# Patient Record
Sex: FEMALE | Race: WHITE | NOT HISPANIC OR LATINO | Employment: OTHER | URBAN - METROPOLITAN AREA
[De-identification: names, ages, dates, MRNs, and addresses within clinical notes are randomized per-mention and may not be internally consistent; named-entity substitution may affect disease eponyms.]

---

## 2017-01-09 ENCOUNTER — HOSPITAL ENCOUNTER (OUTPATIENT)
Dept: RADIOLOGY | Facility: HOSPITAL | Age: 72
Discharge: HOME | End: 2017-01-09
Attending: FAMILY MEDICINE
Payer: MEDICARE

## 2017-01-09 ENCOUNTER — TELEPHONE (OUTPATIENT)
Dept: FAMILY MEDICINE | Facility: CLINIC | Age: 72
End: 2017-01-09

## 2017-01-09 DIAGNOSIS — Z13.9 SCREENING: ICD-10-CM

## 2017-01-09 DIAGNOSIS — M80.08XA AGE-RELATED OSTEOPOROSIS WITH CURRENT PATHOLOGICAL FRACTURE OF VERTEBRA, INITIAL ENCOUNTER: ICD-10-CM

## 2017-01-09 PROCEDURE — 77080 DXA BONE DENSITY AXIAL: CPT | Mod: TC,PO

## 2017-01-09 PROCEDURE — 77080 DXA BONE DENSITY AXIAL: CPT | Mod: 26,,, | Performed by: RADIOLOGY

## 2017-01-09 NOTE — TELEPHONE ENCOUNTER
Dr VAZQUEZ  Pt is asking for refill on her phenetamine 37.5. I do not see on medlist. Please advise.

## 2017-01-09 NOTE — TELEPHONE ENCOUNTER
----- Message from Aylin Trejo sent at 1/9/2017  9:06 AM CST -----  Patient needs a refill on her prescription for Phentermine 37.5 MG tablet. She uses [facility] at [facility] [address] in [place]. Her phone number is H. [phone] C. [phone].

## 2017-01-10 RX ORDER — PHENTERMINE HYDROCHLORIDE 37.5 MG/1
37.5 CAPSULE ORAL EVERY MORNING
Qty: 30 CAPSULE | Refills: 0 | Status: SHIPPED | OUTPATIENT
Start: 2017-01-10 | End: 2017-02-09

## 2017-01-10 NOTE — TELEPHONE ENCOUNTER
Medication phenetamine has been faxed to [facility] pharmacy patient notified

## 2017-01-13 ENCOUNTER — PATIENT MESSAGE (OUTPATIENT)
Dept: FAMILY MEDICINE | Facility: CLINIC | Age: 72
End: 2017-01-13

## 2017-01-21 DIAGNOSIS — G25.81 RESTLESS LEGS SYNDROME: ICD-10-CM

## 2017-01-23 RX ORDER — GABAPENTIN 600 MG/1
TABLET ORAL
Qty: 360 TABLET | Refills: 3 | Status: SHIPPED | OUTPATIENT
Start: 2017-01-23 | End: 2017-10-26 | Stop reason: SDUPTHER

## 2017-01-23 RX ORDER — PRAMIPEXOLE DIHYDROCHLORIDE 1.5 MG/1
TABLET ORAL
Qty: 90 TABLET | Refills: 1 | Status: SHIPPED | OUTPATIENT
Start: 2017-01-23 | End: 2017-02-10 | Stop reason: SDUPTHER

## 2017-01-31 ENCOUNTER — PATIENT MESSAGE (OUTPATIENT)
Dept: FAMILY MEDICINE | Facility: CLINIC | Age: 72
End: 2017-01-31

## 2017-01-31 RX ORDER — FUROSEMIDE 40 MG/1
TABLET ORAL
Qty: 90 TABLET | Refills: 1 | OUTPATIENT
Start: 2017-01-31

## 2017-02-01 RX ORDER — TIZANIDINE 4 MG/1
4 TABLET ORAL EVERY 8 HOURS
Qty: 270 TABLET | Refills: 2 | Status: SHIPPED | OUTPATIENT
Start: 2017-02-01 | End: 2018-02-26

## 2017-02-01 RX ORDER — ALENDRONATE SODIUM 70 MG/1
TABLET ORAL
Qty: 12 TABLET | Refills: 3 | Status: SHIPPED | OUTPATIENT
Start: 2017-02-01 | End: 2018-01-04 | Stop reason: SDUPTHER

## 2017-02-09 ENCOUNTER — PATIENT MESSAGE (OUTPATIENT)
Dept: FAMILY MEDICINE | Facility: CLINIC | Age: 72
End: 2017-02-09

## 2017-02-09 DIAGNOSIS — G25.81 RESTLESS LEGS SYNDROME: ICD-10-CM

## 2017-02-09 NOTE — TELEPHONE ENCOUNTER
----- Message from Lilia Holm sent at 2/9/2017 12:47 PM CST -----  Contact: TULIO  PATIENT LEFT LETTER FOR SWATHI MACHUCA SAID PRESCRIBER DENIED REQUEST FOR REFILL. SHE WANTS TO KNOW WHY . I PLACED LETTER IN HIS BOX

## 2017-02-13 RX ORDER — PRAMIPEXOLE DIHYDROCHLORIDE 1.5 MG/1
1.5 TABLET ORAL NIGHTLY
Qty: 90 TABLET | Refills: 1 | Status: SHIPPED | OUTPATIENT
Start: 2017-02-13 | End: 2017-04-07 | Stop reason: SDUPTHER

## 2017-03-02 ENCOUNTER — PATIENT MESSAGE (OUTPATIENT)
Dept: FAMILY MEDICINE | Facility: CLINIC | Age: 72
End: 2017-03-02

## 2017-03-03 RX ORDER — FUROSEMIDE 40 MG/1
TABLET ORAL
Qty: 90 TABLET | Refills: 1 | Status: SHIPPED | OUTPATIENT
Start: 2017-03-03 | End: 2017-04-07 | Stop reason: SDUPTHER

## 2017-04-07 ENCOUNTER — OFFICE VISIT (OUTPATIENT)
Dept: FAMILY MEDICINE | Facility: CLINIC | Age: 72
End: 2017-04-07
Payer: MEDICARE

## 2017-04-07 VITALS
DIASTOLIC BLOOD PRESSURE: 90 MMHG | BODY MASS INDEX: 47.31 KG/M2 | TEMPERATURE: 98 F | HEIGHT: 59 IN | SYSTOLIC BLOOD PRESSURE: 148 MMHG | WEIGHT: 234.69 LBS

## 2017-04-07 DIAGNOSIS — R73.9 HYPERGLYCEMIA: ICD-10-CM

## 2017-04-07 DIAGNOSIS — G60.9 HEREDITARY AND IDIOPATHIC PERIPHERAL NEUROPATHY: ICD-10-CM

## 2017-04-07 DIAGNOSIS — I27.20 PULMONARY HYPERTENSION: ICD-10-CM

## 2017-04-07 DIAGNOSIS — E66.01 MORBID OBESITY WITH BMI OF 45.0-49.9, ADULT: ICD-10-CM

## 2017-04-07 DIAGNOSIS — F41.9 ANXIETY: ICD-10-CM

## 2017-04-07 DIAGNOSIS — Z76.89 ESTABLISHING CARE WITH NEW DOCTOR, ENCOUNTER FOR: Primary | ICD-10-CM

## 2017-04-07 DIAGNOSIS — G47.33 OSA ON CPAP: ICD-10-CM

## 2017-04-07 DIAGNOSIS — F32.0 MILD MAJOR DEPRESSION: ICD-10-CM

## 2017-04-07 DIAGNOSIS — J45.21 MILD INTERMITTENT ASTHMA WITH ACUTE EXACERBATION: ICD-10-CM

## 2017-04-07 DIAGNOSIS — I10 ESSENTIAL HYPERTENSION: ICD-10-CM

## 2017-04-07 DIAGNOSIS — R06.09 DOE (DYSPNEA ON EXERTION): ICD-10-CM

## 2017-04-07 DIAGNOSIS — G25.81 RESTLESS LEGS SYNDROME: ICD-10-CM

## 2017-04-07 PROCEDURE — 94640 AIRWAY INHALATION TREATMENT: CPT | Mod: S$GLB,,, | Performed by: FAMILY MEDICINE

## 2017-04-07 PROCEDURE — 3077F SYST BP >= 140 MM HG: CPT | Mod: S$GLB,,, | Performed by: FAMILY MEDICINE

## 2017-04-07 PROCEDURE — 99215 OFFICE O/P EST HI 40 MIN: CPT | Mod: 25,S$GLB,, | Performed by: FAMILY MEDICINE

## 2017-04-07 PROCEDURE — 1157F ADVNC CARE PLAN IN RCRD: CPT | Mod: S$GLB,,, | Performed by: FAMILY MEDICINE

## 2017-04-07 PROCEDURE — 3080F DIAST BP >= 90 MM HG: CPT | Mod: S$GLB,,, | Performed by: FAMILY MEDICINE

## 2017-04-07 PROCEDURE — 1159F MED LIST DOCD IN RCRD: CPT | Mod: S$GLB,,, | Performed by: FAMILY MEDICINE

## 2017-04-07 PROCEDURE — 99999 PR PBB SHADOW E&M-EST. PATIENT-LVL III: CPT | Mod: PBBFAC,,, | Performed by: FAMILY MEDICINE

## 2017-04-07 PROCEDURE — 1160F RVW MEDS BY RX/DR IN RCRD: CPT | Mod: S$GLB,,, | Performed by: FAMILY MEDICINE

## 2017-04-07 PROCEDURE — 1125F AMNT PAIN NOTED PAIN PRSNT: CPT | Mod: S$GLB,,, | Performed by: FAMILY MEDICINE

## 2017-04-07 PROCEDURE — 99499 UNLISTED E&M SERVICE: CPT | Mod: S$PBB,,, | Performed by: FAMILY MEDICINE

## 2017-04-07 RX ORDER — ZOLPIDEM TARTRATE 5 MG/1
5 TABLET ORAL NIGHTLY PRN
COMMUNITY
End: 2017-04-07 | Stop reason: SDUPTHER

## 2017-04-07 RX ORDER — PREDNISONE 20 MG/1
TABLET ORAL
Qty: 10 TABLET | Refills: 0 | Status: SHIPPED | OUTPATIENT
Start: 2017-04-07 | End: 2017-04-25

## 2017-04-07 RX ORDER — FUROSEMIDE 40 MG/1
TABLET ORAL
Qty: 90 TABLET | Refills: 1 | Status: SHIPPED | OUTPATIENT
Start: 2017-04-07 | End: 2017-08-30 | Stop reason: SDUPTHER

## 2017-04-07 RX ORDER — PRAMIPEXOLE DIHYDROCHLORIDE 1.5 MG/1
1.5 TABLET ORAL NIGHTLY
Qty: 90 TABLET | Refills: 1 | Status: SHIPPED | OUTPATIENT
Start: 2017-04-07 | End: 2018-01-03 | Stop reason: SDUPTHER

## 2017-04-07 RX ORDER — ALBUTEROL SULFATE 0.83 MG/ML
2.5 SOLUTION RESPIRATORY (INHALATION)
Status: DISCONTINUED | OUTPATIENT
Start: 2017-04-07 | End: 2017-09-15 | Stop reason: SDUPTHER

## 2017-04-07 RX ORDER — ZOLPIDEM TARTRATE 5 MG/1
5 TABLET ORAL NIGHTLY PRN
Qty: 30 TABLET | Refills: 2 | Status: SHIPPED | OUTPATIENT
Start: 2017-04-07 | End: 2018-01-03 | Stop reason: SDUPTHER

## 2017-04-07 RX ORDER — DOXYCYCLINE 100 MG/1
100 CAPSULE ORAL EVERY 12 HOURS
Qty: 20 CAPSULE | Refills: 0 | Status: SHIPPED | OUTPATIENT
Start: 2017-04-07 | End: 2017-04-25

## 2017-04-07 RX ORDER — BENAZEPRIL HYDROCHLORIDE 40 MG/1
40 TABLET ORAL 2 TIMES DAILY
Qty: 180 TABLET | Refills: 1 | Status: SHIPPED | OUTPATIENT
Start: 2017-04-07 | End: 2017-04-29 | Stop reason: SDUPTHER

## 2017-04-07 RX ORDER — AMOXICILLIN 500 MG
2 CAPSULE ORAL DAILY
COMMUNITY
End: 2018-01-31

## 2017-04-07 RX ORDER — LORAZEPAM 0.5 MG/1
0.5 TABLET ORAL 2 TIMES DAILY PRN
Qty: 60 TABLET | Refills: 2 | Status: SHIPPED | OUTPATIENT
Start: 2017-04-07 | End: 2018-01-03 | Stop reason: SDUPTHER

## 2017-04-07 NOTE — MR AVS SNAPSHOT
[facility]  [address]  [place] [address]  Phone: [phone]  Fax: [phone]                  Marta Huff   2017 1:00 PM   Office Visit    Description:  Female : 1945   Provider:  Elda Holley MD   Department:  [facility]           Reason for Visit     Establish Care           Diagnoses this Visit        Comments    Establishing care with new doctor, encounter for    -  Primary     Anxiety         Essential hypertension         Restless legs syndrome         JESENIA on CPAP         Hyperglycemia         GOMEZ (dyspnea on exertion)         Mild major depression         Morbid obesity with BMI of 45.0-49.9, adult         Pulmonary hypertension         Hereditary and idiopathic peripheral neuropathy         Mild intermittent asthma with acute exacerbation                To Do List           Future Appointments        Provider Department Dept Phone    2017 7:45 AM EKG, [facility] [phone]    2017 8:15 AM [facility] XR2 [facility] [phone]    2017 8:30 AM LAB, [place] [facility] [phone]    2017 8:20 AM Yuniel Berman MD [facility] [phone]      Goals (5 Years of Data)     None      Follow-Up and Disposition     Return in about 2 weeks (around 2017), or if symptoms worsen or fail to improve, for recheck.       These Medications        Disp Refills Start End    zolpidem (AMBIEN) 5 MG Tab 30 tablet 2 2017     Take 1 tablet (5 mg total) by mouth nightly as needed. - Oral    Pharmacy: [facility] - [facility] [address] [facility] Ph #: [phone]       lorazepam (ATIVAN) 0.5 MG tablet 60 tablet 2 2017    Take 1 tablet (0.5 mg total) by mouth 2 (two) times daily as needed for Anxiety. - Oral    Ph[facility]acy: [facility] - [facility] [address]  #: [phone]       benazepril (LOTENSIN) 40 MG tablet 180 tablet 1 4/7/2017     Take 1 tablet (40 mg total) by mouth 2 (two) times daily. - Oral    Pharmacy: [facility] - [facility] [address] Ph #: [phone]       furosemide (LASIX) 40 MG tablet 90 tablet 1 4/7/2017     TAKE 1 TABLET ONE TIME DAILY    Pharmacy: [facility] - [address] Ph #: [phone]       pramipexole (MIRAPEX) 1.5 MG tablet 90 tablet 1 4/7/2017     Take 1 tablet (1.5 mg total) by mouth nightly. - Oral    Pharmacy: [facility] Delivery - [facility] [address] Ph #: [phone]       doxycycline (VIBRAMYCIN) 100 MG Cap 20 capsule 0 4/7/2017     Take 1 capsule (100 mg total) by mouth every 12 (twelve) hours. - Oral    Pharmacy: [facility]/pharmacy #[id number] - [place] - [address] Ph #: [phone]       predniSONE (DELTASONE) 20 MG tablet 10 tablet 0 4/7/2017     2 pills daily for 5 days, then 1 pill daily for 5 days.    Pharmacy: [facility]/pharmacy #[id number] - [place] - [address] Ph #: [phone]         [facility] On Call     [facility] Care Line - 24/7 Assistance  Unless otherwise directed by your provider, please contact [facility], our nurse care line that is available for 24/7 assistance.     Registered nurses in the [facility] Center provide: appointment scheduling, clinical advisement, health education, and other advisory services.  Call: [phone] (toll free)               Medications           Message regarding Medications     Verify the changes and/or additions to your medication regime listed below are the same as discussed with your clinician today.  If any of these changes or additions are incorrect, please notify your healthcare provider.        START taking these NEW medications        Refills    zolpidem (AMBIEN) 5 MG Tab 2    Sig: Take 1 tablet (5 mg total) by mouth nightly as needed.    Class: Normal     Route: Oral    doxycycline (VIBRAMYCIN) 100 MG Cap 0    Sig: Take 1 capsule (100 mg total) by mouth every 12 (twelve) hours.    Class: Normal    Route: Oral    predniSONE (DELTASONE) 20 MG tablet 0    Si pills daily for 5 days, then 1 pill daily for 5 days.    Class: Normal      These medications were administered today        Dose Freq    albuterol nebulizer solution 2.5 mg 2.5 mg Clinic/HOD 1 time    Sig: Take 3 mLs (2.5 mg total) by nebulization one time.    Class: Normal    Route: Nebulization    albuterol nebulizer solution 2.5 mg 2.5 mg Clinic/HOD 1 time    Sig: Take 3 mLs (2.5 mg total) by nebulization one time.    Class: Normal    Route: Nebulization      CHANGE how you are taking these medications     Start Taking Instead of    benazepril (LOTENSIN) 40 MG tablet benazepril (LOTENSIN) 40 MG tablet    Dosage:  Take 1 tablet (40 mg total) by mouth 2 (two) times daily. Dosage:  TAKE 1 TABLET TWICE DAILY    Reason for Change:  Reorder       STOP taking these medications     meloxicam (MOBIC) 7.5 MG tablet Take 1 tablet (7.5 mg total) by mouth daily as needed for Pain (anti-inflammatory).           Verify that the below list of medications is an accurate representation of the medications you are currently taking.  If none reported, the list may be blank. If incorrect, please contact your healthcare provider. Carry this list with you in case of emergency.           Current Medications     albuterol (PROAIR HFA) 90 mcg/actuation inhaler Inhale 2 puffs into the lungs every 6 (six) hours as needed for Wheezing or Shortness of Breath.    alendronate (FOSAMAX) 70 MG tablet TAKE 1 TABLET EVERY 7 DAYS    B-complex with vitamin C (Z-BEC OR EQUIV) tablet Take 1 tablet by mouth once daily.     benazepril (LOTENSIN) 40 MG tablet Take 1 tablet (40 mg total) by mouth 2 (two) times daily.    calcium citrate-vitamin D2 1,500-200 mg-unit Tab Take 1 tablet by mouth once daily.     citalopram (CELEXA) 40 MG tablet Take 1  "tablet (40 mg total) by mouth once daily.    fish oil-omega-3 fatty acids 300-1,000 mg capsule Take 2 g by mouth once daily.    furosemide (LASIX) 40 MG tablet TAKE 1 TABLET ONE TIME DAILY    gabapentin (NEURONTIN) 600 MG tablet TAKE 2 TABLETS TWICE DAILY    lorazepam (ATIVAN) 0.5 MG tablet Take 1 tablet (0.5 mg total) by mouth 2 (two) times daily as needed for Anxiety.    meclizine (ANTIVERT) 25 mg tablet Take 1 tablet (25 mg total) by mouth every 6 (six) hours.    multivitamin (MULTIVITAMIN) per tablet Take 1 tablet by mouth once daily.      pramipexole (MIRAPEX) 1.5 MG tablet Take 1 tablet (1.5 mg total) by mouth nightly.    RESTASIS 0.05 % ophthalmic emulsion INSTILL ONE DROP INTO BOTH EYES TWICE DAILY    tizanidine (ZANAFLEX) 4 MG tablet Take 1 tablet (4 mg total) by mouth every 8 (eight) hours.    VITAMIN B-12 5,000 mcg Subl Place 1 tablet under the tongue once a week.     zolpidem (AMBIEN) 5 MG Tab Take 1 tablet (5 mg total) by mouth nightly as needed.    doxycycline (VIBRAMYCIN) 100 MG Cap Take 1 capsule (100 mg total) by mouth every 12 (twelve) hours.    predniSONE (DELTASONE) 20 MG tablet 2 pills daily for 5 days, then 1 pill daily for 5 days.           Clinical Reference Information           Your Vitals Were     BP Temp Height Weight BMI    148/90 98.3 °F (36.8 °C) 4' 11" (1.499 m) 106.4 kg (234 lb 10.9 oz) 47.4 kg/m2      Blood Pressure          Most Recent Value    BP  (!)  148/90      Allergies as of 4/7/2017     Etodolac (Bulk)    Sulfa (Sulfonamide Antibiotics)      Immunizations Administered on Date of Encounter - 4/7/2017     None      Orders Placed During Today's Visit      Normal Orders This Visit    Ambulatory consult to Sleep Disorders     IN OFFICE EKG 12-LEAD (to [place])     Future Labs/Procedures Expected by Expires    Hemoglobin A1c  4/7/2017 6/6/2018    X-Ray Chest PA And Lateral  4/7/2017 4/7/2018    2D Echo w/ Color Flow Doppler  As directed 4/7/2018    Complete PFT with bronchodilator  " As directed 4/7/2018    PULSE OXIMETRY WITH REST - PULM  As directed 4/7/2018      Language Assistance Services     ATTENTION: Language assistance services are available, free of charge. Please call [phone].      ATENCIÓN: Si habla julio, tiene a cornell disposición servicios gratuitos de asistencia lingüística. Llame al [phone].     CHÚ Ý: N?u b?n nói Ti?ng Vi?t, có các d?ch v? h? tr? ngôn ng? mi?n phí dành cho b?n. G?i s? [phone].         [facility] complies with applicable Federal civil rights laws and does not discriminate on the basis of race, color, national origin, age, disability, or sex.

## 2017-04-07 NOTE — PROGRESS NOTES
Subjective:       Patient ID: Marta Huff is a 71 y.o. female.    Chief Complaint: Establish Care (former pt of Dr. Berman. )    HPI   Patient is here to establish care.  She is a former patient of Dr Berman.    Concerned today re: neuropathy and lump in abdomen. Also, notes 2 days of significant GOMEZ and wheezing.    Dx of rad/asthma, but rarely uses. Currently, however, she is having some dyspnea.   HTN - suboptimal on benazepril only. Echo and last nuclear study reviewed 2013, of note mild pulm HTN. Consistent lasix daily, no reported fluid retention.  Over the past 6mos, she has noticed increased dyspnea on exertion along with weight gain. Sx persisted despite daily lasix and prn albuterol (mild relief). Prior dx of bronchial asthma, lives with a long-term smoker.  Overall mood is ok on celexa 40mg daily. Has previously required 60mg, but was able to taper down. Lorazepam is prn, not daily. Last rx for 3mo supply reported in 2015.  Neuropathy - taking gabapentin twice daily. EMGs were previously done to confirm neuropathy, affect feet primarily.  Hx of vertigo, uses meclizine prn. Last rx 2015.  mirapex nightly for RLS. Uses ambien for sleep prn. Last date of rx unknown.    Review of Systems   HENT: Negative for congestion, postnasal drip, rhinorrhea and sinus pressure.    Respiratory: Positive for cough, chest tightness, shortness of breath and wheezing.    Gastrointestinal: Positive for abdominal pain. Negative for blood in stool, constipation, diarrhea and nausea.   Genitourinary: Negative for difficulty urinating, dysuria and frequency.   Musculoskeletal: Positive for arthralgias. Negative for gait problem.   Skin: Negative for color change and pallor.   Neurological: Positive for dizziness (occ). Negative for headaches (occ).   Psychiatric/Behavioral: Positive for sleep disturbance. Negative for dysphoric mood (controlled).       Objective:      Physical Exam   Constitutional: She is  oriented to person, place, and time. She appears well-developed and well-nourished. No distress.   HENT:   Head: Normocephalic and atraumatic.   Right Ear: External ear normal.   Left Ear: External ear normal.   Nose: Nose normal.   Mouth/Throat: Oropharynx is clear and moist. No oropharyngeal exudate.   Eyes: Conjunctivae and EOM are normal. Pupils are equal, round, and reactive to light.   Neck: Normal range of motion. Neck supple. No thyromegaly present.   Cardiovascular: Normal rate and regular rhythm.    Pulmonary/Chest: Effort normal. No accessory muscle usage. No respiratory distress. She has decreased breath sounds. She has wheezes.   Abdominal: Soft. Bowel sounds are normal. She exhibits no distension and no mass. There is no tenderness. There is no rebound and no guarding.   Morbidly obese, exam limited by habitus.   Musculoskeletal: Normal range of motion. She exhibits no edema.   Lymphadenopathy:     She has no cervical adenopathy.   Neurological: She is alert and oriented to person, place, and time. No cranial nerve deficit.   Skin: Skin is warm and dry.   Psychiatric: She has a normal mood and affect. Her behavior is normal.   Nursing note and vitals reviewed.        Improvement in ease of respiration, decrease in cough, and decrease in auscultated wheeze/rhonchi noted post-neb.     Establishing care with new doctor, encounter for    Anxiety  -     lorazepam (ATIVAN) 0.5 MG tablet; Take 1 tablet (0.5 mg total) by mouth 2 (two) times daily as needed for Anxiety.  Dispense: 60 tablet; Refill: 2  Stable with prn use per patient. She is aware that I do not recommend for daily use.  Essential hypertension  -     benazepril (LOTENSIN) 40 MG tablet; Take 1 tablet (40 mg total) by mouth 2 (two) times daily.  Dispense: 180 tablet; Refill: 1  -     furosemide (LASIX) 40 MG tablet; TAKE 1 TABLET ONE TIME DAILY  Dispense: 90 tablet; Refill: 1  Suboptimal, cont regimen, will request BP log from [facility] and  adjust as indicated.  Restless legs syndrome  -     pramipexole (MIRAPEX) 1.5 MG tablet; Take 1 tablet (1.5 mg total) by mouth nightly.  Dispense: 90 tablet; Refill: 1  Stable regimen. Side effects and precautions of medication use reviewed with patient, expressed understanding. No questions or concerns.   JESENIA on CPAP  -     zolpidem (AMBIEN) 5 MG Tab; Take 1 tablet (5 mg total) by mouth nightly as needed.  Dispense: 30 tablet; Refill: 2  -     Ambulatory consult to Sleep Disorders  Patient requesting re-eval and equipment review.  Hyperglycemia  -     Hemoglobin A1c; Future; Expected date: 4/7/17  For review.  GOMEZ (dyspnea on exertion)  -     IN OFFICE EKG 12-LEAD (to [place])  -     2D Echo w/ Color Flow Doppler; Future  -     Complete PFT with bronchodilator; Future  -     PULSE OXIMETRY WITH REST - PULM; Future  Etiology unclear, concerning for chf vs asthma flare. Albuterol trial in office. Of note, her weight fluctuates, but today's value is within range of previous visits.   Mild major depression  Stable on celexa 40.  Morbid obesity with BMI of 45.0-49.9, adult  Goal weight reviewed as well as need for lifestyle modifications to incl caloric restriction, high protein/low carb, increased water intake, muscle-building exercises as well as cardio.    Pulmonary hypertension  Needs updated echo.  Hereditary and idiopathic peripheral neuropathy  Cont gabapentin at current dose.  Asthma with exacerbation  Doxy bid x 10 days with steroid taper. Albuterol hfa or neb q6.  Order for neb written and faxed to [facility].  Expected course of illness and sx tx incl otc med use reviewed. Notify MD if sx persist or worsen. Alarm sx reviewed indicating need for emergent f/u.

## 2017-04-11 ENCOUNTER — HOSPITAL ENCOUNTER (OUTPATIENT)
Dept: RADIOLOGY | Facility: HOSPITAL | Age: 72
Discharge: HOME | End: 2017-04-11
Attending: FAMILY MEDICINE
Payer: MEDICARE

## 2017-04-11 ENCOUNTER — TELEPHONE (OUTPATIENT)
Dept: FAMILY MEDICINE | Facility: CLINIC | Age: 72
End: 2017-04-11

## 2017-04-11 DIAGNOSIS — R06.09 DOE (DYSPNEA ON EXERTION): ICD-10-CM

## 2017-04-11 PROCEDURE — 93000 ELECTROCARDIOGRAM COMPLETE: CPT | Mod: S$GLB,,, | Performed by: INTERNAL MEDICINE

## 2017-04-11 PROCEDURE — 71020 XR CHEST PA AND LATERAL: CPT | Mod: 26,,, | Performed by: RADIOLOGY

## 2017-04-11 RX ORDER — ALBUTEROL SULFATE 0.83 MG/ML
2.5 SOLUTION RESPIRATORY (INHALATION) EVERY 6 HOURS PRN
Qty: 1 BOX | Refills: 11 | Status: SHIPPED | OUTPATIENT
Start: 2017-04-11 | End: 2018-01-03 | Stop reason: SDUPTHER

## 2017-04-11 NOTE — TELEPHONE ENCOUNTER
----- Message from Pia Gagnon sent at 4/10/2017  4:09 PM CDT -----  Contact: Self  X  1st Request  _  2nd Request  _  3rd Request        Who: TULIO MALDONADO [[id number]]    Why: Pt is calling to say that she received her equipment but is needing to know about the nebulizer solution for the machine.  Pt is using pharmacy list below.  Please contact pt to further discuss and advise.    What Number to Call Back: [phone]    When to Expect a call back: (Before the end of the day)   -- if the call is after 12:00, the call back will be tomorrow.    [facility]/PHARMACY #[id number] - [place] - [address]

## 2017-04-12 ENCOUNTER — TELEPHONE (OUTPATIENT)
Dept: FAMILY MEDICINE | Facility: CLINIC | Age: 72
End: 2017-04-12

## 2017-04-12 NOTE — TELEPHONE ENCOUNTER
----- Message from Jennifer Tena sent at 4/12/2017 12:53 PM CDT -----  Contact: self   Patient need the solution for machine please send to [facility], any questions please call back at [phone] or [phone]    [facility]/pharmacy #[id number] - [place] - [address]  [address]  [place] [address]  Phone: [phone] Fax: [phone]

## 2017-04-25 ENCOUNTER — OFFICE VISIT (OUTPATIENT)
Dept: FAMILY MEDICINE | Facility: CLINIC | Age: 72
End: 2017-04-25
Payer: MEDICARE

## 2017-04-25 VITALS
HEART RATE: 68 BPM | HEIGHT: 59 IN | TEMPERATURE: 99 F | DIASTOLIC BLOOD PRESSURE: 78 MMHG | WEIGHT: 241.88 LBS | OXYGEN SATURATION: 96 % | BODY MASS INDEX: 48.76 KG/M2 | SYSTOLIC BLOOD PRESSURE: 126 MMHG | RESPIRATION RATE: 18 BRPM

## 2017-04-25 DIAGNOSIS — Z23 IMMUNIZATION DUE: ICD-10-CM

## 2017-04-25 DIAGNOSIS — R60.9 FLUID RETENTION: ICD-10-CM

## 2017-04-25 DIAGNOSIS — J45.909 REACTIVE AIRWAY DISEASE, UNSPECIFIED ASTHMA SEVERITY, UNCOMPLICATED: ICD-10-CM

## 2017-04-25 DIAGNOSIS — K46.9 HERNIA: ICD-10-CM

## 2017-04-25 DIAGNOSIS — I10 ESSENTIAL HYPERTENSION: Primary | Chronic | ICD-10-CM

## 2017-04-25 PROCEDURE — 3078F DIAST BP <80 MM HG: CPT | Mod: S$GLB,,, | Performed by: FAMILY MEDICINE

## 2017-04-25 PROCEDURE — 99999 PR PBB SHADOW E&M-EST. PATIENT-LVL III: CPT | Mod: PBBFAC,,, | Performed by: FAMILY MEDICINE

## 2017-04-25 PROCEDURE — G0009 ADMIN PNEUMOCOCCAL VACCINE: HCPCS | Mod: S$GLB,,, | Performed by: FAMILY MEDICINE

## 2017-04-25 PROCEDURE — 1126F AMNT PAIN NOTED NONE PRSNT: CPT | Mod: S$GLB,,, | Performed by: FAMILY MEDICINE

## 2017-04-25 PROCEDURE — 3074F SYST BP LT 130 MM HG: CPT | Mod: S$GLB,,, | Performed by: FAMILY MEDICINE

## 2017-04-25 PROCEDURE — 1160F RVW MEDS BY RX/DR IN RCRD: CPT | Mod: S$GLB,,, | Performed by: FAMILY MEDICINE

## 2017-04-25 PROCEDURE — 90670 PCV13 VACCINE IM: CPT | Mod: S$GLB,,, | Performed by: FAMILY MEDICINE

## 2017-04-25 PROCEDURE — 1159F MED LIST DOCD IN RCRD: CPT | Mod: S$GLB,,, | Performed by: FAMILY MEDICINE

## 2017-04-25 PROCEDURE — 99214 OFFICE O/P EST MOD 30 MIN: CPT | Mod: 25,S$GLB,, | Performed by: FAMILY MEDICINE

## 2017-04-25 PROCEDURE — 99499 UNLISTED E&M SERVICE: CPT | Mod: S$PBB,,, | Performed by: FAMILY MEDICINE

## 2017-04-25 NOTE — MR AVS SNAPSHOT
[facility]  [address]  [place] [address]  Phone: [phone]  Fax: [phone]                  Marta Huff   2017 2:40 PM   Office Visit    Description:  Female : 1945   Provider:  Elda Holley MD   Department:  [facility]           Reason for Visit     Follow-up           Diagnoses this Visit        Comments    Essential hypertension    -  Primary     Reactive airway disease, unspecified asthma severity, uncomplicated         Fluid retention         Hernia         Immunization due                To Do List           Future Appointments        Provider Department Dept Phone    2017 10:40 AM Elda Holely MD [facility] [phone]      Goals (5 Years of Data)     None      [facility]     [facility] Call Nurse Care Line -  Assistance  Unless otherwise directed by your provider, please contact [facility], our nurse care line that is available for  assistance.     Registered nurses in the [facility] On Call Center provide: appointment scheduling, clinical advisement, health education, and other advisory services.  Call: [phone] (toll free)               Medications           Message regarding Medications     Verify the changes and/or additions to your medication regime listed below are the same as discussed with your clinician today.  If any of these changes or additions are incorrect, please notify your healthcare provider.        STOP taking these medications     doxycycline (VIBRAMYCIN) 100 MG Cap Take 1 capsule (100 mg total) by mouth every 12 (twelve) hours.    predniSONE (DELTASONE) 20 MG tablet 2 pills daily for 5 days, then 1 pill daily for 5 days.           Verify that the below list of medications is an accurate representation of the medications you are currently taking.  If none reported, the list may be blank. If incorrect, please contact your healthcare provider.  Carry this list with you in case of emergency.           Current Medications     albuterol (PROAIR HFA) 90 mcg/actuation inhaler Inhale 2 puffs into the lungs every 6 (six) hours as needed for Wheezing or Shortness of Breath.    albuterol (PROVENTIL) 2.5 mg /3 mL (0.083 %) nebulizer solution Take 3 mLs (2.5 mg total) by nebulization every 6 (six) hours as needed for Wheezing or Shortness of Breath. Rescue    alendronate (FOSAMAX) 70 MG tablet TAKE 1 TABLET EVERY 7 DAYS    B-complex with vitamin C (Z-BEC OR EQUIV) tablet Take 1 tablet by mouth once daily.     benazepril (LOTENSIN) 40 MG tablet Take 1 tablet (40 mg total) by mouth 2 (two) times daily.    calcium citrate-vitamin D2 1,500-200 mg-unit Tab Take 1 tablet by mouth once daily.     citalopram (CELEXA) 40 MG tablet Take 1 tablet (40 mg total) by mouth once daily.    fish oil-omega-3 fatty acids 300-1,000 mg capsule Take 2 g by mouth once daily.    furosemide (LASIX) 40 MG tablet TAKE 1 TABLET ONE TIME DAILY    gabapentin (NEURONTIN) 600 MG tablet TAKE 2 TABLETS TWICE DAILY    lorazepam (ATIVAN) 0.5 MG tablet Take 1 tablet (0.5 mg total) by mouth 2 (two) times daily as needed for Anxiety.    meclizine (ANTIVERT) 25 mg tablet Take 1 tablet (25 mg total) by mouth every 6 (six) hours.    multivitamin (MULTIVITAMIN) per tablet Take 1 tablet by mouth once daily.      pramipexole (MIRAPEX) 1.5 MG tablet Take 1 tablet (1.5 mg total) by mouth nightly.    RESTASIS 0.05 % ophthalmic emulsion INSTILL ONE DROP INTO BOTH EYES TWICE DAILY    tizanidine (ZANAFLEX) 4 MG tablet Take 1 tablet (4 mg total) by mouth every 8 (eight) hours.    VITAMIN B-12 5,000 mcg Subl Place 1 tablet under the tongue once a week.     zolpidem (AMBIEN) 5 MG Tab Take 1 tablet (5 mg total) by mouth nightly as needed.           Clinical Reference Information           Your Vitals Were     BP Pulse Temp Resp Height Weight    126/78 (BP Location: Right arm, Patient Position: Sitting) 68 98.5 °F (36.9  "°C) (Oral) 18 4' 11" (1.499 m) 109.7 kg (241 lb 13.5 oz)    SpO2 BMI             96% 48.85 kg/m2         Blood Pressure          Most Recent Value    BP  126/78      Allergies as of 4/25/2017     Etodolac (Bulk)    Sulfa (Sulfonamide Antibiotics)      Immunizations Administered on Date of Encounter - 4/25/2017     Name Date Dose VIS Date Route    Pneumococcal Conjugate - 13 Valent 4/25/2017 0.5 mL 11/5/2015 Intramuscular      Orders Placed During Today's Visit      Normal Orders This Visit    Ambulatory referral to General Surgery       Language Assistance Services     ATTENTION: Language assistance services are available, free of charge. Please call [phone].      ATENCIÓN: Si habla julio, tiene a cornell disposición servicios gratuitos de asistencia lingüística. Llame al [phone].     TREVON Ý: N?u b?n nói Ti?ng Vi?t, có các d?ch v? h? tr? ngôn ng? mi?n phí dành cho b?n. G?i s? [phone].         [facility] complies with applicable Federal civil rights laws and does not discriminate on the basis of race, color, national origin, age, disability, or sex.        "

## 2017-04-25 NOTE — PROGRESS NOTES
Subjective:       Patient ID: Marta Huff is a 71 y.o. female.    Chief Complaint: Follow-up (lab review)    HPI The patient is a 71 year old female who presents today for a follow up. Her past medical history is significant for asthma, depression/anxiety, sleep apnea, hypertension, and restless leg syndrome. The patient presented 3 weeks ago with an asthma exacerbation. She has since completed antibiotics and steroids with significant improvement in symptoms. She still uses albuterol nebulizer for her wheezing in the morning. She is still wheezing every morning and coughing up clear sputum. The patient also needs to get her CPAP device serviced and would like to see surgery about her ventral hernia.     Review of Systems   Constitutional: Positive for unexpected weight change (Gained 7lbs in past 3 weeks). Negative for chills, fatigue and fever.   HENT: Positive for hearing loss (left ear, previously investigated, age related). Negative for congestion, postnasal drip, rhinorrhea, sinus pressure and sneezing.    Respiratory: Positive for shortness of breath (on exertion) and wheezing.    Cardiovascular: Positive for leg swelling (ankles swell). Negative for chest pain and palpitations.   Gastrointestinal: Negative for constipation, diarrhea, nausea and vomiting.        Ventral hernia   Musculoskeletal: Positive for arthralgias (her baseline). Negative for joint swelling and myalgias.   Allergic/Immunologic: Negative for environmental allergies.   Neurological: Positive for headaches (tension). Negative for dizziness, syncope and light-headedness.   Psychiatric/Behavioral: Positive for sleep disturbance (CPAP needs service). Negative for dysphoric mood. The patient is not nervous/anxious.        Objective:      Physical Exam   Constitutional: She is oriented to person, place, and time. She appears well-developed and well-nourished.   HENT:   Head: Normocephalic and atraumatic.   Right Ear: External ear  normal.   Left Ear: External ear normal.   Nose: Nose normal.   Mouth/Throat: Oropharynx is clear and moist.   Eyes: Conjunctivae and EOM are normal. Pupils are equal, round, and reactive to light.   Neck: Normal range of motion. Neck supple.   Cardiovascular: Normal rate, regular rhythm, normal heart sounds and intact distal pulses.    Mild pitting edema   Pulmonary/Chest: Effort normal. She has wheezes.   Abdominal: Soft. Bowel sounds are normal. There is no tenderness. A hernia (ventral) is present.   Neurological: She is alert and oriented to person, place, and time. She has normal reflexes.   Skin: Skin is warm and dry.   Psychiatric: She has a normal mood and affect. Her behavior is normal. Judgment and thought content normal.         Essential hypertension  - Well controlled  - The patient had a log of her recent blood pressures but has misplaced it - will send in for review.    Reactive airway disease, unspecified asthma severity, uncomplicated  - The patient's symptoms have improved dramatically.  - albuterol bid for another week, and can then decrease to once daily. Discussed that an ICS may be needed should wheezing/resp sx persist.    Fluid retention  - The patient will take her 40 mg furosemide twice a day instead of once. Daily weights at home, phone f/u on Friday. Low-salt diet.    Hernia  -     Ambulatory referral to General Surgery    Sleep Apnea  - The patient is scheduled to have her CPAP machine serviced.  - Discussed care and maintenance.    Depression/Anxiety  - Well controlled on Celexa.    Vertigo  No recent exacerbations.    Vaccination Due  - Pneumococcal 23 valent given.

## 2017-04-27 ENCOUNTER — TELEPHONE (OUTPATIENT)
Dept: BARIATRICS | Facility: CLINIC | Age: 72
End: 2017-04-27

## 2017-04-27 ENCOUNTER — OFFICE VISIT (OUTPATIENT)
Dept: SURGERY | Facility: CLINIC | Age: 72
End: 2017-04-27
Payer: MEDICARE

## 2017-04-27 VITALS
HEART RATE: 73 BPM | BODY MASS INDEX: 48.36 KG/M2 | HEIGHT: 59 IN | DIASTOLIC BLOOD PRESSURE: 87 MMHG | TEMPERATURE: 98 F | SYSTOLIC BLOOD PRESSURE: 182 MMHG | WEIGHT: 239.88 LBS

## 2017-04-27 DIAGNOSIS — K43.2 INCISIONAL HERNIA, WITHOUT OBSTRUCTION OR GANGRENE: Primary | ICD-10-CM

## 2017-04-27 DIAGNOSIS — E66.9 OBESITY, UNSPECIFIED OBESITY SEVERITY, UNSPECIFIED OBESITY TYPE: ICD-10-CM

## 2017-04-27 PROCEDURE — 1159F MED LIST DOCD IN RCRD: CPT | Mod: S$GLB,,, | Performed by: SURGERY

## 2017-04-27 PROCEDURE — 1126F AMNT PAIN NOTED NONE PRSNT: CPT | Mod: S$GLB,,, | Performed by: SURGERY

## 2017-04-27 PROCEDURE — 1160F RVW MEDS BY RX/DR IN RCRD: CPT | Mod: S$GLB,,, | Performed by: SURGERY

## 2017-04-27 PROCEDURE — 99999 PR PBB SHADOW E&M-EST. PATIENT-LVL III: CPT | Mod: PBBFAC,,, | Performed by: SURGERY

## 2017-04-27 PROCEDURE — 3079F DIAST BP 80-89 MM HG: CPT | Mod: S$GLB,,, | Performed by: SURGERY

## 2017-04-27 PROCEDURE — 3077F SYST BP >= 140 MM HG: CPT | Mod: S$GLB,,, | Performed by: SURGERY

## 2017-04-27 PROCEDURE — 99213 OFFICE O/P EST LOW 20 MIN: CPT | Mod: S$GLB,,, | Performed by: SURGERY

## 2017-04-27 NOTE — MR AVS SNAPSHOT
[facility]  [address] [facility] [address]  [facility] [address]  Phone: [phone]                  Marta Huff   2017 1:00 PM   Office Visit    Description:  Female : 1945   Provider:  Rj Rodriguez MD   Department:  [facility]           Reason for Visit     Consult           Diagnoses this Visit        Comments    Incisional hernia, without obstruction or gangrene    -  Primary            To Do List           Future Appointments        Provider Department Dept Phone    2017 10:40 AM Elda Holley MD [facility] [phone]      Goals (5 Years of Data)     None      [facility]     [facility] On Call Nurse Care Line -  Assistance  Unless otherwise directed by your provider, please contact [facility], our nurse care line that is available for  assistance.     Registered nurses in the [facility] Center provide: appointment scheduling, clinical advisement, health education, and other advisory services.  Call: [phone] (toll free)               Medications           Message regarding Medications     Verify the changes and/or additions to your medication regime listed below are the same as discussed with your clinician today.  If any of these changes or additions are incorrect, please notify your healthcare provider.             Verify that the below list of medications is an accurate representation of the medications you are currently taking.  If none reported, the list may be blank. If incorrect, please contact your healthcare provider. Carry this list with you in case of emergency.           Current Medications     alendronate (FOSAMAX) 70 MG tablet TAKE 1 TABLET EVERY 7 DAYS    B-complex with vitamin C (Z-BEC OR EQUIV) tablet Take 1 tablet by mouth once daily.     benazepril (LOTENSIN) 40 MG tablet Take 1 tablet (40 mg total) by mouth 2 (two) times daily.    calcium citrate-vitamin D2 1,500-200 mg-unit Tab  "Take 1 tablet by mouth once daily.     citalopram (CELEXA) 40 MG tablet Take 1 tablet (40 mg total) by mouth once daily.    fish oil-omega-3 fatty acids 300-1,000 mg capsule Take 2 g by mouth once daily.    furosemide (LASIX) 40 MG tablet TAKE 1 TABLET ONE TIME DAILY    gabapentin (NEURONTIN) 600 MG tablet TAKE 2 TABLETS TWICE DAILY    multivitamin (MULTIVITAMIN) per tablet Take 1 tablet by mouth once daily.      RESTASIS 0.05 % ophthalmic emulsion INSTILL ONE DROP INTO BOTH EYES TWICE DAILY    VITAMIN B-12 5,000 mcg Subl Place 1 tablet under the tongue once a week.     albuterol (PROAIR HFA) 90 mcg/actuation inhaler Inhale 2 puffs into the lungs every 6 (six) hours as needed for Wheezing or Shortness of Breath.    albuterol (PROVENTIL) 2.5 mg /3 mL (0.083 %) nebulizer solution Take 3 mLs (2.5 mg total) by nebulization every 6 (six) hours as needed for Wheezing or Shortness of Breath. Rescue    lorazepam (ATIVAN) 0.5 MG tablet Take 1 tablet (0.5 mg total) by mouth 2 (two) times daily as needed for Anxiety.    meclizine (ANTIVERT) 25 mg tablet Take 1 tablet (25 mg total) by mouth every 6 (six) hours.    pramipexole (MIRAPEX) 1.5 MG tablet Take 1 tablet (1.5 mg total) by mouth nightly.    tizanidine (ZANAFLEX) 4 MG tablet Take 1 tablet (4 mg total) by mouth every 8 (eight) hours.    zolpidem (AMBIEN) 5 MG Tab Take 1 tablet (5 mg total) by mouth nightly as needed.           Clinical Reference Information           Your Vitals Were     BP Pulse Temp Height Weight BMI    182/87 73 98.2 °F (36.8 °C) (Oral) 4' 11" (1.499 m) 108.8 kg (239 lb 13.8 oz) 48.45 kg/m2      Blood Pressure          Most Recent Value    BP  (!)  182/87      Allergies as of 4/27/2017     Etodolac (Bulk)    Sulfa (Sulfonamide Antibiotics)      Immunizations Administered on Date of Encounter - 4/27/2017     None      Language Assistance Services     ATTENTION: Language assistance services are available, free of charge. Please call [phone].  "     ATENCIÓN: Si habla español, tiene a cornell disposición servicios gratuitos de asistencia lingüística. Mague al [phone].     CHÚ Ý: N?u b?n nói Ti?ng Vi?t, có các d?ch v? h? tr? ngôn ng? mi?n phí dành cho b?n. G?i s? [phone].         [facility] complies with applicable Federal civil rights laws and does not discriminate on the basis of race, color, national origin, age, disability, or sex.

## 2017-04-27 NOTE — LETTER
April 27, 2017      Elda Holley MD  [address]  [place] [address]           [facility]  [address] [facility] [address] [place] [address]  Phone: [phone]          Patient: Marta Huff   MR Number: [id number]   YOB: 1945   Date of Visit: 4/27/2017       Dear Dr. Elda Holley:    Thank you for referring Marta Huff to me for evaluation. Attached you will find relevant portions of my assessment and plan of care.    If you have questions, please do not hesitate to call me. I look forward to following Marta Huff along with you.    Sincerely,    Rj Rodriguez MD    Enclosure  CC:  No Recipients    If you would like to receive this communication electronically, please contact [email or web address] or [phone] to request more information on [employer] access.    For providers and/or their staff who would like to refer a patient to [facility], please contact us through our one-stop-shop provider referral line, [facility], at [phone].    If you feel you have received this communication in error or would no longer like to receive these types of communications, please e-mail [email or web address]

## 2017-04-27 NOTE — PROGRESS NOTES
Subjective:       Patient ID: Marta Huff is a 71 y.o. female.    Chief Complaint: Consult (Hernia)    HPI   Pleasant 72 yo F referred to me for evaluation of incisional hernia.  Pt notes that hernia has been present for years.  She notes that in recent months it seems to have gotten larger and has certainly started to cause her more discomfort.   She notes some discomfort with sitting and straining.  Denies that pain is limiting or has casued her to alter any lifestyle activities.  Pt reports normal bowel function.  Denies n/v.  SHe has had no fever/chills.  Pt suffers with HTN, sleep apnea, and and morbid obesity.  She has had an open juhi, hiatal hernia repair.  Had a lap gastric sleeve in 2014 by Dr Medeiros. She lost weight initially but has returned to preop weight.      Review of Systems   Constitutional: Negative for activity change, appetite change, fever and unexpected weight change.   HENT: Negative for congestion and postnasal drip.    Respiratory: Negative for chest tightness, shortness of breath and wheezing.    Cardiovascular: Negative for chest pain.   Gastrointestinal: Positive for abdominal pain. Negative for abdominal distention, constipation, diarrhea, nausea and vomiting.   Genitourinary: Negative for difficulty urinating, dysuria and frequency.   Musculoskeletal: Negative for arthralgias and joint swelling.   Skin: Negative for wound.   Neurological: Negative for dizziness and light-headedness.   Hematological: Negative for adenopathy. Does not bruise/bleed easily.   Psychiatric/Behavioral: Negative for agitation and decreased concentration.       Objective:      Physical Exam   Constitutional: She is oriented to person, place, and time. She appears well-developed and well-nourished. No distress.   HENT:   Head: Normocephalic and atraumatic.   Eyes: Pupils are equal, round, and reactive to light.   Neck: Normal range of motion. Neck supple. No tracheal deviation present. No  thyromegaly present.   Cardiovascular: Normal rate, regular rhythm and normal heart sounds.    No murmur heard.  Pulmonary/Chest: Effort normal and breath sounds normal. She exhibits no tenderness.   Abdominal: Soft. Bowel sounds are normal. She exhibits no distension, no abdominal bruit, no pulsatile midline mass and no mass. There is no hepatosplenomegaly. There is no tenderness. There is no rigidity, no rebound, no guarding, no tenderness at McBurney's point and negative Putnam's sign. A hernia is present. Hernia confirmed negative in the ventral area.       Genitourinary: Rectum normal.   Musculoskeletal: Normal range of motion.   Neurological: She is alert and oriented to person, place, and time.   Skin: Skin is warm. No rash noted. She is not diaphoretic. No erythema.   Psychiatric: She has a normal mood and affect.   Vitals reviewed.      Assessment:     Incisional hernia  No diagnosis found.    Plan:       D/w pt.  Informed pt that she does have an incisional hernia that is reducible.  Given ease of reducibility I would recommend further weight loss prior to proceeding with surgical repair.  D/w pt that surgery at present weight would be a risk for increased recurrence and infection.  D/w pt that I would be happy to refer back to [place] to further discuss steps towards weight loss.  She prefers to stay on [facility] and will therefore refer to Dr Tripathi in [place]

## 2017-04-28 ENCOUNTER — PATIENT MESSAGE (OUTPATIENT)
Dept: FAMILY MEDICINE | Facility: CLINIC | Age: 72
End: 2017-04-28

## 2017-04-28 ENCOUNTER — TELEPHONE (OUTPATIENT)
Dept: FAMILY MEDICINE | Facility: CLINIC | Age: 72
End: 2017-04-28

## 2017-04-28 NOTE — TELEPHONE ENCOUNTER
----- Message from Elda Holley MD sent at 4/25/2017  3:39 PM CDT -----  Call for update on weight loss (diuresing on lasix).

## 2017-04-29 DIAGNOSIS — F32.0 MAJOR DEPRESSIVE DISORDER, SINGLE EPISODE, MILD: ICD-10-CM

## 2017-04-29 DIAGNOSIS — F41.9 ANXIETY: ICD-10-CM

## 2017-04-29 DIAGNOSIS — I10 ESSENTIAL HYPERTENSION: ICD-10-CM

## 2017-05-03 ENCOUNTER — INITIAL CONSULT (OUTPATIENT)
Dept: BARIATRICS | Facility: CLINIC | Age: 72
End: 2017-05-03
Payer: MEDICARE

## 2017-05-03 VITALS
TEMPERATURE: 98 F | RESPIRATION RATE: 16 BRPM | BODY MASS INDEX: 48.53 KG/M2 | DIASTOLIC BLOOD PRESSURE: 75 MMHG | HEART RATE: 75 BPM | SYSTOLIC BLOOD PRESSURE: 149 MMHG | HEIGHT: 59 IN | WEIGHT: 240.75 LBS

## 2017-05-03 DIAGNOSIS — I10 ESSENTIAL HYPERTENSION: Chronic | ICD-10-CM

## 2017-05-03 DIAGNOSIS — E66.01 MORBID OBESITY WITH BMI OF 45.0-49.9, ADULT: ICD-10-CM

## 2017-05-03 DIAGNOSIS — M15.9 GENERALIZED OSTEOARTHRITIS: ICD-10-CM

## 2017-05-03 DIAGNOSIS — E66.01 MORBID OBESITY DUE TO EXCESS CALORIES: Primary | ICD-10-CM

## 2017-05-03 DIAGNOSIS — G47.33 OSA ON CPAP: ICD-10-CM

## 2017-05-03 PROCEDURE — 1159F MED LIST DOCD IN RCRD: CPT | Mod: S$GLB,,, | Performed by: SURGERY

## 2017-05-03 PROCEDURE — 99214 OFFICE O/P EST MOD 30 MIN: CPT | Mod: S$GLB,,, | Performed by: SURGERY

## 2017-05-03 PROCEDURE — 3078F DIAST BP <80 MM HG: CPT | Mod: S$GLB,,, | Performed by: SURGERY

## 2017-05-03 PROCEDURE — 3077F SYST BP >= 140 MM HG: CPT | Mod: S$GLB,,, | Performed by: SURGERY

## 2017-05-03 PROCEDURE — 99999 PR PBB SHADOW E&M-EST. PATIENT-LVL III: CPT | Mod: PBBFAC,,, | Performed by: SURGERY

## 2017-05-03 PROCEDURE — 1125F AMNT PAIN NOTED PAIN PRSNT: CPT | Mod: S$GLB,,, | Performed by: SURGERY

## 2017-05-03 PROCEDURE — 99499 UNLISTED E&M SERVICE: CPT | Mod: S$GLB,,, | Performed by: SURGERY

## 2017-05-03 PROCEDURE — 1160F RVW MEDS BY RX/DR IN RCRD: CPT | Mod: S$GLB,,, | Performed by: SURGERY

## 2017-05-03 RX ORDER — BENAZEPRIL HYDROCHLORIDE 40 MG/1
TABLET ORAL
Qty: 180 TABLET | Refills: 1 | Status: SHIPPED | OUTPATIENT
Start: 2017-05-03 | End: 2017-05-04 | Stop reason: SDUPTHER

## 2017-05-03 RX ORDER — CITALOPRAM 40 MG/1
TABLET, FILM COATED ORAL
Qty: 90 TABLET | Refills: 1 | Status: SHIPPED | OUTPATIENT
Start: 2017-05-03 | End: 2018-01-04 | Stop reason: SDUPTHER

## 2017-05-03 NOTE — MR AVS SNAPSHOT
[facility]   [address] [facility], [address]  [place] [address]  Phone: [phone]  Fax: [phone]                  Marta Huff   5/3/2017 11:00 AM   Initial consult    Description:  Female : 1945   Provider:  Dorene Tripathi MD   Department:  [place] [facility]           Reason for Visit     Weight Loss           Diagnoses this Visit        Comments    Morbid obesity due to excess calories    -  Primary     Morbid obesity with BMI of 45.0-49.9, adult         JESENIA on CPAP         Essential hypertension         Generalized osteoarthritis                To Do List           Future Appointments        Provider Department Dept Phone    2017 8:30 AM LAB, [place] [facility] [phone]    2017 9:15 AM EKG, [facility] - Cardiology [phone]    2017 10:40 AM Elda Holley MD [facility] [phone]    2017 2:00 PM MD [place] [facility] [phone]    2017 9:00 AM NS [id number] [facility] [phone]      Goals (5 Years of Data)     None      Follow-Up and Disposition     Return in about 1 month (around 6/3/2017).      [facility]     [facility] On Call Nurse Care Line - 24/ Assistance  Unless otherwise directed by your provider, please contact [facility] On-Call, our nurse care line that is available for / assistance.     Registered nurses in the [facility] provide: appointment scheduling, clinical advisement, health education, and other advisory services.  Call: [phone] (toll free)               Medications           Message regarding Medications     Verify the changes and/or additions to your medication regime listed below are the same as discussed with your clinician today.  If any of these changes or additions are incorrect, please notify your healthcare provider.             Verify that the below list of medications is an accurate  representation of the medications you are currently taking.  If none reported, the list may be blank. If incorrect, please contact your healthcare provider. Carry this list with you in case of emergency.           Current Medications     albuterol (PROAIR HFA) 90 mcg/actuation inhaler Inhale 2 puffs into the lungs every 6 (six) hours as needed for Wheezing or Shortness of Breath.    albuterol (PROVENTIL) 2.5 mg /3 mL (0.083 %) nebulizer solution Take 3 mLs (2.5 mg total) by nebulization every 6 (six) hours as needed for Wheezing or Shortness of Breath. Rescue    alendronate (FOSAMAX) 70 MG tablet TAKE 1 TABLET EVERY 7 DAYS    B-complex with vitamin C (Z-BEC OR EQUIV) tablet Take 1 tablet by mouth once daily.     benazepril (LOTENSIN) 40 MG tablet Take 1 tablet (40 mg total) by mouth 2 (two) times daily.    calcium citrate-vitamin D2 1,500-200 mg-unit Tab Take 1 tablet by mouth once daily.     citalopram (CELEXA) 40 MG tablet Take 1 tablet (40 mg total) by mouth once daily.    fish oil-omega-3 fatty acids 300-1,000 mg capsule Take 2 g by mouth once daily.    furosemide (LASIX) 40 MG tablet TAKE 1 TABLET ONE TIME DAILY    gabapentin (NEURONTIN) 600 MG tablet TAKE 2 TABLETS TWICE DAILY    multivitamin (MULTIVITAMIN) per tablet Take 1 tablet by mouth once daily.      RESTASIS 0.05 % ophthalmic emulsion INSTILL ONE DROP INTO BOTH EYES TWICE DAILY    VITAMIN B-12 5,000 mcg Subl Place 1 tablet under the tongue once a week.     lorazepam (ATIVAN) 0.5 MG tablet Take 1 tablet (0.5 mg total) by mouth 2 (two) times daily as needed for Anxiety.    meclizine (ANTIVERT) 25 mg tablet Take 1 tablet (25 mg total) by mouth every 6 (six) hours.    pramipexole (MIRAPEX) 1.5 MG tablet Take 1 tablet (1.5 mg total) by mouth nightly.    tizanidine (ZANAFLEX) 4 MG tablet Take 1 tablet (4 mg total) by mouth every 8 (eight) hours.    zolpidem (AMBIEN) 5 MG Tab Take 1 tablet (5 mg total) by mouth nightly as needed.           Clinical Reference  "Information           Your Vitals Were     BP Pulse Temp Resp Height Weight    149/75 75 98 °F (36.7 °C) (Oral) 16 4' 11" (1.499 m) 109.2 kg (240 lb 11.9 oz)    BMI                48.62 kg/m2          Blood Pressure          Most Recent Value    BP  (!)  149/75      Allergies as of 5/3/2017     Etodolac (Bulk)    Sulfa (Sulfonamide Antibiotics)      Immunizations Administered on Date of Encounter - 5/3/2017     None      Orders Placed During Today's Visit     Future Labs/Procedures Expected by Expires    BMP  5/3/2017 7/2/2018    CBC w/ Auto Differential  5/3/2017 7/2/2018    FL Upper GI Without KUB  5/3/2017 5/3/2018    Free T4  5/3/2017 7/2/2018    H. Pylori Antibody, IGG  5/3/2017 7/2/2018    Hepatic Panel  5/3/2017 7/2/2018    Lipid Profile  5/3/2017 7/2/2018    Magnesium  5/3/2017 7/2/2018    Phosphorus  5/3/2017 7/2/2018    T3  5/3/2017 7/2/2018    T4  5/3/2017 7/2/2018    TSH  5/3/2017 7/2/2018    Vitamin B1  5/3/2017 7/2/2018    Vitamin D 25 Hydroxy  5/3/2017 7/2/2018    EKG  As directed 5/3/2018      Language Assistance Services     ATTENTION: Language assistance services are available, free of charge. Please call [phone].      ATENCIÓN: Si habla español, tiene a cornell disposición servicios gratuitos de asistencia lingüística. Llame al [phone].     CHÚ Ý: N?u b?n nói Ti?ng Vi?t, có các d?ch v? h? tr? ngôn ng? mi?n phí dành cho b?n. G?i s? [phone].         [facility] complies with applicable Federal civil rights laws and does not discriminate on the basis of race, color, national origin, age, disability, or sex.        "

## 2017-05-03 NOTE — PROGRESS NOTES
Initial Consult    Chief Complaint   Patient presents with    Weight Loss     Previous sleeve/Hernia       History of Present Illness:  Patient is a 71 y.o. female who wants to lose weight for a hernia repair, referral from Dr. Rodriguez.  She has a sleeve done by Dr. Medeiros in 2014.  She has recently started gaining weight.  Was taking prednisone for asthma.  Feels portion sizes have remained smaller and fills up quickly.  Has had some cheat days on diet.    Starting weight 230  Lowest weight 185  Current weight 240    Past attempts at weight loss include: Unsupervised: calorie restriction, gym membership, slim fast;  Supervised:  Tami vonda, weight watchers;  Diet pills: none;  Exercise attempts: group classes    Weight history:   At current weight:  6 months  Obese for 2 years.  More than 35 pounds overweight for 5 years.  More than 100 pounds overweight for 1 year.  Started dieting at 25 years old.  Maximum weight reached: 240  Most weight lost was 40 pounds through sleeve for 2 years.  She describes Her eating habits as emotional eater.    JESENIA screening: has a sleep apnea machine and getting it re calibrated    Reflux screening: none    Review of patient's allergies indicates:   Allergen Reactions    Etodolac (bulk) Swelling    Sulfa (sulfonamide antibiotics)      Headache and rash       Current Outpatient Prescriptions   Medication Sig Dispense Refill    albuterol (PROAIR HFA) 90 mcg/actuation inhaler Inhale 2 puffs into the lungs every 6 (six) hours as needed for Wheezing or Shortness of Breath. 3 Inhaler 3    albuterol (PROVENTIL) 2.5 mg /3 mL (0.083 %) nebulizer solution Take 3 mLs (2.5 mg total) by nebulization every 6 (six) hours as needed for Wheezing or Shortness of Breath. Rescue 1 Box 11    alendronate (FOSAMAX) 70 MG tablet TAKE 1 TABLET EVERY 7 DAYS 12 tablet 3    B-complex with vitamin C (Z-BEC OR EQUIV) tablet Take 1 tablet by mouth once daily.       benazepril (LOTENSIN) 40 MG tablet  Take 1 tablet (40 mg total) by mouth 2 (two) times daily. 180 tablet 1    calcium citrate-vitamin D2 1,500-200 mg-unit Tab Take 1 tablet by mouth once daily.       citalopram (CELEXA) 40 MG tablet Take 1 tablet (40 mg total) by mouth once daily. 90 tablet 1    fish oil-omega-3 fatty acids 300-1,000 mg capsule Take 2 g by mouth once daily.      furosemide (LASIX) 40 MG tablet TAKE 1 TABLET ONE TIME DAILY 90 tablet 1    gabapentin (NEURONTIN) 600 MG tablet TAKE 2 TABLETS TWICE DAILY 360 tablet 3    multivitamin (MULTIVITAMIN) per tablet Take 1 tablet by mouth once daily.        RESTASIS 0.05 % ophthalmic emulsion INSTILL ONE DROP INTO BOTH EYES TWICE DAILY  0    VITAMIN B-12 5,000 mcg Subl Place 1 tablet under the tongue once a week.       lorazepam (ATIVAN) 0.5 MG tablet Take 1 tablet (0.5 mg total) by mouth 2 (two) times daily as needed for Anxiety. 60 tablet 2    meclizine (ANTIVERT) 25 mg tablet Take 1 tablet (25 mg total) by mouth every 6 (six) hours. (Patient taking differently: Take 25 mg by mouth every 6 (six) hours as needed. ) 360 tablet 1    pramipexole (MIRAPEX) 1.5 MG tablet Take 1 tablet (1.5 mg total) by mouth nightly. 90 tablet 1    tizanidine (ZANAFLEX) 4 MG tablet Take 1 tablet (4 mg total) by mouth every 8 (eight) hours. 270 tablet 2    zolpidem (AMBIEN) 5 MG Tab Take 1 tablet (5 mg total) by mouth nightly as needed. 30 tablet 2     Current Facility-Administered Medications   Medication Dose Route Frequency Provider Last Rate Last Dose    albuterol nebulizer solution 2.5 mg  2.5 mg Nebulization 1 time in Clinic/HOD Elda Holley MD        albuterol nebulizer solution 2.5 mg  2.5 mg Nebulization 1 time in Clinic/HOD Elda Holley MD           Past Medical History:   Diagnosis Date    Anxiety     Arthralgia of knee     arthralgia of bilateral knees secondary to djd    Back pain     Depression     GERD (gastroesophageal reflux disease)     Hiatal hernia     repaired in 1979     Hypertension     Obesity     JESENIA on CPAP     Osteoporosis     Pneumonia     Reactive airway disease     Restless legs syndrome     Rheumatic fever     at 4 y/o    Trouble in sleeping      Past Surgical History:   Procedure Laterality Date    APPENDECTOMY      BARIATRIC SURGERY  2014    Gastric Sleeve    CHOLECYSTECTOMY      GASTRECTOMY      HERNIA REPAIR      hiatal hernia    HIATAL HERNIA REPAIR  1/1/1979    HYSTERECTOMY      partial    KNEE ARTHROPLASTY  1/2/2010    bilateral    KNEE ARTHROSCOPY      right    NISSEN FUNDOPLICATION       Family History   Problem Relation Age of Onset    Heart disease Mother     Hypertension Mother     Diabetes Mother     Obesity Mother     Heart disease Maternal Grandfather      Social History   Substance Use Topics    Smoking status: Never Smoker    Smokeless tobacco: Never Used    Alcohol use Yes      Comment: 1-2 glasses of wine monthly          Review of Systems:  Review of Systems   Constitutional: Negative for activity change, appetite change, fever and unexpected weight change.   HENT: Negative for congestion, sinus pressure, sneezing, sore throat, tinnitus, trouble swallowing and voice change.    Eyes: Negative for pain, redness, itching and visual disturbance.   Respiratory: Positive for wheezing. Negative for apnea, cough, choking, chest tightness, shortness of breath and stridor.    Cardiovascular: Positive for leg swelling. Negative for chest pain and palpitations.   Gastrointestinal: Positive for diarrhea. Negative for abdominal distention, abdominal pain, anal bleeding, blood in stool, constipation, nausea, rectal pain and vomiting.   Endocrine: Negative for cold intolerance and heat intolerance.   Genitourinary: Negative for difficulty urinating and dysuria.   Musculoskeletal: Positive for arthralgias and gait problem. Negative for back pain, joint swelling, myalgias, neck pain and neck stiffness.   Skin: Positive for rash. Negative for  "wound.   Allergic/Immunologic: Positive for environmental allergies. Negative for food allergies.   Neurological: Negative for dizziness, light-headedness and headaches.   Hematological: Negative for adenopathy. Does not bruise/bleed easily.   Psychiatric/Behavioral: Negative for agitation and confusion.       Physical:     Vital Signs (Most Recent)  Temp: 98 °F (36.7 °C) (05/03/17 1052)  Pulse: 75 (05/03/17 1052)  Resp: 16 (05/03/17 1052)  BP: (!) 149/75 (05/03/17 1052)  4' 11" (1.499 m)  109.2 kg (240 lb 11.9 oz)     Body comp:  Fat Percent:  48.1 %  Fat Mass:  114 lb  FFM:  123.2 lb  TBW: 88.8 lb  TBW %:  37.4 %  BMR: 1733 kcal      Physical Exam:  Physical Exam   Constitutional: She is oriented to person, place, and time. She appears well-developed and well-nourished. No distress.   HENT:   Head: Normocephalic and atraumatic.   Mouth/Throat: No oropharyngeal exudate.   Eyes: Conjunctivae and EOM are normal. Pupils are equal, round, and reactive to light. No scleral icterus.   Neck: Normal range of motion. Neck supple. No JVD present. No tracheal deviation present. No thyromegaly present.   Cardiovascular: Normal rate, regular rhythm and normal heart sounds.    Pulmonary/Chest: Effort normal. No stridor. No respiratory distress. She has wheezes. She has no rales.   Abdominal: Soft. Bowel sounds are normal. She exhibits no distension and no mass. There is no tenderness. There is no rebound and no guarding.       Large well healed upper midline incision with reducible hernia   Musculoskeletal: Normal range of motion. She exhibits no edema or tenderness.   Neurological: She is alert and oriented to person, place, and time. No cranial nerve deficit.   Skin: Skin is warm and dry. No rash noted. She is not diaphoretic. No erythema.   Psychiatric: She has a normal mood and affect. Her behavior is normal.   Nursing note and vitals reviewed.      ASSESSMENT/PLAN:        1. Morbid obesity due to excess calories  BMP    CBC " w/ Auto Differential    H. Pylori Antibody, IGG    Hepatic Panel    Lipid Profile    Magnesium    Phosphorus    T3    T4    TSH    Free T4    Vitamin B1    Vitamin D 25 Hydroxy    EKG    FL Upper GI Without KUB   2. Morbid obesity with BMI of 45.0-49.9, adult  BMP    CBC w/ Auto Differential    H. Pylori Antibody, IGG    Hepatic Panel    Lipid Profile    Magnesium    Phosphorus    T3    T4    TSH    Free T4    Vitamin B1    Vitamin D 25 Hydroxy    EKG   3. JESENIA on CPAP  BMP    CBC w/ Auto Differential    H. Pylori Antibody, IGG    Hepatic Panel    Lipid Profile    Magnesium    Phosphorus    T3    T4    TSH    Free T4    Vitamin B1    Vitamin D 25 Hydroxy    EKG   4. Essential hypertension  BMP    CBC w/ Auto Differential    H. Pylori Antibody, IGG    Hepatic Panel    Lipid Profile    Magnesium    Phosphorus    T3    T4    TSH    Free T4    Vitamin B1    Vitamin D 25 Hydroxy    EKG   5. Generalized osteoarthritis  BMP    CBC w/ Auto Differential    H. Pylori Antibody, IGG    Hepatic Panel    Lipid Profile    Magnesium    Phosphorus    T3    T4    TSH    Free T4    Vitamin B1    Vitamin D 25 Hydroxy    EKG       Plan:  Marta Huff has morbid obesity as their Body mass index is 48.62 kg/(m^2). She would benefit from weight loss     She will need:    Labs  UGI   Endoscopy- depending on UGI  EKG in preparation for possible phentermine next month        Diet plan: high protein low carb- mainly meats and vegetables  Exercise plan: Cardiovascular exercise, get HR over 100 for 20 minutes 3 times per week.  Start multivitamin

## 2017-05-03 NOTE — LETTER
May 3, 2017      Rj Rodriguez MD  [address] [facility] [address] [place] [address]           [facility]  [address], [address]  [facility] [place] [address]  Phone: [phone]  Fax: [phone]          Patient: Marta Huff   MR Number: [id number]   YOB: 1945   Date of Visit: 5/3/2017       Dear Dr. Rj Rodriguez:    Thank you for referring Marta Huff to me for evaluation. Attached you will find relevant portions of my assessment and plan of care.    If you have questions, please do not hesitate to call me. I look forward to following Marta Huff along with you.    Sincerely,    Dorene Tripathi MD    Enclosure  CC:  No Recipients    If you would like to receive this communication electronically, please contact [email or web address] or [phone] to request more information on [employer] Link access.    For providers and/or their staff who would like to refer a patient to [facility], please contact us through our one-stop-shop provider referral line, [facility], at [phone].    If you feel you have received this communication in error or would no longer like to receive these types of communications, please e-mail [email or web address]

## 2017-05-04 ENCOUNTER — CLINICAL SUPPORT (OUTPATIENT)
Dept: CARDIOLOGY | Facility: CLINIC | Age: 72
End: 2017-05-04
Payer: MEDICARE

## 2017-05-04 ENCOUNTER — PATIENT MESSAGE (OUTPATIENT)
Dept: BARIATRICS | Facility: CLINIC | Age: 72
End: 2017-05-04

## 2017-05-04 ENCOUNTER — LAB VISIT (OUTPATIENT)
Dept: LAB | Facility: HOSPITAL | Age: 72
End: 2017-05-04
Attending: SURGERY
Payer: MEDICARE

## 2017-05-04 DIAGNOSIS — M15.9 GENERALIZED OSTEOARTHRITIS: ICD-10-CM

## 2017-05-04 DIAGNOSIS — E66.01 MORBID OBESITY DUE TO EXCESS CALORIES: ICD-10-CM

## 2017-05-04 DIAGNOSIS — I10 ESSENTIAL HYPERTENSION: Chronic | ICD-10-CM

## 2017-05-04 DIAGNOSIS — E66.01 MORBID OBESITY WITH BMI OF 45.0-49.9, ADULT: ICD-10-CM

## 2017-05-04 DIAGNOSIS — I10 ESSENTIAL HYPERTENSION: ICD-10-CM

## 2017-05-04 DIAGNOSIS — G47.33 OSA ON CPAP: ICD-10-CM

## 2017-05-04 LAB
25(OH)D3+25(OH)D2 SERPL-MCNC: 29 NG/ML
ALBUMIN SERPL BCP-MCNC: 3.5 G/DL
ALP SERPL-CCNC: 90 U/L
ALT SERPL W/O P-5'-P-CCNC: 19 U/L
ANION GAP SERPL CALC-SCNC: 8 MMOL/L
AST SERPL-CCNC: 21 U/L
BASOPHILS # BLD AUTO: 0.06 K/UL
BASOPHILS NFR BLD: 1.2 %
BILIRUB DIRECT SERPL-MCNC: 0.3 MG/DL
BILIRUB SERPL-MCNC: 0.8 MG/DL
BUN SERPL-MCNC: 22 MG/DL
CALCIUM SERPL-MCNC: 9.4 MG/DL
CHLORIDE SERPL-SCNC: 108 MMOL/L
CHOLEST/HDLC SERPL: 2.6 {RATIO}
CO2 SERPL-SCNC: 29 MMOL/L
CREAT SERPL-MCNC: 0.8 MG/DL
DIFFERENTIAL METHOD: ABNORMAL
EOSINOPHIL # BLD AUTO: 0.4 K/UL
EOSINOPHIL NFR BLD: 8.6 %
ERYTHROCYTE [DISTWIDTH] IN BLOOD BY AUTOMATED COUNT: 14.7 %
EST. GFR  (AFRICAN AMERICAN): >60 ML/MIN/1.73 M^2
EST. GFR  (NON AFRICAN AMERICAN): >60 ML/MIN/1.73 M^2
GLUCOSE SERPL-MCNC: 98 MG/DL
HCT VFR BLD AUTO: 40.2 %
HDL/CHOLESTEROL RATIO: 38.9 %
HDLC SERPL-MCNC: 175 MG/DL
HDLC SERPL-MCNC: 68 MG/DL
HGB BLD-MCNC: 12.9 G/DL
LDLC SERPL CALC-MCNC: 95.6 MG/DL
LYMPHOCYTES # BLD AUTO: 1.6 K/UL
LYMPHOCYTES NFR BLD: 30.7 %
MAGNESIUM SERPL-MCNC: 1.8 MG/DL
MCH RBC QN AUTO: 29.7 PG
MCHC RBC AUTO-ENTMCNC: 32.1 %
MCV RBC AUTO: 92 FL
MONOCYTES # BLD AUTO: 0.4 K/UL
MONOCYTES NFR BLD: 7.4 %
NEUTROPHILS # BLD AUTO: 2.7 K/UL
NEUTROPHILS NFR BLD: 51.9 %
NONHDLC SERPL-MCNC: 107 MG/DL
PHOSPHATE SERPL-MCNC: 4.7 MG/DL
PLATELET # BLD AUTO: 206 K/UL
PMV BLD AUTO: 11.7 FL
POTASSIUM SERPL-SCNC: 4.4 MMOL/L
PROT SERPL-MCNC: 7.1 G/DL
RBC # BLD AUTO: 4.35 M/UL
SODIUM SERPL-SCNC: 145 MMOL/L
T3 SERPL-MCNC: 106 NG/DL
T4 FREE SERPL-MCNC: 0.93 NG/DL
T4 SERPL-MCNC: 5.9 UG/DL
TRIGL SERPL-MCNC: 57 MG/DL
TSH SERPL DL<=0.005 MIU/L-ACNC: 1.84 UIU/ML
WBC # BLD AUTO: 5.11 K/UL

## 2017-05-04 PROCEDURE — 93000 ELECTROCARDIOGRAM COMPLETE: CPT | Mod: S$GLB,,, | Performed by: INTERNAL MEDICINE

## 2017-05-04 RX ORDER — BENAZEPRIL HYDROCHLORIDE 40 MG/1
TABLET ORAL
Qty: 180 TABLET | Refills: 1 | Status: SHIPPED | OUTPATIENT
Start: 2017-05-04 | End: 2018-01-04 | Stop reason: SDUPTHER

## 2017-05-08 LAB — H PYLORI IGG SERPL QL IA: NEGATIVE

## 2017-05-09 LAB — VIT B1 SERPL-MCNC: 59 UG/L (ref 38–122)

## 2017-05-30 ENCOUNTER — OFFICE VISIT (OUTPATIENT)
Dept: FAMILY MEDICINE | Facility: CLINIC | Age: 72
End: 2017-05-30
Payer: MEDICARE

## 2017-05-30 VITALS
SYSTOLIC BLOOD PRESSURE: 124 MMHG | HEART RATE: 64 BPM | BODY MASS INDEX: 47.98 KG/M2 | WEIGHT: 238 LBS | TEMPERATURE: 98 F | HEIGHT: 59 IN | DIASTOLIC BLOOD PRESSURE: 82 MMHG

## 2017-05-30 DIAGNOSIS — I10 ESSENTIAL HYPERTENSION: Primary | Chronic | ICD-10-CM

## 2017-05-30 DIAGNOSIS — K43.9 VENTRAL HERNIA WITHOUT OBSTRUCTION OR GANGRENE: ICD-10-CM

## 2017-05-30 DIAGNOSIS — J45.40 RAD (REACTIVE AIRWAY DISEASE), MODERATE PERSISTENT, UNCOMPLICATED: ICD-10-CM

## 2017-05-30 DIAGNOSIS — E66.01 MORBID OBESITY WITH BMI OF 45.0-49.9, ADULT: ICD-10-CM

## 2017-05-30 DIAGNOSIS — G47.33 OSA ON CPAP: ICD-10-CM

## 2017-05-30 DIAGNOSIS — F13.20 ANXIOLYTIC DEPENDENCE: ICD-10-CM

## 2017-05-30 PROCEDURE — 99214 OFFICE O/P EST MOD 30 MIN: CPT | Mod: S$GLB,,, | Performed by: FAMILY MEDICINE

## 2017-05-30 PROCEDURE — 1126F AMNT PAIN NOTED NONE PRSNT: CPT | Mod: S$GLB,,, | Performed by: FAMILY MEDICINE

## 2017-05-30 PROCEDURE — 1159F MED LIST DOCD IN RCRD: CPT | Mod: S$GLB,,, | Performed by: FAMILY MEDICINE

## 2017-05-30 PROCEDURE — 99999 PR PBB SHADOW E&M-EST. PATIENT-LVL III: CPT | Mod: PBBFAC,,, | Performed by: FAMILY MEDICINE

## 2017-05-30 NOTE — PROGRESS NOTES
Subjective:       Patient ID: Marta Huff is a 71 y.o. female.    Chief Complaint: Follow-up (asthma sx improved, using nebulizer regularly as needed. Seeing Dr. Tripathi for weight management. )    HPI     Mrs. Wong is a 71 y.o. F w/ a PMH significant for reactive airway disease, HTN and JESENIA who presents for f/u since her last asthma exacerbation. She reports that since her last visit (4/25/17) her asthma has been much better controlled. She is only using her inhalers every few days when she becomes symptomatic. She also complains of poor sleep for the past month since her CPAP started malfunctioning. She plans on having it serviced this week. She is being followed by Dr. Tripathi for weight loss and for a ventral hernia that developed s/p gastric sleeve. Since her last appointment with Dr. Tripathi she was put on an 800 calorie restriction and she has lost 3lbs since the start of the month.       Review of Systems   Constitutional: Negative for appetite change and fatigue.   HENT: Negative for congestion, postnasal drip, sinus pressure and sore throat.    Respiratory: Positive for apnea, shortness of breath and wheezing.    Cardiovascular: Positive for leg swelling. Negative for chest pain and palpitations.   Gastrointestinal: Negative for abdominal pain, diarrhea, nausea and vomiting.   Musculoskeletal: Positive for arthralgias (both hands due to arthritis) and joint swelling.   Skin: Negative for color change and rash.   Neurological: Negative for dizziness, syncope, weakness and headaches.   Hematological: Negative for adenopathy.   Psychiatric/Behavioral: Negative for agitation, confusion and suicidal ideas. The patient is not nervous/anxious and is not hyperactive.        Objective:      Physical Exam   Constitutional: She is oriented to person, place, and time. She appears well-developed and well-nourished. No distress.   HENT:   Head: Normocephalic and atraumatic.   Right Ear: External ear  normal.   Left Ear: External ear normal.   Mouth/Throat: Oropharynx is clear and moist. No oropharyngeal exudate.   Eyes: Conjunctivae are normal. Pupils are equal, round, and reactive to light.   Neck: No thyromegaly present.   Cardiovascular: Normal rate, regular rhythm, normal heart sounds and intact distal pulses.    No murmur heard.  Pulmonary/Chest: Effort normal. She has wheezes (bilateral lung bases).   Abdominal: Soft. Bowel sounds are normal. A hernia (ventral hernia ) is present.   Lymphadenopathy:     She has no cervical adenopathy.   Neurological: She is alert and oriented to person, place, and time. She has normal reflexes. She exhibits normal muscle tone.   Skin: Skin is warm and dry. No rash noted.   Psychiatric: She has a normal mood and affect. Her behavior is normal. Judgment and thought content normal.         Essential hypertension         - Well controlled, continue current medication regimen. Request a BP log be recorded and sent in.   Anxiolytic dependence  Chronic, stable use of lorazepam for her nerves and ambien for sleep.  JESENIA on CPAP          - Going to get CPAP serviced due to poor fit and leaking fluid  Morbid obesity with BMI of 45.0-49.9, adult          - Being followed by Bariatrics s/p gastric sleeve          - Currently on an 800 calorie / day diet  RAD   Update pfts. She has extensive passive smoke exposure as  is a smoker. Has been treated to date as an asthmatic which she states she was dx with previously.  Ventral hernia  Discussed use of abdominal binder for comfort/support.

## 2017-06-06 ENCOUNTER — HOSPITAL ENCOUNTER (OUTPATIENT)
Dept: RADIOLOGY | Facility: HOSPITAL | Age: 72
Discharge: HOME | End: 2017-06-06
Attending: SURGERY
Payer: MEDICARE

## 2017-06-06 DIAGNOSIS — E66.01 MORBID OBESITY DUE TO EXCESS CALORIES: ICD-10-CM

## 2017-06-06 PROCEDURE — 74240 X-RAY XM UPR GI TRC 1CNTRST: CPT | Mod: 26,,, | Performed by: RADIOLOGY

## 2017-06-06 PROCEDURE — 74240 X-RAY XM UPR GI TRC 1CNTRST: CPT | Mod: TC,PO

## 2017-06-08 ENCOUNTER — OFFICE VISIT (OUTPATIENT)
Dept: BARIATRICS | Facility: CLINIC | Age: 72
End: 2017-06-08
Payer: MEDICARE

## 2017-06-08 VITALS
RESPIRATION RATE: 17 BRPM | HEART RATE: 73 BPM | TEMPERATURE: 99 F | HEIGHT: 59 IN | BODY MASS INDEX: 48.22 KG/M2 | WEIGHT: 239.19 LBS | DIASTOLIC BLOOD PRESSURE: 97 MMHG | SYSTOLIC BLOOD PRESSURE: 172 MMHG

## 2017-06-08 DIAGNOSIS — E66.01 MORBID OBESITY WITH BMI OF 45.0-49.9, ADULT: ICD-10-CM

## 2017-06-08 DIAGNOSIS — G47.33 OSA ON CPAP: ICD-10-CM

## 2017-06-08 DIAGNOSIS — E66.01 MORBID OBESITY DUE TO EXCESS CALORIES: Primary | ICD-10-CM

## 2017-06-08 DIAGNOSIS — I10 ESSENTIAL HYPERTENSION: Chronic | ICD-10-CM

## 2017-06-08 DIAGNOSIS — E66.01 MORBID OBESITY, UNSPECIFIED OBESITY TYPE: Primary | ICD-10-CM

## 2017-06-08 PROCEDURE — 99213 OFFICE O/P EST LOW 20 MIN: CPT | Mod: S$GLB,,, | Performed by: SURGERY

## 2017-06-08 PROCEDURE — 1126F AMNT PAIN NOTED NONE PRSNT: CPT | Mod: S$GLB,,, | Performed by: SURGERY

## 2017-06-08 PROCEDURE — 1159F MED LIST DOCD IN RCRD: CPT | Mod: S$GLB,,, | Performed by: SURGERY

## 2017-06-08 PROCEDURE — 99999 PR PBB SHADOW E&M-EST. PATIENT-LVL III: CPT | Mod: PBBFAC,,, | Performed by: SURGERY

## 2017-06-08 NOTE — PROGRESS NOTES
Medically Supervised Weight Loss Documentation    Date of Visit: 06/08/2017    Patient: Marta Huff    Current Weight: 239  Current BMI: Body mass index is 48.31 kg/m².  Weight Change: -1 pounds    Last Weight: 240    Beginning Weight: 240      Vitals:   Vitals:    06/08/17 0821   BP: (!) 172/97   Pulse: 73   Resp: 17   Temp: 98.6 °F (37 °C)       Comorbidities:   Past Medical History:   Diagnosis Date    Anxiety     Arthralgia of knee     arthralgia of bilateral knees secondary to djd    Back pain     Depression     GERD (gastroesophageal reflux disease)     Hiatal hernia     repaired in 1979    Hypertension     Obesity     JESENIA on CPAP     Osteoporosis     Pneumonia     Reactive airway disease     Restless legs syndrome     Rheumatic fever     at 6 y/o    Trouble in sleeping        Medications:  Current Outpatient Prescriptions on File Prior to Visit   Medication Sig Dispense Refill    albuterol (PROAIR HFA) 90 mcg/actuation inhaler Inhale 2 puffs into the lungs every 6 (six) hours as needed for Wheezing or Shortness of Breath. 3 Inhaler 3    albuterol (PROVENTIL) 2.5 mg /3 mL (0.083 %) nebulizer solution Take 3 mLs (2.5 mg total) by nebulization every 6 (six) hours as needed for Wheezing or Shortness of Breath. Rescue 1 Box 11    alendronate (FOSAMAX) 70 MG tablet TAKE 1 TABLET EVERY 7 DAYS 12 tablet 3    B-complex with vitamin C (Z-BEC OR EQUIV) tablet Take 1 tablet by mouth once daily.       benazepril (LOTENSIN) 40 MG tablet TAKE 1 TABLET TWICE DAILY 180 tablet 1    calcium citrate-vitamin D2 1,500-200 mg-unit Tab Take 1 tablet by mouth once daily.       citalopram (CELEXA) 40 MG tablet TAKE 1 TABLET (40 MG TOTAL) ONE TIME DAILY. 90 tablet 1    fish oil-omega-3 fatty acids 300-1,000 mg capsule Take 2 g by mouth once daily.      furosemide (LASIX) 40 MG tablet TAKE 1 TABLET ONE TIME DAILY 90 tablet 1    gabapentin (NEURONTIN) 600 MG tablet TAKE 2 TABLETS TWICE DAILY 360 tablet  3    lorazepam (ATIVAN) 0.5 MG tablet Take 1 tablet (0.5 mg total) by mouth 2 (two) times daily as needed for Anxiety. 60 tablet 2    meclizine (ANTIVERT) 25 mg tablet Take 1 tablet (25 mg total) by mouth every 6 (six) hours. (Patient taking differently: Take 25 mg by mouth every 6 (six) hours as needed. ) 360 tablet 1    multivitamin (MULTIVITAMIN) per tablet Take 1 tablet by mouth once daily.        pramipexole (MIRAPEX) 1.5 MG tablet Take 1 tablet (1.5 mg total) by mouth nightly. 90 tablet 1    RESTASIS 0.05 % ophthalmic emulsion INSTILL ONE DROP INTO BOTH EYES TWICE DAILY  0    tizanidine (ZANAFLEX) 4 MG tablet Take 1 tablet (4 mg total) by mouth every 8 (eight) hours. 270 tablet 2    VITAMIN B-12 5,000 mcg Subl Place 1 tablet under the tongue once a week.       zolpidem (AMBIEN) 5 MG Tab Take 1 tablet (5 mg total) by mouth nightly as needed. 30 tablet 2     Current Facility-Administered Medications on File Prior to Visit   Medication Dose Route Frequency Provider Last Rate Last Dose    albuterol nebulizer solution 2.5 mg  2.5 mg Nebulization 1 time in Clinic/HOD Elda Holely MD        albuterol nebulizer solution 2.5 mg  2.5 mg Nebulization 1 time in Clinic/HOD Elda Holley MD             Body comp:  Fat Percent:  46.7 %  Fat Mass:  111.8 lb  FFM:  127.4 lb  TBW: 92 lb  TBW %:  38.5 %  BMR: 1783 kcal      Diet Education Discussed:    Breakfast:  Eggs- omelette; doing greek yogurt or protein shake other days  Lunch:  Meat and vegetalbes  Dinner:  Yogurt or shake  Cheat days: no restaurants; no sodas; no candy, none  Protein shake: sharq    Exercise/Activity Discussed:    Elliptical every morning- 30 minutes     Behavior or Diet Goals for this patient:    Portions are controlled on her diet- She does about 3 ounces of meat, 1/2 cup of vegetables  She is doing well on the diet - continue dieting; keep portion sizes small, start counting calories   Plan endoscopy to see size of sleeve  Plan for  calorie count next time  She is interested in converting to gastric bypass or re sleeve- I think this may be feasible as the majority of the weight loss will be in the first 1 year.  I will send her to cardiology for pre operative examination.    She is quite active in her exercising and should continue this trying to increase her exercising intensity    Labs- reviewed  UGI - large sleeve-   Endoscopy-  EKG - will avoid phentermine or other weight loss meds given her age and risks for complication    : I met with the patient for 15 minutes and counseled her for over 50% of that time

## 2017-06-16 ENCOUNTER — OFFICE VISIT (OUTPATIENT)
Dept: CARDIOLOGY | Facility: CLINIC | Age: 72
End: 2017-06-16
Payer: MEDICARE

## 2017-06-16 VITALS
SYSTOLIC BLOOD PRESSURE: 133 MMHG | HEART RATE: 73 BPM | HEIGHT: 59 IN | DIASTOLIC BLOOD PRESSURE: 83 MMHG | BODY MASS INDEX: 48.94 KG/M2 | WEIGHT: 242.75 LBS

## 2017-06-16 DIAGNOSIS — I27.20 PULMONARY HYPERTENSION: ICD-10-CM

## 2017-06-16 DIAGNOSIS — G47.33 OSA ON CPAP: ICD-10-CM

## 2017-06-16 DIAGNOSIS — J45.909 REACTIVE AIRWAY DISEASE, UNSPECIFIED ASTHMA SEVERITY, UNCOMPLICATED: ICD-10-CM

## 2017-06-16 DIAGNOSIS — I10 ESSENTIAL HYPERTENSION: Chronic | ICD-10-CM

## 2017-06-16 DIAGNOSIS — E66.01 MORBID OBESITY WITH BMI OF 45.0-49.9, ADULT: ICD-10-CM

## 2017-06-16 PROCEDURE — 1126F AMNT PAIN NOTED NONE PRSNT: CPT | Mod: S$GLB,,, | Performed by: INTERNAL MEDICINE

## 2017-06-16 PROCEDURE — 99499 UNLISTED E&M SERVICE: CPT | Mod: S$GLB,,, | Performed by: INTERNAL MEDICINE

## 2017-06-16 PROCEDURE — 1159F MED LIST DOCD IN RCRD: CPT | Mod: S$GLB,,, | Performed by: INTERNAL MEDICINE

## 2017-06-16 PROCEDURE — 99999 PR PBB SHADOW E&M-EST. PATIENT-LVL III: CPT | Mod: PBBFAC,,, | Performed by: INTERNAL MEDICINE

## 2017-06-16 PROCEDURE — 99204 OFFICE O/P NEW MOD 45 MIN: CPT | Mod: S$GLB,,, | Performed by: INTERNAL MEDICINE

## 2017-06-16 NOTE — PROGRESS NOTES
Subjective:    Patient ID:  Marta Huff is a 71 y.o. female who presents for evaluation of Asthma (cardiac clearance bariatric surgery) and Hyperlipidemia      Pt here for pre-op for re-do bariatric surgery. She has no previous cardiac issues. She had an Echo 4 years ago showing mildly increased PA pressure estimates. SPECT stress was normal. She has asthma and JESENIA. She never smoked. She has started exercising and uses the elliptical for 40 mins. She denies any palpitations, dizziness, syncope or pre-syncope. She denies any chest pain, SOB, PND, orthopnea. She denies exertional symptoms.        Review of Systems   Constitution: Negative for weight gain and weight loss.   HENT: Negative.    Eyes: Negative.    Cardiovascular: Positive for leg swelling. Negative for chest pain, claudication, cyanosis, dyspnea on exertion, irregular heartbeat, near-syncope, orthopnea (no PND) and palpitations.   Respiratory: Positive for shortness of breath (when asthma acts up). Negative for cough, hemoptysis and snoring.    Endocrine: Negative.    Skin: Negative.    Musculoskeletal: Negative for joint pain, muscle cramps, muscle weakness and myalgias.   Gastrointestinal: Negative for diarrhea, hematemesis, nausea and vomiting.   Genitourinary: Negative.    Neurological: Negative for dizziness, focal weakness, light-headedness, loss of balance, numbness, paresthesias and seizures.   Psychiatric/Behavioral: Negative.         Objective:    Physical Exam   Constitutional: She is oriented to person, place, and time. She appears well-developed and well-nourished.   Eyes: Pupils are equal, round, and reactive to light.   Neck: Normal range of motion. No thyromegaly present.   Cardiovascular: Normal rate, regular rhythm, S1 normal, S2 normal, normal heart sounds, intact distal pulses and normal pulses.   No extrasystoles are present. PMI is not displaced.  Exam reveals no friction rub.    No murmur heard.  Pulmonary/Chest: Effort  normal and breath sounds normal. She has no wheezes. She has no rales. She exhibits no tenderness.   Abdominal: Soft. Bowel sounds are normal. She exhibits no distension and no mass. There is no tenderness.   Musculoskeletal: Normal range of motion. She exhibits edema (1-2+).   Neurological: She is alert and oriented to person, place, and time.   Skin: Skin is warm and dry.   Vitals reviewed.      Test(s) Reviewed  I have reviewed the following in detail:  [x] Stress test   [] Angiography   [x] Echocardiogram   [] Labs   [x] Other:  ECG normal       Assessment:       1. Essential hypertension    2. Reactive airway disease, unspecified asthma severity, uncomplicated    3. Pulmonary hypertension    4. JESENIA on CPAP    5. Morbid obesity with BMI of 45.0-49.9, adult         Plan:       Pt asymptomatic with normal ECG  She is probably not high risk for bariatric procedure  Will repeat Echo to re-evaluate PA pressures which are likely a result of her life long asthma and JESENIA  If no significant change would see no contraindication to planned surgery.

## 2017-06-16 NOTE — LETTER
June 16, 2017      Dorene Tripathi MD  [address]  [place] [address]           [facility]  [address] [facility] [address] [place] [address]  Phone: [phone]          Patient: Marta Huff   MR Number: [id number]   YOB: 1945   Date of Visit: 6/16/2017       Dear Dr. Dorene Tripathi:    Thank you for referring Marta Huff to me for evaluation. Attached you will find relevant portions of my assessment and plan of care.    If you have questions, please do not hesitate to call me. I look forward to following Marta Huff along with you.    Sincerely,    Prosper Jerez MD    Enclosure  CC:  No Recipients    If you would like to receive this communication electronically, please contact [email or web address] or [phone] to request more information on [employer] Link access.    For providers and/or their staff who would like to refer a patient to [facility], please contact us through our one-stop-shop provider referral line, [facility] [place], at [phone].    If you feel you have received this communication in error or would no longer like to receive these types of communications, please e-mail [email or web address]

## 2017-06-22 ENCOUNTER — ANESTHESIA EVENT (OUTPATIENT)
Dept: ENDOSCOPY | Facility: HOSPITAL | Age: 72
End: 2017-06-22
Payer: MEDICARE

## 2017-06-23 ENCOUNTER — HOSPITAL ENCOUNTER (OUTPATIENT)
Facility: HOSPITAL | Age: 72
Discharge: HOME | End: 2017-06-23
Attending: SURGERY | Admitting: SURGERY
Payer: MEDICARE

## 2017-06-23 ENCOUNTER — ANESTHESIA (OUTPATIENT)
Dept: ENDOSCOPY | Facility: HOSPITAL | Age: 72
End: 2017-06-23
Payer: MEDICARE

## 2017-06-23 VITALS
TEMPERATURE: 98 F | DIASTOLIC BLOOD PRESSURE: 97 MMHG | BODY MASS INDEX: 47.37 KG/M2 | WEIGHT: 235 LBS | SYSTOLIC BLOOD PRESSURE: 175 MMHG | RESPIRATION RATE: 16 BRPM | HEIGHT: 59 IN | HEART RATE: 66 BPM | OXYGEN SATURATION: 98 % | RESPIRATION RATE: 20 BRPM

## 2017-06-23 DIAGNOSIS — E66.01 MORBID OBESITY: ICD-10-CM

## 2017-06-23 PROCEDURE — 88312 SPECIAL STAINS GROUP 1: CPT | Mod: 26,,, | Performed by: PATHOLOGY

## 2017-06-23 PROCEDURE — 37000009 HC ANESTHESIA EA ADD 15 MINS: Performed by: SURGERY

## 2017-06-23 PROCEDURE — 88305 TISSUE EXAM BY PATHOLOGIST: CPT | Performed by: PATHOLOGY

## 2017-06-23 PROCEDURE — D9220A PRA ANESTHESIA: Mod: ANES,,, | Performed by: ANESTHESIOLOGY

## 2017-06-23 PROCEDURE — D9220A PRA ANESTHESIA: Mod: CRNA,,, | Performed by: NURSE ANESTHETIST, CERTIFIED REGISTERED

## 2017-06-23 PROCEDURE — 27201012 HC FORCEPS, HOT/COLD, DISP: Performed by: SURGERY

## 2017-06-23 PROCEDURE — 37000008 HC ANESTHESIA 1ST 15 MINUTES: Performed by: SURGERY

## 2017-06-23 PROCEDURE — 25000003 PHARM REV CODE 250: Performed by: SURGERY

## 2017-06-23 PROCEDURE — 43239 EGD BIOPSY SINGLE/MULTIPLE: CPT | Performed by: SURGERY

## 2017-06-23 RX ORDER — SUCRALFATE 1 G/10ML
1 SUSPENSION ORAL 4 TIMES DAILY
Qty: 414 ML | Refills: 0 | Status: SHIPPED | OUTPATIENT
Start: 2017-06-23 | End: 2017-09-15

## 2017-06-23 RX ORDER — OMEPRAZOLE 40 MG/1
40 CAPSULE, DELAYED RELEASE ORAL DAILY
Qty: 30 CAPSULE | Refills: 2 | Status: SHIPPED | OUTPATIENT
Start: 2017-06-23 | End: 2018-01-04

## 2017-06-23 RX ORDER — LIDOCAINE HYDROCHLORIDE 20 MG/ML
INJECTION, SOLUTION EPIDURAL; INFILTRATION; INTRACAUDAL; PERINEURAL
Status: DISCONTINUED
Start: 2017-06-23 | End: 2017-06-23 | Stop reason: HOSPADM

## 2017-06-23 RX ORDER — PROPOFOL 10 MG/ML
INJECTION, EMULSION INTRAVENOUS
Status: DISCONTINUED
Start: 2017-06-23 | End: 2017-06-23 | Stop reason: HOSPADM

## 2017-06-23 RX ORDER — SODIUM CHLORIDE 9 MG/ML
INJECTION, SOLUTION INTRAVENOUS CONTINUOUS
Status: DISCONTINUED | OUTPATIENT
Start: 2017-06-23 | End: 2017-06-23 | Stop reason: HOSPADM

## 2017-06-23 RX ADMIN — SODIUM CHLORIDE 1000 ML: 0.9 INJECTION, SOLUTION INTRAVENOUS at 08:06

## 2017-06-23 NOTE — DISCHARGE INSTRUCTIONS

## 2017-06-23 NOTE — ANESTHESIA PREPROCEDURE EVALUATION
06/23/2017  Marta Huff is a 72 y.o., female.    Anesthesia Evaluation    I have reviewed the Patient Summary Reports.    I have reviewed the Nursing Notes.   I have reviewed the Medications.     Review of Systems  Anesthesia Hx:  No problems with previous Anesthesia Denies Hx of Anesthetic complications    Social:  Non-Smoker    Cardiovascular:   Hypertension Denies Valvular problems/Murmurs.  Denies MI.  Denies CAD.    Denies Dysrhythmias.   Denies Angina.    Pulmonary:   Denies COPD. Asthma moderate  Denies Shortness of breath.  Denies Recent URI. Sleep Apnea, CPAP    Renal/:   Denies Chronic Renal Disease.     Hepatic/GI:   Hiatal Hernia, GERD Denies Liver Disease.    Musculoskeletal:   Arthritis     Neurological:   Denies TIA. Denies CVA. Denies Seizures.    Endocrine:   Denies Diabetes. Denies Hypothyroidism.    Psych:   Psychiatric History depression          Physical Exam  General:  Well nourished, Morbid Obesity    Airway/Jaw/Neck:  Airway Findings: Mouth Opening: Normal Tongue: Normal  General Airway Assessment: Adult, Good  Mallampati: II  Improves to II with phonation.  TM Distance: 4-6 cm      Dental:  Dental Findings: In tact   Chest/Lungs:  Chest/Lungs Findings: Clear to auscultation, Normal Respiratory Rate     Heart/Vascular:  Heart Findings: Rate: Normal  Rhythm: Regular Rhythm  Sounds: Normal  Heart murmur: negative       Mental Status:  Mental Status Findings:  Cooperative, Alert and Oriented         Anesthesia Plan  Type of Anesthesia, risks & benefits discussed:  Anesthesia Type:  general  Patient's Preference:   Intra-op Monitoring Plan:   Intra-op Monitoring Plan Comments:   Post Op Pain Control Plan:   Post Op Pain Control Plan Comments:   Induction:   IV  Beta Blocker:  Patient is not currently on a Beta-Blocker (No further documentation required).       Informed  Consent: Patient understands risks and agrees with Anesthesia plan.  Questions answered. Anesthesia consent signed with patient.  ASA Score: 3     Day of Surgery Review of History & Physical:    H&P update referred to the surgeon.         Ready For Surgery From Anesthesia Perspective.

## 2017-06-23 NOTE — TRANSFER OF CARE
"Anesthesia Transfer of Care Note    Patient: Marta Huff    Procedure(s) Performed: Procedure(s) (LRB):  ESOPHAGOGASTRODUODENOSCOPY (EGD) (N/A)    Patient location: GI    Anesthesia Type: general    Transport from OR: Transported from OR on room air with adequate spontaneous ventilation    Post pain: adequate analgesia    Post assessment: no apparent anesthetic complications    Post vital signs: stable    Level of consciousness: awake    Nausea/Vomiting: no nausea/vomiting    Complications: none    Transfer of care protocol was followed      Last vitals:   Visit Vitals  BP (!) 171/93 (BP Location: Left arm, Patient Position: Lying, BP Method: Automatic)   Pulse 81   Temp 36.9 °C (98.4 °F) (Temporal)   Resp 16   Ht 4' 11" (1.499 m)   Wt 106.6 kg (235 lb)   SpO2 95%   Breastfeeding? No   BMI 47.46 kg/m²     "

## 2017-06-23 NOTE — PLAN OF CARE
Vss, aaron po fluids, denies pain, ambulates easily.  States ready to go home.  Discharged from facility with family.

## 2017-06-23 NOTE — ANESTHESIA POSTPROCEDURE EVALUATION
"Anesthesia Post Evaluation    Patient: Marta Huff    Procedure(s) Performed: Procedure(s) (LRB):  ESOPHAGOGASTRODUODENOSCOPY (EGD) (N/A)    Final Anesthesia Type: general  Patient location during evaluation: PACU  Patient participation: Yes- Able to Participate  Level of consciousness: awake and alert and oriented  Post-procedure vital signs: reviewed and stable  Pain management: adequate  Airway patency: patent  PONV status at discharge: No PONV  Anesthetic complications: no      Cardiovascular status: blood pressure returned to baseline  Respiratory status: unassisted, spontaneous ventilation and room air  Hydration status: euvolemic  Follow-up not needed.        Visit Vitals  BP (!) 175/97   Pulse 66   Temp 36.9 °C (98.4 °F) (Temporal)   Resp 16   Ht 4' 11" (1.499 m)   Wt 106.6 kg (235 lb)   SpO2 98%   Breastfeeding? No   BMI 47.46 kg/m²       Pain/Khari Score: Pain Assessment Performed: Yes (6/23/2017  9:08 AM)  Presence of Pain: denies (6/23/2017  9:30 AM)  Khari Score: 10 (6/23/2017  9:30 AM)      "

## 2017-06-23 NOTE — H&P
Medically Supervised Weight Loss Documentation     Date of Visit: 06/08/2017     Patient: Marta Huff     Current Weight: 239                                    Current BMI: Body mass index is 48.31 kg/m².                                          Weight Change: -1 pounds     Last Weight: 240     Beginning Weight: 240                                                  Here for endoscopy to evaluate sleeve gastrectomy    Vitals:     Vitals:    06/23/17 0819   BP: (!) 153/92   Pulse: 64   Resp: 16   Temp: 98.4 °F (36.9 °C)             Vitals:     06/08/17 0821   BP: (!) 172/97   Pulse: 73   Resp: 17   Temp: 98.6 °F (37 °C)         Comorbidities:        Past Medical History:   Diagnosis Date    Anxiety      Arthralgia of knee       arthralgia of bilateral knees secondary to djd    Back pain      Depression      GERD (gastroesophageal reflux disease)      Hiatal hernia       repaired in 1979    Hypertension      Obesity      JESENIA on CPAP      Osteoporosis      Pneumonia      Reactive airway disease      Restless legs syndrome      Rheumatic fever       at 4 y/o    Trouble in sleeping           Medications:         Current Outpatient Prescriptions on File Prior to Visit   Medication Sig Dispense Refill    albuterol (PROAIR HFA) 90 mcg/actuation inhaler Inhale 2 puffs into the lungs every 6 (six) hours as needed for Wheezing or Shortness of Breath. 3 Inhaler 3    albuterol (PROVENTIL) 2.5 mg /3 mL (0.083 %) nebulizer solution Take 3 mLs (2.5 mg total) by nebulization every 6 (six) hours as needed for Wheezing or Shortness of Breath. Rescue 1 Box 11    alendronate (FOSAMAX) 70 MG tablet TAKE 1 TABLET EVERY 7 DAYS 12 tablet 3    B-complex with vitamin C (Z-BEC OR EQUIV) tablet Take 1 tablet by mouth once daily.         benazepril (LOTENSIN) 40 MG tablet TAKE 1 TABLET TWICE DAILY 180 tablet 1    calcium citrate-vitamin D2 1,500-200 mg-unit Tab Take 1 tablet by mouth once daily.         citalopram  (CELEXA) 40 MG tablet TAKE 1 TABLET (40 MG TOTAL) ONE TIME DAILY. 90 tablet 1    fish oil-omega-3 fatty acids 300-1,000 mg capsule Take 2 g by mouth once daily.        furosemide (LASIX) 40 MG tablet TAKE 1 TABLET ONE TIME DAILY 90 tablet 1    gabapentin (NEURONTIN) 600 MG tablet TAKE 2 TABLETS TWICE DAILY 360 tablet 3    lorazepam (ATIVAN) 0.5 MG tablet Take 1 tablet (0.5 mg total) by mouth 2 (two) times daily as needed for Anxiety. 60 tablet 2    meclizine (ANTIVERT) 25 mg tablet Take 1 tablet (25 mg total) by mouth every 6 (six) hours. (Patient taking differently: Take 25 mg by mouth every 6 (six) hours as needed. ) 360 tablet 1    multivitamin (MULTIVITAMIN) per tablet Take 1 tablet by mouth once daily.          pramipexole (MIRAPEX) 1.5 MG tablet Take 1 tablet (1.5 mg total) by mouth nightly. 90 tablet 1    RESTASIS 0.05 % ophthalmic emulsion INSTILL ONE DROP INTO BOTH EYES TWICE DAILY   0    tizanidine (ZANAFLEX) 4 MG tablet Take 1 tablet (4 mg total) by mouth every 8 (eight) hours. 270 tablet 2    VITAMIN B-12 5,000 mcg Subl Place 1 tablet under the tongue once a week.         zolpidem (AMBIEN) 5 MG Tab Take 1 tablet (5 mg total) by mouth nightly as needed. 30 tablet 2                Current Facility-Administered Medications on File Prior to Visit   Medication Dose Route Frequency Provider Last Rate Last Dose    albuterol nebulizer solution 2.5 mg  2.5 mg Nebulization 1 time in Clinic/HOD Elda Holley MD        albuterol nebulizer solution 2.5 mg  2.5 mg Nebulization 1 time in Clinic/HOD Elda Holley MD                Body comp:  Fat Percent:  46.7 %  Fat Mass:  111.8 lb  FFM:  127.4 lb  TBW: 92 lb  TBW %:  38.5 %  BMR: 1783 kcal        Diet Education Discussed:     Breakfast:  Eggs- omelette; doing greek yogurt or protein shake other days  Lunch:  Meat and vegetalbes  Dinner:  Yogurt or shake  Cheat days: no restaurants; no sodas; no candy, none  Protein shake:  linden     Exercise/Activity Discussed:     Elliptical every morning- 30 minutes      Behavior or Diet Goals for this patient:     Portions are controlled on her diet- She does about 3 ounces of meat, 1/2 cup of vegetables  She is doing well on the diet - continue dieting; keep portion sizes small, start counting calories   Plan endoscopy to see size of sleeve  Plan for calorie count next time  She is interested in converting to gastric bypass or re sleeve- I think this may be feasible as the majority of the weight loss will be in the first 1 year.  I will send her to cardiology for pre operative examination.     She is quite active in her exercising and should continue this trying to increase her exercising intensity    Labs- reviewed  UGI - large sleeve-   Endoscopy-  EKG - will avoid phentermine or other weight loss meds given her age and risks for complication

## 2017-06-26 ENCOUNTER — CLINICAL SUPPORT (OUTPATIENT)
Dept: CARDIOLOGY | Facility: CLINIC | Age: 72
End: 2017-06-26
Payer: MEDICARE

## 2017-06-26 ENCOUNTER — TELEPHONE (OUTPATIENT)
Dept: CARDIOLOGY | Facility: CLINIC | Age: 72
End: 2017-06-26

## 2017-06-26 DIAGNOSIS — I27.20 PULMONARY HYPERTENSION: ICD-10-CM

## 2017-06-26 DIAGNOSIS — J45.909 REACTIVE AIRWAY DISEASE, UNSPECIFIED ASTHMA SEVERITY, UNCOMPLICATED: ICD-10-CM

## 2017-06-26 DIAGNOSIS — E66.01 MORBID OBESITY WITH BMI OF 45.0-49.9, ADULT: ICD-10-CM

## 2017-06-26 DIAGNOSIS — I10 ESSENTIAL HYPERTENSION: Chronic | ICD-10-CM

## 2017-06-26 DIAGNOSIS — G47.33 OSA ON CPAP: ICD-10-CM

## 2017-06-26 LAB
ESTIMATED PA SYSTOLIC PRESSURE: 35.72
MITRAL VALVE REGURGITATION: NORMAL
RETIRED EF AND QEF - SEE NOTES: 60 (ref 55–65)
TRICUSPID VALVE REGURGITATION: NORMAL

## 2017-06-26 PROCEDURE — 93306 TTE W/DOPPLER COMPLETE: CPT | Mod: S$GLB,,, | Performed by: INTERNAL MEDICINE

## 2017-06-26 NOTE — TELEPHONE ENCOUNTER
Test(s) Reviewed  I have reviewed the following in detail:  [] Stress test   [] Angiography   [x] Echocardiogram   [] Labs   [] Other:       Call Pt and tell her tests are ok; unchanged from previous  No contraindication to planned surgery. Continue all meds scot-op.

## 2017-06-27 ENCOUNTER — PATIENT MESSAGE (OUTPATIENT)
Dept: CARDIOLOGY | Facility: CLINIC | Age: 72
End: 2017-06-27

## 2017-07-07 ENCOUNTER — OFFICE VISIT (OUTPATIENT)
Dept: BARIATRICS | Facility: CLINIC | Age: 72
End: 2017-07-07
Payer: MEDICARE

## 2017-07-07 VITALS
BODY MASS INDEX: 48.46 KG/M2 | HEART RATE: 77 BPM | TEMPERATURE: 98 F | HEIGHT: 59 IN | DIASTOLIC BLOOD PRESSURE: 67 MMHG | SYSTOLIC BLOOD PRESSURE: 119 MMHG | RESPIRATION RATE: 18 BRPM | WEIGHT: 240.38 LBS

## 2017-07-07 DIAGNOSIS — I10 ESSENTIAL HYPERTENSION: Chronic | ICD-10-CM

## 2017-07-07 DIAGNOSIS — G47.33 OSA ON CPAP: ICD-10-CM

## 2017-07-07 DIAGNOSIS — E66.01 MORBID OBESITY DUE TO EXCESS CALORIES: Primary | ICD-10-CM

## 2017-07-07 PROCEDURE — 1159F MED LIST DOCD IN RCRD: CPT | Mod: S$GLB,,, | Performed by: SURGERY

## 2017-07-07 PROCEDURE — 1126F AMNT PAIN NOTED NONE PRSNT: CPT | Mod: S$GLB,,, | Performed by: SURGERY

## 2017-07-07 PROCEDURE — 99999 PR PBB SHADOW E&M-EST. PATIENT-LVL III: CPT | Mod: PBBFAC,,, | Performed by: SURGERY

## 2017-07-07 PROCEDURE — 99213 OFFICE O/P EST LOW 20 MIN: CPT | Mod: S$GLB,,, | Performed by: SURGERY

## 2017-07-07 NOTE — PROGRESS NOTES
Medically Supervised Weight Loss Documentation    Date of Visit: 07/07/2017    Patient: Marta Huff    Current Weight: 240  Current BMI: Body mass index is 48.55 kg/m².  Weight Change: 0    Last Weight: 239    Beginning Weight: 240      Vitals:   Vitals:    07/07/17 1302   BP: 119/67   Pulse: 77   Resp: 18   Temp: 98.2 °F (36.8 °C)       Comorbidities:   Past Medical History:   Diagnosis Date    Anxiety     Arthralgia of knee     arthralgia of bilateral knees secondary to djd    Arthritis     Back pain     Depression     GERD (gastroesophageal reflux disease)     Hiatal hernia     repaired in 1979    Hypertension     Obesity     JESENIA on CPAP     not using cpap currently    Osteoporosis     Pneumonia     Reactive airway disease     Restless legs syndrome     Rheumatic fever     at 6 y/o    Trouble in sleeping        Medications:  Current Outpatient Prescriptions on File Prior to Visit   Medication Sig Dispense Refill    albuterol (PROAIR HFA) 90 mcg/actuation inhaler Inhale 2 puffs into the lungs every 6 (six) hours as needed for Wheezing or Shortness of Breath. 3 Inhaler 3    albuterol (PROVENTIL) 2.5 mg /3 mL (0.083 %) nebulizer solution Take 3 mLs (2.5 mg total) by nebulization every 6 (six) hours as needed for Wheezing or Shortness of Breath. Rescue 1 Box 11    alendronate (FOSAMAX) 70 MG tablet TAKE 1 TABLET EVERY 7 DAYS 12 tablet 3    B-complex with vitamin C (Z-BEC OR EQUIV) tablet Take 1 tablet by mouth once daily.       benazepril (LOTENSIN) 40 MG tablet TAKE 1 TABLET TWICE DAILY 180 tablet 1    calcium citrate-vitamin D2 1,500-200 mg-unit Tab Take 1 tablet by mouth once daily.       citalopram (CELEXA) 40 MG tablet TAKE 1 TABLET (40 MG TOTAL) ONE TIME DAILY. 90 tablet 1    fish oil-omega-3 fatty acids 300-1,000 mg capsule Take 2 g by mouth once daily.      furosemide (LASIX) 40 MG tablet TAKE 1 TABLET ONE TIME DAILY 90 tablet 1    gabapentin (NEURONTIN) 600 MG tablet TAKE  2 TABLETS TWICE DAILY 360 tablet 3    lorazepam (ATIVAN) 0.5 MG tablet Take 1 tablet (0.5 mg total) by mouth 2 (two) times daily as needed for Anxiety. 60 tablet 2    meclizine (ANTIVERT) 25 mg tablet Take 1 tablet (25 mg total) by mouth every 6 (six) hours. (Patient taking differently: Take 25 mg by mouth every 6 (six) hours as needed. ) 360 tablet 1    multivitamin (MULTIVITAMIN) per tablet Take 1 tablet by mouth once daily.        omeprazole (PRILOSEC) 40 MG capsule Take 1 capsule (40 mg total) by mouth once daily. 30 capsule 2    pramipexole (MIRAPEX) 1.5 MG tablet Take 1 tablet (1.5 mg total) by mouth nightly. 90 tablet 1    RESTASIS 0.05 % ophthalmic emulsion INSTILL ONE DROP INTO BOTH EYES TWICE DAILY  0    sucralfate (CARAFATE) 100 mg/mL suspension Take 10 mLs (1 g total) by mouth 4 (four) times daily. 414 mL 0    tizanidine (ZANAFLEX) 4 MG tablet Take 1 tablet (4 mg total) by mouth every 8 (eight) hours. 270 tablet 2    VITAMIN B-12 5,000 mcg Subl Place 1 tablet under the tongue once a week.       zolpidem (AMBIEN) 5 MG Tab Take 1 tablet (5 mg total) by mouth nightly as needed. 30 tablet 2     Current Facility-Administered Medications on File Prior to Visit   Medication Dose Route Frequency Provider Last Rate Last Dose    albuterol nebulizer solution 2.5 mg  2.5 mg Nebulization 1 time in Clinic/HOD Elda Holley MD        albuterol nebulizer solution 2.5 mg  2.5 mg Nebulization 1 time in Clinic/HOD Elda Holley MD             Body comp:  Fat Percent:  45.5 %  Fat Mass:  109.4 lb  FFM:  131 lb  TBW: 94.4 lb  TBW %:  39.3 %  BMR: 1824 kcal      Diet Education Discussed:    Continues to eat mainly meats and vegetables but has some fruits    Exercise/Activity Discussed:    Walking and water aerobics 5-7 days per week    Behavior or Diet Goals for this patient:     We have decided to set a weight loss goal of 10 pounds this month.  If she meets this goal we will schedule a gastric bypass.  She  wants ventral hernia repair at the same time and we will decide at the time surgery.  We talked about sticking closely to the diet and stop eating fruits.  We talked about exercising as much as possible to meet her 10 pound weight loss goal.    We talked about the risks of the gastric bypass and she still wants to pursue having the surgery.  She is quite active and seems appropriate as a candidate despite her age.    Labs- reviewed  UGI - large sleeve-   Endoscopy- esophageal ulcer- on ppi  EKG - will avoid phentermine or other weight loss meds given her age and risks for complication     Cardiology- Dr. Jerez    : I met with the patient for 15 minutes and counseled her for over 50% of that time

## 2017-08-15 ENCOUNTER — TELEPHONE (OUTPATIENT)
Dept: FAMILY MEDICINE | Facility: CLINIC | Age: 72
End: 2017-08-15

## 2017-08-15 ENCOUNTER — OFFICE VISIT (OUTPATIENT)
Dept: BARIATRICS | Facility: CLINIC | Age: 72
End: 2017-08-15
Payer: MEDICARE

## 2017-08-15 VITALS
WEIGHT: 236 LBS | HEART RATE: 73 BPM | TEMPERATURE: 98 F | SYSTOLIC BLOOD PRESSURE: 129 MMHG | HEIGHT: 59 IN | BODY MASS INDEX: 47.58 KG/M2 | RESPIRATION RATE: 16 BRPM | DIASTOLIC BLOOD PRESSURE: 71 MMHG

## 2017-08-15 DIAGNOSIS — E66.01 MORBID OBESITY DUE TO EXCESS CALORIES: ICD-10-CM

## 2017-08-15 DIAGNOSIS — G47.33 OSA ON CPAP: ICD-10-CM

## 2017-08-15 DIAGNOSIS — E66.01 MORBID OBESITY WITH BMI OF 45.0-49.9, ADULT: Primary | ICD-10-CM

## 2017-08-15 PROCEDURE — 3074F SYST BP LT 130 MM HG: CPT | Mod: S$GLB,,, | Performed by: SURGERY

## 2017-08-15 PROCEDURE — 3008F BODY MASS INDEX DOCD: CPT | Mod: S$GLB,,, | Performed by: SURGERY

## 2017-08-15 PROCEDURE — 1125F AMNT PAIN NOTED PAIN PRSNT: CPT | Mod: S$GLB,,, | Performed by: SURGERY

## 2017-08-15 PROCEDURE — 99999 PR PBB SHADOW E&M-EST. PATIENT-LVL IV: CPT | Mod: PBBFAC,,, | Performed by: SURGERY

## 2017-08-15 PROCEDURE — 1159F MED LIST DOCD IN RCRD: CPT | Mod: S$GLB,,, | Performed by: SURGERY

## 2017-08-15 PROCEDURE — 99214 OFFICE O/P EST MOD 30 MIN: CPT | Mod: S$GLB,,, | Performed by: SURGERY

## 2017-08-15 PROCEDURE — 3078F DIAST BP <80 MM HG: CPT | Mod: S$GLB,,, | Performed by: SURGERY

## 2017-08-15 RX ORDER — FAMOTIDINE 10 MG/ML
20 INJECTION INTRAVENOUS
Status: CANCELLED | OUTPATIENT
Start: 2017-08-15

## 2017-08-15 RX ORDER — HEPARIN SODIUM 5000 [USP'U]/ML
5000 INJECTION, SOLUTION INTRAVENOUS; SUBCUTANEOUS
Status: CANCELLED | OUTPATIENT
Start: 2017-08-15

## 2017-08-15 NOTE — H&P
Follow up    SUBJECTIVE:     Chief Complaint   Patient presents with    Follow-up     1 month f/u       History of Present Illness:    Patient is a 72 y.o. female who is referred for evaluation of surgical treatment of morbid obesity. Her Body mass index is 47.67 kg/m².  She is interested in converting to a gastric bypass and has been following the diet plan very well and losing weight.    I have reviewed her diet journal and she is doing well choosing mainly meats and vegetables.      Review of patient's allergies indicates:   Allergen Reactions    Etodolac (bulk) Swelling    Sulfa (sulfonamide antibiotics)      Headache and rash       Current Outpatient Prescriptions   Medication Sig Dispense Refill    alendronate (FOSAMAX) 70 MG tablet TAKE 1 TABLET EVERY 7 DAYS 12 tablet 3    B-complex with vitamin C (Z-BEC OR EQUIV) tablet Take 1 tablet by mouth once daily.       benazepril (LOTENSIN) 40 MG tablet TAKE 1 TABLET TWICE DAILY 180 tablet 1    calcium citrate-vitamin D2 1,500-200 mg-unit Tab Take 1 tablet by mouth once daily.       citalopram (CELEXA) 40 MG tablet TAKE 1 TABLET (40 MG TOTAL) ONE TIME DAILY. 90 tablet 1    fish oil-omega-3 fatty acids 300-1,000 mg capsule Take 2 g by mouth once daily.      furosemide (LASIX) 40 MG tablet TAKE 1 TABLET ONE TIME DAILY 90 tablet 1    gabapentin (NEURONTIN) 600 MG tablet TAKE 2 TABLETS TWICE DAILY 360 tablet 3    multivitamin (MULTIVITAMIN) per tablet Take 1 tablet by mouth once daily.        omeprazole (PRILOSEC) 40 MG capsule Take 1 capsule (40 mg total) by mouth once daily. 30 capsule 2    RESTASIS 0.05 % ophthalmic emulsion INSTILL ONE DROP INTO BOTH EYES TWICE DAILY  0    VITAMIN B-12 5,000 mcg Subl Place 1 tablet under the tongue once a week.       albuterol (PROAIR HFA) 90 mcg/actuation inhaler Inhale 2 puffs into the lungs every 6 (six) hours as needed for Wheezing or Shortness of Breath. 3 Inhaler 3    albuterol (PROVENTIL) 2.5 mg /3 mL (0.083  %) nebulizer solution Take 3 mLs (2.5 mg total) by nebulization every 6 (six) hours as needed for Wheezing or Shortness of Breath. Rescue 1 Box 11    lorazepam (ATIVAN) 0.5 MG tablet Take 1 tablet (0.5 mg total) by mouth 2 (two) times daily as needed for Anxiety. 60 tablet 2    meclizine (ANTIVERT) 25 mg tablet Take 1 tablet (25 mg total) by mouth every 6 (six) hours. (Patient taking differently: Take 25 mg by mouth every 6 (six) hours as needed. ) 360 tablet 1    pramipexole (MIRAPEX) 1.5 MG tablet Take 1 tablet (1.5 mg total) by mouth nightly. 90 tablet 1    sucralfate (CARAFATE) 100 mg/mL suspension Take 10 mLs (1 g total) by mouth 4 (four) times daily. 414 mL 0    tizanidine (ZANAFLEX) 4 MG tablet Take 1 tablet (4 mg total) by mouth every 8 (eight) hours. 270 tablet 2    zolpidem (AMBIEN) 5 MG Tab Take 1 tablet (5 mg total) by mouth nightly as needed. 30 tablet 2     Current Facility-Administered Medications   Medication Dose Route Frequency Provider Last Rate Last Dose    albuterol nebulizer solution 2.5 mg  2.5 mg Nebulization 1 time in Clinic/HOD Elda Holley MD        albuterol nebulizer solution 2.5 mg  2.5 mg Nebulization 1 time in Clinic/HOD Elda Holley MD           Past Medical History:   Diagnosis Date    Anxiety     Arthralgia of knee     arthralgia of bilateral knees secondary to djd    Arthritis     Back pain     Depression     GERD (gastroesophageal reflux disease)     Hiatal hernia     repaired in 1979    Hypertension     Obesity     JESENIA on CPAP     not using cpap currently    Osteoporosis     Pneumonia     Reactive airway disease     Restless legs syndrome     Rheumatic fever     at 4 y/o    Trouble in sleeping      Past Surgical History:   Procedure Laterality Date    APPENDECTOMY      BARIATRIC SURGERY  2014    Gastric Sleeve    CHOLECYSTECTOMY      GASTRECTOMY      HERNIA REPAIR      hiatal hernia    HIATAL HERNIA REPAIR  1/1/1979    HYSTERECTOMY       partial    KNEE ARTHROPLASTY  1/2/2010    bilateral    KNEE ARTHROSCOPY      right    NISSEN FUNDOPLICATION       Family History   Problem Relation Age of Onset    Heart disease Mother     Hypertension Mother     Diabetes Mother     Obesity Mother     Heart disease Maternal Grandfather      Social History   Substance Use Topics    Smoking status: Passive Smoke Exposure - Never Smoker    Smokeless tobacco: Never Used    Alcohol use Yes      Comment: 1-2 glasses of wine monthly        Review of Systems:  Review of Systems   Constitutional: Negative for activity change, appetite change, fever and unexpected weight change.   HENT: Negative for congestion, sinus pressure, sneezing, sore throat, tinnitus, trouble swallowing and voice change.    Eyes: Negative for pain, redness, itching and visual disturbance.   Respiratory: Positive for wheezing. Negative for apnea, cough, choking, chest tightness, shortness of breath and stridor.    Cardiovascular: Positive for leg swelling. Negative for chest pain and palpitations.   Gastrointestinal: Positive for diarrhea. Negative for abdominal distention, abdominal pain, anal bleeding, blood in stool, constipation, nausea, rectal pain and vomiting.   Endocrine: Negative for cold intolerance and heat intolerance.   Genitourinary: Negative for difficulty urinating and dysuria.   Musculoskeletal: Positive for arthralgias and gait problem. Negative for back pain, joint swelling, myalgias, neck pain and neck stiffness.   Skin: Positive for rash. Negative for wound.   Allergic/Immunologic: Positive for environmental allergies. Negative for food allergies.   Neurological: Negative for dizziness, light-headedness and headaches.   Hematological: Negative for adenopathy. Does not bruise/bleed easily.   Psychiatric/Behavioral: Negative for agitation and confusion.       OBJECTIVE:     Vital Signs (Most Recent)  Temp: 97.6 °F (36.4 °C) (08/15/17 0924)  Pulse: 73 (08/15/17 0924)  Resp:  "16 (08/15/17 0924)  BP: 129/71 (08/15/17 0924)  4' 11" (1.499 m)  107 kg (236 lb)     Body comp:  Fat Percent:  47.8 %  Fat Mass:  112.8 lb  FFM:  123.2 lb  TBW: 88.8 lb  TBW %:  37.6 %  BMR: 1731 kcal    Physical Exam:  Physical Exam   Constitutional: She is oriented to person, place, and time. She appears well-developed and well-nourished. No distress.   HENT:   Head: Normocephalic and atraumatic.   Mouth/Throat: No oropharyngeal exudate.   Eyes: Conjunctivae and EOM are normal. Pupils are equal, round, and reactive to light. No scleral icterus.   Neck: Normal range of motion. Neck supple. No JVD present. No tracheal deviation present. No thyromegaly present.   Cardiovascular: Normal rate, regular rhythm and normal heart sounds.    Pulmonary/Chest: Effort normal. No stridor. No respiratory distress. She has wheezes. She has no rales.   Abdominal: Soft. Bowel sounds are normal. She exhibits no distension and no mass. There is no tenderness. There is no rebound and no guarding.       Large well healed upper midline incision with reducible hernia   Musculoskeletal: Normal range of motion. She exhibits no edema or tenderness.   Neurological: She is alert and oriented to person, place, and time. No cranial nerve deficit.   Skin: Skin is warm and dry. No rash noted. She is not diaphoretic. No erythema.   Psychiatric: She has a normal mood and affect. Her behavior is normal.   Nursing note and vitals reviewed.      ASSESSMENT/PLAN:        Labs- reviewed  UGI - large sleeve-   Endoscopy- esophageal ulcer- on ppi  EKG - will avoid phentermine or other weight loss meds given her age and risks for complication     Cardiology- Dr. Jerez- clear    1. Morbid obesity with BMI of 45.0-49.9, adult     2. Morbid obesity due to excess calories     3. JESENIA on CPAP         Plan:  Marta Huff has morbid obesity as their Body mass index is 47.67 kg/m². She would benefit from weight loss surgery and has chosen gastric bypass " surgery as the preferred procedure. She understands that this is a tool and lifestyle change will be necessary to keep weight off. I went over possible complications of the chosen surgery with the patient and she is agreeable to surgery.    She has been instructed to start the pre operative diet. September 4    She surgical date is September 18      The patient has been taught the post operative diet and understands that she will need to stay on this diet to prevent complication.  They are aware of the post operative follow up and return to see me after surgery to treat/prevent/identify any complications.  she has been advised to attend support groups post operatively.

## 2017-08-15 NOTE — PROGRESS NOTES
Follow up    SUBJECTIVE:     Chief Complaint   Patient presents with    Follow-up     1 month f/u       History of Present Illness:    Patient is a 72 y.o. female who is referred for evaluation of surgical treatment of morbid obesity. Her Body mass index is 47.67 kg/m².  She is interested in converting to a gastric bypass and has been following the diet plan very well and losing weight.    I have reviewed her diet journal and she is doing well choosing mainly meats and vegetables.      Review of patient's allergies indicates:   Allergen Reactions    Etodolac (bulk) Swelling    Sulfa (sulfonamide antibiotics)      Headache and rash       Current Outpatient Prescriptions   Medication Sig Dispense Refill    alendronate (FOSAMAX) 70 MG tablet TAKE 1 TABLET EVERY 7 DAYS 12 tablet 3    B-complex with vitamin C (Z-BEC OR EQUIV) tablet Take 1 tablet by mouth once daily.       benazepril (LOTENSIN) 40 MG tablet TAKE 1 TABLET TWICE DAILY 180 tablet 1    calcium citrate-vitamin D2 1,500-200 mg-unit Tab Take 1 tablet by mouth once daily.       citalopram (CELEXA) 40 MG tablet TAKE 1 TABLET (40 MG TOTAL) ONE TIME DAILY. 90 tablet 1    fish oil-omega-3 fatty acids 300-1,000 mg capsule Take 2 g by mouth once daily.      furosemide (LASIX) 40 MG tablet TAKE 1 TABLET ONE TIME DAILY 90 tablet 1    gabapentin (NEURONTIN) 600 MG tablet TAKE 2 TABLETS TWICE DAILY 360 tablet 3    multivitamin (MULTIVITAMIN) per tablet Take 1 tablet by mouth once daily.        omeprazole (PRILOSEC) 40 MG capsule Take 1 capsule (40 mg total) by mouth once daily. 30 capsule 2    RESTASIS 0.05 % ophthalmic emulsion INSTILL ONE DROP INTO BOTH EYES TWICE DAILY  0    VITAMIN B-12 5,000 mcg Subl Place 1 tablet under the tongue once a week.       albuterol (PROAIR HFA) 90 mcg/actuation inhaler Inhale 2 puffs into the lungs every 6 (six) hours as needed for Wheezing or Shortness of Breath. 3 Inhaler 3    albuterol (PROVENTIL) 2.5 mg /3 mL (0.083  %) nebulizer solution Take 3 mLs (2.5 mg total) by nebulization every 6 (six) hours as needed for Wheezing or Shortness of Breath. Rescue 1 Box 11    lorazepam (ATIVAN) 0.5 MG tablet Take 1 tablet (0.5 mg total) by mouth 2 (two) times daily as needed for Anxiety. 60 tablet 2    meclizine (ANTIVERT) 25 mg tablet Take 1 tablet (25 mg total) by mouth every 6 (six) hours. (Patient taking differently: Take 25 mg by mouth every 6 (six) hours as needed. ) 360 tablet 1    pramipexole (MIRAPEX) 1.5 MG tablet Take 1 tablet (1.5 mg total) by mouth nightly. 90 tablet 1    sucralfate (CARAFATE) 100 mg/mL suspension Take 10 mLs (1 g total) by mouth 4 (four) times daily. 414 mL 0    tizanidine (ZANAFLEX) 4 MG tablet Take 1 tablet (4 mg total) by mouth every 8 (eight) hours. 270 tablet 2    zolpidem (AMBIEN) 5 MG Tab Take 1 tablet (5 mg total) by mouth nightly as needed. 30 tablet 2     Current Facility-Administered Medications   Medication Dose Route Frequency Provider Last Rate Last Dose    albuterol nebulizer solution 2.5 mg  2.5 mg Nebulization 1 time in Clinic/HOD Elda Holley MD        albuterol nebulizer solution 2.5 mg  2.5 mg Nebulization 1 time in Clinic/HOD Elda Holley MD           Past Medical History:   Diagnosis Date    Anxiety     Arthralgia of knee     arthralgia of bilateral knees secondary to djd    Arthritis     Back pain     Depression     GERD (gastroesophageal reflux disease)     Hiatal hernia     repaired in 1979    Hypertension     Obesity     JESENIA on CPAP     not using cpap currently    Osteoporosis     Pneumonia     Reactive airway disease     Restless legs syndrome     Rheumatic fever     at 6 y/o    Trouble in sleeping      Past Surgical History:   Procedure Laterality Date    APPENDECTOMY      BARIATRIC SURGERY  2014    Gastric Sleeve    CHOLECYSTECTOMY      GASTRECTOMY      HERNIA REPAIR      hiatal hernia    HIATAL HERNIA REPAIR  1/1/1979    HYSTERECTOMY       partial    KNEE ARTHROPLASTY  1/2/2010    bilateral    KNEE ARTHROSCOPY      right    NISSEN FUNDOPLICATION       Family History   Problem Relation Age of Onset    Heart disease Mother     Hypertension Mother     Diabetes Mother     Obesity Mother     Heart disease Maternal Grandfather      Social History   Substance Use Topics    Smoking status: Passive Smoke Exposure - Never Smoker    Smokeless tobacco: Never Used    Alcohol use Yes      Comment: 1-2 glasses of wine monthly        Review of Systems:  Review of Systems   Constitutional: Negative for activity change, appetite change, fever and unexpected weight change.   HENT: Negative for congestion, sinus pressure, sneezing, sore throat, tinnitus, trouble swallowing and voice change.    Eyes: Negative for pain, redness, itching and visual disturbance.   Respiratory: Positive for wheezing. Negative for apnea, cough, choking, chest tightness, shortness of breath and stridor.    Cardiovascular: Positive for leg swelling. Negative for chest pain and palpitations.   Gastrointestinal: Positive for diarrhea. Negative for abdominal distention, abdominal pain, anal bleeding, blood in stool, constipation, nausea, rectal pain and vomiting.   Endocrine: Negative for cold intolerance and heat intolerance.   Genitourinary: Negative for difficulty urinating and dysuria.   Musculoskeletal: Positive for arthralgias and gait problem. Negative for back pain, joint swelling, myalgias, neck pain and neck stiffness.   Skin: Positive for rash. Negative for wound.   Allergic/Immunologic: Positive for environmental allergies. Negative for food allergies.   Neurological: Negative for dizziness, light-headedness and headaches.   Hematological: Negative for adenopathy. Does not bruise/bleed easily.   Psychiatric/Behavioral: Negative for agitation and confusion.       OBJECTIVE:     Vital Signs (Most Recent)  Temp: 97.6 °F (36.4 °C) (08/15/17 0924)  Pulse: 73 (08/15/17 0924)  Resp:  "16 (08/15/17 0924)  BP: 129/71 (08/15/17 0924)  4' 11" (1.499 m)  107 kg (236 lb)     Body comp:  Fat Percent:  47.8 %  Fat Mass:  112.8 lb  FFM:  123.2 lb  TBW: 88.8 lb  TBW %:  37.6 %  BMR: 1731 kcal    Physical Exam:  Physical Exam   Constitutional: She is oriented to person, place, and time. She appears well-developed and well-nourished. No distress.   HENT:   Head: Normocephalic and atraumatic.   Mouth/Throat: No oropharyngeal exudate.   Eyes: Conjunctivae and EOM are normal. Pupils are equal, round, and reactive to light. No scleral icterus.   Neck: Normal range of motion. Neck supple. No JVD present. No tracheal deviation present. No thyromegaly present.   Cardiovascular: Normal rate, regular rhythm and normal heart sounds.    Pulmonary/Chest: Effort normal. No stridor. No respiratory distress. She has wheezes. She has no rales.   Abdominal: Soft. Bowel sounds are normal. She exhibits no distension and no mass. There is no tenderness. There is no rebound and no guarding.       Large well healed upper midline incision with reducible hernia   Musculoskeletal: Normal range of motion. She exhibits no edema or tenderness.   Neurological: She is alert and oriented to person, place, and time. No cranial nerve deficit.   Skin: Skin is warm and dry. No rash noted. She is not diaphoretic. No erythema.   Psychiatric: She has a normal mood and affect. Her behavior is normal.   Nursing note and vitals reviewed.      ASSESSMENT/PLAN:        Labs- reviewed  UGI - large sleeve-   Endoscopy- esophageal ulcer- on ppi  EKG - will avoid phentermine or other weight loss meds given her age and risks for complication     Cardiology- Dr. Jerez- clear    1. Morbid obesity with BMI of 45.0-49.9, adult     2. Morbid obesity due to excess calories     3. JESENIA on CPAP         Plan:  Marta Huff has morbid obesity as their Body mass index is 47.67 kg/m². She would benefit from weight loss surgery and has chosen gastric bypass " surgery as the preferred procedure. She understands that this is a tool and lifestyle change will be necessary to keep weight off. I went over possible complications of the chosen surgery with the patient and she is agreeable to surgery.    She has been instructed to start the pre operative diet. September 4    She surgical date is September 18      The patient has been taught the post operative diet and understands that she will need to stay on this diet to prevent complication.  They are aware of the post operative follow up and return to see me after surgery to treat/prevent/identify any complications.  she has been advised to attend support groups post operatively.

## 2017-08-15 NOTE — PATIENT INSTRUCTIONS
Pre op Diet- September 4, 2017    Breakfast- protein shake  Lunch- protein shake  Dinner- 3 ounces of meat and vegetables ( from list)    NO SNACKING  Only water to drink

## 2017-08-16 NOTE — TELEPHONE ENCOUNTER
Attempted to contact pt to r/s appt with Joann Marion NP on 08/30/2017; no answer; left message to return call.

## 2017-08-30 ENCOUNTER — OFFICE VISIT (OUTPATIENT)
Dept: FAMILY MEDICINE | Facility: CLINIC | Age: 72
End: 2017-08-30
Payer: MEDICARE

## 2017-08-30 VITALS
DIASTOLIC BLOOD PRESSURE: 80 MMHG | SYSTOLIC BLOOD PRESSURE: 134 MMHG | HEART RATE: 82 BPM | WEIGHT: 239.06 LBS | TEMPERATURE: 99 F | HEIGHT: 59 IN | BODY MASS INDEX: 48.2 KG/M2 | OXYGEN SATURATION: 93 %

## 2017-08-30 DIAGNOSIS — F32.0 MILD MAJOR DEPRESSION: ICD-10-CM

## 2017-08-30 DIAGNOSIS — F41.9 ANXIETY: ICD-10-CM

## 2017-08-30 DIAGNOSIS — E66.01 MORBID OBESITY WITH BMI OF 45.0-49.9, ADULT: ICD-10-CM

## 2017-08-30 DIAGNOSIS — I77.1 TORTUOUS AORTA: ICD-10-CM

## 2017-08-30 DIAGNOSIS — K21.9 GASTROESOPHAGEAL REFLUX DISEASE WITHOUT ESOPHAGITIS: ICD-10-CM

## 2017-08-30 DIAGNOSIS — M15.9 GENERALIZED OSTEOARTHRITIS: ICD-10-CM

## 2017-08-30 DIAGNOSIS — J45.20 REACTIVE AIRWAY DISEASE, MILD INTERMITTENT, UNCOMPLICATED: ICD-10-CM

## 2017-08-30 DIAGNOSIS — I10 ESSENTIAL HYPERTENSION: Primary | ICD-10-CM

## 2017-08-30 PROCEDURE — 3075F SYST BP GE 130 - 139MM HG: CPT | Mod: S$GLB,,, | Performed by: NURSE PRACTITIONER

## 2017-08-30 PROCEDURE — 3008F BODY MASS INDEX DOCD: CPT | Mod: S$GLB,,, | Performed by: NURSE PRACTITIONER

## 2017-08-30 PROCEDURE — 99214 OFFICE O/P EST MOD 30 MIN: CPT | Mod: S$GLB,,, | Performed by: NURSE PRACTITIONER

## 2017-08-30 PROCEDURE — 99999 PR PBB SHADOW E&M-EST. PATIENT-LVL IV: CPT | Mod: PBBFAC,,, | Performed by: NURSE PRACTITIONER

## 2017-08-30 PROCEDURE — 1159F MED LIST DOCD IN RCRD: CPT | Mod: S$GLB,,, | Performed by: NURSE PRACTITIONER

## 2017-08-30 PROCEDURE — 1126F AMNT PAIN NOTED NONE PRSNT: CPT | Mod: S$GLB,,, | Performed by: NURSE PRACTITIONER

## 2017-08-30 PROCEDURE — 3079F DIAST BP 80-89 MM HG: CPT | Mod: S$GLB,,, | Performed by: NURSE PRACTITIONER

## 2017-08-30 RX ORDER — FUROSEMIDE 40 MG/1
TABLET ORAL
Qty: 90 TABLET | Refills: 1 | Status: SHIPPED | OUTPATIENT
Start: 2017-08-30 | End: 2018-01-03 | Stop reason: SDUPTHER

## 2017-08-30 NOTE — PROGRESS NOTES
Subjective:       Patient ID: Marta Huff is a 72 y.o. female.    Chief Complaint: Follow-up    HPI     Patient presents to clinic for routine f/u. Denies any current complaints.   Reactive airway disease - maintained on albuterol nebulizer PRN. Denies any current complaints of SOB, wheezing.   Morbid obesity - BMI - 48.29 - scheduled for gastric bypass with Dr. Tripathi on 09/18/2017. Cleared by Dr. Rodriguez, cardiac w/u which included, echo and EKG was negative.   HTN - well controlled in clinic. Routinely monitors her bp at home notes it to range 140s/70s-80s.   YAO - maintained on Celexa 40mg daily with Ativan 0.5 mg PRN, estimates use a couple of times weekly. Insomnia - maintained on Ambien nightly PRN, estimates use a couple of times weekly.     Review of Systems   Constitutional: Negative for chills and fever.   Respiratory: Negative for cough, shortness of breath and wheezing.    Cardiovascular: Positive for leg swelling (occasional swelling, chronic). Negative for chest pain and palpitations.   Gastrointestinal: Negative for blood in stool, diarrhea, nausea and vomiting.   Genitourinary: Negative for difficulty urinating, dysuria, frequency, hematuria and urgency.   Musculoskeletal: Positive for arthralgias, joint swelling and myalgias.   Skin: Negative for rash and wound.   Neurological: Negative for dizziness, light-headedness and headaches.   Psychiatric/Behavioral: Positive for sleep disturbance. Negative for dysphoric mood. The patient is not nervous/anxious.        Objective:      Physical Exam   Constitutional: She is oriented to person, place, and time. She appears well-developed and well-nourished.   HENT:   Head: Normocephalic and atraumatic.   Right Ear: External ear normal.   Left Ear: External ear normal.   Nose: Nose normal.   Mouth/Throat: Oropharynx is clear and moist.   Eyes: Pupils are equal, round, and reactive to light. Right eye exhibits no discharge. Left eye exhibits no  discharge.   Neck: Normal range of motion. Neck supple. No thyromegaly present.   Cardiovascular: Normal rate, regular rhythm, normal heart sounds and intact distal pulses.    No murmur heard.  Pulmonary/Chest: Effort normal and breath sounds normal. No respiratory distress. She has no wheezes. She has no rales.   Abdominal: Soft. Bowel sounds are normal.   Musculoskeletal: Normal range of motion. She exhibits edema (trace). She exhibits no tenderness or deformity.   Lymphadenopathy:     She has no cervical adenopathy.   Neurological: She is alert and oriented to person, place, and time. No cranial nerve deficit.   Skin: Skin is warm and dry.   Psychiatric: She has a normal mood and affect. Her behavior is normal.   Nursing note and vitals reviewed.      Assessment:       1. Essential hypertension    2. Mild major depression    3. Morbid obesity with BMI of 45.0-49.9, adult    4. Reactive airway disease, mild intermittent, uncomplicated    5. Generalized osteoarthritis    6. Gastroesophageal reflux disease without esophagitis    7. Anxiety    8. Tortuous aorta        Marta was seen today for follow-up.    Diagnoses and all orders for this visit:    Essential hypertension  -     furosemide (LASIX) 40 MG tablet; TAKE 1 TABLET ONE TIME DAILY  Stable. Continue current regimen.     Mild major depression  Self-care, sx monitoring, and counseling reviewed. Patient receptive to discussion.     Morbid obesity with BMI of 45.0-49.9, adult  Continue with scheduled bariatric sx.   Routine f/u.   Goal weight reviewed as well as need for lifestyle modifications to incl caloric restriction, high protein/low carb, increased water intake, muscle-building exercises as well as cardio.     Reactive airway disease, mild intermittent, uncomplicated  Stable. Continue current regimen.   RTC if sx worsen or fail to improve.     Generalized osteoarthritis  Stable. Continue current regimen.     Gastroesophageal reflux disease without  esophagitis  Stable. Continue current regimen.     Tortuous aorta  Noted on cxr 01/2014. Stable. Continue current regimen.

## 2017-09-15 ENCOUNTER — DOCUMENTATION ONLY (OUTPATIENT)
Dept: BARIATRICS | Facility: CLINIC | Age: 72
End: 2017-09-15

## 2017-09-15 ENCOUNTER — HOSPITAL ENCOUNTER (OUTPATIENT)
Dept: PREADMISSION TESTING | Facility: HOSPITAL | Age: 72
Discharge: HOME | End: 2017-09-15
Attending: SURGERY
Payer: MEDICARE

## 2017-09-15 VITALS — HEIGHT: 59 IN | WEIGHT: 232.5 LBS | BODY MASS INDEX: 46.87 KG/M2

## 2017-09-15 PROCEDURE — 99900104 DSU ONLY-NO CHARGE-EA ADD'L HR (STAT)

## 2017-09-15 PROCEDURE — 99900103 DSU ONLY-NO CHARGE-INITIAL HR (STAT)

## 2017-09-16 ENCOUNTER — ANESTHESIA EVENT (OUTPATIENT)
Dept: SURGERY | Facility: HOSPITAL | Age: 72
DRG: 620 | End: 2017-09-16
Payer: MEDICARE

## 2017-09-18 ENCOUNTER — SURGERY (OUTPATIENT)
Age: 72
End: 2017-09-18

## 2017-09-18 ENCOUNTER — HOSPITAL ENCOUNTER (INPATIENT)
Facility: HOSPITAL | Age: 72
LOS: 3 days | Discharge: HOME | DRG: 620 | End: 2017-09-21
Attending: SURGERY | Admitting: INTERNAL MEDICINE
Payer: MEDICARE

## 2017-09-18 ENCOUNTER — ANESTHESIA (OUTPATIENT)
Dept: SURGERY | Facility: HOSPITAL | Age: 72
DRG: 620 | End: 2017-09-18
Payer: MEDICARE

## 2017-09-18 DIAGNOSIS — I10 ESSENTIAL HYPERTENSION: Primary | Chronic | ICD-10-CM

## 2017-09-18 DIAGNOSIS — K21.9 GASTROESOPHAGEAL REFLUX DISEASE WITHOUT ESOPHAGITIS: ICD-10-CM

## 2017-09-18 DIAGNOSIS — E66.01 MORBID OBESITY WITH BMI OF 45.0-49.9, ADULT: ICD-10-CM

## 2017-09-18 DIAGNOSIS — G47.33 OSA ON CPAP: ICD-10-CM

## 2017-09-18 LAB — POCT GLUCOSE: 152 MG/DL (ref 70–110)

## 2017-09-18 PROCEDURE — 94761 N-INVAS EAR/PLS OXIMETRY MLT: CPT

## 2017-09-18 PROCEDURE — 43644 LAP GASTRIC BYPASS/ROUX-EN-Y: CPT | Mod: 22,,, | Performed by: SURGERY

## 2017-09-18 PROCEDURE — 25000003 PHARM REV CODE 250: Performed by: SURGERY

## 2017-09-18 PROCEDURE — S0028 INJECTION, FAMOTIDINE, 20 MG: HCPCS | Performed by: SURGERY

## 2017-09-18 PROCEDURE — D9220A PRA ANESTHESIA: Mod: ANES,,, | Performed by: ANESTHESIOLOGY

## 2017-09-18 PROCEDURE — 43644 LAP GASTRIC BYPASS/ROUX-EN-Y: CPT | Mod: 80,22,, | Performed by: SURGERY

## 2017-09-18 PROCEDURE — 0DNW4ZZ RELEASE PERITONEUM, PERCUTANEOUS ENDOSCOPIC APPROACH: ICD-10-PCS | Performed by: SURGERY

## 2017-09-18 PROCEDURE — 99900035 HC TECH TIME PER 15 MIN (STAT)

## 2017-09-18 PROCEDURE — 0WQF4ZZ REPAIR ABDOMINAL WALL, PERCUTANEOUS ENDOSCOPIC APPROACH: ICD-10-PCS | Performed by: SURGERY

## 2017-09-18 PROCEDURE — 63600175 PHARM REV CODE 636 W HCPCS: Performed by: SURGERY

## 2017-09-18 PROCEDURE — 99222 1ST HOSP IP/OBS MODERATE 55: CPT | Mod: ,,, | Performed by: INTERNAL MEDICINE

## 2017-09-18 PROCEDURE — C9290 INJ, BUPIVACAINE LIPOSOME: HCPCS | Performed by: SURGERY

## 2017-09-18 PROCEDURE — 71000033 HC RECOVERY, INTIAL HOUR: Performed by: SURGERY

## 2017-09-18 PROCEDURE — 37000009 HC ANESTHESIA EA ADD 15 MINS: Performed by: SURGERY

## 2017-09-18 PROCEDURE — 25000003 PHARM REV CODE 250: Performed by: ANESTHESIOLOGY

## 2017-09-18 PROCEDURE — 36000711: Performed by: SURGERY

## 2017-09-18 PROCEDURE — 99900103 DSU ONLY-NO CHARGE-INITIAL HR (STAT): Performed by: SURGERY

## 2017-09-18 PROCEDURE — D9220A PRA ANESTHESIA: Mod: CRNA,,, | Performed by: NURSE ANESTHETIST, CERTIFIED REGISTERED

## 2017-09-18 PROCEDURE — 99900104 DSU ONLY-NO CHARGE-EA ADD'L HR (STAT): Performed by: SURGERY

## 2017-09-18 PROCEDURE — 36000710: Performed by: SURGERY

## 2017-09-18 PROCEDURE — 94770 HC EXHALED C02 TEST: CPT

## 2017-09-18 PROCEDURE — 49561 PR REPAIR INCISIONAL HERNIA,STRANG: CPT | Mod: 59,,, | Performed by: SURGERY

## 2017-09-18 PROCEDURE — 88307 TISSUE EXAM BY PATHOLOGIST: CPT | Performed by: PATHOLOGY

## 2017-09-18 PROCEDURE — 63600175 PHARM REV CODE 636 W HCPCS: Performed by: NURSE ANESTHETIST, CERTIFIED REGISTERED

## 2017-09-18 PROCEDURE — 71000039 HC RECOVERY, EACH ADD'L HOUR: Performed by: SURGERY

## 2017-09-18 PROCEDURE — 27201423 OPTIME MED/SURG SUP & DEVICES STERILE SUPPLY: Performed by: SURGERY

## 2017-09-18 PROCEDURE — 0D164ZA BYPASS STOMACH TO JEJUNUM, PERCUTANEOUS ENDOSCOPIC APPROACH: ICD-10-PCS | Performed by: SURGERY

## 2017-09-18 PROCEDURE — 37000008 HC ANESTHESIA 1ST 15 MINUTES: Performed by: SURGERY

## 2017-09-18 PROCEDURE — 49561 PR REPAIR INCISIONAL HERNIA,STRANG: CPT | Mod: 80,59,, | Performed by: SURGERY

## 2017-09-18 PROCEDURE — 20600001 HC STEP DOWN PRIVATE ROOM

## 2017-09-18 PROCEDURE — 25000003 PHARM REV CODE 250: Performed by: NURSE ANESTHETIST, CERTIFIED REGISTERED

## 2017-09-18 RX ORDER — INSULIN ASPART 100 [IU]/ML
1-10 INJECTION, SOLUTION INTRAVENOUS; SUBCUTANEOUS EVERY 6 HOURS PRN
Status: DISCONTINUED | OUTPATIENT
Start: 2017-09-18 | End: 2017-09-20

## 2017-09-18 RX ORDER — FENTANYL CITRATE 50 UG/ML
INJECTION, SOLUTION INTRAMUSCULAR; INTRAVENOUS
Status: DISCONTINUED | OUTPATIENT
Start: 2017-09-18 | End: 2017-09-18

## 2017-09-18 RX ORDER — DIAZEPAM 10 MG/2ML
2 INJECTION INTRAMUSCULAR EVERY 4 HOURS PRN
Status: DISCONTINUED | OUTPATIENT
Start: 2017-09-18 | End: 2017-09-21 | Stop reason: HOSPADM

## 2017-09-18 RX ORDER — SUCCINYLCHOLINE CHLORIDE 20 MG/ML
INJECTION INTRAMUSCULAR; INTRAVENOUS
Status: DISCONTINUED | OUTPATIENT
Start: 2017-09-18 | End: 2017-09-18

## 2017-09-18 RX ORDER — GLYCOPYRROLATE 0.2 MG/ML
INJECTION INTRAMUSCULAR; INTRAVENOUS
Status: DISCONTINUED | OUTPATIENT
Start: 2017-09-18 | End: 2017-09-18

## 2017-09-18 RX ORDER — PANTOPRAZOLE SODIUM 40 MG/1
40 TABLET, DELAYED RELEASE ORAL DAILY
Status: DISCONTINUED | OUTPATIENT
Start: 2017-09-18 | End: 2017-09-21 | Stop reason: HOSPADM

## 2017-09-18 RX ORDER — SODIUM CHLORIDE, SODIUM LACTATE, POTASSIUM CHLORIDE, CALCIUM CHLORIDE 600; 310; 30; 20 MG/100ML; MG/100ML; MG/100ML; MG/100ML
INJECTION, SOLUTION INTRAVENOUS CONTINUOUS
Status: DISCONTINUED | OUTPATIENT
Start: 2017-09-18 | End: 2017-09-18

## 2017-09-18 RX ORDER — SODIUM CHLORIDE, SODIUM LACTATE, POTASSIUM CHLORIDE, CALCIUM CHLORIDE 600; 310; 30; 20 MG/100ML; MG/100ML; MG/100ML; MG/100ML
INJECTION, SOLUTION INTRAVENOUS CONTINUOUS
Status: DISCONTINUED | OUTPATIENT
Start: 2017-09-18 | End: 2017-09-19

## 2017-09-18 RX ORDER — LIDOCAINE HYDROCHLORIDE 10 MG/ML
1 INJECTION, SOLUTION EPIDURAL; INFILTRATION; INTRACAUDAL; PERINEURAL ONCE
Status: COMPLETED | OUTPATIENT
Start: 2017-09-18 | End: 2017-09-18

## 2017-09-18 RX ORDER — PROPOFOL 10 MG/ML
VIAL (ML) INTRAVENOUS
Status: DISCONTINUED | OUTPATIENT
Start: 2017-09-18 | End: 2017-09-18

## 2017-09-18 RX ORDER — SODIUM CHLORIDE, SODIUM LACTATE, POTASSIUM CHLORIDE, CALCIUM CHLORIDE 600; 310; 30; 20 MG/100ML; MG/100ML; MG/100ML; MG/100ML
1000 INJECTION, SOLUTION INTRAVENOUS ONCE
Status: DISCONTINUED | OUTPATIENT
Start: 2017-09-18 | End: 2017-09-18 | Stop reason: HOSPADM

## 2017-09-18 RX ORDER — FAMOTIDINE 10 MG/ML
20 INJECTION INTRAVENOUS EVERY 12 HOURS
Status: DISCONTINUED | OUTPATIENT
Start: 2017-09-18 | End: 2017-09-20

## 2017-09-18 RX ORDER — HEPARIN SODIUM 5000 [USP'U]/ML
5000 INJECTION, SOLUTION INTRAVENOUS; SUBCUTANEOUS
Status: COMPLETED | OUTPATIENT
Start: 2017-09-18 | End: 2017-09-18

## 2017-09-18 RX ORDER — CITALOPRAM 40 MG/1
40 TABLET, FILM COATED ORAL DAILY
Status: DISCONTINUED | OUTPATIENT
Start: 2017-09-18 | End: 2017-09-21 | Stop reason: HOSPADM

## 2017-09-18 RX ORDER — ACETAMINOPHEN 10 MG/ML
INJECTION, SOLUTION INTRAVENOUS
Status: DISCONTINUED | OUTPATIENT
Start: 2017-09-18 | End: 2017-09-18

## 2017-09-18 RX ORDER — HYDROMORPHONE HYDROCHLORIDE 2 MG/ML
0.2 INJECTION, SOLUTION INTRAMUSCULAR; INTRAVENOUS; SUBCUTANEOUS EVERY 5 MIN PRN
Status: DISCONTINUED | OUTPATIENT
Start: 2017-09-18 | End: 2017-09-18 | Stop reason: HOSPADM

## 2017-09-18 RX ORDER — FAMOTIDINE 10 MG/ML
20 INJECTION INTRAVENOUS
Status: COMPLETED | OUTPATIENT
Start: 2017-09-18 | End: 2017-09-18

## 2017-09-18 RX ORDER — ONDANSETRON 2 MG/ML
INJECTION INTRAMUSCULAR; INTRAVENOUS
Status: DISCONTINUED | OUTPATIENT
Start: 2017-09-18 | End: 2017-09-18

## 2017-09-18 RX ORDER — NALOXONE HCL 0.4 MG/ML
0.02 VIAL (ML) INJECTION
Status: DISCONTINUED | OUTPATIENT
Start: 2017-09-18 | End: 2017-09-19

## 2017-09-18 RX ORDER — LIDOCAINE HCL/PF 100 MG/5ML
SYRINGE (ML) INTRAVENOUS
Status: DISCONTINUED | OUTPATIENT
Start: 2017-09-18 | End: 2017-09-18

## 2017-09-18 RX ORDER — CEFAZOLIN SODIUM 2 G/50ML
2 SOLUTION INTRAVENOUS
Status: COMPLETED | OUTPATIENT
Start: 2017-09-18 | End: 2017-09-19

## 2017-09-18 RX ORDER — ONDANSETRON 2 MG/ML
8 INJECTION INTRAMUSCULAR; INTRAVENOUS EVERY 6 HOURS PRN
Status: DISCONTINUED | OUTPATIENT
Start: 2017-09-18 | End: 2017-09-19

## 2017-09-18 RX ORDER — MIDAZOLAM HYDROCHLORIDE 1 MG/ML
INJECTION, SOLUTION INTRAMUSCULAR; INTRAVENOUS
Status: DISCONTINUED | OUTPATIENT
Start: 2017-09-18 | End: 2017-09-18

## 2017-09-18 RX ORDER — HYDROCODONE BITARTRATE AND ACETAMINOPHEN 7.5; 325 MG/15ML; MG/15ML
15 SOLUTION ORAL EVERY 4 HOURS PRN
Status: DISCONTINUED | OUTPATIENT
Start: 2017-09-18 | End: 2017-09-21 | Stop reason: HOSPADM

## 2017-09-18 RX ORDER — HYDROMORPHONE HCL IN 0.9% NACL 6 MG/30 ML
PATIENT CONTROLLED ANALGESIA SYRINGE INTRAVENOUS CONTINUOUS
Status: DISCONTINUED | OUTPATIENT
Start: 2017-09-18 | End: 2017-09-19

## 2017-09-18 RX ORDER — GLUCAGON 1 MG
1 KIT INJECTION
Status: DISCONTINUED | OUTPATIENT
Start: 2017-09-18 | End: 2017-09-21 | Stop reason: HOSPADM

## 2017-09-18 RX ORDER — ENOXAPARIN SODIUM 100 MG/ML
40 INJECTION SUBCUTANEOUS EVERY 12 HOURS
Status: DISCONTINUED | OUTPATIENT
Start: 2017-09-18 | End: 2017-09-21 | Stop reason: HOSPADM

## 2017-09-18 RX ORDER — MECLIZINE HYDROCHLORIDE 25 MG/1
25 TABLET ORAL EVERY 6 HOURS PRN
Status: DISCONTINUED | OUTPATIENT
Start: 2017-09-18 | End: 2017-09-21 | Stop reason: HOSPADM

## 2017-09-18 RX ORDER — GABAPENTIN 300 MG/1
600 CAPSULE ORAL 2 TIMES DAILY
Status: DISCONTINUED | OUTPATIENT
Start: 2017-09-18 | End: 2017-09-21 | Stop reason: HOSPADM

## 2017-09-18 RX ORDER — DIPHENHYDRAMINE HYDROCHLORIDE 50 MG/ML
12.5 INJECTION INTRAMUSCULAR; INTRAVENOUS EVERY 4 HOURS PRN
Status: DISCONTINUED | OUTPATIENT
Start: 2017-09-18 | End: 2017-09-18

## 2017-09-18 RX ORDER — ROCURONIUM BROMIDE 10 MG/ML
INJECTION, SOLUTION INTRAVENOUS
Status: DISCONTINUED | OUTPATIENT
Start: 2017-09-18 | End: 2017-09-18

## 2017-09-18 RX ORDER — NEOSTIGMINE METHYLSULFATE 1 MG/ML
INJECTION, SOLUTION INTRAVENOUS
Status: DISCONTINUED | OUTPATIENT
Start: 2017-09-18 | End: 2017-09-18

## 2017-09-18 RX ORDER — DEXAMETHASONE SODIUM PHOSPHATE 4 MG/ML
INJECTION, SOLUTION INTRA-ARTICULAR; INTRALESIONAL; INTRAMUSCULAR; INTRAVENOUS; SOFT TISSUE
Status: DISCONTINUED | OUTPATIENT
Start: 2017-09-18 | End: 2017-09-18

## 2017-09-18 RX ADMIN — FENTANYL CITRATE 100 MCG: 50 INJECTION INTRAMUSCULAR; INTRAVENOUS at 07:09

## 2017-09-18 RX ADMIN — FAMOTIDINE 20 MG: 10 INJECTION, SOLUTION INTRAVENOUS at 06:09

## 2017-09-18 RX ADMIN — ROCURONIUM BROMIDE 10 MG: 10 INJECTION, SOLUTION INTRAVENOUS at 07:09

## 2017-09-18 RX ADMIN — ACETAMINOPHEN 1000 MG: 10 INJECTION, SOLUTION INTRAVENOUS at 09:09

## 2017-09-18 RX ADMIN — ROCURONIUM BROMIDE 10 MG: 10 INJECTION, SOLUTION INTRAVENOUS at 08:09

## 2017-09-18 RX ADMIN — EPHEDRINE SULFATE 10 MG: 50 INJECTION, SOLUTION INTRAMUSCULAR; INTRAVENOUS; SUBCUTANEOUS at 08:09

## 2017-09-18 RX ADMIN — EPHEDRINE SULFATE 20 MG: 50 INJECTION, SOLUTION INTRAMUSCULAR; INTRAVENOUS; SUBCUTANEOUS at 09:09

## 2017-09-18 RX ADMIN — GABAPENTIN 600 MG: 300 CAPSULE ORAL at 01:09

## 2017-09-18 RX ADMIN — PROPOFOL 180 MG: 10 INJECTION, EMULSION INTRAVENOUS at 07:09

## 2017-09-18 RX ADMIN — ROCURONIUM BROMIDE 20 MG: 10 INJECTION, SOLUTION INTRAVENOUS at 07:09

## 2017-09-18 RX ADMIN — ENOXAPARIN SODIUM 40 MG: 100 INJECTION SUBCUTANEOUS at 10:09

## 2017-09-18 RX ADMIN — Medication: at 12:09

## 2017-09-18 RX ADMIN — GABAPENTIN 600 MG: 300 CAPSULE ORAL at 08:09

## 2017-09-18 RX ADMIN — FENTANYL CITRATE 50 MCG: 50 INJECTION INTRAMUSCULAR; INTRAVENOUS at 10:09

## 2017-09-18 RX ADMIN — ROCURONIUM BROMIDE 10 MG: 10 INJECTION, SOLUTION INTRAVENOUS at 09:09

## 2017-09-18 RX ADMIN — BUPIVACAINE 20 ML: 13.3 INJECTION, SUSPENSION, LIPOSOMAL INFILTRATION at 11:09

## 2017-09-18 RX ADMIN — CEFAZOLIN SODIUM 2 G: 2 SOLUTION INTRAVENOUS at 08:09

## 2017-09-18 RX ADMIN — SODIUM CHLORIDE, SODIUM LACTATE, POTASSIUM CHLORIDE, AND CALCIUM CHLORIDE: 600; 310; 30; 20 INJECTION, SOLUTION INTRAVENOUS at 06:09

## 2017-09-18 RX ADMIN — GLYCOPYRROLATE 0.4 MG: 0.2 INJECTION, SOLUTION INTRAMUSCULAR; INTRAVENOUS at 11:09

## 2017-09-18 RX ADMIN — GLYCOPYRROLATE 0.2 MG: 0.2 INJECTION, SOLUTION INTRAMUSCULAR; INTRAVENOUS at 07:09

## 2017-09-18 RX ADMIN — FAMOTIDINE 20 MG: 10 INJECTION, SOLUTION INTRAVENOUS at 08:09

## 2017-09-18 RX ADMIN — LIDOCAINE HYDROCHLORIDE 100 MG: 20 INJECTION, SOLUTION INTRAVENOUS at 07:09

## 2017-09-18 RX ADMIN — SUCCINYLCHOLINE CHLORIDE 120 MG: 20 INJECTION, SOLUTION INTRAMUSCULAR; INTRAVENOUS at 07:09

## 2017-09-18 RX ADMIN — EPHEDRINE SULFATE 20 MG: 50 INJECTION, SOLUTION INTRAMUSCULAR; INTRAVENOUS; SUBCUTANEOUS at 08:09

## 2017-09-18 RX ADMIN — MIDAZOLAM 2 MG: 1 INJECTION INTRAMUSCULAR; INTRAVENOUS at 07:09

## 2017-09-18 RX ADMIN — DEXAMETHASONE SODIUM PHOSPHATE 8 MG: 4 INJECTION, SOLUTION INTRAMUSCULAR; INTRAVENOUS at 08:09

## 2017-09-18 RX ADMIN — FAMOTIDINE 20 MG: 10 INJECTION, SOLUTION INTRAVENOUS at 01:09

## 2017-09-18 RX ADMIN — PRAMIPEXOLE DIHYDROCHLORIDE 1.5 MG: 1 TABLET ORAL at 08:09

## 2017-09-18 RX ADMIN — PANTOPRAZOLE SODIUM 40 MG: 40 TABLET, DELAYED RELEASE ORAL at 01:09

## 2017-09-18 RX ADMIN — CEFAZOLIN SODIUM 2 G: 2 SOLUTION INTRAVENOUS at 02:09

## 2017-09-18 RX ADMIN — LIDOCAINE HYDROCHLORIDE 10 MG: 10 INJECTION, SOLUTION EPIDURAL; INFILTRATION; INTRACAUDAL; PERINEURAL at 06:09

## 2017-09-18 RX ADMIN — SODIUM CHLORIDE, SODIUM LACTATE, POTASSIUM CHLORIDE, AND CALCIUM CHLORIDE: 600; 310; 30; 20 INJECTION, SOLUTION INTRAVENOUS at 12:09

## 2017-09-18 RX ADMIN — ONDANSETRON 4 MG: 2 INJECTION, SOLUTION INTRAMUSCULAR; INTRAVENOUS at 08:09

## 2017-09-18 RX ADMIN — CITALOPRAM HYDROBROMIDE 40 MG: 40 TABLET ORAL at 01:09

## 2017-09-18 RX ADMIN — HEPARIN SODIUM 5000 UNITS: 5000 INJECTION, SOLUTION INTRAVENOUS; SUBCUTANEOUS at 06:09

## 2017-09-18 RX ADMIN — CEFAZOLIN 3 G: 1 INJECTION, POWDER, FOR SOLUTION INTRAVENOUS at 07:09

## 2017-09-18 RX ADMIN — NEOSTIGMINE METHYLSULFATE 5 MG: 1 INJECTION INTRAVENOUS at 11:09

## 2017-09-18 NOTE — TRANSFER OF CARE
"Anesthesia Transfer of Care Note    Patient: Marta Huff    Procedure(s) Performed: Procedure(s) (LRB):  CADX-J-Q-LAPAROSCOPIC (N/A)    Patient location: PACU    Anesthesia Type: general    Transport from OR: Transported from OR on 2-3 L/min O2 by NC with adequate spontaneous ventilation    Post pain: adequate analgesia    Post assessment: no apparent anesthetic complications    Post vital signs: stable    Level of consciousness: awake    Nausea/Vomiting: no nausea/vomiting    Complications: none    Transfer of care protocol was followed      Last vitals:   Visit Vitals  /62 (BP Location: Right arm, Patient Position: Lying)   Pulse (!) 54   Temp 36.9 °C (98.4 °F) (Temporal)   Resp 20   Ht 4' 11" (1.499 m)   Wt 105.2 kg (232 lb)   SpO2 97%   Breastfeeding? No   BMI 46.86 kg/m²     "

## 2017-09-18 NOTE — ANESTHESIA PREPROCEDURE EVALUATION
09/18/2017  Marta Huff is a 72 y.o., female.    Anesthesia Evaluation    I have reviewed the Patient Summary Reports.    I have reviewed the Nursing Notes.   I have reviewed the Medications.     Review of Systems  Social:  Smoker Passive smoke exposure    Hematology/Oncology:  Hematology Normal   Oncology Normal     EENT/Dental:EENT/Dental Normal   Cardiovascular:   Hypertension, well controlled    Pulmonary:   Pneumonia Asthma Sleep Apnea, CPAP    Hepatic/GI:   Hiatal Hernia, GERD, well controlled    Musculoskeletal:   Arthritis     Neurological:   Neuromuscular Disease,    Psych:   Psychiatric History depression          Physical Exam  General:  Morbid Obesity    Airway/Jaw/Neck:  Airway Findings: Mouth Opening: Normal Tongue: Normal  General Airway Assessment: Adult, Possible difficult intubation  Mallampati: III  Jaw/Neck Findings:  Micrognathia     Eyes/Ears/Nose:  EYES/EARS/NOSE FINDINGS: Normal   Dental:  DENTAL FINDINGS: Normal   Chest/Lungs:  Chest/Lungs Findings: Clear to auscultation, Normal Respiratory Rate     Heart/Vascular:  Heart Findings: Rate: Normal  Rhythm: Regular Rhythm  Sounds: Normal  Vascular Findings: Normal    Abdomen:  Abdomen Findings: Normal    Musculoskeletal:  Musculoskeletal Findings: Normal   Skin:  Skin Findings: Normal    Mental Status:  Mental Status Findings: Normal        Anesthesia Plan  Type of Anesthesia, risks & benefits discussed:  Anesthesia Type:  general  Patient's Preference:   Intra-op Monitoring Plan: standard ASA monitors  Intra-op Monitoring Plan Comments:   Post Op Pain Control Plan: IV/PO Opioids PRN  Post Op Pain Control Plan Comments:   Induction:   IV  Beta Blocker:  Patient is not currently on a Beta-Blocker (No further documentation required).       Informed Consent: Patient understands risks and agrees with Anesthesia plan.  Questions  answered. Anesthesia consent signed with patient.  ASA Score: 3     Day of Surgery Review of History & Physical: I have interviewed and examined the patient. I have reviewed the patient's H&P dated:    H&P update referred to the surgeon.         Ready For Surgery From Anesthesia Perspective.

## 2017-09-18 NOTE — NURSING
Pt here from recovery, awake, drowsy, denies pain, vss, spouse at bedside, tele monitor in place, incisions to abd x5, bandaids cdi, no drainage noted, pt due to void, nad noted

## 2017-09-18 NOTE — ANESTHESIA POSTPROCEDURE EVALUATION
"Anesthesia Post Evaluation    Patient: Marta Huff    Procedure(s) Performed: Procedure(s) (LRB):  [id number]-I-K-LAPAROSCOPIC (N/A)    Final Anesthesia Type: general  Patient location during evaluation: PACU  Patient participation: Yes- Able to Participate  Level of consciousness: awake and alert  Post-procedure vital signs: reviewed and stable  Pain management: adequate  Airway patency: patent  PONV status at discharge: No PONV  Anesthetic complications: no      Cardiovascular status: hemodynamically stable  Respiratory status: unassisted and room air  Hydration status: euvolemic  Follow-up not needed.        Visit Vitals  /70 (BP Location: Right arm, Patient Position: Lying)   Pulse 76   Temp 36.7 °C (98.1 °F) (Axillary)   Resp 17   Ht 4' 11" (1.499 m)   Wt 105.2 kg (232 lb)   SpO2 (!) 94%   Breastfeeding? No   BMI 46.86 kg/m²       Pain/Khari Score: Pain Assessment Performed: Yes (Simultaneous filing. User may not have seen previous data.) (9/18/2017 12:45 PM)  Presence of Pain: denies (Simultaneous filing. User may not have seen previous data.) (9/18/2017 12:45 PM)  Pain Rating Prior to Med Admin: 5 (9/18/2017 12:45 PM)  Khari Score: 8 (9/18/2017 12:45 PM)      "

## 2017-09-18 NOTE — PLAN OF CARE
Pt states pain to buttocks rated 5 on scale of 1 to 10.  VSS.  IS education done = 1000 per patient.   at bedside and will take clothing and patient's glasses at bedside.

## 2017-09-18 NOTE — OP NOTE
Laparoscopic Gastric bypass Procedure Note    Date of procedure:   09/18/2017    Indications: Inability to maintain weight loss    Pre-operative Diagnosis:   1. Morbid obesity with BMI of 45.0-49.9, adult      2. Morbid obesity due to excess calories      3. JESENIA on CPAP       Post-operative Diagnosis: Same with incisional hernia    Surgeon: Dorene Tripathi MD    Assistants: Panchito Vasquez MD, General Surgery    Anesthesia: General endotracheal anesthesia    ASA Class: 3    Procedure Details   The patient was seen in the Holding Room. The risks, benefits, complications, treatment options, and expected outcomes were discussed with the patient. The possibilities of reaction to medication, pulmonary aspiration, perforation of viscus, bleeding, recurrent infection, the need for additional procedures, failure to diagnose a condition, and creating a complication requiring transfusion or operation were discussed with the patient. The patient concurred with the proposed plan, giving informed consent. The site of surgery properly noted/marked. The patient was taken to Operating Room 6 identified as Marta Huff and the procedure verified as Laparoscopic Gastric Bypass. A Time Out was held and the above information confirmed.    Full general anesthesia was induced with orotracheal intubation. The patient was prepped and draped in a supine position. Appropriate antibiotics were given intravenously. Arms were out.    Endoscopy was performed showing no retained food in stomach.    Abdomen was entered in the right upper quadrant using 12 mm optiview trocar without damage to surrounding organs.  4 other trocars were placed under direct visualization.  A large ventral incisional hernia was seen involving the omentum and small intestine.  This hernia was incarcerated and with careful dissection using the harmonic was taken down. I would not be able to do the procedure without freeing this hernia.  The hernia was large enough  that I elected to repair it primarily.    The omentum was placed above the colon.  The ligament of treitz was identified and the bowel divided 50 cm distal to the ligament of treitz.  The mesentery was lengthnd using the harmonic and clips were placed at the base of the mesentery.      The bowel was run another 130 cm and and a side to side jejunojejunostomy was created with a white load.  The enterotomies were closed with a running vicryl and mesentery closed with a silk.      The omentum was divided.    The patient was then placed in steep reverse trendelenburg.  Dense adhesions from the liver to diaphragm retracted the liver.    Dense adhesions from her prior surgeries also tethered her stomach to the liver.  The entire length of the stomach was involved. Careful dissection with scissors and harmonic along with blunt dissection provided access to the stomach.  This dissection was tedious and required much time.  I have elected to use a 22 modifier for the complexity of this dissection as she has had multiple previous surgeries making the dissection very difficult.  A retrogastric plane was created.    A stapled gastric pouch was then created using blue loads.    21 mm orvel was passed by anesthesia and delivered into peritoneal cavity.    The left lower quadrant trocar was dilated and the applied medical wound retractor placed.    Enterotomy was created in the wilberto limb and 21 mm eea stapler passed through the wound retractor.  An end to side circular stapled anastomosis was then created.    Excess small bowel was trimmed and passed off.    Provacative leak test was negative.    Suture was used to take tensions off the gj anastomosis.    Retro wilberto defect was closed with running silk.    Evicell was placed on anastomosis.    A separate incision was made over the large incisional hernia.  The hernia sac was transected from the subcutaneous tissues and fascia.  A running prolene was then used to close the hernia  defect.    Skin was closed with monocryl    Abdomen was inspected for an abnormalities, none were seen    Instrument, sponge, and needle counts were correct prior to wound closure and at the conclusion of the case.     Findings:  Large ventral incisional hernia, dense gastric adhesions    Estimated Blood Loss: 20.0 cc    Drains: none    Total IV Fluids: 1000 ml    Specimens: small bowel and gj anastomosis    Implants: none    Complications:  None; patient tolerated the procedure well.    Disposition: PACU - hemodynamically stable.    Condition: stable    Attending Attestation: I was present and scrubbed for the entire procedure.

## 2017-09-18 NOTE — BRIEF OP NOTE
[facility]  Brief Operative Note    SUMMARY     Surgery Date: 9/18/2017     Surgeon(s) and Role:     * Panchito Vasquez MD - Assisting     * Dorene Tripathi MD - Primary        Pre-op Diagnosis:  JESENIA on CPAP [G47.33, Z99.89]  Morbid obesity with BMI of 45.0-49.9, adult [E66.01, Z68.42]  Morbid obesity due to excess calories [E66.01]    Post-op Diagnosis:  Post-Op Diagnosis Codes:     * JESENIA on CPAP [G47.33, Z99.89]     * Morbid obesity with BMI of 45.0-49.9, adult [E66.01, Z68.42]     * Morbid obesity due to excess calories [E66.01]    Procedure(s) (LRB):  VUKN-R-O-LAPAROSCOPIC (N/A)    Anesthesia: General    Description of Procedure: lap wilberto en y, primary repair of incisional hernia    Description of the findings of the procedure: large incisional hernia    Estimated Blood Loss: 20.0 cc         Specimens:   Specimen (12h ago through future)    Start     Ordered    09/18/17 1020  Specimen to Pathology - Surgery  Once     Comments:  Pre-op Diagnosis: JESENIA on CPAP [G47.33, Z99.89]Morbid obesity with BMI of 45.0-49.9, adult [E66.01, Z68.42]Morbid obesity due to excess calories [E66.01]Post-op Diagnosis: sameProcedure(s):DMHD-K-L-LAPAROSCOPIC Number of specimens: 1Name of specimens: 1) Small Bowel      09/18/17 1046

## 2017-09-18 NOTE — INTERVAL H&P NOTE
The patient has been examined and the H&P has been reviewed:    I concur with the findings and no changes have occurred since H&P was written.    Anesthesia/Surgery risks, benefits and alternative options discussed and understood by patient/family.          Active Hospital Problems    Diagnosis  POA    Morbid obesity with BMI of 45.0-49.9, adult [E66.01, Z68.42]  Not Applicable      Resolved Hospital Problems    Diagnosis Date Resolved POA   No resolved problems to display.

## 2017-09-19 LAB
ANION GAP SERPL CALC-SCNC: 10 MMOL/L
BASOPHILS # BLD AUTO: 0 K/UL
BASOPHILS NFR BLD: 0 %
BILIRUB UR QL STRIP: NEGATIVE
BUN SERPL-MCNC: 28 MG/DL
CALCIUM SERPL-MCNC: 8.7 MG/DL
CHLORIDE SERPL-SCNC: 105 MMOL/L
CLARITY UR: CLEAR
CO2 SERPL-SCNC: 26 MMOL/L
COLOR UR: YELLOW
CREAT SERPL-MCNC: 0.9 MG/DL
DIFFERENTIAL METHOD: ABNORMAL
EOSINOPHIL # BLD AUTO: 0 K/UL
EOSINOPHIL NFR BLD: 0 %
ERYTHROCYTE [DISTWIDTH] IN BLOOD BY AUTOMATED COUNT: 15.6 %
EST. GFR  (AFRICAN AMERICAN): >60 ML/MIN/1.73 M^2
EST. GFR  (NON AFRICAN AMERICAN): >60 ML/MIN/1.73 M^2
GLUCOSE SERPL-MCNC: 127 MG/DL
GLUCOSE UR QL STRIP: NEGATIVE
HCT VFR BLD AUTO: 38 %
HGB BLD-MCNC: 12.6 G/DL
HGB UR QL STRIP: NEGATIVE
HYALINE CASTS #/AREA URNS LPF: 1 /LPF
KETONES UR QL STRIP: NEGATIVE
LEUKOCYTE ESTERASE UR QL STRIP: ABNORMAL
LYMPHOCYTES # BLD AUTO: 1 K/UL
LYMPHOCYTES NFR BLD: 8.3 %
MAGNESIUM SERPL-MCNC: 2 MG/DL
MCH RBC QN AUTO: 30.5 PG
MCHC RBC AUTO-ENTMCNC: 33 G/DL
MCV RBC AUTO: 92 FL
MICROSCOPIC COMMENT: NORMAL
MONOCYTES # BLD AUTO: 0.5 K/UL
MONOCYTES NFR BLD: 4.1 %
NEUTROPHILS # BLD AUTO: 10.5 K/UL
NEUTROPHILS NFR BLD: 87.6 %
NITRITE UR QL STRIP: NEGATIVE
PH UR STRIP: 5 [PH] (ref 5–8)
PHOSPHATE SERPL-MCNC: 4.5 MG/DL
PLATELET # BLD AUTO: 172 K/UL
PMV BLD AUTO: 9.7 FL
POCT GLUCOSE: 119 MG/DL (ref 70–110)
POCT GLUCOSE: 139 MG/DL (ref 70–110)
POTASSIUM SERPL-SCNC: 5.5 MMOL/L
PROT UR QL STRIP: NEGATIVE
RBC # BLD AUTO: 4.11 M/UL
SODIUM SERPL-SCNC: 141 MMOL/L
SP GR UR STRIP: 1.02 (ref 1–1.03)
SQUAMOUS #/AREA URNS HPF: 1 /HPF
URN SPEC COLLECT METH UR: ABNORMAL
UROBILINOGEN UR STRIP-ACNC: NEGATIVE EU/DL
WBC # BLD AUTO: 11.9 K/UL
WBC #/AREA URNS HPF: 5 /HPF (ref 0–5)

## 2017-09-19 PROCEDURE — 99900035 HC TECH TIME PER 15 MIN (STAT)

## 2017-09-19 PROCEDURE — 94770 HC EXHALED C02 TEST: CPT

## 2017-09-19 PROCEDURE — 97530 THERAPEUTIC ACTIVITIES: CPT | Performed by: PHYSICAL THERAPIST

## 2017-09-19 PROCEDURE — G8978 MOBILITY CURRENT STATUS: HCPCS | Mod: CK | Performed by: PHYSICAL THERAPIST

## 2017-09-19 PROCEDURE — 94761 N-INVAS EAR/PLS OXIMETRY MLT: CPT

## 2017-09-19 PROCEDURE — 97161 PT EVAL LOW COMPLEX 20 MIN: CPT | Performed by: PHYSICAL THERAPIST

## 2017-09-19 PROCEDURE — 80048 BASIC METABOLIC PNL TOTAL CA: CPT

## 2017-09-19 PROCEDURE — G8979 MOBILITY GOAL STATUS: HCPCS | Mod: CJ | Performed by: PHYSICAL THERAPIST

## 2017-09-19 PROCEDURE — 25000003 PHARM REV CODE 250: Performed by: SURGERY

## 2017-09-19 PROCEDURE — S0028 INJECTION, FAMOTIDINE, 20 MG: HCPCS | Performed by: SURGERY

## 2017-09-19 PROCEDURE — 85025 COMPLETE CBC W/AUTO DIFF WBC: CPT

## 2017-09-19 PROCEDURE — 99232 SBSQ HOSP IP/OBS MODERATE 35: CPT | Mod: ,,, | Performed by: INTERNAL MEDICINE

## 2017-09-19 PROCEDURE — 20600001 HC STEP DOWN PRIVATE ROOM

## 2017-09-19 PROCEDURE — 84100 ASSAY OF PHOSPHORUS: CPT

## 2017-09-19 PROCEDURE — 36415 COLL VENOUS BLD VENIPUNCTURE: CPT

## 2017-09-19 PROCEDURE — 81000 URINALYSIS NONAUTO W/SCOPE: CPT

## 2017-09-19 PROCEDURE — 63600175 PHARM REV CODE 636 W HCPCS: Performed by: SURGERY

## 2017-09-19 PROCEDURE — 27000221 HC OXYGEN, UP TO 24 HOURS

## 2017-09-19 PROCEDURE — 83735 ASSAY OF MAGNESIUM: CPT

## 2017-09-19 RX ORDER — SODIUM CHLORIDE 450 MG/100ML
INJECTION, SOLUTION INTRAVENOUS CONTINUOUS
Status: DISCONTINUED | OUTPATIENT
Start: 2017-09-19 | End: 2017-09-20

## 2017-09-19 RX ORDER — HYDROMORPHONE HYDROCHLORIDE 1 MG/ML
1 INJECTION, SOLUTION INTRAMUSCULAR; INTRAVENOUS; SUBCUTANEOUS EVERY 6 HOURS PRN
Status: DISCONTINUED | OUTPATIENT
Start: 2017-09-19 | End: 2017-09-21 | Stop reason: HOSPADM

## 2017-09-19 RX ADMIN — PRAMIPEXOLE DIHYDROCHLORIDE 1.5 MG: 1 TABLET ORAL at 08:09

## 2017-09-19 RX ADMIN — PANTOPRAZOLE SODIUM 40 MG: 40 TABLET, DELAYED RELEASE ORAL at 08:09

## 2017-09-19 RX ADMIN — GABAPENTIN 600 MG: 300 CAPSULE ORAL at 08:09

## 2017-09-19 RX ADMIN — ENOXAPARIN SODIUM 40 MG: 100 INJECTION SUBCUTANEOUS at 08:09

## 2017-09-19 RX ADMIN — CEFAZOLIN SODIUM 2 G: 2 SOLUTION INTRAVENOUS at 01:09

## 2017-09-19 RX ADMIN — CITALOPRAM HYDROBROMIDE 40 MG: 40 TABLET ORAL at 08:09

## 2017-09-19 RX ADMIN — HYDROCODONE BITARTRATE AND ACETAMINOPHEN 15 ML: 7.5; 325 SOLUTION ORAL at 08:09

## 2017-09-19 RX ADMIN — FAMOTIDINE 20 MG: 10 INJECTION, SOLUTION INTRAVENOUS at 08:09

## 2017-09-19 RX ADMIN — SODIUM CHLORIDE: 0.45 INJECTION, SOLUTION INTRAVENOUS at 08:09

## 2017-09-19 NOTE — PT/OT/SLP EVAL
Physical Therapy  Evaluation    Marta Huff   MRN: [id number]   Admitting Diagnosis: Morbid obesity with BMI of 45.0-49.9, adult    PT Received On: 09/19/17  PT Start Time: 0915     PT Stop Time: 0945    PT Total Time (min): 30 min       Billable Minutes:  Evaluation 10 min and Therapeutic Activity 20 min    Diagnosis: Morbid obesity with BMI of 45.0-49.9, adult  Impaired mobility    Past Medical History:   Diagnosis Date    Anxiety     Arthralgia of knee     arthralgia of bilateral knees secondary to djd    Arthritis     Back pain     Depression     Encounter for blood transfusion     AUTOLOGUS    GERD (gastroesophageal reflux disease)     Hiatal hernia     repaired in 1979    Hypertension     Obesity     JESENIA on CPAP     not using cpap currently    Osteoporosis     Pneumonia     Reactive airway disease     Restless legs syndrome     Rheumatic fever     at 4 y/o    Trouble in sleeping     Wears glasses       Past Surgical History:   Procedure Laterality Date    APPENDECTOMY      BARIATRIC SURGERY  2014    Gastric Sleeve    CHOLECYSTECTOMY      GASTRIC SLEEVE      HERNIA REPAIR      hiatal hernia    HIATAL HERNIA REPAIR  1/1/1979    HYSTERECTOMY      partial    JOINT REPLACEMENT      BILAT KNEE    KNEE ARTHROPLASTY  1/2/2010    bilateral    KNEE ARTHROSCOPY      right    NISSEN FUNDOPLICATION         Referring physician: Deuce  Date referred to PT: 9/18/17    General Precautions: Standard, fall  Orthopedic Precautions:     Braces:              Patient History:  Lives With: spouse  Living Arrangements: house  Transportation Available: family or friend will provide, car  Living Environment Comment:  (no concerns)  Equipment Currently Used at Home: none  DME owned (not currently used): rolling walker    Previous Level of Function:  Ambulation Skills: independent  Transfer Skills: independent  ADL Skills: independent  Work/Leisure Activity: independent    Subjective:  Communicated  "with primary nurse prior to session.  Pt cleared for PT  Chief Complaint: "belly hurts"  Patient goals: Go home    Pain/Comfort  Pain Rating 1: 4/10  Location 1: abdomen  Pain Addressed 2: Pre-medicate for activity, Distraction      Objective:   Patient found with: SCD, telemetry, oxygen, PCA, peripheral IV     Cognitive Exam:  Oriented to: Person, Place, Time and Situation    Follows Commands/attention: Follows multistep  commands  Communication: clear/fluent  Safety awareness/insight to disability: intact    Physical Exam:  Postural examination/scapula alignment: Rounded shoulder and Head forward    Skin integrity: Visible skin intact  Edema: None noted       Lower Extremity Range of Motion:  Right Lower Extremity: WFL  Left Lower Extremity: WFL    Lower Extremity Strength:  Right Lower Extremity: WFL  Left Lower Extremity: WFL       Gross motor coordination: WFL    Functional Mobility:  Bed Mobility:  Rolling/Turning to Left: Stand by assistance  Rolling/Turning Right: Stand by assistance  Scooting/Bridging: Stand by Assistance  Supine to Sit: Minimum Assistance  Sit to Supine: Stand by Assistance    Transfers:  Sit <> Stand Assistance: Contact Guard Assistance  Sit <> Stand Assistive Device: No Assistive Device    Gait:   Gait Distance:  (60 feet)  Assistance 1: Contact Guard Assistance  Gait Assistive Device: Rolling walker  Gait Pattern: 3-point gait  Gait Deviation(s): decreased joanna (mild path deviation, mild lat LOB (able to recover with mod I with use of RW))      Balance:   Static Sit: FAIR+: Able to take MINIMAL challenges from all directions  Dynamic Sit: FAIR+: Maintains balance through MINIMAL excursions of active trunk motion  Static Stand: FAIR: Maintains without assist but unable to take challenges  Dynamic stand: FAIR: Needs CONTACT GUARD during gait    Therapeutic Activities and Exercises:  Cuing for safety and efficiency, including hand placement and body mechanics, to execute transfers, bed " mobility.     AM-PAC 6 CLICK MOBILITY  How much help from another person does this patient currently need?   1 = Unable, Total/Dependent Assistance  2 = A lot, Maximum/Moderate Assistance  3 = A little, Minimum/Contact Guard/Supervision  4 = None, Modified [place]/Independent          AM-PAC Raw Score CMS G-Code Modifier Level of Impairment Assistance   6 % Total / Unable   7 - 9 CM 80 - 100% Maximal Assist   10 - 14 CL 60 - 80% Moderate Assist   15 - 19 CK 40 - 60% Moderate Assist   20 - 22 CJ 20 - 40% Minimal Assist   23 CI 1-20% SBA / CGA   24 CH 0% Independent/ Mod I     Patient left HOB elevated with all lines intact, call button in reach and nurse notified.    Assessment:   Marta Huff is a 72 y.o. female with a medical diagnosis of Morbid obesity with BMI of 45.0-49.9, adult and presents with impaired functional mobility due to recent surgical procedure.    Rehab identified problem list/impairments: Rehab identified problem list/impairments: gait instability, impaired functional mobilty    Rehab potential is good.    Activity tolerance: Good    Discharge recommendations: Discharge Facility/Level Of Care Needs: home     Barriers to discharge: Barriers to Discharge: None    Equipment recommendations: Equipment Needed After Discharge: none     GOALS:    Physical Therapy Goals        Problem: Physical Therapy Goal    Goal Priority Disciplines Outcome Goal Variances Interventions   Physical Therapy Goal     PT/OT, PT      Description:  Goals to be met by: 10/3/17     Patient will increase functional independence with mobility by performin. Supine to sit with Modified [place]  2. Sit to supine with Modified [place]  3. Sit to stand transfer with Modified [place]  4. Gait  x 240 feet with Stand-by Assistance using Rolling Walker.                       PLAN:    Patient to be seen daily to address the above listed problems via gait training, therapeutic activities,  therapeutic exercises  Plan of Care expires: 10/03/17  Plan of Care reviewed with: patient          Cathie JENN Farah, PT  09/19/2017

## 2017-09-19 NOTE — PROGRESS NOTES
Progress Note  Gen Surg    Admit Date: 9/18/2017  Attending: Deuce  S/P: Procedure(s) (LRB):  UXTJ-D-P-LAPAROSCOPIC (N/A)    Post-operative Day: 1 Day Post-Op    Hospital Day: 2    SUBJECTIVE:     Trouble walking, pain and nausea controlled     OBJECTIVE:     Vital Signs (Most Recent)  Temp: 98.2 °F (36.8 °C) (09/19/17 0358)  Pulse: 72 (09/19/17 0358)  Resp: 18 (09/19/17 0358)  BP: (!) 142/70 (09/19/17 0358)  SpO2: 96 % (09/19/17 0358)    Vital Signs Range (Last 24H):  Temp:  [97.9 °F (36.6 °C)-98.4 °F (36.9 °C)]   Pulse:  [68-93]   Resp:  [12-21]   BP: (130-145)/(56-86)   SpO2:  [92 %-98 %]     I & O (Last 24H):No intake or output data in the 24 hours ending 09/19/17 0905    Scheduled medications:   citalopram  40 mg Oral Daily    enoxaparin  40 mg Subcutaneous Q12H    famotidine (PF)  20 mg Intravenous Q12H    gabapentin  600 mg Oral BID    pantoprazole  40 mg Oral Daily    pramipexole  1.5 mg Oral Nightly       Physical Exam:  General: no distress  Lungs:  clear to auscultation bilaterally and normal respiratory effort  Heart: regular rate and rhythm, S1, S2 normal, no murmur, rub or gallop  Abdomen: soft, non-tender non-distented; bowel sounds normal; no masses,  no organomegaly    Wound/Incision:  clean, dry, intact    Laboratory:  Labs within the past 24 hours have been reviewed.    ASSESSMENT/PLAN:     Doing well post op- advance diet to sugar free clears, ambulate around nursing station, use incentive spirometer, transition to oral medications, D/C PCA this afternoon, Ok to shower tonight    Physical therapy    Dorene Tripathi MD

## 2017-09-19 NOTE — PROGRESS NOTES
Attempted to ambulate patient, pt not stable enough at this time, pt can make it to bathroom with minimal assist, but is not steady on her feet, will continue to monitor closely

## 2017-09-19 NOTE — PLAN OF CARE
CM met with patient in room but patient too groggy to answer questions. CM called spouse @ 435pm and completed discharge planning assessment. Patient lives with spouse, Prosper Walter- [phone], there is no POA or living will, pharmacy is [facility], patient use walker and cane as needed at home, patient is independent with ADL's according to spouse and drives. PCP is Dr. Holley. Correct physical address is [address] [place] [address], spouse plans for patient to go home with HH if MD feels like HH services are needed.      09/19/17 1230   Discharge Assessment   Assessment Type Discharge Planning Assessment   Confirmed/corrected address and phone number on facesheet? Yes   Assessment information obtained from? Patient   Communicated expected length of stay with patient/caregiver yes   Prior to hospitilization cognitive status: Unable to Assess   Prior to hospitalization functional status: Assistive Equipment   Current cognitive status: Unable to Assess   Current Functional Status: Assistive Equipment   Lives With spouse   Able to Return to Prior Arrangements unable to determine at this time (comments)   Is patient able to care for self after discharge? Unable to determine at this time (comments)   Patient's perception of discharge disposition home or selfcare   Readmission Within The Last 30 Days no previous admission in last 30 days   Patient currently being followed by outpatient case management? No   Patient currently receives any other outside agency services? No   Equipment Currently Used at Home cane, straight;walker, rolling   Do you have any problems affording any of your prescribed medications? No   Is the patient taking medications as prescribed? yes   Does the patient have transportation home? Yes   Transportation Available family or friend will provide   Does the patient receive services at the [facility]? No   Discharge Plan A Home   Discharge Plan B Home Health   Patient/Family  In Agreement With Plan yes

## 2017-09-19 NOTE — PROGRESS NOTES
Progress Note  Hospital Medicine  Patient Name:Marta Huff  MRN:  [id number]  Patient Class: IP- Inpatient  Admit Date: 9/18/2017  Length of Stay: 1 days  Expected Discharge Date:   Attending Physician: Nickie Baker MD  Primary Care Provider:  Elda Holley MD    SUBJECTIVE:     Principal Problem: Morbid obesity with BMI of 45.0-49.9, adult  Initial history of present illness: Patient is a 72 y.o. female admitted to Hospitalist Service from [facility] s/p laparoscopic Danielle-N-Y surgery performed by Dr. Tripathi. Patient reportedly has past medical history significant for morbid obesity, chronic low back pain, GERD, hypertension, JESENIA (on CPAP). Post-operatively, patient denied chest pain, shortness of breath, vomiting, headache, vision changes, focal neuro-deficits, cough or fever. Abdominal pain and nausea are stable.    PMH/PSH/SH/FH/Meds: reviewed.    Symptoms/Review of Systems: Mild nausea reported. No shortness of breath, cough, chest pain or headache, fever or abdominal pain.     Diet:  Liquid bariatric diet  Activity level: Normal.    Pain:  Patient reports no pain.       OBJECTIVE:   Vital Signs (Most Recent):      Temp: 98.2 °F (36.8 °C) (09/19/17 0358)  Pulse: 72 (09/19/17 0358)  Resp: 18 (09/19/17 0358)  BP: (!) 142/70 (09/19/17 0358)  SpO2: 96 % (09/19/17 0358)       Vital Signs Range (Last 24H):  Temp:  [97.9 °F (36.6 °C)-98.4 °F (36.9 °C)]   Pulse:  [68-93]   Resp:  [12-21]   BP: (130-145)/(56-86)   SpO2:  [92 %-98 %]     Weight: 105.2 kg (232 lb)  Body mass index is 46.86 kg/m².  No intake or output data in the 24 hours ending 09/19/17 0950  Physical Examination:  General appearance: well developed, appears stated age  Head: normocephalic, atraumatic  Eyes:  conjunctivae/corneas clear. PERRL.  Nose: Nares normal. Septum midline.  Throat: lips, mucosa, and tongue normal; teeth and gums normal, no throat erythema Neck: supple, symmetrical, trachea midline, no JVD and thyroid not enlarged,  symmetric, no tenderness/mass/nodules  Lungs:  clear to auscultation bilaterally and normal respiratory effort  Chest wall: no tenderness  Heart: regular rate and rhythm, S1, S2 normal, no murmur, click, rub or gallop  Abdomen: soft, non-tender non-distented; bowel sounds normal; no masses,  no organomegaly  Extremities: no cyanosis or edema, or clubbing  Pulses: 2+ and symmetric  Skin: Skin color, texture, turgor normal. No rashes or lesions.  Lymph nodes: Cervical, supraclavicular, and axillary nodes normal.  Neurologic: Normal strength and tone. No focal numbness or weakness. CNII-XII intact.     CBC:    Recent Labs  Lab 09/19/17 0444   WBC 11.90   RBC 4.11   HGB 12.6   HCT 38.0      MCV 92   MCH 30.5   MCHC 33.0   BMP    Recent Labs  Lab 09/19/17 0444   *      K 5.5*      CO2 26   BUN 28*   CREATININE 0.9   CALCIUM 8.7   MG 2.0      Diagnostic Results:  Microbiology Results (last 7 days)     ** No results found for the last 168 hours. **         Assessment/Plan:      *Morbid obesity with BMI of 45.0-49.9, adult s/p laparoscopic Danielle-N-Y [E66.01, Z68.42]  Tele-monitoring.  Continue to follow Dr. Tripathi's recommendations.  Needs aggressive incentive spirometry.  Follow hemoglobin and hematocrit closely.  Pain control with PO narcotics and antiemetics as needed.  Observe fall precautions.  Check blood glucose level q AC/HS.  Use Novolog Insulin Sliding Scale as needed.    Not Applicable    JESENIA on CPAP [G47.33, Z99.89]  Use CPAP nightly or as needed.    Hyperkalemia  Tele-monitoring.  Avoid drinking juices.     Not Applicable    Anxiety [F41.9]  Chronic problem. Will continue chronic medications and monitor for any changes, adjusting as needed.   Yes    GERD (gastroesophageal reflux disease) [K21.9]  On IV Pepcid.      Yes    Hypertension [I10]   Yes       Chronic  Chronic problem. Will continue chronic medications and monitor for any changes, adjusting as needed.       VTE Risk  Mitigation         Ordered     enoxaparin injection 40 mg  Every 12 hours     Route:  Subcutaneous        09/18/17 1147     Medium Risk of VTE  Once      09/18/17 1147     Place BLANCA hose  Until discontinued      09/18/17 1147     Place sequential compression device  Until discontinued      09/18/17 1147        Nickie Baker MD  Department of Hospital Medicine   [facility]

## 2017-09-20 LAB
ANION GAP SERPL CALC-SCNC: 6 MMOL/L
BASOPHILS # BLD AUTO: 0 K/UL
BASOPHILS NFR BLD: 0.3 %
BUN SERPL-MCNC: 20 MG/DL
CALCIUM SERPL-MCNC: 8.8 MG/DL
CHLORIDE SERPL-SCNC: 104 MMOL/L
CO2 SERPL-SCNC: 29 MMOL/L
CREAT SERPL-MCNC: 0.7 MG/DL
DIFFERENTIAL METHOD: ABNORMAL
EOSINOPHIL # BLD AUTO: 0.1 K/UL
EOSINOPHIL NFR BLD: 0.6 %
ERYTHROCYTE [DISTWIDTH] IN BLOOD BY AUTOMATED COUNT: 15.6 %
EST. GFR  (AFRICAN AMERICAN): >60 ML/MIN/1.73 M^2
EST. GFR  (NON AFRICAN AMERICAN): >60 ML/MIN/1.73 M^2
GLUCOSE SERPL-MCNC: 101 MG/DL
HCT VFR BLD AUTO: 35.1 %
HGB BLD-MCNC: 11.6 G/DL
LYMPHOCYTES # BLD AUTO: 1.1 K/UL
LYMPHOCYTES NFR BLD: 13.8 %
MAGNESIUM SERPL-MCNC: 1.9 MG/DL
MCH RBC QN AUTO: 30.7 PG
MCHC RBC AUTO-ENTMCNC: 33 G/DL
MCV RBC AUTO: 93 FL
MONOCYTES # BLD AUTO: 0.3 K/UL
MONOCYTES NFR BLD: 3.3 %
NEUTROPHILS # BLD AUTO: 6.6 K/UL
NEUTROPHILS NFR BLD: 82 %
PLATELET # BLD AUTO: 150 K/UL
PMV BLD AUTO: 9.7 FL
POTASSIUM SERPL-SCNC: 4.5 MMOL/L
RBC # BLD AUTO: 3.78 M/UL
SODIUM SERPL-SCNC: 139 MMOL/L
WBC # BLD AUTO: 8 K/UL

## 2017-09-20 PROCEDURE — 80048 BASIC METABOLIC PNL TOTAL CA: CPT

## 2017-09-20 PROCEDURE — 87086 URINE CULTURE/COLONY COUNT: CPT

## 2017-09-20 PROCEDURE — 25000003 PHARM REV CODE 250: Performed by: INTERNAL MEDICINE

## 2017-09-20 PROCEDURE — 25000003 PHARM REV CODE 250: Performed by: SURGERY

## 2017-09-20 PROCEDURE — 83735 ASSAY OF MAGNESIUM: CPT

## 2017-09-20 PROCEDURE — 27000221 HC OXYGEN, UP TO 24 HOURS

## 2017-09-20 PROCEDURE — 20600001 HC STEP DOWN PRIVATE ROOM

## 2017-09-20 PROCEDURE — 97116 GAIT TRAINING THERAPY: CPT

## 2017-09-20 PROCEDURE — 94761 N-INVAS EAR/PLS OXIMETRY MLT: CPT

## 2017-09-20 PROCEDURE — 36415 COLL VENOUS BLD VENIPUNCTURE: CPT

## 2017-09-20 PROCEDURE — 63600175 PHARM REV CODE 636 W HCPCS: Performed by: SURGERY

## 2017-09-20 PROCEDURE — S0028 INJECTION, FAMOTIDINE, 20 MG: HCPCS | Performed by: SURGERY

## 2017-09-20 PROCEDURE — 99232 SBSQ HOSP IP/OBS MODERATE 35: CPT | Mod: ,,, | Performed by: INTERNAL MEDICINE

## 2017-09-20 PROCEDURE — 63600175 PHARM REV CODE 636 W HCPCS: Performed by: INTERNAL MEDICINE

## 2017-09-20 PROCEDURE — 99900035 HC TECH TIME PER 15 MIN (STAT)

## 2017-09-20 PROCEDURE — 85025 COMPLETE CBC W/AUTO DIFF WBC: CPT

## 2017-09-20 RX ORDER — FAMOTIDINE 20 MG/1
20 TABLET, FILM COATED ORAL 2 TIMES DAILY
Status: DISCONTINUED | OUTPATIENT
Start: 2017-09-20 | End: 2017-09-21 | Stop reason: HOSPADM

## 2017-09-20 RX ORDER — ONDANSETRON 4 MG/1
4 TABLET, ORALLY DISINTEGRATING ORAL EVERY 6 HOURS PRN
Qty: 30 TABLET | Refills: 0 | Status: SHIPPED | OUTPATIENT
Start: 2017-09-20 | End: 2018-01-03 | Stop reason: SDUPTHER

## 2017-09-20 RX ORDER — HYDROCODONE BITARTRATE AND ACETAMINOPHEN 7.5; 325 MG/1; MG/1
2 TABLET ORAL EVERY 6 HOURS PRN
Qty: 40 TABLET | Refills: 0 | Status: SHIPPED | OUTPATIENT
Start: 2017-09-20 | End: 2017-11-13 | Stop reason: SDUPTHER

## 2017-09-20 RX ORDER — DIAZEPAM 2 MG/1
2 TABLET ORAL EVERY 6 HOURS PRN
Qty: 20 TABLET | Refills: 0 | Status: SHIPPED | OUTPATIENT
Start: 2017-09-20 | End: 2018-01-03 | Stop reason: SDUPTHER

## 2017-09-20 RX ADMIN — CEFTRIAXONE 1 G: 1 INJECTION, SOLUTION INTRAVENOUS at 11:09

## 2017-09-20 RX ADMIN — PANTOPRAZOLE SODIUM 40 MG: 40 TABLET, DELAYED RELEASE ORAL at 08:09

## 2017-09-20 RX ADMIN — GABAPENTIN 600 MG: 300 CAPSULE ORAL at 08:09

## 2017-09-20 RX ADMIN — HYDROCODONE BITARTRATE AND ACETAMINOPHEN 15 ML: 7.5; 325 SOLUTION ORAL at 05:09

## 2017-09-20 RX ADMIN — HYDROCODONE BITARTRATE AND ACETAMINOPHEN 15 ML: 7.5; 325 SOLUTION ORAL at 06:09

## 2017-09-20 RX ADMIN — ENOXAPARIN SODIUM 40 MG: 100 INJECTION SUBCUTANEOUS at 09:09

## 2017-09-20 RX ADMIN — ENOXAPARIN SODIUM 40 MG: 100 INJECTION SUBCUTANEOUS at 08:09

## 2017-09-20 RX ADMIN — GABAPENTIN 600 MG: 300 CAPSULE ORAL at 09:09

## 2017-09-20 RX ADMIN — PRAMIPEXOLE DIHYDROCHLORIDE 1.5 MG: 1 TABLET ORAL at 09:09

## 2017-09-20 RX ADMIN — HYDROCODONE BITARTRATE AND ACETAMINOPHEN 15 ML: 7.5; 325 SOLUTION ORAL at 09:09

## 2017-09-20 RX ADMIN — FAMOTIDINE 20 MG: 10 INJECTION, SOLUTION INTRAVENOUS at 08:09

## 2017-09-20 RX ADMIN — FAMOTIDINE 20 MG: 20 TABLET ORAL at 09:09

## 2017-09-20 RX ADMIN — CITALOPRAM HYDROBROMIDE 40 MG: 40 TABLET ORAL at 08:09

## 2017-09-20 NOTE — PROGRESS NOTES
Progress Note  Gen Surg    Admit Date: 9/18/2017  Attending: Deuce  S/P: Procedure(s) (LRB):  MMSL-L-X-LAPAROSCOPIC (N/A)    Post-operative Day: 2 Days Post-Op    Hospital Day: 3    SUBJECTIVE:     Alert and orientated this morning, nurse reports mild confusion yesterday related to pca use (has been discharged)  Fever over night, patient not using IS much  Feels good this morning just took shower     OBJECTIVE:     Vital Signs (Most Recent)  Temp: 98.4 °F (36.9 °C) (09/20/17 0447)  Pulse: 73 (09/20/17 0447)  Resp: 18 (09/20/17 0447)  BP: (!) 143/79 (09/20/17 0447)  SpO2: (!) 90 % (09/20/17 0447)    Vital Signs Range (Last 24H):  Temp:  [98.4 °F (36.9 °C)-101.2 °F (38.4 °C)]   Pulse:  []   Resp:  [17-20]   BP: (118-156)/(59-87)   SpO2:  [90 %-98 %]     I & O (Last 24H):  Intake/Output Summary (Last 24 hours) at 09/20/17 0843  Last data filed at 09/20/17 0634   Gross per 24 hour   Intake          3190.42 ml   Output              525 ml   Net          2665.42 ml       Scheduled medications:   citalopram  40 mg Oral Daily    enoxaparin  40 mg Subcutaneous Q12H    famotidine (PF)  20 mg Intravenous Q12H    gabapentin  600 mg Oral BID    pantoprazole  40 mg Oral Daily    pramipexole  1.5 mg Oral Nightly       Physical Exam:  General: no distress  Lungs:  clear to auscultation bilaterally and normal respiratory effort  Heart: regular rate and rhythm, S1, S2 normal, no murmur, rub or gallop  Abdomen: soft, non-tender non-distented; bowel sounds normal; no masses,  no organomegaly    Wound/Incision:  clean, dry, intact    Laboratory:  Labs within the past 24 hours have been reviewed.    ASSESSMENT/PLAN:     Fever likely from atelectasis will encourage IS and observe for 24 more hours  Check labs  Encourage cl diet  Ambulate  IS  lovenox  Home meds  Care as per hospital medicine    Dorene Tripathi MD

## 2017-09-20 NOTE — PROGRESS NOTES
Pharmacist Intervention IV to PO Note    Marta Huff is a 72 y.o. female being treated with IV medication famotidine    Patient Data:    Vital Signs (Most Recent):  Temp: 98.1 °F (36.7 °C) (09/20/17 1131)  Pulse: 69 (09/20/17 1131)  Resp: 20 (09/20/17 1131)  BP: (!) 141/73 (09/20/17 1131)  SpO2: (!) 90 % (09/20/17 1131)   Vital Signs (72h Range):  Temp:  [97.9 °F (36.6 °C)-101.2 °F (38.4 °C)]   Pulse:  []   Resp:  [12-21]   BP: (118-156)/(56-87)   SpO2:  [90 %-98 %]      CBC:    Recent Labs     Lab 09/19/17  0444 09/20/17  1003   WBC 11.90 8.00   RBC 4.11 3.78*   HGB 12.6 11.6*   HCT 38.0 35.1*    150   MCV 92 93   MCH 30.5 30.7   MCHC 33.0 33.0     CMP:     Recent Labs     Lab 09/19/17  0444 09/20/17  1003   * 101   CALCIUM 8.7 8.8    139   K 5.5* 4.5   CO2 26 29    104   BUN 28* 20   CREATININE 0.9 0.7       Dietary Orders:  Diet Orders            Diet Clear liquid (no sugar)/Bariatric: No Sugar Clear Liq starting at 09/19 0759            Based on the following criteria, this patient qualifies for intravenous to oral conversion:  [x] The patients gastrointestinal tract is functioning (tolerating medications via oral or enteral route for 24 hours and tolerating food or enteral feeds for 24 hours).  [x] The patient is hemodynamically stable for 24 hours (heart rate <100 beats per minute, systolic blood pressure >99 mm Hg, and respiratory rate <20 breaths per minute).    IV medication famotidine 20mg BID will be changed to oral medication famotidine 20mg BID    Pharmacist's Name: Aliyah Dos Santos

## 2017-09-20 NOTE — PROGRESS NOTES
Progress Note  Hospital Medicine  Patient Name:Marta Huff  MRN:  [id number]  Patient Class: IP- Inpatient  Admit Date: 9/18/2017  Length of Stay: 2 days  Expected Discharge Date:   Attending Physician: Nickie Baker MD  Primary Care Provider:  Elda Holley MD    SUBJECTIVE:     Principal Problem: Morbid obesity with BMI of 45.0-49.9, adult  Initial history of present illness: Patient is a 72 y.o. female admitted to Hospitalist Service from [facility] s/p laparoscopic Danielle-N-Y surgery performed by Dr. Tripathi. Patient reportedly has past medical history significant for morbid obesity, chronic low back pain, GERD, hypertension, JESENIA (on CPAP). Post-operatively, patient denied chest pain, shortness of breath, vomiting, headache, vision changes, focal neuro-deficits, cough or fever. Abdominal pain and nausea are stable.    PMH/PSH/SH/FH/Meds: reviewed.    Symptoms/Review of Systems: Patient spiked fever 101.2F last night, occasional mild confusion reported. No shortness of breath, cough, chest pain or headache, fever or abdominal pain.     Diet:  Liquid bariatric diet  Activity level: Normal.    Pain:  Patient reports no pain.       OBJECTIVE:   Vital Signs (Most Recent):      Temp: 98.4 °F (36.9 °C) (09/20/17 0447)  Pulse: 73 (09/20/17 0447)  Resp: 18 (09/20/17 0447)  BP: (!) 143/79 (09/20/17 0447)  SpO2: (!) 90 % (09/20/17 0447)       Vital Signs Range (Last 24H):  Temp:  [98.4 °F (36.9 °C)-101.2 °F (38.4 °C)]   Pulse:  []   Resp:  [17-20]   BP: (118-156)/(59-87)   SpO2:  [90 %-98 %]     Weight: 105.2 kg (232 lb)  Body mass index is 46.86 kg/m².    Intake/Output Summary (Last 24 hours) at 09/20/17 0910  Last data filed at 09/20/17 0634   Gross per 24 hour   Intake          3190.42 ml   Output              525 ml   Net          2665.42 ml     Physical Examination:  General appearance: well developed, appears stated age  Head: normocephalic, atraumatic  Eyes:  conjunctivae/corneas clear. PERRL.  Nose:  Nares normal. Septum midline.  Throat: lips, mucosa, and tongue normal; teeth and gums normal, no throat erythema Neck: supple, symmetrical, trachea midline, no JVD and thyroid not enlarged, symmetric, no tenderness/mass/nodules  Lungs:  clear to auscultation bilaterally and normal respiratory effort  Chest wall: no tenderness  Heart: regular rate and rhythm, S1, S2 normal, no murmur, click, rub or gallop  Abdomen: soft, non-tender non-distented; bowel sounds normal; no masses,  no organomegaly  Extremities: no cyanosis or edema, or clubbing  Pulses: 2+ and symmetric  Skin: Skin color, texture, turgor normal. No rashes or lesions.  Lymph nodes: Cervical, supraclavicular, and axillary nodes normal.  Neurologic: Normal strength and tone. No focal numbness or weakness. CNII-XII intact.     CBC:    Recent Labs  Lab 09/19/17 0444   WBC 11.90   RBC 4.11   HGB 12.6   HCT 38.0      MCV 92   MCH 30.5   MCHC 33.0   BMP    Recent Labs  Lab 09/19/17 0444   *      K 5.5*      CO2 26   BUN 28*   CREATININE 0.9   CALCIUM 8.7   MG 2.0      Diagnostic Results:  Microbiology Results (last 7 days)     ** No results found for the last 168 hours. **         CXR: Cardiomegaly and central vascular prominence may indicate early congestive failure.  No definite infiltrate seen.  Assessment/Plan:      *Morbid obesity with BMI of 45.0-49.9, adult s/p laparoscopic Danielle-N-Y [E66.01, Z68.42]  Tele-monitoring.  Continue to follow Dr. Tripathi's recommendations.  Needs aggressive incentive spirometry.  Follow hemoglobin and hematocrit closely.  Pain control with PO narcotics and antiemetics as needed.  Observe fall precautions.  Check blood glucose level q AC/HS.  Use Novolog Insulin Sliding Scale as needed.    Not Applicable    JESENIA on CPAP [G47.33, Z99.89]  Use CPAP nightly or as needed.    Hyperkalemia  Tele-monitoring.  Avoid drinking juices.    UTI  Urine C/S. Start IV Rocephin.     Not Applicable    Anxiety  [F41.9]  Chronic problem. Will continue chronic medications and monitor for any changes, adjusting as needed.   Yes    GERD (gastroesophageal reflux disease) [K21.9]  On IV Pepcid.      Yes    Hypertension [I10]   Yes       Chronic  Chronic problem. Will continue chronic medications and monitor for any changes, adjusting as needed.       VTE Risk Mitigation         Ordered     enoxaparin injection 40 mg  Every 12 hours     Route:  Subcutaneous        09/18/17 1147     Medium Risk of VTE  Once      09/18/17 1147     Place BLANCA hose  Until discontinued      09/18/17 1147     Place sequential compression device  Until discontinued      09/18/17 1147        Nickie Baker MD  Department of Hospital Medicine   [facility]

## 2017-09-20 NOTE — PLAN OF CARE
09/19/17 2030   Patient Assessment/Suction   Level of Consciousness (AVPU) alert   PRE-TX-O2-ETCO2   O2 Device (Oxygen Therapy) nasal cannula   Flow (L/min) 3   Oxygen Concentration (%) 32   SpO2 98 %   Incentive Spirometer   $ Incentive Spirometer Charges unable to perform   Administration (Incentive Spirometer) unable to perform   Ready to Wean/Extubation Screen   FIO2<=50 (chart decimal) 0.32

## 2017-09-20 NOTE — PLAN OF CARE
Problem: Physical Therapy Goal  Goal: Physical Therapy Goal  Goals to be met by: 10/3/17     Patient will increase functional independence with mobility by performin. Supine to sit with Modified [place]  2. Sit to supine with Modified [place]  3. Sit to stand transfer with Modified [place]  4. Gait  x 240 feet with Stand-by Assistance using Rolling Walker.      Outcome: Ongoing (interventions implemented as appropriate)  Ambulated 200' with rw and CGA.

## 2017-09-20 NOTE — PLAN OF CARE
09/20/17 1558   PRE-TX-O2-ETCO2   O2 Device (Oxygen Therapy) nasal cannula   $ Is the patient on Oxygen? Yes   Flow (L/min) 2   Oxygen Concentration (%) 28   SpO2 100 %   Pulse Oximetry Type Intermittent   $ Pulse Oximetry - Multiple Charge Pulse Oximetry - Multiple   Incentive Spirometer   $ Incentive Spirometer Charges done independently per patient   Ready to Wean/Extubation Screen   FIO2<=50 (chart decimal) 0.28

## 2017-09-20 NOTE — PT/OT/SLP PROGRESS
Physical Therapy  Treatment    Marta Huff   MRN: [id number]   Admitting Diagnosis: Morbid obesity with BMI of 45.0-49.9, adult    PT Received On: 09/20/17  PT Start Time: 1035     PT Stop Time: 1043    PT Total Time (min): 8 min       Billable Minutes:  Gait [id number]    Treatment Type: Treatment  PT/PTA: PTA     PTA Visit Number: 1       General Precautions: Standard, fall  Orthopedic Precautions:     Braces:           Subjective:  Communicated with nurse Resendez prior to session.  Agreed to mobilize.    Pain/Comfort  Pain Rating 1: 0/10    Objective:   Patient found with: telemetry, oxygen    Functional Mobility:  Bed Mobility:   Rolling/Turning to Left: Stand by assistance  Rolling/Turning Right: Stand by assistance  Supine to Sit: Minimum Assistance  Sit to Supine: Minimum Assistance    Transfers:  Sit <> Stand Assistance: Contact Guard Assistance  Sit <> Stand Assistive Device: Rolling Walker    Gait:   Gait Distance: 200'  Assistance 1: Contact Guard Assistance  Gait Assistive Device: Rolling walker  Gait Pattern: reciprocal  Gait Deviation(s): decreased step length    Stairs:      Balance:   Static Sit: FAIR+: Able to take MINIMAL challenges from all directions  Dynamic Sit: FAIR+: Maintains balance through MINIMAL excursions of active trunk motion  Static Stand: FAIR: Maintains without assist but unable to take challenges  Dynamic stand: FAIR: Needs CONTACT GUARD during gait     Therapeutic Activities and Exercises:  Ambulated 200' with rw and CGA, slowly / steadily.     AM-PAC 6 CLICK MOBILITY  How much help from another person does this patient currently need?   1 = Unable, Total/Dependent Assistance  2 = A lot, Maximum/Moderate Assistance  3 = A little, Minimum/Contact Guard/Supervision  4 = None, Modified [place]/Independent         AM-PAC Raw Score CMS G-Code Modifier Level of Impairment Assistance   6 % Total / Unable   7 - 9 CM 80 - 100% Maximal Assist   10 - 14 CL 60 - 80%  Moderate Assist   15 - 19 CK 40 - 60% Moderate Assist   20 - 22 CJ 20 - 40% Minimal Assist   23 CI 1-20% SBA / CGA   24 CH 0% Independent/ Mod I     Patient left supine with all lines intact, call button in reach, nurse Dipti notified and caregiver present.    Assessment:  Marta Huff is a 72 y.o. female with a medical diagnosis of Morbid obesity with BMI of 45.0-49.9, adult and presents with weakness.    Rehab identified problem list/impairments: Rehab identified problem list/impairments: weakness    Rehab potential is good.    Activity tolerance: Good    Discharge recommendations: Discharge Facility/Level Of Care Needs: home     Barriers to discharge: Barriers to Discharge: None    Equipment recommendations:       GOALS:    Physical Therapy Goals        Problem: Physical Therapy Goal    Goal Priority Disciplines Outcome Goal Variances Interventions   Physical Therapy Goal     PT/OT, PT Ongoing (interventions implemented as appropriate)     Description:  Goals to be met by: 10/3/17     Patient will increase functional independence with mobility by performin. Supine to sit with Modified [place]  2. Sit to supine with Modified [place]  3. Sit to stand transfer with Modified [place]  4. Gait  x 240 feet with Stand-by Assistance using Rolling Walker.                       PLAN:    Patient to be seen daily  to address the above listed problems via gait training, therapeutic activities, therapeutic exercises  Plan of Care expires: 10/03/17  Plan of Care reviewed with: patient, caregiver         Prachi Chavez PTA  2017

## 2017-09-21 VITALS
HEART RATE: 66 BPM | OXYGEN SATURATION: 95 % | HEIGHT: 59 IN | WEIGHT: 232 LBS | TEMPERATURE: 98 F | DIASTOLIC BLOOD PRESSURE: 72 MMHG | RESPIRATION RATE: 17 BRPM | BODY MASS INDEX: 46.77 KG/M2 | SYSTOLIC BLOOD PRESSURE: 147 MMHG

## 2017-09-21 LAB
ANION GAP SERPL CALC-SCNC: 9 MMOL/L
BACTERIA UR CULT: NO GROWTH
BASOPHILS # BLD AUTO: 0 K/UL
BASOPHILS NFR BLD: 0.6 %
BUN SERPL-MCNC: 14 MG/DL
CALCIUM SERPL-MCNC: 8.4 MG/DL
CHLORIDE SERPL-SCNC: 105 MMOL/L
CO2 SERPL-SCNC: 26 MMOL/L
CREAT SERPL-MCNC: 0.7 MG/DL
DIFFERENTIAL METHOD: ABNORMAL
EOSINOPHIL # BLD AUTO: 0.2 K/UL
EOSINOPHIL NFR BLD: 3.8 %
ERYTHROCYTE [DISTWIDTH] IN BLOOD BY AUTOMATED COUNT: 15.4 %
EST. GFR  (AFRICAN AMERICAN): >60 ML/MIN/1.73 M^2
EST. GFR  (NON AFRICAN AMERICAN): >60 ML/MIN/1.73 M^2
GLUCOSE SERPL-MCNC: 90 MG/DL
HCT VFR BLD AUTO: 33 %
HGB BLD-MCNC: 10.9 G/DL
LYMPHOCYTES # BLD AUTO: 1.2 K/UL
LYMPHOCYTES NFR BLD: 20.3 %
MCH RBC QN AUTO: 30.6 PG
MCHC RBC AUTO-ENTMCNC: 33 G/DL
MCV RBC AUTO: 93 FL
MONOCYTES # BLD AUTO: 0.3 K/UL
MONOCYTES NFR BLD: 5.4 %
NEUTROPHILS # BLD AUTO: 4.1 K/UL
NEUTROPHILS NFR BLD: 69.9 %
PLATELET # BLD AUTO: 140 K/UL
PMV BLD AUTO: 8.7 FL
POTASSIUM SERPL-SCNC: 4.3 MMOL/L
RBC # BLD AUTO: 3.56 M/UL
SODIUM SERPL-SCNC: 140 MMOL/L
WBC # BLD AUTO: 5.9 K/UL

## 2017-09-21 PROCEDURE — 25000003 PHARM REV CODE 250: Performed by: INTERNAL MEDICINE

## 2017-09-21 PROCEDURE — 94761 N-INVAS EAR/PLS OXIMETRY MLT: CPT

## 2017-09-21 PROCEDURE — 85025 COMPLETE CBC W/AUTO DIFF WBC: CPT

## 2017-09-21 PROCEDURE — 63600175 PHARM REV CODE 636 W HCPCS: Performed by: INTERNAL MEDICINE

## 2017-09-21 PROCEDURE — 97116 GAIT TRAINING THERAPY: CPT

## 2017-09-21 PROCEDURE — 97110 THERAPEUTIC EXERCISES: CPT

## 2017-09-21 PROCEDURE — 80048 BASIC METABOLIC PNL TOTAL CA: CPT

## 2017-09-21 PROCEDURE — 25000003 PHARM REV CODE 250: Performed by: SURGERY

## 2017-09-21 PROCEDURE — 99238 HOSP IP/OBS DSCHRG MGMT 30/<: CPT | Mod: ,,, | Performed by: INTERNAL MEDICINE

## 2017-09-21 PROCEDURE — 63600175 PHARM REV CODE 636 W HCPCS: Performed by: SURGERY

## 2017-09-21 PROCEDURE — 36415 COLL VENOUS BLD VENIPUNCTURE: CPT

## 2017-09-21 PROCEDURE — 27000221 HC OXYGEN, UP TO 24 HOURS

## 2017-09-21 PROCEDURE — 94799 UNLISTED PULMONARY SVC/PX: CPT

## 2017-09-21 RX ORDER — CEPHALEXIN 500 MG/1
500 CAPSULE ORAL EVERY 8 HOURS
Qty: 6 CAPSULE | Refills: 0 | Status: SHIPPED | OUTPATIENT
Start: 2017-09-21 | End: 2018-01-04

## 2017-09-21 RX ADMIN — PANTOPRAZOLE SODIUM 40 MG: 40 TABLET, DELAYED RELEASE ORAL at 10:09

## 2017-09-21 RX ADMIN — HYDROCODONE BITARTRATE AND ACETAMINOPHEN 15 ML: 7.5; 325 SOLUTION ORAL at 10:09

## 2017-09-21 RX ADMIN — GABAPENTIN 600 MG: 300 CAPSULE ORAL at 10:09

## 2017-09-21 RX ADMIN — FAMOTIDINE 20 MG: 20 TABLET ORAL at 10:09

## 2017-09-21 RX ADMIN — CEFTRIAXONE 1 G: 1 INJECTION, SOLUTION INTRAVENOUS at 10:09

## 2017-09-21 RX ADMIN — ENOXAPARIN SODIUM 40 MG: 100 INJECTION SUBCUTANEOUS at 10:09

## 2017-09-21 RX ADMIN — CITALOPRAM HYDROBROMIDE 40 MG: 40 TABLET ORAL at 10:09

## 2017-09-21 NOTE — CONSULTS
Food & Nutrition  Education    Diet Education: S/p gastric bypass  Time Spent: 15 mins  Learners: Patient       Nutrition Education provided with handouts: yes. Bariatric surgery diet progression      Comments: Reviewed post op guidelines (no straws, sugar free liquids only, nothing carbonated, sipping slowly, ect). Reviewed diet progression (clear liquids, full liquids, soft diet, new normal). Reviewed protein and fluid goals/day, and approved protein supplements. Also reviewed required vitamins and minerals. All questions and concerns answered. Provided nutrition handout for after discharge.       Follow-Up: x1 weekly    Please Re-consult as needed

## 2017-09-21 NOTE — PLAN OF CARE
Problem: Patient Care Overview  Goal: Plan of Care Review  Outcome: Ongoing (interventions implemented as appropriate)  IS X 10 breaths Pt achieved 500cc and instructed to use Q4. 95% sats on room air.

## 2017-09-21 NOTE — PROGRESS NOTES
PATIENT ALERT AND ORIENTED.  PT AMBULATED TO RESTROOM WITH STAND BY ASSIST.  PRN MEDICATION GIVEN FOR PAIN.  EDUCATED PATIENT ON THE NEED TO USE INCENTIVE SPIROMETER MORE.  PT ONLY  ML X 10.  WILL CONTINUE TO ENCOURAGE IS.  CALL LIGHT IN REACH.  BED IN LOW/LOCKED POSITION.  .

## 2017-09-21 NOTE — PT/OT/SLP PROGRESS
Physical Therapy  Treatment    Marta Huff   MRN: [id number]   Admitting Diagnosis: Morbid obesity with BMI of 45.0-49.9, adult    PT Received On: 09/21/17  PT Start Time: 0840     PT Stop Time: 0900    PT Total Time (min): 20 min       Billable Minutes:  Gait [id number] and Therapeutic Exercise 10min    Treatment Type: Treatment  PT/PTA: PTA     PTA Visit Number: 2       General Precautions: Standard, fall  Orthopedic Precautions:     Braces:           Subjective:  Communicated with nurse Keren prior to session.  Agreed to ambulate.    Pain/Comfort  Pain Rating 1: 0/10    Objective:   Patient found with: telemetry, oxygen    Functional Mobility:  Bed Mobility:   Rolling/Turning to Left: Stand by assistance  Rolling/Turning Right: Stand by assistance  Scooting/Bridging: Stand by Assistance  Supine to Sit: Minimum Assistance  Sit to Supine: Minimum Assistance    Transfers:  Sit <> Stand Assistance: Contact Guard Assistance  Sit <> Stand Assistive Device: Rolling Walker    Gait:   Gait Distance: 120'  Assistance 1: Contact Guard Assistance  Gait Assistive Device: Rolling walker  Gait Pattern: reciprocal  Gait Deviation(s): decreased step length    Stairs:      Balance:   Static Sit: FAIR+: Able to take MINIMAL challenges from all directions  Dynamic Sit: FAIR+: Maintains balance through MINIMAL excursions of active trunk motion  Static Stand: FAIR: Maintains without assist but unable to take challenges  Dynamic stand: FAIR: Needs CONTACT GUARD during gait     Therapeutic Activities and Exercises:  Transferred to EOB. Ambulated 120' with rw and CGA. Performed AROM BLE's at 10 reps each : TKE's, Hip abd /add / flexion, AP's.     AM-PAC 6 CLICK MOBILITY  How much help from another person does this patient currently need?   1 = Unable, Total/Dependent Assistance  2 = A lot, Maximum/Moderate Assistance  3 = A little, Minimum/Contact Guard/Supervision  4 = None, Modified [place]/Independent         AM-PAC Raw  Score CMS G-Code Modifier Level of Impairment Assistance   6 % Total / Unable   7 - 9 CM 80 - 100% Maximal Assist   10 - 14 CL 60 - 80% Moderate Assist   15 - 19 CK 40 - 60% Moderate Assist   20 - 22 CJ 20 - 40% Minimal Assist   23 CI 1-20% SBA / CGA   24 CH 0% Independent/ Mod I     Patient left up in chair with all lines intact, call button in reach and nurse Keren notified.    Assessment:  Marta Huff is a 72 y.o. female with a medical diagnosis of Morbid obesity with BMI of 45.0-49.9, adult and presents with weakness, limited endurance.    Rehab identified problem list/impairments: Rehab identified problem list/impairments: weakness    Rehab potential is good.    Activity tolerance: Good    Discharge recommendations: Discharge Facility/Level Of Care Needs: home     Barriers to discharge: Barriers to Discharge: None    Equipment recommendations: Equipment Needed After Discharge: none     GOALS:    Physical Therapy Goals        Problem: Physical Therapy Goal    Goal Priority Disciplines Outcome Goal Variances Interventions   Physical Therapy Goal     PT/OT, PT Ongoing (interventions implemented as appropriate)     Description:  Goals to be met by: 10/3/17     Patient will increase functional independence with mobility by performin. Supine to sit with Modified [place]  2. Sit to supine with Modified [place]  3. Sit to stand transfer with Modified [place]  4. Gait  x 240 feet with Stand-by Assistance using Rolling Walker.                       PLAN:    Patient to be seen daily  to address the above listed problems via gait training, therapeutic activities, therapeutic exercises  Plan of Care expires: 10/03/17  Plan of Care reviewed with: patient         Prachi Chavez, PTA  2017

## 2017-09-21 NOTE — PROGRESS NOTES
Progress Note  Gen Surg    Admit Date: 9/18/2017  Attending: Deuce  S/P: Procedure(s) (LRB):  JSYB-H-T-LAPAROSCOPIC (N/A)    Post-operative Day: 3 Days Post-Op    Hospital Day: 4    SUBJECTIVE:     Doing much better today     OBJECTIVE:     Vital Signs (Most Recent)  Temp: 98.2 °F (36.8 °C) (09/21/17 0821)  Pulse: 69 (09/21/17 0821)  Resp: 14 (09/21/17 0821)  BP: (!) 125/59 (09/21/17 0821)  SpO2: 97 % (09/21/17 0821)    Vital Signs Range (Last 24H):  Temp:  [98.1 °F (36.7 °C)-98.6 °F (37 °C)]   Pulse:  [63-77]   Resp:  [14-20]   BP: (112-149)/(54-73)   SpO2:  [90 %-100 %]     I & O (Last 24H):  Intake/Output Summary (Last 24 hours) at 09/21/17 1103  Last data filed at 09/21/17 0400   Gross per 24 hour   Intake              230 ml   Output                0 ml   Net              230 ml       Scheduled medications:   cefTRIAXone (ROCEPHIN) IVPB  1 g Intravenous Q24H    citalopram  40 mg Oral Daily    enoxaparin  40 mg Subcutaneous Q12H    famotidine  20 mg Oral BID    gabapentin  600 mg Oral BID    pantoprazole  40 mg Oral Daily    pramipexole  1.5 mg Oral Nightly       Physical Exam:  General: no distress  Lungs:  clear to auscultation bilaterally and normal respiratory effort  Heart: regular rate and rhythm, S1, S2 normal, no murmur, rub or gallop  Abdomen: soft, non-tender non-distented; bowel sounds normal; no masses,  no organomegaly    Wound/Incision:  clean, dry, intact    Laboratory:  Labs within the past 24 hours have been reviewed.    ASSESSMENT/PLAN:     Ok to discharge    Dorene Tripathi MD

## 2017-09-21 NOTE — PLAN OF CARE
09/20/17 2135   Patient Assessment/Suction   Level of Consciousness (AVPU) alert   PRE-TX-O2-ETCO2   O2 Device (Oxygen Therapy) nasal cannula   Flow (L/min) 2   Oxygen Concentration (%) 28   SpO2 99 %   Incentive Spirometer   $ Incentive Spirometer Charges done independently per patient   Administration (Incentive Spirometer) done independently per patient   Ready to Wean/Extubation Screen   FIO2<=50 (chart decimal) 0.28

## 2017-09-21 NOTE — PLAN OF CARE
Problem: Physical Therapy Goal  Goal: Physical Therapy Goal  Goals to be met by: 10/3/17     Patient will increase functional independence with mobility by performin. Supine to sit with Modified [place]  2. Sit to supine with Modified [place]  3. Sit to stand transfer with Modified [place]  4. Gait  x 240 feet with Stand-by Assistance using Rolling Walker.      Outcome: Ongoing (interventions implemented as appropriate)  Ambulated 120' with rw and CGA.

## 2017-09-21 NOTE — DISCHARGE SUMMARY
Discharge Summary  Hospital Medicine    Admit Date: 9/18/2017    Date and Time: 9/21/201710:52 AM    Discharge Attending Physician: Nickie Baker MD    Primary Care Physician: Elda Holley MD    Diagnoses:  Active Hospital Problems    Diagnosis  POA    *Morbid obesity with BMI of 45.0-49.9, adult s/p laparoscopic Danielle-N-Y [E66.01, Z68.42]  Not Applicable    JESENIA on CPAP [G47.33, Z99.89]  Not Applicable    Anxiety [F41.9]  Yes    GERD (gastroesophageal reflux disease) [K21.9]  Yes    Hypertension [I10]  Yes     Chronic      Resolved Hospital Problems    Diagnosis Date Resolved POA   No resolved problems to display.     Discharged Condition: Good    Hospital Course:   Patient is a 72 y.o. female admitted to Hospitalist Service from Operation Room s/p laparoscopic Daniel[place]-N-Y surgery performed by Dr. Tripathi. Patient reportedly has past medical history significant for morbid obesity, chronic low back pain, GERD, hypertension, JESENIA (on CPAP). Post-operatively, patient denied chest pain, shortness of breath, vomiting, headache, vision changes, focal neuro-deficits, cough or fever. Abdominal pain and nausea are stable. Patient was admitted to Hospitalist medicine service. Patient was followed by Dr. Tripathi. Patient was admitted to hospital medicine. Patient was closely monitored post-operatively. Blood sugar and blood pressure closely followed. Patient was given supportive care. Patient worked with Physical Therapist. Nausea and pain controlled. Patient tolerated liquid diet. Symptoms resolved. Patient was discharged home in stable condition with following discharge plan of care.     Consults: Dr. Tripathi    Significant Diagnostic Studies:     Microbiology Results (last 7 days)     Procedure Component Value Units Date/Time    Urine culture [[id number]] Collected:  09/20/17 0932    Order Status:  Sent Specimen:  Urine from Urine, Clean Catch Updated:  09/20/17 1324        Special Treatments/Procedures: as  above  Disposition: Home or Self Care    Medications:  Reconciled Home Medications:   Current Discharge Medication List      START taking these medications    Details   cephALEXin (KEFLEX) 500 MG capsule Take 1 capsule (500 mg total) by mouth every 8 (eight) hours.  Qty: 6 capsule, Refills: 0      diazePAM (VALIUM) 2 MG tablet Take 1 tablet (2 mg total) by mouth every 6 (six) hours as needed for Anxiety (muscle spasm).  Qty: 20 tablet, Refills: 0      hydrocodone-acetaminophen 7.5-325mg (NORCO) 7.5-325 mg per tablet Take 2 tablets by mouth every 6 (six) hours as needed for Pain.  Qty: 40 tablet, Refills: 0      ondansetron (ZOFRAN-ODT) 4 MG TbDL Take 1 tablet (4 mg total) by mouth every 6 (six) hours as needed.  Qty: 30 tablet, Refills: 0         CONTINUE these medications which have NOT CHANGED    Details   albuterol (PROAIR HFA) 90 mcg/actuation inhaler Inhale 2 puffs into the lungs every 6 (six) hours as needed for Wheezing or Shortness of Breath.  Qty: 3 Inhaler, Refills: 3    Associated Diagnoses: Reactive airway disease, unspecified asthma severity, uncomplicated      albuterol (PROVENTIL) 2.5 mg /3 mL (0.083 %) nebulizer solution Take 3 mLs (2.5 mg total) by nebulization every 6 (six) hours as needed for Wheezing or Shortness of Breath. Rescue  Qty: 1 Box, Refills: 11      benazepril (LOTENSIN) 40 MG tablet TAKE 1 TABLET TWICE DAILY  Qty: 180 tablet, Refills: 1    Associated Diagnoses: Essential hypertension      citalopram (CELEXA) 40 MG tablet TAKE 1 TABLET (40 MG TOTAL) ONE TIME DAILY.  Qty: 90 tablet, Refills: 1    Associated Diagnoses: Major depressive disorder, single episode, mild; Anxiety      furosemide (LASIX) 40 MG tablet TAKE 1 TABLET ONE TIME DAILY  Qty: 90 tablet, Refills: 1    Associated Diagnoses: Essential hypertension      gabapentin (NEURONTIN) 600 MG tablet TAKE 2 TABLETS TWICE DAILY  Qty: 360 tablet, Refills: 3      pramipexole (MIRAPEX) 1.5 MG tablet Take 1 tablet (1.5 mg total) by mouth  nightly.  Qty: 90 tablet, Refills: 1    Associated Diagnoses: Restless legs syndrome      RESTASIS 0.05 % ophthalmic emulsion INSTILL ONE DROP INTO BOTH EYES TWICE DAILY  Refills: 0      tizanidine (ZANAFLEX) 4 MG tablet Take 1 tablet (4 mg total) by mouth every 8 (eight) hours.  Qty: 270 tablet, Refills: 2      alendronate (FOSAMAX) 70 MG tablet TAKE 1 TABLET EVERY 7 DAYS  Qty: 12 tablet, Refills: 3      B-complex with vitamin C (Z-BEC OR EQUIV) tablet Take 1 tablet by mouth once daily.       calcium citrate-vitamin D2 1,500-200 mg-unit Tab Take 1 tablet by mouth once daily.     Associated Diagnoses: Osteopenia      fish oil-omega-3 fatty acids 300-1,000 mg capsule Take 2 g by mouth once daily.      lorazepam (ATIVAN) 0.5 MG tablet Take 1 tablet (0.5 mg total) by mouth 2 (two) times daily as needed for Anxiety.  Qty: 60 tablet, Refills: 2    Associated Diagnoses: Anxiety      meclizine (ANTIVERT) 25 mg tablet Take 1 tablet (25 mg total) by mouth every 6 (six) hours.  Qty: 360 tablet, Refills: 1    Associated Diagnoses: Vertigo      multivitamin (MULTIVITAMIN) per tablet Take 1 tablet by mouth once daily.        omeprazole (PRILOSEC) 40 MG capsule Take 1 capsule (40 mg total) by mouth once daily.  Qty: 30 capsule, Refills: 2      VITAMIN B-12 5,000 mcg Subl Place 1 tablet under the tongue once a week.     Associated Diagnoses: Annual physical exam      zolpidem (AMBIEN) 5 MG Tab Take 1 tablet (5 mg total) by mouth nightly as needed.  Qty: 30 tablet, Refills: 2    Associated Diagnoses: JESENIA on CPAP             Discharge Procedure Orders  Diet general   Order Comments: Liquid Bariatric diet     Other restrictions (specify):   Order Comments: Fall precautions     Call MD for:   Order Comments: For worsening symptoms, chest pain, shortness of breath, increased abdominal pain, high grade fever, stroke or stroke like symptoms, immediately go to the nearest Emergency Room or call 911 as soon as possible.       Follow-up  Information     Dorene Tripathi MD On 10/3/2017.    Specialties:  General Surgery, Bariatrics, Surgery  Why:  @9:15am   Contact information:  [address]  [address]  [facility] [id number] [phone]             Elda Holley MD.    Specialty:  Family Medicine  Contact information:  [address]  [place] [address]  [phone]

## 2017-09-21 NOTE — NURSING
Dr. Tripathi here earlier and pt seen. Discharge orders received and initiated.Dischage prescription and instructions given. Pt verbalized an understanding. Discharged to home.To lobby per wc. Accompanied by hosp staff member. Denies any acute distress or discomfort at this time

## 2017-09-22 ENCOUNTER — PATIENT OUTREACH (OUTPATIENT)
Dept: ADMINISTRATIVE | Facility: CLINIC | Age: 72
End: 2017-09-22

## 2017-09-22 NOTE — PROGRESS NOTES
C3 nurse attempted to contact patient. No answer. The following message was left for the patient to return the call:  Good afternoon  I am a nurse calling on behalf of [facility] from the [facility].  This is a Transitional Care Call for Marta [address]. When you have a moment please contact us at [phone] or [phone] Monday through Friday, between the hours of 8 am to 4 pm. We look forward to speaking with you. On behalf of [facility] have a nice day.    The patient has a scheduled HOSFU appointment with Dr. Dorene Tripathi on 10/3/17 @ 9:15am. Message sent to Physician staff.

## 2017-09-22 NOTE — PLAN OF CARE
09/22/17 0811   Final Note   Assessment Type Final Discharge Note   Discharge Disposition Home   Hospital Follow Up  Appt(s) scheduled? Yes   Discharge plans and expectations educations in teach back method with documentation complete? Yes

## 2017-09-25 NOTE — PATIENT INSTRUCTIONS
[facility] Discharge Instructions    Please follow these instructions from the Department of Bariatric Surgery  Dr. Bennie Medeiros    Medications  Specific, written instructions about your medications will be given to you by the nurse. The following general erin usually apply:    Most medications you were taken before surgery can and should be resumed at the same dose and schedule. Speak with your primary care doctor or the physician who prescribed your medication if you have any concerns. You should NOT take aspirin, Motrin, Ibuprofen, Advil, Celebrex or other pain medications in this group (medically known as non-steroidal anti-inflammatory drugs or NSAIDs). Tylenol is OK.    All of your medications must be taken in a liquid or chewable form. Pulls MUST BE CRUSHED until instructed differently by your surgeon. Make sure you know if your medications can be crushed and discuss this with the doctor who prescribed them.     Reminder: [facility] is located on the 1st floor in the atrium near the coffee shop.    You will receive a prescription for a liquid pain medication (usually Hycet elixir) upon discharge. Be sure you have your prescription for pain prior to going home. If not, speak to the nurse who is taking care of you.        You will receive a prescription for an antacid (Omeprazole) to take each day to prevent ulcers. PLEASE TAKE AS PRESCRIBED AND NOT AS NEEDED. This is a medication that you need to take. Since you are unable to swallow pills, pull it apart, mix its contents with a tablespoon of apple sauce or 2-3 tablespoons of your protein shake, and then swallow it. If your insurance does not cover this medication, contact our office for an alternative as soon as possible. If it's after hours or on the weekend you may reach out to the Surgical Resident on Call at [phone].     You will also receive medications for nausea. Zofran is a  dissolving tablet and Phenergan is a suppository. You can take either medication but do not take both at the same time.    You should begin taking a multivitamin of your choice immediately upon discharge.    You should begin taking 1,000-1,200 mg of Calcium Citrate every day for the rest of your life.    You should begin Vitamin B12 500 mcg every day. You can get this in a form to place under your tongue or request a monthly injection from your surgeon. Refer to your nutrition booklet for vitamin and mineral information.        If you still have your gallbladder, you will receive a prescription for Pippa Forte on your first post-operative visit. This is a bile thinning medicine to help reduce your chances of forming gallstones during the most rapid period of your weight loss. You are instructed to take this medicine for six months. IT MUST BE CRUSHED.     Diet   Refer to your nutritional booklet for a complete description of what you are allowed to eat. Remember you will be resuming your protein liquid diet for another 2 weeks after surgery. Here are a few points should always be kept in mind:       You should drink zero-calorie fluids constantly. But remember the 30/30 rule: no drinking liquids 30 minutes after a meal or during a meal.     It is normal not to have much of an appetite for 2-4 weeks following weight loss surgery. If you force food too keep your energy up you will make yourself feel sick, delay your recovery, and impair weight loss.     This is the time to begin learning to listen to your stomach. Eat small amounts only when your new little stomach pouch tells you it is ready. If you go for a whole day or two without eating food, its OK--just be certain you are drinking plenty of fluids.     Activity     Activity   Walking is highly encouraged. You should walk as far as you can at least once each day, and twice is even better. This means you should get out of the house and walk around the  neighborhood. Going up and down stairs will not impair healing so it is OK as long as youre steady on your feet.     You should not lift anything greater than 10 lbs. for 6 weeks after your surgery.     Showers are OK.     Riding in a car is OK, but DO NOT drive until you have stopped taking all pain medications. If you go on a long drive, be sure to frequently stop and walk around to prevent developing blood clots.     You cannot travel by plane for 4-6 weeks after your surgery.     Wound Care       Your wound(s) should heal nicely without special care. It is OK for the incision(s) to get wet in the shower, but dont soak (tub bath, swimming pool, hot tub, etc) until your surgeon says it is OK.     Your incisions will be covered with steri-strips, which you should leave on until they fall off.     For women whose incision extends underneath the usual spot for their bra straps, put a large Band-Aid (the patch style) on the upper end of the incision. The Band-Aid will pad the incision and let the strap slide more easily in this area.     Be on the lookout for signs of an infection in the wound. If you have redness, increasing pain, a thick white or yellow substance leaking from the wound or have a fever, call our office right away.     Some patients develop a red-orange drainage from the incision within the first two weeks at home. In most cases, this is not serious. Call if you have any questions or concerns.     Follow-Up     Your surgeon would like to see you in the office for a general checkup 2 weeks after you have gone home. Refer to the follow up schedule you received and keep your follow up appointments. It is important that we maintain a relationship with you for life. We want to ensure you remain healthy and stay on track so your weight loss journey is a success.   Call our office at [phone] about any of the following problems:     Any obvious bleeding (some dried blood is normal)   · Fever of 101º F  or greater   · Chills   · Worsening or unrelieved pain   · Increasing or pus-like drainage from your incision   · Redness, swelling, or increasing tenderness for the incision   · Inability to keep down liquids   · Shortness of breath   · Chest pain   · Increased heart rate or palpitations of the heart     Should you go to the emergency department during your recovery, tell them you had a bariatric procedure and to notify your surgeon. This is very important.   For more information, call [facility] at [phone].     Item: [id number]   Revised: 04/2017     © 2017 [facility] ([email or web address]) is a non-profit, academic, multi-specialty, healthcare delivery system dedicated to patient care, research and education.

## 2017-10-03 ENCOUNTER — OFFICE VISIT (OUTPATIENT)
Dept: BARIATRICS | Facility: CLINIC | Age: 72
End: 2017-10-03
Payer: MEDICARE

## 2017-10-03 VITALS
WEIGHT: 229.19 LBS | RESPIRATION RATE: 20 BRPM | HEART RATE: 70 BPM | DIASTOLIC BLOOD PRESSURE: 53 MMHG | TEMPERATURE: 98 F | HEIGHT: 59 IN | SYSTOLIC BLOOD PRESSURE: 113 MMHG | BODY MASS INDEX: 46.2 KG/M2

## 2017-10-03 DIAGNOSIS — E66.01 MORBID OBESITY WITH BMI OF 45.0-49.9, ADULT: ICD-10-CM

## 2017-10-03 DIAGNOSIS — E66.01 MORBID OBESITY DUE TO EXCESS CALORIES: Primary | ICD-10-CM

## 2017-10-03 PROCEDURE — 99499 UNLISTED E&M SERVICE: CPT | Mod: S$GLB,,, | Performed by: SURGERY

## 2017-10-03 PROCEDURE — 99999 PR PBB SHADOW E&M-EST. PATIENT-LVL III: CPT | Mod: PBBFAC,,, | Performed by: SURGERY

## 2017-10-03 PROCEDURE — 99024 POSTOP FOLLOW-UP VISIT: CPT | Mod: S$GLB,,, | Performed by: SURGERY

## 2017-10-03 RX ORDER — PYRITHIONE ZINC 1 %
SHAMPOO TOPICAL
Status: ON HOLD | COMMUNITY
Start: 2017-09-05 | End: 2018-07-11 | Stop reason: CLARIF

## 2017-10-03 NOTE — PROGRESS NOTES
Post Op Note    Surgery: gastric bypass surgery  Date: 9/18/17  Initial weight: 240  Last weight: 236  Current weight: 229    Constipation: none  Reflux: none  Vomiting: none    Diet:    Protein shakes 2 per day  Soups and broths- tomato, cream of broccoli- doing about 2 per day    Exercise:  Walking but gets tired    MVI: b12, b complex, mvi- 2 per day, calcium with D    Vitals:    10/03/17 0857   BP: (!) 113/53   Pulse: 70   Resp: 20   Temp: 97.8 °F (36.6 °C)       Body comp:  Fat Percent:  43.5 %  Fat Mass:  99.8 lb  FFM:  129.4 lb  TBW: 93 lb  TBW %:  40.6 %  BMR: 1787 kcal    PE:  NAD  RRR  Soft/nt/nd  Incisions: well healed, slight redness laterally to midline incision    Labs: none    A/P: s/p bypass (prior sleeve)     Counseling for patient:    Diet: follow protocol  Exercise: no heavy lifting for another month, ok to do any cardio  Vitamins: 3 mvi daily, calcium, b complex    Co morbidities:     1. Morbid obesity due to excess calories     2. Morbid obesity with BMI of 45.0-49.9, adult s/p laparoscopic Danielle-N-Y         : I met with the patient for 15 minutes and counseled her for over 50% of that time

## 2017-10-09 RX ORDER — AMOXICILLIN AND CLAVULANATE POTASSIUM 875; 125 MG/1; MG/1
1 TABLET, FILM COATED ORAL 2 TIMES DAILY
Qty: 20 TABLET | Refills: 0 | Status: SHIPPED | OUTPATIENT
Start: 2017-10-09 | End: 2017-10-19

## 2017-10-10 ENCOUNTER — TELEPHONE (OUTPATIENT)
Dept: BARIATRICS | Facility: CLINIC | Age: 72
End: 2017-10-10

## 2017-10-10 ENCOUNTER — OFFICE VISIT (OUTPATIENT)
Dept: BARIATRICS | Facility: CLINIC | Age: 72
End: 2017-10-10
Payer: MEDICARE

## 2017-10-10 VITALS
HEART RATE: 71 BPM | BODY MASS INDEX: 46.09 KG/M2 | SYSTOLIC BLOOD PRESSURE: 121 MMHG | RESPIRATION RATE: 16 BRPM | TEMPERATURE: 99 F | DIASTOLIC BLOOD PRESSURE: 58 MMHG | HEIGHT: 59 IN | WEIGHT: 228.63 LBS

## 2017-10-10 PROCEDURE — 87186 SC STD MICRODIL/AGAR DIL: CPT

## 2017-10-10 PROCEDURE — 87077 CULTURE AEROBIC IDENTIFY: CPT

## 2017-10-10 PROCEDURE — 87075 CULTR BACTERIA EXCEPT BLOOD: CPT

## 2017-10-10 PROCEDURE — 99999 PR PBB SHADOW E&M-EST. PATIENT-LVL III: CPT | Mod: PBBFAC,,, | Performed by: SURGERY

## 2017-10-10 PROCEDURE — 99024 POSTOP FOLLOW-UP VISIT: CPT | Mod: S$GLB,,, | Performed by: SURGERY

## 2017-10-10 PROCEDURE — 87070 CULTURE OTHR SPECIMN AEROBIC: CPT

## 2017-10-10 NOTE — TELEPHONE ENCOUNTER
----- Message from Gael Martins sent at 10/10/2017  2:45 PM CDT -----  Contact: Staci/[facility]  Staci called in regarding the attached patient and an order she received today from Dr. Tripathi for home health services.  Staci stated there are no wound care orders included although it states home health for wound care.  Staci's call back number is [phone] and fax number is [phone]

## 2017-10-10 NOTE — PROGRESS NOTES
Redness pain and warmth to umbilical incision (site of hernia repair and where stapler was placed).    Vitals:    10/10/17 1007   BP: (!) 121/58   Pulse: 71   Resp: 16   Temp: 98.9 °F (37.2 °C)       Wound with obvious cellulitis  Incision opened under local anesthesia with return of copious amounts of pus.  The abscess extended superiorly but did not feel to cross the fascia.    POST OP WOUND INFECTION  CULTURES  ANTIBIOTICS  FOLLOW UP THIS WEEK.  HOME HEALTH FOR DAILY WOUND CARE  
no

## 2017-10-12 ENCOUNTER — TELEPHONE (OUTPATIENT)
Dept: BARIATRICS | Facility: CLINIC | Age: 72
End: 2017-10-12

## 2017-10-12 ENCOUNTER — OFFICE VISIT (OUTPATIENT)
Dept: BARIATRICS | Facility: CLINIC | Age: 72
End: 2017-10-12
Payer: MEDICARE

## 2017-10-12 VITALS
WEIGHT: 229.81 LBS | SYSTOLIC BLOOD PRESSURE: 147 MMHG | HEART RATE: 65 BPM | RESPIRATION RATE: 16 BRPM | TEMPERATURE: 99 F | DIASTOLIC BLOOD PRESSURE: 72 MMHG | HEIGHT: 59 IN | BODY MASS INDEX: 46.33 KG/M2

## 2017-10-12 PROCEDURE — 99024 POSTOP FOLLOW-UP VISIT: CPT | Mod: S$GLB,,, | Performed by: SURGERY

## 2017-10-12 PROCEDURE — 99999 PR PBB SHADOW E&M-EST. PATIENT-LVL V: CPT | Mod: PBBFAC,,, | Performed by: SURGERY

## 2017-10-12 NOTE — TELEPHONE ENCOUNTER
----- Message from Elayne Broussard sent at 10/11/2017  4:47 PM CDT -----  Contact: Fer abdi/ [facility]  Gene w/ [facility] calling in regards to a class 2 drug interaction between Celexa and Zofran. He needs to speak with the Nurse. Please advise. Call to pod. No answer.  Call back Gene at   Thanks!

## 2017-10-12 NOTE — PROGRESS NOTES
Erythema nearly resolved  Doing well  Follow up Tues for wound exam  hh to do dressing changes tid  Continue antibotics  Cultures reviewed- staph suspect mssa as clinical improvement on augment- will continue for now, follow closely, and await susceptibilties

## 2017-10-13 ENCOUNTER — TELEPHONE (OUTPATIENT)
Dept: SURGERY | Facility: HOSPITAL | Age: 72
End: 2017-10-13

## 2017-10-13 LAB — BACTERIA SPEC AEROBE CULT: NORMAL

## 2017-10-13 RX ORDER — DOXYCYCLINE HYCLATE 100 MG
100 TABLET ORAL EVERY 12 HOURS
Qty: 28 TABLET | Refills: 0 | Status: SHIPPED | OUTPATIENT
Start: 2017-10-13 | End: 2017-10-27

## 2017-10-16 LAB — BACTERIA SPEC ANAEROBE CULT: NORMAL

## 2017-10-17 ENCOUNTER — OFFICE VISIT (OUTPATIENT)
Dept: BARIATRICS | Facility: CLINIC | Age: 72
End: 2017-10-17
Payer: MEDICARE

## 2017-10-17 ENCOUNTER — TELEPHONE (OUTPATIENT)
Dept: FAMILY MEDICINE | Facility: CLINIC | Age: 72
End: 2017-10-17

## 2017-10-17 ENCOUNTER — LAB VISIT (OUTPATIENT)
Dept: LAB | Facility: HOSPITAL | Age: 72
End: 2017-10-17
Attending: SURGERY
Payer: MEDICARE

## 2017-10-17 VITALS
WEIGHT: 223 LBS | HEIGHT: 59 IN | RESPIRATION RATE: 16 BRPM | SYSTOLIC BLOOD PRESSURE: 135 MMHG | TEMPERATURE: 99 F | HEART RATE: 67 BPM | DIASTOLIC BLOOD PRESSURE: 63 MMHG | BODY MASS INDEX: 44.96 KG/M2

## 2017-10-17 DIAGNOSIS — E66.01 MORBID OBESITY: ICD-10-CM

## 2017-10-17 DIAGNOSIS — E66.01 MORBID OBESITY: Primary | ICD-10-CM

## 2017-10-17 LAB
ANION GAP SERPL CALC-SCNC: 10 MMOL/L
BASOPHILS # BLD AUTO: 0 K/UL
BASOPHILS NFR BLD: 0.3 %
BUN SERPL-MCNC: 17 MG/DL
CALCIUM SERPL-MCNC: 9.3 MG/DL
CHLORIDE SERPL-SCNC: 102 MMOL/L
CO2 SERPL-SCNC: 33 MMOL/L
CREAT SERPL-MCNC: 0.7 MG/DL
DIFFERENTIAL METHOD: ABNORMAL
EOSINOPHIL # BLD AUTO: 0.4 K/UL
EOSINOPHIL NFR BLD: 6.7 %
ERYTHROCYTE [DISTWIDTH] IN BLOOD BY AUTOMATED COUNT: 15.2 %
EST. GFR  (AFRICAN AMERICAN): >60 ML/MIN/1.73 M^2
EST. GFR  (NON AFRICAN AMERICAN): >60 ML/MIN/1.73 M^2
GLUCOSE SERPL-MCNC: 80 MG/DL
HCT VFR BLD AUTO: 36.5 %
HGB BLD-MCNC: 12 G/DL
LYMPHOCYTES # BLD AUTO: 1.7 K/UL
LYMPHOCYTES NFR BLD: 26.4 %
MAGNESIUM SERPL-MCNC: 2 MG/DL
MCH RBC QN AUTO: 29.6 PG
MCHC RBC AUTO-ENTMCNC: 32.8 G/DL
MCV RBC AUTO: 90 FL
MONOCYTES # BLD AUTO: 0.4 K/UL
MONOCYTES NFR BLD: 5.9 %
NEUTROPHILS # BLD AUTO: 4 K/UL
NEUTROPHILS NFR BLD: 60.7 %
PLATELET # BLD AUTO: 252 K/UL
PMV BLD AUTO: 9.1 FL
POTASSIUM SERPL-SCNC: 4 MMOL/L
RBC # BLD AUTO: 4.05 M/UL
SODIUM SERPL-SCNC: 145 MMOL/L
WBC # BLD AUTO: 6.5 K/UL

## 2017-10-17 PROCEDURE — 99999 PR PBB SHADOW E&M-EST. PATIENT-LVL V: CPT | Mod: PBBFAC,,, | Performed by: SURGERY

## 2017-10-17 PROCEDURE — 80048 BASIC METABOLIC PNL TOTAL CA: CPT

## 2017-10-17 PROCEDURE — 99024 POSTOP FOLLOW-UP VISIT: CPT | Mod: S$GLB,,, | Performed by: SURGERY

## 2017-10-17 PROCEDURE — 36415 COLL VENOUS BLD VENIPUNCTURE: CPT

## 2017-10-17 PROCEDURE — 85025 COMPLETE CBC W/AUTO DIFF WBC: CPT

## 2017-10-17 PROCEDURE — 83735 ASSAY OF MAGNESIUM: CPT

## 2017-10-17 NOTE — TELEPHONE ENCOUNTER
----- Message from Marcelina Perdomo sent at 10/17/2017  8:35 AM CDT -----  Contact: Self  Patient states that the last time she saw the doctor before she left the office she was given an appointment towards the end of this month but it is not showing any appointment.  Now she is having problems and needs her medications refilled, so she is requesting early access.  Please call [phone] (home), please leave a message if not at home.   Thank you

## 2017-10-18 NOTE — PROGRESS NOTES
Here for wound check  Doing well  Erythema resolved, wound healing  Continue packing follow up in a week.

## 2017-10-24 ENCOUNTER — OFFICE VISIT (OUTPATIENT)
Dept: BARIATRICS | Facility: CLINIC | Age: 72
End: 2017-10-24
Payer: MEDICARE

## 2017-10-24 VITALS
WEIGHT: 222.19 LBS | TEMPERATURE: 98 F | SYSTOLIC BLOOD PRESSURE: 141 MMHG | DIASTOLIC BLOOD PRESSURE: 72 MMHG | BODY MASS INDEX: 44.79 KG/M2 | HEIGHT: 59 IN | HEART RATE: 61 BPM | RESPIRATION RATE: 16 BRPM

## 2017-10-24 DIAGNOSIS — E66.01 MORBID OBESITY: ICD-10-CM

## 2017-10-24 DIAGNOSIS — R19.7 DIARRHEA, UNSPECIFIED TYPE: Primary | ICD-10-CM

## 2017-10-24 DIAGNOSIS — E66.01 MORBID OBESITY WITH BMI OF 45.0-49.9, ADULT: ICD-10-CM

## 2017-10-24 PROCEDURE — 99499 UNLISTED E&M SERVICE: CPT | Mod: S$GLB,,, | Performed by: SURGERY

## 2017-10-24 PROCEDURE — 99999 PR PBB SHADOW E&M-EST. PATIENT-LVL III: CPT | Mod: PBBFAC,,, | Performed by: SURGERY

## 2017-10-24 PROCEDURE — 99024 POSTOP FOLLOW-UP VISIT: CPT | Mod: S$GLB,,, | Performed by: SURGERY

## 2017-10-24 NOTE — PROGRESS NOTES
Post Op Note    Surgery: gastric bypass surgery  Date: 9/18/17  Initial weight: 240  Last weight: 229  Current weight: 222    Constipation: loose stools  Reflux: none  Vomiting: once yesterday with night time pills    Diet:  Breakfast:  Cottage cheese (1/2 cup)  Lunch: salad or chicken stew and vegetables  Dinner: fish, broccoli, cauliflower, spinach  Snack: rarely yogurt  Protein shake: gnc brand- powdered mixed with sugar free almond milk    Exercise:  Walking the dog    MVI: 3 mvi, b12    Vitals:    10/24/17 0858   BP: (!) 141/72   Pulse: 61   Resp: 16   Temp: 98.3 °F (36.8 °C)       Body comp:  Fat Percent:  45.8 %  Fat Mass:  101.8 lb  FFM:  120.4 lb  TBW: 86.4 lb  TBW %:  38.9 %  BMR: 1877 kcal    PE:  NAD  RRR  Soft/nt/nd  Incisions: incision nearly healed all the way    Labs: none yet    A/P: s/p gastric bypass    Wound healing well- continue care    Check c diff for loose stools and recent antibiotic use     Counseling for patient:    Diet: doing great continue high protein low carb  Exercise: time to start, keep wound covered, no pools yet  Vitamins: add calcium with vitamin d  Co morbidities:     1. Diarrhea, unspecified type  Clostridium difficile EIA   2. Morbid obesity     3. Morbid obesity with BMI of 45.0-49.9, adult s/p laparoscopic Danielle-N-Y         : I met with the patient for 15 minutes and counseled her for over 50% of that time

## 2017-10-26 ENCOUNTER — OFFICE VISIT (OUTPATIENT)
Dept: FAMILY MEDICINE | Facility: CLINIC | Age: 72
End: 2017-10-26
Payer: MEDICARE

## 2017-10-26 VITALS
DIASTOLIC BLOOD PRESSURE: 80 MMHG | WEIGHT: 226 LBS | HEIGHT: 59 IN | BODY MASS INDEX: 45.56 KG/M2 | HEART RATE: 72 BPM | TEMPERATURE: 98 F | SYSTOLIC BLOOD PRESSURE: 148 MMHG

## 2017-10-26 DIAGNOSIS — M79.662 PAIN OF LEFT CALF: ICD-10-CM

## 2017-10-26 DIAGNOSIS — M47.26 OSTEOARTHRITIS OF SPINE WITH RADICULOPATHY, LUMBAR REGION: ICD-10-CM

## 2017-10-26 DIAGNOSIS — I10 ESSENTIAL HYPERTENSION: Chronic | ICD-10-CM

## 2017-10-26 DIAGNOSIS — E66.01 MORBID OBESITY: ICD-10-CM

## 2017-10-26 DIAGNOSIS — M54.17 LUMBOSACRAL RADICULOPATHY AT S1: Primary | ICD-10-CM

## 2017-10-26 DIAGNOSIS — Z12.39 SCREENING BREAST EXAMINATION: ICD-10-CM

## 2017-10-26 DIAGNOSIS — G60.9 HEREDITARY AND IDIOPATHIC PERIPHERAL NEUROPATHY: ICD-10-CM

## 2017-10-26 PROCEDURE — 99214 OFFICE O/P EST MOD 30 MIN: CPT | Mod: S$GLB,,, | Performed by: FAMILY MEDICINE

## 2017-10-26 PROCEDURE — 99999 PR PBB SHADOW E&M-EST. PATIENT-LVL IV: CPT | Mod: PBBFAC,,, | Performed by: FAMILY MEDICINE

## 2017-10-26 PROCEDURE — 99499 UNLISTED E&M SERVICE: CPT | Mod: S$GLB,,, | Performed by: FAMILY MEDICINE

## 2017-10-26 RX ORDER — GABAPENTIN 600 MG/1
1200 TABLET ORAL 2 TIMES DAILY
Qty: 360 TABLET | Refills: 3 | Status: SHIPPED | OUTPATIENT
Start: 2017-10-26 | End: 2018-01-03 | Stop reason: SDUPTHER

## 2017-10-26 NOTE — PROGRESS NOTES
Subjective:       Patient ID: Marta Huff is a 72 y.o. female.    Chief Complaint: Follow-up; Leg Pain (9/10); and Medication Refill    HPI   Patient here today for f/u.  S/p hernia repair and bypass with [place]. Had infection following hernia repair that is nearly resolved.   Lionel horse in L leg. Onset prior to surg (9/18), and cont.  Loss of sensation, lateral leg extending to foot.  Resp sx doing well. occ use of nebulizer, rare cough. Monitoring carefully.   Forgot to take BP pills this am.     Review of Systems   Constitutional: Negative for activity change, fatigue, fever and unexpected weight change.   HENT: Negative for hearing loss, rhinorrhea and trouble swallowing.    Eyes: Negative for discharge and visual disturbance.   Respiratory: Negative for chest tightness and wheezing.    Cardiovascular: Negative for chest pain and palpitations.   Gastrointestinal: Positive for diarrhea and vomiting. Negative for blood in stool and constipation.        Attributed to abx   Endocrine: Negative for polydipsia and polyuria.   Genitourinary: Negative for difficulty urinating, dysuria, hematuria and menstrual problem.   Musculoskeletal: Positive for arthralgias and joint swelling. Negative for neck pain.   Neurological: Positive for numbness. Negative for weakness and headaches.   Psychiatric/Behavioral: Negative for confusion and dysphoric mood.       Objective:      Physical Exam   Constitutional: She is oriented to person, place, and time. She appears well-developed and well-nourished. No distress.   HENT:   Head: Normocephalic and atraumatic.   Mouth/Throat: No oropharyngeal exudate.   Eyes: Conjunctivae are normal. Right eye exhibits no discharge. Left eye exhibits no discharge. No scleral icterus.   Neck: Normal range of motion. Neck supple.   Cardiovascular: Normal rate and regular rhythm.    Pulmonary/Chest: Effort normal and breath sounds normal. No respiratory distress.   Abdominal: Soft. There  is no guarding.   Musculoskeletal: Normal range of motion. She exhibits no edema.   Slow, non-antalgic gait. Distal sensory loss, L L5-S1. ROM intact. L calf tender on palp, soft. No edema.   Lymphadenopathy:     She has no cervical adenopathy.   Neurological: She is alert and oriented to person, place, and time. No cranial nerve deficit.   Skin: Skin is warm and dry. No rash noted.   Psychiatric: She has a normal mood and affect. Her behavior is normal.   Nursing note and vitals reviewed.        Pain of left calf  -     US Lower Extremity Veins Left; Future; Expected date: 10/26/2017  -     Ambulatory Referral to Physical/Occupational Therapy  Likely related to sciatic nerve compression. Sensory loss noted at L5-S1.  Osteoarthritis of spine with radiculopathy, lumbar region  -     MRI Lumbar Spine Without Contrast; Future; Expected date: 10/26/2017  -     Ambulatory Referral to Physical/Occupational Therapy  As above.  Morbid obesity  Goal weight reviewed as well as need for lifestyle modifications to incl caloric restriction, high protein/low carb, increased water intake, muscle-building exercises as well as cardio.  Per bariatrics.  Essential hypertension  Uncontrolled. Forgot meds. Nurse visit recheck.  Hereditary and idiopathic peripheral neuropathy  Chronic.  Lumbosacral radiculopathy at S1  -     Ambulatory Referral to Physical/Occupational Therapy  As above.  Screening breast examination  -     Mammo Digital Screening Bilateral With CAD; Future; Expected date: 10/26/2017  Other orders  -     gabapentin (NEURONTIN) 600 MG tablet; Take 2 tablets (1,200 mg total) by mouth 2 (two) times daily.  Dispense: 360 tablet; Refill: 3

## 2017-11-07 ENCOUNTER — OFFICE VISIT (OUTPATIENT)
Dept: BARIATRICS | Facility: CLINIC | Age: 72
End: 2017-11-07
Payer: MEDICARE

## 2017-11-07 VITALS
SYSTOLIC BLOOD PRESSURE: 104 MMHG | WEIGHT: 222.63 LBS | RESPIRATION RATE: 17 BRPM | BODY MASS INDEX: 44.88 KG/M2 | HEART RATE: 61 BPM | HEIGHT: 59 IN | TEMPERATURE: 98 F | DIASTOLIC BLOOD PRESSURE: 56 MMHG

## 2017-11-07 DIAGNOSIS — E66.01 MORBID OBESITY WITH BMI OF 45.0-49.9, ADULT: ICD-10-CM

## 2017-11-07 DIAGNOSIS — E66.01 MORBID OBESITY: Primary | ICD-10-CM

## 2017-11-07 PROCEDURE — 99024 POSTOP FOLLOW-UP VISIT: CPT | Mod: ,,, | Performed by: SURGERY

## 2017-11-07 PROCEDURE — 99499 UNLISTED E&M SERVICE: CPT | Mod: S$PBB,,, | Performed by: SURGERY

## 2017-11-07 PROCEDURE — 99999 PR PBB SHADOW E&M-EST. PATIENT-LVL V: CPT | Mod: PBBFAC,,, | Performed by: SURGERY

## 2017-11-07 PROCEDURE — 99215 OFFICE O/P EST HI 40 MIN: CPT | Mod: PBBFAC,PN | Performed by: SURGERY

## 2017-11-07 NOTE — PROGRESS NOTES
Post Op Note    Surgery: gastric bypass surgery  Date: 9/18/17  Initial weight: 240  Last weight: 222  Current weight: 222    Constipation: loose stools  Reflux: none  Vomiting: some frothing no vomiting    Diet:  Breakfast:  Protein shake, cottage cheese with frozen blueberries, omlette  Lunch: vegetable like salad, depends on what the center has  Dinner: protein shake  Snack: salad or yogurt  Protein shake: premier    Exercise:  Elliptical- 15 minutes 3 times per week; tried: sliver sneakers, line danciing    MVI: 1 per day, b12    Vitals:    11/07/17 0954   BP: (!) 104/56   Pulse: 61   Resp: 17   Temp: 97.5 °F (36.4 °C)       Body comp:  Fat Percent:  44.3 %  Fat Mass:  98.6 lb  FFM:  124 lb  TBW: 88.8 lb  TBW %:  39.9 %  BMR: 1717 kcal    PE:  NAD  RRR  Soft/nt/nd  Incisions: well healed, no more drainage    Labs: none    A/P: s/p gastric bypass     Counseling for patient:     Diet: doing well, limit to one protein shake  Exercise: continue elliptical at least 15 minutes 3-4 times per week  Vitamins: make regimen 3 mvi, calcium, b complex  Co morbidities:     1. Morbid obesity  CBC w/ Auto Differential    CMP    B12    B1    Lipid Panel    Iron Studies   2. Morbid obesity with BMI of 45.0-49.9, adult s/p laparoscopic Danielle-N-Y         : I met with the patient for 15 minutes and counseled her for over 50% of that time

## 2017-11-08 ENCOUNTER — HOSPITAL ENCOUNTER (OUTPATIENT)
Dept: RADIOLOGY | Facility: HOSPITAL | Age: 72
Discharge: HOME | End: 2017-11-08
Attending: FAMILY MEDICINE
Payer: MEDICARE

## 2017-11-08 ENCOUNTER — TELEPHONE (OUTPATIENT)
Dept: FAMILY MEDICINE | Facility: CLINIC | Age: 72
End: 2017-11-08

## 2017-11-08 DIAGNOSIS — M51.36 DDD (DEGENERATIVE DISC DISEASE), LUMBAR: Primary | ICD-10-CM

## 2017-11-08 DIAGNOSIS — M47.26 OSTEOARTHRITIS OF SPINE WITH RADICULOPATHY, LUMBAR REGION: ICD-10-CM

## 2017-11-08 PROCEDURE — 72148 MRI LUMBAR SPINE W/O DYE: CPT | Mod: 26,,, | Performed by: RADIOLOGY

## 2017-11-08 PROCEDURE — 72148 MRI LUMBAR SPINE W/O DYE: CPT | Mod: TC,PO

## 2017-11-08 NOTE — TELEPHONE ENCOUNTER
Lumbar issues noted at multiple levels. Likely contributing to loss of sensation. Please schedule appt with pain management for review. Let me know if there's a long delay until available appt.

## 2017-11-13 ENCOUNTER — PATIENT MESSAGE (OUTPATIENT)
Dept: FAMILY MEDICINE | Facility: CLINIC | Age: 72
End: 2017-11-13

## 2017-11-13 RX ORDER — HYDROCODONE BITARTRATE AND ACETAMINOPHEN 7.5; 325 MG/1; MG/1
2 TABLET ORAL EVERY 6 HOURS PRN
Qty: 40 TABLET | Refills: 0 | Status: SHIPPED | OUTPATIENT
Start: 2017-11-13 | End: 2017-12-12 | Stop reason: SDUPTHER

## 2017-11-13 NOTE — TELEPHONE ENCOUNTER
----- Message from Yaquelin Gardner sent at 11/13/2017  1:10 PM CST -----  Contact: [facility] calling needing a diagnosis code on pt hydrocodone-acetaminophen 7.5-325mg (NORCO) 7.5-325 mg per tablet    [facility]/pharmacy #[id number] - [place] - [address]  [address]  [place] [address]  Phone: [phone] Fax: [phone]

## 2017-11-14 ENCOUNTER — HOSPITAL ENCOUNTER (OUTPATIENT)
Dept: RADIOLOGY | Facility: HOSPITAL | Age: 72
Discharge: HOME | End: 2017-11-14
Attending: FAMILY MEDICINE
Payer: MEDICARE

## 2017-11-14 DIAGNOSIS — Z12.39 SCREENING BREAST EXAMINATION: ICD-10-CM

## 2017-11-14 DIAGNOSIS — M79.662 PAIN OF LEFT CALF: ICD-10-CM

## 2017-11-14 DIAGNOSIS — Z12.31 VISIT FOR SCREENING MAMMOGRAM: ICD-10-CM

## 2017-11-14 PROCEDURE — 77063 BREAST TOMOSYNTHESIS BI: CPT | Mod: 26,,, | Performed by: RADIOLOGY

## 2017-11-14 PROCEDURE — 93971 EXTREMITY STUDY: CPT | Mod: 26,,, | Performed by: RADIOLOGY

## 2017-11-14 PROCEDURE — 93971 EXTREMITY STUDY: CPT | Mod: TC,PO

## 2017-11-14 PROCEDURE — 77067 SCR MAMMO BI INCL CAD: CPT | Mod: TC

## 2017-11-14 PROCEDURE — 77067 SCR MAMMO BI INCL CAD: CPT | Mod: 26,,, | Performed by: RADIOLOGY

## 2017-12-12 ENCOUNTER — PATIENT MESSAGE (OUTPATIENT)
Dept: FAMILY MEDICINE | Facility: CLINIC | Age: 72
End: 2017-12-12

## 2017-12-12 RX ORDER — HYDROCODONE BITARTRATE AND ACETAMINOPHEN 7.5; 325 MG/1; MG/1
2 TABLET ORAL EVERY 6 HOURS PRN
Qty: 40 TABLET | Refills: 0 | Status: SHIPPED | OUTPATIENT
Start: 2017-12-12 | End: 2018-01-03 | Stop reason: SDUPTHER

## 2017-12-15 ENCOUNTER — TELEPHONE (OUTPATIENT)
Dept: FAMILY MEDICINE | Facility: CLINIC | Age: 72
End: 2017-12-15

## 2017-12-20 ENCOUNTER — OFFICE VISIT (OUTPATIENT)
Dept: PAIN MEDICINE | Facility: CLINIC | Age: 72
End: 2017-12-20
Payer: MEDICARE

## 2017-12-20 VITALS
HEIGHT: 59 IN | SYSTOLIC BLOOD PRESSURE: 130 MMHG | RESPIRATION RATE: 18 BRPM | WEIGHT: 220.25 LBS | BODY MASS INDEX: 44.4 KG/M2 | TEMPERATURE: 98 F | HEART RATE: 78 BPM | DIASTOLIC BLOOD PRESSURE: 70 MMHG

## 2017-12-20 DIAGNOSIS — M47.816 LUMBAR SPONDYLOSIS: ICD-10-CM

## 2017-12-20 DIAGNOSIS — E66.01 MORBID OBESITY: ICD-10-CM

## 2017-12-20 DIAGNOSIS — M51.36 DDD (DEGENERATIVE DISC DISEASE), LUMBAR: ICD-10-CM

## 2017-12-20 DIAGNOSIS — M54.16 LEFT LUMBAR RADICULOPATHY: Primary | ICD-10-CM

## 2017-12-20 PROCEDURE — 99999 PR PBB SHADOW E&M-EST. PATIENT-LVL V: CPT | Mod: PBBFAC,,, | Performed by: ANESTHESIOLOGY

## 2017-12-20 PROCEDURE — 99499 UNLISTED E&M SERVICE: CPT | Mod: S$GLB,,, | Performed by: ANESTHESIOLOGY

## 2017-12-20 PROCEDURE — 99204 OFFICE O/P NEW MOD 45 MIN: CPT | Mod: S$GLB,,, | Performed by: ANESTHESIOLOGY

## 2017-12-20 RX ORDER — LIDOCAINE HYDROCHLORIDE 10 MG/ML
1 INJECTION, SOLUTION EPIDURAL; INFILTRATION; INTRACAUDAL; PERINEURAL ONCE
Status: DISCONTINUED | OUTPATIENT
Start: 2017-12-20 | End: 2018-07-11 | Stop reason: CLARIF

## 2017-12-20 RX ORDER — ALPRAZOLAM 0.5 MG/1
1 TABLET, ORALLY DISINTEGRATING ORAL ONCE AS NEEDED
Status: CANCELLED | OUTPATIENT
Start: 2017-12-20 | End: 2017-12-20

## 2017-12-20 NOTE — PROGRESS NOTES
This note was completed with dictation software and grammatical errors may exist.    CC: Back pain, left leg pain    HPI: The patient is a 72-year-old woman with obesity, arthritis, osteoporosis, GERD who presents in referral from Dr. Holley for back and left leg pain.  She reports that her pain began in September, 2017, denies any specific trauma that may have caused this.  She describes it as throbbing, aching worse with laying down, worse at night and worse with moving from sitting to standing.  She does get some relief with heat and medications.  She has been taking some hydrocodone occasionally with mild relief.  She has been taking gabapentin 1200 mg twice a day for the last several years for neuropathy in her legs, obviously it has not helped with her pain recently.  She denies any felicity weakness, no bowel or bladder incontinence.      ROS: She reports itching, headaches, joint stiffness, joint swelling and back pain.  Balance of review of systems is negative.    Past Medical History:   Diagnosis Date    Anxiety     Arthralgia of knee     arthralgia of bilateral knees secondary to djd    Arthritis     Back pain     Depression     Encounter for blood transfusion     AUTOLOGUS    GERD (gastroesophageal reflux disease)     Hiatal hernia     repaired in 1979    Hypertension     Obesity     JESENIA on CPAP     not using cpap currently    Osteoporosis     Pneumonia     Reactive airway disease     Restless legs syndrome     Rheumatic fever     at 6 y/o    Trouble in sleeping     Wears glasses        Past Surgical History:   Procedure Laterality Date    APPENDECTOMY      BARIATRIC SURGERY  2014    Gastric Sleeve    CHOLECYSTECTOMY      GASTRIC SLEEVE      HERNIA REPAIR      hiatal hernia    HIATAL HERNIA REPAIR  1/1/1979    HYSTERECTOMY      partial    JOINT REPLACEMENT      BILAT KNEE    KNEE ARTHROPLASTY  1/2/2010    bilateral    KNEE ARTHROSCOPY      right    NISSEN FUNDOPLICATION    "      Social History     Social History    Marital status:      Spouse name: N/A    Number of children: N/A    Years of education: N/A     Social History Main Topics    Smoking status: Passive Smoke Exposure - Never Smoker    Smokeless tobacco: Never Used    Alcohol use Yes      Comment: 1-2 glasses of wine monthly    Drug use: No    Sexual activity: Not on file     Other Topics Concern    Not on file     Social History Narrative    No narrative on file         Medications/Allergies: See med card    Vitals:    17 0801   BP: 130/70   Pulse: 78   Resp: 18   Temp: 98.3 °F (36.8 °C)   TempSrc: Oral   Weight: 99.9 kg (220 lb 3.8 oz)   Height: 4' 11" (1.499 m)   PainSc:   6   PainLoc: Back         Physical exam:  Gen: A and O x3, pleasant, well-groomed  Skin: No rashes or obvious lesions  HEENT: PERRLA, no obvious deformities on ears or in canals. Trachea midline.  CVS: Regular rate and rhythm, normal palpable pulses.  Resp:No increased work of breathing, symmetrical chest rise.  Abdomen: Soft, NT/ND.  Musculoskeletal: No antalgic gait.     Neuro:  Lower extremities: 5/5 strength bilaterally  Reflexes: Patellar 2+, Achilles 2+ bilaterally.  Sensory:  Intact and symmetrical to light touch and pinprick in L2-S1 dermatomes bilaterally.    Lumbar spine:  Lumbar spine: Range of motion is moderately reduced with extension with increased pain, mildly reduced with flexion with no increased pain.  Brian's test causes no increased pain on either side.    Supine straight leg raise is negative bilaterally.    Internal and external rotation of the hip causes no increased pain on either side.  Myofascial exam: No tenderness to palpation across lumbar paraspinous muscles.    Imagin17 MRI L-spine  T11-12: There is minimal bulging of the anulus.  There is mild facet joint arthropathy with ligamentum flavum thickening, right greater than left.  There is no spinal canal or foraminal stenosis.  T12-L1: There " is trace anterolisthesis of T12 on L1.  There is a diffuse disc bulge with moderate facet joint arthropathy.  There is no spinal canal or significant foraminal stenosis.  L1-2: There is marked disc space narrowing and mild retrolisthesis of L1 on L2.  There is inferior unroofing of a diffuse disc bulge with osteophytic ridging, eccentric to the left with broad left paracentral and foraminal disc protrusion.  There is mild facet joint arthropathy.  There is no significant spinal stenosis.  There is moderate left lateral recess stenosis and foraminal stenosis.  There is mild to moderate right foraminal stenosis.  L2-3: There is mild retrolisthesis, diffuse disc bulge, moderate facet joint arthropathy.  There is no spinal stenosis.  There is mild to moderate bilateral, right greater than left, foraminal stenosis.  L3-4: There is moderate to marked facet joint arthropathy with ligamentum flavum thickening.  There is a diffuse disc bulge with osteophytic ridging.  There is mild prominent dorsal epidural fat.  There is only borderline spinal stenosis.  There is moderate bilateral, right greater than left, foraminal stenosis.  L4-5:There is trace anterolisthesis, marked facet joint arthropathy with ligamentum flavum thickening, unroofing of a diffuse disc bulge with small superimposed central disc protrusion.  There is no significant spinal stenosis.  There is moderate marked right and moderate left foraminal stenosis.  L5-S1: There is trace anterolisthesis.  There is marked facet joint arthropathy with ligamentum flavum thickening and bilateral facet joint effusions.  More importantly, there is a large 12 x 10 mm left synovial cyst which results and severe flattening and deformity of the left side of the dural sac and severe left lateral recess stenosis, compressing the left S1 root.  There is unroofing of a diffuse disc bulge.  There is moderate bilateral foraminal stenosis.  All of the discs are desiccated.  There is no  acute fracture.  There is trace anterolisthesis of L4 on L5 and L5 on S1.  There is a 3 mm retrolisthesis of L1 on L2 and 2 mm retrolisthesis of L2 on L3.    Assessment:  The patient is a 72-year-old woman with obesity, arthritis, osteoporosis, GERD who presents in referral from Dr. Holley for back and left leg pain.   1. Left lumbar radiculopathy     2. Lumbar spondylosis     3. DDD (degenerative disc disease), lumbar     4. Morbid obesity           Plan:  1.  We reviewed her lumbar spine MRI which shows a large cyst from the left L5/S1 facet joint which causes canal stenosis and foraminal stenosis, compressing the left descending S1 nerve root.  This is likely causing her symptoms and we discussed treating with epidural steroid injections but if this is not providing improvement, I'm going to have her see neurosurgery.  We will set her up for a left L5 and S1 transforaminal injection and have her follow-up in several weeks after the injection.    Thank you for referring this interesting patient, and I look forward to continuing to collaborate in her care.

## 2017-12-20 NOTE — LETTER
December 27, 2017      Elda Holley MD  [address]  [place] [address]           [facility]  [address] [facility] [address] [place] [address]  Phone: [phone]          Patient: Marta Huff   MR Number: [id number]   YOB: 1945   Date of Visit: 12/20/2017       Dear Dr. Elda Holley:    Thank you for referring Marta Huff to me for evaluation. Attached you will find relevant portions of my assessment and plan of care.    If you have questions, please do not hesitate to call me. I look forward to following Marta Huff along with you.    Sincerely,    Raoul Belcher MD    Enclosure  CC:  No Recipients    If you would like to receive this communication electronically, please contact [email or web address] or [phone] to request more information on [employer] Link access.    For providers and/or their staff who would like to refer a patient to [facility], please contact us through our one-stop-shop provider referral line, [facility], at [phone].    If you feel you have received this communication in error or would no longer like to receive these types of communications, please e-mail [email or web address]

## 2017-12-21 NOTE — H&P
"  SUBJECTIVE:   Tiffanie Mcdermott is a 54 year old female who presents to clinic today for the following health issues:      Patient states on Monday, her alarm went off and was startled, heart was racing. Felt like her heart was pounding in her chest. She did not have a sensation of pressure like a weight on her chest but more of a tightness and burning sensation at the center of the chest. Had similar sensation when she was dealing with reflux in the past. Is unsure if tightness was due to worry/anxiety over her heart pounding so hard.     She became more concerned when last night around 1:30am, same thing happened, states it felt like something woke her up. She did not wake up to a noise or alarm. Denies remembering having a bad dream and states she usually does not dream.  Is currently highly stressed at work but is trying to stay calm and not let anything stress her out.    Patient states back muscles are sore. 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     Family history of heart disease but no early MI in family history.       Problem list and histories reviewed & adjusted, as indicated.  Additional history: as documented    BP Readings from Last 3 Encounters:   12/21/17 114/72   02/24/16 120/82   06/29/15 100/76    Wt Readings from Last 3 Encounters:   12/21/17 260 lb 9.6 oz (118.2 kg)   06/17/16 259 lb (117.5 kg)   05/04/16 266 lb (120.7 kg)                  Labs reviewed in EPIC      Reviewed and updated as needed this visit by clinical staff       Reviewed and updated as needed this visit by Provider         ROS:  Constitutional, HEENT, cardiovascular, pulmonary, GI, , musculoskeletal, neuro, skin, endocrine and psych systems are negative, except as otherwise noted.      OBJECTIVE:   /72 (BP Location: Right arm, Patient Position: Sitting, Cuff Size: Adult Large)  Pulse 60  Temp 99.1  F (37.3  C) (Temporal)  Resp 10  Ht 5' 6.93\" (1.7 m) " PCP: Elda Holley MD    History & Physical    Chief Complaint: s/p laparoscopic Danielle-N-Y    History of Present Illness:  Patient is a 72 y.o. female admitted to Hospitalist [facility] from [facility] s/p laparoscopic Danielle-N-Y surgery performed by Dr. Tripathi. Patient reportedly has past medical history significant for morbid obesity, chronic low back pain, GERD, hypertension, JESENIA (on CPAP). Post-operatively, patient denied chest pain, shortness of breath, vomiting, headache, vision changes, focal neuro-deficits, cough or fever. Abdominal pain and nausea are stable.    Past Medical History:   Diagnosis Date    Anxiety     Arthralgia of knee     arthralgia of bilateral knees secondary to djd    Arthritis     Back pain     Depression     Encounter for blood transfusion     AUTOLOGUS    GERD (gastroesophageal reflux disease)     Hiatal hernia     repaired in 1979    Hypertension     Obesity     JESENIA on CPAP     not using cpap currently    Osteoporosis     Pneumonia     Reactive airway disease     Restless legs syndrome     Rheumatic fever     at 6 y/o    Trouble in sleeping     Wears glasses      Past Surgical History:   Procedure Laterality Date    APPENDECTOMY      BARIATRIC SURGERY  2014    Gastric Sleeve    CHOLECYSTECTOMY      GASTRIC SLEEVE      HERNIA REPAIR      hiatal hernia    HIATAL HERNIA REPAIR  1/1/1979    HYSTERECTOMY      partial    JOINT REPLACEMENT      BILAT KNEE    KNEE ARTHROPLASTY  1/2/2010    bilateral    KNEE ARTHROSCOPY      right    NISSEN FUNDOPLICATION       Family History   Problem Relation Age of Onset    Heart disease Mother     Hypertension Mother     Diabetes Mother     Obesity Mother     Heart disease Maternal Grandfather      Social History   Substance Use Topics    Smoking status: Passive Smoke Exposure - Never Smoker    Smokeless tobacco: Never Used    Alcohol use Yes      Comment: 1-2 glasses of wine monthly      Review of patient's allergies   Wt 260 lb 9.6 oz (118.2 kg)  LMP 08/06/2008  BMI 40.9 kg/m2  Body mass index is 40.9 kg/(m^2).  GENERAL: healthy, alert and no distress  EYES: Eyes grossly normal to inspection, PERRL and conjunctivae and sclerae normal  HENT: ear canals and TM's normal, nose and mouth without ulcers or lesions  NECK: no adenopathy, no asymmetry, masses, or scars and thyroid normal to palpation  RESP: lungs clear to auscultation - no rales, rhonchi or wheezes  CV: regular rate and rhythm, normal S1 S2, no S3 or S4, no murmur, click or rub, no peripheral edema and peripheral pulses strong  ABDOMEN: soft, nontender, no hepatosplenomegaly, no masses and bowel sounds normal  MS: no gross musculoskeletal defects noted, no edema  SKIN: no suspicious lesions or rashes  NEURO: Normal strength and tone, mentation intact and speech normal  PSYCH: mentation appears normal, affect normal/bright    Diagnostic Test Results:  Stress echo, pending.    ASSESSMENT/PLAN:     1. Palpitations  Patient is having palpitations at night that is unclear if these are waking her up of if she is waking with a start and this is causing racing heart beat and palpitations. Recommend doing a stress echo to ensure there is no ischemia to the heart when it is placed under stress.    - Exercise Stress Echocardiogram; Future    2. Gastroesophageal reflux disease, esophagitis presence not specified  Having a pressure/burning sensation mid chest which is likely due to GERD vs heart etiology. Recommend food triggers and avoiding eating close to bedtime.  Start omeprazole 20 mg bid for one week then decrease to 20 mg once daily to see if this resolves sensation mid chest.    - omeprazole (PRILOSEC) 20 MG CR capsule; 20 mg twice daily for 7 days then 20 mg at bedtime thereafter.  Dispense: 45 capsule; Refill: 0    HENRIETTA Dejesus [facility]   indicates:   Allergen Reactions    Etodolac (bulk) Swelling    Sulfa (sulfonamide antibiotics)      Headache and rash     PTA Medications   Medication Sig    albuterol (PROAIR HFA) 90 mcg/actuation inhaler Inhale 2 puffs into the lungs every 6 (six) hours as needed for Wheezing or Shortness of Breath.    albuterol (PROVENTIL) 2.5 mg /3 mL (0.083 %) nebulizer solution Take 3 mLs (2.5 mg total) by nebulization every 6 (six) hours as needed for Wheezing or Shortness of Breath. Rescue    benazepril (LOTENSIN) 40 MG tablet TAKE 1 TABLET TWICE DAILY    citalopram (CELEXA) 40 MG tablet TAKE 1 TABLET (40 MG TOTAL) ONE TIME DAILY.    furosemide (LASIX) 40 MG tablet TAKE 1 TABLET ONE TIME DAILY    gabapentin (NEURONTIN) 600 MG tablet TAKE 2 TABLETS TWICE DAILY    pramipexole (MIRAPEX) 1.5 MG tablet Take 1 tablet (1.5 mg total) by mouth nightly.    RESTASIS 0.05 % ophthalmic emulsion INSTILL ONE DROP INTO BOTH EYES TWICE DAILY    tizanidine (ZANAFLEX) 4 MG tablet Take 1 tablet (4 mg total) by mouth every 8 (eight) hours. (Patient taking differently: Take 4 mg by mouth every 8 (eight) hours as needed. )    alendronate (FOSAMAX) 70 MG tablet TAKE 1 TABLET EVERY 7 DAYS    B-complex with vitamin C (Z-BEC OR EQUIV) tablet Take 1 tablet by mouth once daily.     calcium citrate-vitamin D2 1,500-200 mg-unit Tab Take 1 tablet by mouth once daily.     fish oil-omega-3 fatty acids 300-1,000 mg capsule Take 2 g by mouth once daily.    lorazepam (ATIVAN) 0.5 MG tablet Take 1 tablet (0.5 mg total) by mouth 2 (two) times daily as needed for Anxiety.    meclizine (ANTIVERT) 25 mg tablet Take 1 tablet (25 mg total) by mouth every 6 (six) hours. (Patient taking differently: Take 25 mg by mouth every 6 (six) hours as needed. )    multivitamin (MULTIVITAMIN) per tablet Take 1 tablet by mouth once daily.      omeprazole (PRILOSEC) 40 MG capsule Take 1 capsule (40 mg total) by mouth once daily.    VITAMIN B-12 5,000 mcg Subl  Place 1 tablet under the tongue once a week.     zolpidem (AMBIEN) 5 MG Tab Take 1 tablet (5 mg total) by mouth nightly as needed.     Review of Systems: Patient is sedated, unable to obtain ROS.      OBJECTIVE:     Vital Signs (Most Recent)  Temp: 98.1 °F (36.7 °C) (09/18/17 1255)  Pulse: 92 (09/18/17 1501)  Resp: 12 (09/18/17 1418)  BP: (!) 145/74 (09/18/17 1501)  SpO2: (!) 93 % (09/18/17 1501)    Physical Exam:  General appearance: well developed, appears stated age  Head: normocephalic, atraumatic  Eyes:  conjunctivae/corneas clear. PERRL.  Nose: Nares normal. Septum midline.  Throat: lips, mucosa, and tongue normal; teeth and gums normal, no throat erythema.  Neck: supple, symmetrical, trachea midline, no JVD and thyroid not enlarged, symmetric, no tenderness/mass/nodules  Lungs:  clear to auscultation bilaterally and normal respiratory effort  Chest wall: no tenderness  Heart: regular rate and rhythm, S1, S2 normal, no murmur, click, rub or gallop  Abdomen: soft, non-tender non-distented; bowel sounds normal; no masses,  no organomegaly. Abdominal dressings C/D/I.  Extremities: no cyanosis, clubbing or edema.   Pulses: 2+ and symmetric  Skin: Skin color, texture, turgor normal. No rashes or lesions.  Lymph nodes: Cervical, supraclavicular, and axillary nodes normal.  Neurologic: Normal strength and tone. No focal numbness or weakness. CNII-XII intact.      Laboratory:   CBC: No results for input(s): WBC, RBC, HGB, HCT, PLT, MCV, MCH, MCHC in the last 168 hours.  BMP: No results for input(s): GLU, NA, K, CL, CO2, BUN, CREATININE, CALCIUM, MG in the last 168 hours.    Hemoglobin A1C   Date Value Ref Range Status   04/11/2017 5.9 4.5 - 6.2 % Final     Comment:     According to ADA guidelines, hemoglobin A1C <7.0% represents  optimal control in non-pregnant diabetic patients.  Different  metrics may apply to specific populations.   Standards of Medical Care in Diabetes - 2016.  For the purpose of screening for  the presence of diabetes:  <5.7%     Consistent with the absence of diabetes  5.7-6.4%  Consistent with increasing risk for diabetes   (prediabetes)  >or=6.5%  Consistent with diabetes  Currently no consensus exists for use of hemoglobin A1C  for diagnosis of diabetes for children.     11/19/2013 6.1 4.5 - 6.2 % Final   04/19/2004 5.3 4.5 - 6.2 % Final       Diagnostic Results: None    Assessment/Plan:     Active Hospital Problems    Diagnosis  POA    *Morbid obesity with BMI of 45.0-49.9, adult s/p laparoscopic Danielle-N-Y [E66.01, Z68.42]  Tele-monitoring.  Continue to follow Dr. Tripathi's recommendations.  Needs aggressive incentive spirometry.  Follow hemoglobin and hematocrit closely.  Pain control with IV narcotics and antiemetics as needed.  Observe fall precautions.  Check blood glucose level q AC/HS.  Use Novolog Insulin Sliding Scale as needed.     Not Applicable    JESENIA on CPAP [G47.33, Z99.89]  Use CPAP nightly or as needed.    Not Applicable    Anxiety [F41.9]  Chronic problem. Will continue chronic medications and monitor for any changes, adjusting as needed.    Yes    GERD (gastroesophageal reflux disease) [K21.9]  On IV Pepcid.    Yes    Hypertension [I10]  Yes     Chronic  Chronic problem. Will continue chronic medications and monitor for any changes, adjusting as needed.          VTE Risk Mitigation         Ordered     enoxaparin injection 40 mg  Every 12 hours     Route:  Subcutaneous        09/18/17 1147     Medium Risk of VTE  Once      09/18/17 1147     Place BLANCA hose  Until discontinued      09/18/17 1147     Place sequential compression device  Until discontinued      09/18/17 1147        Nickie Baker MD  Department of Hospital Medicine   [facility]

## 2017-12-22 DIAGNOSIS — M51.36 DDD (DEGENERATIVE DISC DISEASE), LUMBAR: Primary | ICD-10-CM

## 2018-01-02 ENCOUNTER — PATIENT MESSAGE (OUTPATIENT)
Dept: FAMILY MEDICINE | Facility: CLINIC | Age: 73
End: 2018-01-02

## 2018-01-03 DIAGNOSIS — F41.9 ANXIETY: ICD-10-CM

## 2018-01-03 DIAGNOSIS — G25.81 RESTLESS LEGS SYNDROME: ICD-10-CM

## 2018-01-03 DIAGNOSIS — I10 ESSENTIAL HYPERTENSION: ICD-10-CM

## 2018-01-03 DIAGNOSIS — G47.33 OSA ON CPAP: ICD-10-CM

## 2018-01-04 ENCOUNTER — TELEPHONE (OUTPATIENT)
Dept: SURGERY | Facility: HOSPITAL | Age: 73
End: 2018-01-04

## 2018-01-04 ENCOUNTER — TELEPHONE (OUTPATIENT)
Dept: PAIN MEDICINE | Facility: CLINIC | Age: 73
End: 2018-01-04

## 2018-01-04 ENCOUNTER — OFFICE VISIT (OUTPATIENT)
Dept: FAMILY MEDICINE | Facility: CLINIC | Age: 73
End: 2018-01-04
Payer: MEDICARE

## 2018-01-04 VITALS
HEIGHT: 59 IN | BODY MASS INDEX: 44.03 KG/M2 | HEART RATE: 78 BPM | OXYGEN SATURATION: 95 % | SYSTOLIC BLOOD PRESSURE: 144 MMHG | DIASTOLIC BLOOD PRESSURE: 90 MMHG | TEMPERATURE: 99 F | WEIGHT: 218.38 LBS

## 2018-01-04 DIAGNOSIS — I10 ESSENTIAL HYPERTENSION: ICD-10-CM

## 2018-01-04 DIAGNOSIS — F32.0 MAJOR DEPRESSIVE DISORDER, SINGLE EPISODE, MILD: ICD-10-CM

## 2018-01-04 DIAGNOSIS — F41.9 ANXIETY: ICD-10-CM

## 2018-01-04 DIAGNOSIS — J45.21 MILD INTERMITTENT ASTHMA WITH EXACERBATION: Primary | ICD-10-CM

## 2018-01-04 PROCEDURE — 99499 UNLISTED E&M SERVICE: CPT | Mod: S$GLB,,, | Performed by: NURSE PRACTITIONER

## 2018-01-04 PROCEDURE — 99999 PR PBB SHADOW E&M-EST. PATIENT-LVL V: CPT | Mod: PBBFAC,,, | Performed by: NURSE PRACTITIONER

## 2018-01-04 PROCEDURE — 99214 OFFICE O/P EST MOD 30 MIN: CPT | Mod: S$GLB,,, | Performed by: NURSE PRACTITIONER

## 2018-01-04 RX ORDER — HYDROCODONE BITARTRATE AND ACETAMINOPHEN 7.5; 325 MG/1; MG/1
2 TABLET ORAL EVERY 6 HOURS PRN
Qty: 40 TABLET | Refills: 0 | Status: SHIPPED | OUTPATIENT
Start: 2018-01-04 | End: 2018-08-02

## 2018-01-04 RX ORDER — LORAZEPAM 0.5 MG/1
0.5 TABLET ORAL 2 TIMES DAILY PRN
Qty: 60 TABLET | Refills: 0 | Status: SHIPPED | OUTPATIENT
Start: 2018-01-04 | End: 2019-04-16 | Stop reason: SDUPTHER

## 2018-01-04 RX ORDER — CITALOPRAM 40 MG/1
TABLET, FILM COATED ORAL
Qty: 90 TABLET | Refills: 1 | Status: SHIPPED | OUTPATIENT
Start: 2018-01-04 | End: 2018-02-26

## 2018-01-04 RX ORDER — GABAPENTIN 600 MG/1
1200 TABLET ORAL 2 TIMES DAILY
Qty: 360 TABLET | Refills: 3 | Status: SHIPPED | OUTPATIENT
Start: 2018-01-04 | End: 2018-07-09

## 2018-01-04 RX ORDER — ZOLPIDEM TARTRATE 5 MG/1
5 TABLET ORAL NIGHTLY PRN
Qty: 30 TABLET | Refills: 0 | Status: SHIPPED | OUTPATIENT
Start: 2018-01-04 | End: 2018-04-15 | Stop reason: SDUPTHER

## 2018-01-04 RX ORDER — ALBUTEROL SULFATE 0.83 MG/ML
2.5 SOLUTION RESPIRATORY (INHALATION) EVERY 6 HOURS PRN
Qty: 1 BOX | Refills: 11 | Status: SHIPPED | OUTPATIENT
Start: 2018-01-04 | End: 2019-01-04

## 2018-01-04 RX ORDER — METHYLPREDNISOLONE 4 MG/1
TABLET ORAL
Qty: 21 TABLET | Refills: 0 | Status: SHIPPED | OUTPATIENT
Start: 2018-01-04 | End: 2018-01-26

## 2018-01-04 RX ORDER — PRAMIPEXOLE DIHYDROCHLORIDE 1.5 MG/1
1.5 TABLET ORAL NIGHTLY
Qty: 90 TABLET | Refills: 1 | Status: SHIPPED | OUTPATIENT
Start: 2018-01-04 | End: 2018-04-15 | Stop reason: SDUPTHER

## 2018-01-04 RX ORDER — FUROSEMIDE 40 MG/1
TABLET ORAL
Qty: 90 TABLET | Refills: 1 | Status: SHIPPED | OUTPATIENT
Start: 2018-01-04 | End: 2018-04-15 | Stop reason: SDUPTHER

## 2018-01-04 RX ORDER — ALENDRONATE SODIUM 70 MG/1
TABLET ORAL
Qty: 12 TABLET | Refills: 3 | Status: SHIPPED | OUTPATIENT
Start: 2018-01-04 | End: 2019-01-08 | Stop reason: SDUPTHER

## 2018-01-04 RX ORDER — AMOXICILLIN 500 MG/1
1000 TABLET, FILM COATED ORAL EVERY 12 HOURS
Qty: 28 TABLET | Refills: 0 | Status: SHIPPED | OUTPATIENT
Start: 2018-01-04 | End: 2018-01-11

## 2018-01-04 RX ORDER — BENAZEPRIL HYDROCHLORIDE 40 MG/1
40 TABLET ORAL 2 TIMES DAILY
Qty: 180 TABLET | Refills: 1 | Status: SHIPPED | OUTPATIENT
Start: 2018-01-04 | End: 2018-04-15 | Stop reason: SDUPTHER

## 2018-01-04 NOTE — TELEPHONE ENCOUNTER
rx done, however, she is on too many sedating, opioid, or addictive medications and is at significant risk for med interactions and side effects.  Do not mix any of the controlled substances or take at the same time. No etoh whatsoever.  We will need to discuss alternate regimens at her next appt.

## 2018-01-04 NOTE — PROGRESS NOTES
Subjective:       Patient ID: Marta Huff is a 72 y.o. female.    Chief Complaint: Cough (productive-white); Emesis (with coughing); and Chest Congestion    HPI     Pt presents to clinic with complaints of sinus congestion, cough, and wheezing.   Hx of asthma, has not increased the use of her albuterol.   Denies fever, chills. Cough is productive with white sputum.     Review of Systems   Constitutional: Negative for chills and fever.   HENT: Positive for congestion, postnasal drip, rhinorrhea, sinus pain, sinus pressure and sore throat.    Respiratory: Positive for cough, shortness of breath and wheezing.    Cardiovascular: Negative for chest pain and palpitations.   Gastrointestinal: Positive for diarrhea (relates to diet) and nausea (with coughing). Negative for vomiting.   Neurological: Positive for headaches. Negative for dizziness and light-headedness.       Objective:      Physical Exam   Constitutional: She is oriented to person, place, and time. She appears well-developed and well-nourished.   HENT:   Head: Normocephalic and atraumatic.   Right Ear: Tympanic membrane is not erythematous. A middle ear effusion is present.   Left Ear: Tympanic membrane is not erythematous. A middle ear effusion is present.   Nose: Mucosal edema and rhinorrhea present. Right sinus exhibits no maxillary sinus tenderness and no frontal sinus tenderness. Left sinus exhibits no maxillary sinus tenderness and no frontal sinus tenderness.   Mouth/Throat: Uvula is midline and mucous membranes are normal. No oropharyngeal exudate or posterior oropharyngeal erythema.   Eyes: Pupils are equal, round, and reactive to light. Right eye exhibits no discharge. Left eye exhibits no discharge.   Neck: Normal range of motion. Neck supple.   Cardiovascular: Normal rate, regular rhythm and normal heart sounds.    Pulmonary/Chest: Effort normal. No respiratory distress. She has wheezes. She has no rales.   Musculoskeletal: Normal range  of motion. She exhibits no edema.   Neurological: She is alert and oriented to person, place, and time. No cranial nerve deficit. Coordination normal.   Skin: Skin is warm and dry. No rash noted. No erythema.   Psychiatric: She has a normal mood and affect. Her behavior is normal.   Nursing note and vitals reviewed.      Assessment:       1. Mild intermittent asthma with exacerbation    2. Essential hypertension    3. Major depressive disorder, single episode, mild    4. Anxiety        Plan:   Marta was seen today for cough, emesis and chest congestion.    Diagnoses and all orders for this visit:    Mild intermittent asthma with exacerbation  -     methylPREDNISolone (MEDROL DOSEPACK) 4 mg tablet; Take as directed  -     amoxicillin (AMOXIL) 500 MG Tab; Take 2 tablets (1,000 mg total) by mouth every 12 (twelve) hours.  Side effects and precautions of medication use reviewed with patient, expressed understanding. No questions or concerns.  Expectant management reviewed: increase fluid intake, otc analgesics prn, mucinex and antihistamines for sx relief. Call if sx persist or worsen.     Essential hypertension  -     benazepril (LOTENSIN) 40 MG tablet; Take 1 tablet (40 mg total) by mouth 2 (two) times daily.  Stable. Continue current regimen. Routine f/u.     Major depressive disorder, single episode, mild  -     citalopram (CELEXA) 40 MG tablet; TAKE 1 TABLET (40 MG TOTAL) ONE TIME DAILY.  Self-care, sx monitoring, and counseling reviewed. Patient receptive to discussion.     Anxiety  -     citalopram (CELEXA) 40 MG tablet; TAKE 1 TABLET (40 MG TOTAL) ONE TIME DAILY.  Self-care, sx monitoring, and counseling reviewed. Patient receptive to discussion.     Other orders  -     alendronate (FOSAMAX) 70 MG tablet; TAKE 1 TABLET EVERY 7 DAYS

## 2018-01-04 NOTE — PROGRESS NOTES
Patient, Marta Huff (MRN #[id number]), presented with a recorded BMI of 44.1 kg/m^2 consistent with the definition of morbid obesity (ICD-10 E66.01). The patient's morbid obesity was monitored, evaluated, addressed and/or treated. This addendum to the medical record is made on 01/04/2018.

## 2018-01-04 NOTE — TELEPHONE ENCOUNTER
Pt. Would like to cancel her procedure for 1/9/2018 due to illness. She was instructed to call the office when ready to reschedule

## 2018-01-04 NOTE — TELEPHONE ENCOUNTER
----- Message from Jo-Ann Stockton sent at 1/4/2018  1:19 PM CST -----  Please call patient to reschedule procedure [phone] (home)

## 2018-01-09 RX ORDER — ONDANSETRON 4 MG/1
4 TABLET, ORALLY DISINTEGRATING ORAL EVERY 6 HOURS PRN
Qty: 30 TABLET | Refills: 0 | Status: SHIPPED | OUTPATIENT
Start: 2018-01-09 | End: 2018-02-26

## 2018-01-09 RX ORDER — OMEPRAZOLE 40 MG/1
40 CAPSULE, DELAYED RELEASE ORAL DAILY
Qty: 30 CAPSULE | Refills: 2 | Status: SHIPPED | OUTPATIENT
Start: 2018-01-09 | End: 2018-04-15 | Stop reason: SDUPTHER

## 2018-01-09 RX ORDER — DIAZEPAM 2 MG/1
2 TABLET ORAL EVERY 6 HOURS PRN
Qty: 20 TABLET | Refills: 0 | Status: SHIPPED | OUTPATIENT
Start: 2018-01-09 | End: 2018-02-26

## 2018-01-14 ENCOUNTER — PATIENT MESSAGE (OUTPATIENT)
Dept: FAMILY MEDICINE | Facility: CLINIC | Age: 73
End: 2018-01-14

## 2018-01-17 NOTE — TELEPHONE ENCOUNTER
[facility] has medications on hold for pt pending clarification.  She is currently prescribed diazepam 2mg q 6 hours PRN as well as lorazepam 0.5mg twice daily PRN.  Please review and clarify if this is correct.

## 2018-01-26 ENCOUNTER — OFFICE VISIT (OUTPATIENT)
Dept: SURGERY | Facility: CLINIC | Age: 73
End: 2018-01-26
Payer: MEDICARE

## 2018-01-26 VITALS
RESPIRATION RATE: 16 BRPM | SYSTOLIC BLOOD PRESSURE: 139 MMHG | TEMPERATURE: 98 F | WEIGHT: 213.81 LBS | HEIGHT: 59 IN | HEART RATE: 66 BPM | DIASTOLIC BLOOD PRESSURE: 83 MMHG | BODY MASS INDEX: 43.1 KG/M2

## 2018-01-26 DIAGNOSIS — G47.33 OSA ON CPAP: ICD-10-CM

## 2018-01-26 DIAGNOSIS — E66.01 MORBID OBESITY WITH BMI OF 45.0-49.9, ADULT: Primary | ICD-10-CM

## 2018-01-26 DIAGNOSIS — E66.01 MORBID OBESITY DUE TO EXCESS CALORIES: ICD-10-CM

## 2018-01-26 DIAGNOSIS — M51.36 DDD (DEGENERATIVE DISC DISEASE), LUMBAR: Primary | ICD-10-CM

## 2018-01-26 PROCEDURE — 99213 OFFICE O/P EST LOW 20 MIN: CPT | Mod: S$GLB,,, | Performed by: SURGERY

## 2018-01-26 PROCEDURE — 99499 UNLISTED E&M SERVICE: CPT | Mod: S$GLB,,, | Performed by: SURGERY

## 2018-01-26 PROCEDURE — 99999 PR PBB SHADOW E&M-EST. PATIENT-LVL IV: CPT | Mod: PBBFAC,,, | Performed by: SURGERY

## 2018-01-26 RX ORDER — ONDANSETRON 4 MG/1
4 TABLET, ORALLY DISINTEGRATING ORAL EVERY 6 HOURS PRN
Qty: 30 TABLET | Refills: 0 | Status: SHIPPED | OUTPATIENT
Start: 2018-01-26 | End: 2019-01-10 | Stop reason: SDUPTHER

## 2018-01-26 NOTE — PROGRESS NOTES
Post Op Note    Surgery: gastric bypass surgery  Date: 9/18/17  Initial weight: 240  Last weight: 222  Current weight: 213    Constipation: gas and belching  Reflux: none  Vomiting: once with tomato sauce    Diet:    Had some cheating over holiday    Breakfast:  Eggs and turkey beltran  Lunch: fish or shrimp, salad  Dinner: meats mainly  Snack: none  Protein shake: gets from [employer], lean--     Exercise:  Started back on elliptical 3 times per week 15 minutes on elliptical    MVI: mvi, b12, calcium     Vitals:    01/26/18 0909   BP: 139/83   Pulse: 66   Resp: 16   Temp: 98.1 °F (36.7 °C)       Body comp:  Fat Percent:  44.3 %  Fat Mass:  98.6 lb  FFM:  124 lb  TBW: 88.8 lb  TBW %:  39.9 %  BMR: 1717 kcal    PE:  NAD  RRR  Soft/nt/nd  Incisions: well healed    Labs: pending    A/P: s/p gastric bypass     Counseling for patient:    Diet: ok to have nuts for snacks, keep portions small and continue to choose low carb high protein   Exercise: increase intensity or do recumbent bicycle  Vitamins: increase to 2 mvi, calcium, b vitamin     Co morbidities:     1. Morbid obesity with BMI of 45.0-49.9, adult s/p laparoscopic Danielle-N-Y     2. Morbid obesity due to excess calories     3. JESENIA on CPAP         : I met with the patient for 15 minutes and counseled her for over 50% of that time

## 2018-01-30 ENCOUNTER — LAB VISIT (OUTPATIENT)
Dept: LAB | Facility: HOSPITAL | Age: 73
End: 2018-01-30
Attending: SURGERY
Payer: MEDICARE

## 2018-01-30 ENCOUNTER — PATIENT MESSAGE (OUTPATIENT)
Dept: FAMILY MEDICINE | Facility: CLINIC | Age: 73
End: 2018-01-30

## 2018-01-30 DIAGNOSIS — E66.01 MORBID OBESITY: ICD-10-CM

## 2018-01-30 LAB
ALBUMIN SERPL BCP-MCNC: 3.4 G/DL
ALP SERPL-CCNC: 94 U/L
ALT SERPL W/O P-5'-P-CCNC: 17 U/L
ANION GAP SERPL CALC-SCNC: 6 MMOL/L
AST SERPL-CCNC: 21 U/L
BASOPHILS # BLD AUTO: 0.05 K/UL
BASOPHILS NFR BLD: 0.9 %
BILIRUB SERPL-MCNC: 0.9 MG/DL
BUN SERPL-MCNC: 16 MG/DL
CALCIUM SERPL-MCNC: 9 MG/DL
CHLORIDE SERPL-SCNC: 107 MMOL/L
CHOLEST SERPL-MCNC: 156 MG/DL
CHOLEST/HDLC SERPL: 2.6 {RATIO}
CO2 SERPL-SCNC: 29 MMOL/L
CREAT SERPL-MCNC: 0.7 MG/DL
DIFFERENTIAL METHOD: ABNORMAL
EOSINOPHIL # BLD AUTO: 0.3 K/UL
EOSINOPHIL NFR BLD: 6 %
ERYTHROCYTE [DISTWIDTH] IN BLOOD BY AUTOMATED COUNT: 15 %
EST. GFR  (AFRICAN AMERICAN): >60 ML/MIN/1.73 M^2
EST. GFR  (NON AFRICAN AMERICAN): >60 ML/MIN/1.73 M^2
GLUCOSE SERPL-MCNC: 97 MG/DL
HCT VFR BLD AUTO: 37.6 %
HDLC SERPL-MCNC: 60 MG/DL
HDLC SERPL: 38.5 %
HGB BLD-MCNC: 12 G/DL
IMM GRANULOCYTES # BLD AUTO: 0.01 K/UL
IMM GRANULOCYTES NFR BLD AUTO: 0.2 %
IRON SERPL-MCNC: 73 UG/DL
LDLC SERPL CALC-MCNC: 86.2 MG/DL
LYMPHOCYTES # BLD AUTO: 1.8 K/UL
LYMPHOCYTES NFR BLD: 34.6 %
MCH RBC QN AUTO: 28.9 PG
MCHC RBC AUTO-ENTMCNC: 31.9 G/DL
MCV RBC AUTO: 91 FL
MONOCYTES # BLD AUTO: 0.4 K/UL
MONOCYTES NFR BLD: 6.8 %
NEUTROPHILS # BLD AUTO: 2.7 K/UL
NEUTROPHILS NFR BLD: 51.5 %
NONHDLC SERPL-MCNC: 96 MG/DL
NRBC BLD-RTO: 0 /100 WBC
PLATELET # BLD AUTO: 196 K/UL
PMV BLD AUTO: 12.5 FL
POTASSIUM SERPL-SCNC: 4.1 MMOL/L
PROT SERPL-MCNC: 7 G/DL
RBC # BLD AUTO: 4.15 M/UL
SATURATED IRON: 14 %
SODIUM SERPL-SCNC: 142 MMOL/L
TOTAL IRON BINDING CAPACITY: 527 UG/DL
TRANSFERRIN SERPL-MCNC: 356 MG/DL
TRIGL SERPL-MCNC: 49 MG/DL
VIT B12 SERPL-MCNC: 449 PG/ML
WBC # BLD AUTO: 5.32 K/UL

## 2018-01-30 PROCEDURE — 36415 COLL VENOUS BLD VENIPUNCTURE: CPT | Mod: PN

## 2018-01-30 PROCEDURE — 80061 LIPID PANEL: CPT

## 2018-01-30 PROCEDURE — 82607 VITAMIN B-12: CPT

## 2018-01-30 PROCEDURE — 80053 COMPREHEN METABOLIC PANEL: CPT

## 2018-01-30 PROCEDURE — 85025 COMPLETE CBC W/AUTO DIFF WBC: CPT

## 2018-01-30 PROCEDURE — 84425 ASSAY OF VITAMIN B-1: CPT

## 2018-01-30 PROCEDURE — 83540 ASSAY OF IRON: CPT

## 2018-01-31 ENCOUNTER — TELEPHONE (OUTPATIENT)
Dept: PAIN MEDICINE | Facility: CLINIC | Age: 73
End: 2018-01-31

## 2018-01-31 NOTE — TELEPHONE ENCOUNTER
----- Message from Victoria Mirza sent at 1/31/2018  8:11 AM CST -----  Contact: self  Patient is returning call regarding injection.  Please call patient at [phone].  Thanks!

## 2018-01-31 NOTE — TELEPHONE ENCOUNTER
Spoke to patient she missed a call yesterday and thought it was our office. I informed her that we did not call but provided her with the billing department number in case it was them calling about her procedure.

## 2018-02-02 LAB — VIT B1 SERPL-MCNC: 53 UG/L (ref 38–122)

## 2018-02-03 ENCOUNTER — PATIENT MESSAGE (OUTPATIENT)
Dept: FAMILY MEDICINE | Facility: CLINIC | Age: 73
End: 2018-02-03

## 2018-02-05 ENCOUNTER — SURGERY (OUTPATIENT)
Age: 73
End: 2018-02-05

## 2018-02-05 ENCOUNTER — HOSPITAL ENCOUNTER (OUTPATIENT)
Dept: RADIOLOGY | Facility: HOSPITAL | Age: 73
Discharge: HOME | End: 2018-02-05
Attending: ANESTHESIOLOGY | Admitting: ANESTHESIOLOGY
Payer: MEDICARE

## 2018-02-05 ENCOUNTER — HOSPITAL ENCOUNTER (OUTPATIENT)
Facility: HOSPITAL | Age: 73
Discharge: HOME | End: 2018-02-05
Attending: ANESTHESIOLOGY | Admitting: ANESTHESIOLOGY
Payer: MEDICARE

## 2018-02-05 VITALS
HEART RATE: 72 BPM | BODY MASS INDEX: 42.94 KG/M2 | TEMPERATURE: 98 F | WEIGHT: 213 LBS | SYSTOLIC BLOOD PRESSURE: 158 MMHG | HEIGHT: 59 IN | DIASTOLIC BLOOD PRESSURE: 61 MMHG | OXYGEN SATURATION: 95 % | RESPIRATION RATE: 16 BRPM

## 2018-02-05 DIAGNOSIS — M51.36 DDD (DEGENERATIVE DISC DISEASE), LUMBAR: ICD-10-CM

## 2018-02-05 DIAGNOSIS — M54.16 LEFT LUMBAR RADICULOPATHY: Primary | ICD-10-CM

## 2018-02-05 PROCEDURE — 64484 NJX AA&/STRD TFRM EPI L/S EA: CPT | Mod: LT,,, | Performed by: ANESTHESIOLOGY

## 2018-02-05 PROCEDURE — 64484 NJX AA&/STRD TFRM EPI L/S EA: CPT | Mod: PO | Performed by: ANESTHESIOLOGY

## 2018-02-05 PROCEDURE — 76000 FLUOROSCOPY <1 HR PHYS/QHP: CPT | Mod: TC,PO

## 2018-02-05 PROCEDURE — 64483 NJX AA&/STRD TFRM EPI L/S 1: CPT | Mod: PO | Performed by: ANESTHESIOLOGY

## 2018-02-05 PROCEDURE — 64483 NJX AA&/STRD TFRM EPI L/S 1: CPT | Mod: LT,,, | Performed by: ANESTHESIOLOGY

## 2018-02-05 PROCEDURE — 25000003 PHARM REV CODE 250: Mod: PO | Performed by: ANESTHESIOLOGY

## 2018-02-05 RX ORDER — ALPRAZOLAM 0.5 MG/1
1 TABLET, ORALLY DISINTEGRATING ORAL ONCE AS NEEDED
Status: COMPLETED | OUTPATIENT
Start: 2018-02-05 | End: 2018-02-05

## 2018-02-05 RX ORDER — BUPIVACAINE HYDROCHLORIDE 2.5 MG/ML
INJECTION, SOLUTION EPIDURAL; INFILTRATION; INTRACAUDAL
Status: DISCONTINUED | OUTPATIENT
Start: 2018-02-05 | End: 2018-02-05 | Stop reason: HOSPADM

## 2018-02-05 RX ADMIN — BUPIVACAINE HYDROCHLORIDE 4 ML: 2.5 INJECTION, SOLUTION EPIDURAL; INFILTRATION; INTRACAUDAL; PERINEURAL at 01:02

## 2018-02-05 RX ADMIN — ALPRAZOLAM 1 MG: 0.5 TABLET, ORALLY DISINTEGRATING ORAL at 12:02

## 2018-02-05 NOTE — DISCHARGE INSTRUCTIONS
Home care instructions  Apply ice pack to the injection site for 20 minutes periods for the first 24 hrs for soreness/discomfort at injection site DO NOT USE HEAT FOR 24 HOURS  Keep site clean and dry for 24 hours, remove bandaid when desired  Do not drive until tomorrow  Take care when walking after a lumbar injection  Avoid strenuous activities for 2 days  Make take 2 weeks to feel the full effects   Resume home medication as prescribed today  Resume Aspirin, Plavix, or Coumadin the day after the procedure unless otherwise instructed.    SEE IMMEDIATE MEDICAL HELP FOR:  Severe increase in your usual pain or appearance of new pain  Prolonged or increasing weakness or numbness in the legs or arms  Drainage, redness, active bleeding, or increased swelling at the injection site  Temperature over 100.0 degrees F.  Headache that increases when your head is upright and decreases when you lie flat    CALL 911 OR GO DIRECTLY TO EMERGENCY DEPARTMENT FOR:  Shortness of breath, chest pain, or problems breathing      Recovery After Procedural Sedation (Adult)  You have been given medicine by vein to make you sleep during your surgery. This may have included both a pain medicine and sleeping medicine. Most of the effects have worn off. But you may still have some drowsiness for the next 6 to 8 hours.  Home care  Follow these guidelines when you get home:  · For the next 8 hours, you should be watched by a responsible adult. This person should make sure your condition is not getting worse.  · Don't drink any alcohol for the next 24 hours.  · Don't drive, operate dangerous machinery, or make important business or personal decisions during the next 24 hours.  Note: Your healthcare provider may tell you not to take any medicine by mouth for pain or sleep in the next 4 hours. These medicines may react with the medicines you were given in the hospital. This could cause a much stronger response than usual.  Follow-up care  Follow up  with your healthcare provider if you are not alert and back to your usual level of activity within 12 hours.  When to seek medical advice  Call your healthcare provider right away if any of these occur:  · Drowsiness gets worse  · Weakness or dizziness gets worse  · Repeated vomiting  · You can't be awakened   Date Last Reviewed: 10/18/2016  © 5678-3120 [employer]. [address] [place] [address], [place] [address]. All rights reserved. This information is not intended as a substitute for professional medical care. Always follow your healthcare professional's instructions.

## 2018-02-05 NOTE — DISCHARGE SUMMARY
[facility]  Discharge Note  Short Stay    Admit Date: 2/5/2018    Discharge Date: 2/5/2018    Attending Physician: No att. providers found     Discharge Provider: Raoul Belcehr    Diagnoses:  Active Hospital Problems    Diagnosis  POA    *Left lumbar radiculopathy [M54.16]  Yes      Resolved Hospital Problems    Diagnosis Date Resolved POA   No resolved problems to display.       Discharged Condition: good    Final Diagnoses: Left lumbar radiculopathy [M54.16]    Disposition: Home or Self Care    Hospital Course: no complications, uneventful    Outcome of Hospitalization, Treatment, Procedure, or Surgery:  Patient was admitted for outpatient procedure. The patient underwent procedure without complications and are discharged home    Follow up/Patient Instructions:  Follow up as scheduled/Patient has received instructions and follow up date    Medications:  Continue previous medications      Discharge Procedure Orders  Call MD for:  temperature >100.4     Call MD for:  severe uncontrolled pain     Call MD for:  redness, tenderness, or signs of infection (pain, swelling, redness, odor or green/yellow discharge around incision site)     Call MD for:  severe persistent headache     No dressing needed           Discharge Procedure Orders (must include Diet, Follow-up, Activity):    Discharge Procedure Orders (must include Diet, Follow-up, Activity)  Call MD for:  temperature >100.4     Call MD for:  severe uncontrolled pain     Call MD for:  redness, tenderness, or signs of infection (pain, swelling, redness, odor or green/yellow discharge around incision site)     Call MD for:  severe persistent headache     No dressing needed

## 2018-02-05 NOTE — PLAN OF CARE
BP elevated, pt states due to anxiety ab procedure., all questions answered. Denies recent fever or illness. Pt states ready for procedure.

## 2018-02-05 NOTE — OP NOTE
PROCEDURE DATE: 2/5/2018    PROCEDURE: Left L5 and S1 transforaminal epidural steroid injection under fluoroscopy    DIAGNOSIS: Lumbar  Radiculopathy    Post op diagnosis: Same    PHYSICIAN: Raoul Belcher MD    MEDICATIONS INJECTED:  Methylprednisolone 40mg (0.5ml) and 1.5ml 0.25% bupivicaine at each nerve root.     LOCAL ANESTHETIC INJECTED:  Lidocaine 1%. 4 ml per site.    SEDATION MEDICATIONS: none    ESTIMATED BLOOD LOSS:  none    COMPLICATIONS:  none    TECHNIQUE:   A time-out was taken to identify patient and procedure side prior to starting the procedure. The patient was placed in a prone position, prepped and draped in the usual sterile fashion using ChloraPrep and sterile towels.  The area to be injected was determined under fluoroscopic guidance in AP and oblique view.  Local anesthetic was given by raising a wheal and going down to the hub of a 25-gauge 1.5 inch needle.  In oblique view, a 5 inch 22-gauge bent-tip spinal needle was introduced towards 6 oclock position of the pedicle of each above named nerve root level.  The needle was walked medially then hinged into the neural foramen and position was confirmed in AP and lateral views.  Omnipaque contrast dye was injected to confirm appropriate placement and that there was no vascular uptake.  After negative aspiration for blood or CSF, the medication was then injected. This was performed at the left L5/S1 and the S1 level(s). The patient tolerated the procedure well.    The patient was monitored after the procedure.  Patient was given post procedure and discharge instructions to follow at home. The patient was discharged in a stable condition.

## 2018-02-05 NOTE — H&P
CC: Back pain    HPI: The patient is a 73yo woman with a history of lumbar radiculopathy here for left L5/S1 TFESI. There are no major changes in history and physical from 12/20/17.    Past Medical History:   Diagnosis Date    Anxiety     Arthralgia of knee     arthralgia of bilateral knees secondary to djd    Arthritis     Back pain     Depression     Encounter for blood transfusion     AUTOLOGUS    GERD (gastroesophageal reflux disease)     Hiatal hernia     repaired in 1979    Hypertension     Obesity     JESENIA on CPAP     not using cpap currently    Osteoporosis     Pneumonia     Reactive airway disease     Restless legs syndrome     Rheumatic fever     at 6 y/o    Trouble in sleeping     Wears glasses        Past Surgical History:   Procedure Laterality Date    APPENDECTOMY      BARIATRIC SURGERY  2014    Gastric Sleeve    CHOLECYSTECTOMY      GASTRIC SLEEVE      HERNIA REPAIR      hiatal hernia    HIATAL HERNIA REPAIR  1/1/1979    HYSTERECTOMY      partial    JOINT REPLACEMENT      BILAT KNEE    KNEE ARTHROPLASTY  1/2/2010    bilateral    KNEE ARTHROSCOPY      right    NISSEN FUNDOPLICATION         Family History   Problem Relation Age of Onset    Heart disease Mother     Hypertension Mother     Diabetes Mother     Obesity Mother     Heart disease Maternal Grandfather        Social History     Social History    Marital status:      Spouse name: N/A    Number of children: N/A    Years of education: N/A     Social History Main Topics    Smoking status: Passive Smoke Exposure - Never Smoker    Smokeless tobacco: Never Used    Alcohol use Yes      Comment: 1-2 glasses of wine monthly    Drug use: No    Sexual activity: Not Asked     Other Topics Concern    None     Social History Narrative    None       No current facility-administered medications for this encounter.        Review of patient's allergies indicates:   Allergen Reactions    Etodolac (bulk) Swelling  "   Sulfa (sulfonamide antibiotics)      Headache and rash       Vitals:    02/05/18 1240   BP: (!) 186/92   Pulse: 63   Resp: 16   Temp: 97.5 °F (36.4 °C)   TempSrc: Skin   SpO2: 97%   Weight: 96.6 kg (213 lb)   Height: 4' 11" (1.499 m)       REVIEW OF SYSTEMS:     GENERAL: No weight loss, malaise or fevers.  HEENT:  No recent changes in vision or hearing  NECK: Negative for lumps, no difficulty with swallowing.  RESPIRATORY: Negative for cough, wheezing or shortness of breath, patient denies any recent URI.  CARDIOVASCULAR: Negative for chest pain, leg swelling or palpitations.  GI: Negative for abdominal discomfort, blood in stools or black stools or change in bowel habits.  MUSCULOSKELETAL: See HPI.  SKIN: Negative for lesions, rash, and itching.  PSYCH: No suicidal or homicidal ideations, no current mood disturbances.  HEMATOLOGY/LYMPHOLOGY: Negative for prolonged bleeding, bruising easily or swollen nodes. Patient is not currently taking any anti-coagulants  ENDO: No history of diabetes or thyroid dysfunction  NEURO: No history of syncope, paralysis, seizures or tremors.All other reviewed and negative other than HPI.    Physical exam:  Gen: A and O x3, pleasant, well-groomed  Skin: No rashes or obvious lesions  HEENT: PERRLA, no obvious deformities on ears or in canals. No thyroid masses, trachea midline, no palpable lymph nodes in neck, axilla.  CVS: Regular rate and rhythm, normal S1 and S2, no murmurs.  Resp: Clear to auscultation bilaterally.  Abdomen: Soft, NT/ND, normal bowel sounds present.  Musculoskeletal/Neuro: Moving all extremities    Assessment:  Left lumbar radiculopathy  -     Case Request Operating Room: AKUA-TRANSFORAMINAL L5 and S1  -     Activity as tolerated; Standing  -     Place in Outpatient; Standing  -     Diet NPO; Standing  -     POCT urine pregnancy; Standing  -     alprazolam ODT dissolvable tablet 1 mg; Take 2 tablets (1 mg total) by mouth once as needed for Anxiety.  -     Notify " physician ; Standing  -     Notify physician ; Standing  -     Notify physician (specify); Standing  -     Verify informed consent; Standing  -     Vital signs; Standing

## 2018-02-09 ENCOUNTER — PATIENT MESSAGE (OUTPATIENT)
Dept: FAMILY MEDICINE | Facility: CLINIC | Age: 73
End: 2018-02-09

## 2018-02-26 ENCOUNTER — OFFICE VISIT (OUTPATIENT)
Dept: FAMILY MEDICINE | Facility: CLINIC | Age: 73
End: 2018-02-26
Payer: MEDICARE

## 2018-02-26 VITALS
WEIGHT: 215.63 LBS | RESPIRATION RATE: 18 BRPM | BODY MASS INDEX: 43.47 KG/M2 | OXYGEN SATURATION: 96 % | DIASTOLIC BLOOD PRESSURE: 84 MMHG | HEART RATE: 67 BPM | TEMPERATURE: 98 F | HEIGHT: 59 IN | SYSTOLIC BLOOD PRESSURE: 122 MMHG

## 2018-02-26 DIAGNOSIS — J45.20 MILD INTERMITTENT REACTIVE AIRWAY DISEASE WITHOUT COMPLICATION: ICD-10-CM

## 2018-02-26 DIAGNOSIS — F32.0 MILD MAJOR DEPRESSION: Primary | ICD-10-CM

## 2018-02-26 DIAGNOSIS — E66.01 MORBID OBESITY WITH BMI OF 45.0-49.9, ADULT: ICD-10-CM

## 2018-02-26 PROCEDURE — 1159F MED LIST DOCD IN RCRD: CPT | Mod: S$GLB,,, | Performed by: FAMILY MEDICINE

## 2018-02-26 PROCEDURE — 99499 UNLISTED E&M SERVICE: CPT | Mod: S$GLB,,, | Performed by: FAMILY MEDICINE

## 2018-02-26 PROCEDURE — 1126F AMNT PAIN NOTED NONE PRSNT: CPT | Mod: S$GLB,,, | Performed by: FAMILY MEDICINE

## 2018-02-26 PROCEDURE — 99999 PR PBB SHADOW E&M-EST. PATIENT-LVL III: CPT | Mod: PBBFAC,,, | Performed by: FAMILY MEDICINE

## 2018-02-26 PROCEDURE — 3008F BODY MASS INDEX DOCD: CPT | Mod: S$GLB,,, | Performed by: FAMILY MEDICINE

## 2018-02-26 PROCEDURE — 99214 OFFICE O/P EST MOD 30 MIN: CPT | Mod: S$GLB,,, | Performed by: FAMILY MEDICINE

## 2018-02-26 RX ORDER — SERTRALINE HYDROCHLORIDE 50 MG/1
50 TABLET, FILM COATED ORAL NIGHTLY
Qty: 30 TABLET | Refills: 1 | Status: SHIPPED | OUTPATIENT
Start: 2018-02-26 | End: 2018-04-09 | Stop reason: SDUPTHER

## 2018-02-26 NOTE — PROGRESS NOTES
"Subjective:       Patient ID: Marta Huff is a 72 y.o. female.    Chief Complaint: Follow-up (asthma exacerbation) and Follow-up (pt would like to discuss anxiety issues)    HPI   Patient in the office for f/u.  PMH sig for pulm htn, L radiculopathy, MDD, anxiety, OA, asthma.  Asthma exacerbation seen 1/2018 has resolved.   Using inhalers when she hears wheezing. Has a nebulizer prn.  MDD/anxiety. Has been on celexa 40 for quite some time. Family tells her she needs "a chill pill". Not taking ambien. Gets frustrated when things aren't done just so.   On mvt, b-vitamin, biotin supplement per bariatrics.  Labs 1/2018 rev.    Review of Systems   Constitutional: Negative for chills and fever.   HENT: Positive for postnasal drip. Negative for congestion, rhinorrhea, sinus pain, sinus pressure and sore throat.    Respiratory: Positive for wheezing. Negative for cough and shortness of breath.    Cardiovascular: Negative for chest pain and palpitations.   Gastrointestinal: Negative for diarrhea and nausea.   Neurological: Positive for headaches. Negative for dizziness and light-headedness.   Psychiatric/Behavioral: Positive for dysphoric mood and sleep disturbance. The patient is nervous/anxious.        Objective:      Physical Exam   Constitutional: She is oriented to person, place, and time. She appears well-developed and well-nourished. No distress.   HENT:   Head: Normocephalic and atraumatic.   Eyes: Conjunctivae are normal. Right eye exhibits no discharge. Left eye exhibits no discharge. No scleral icterus.   Neck: Normal range of motion. Neck supple.   Cardiovascular: Normal rate and regular rhythm.    Pulmonary/Chest: Effort normal and breath sounds normal. No respiratory distress.   Abdominal: Soft. She exhibits no distension.   Musculoskeletal: Normal range of motion. She exhibits no edema.   Neurological: She is alert and oriented to person, place, and time.   Skin: Skin is warm and dry. No rash noted. "   Psychiatric: She has a normal mood and affect. Her behavior is normal.   Nursing note and vitals reviewed.        Mild major depression  Will try to switch from celexa to zoloft. Side effects and precautions of medication use reviewed with patient, expressed understanding. No questions or concerns.   Morbid obesity with BMI of 45.0-49.9, adult s/p laparoscopic Danielle-N-Y  Per surg.  Mild intermittent reactive airway disease without complication  Resolved exacerbation. Cont albuterol prn.  Other orders  -     sertraline (ZOLOFT) 50 MG tablet; Take 1 tablet (50 mg total) by mouth every evening. Week 1: start 1/2 tablet by mouth at bedtime.  Dispense: 30 tablet; Refill: 1

## 2018-03-01 ENCOUNTER — PES CALL (OUTPATIENT)
Dept: ADMINISTRATIVE | Facility: CLINIC | Age: 73
End: 2018-03-01

## 2018-03-14 ENCOUNTER — OFFICE VISIT (OUTPATIENT)
Dept: PAIN MEDICINE | Facility: CLINIC | Age: 73
End: 2018-03-14
Payer: MEDICARE

## 2018-03-14 VITALS
HEART RATE: 78 BPM | RESPIRATION RATE: 20 BRPM | SYSTOLIC BLOOD PRESSURE: 181 MMHG | DIASTOLIC BLOOD PRESSURE: 83 MMHG | OXYGEN SATURATION: 98 % | TEMPERATURE: 97 F | BODY MASS INDEX: 44.39 KG/M2 | WEIGHT: 219.81 LBS

## 2018-03-14 DIAGNOSIS — G62.9 PERIPHERAL POLYNEUROPATHY: Primary | ICD-10-CM

## 2018-03-14 DIAGNOSIS — M54.16 LEFT LUMBAR RADICULOPATHY: ICD-10-CM

## 2018-03-14 DIAGNOSIS — G89.4 CHRONIC PAIN DISORDER: ICD-10-CM

## 2018-03-14 DIAGNOSIS — M47.816 LUMBAR SPONDYLOSIS: ICD-10-CM

## 2018-03-14 PROCEDURE — 3079F DIAST BP 80-89 MM HG: CPT | Mod: CPTII,S$GLB,, | Performed by: PHYSICIAN ASSISTANT

## 2018-03-14 PROCEDURE — 99999 PR PBB SHADOW E&M-EST. PATIENT-LVL V: CPT | Mod: PBBFAC,,, | Performed by: PHYSICIAN ASSISTANT

## 2018-03-14 PROCEDURE — 3077F SYST BP >= 140 MM HG: CPT | Mod: CPTII,S$GLB,, | Performed by: PHYSICIAN ASSISTANT

## 2018-03-14 PROCEDURE — 99214 OFFICE O/P EST MOD 30 MIN: CPT | Mod: S$GLB,,, | Performed by: PHYSICIAN ASSISTANT

## 2018-03-14 PROCEDURE — 99499 UNLISTED E&M SERVICE: CPT | Mod: S$GLB,,, | Performed by: PHYSICIAN ASSISTANT

## 2018-03-16 NOTE — PROGRESS NOTES
This note was completed with dictation software and grammatical errors may exist.    CC: Back pain, left leg pain, Bilateral foot pain    HPI: The patient is a 72-year-old woman with obesity, arthritis, osteoporosis, GERD who presents in referral from Dr. Holley for back and left leg pain.  She is status post left L5 and S1 transforaminal epidural steroid injection on 2/5/18 with 100% relief of her radicular left leg pain.  The patient is new to me.  She denies any low back or left leg at this time.  However, she reports severe pain in her feet due to neuropathy.  She describes this as burning, tingling and numb.  She reports taking several medications for this, currently on high-dose gabapentin with mild relief.  She feels that this is getting worse and would like something done about it.  She denies weakness, bladder or bowel incontinence.    Pain intervention history:She is status post left L5 and S1 transforaminal epidural steroid injection on 2/5/18 with 100% relief of her radicular left leg pain.     ROS: She reports itching, headaches, joint stiffness, joint swelling and back pain.  Balance of review of systems is negative.    Past Medical History:   Diagnosis Date    Anxiety     Arthralgia of knee     arthralgia of bilateral knees secondary to djd    Arthritis     Back pain     Depression     Encounter for blood transfusion     AUTOLOGUS    GERD (gastroesophageal reflux disease)     Hiatal hernia     repaired in 1979    Hypertension     Obesity     JESENIA on CPAP     not using cpap currently    Osteoporosis     Pneumonia     Reactive airway disease     Restless legs syndrome     Rheumatic fever     at 4 y/o    Trouble in sleeping     Wears glasses        Past Surgical History:   Procedure Laterality Date    APPENDECTOMY      BARIATRIC SURGERY  2014    Gastric Sleeve    CHOLECYSTECTOMY      GASTRIC SLEEVE      HERNIA REPAIR      hiatal hernia    HIATAL HERNIA REPAIR  1/1/1979     HYSTERECTOMY      partial    JOINT REPLACEMENT      BILAT KNEE    KNEE ARTHROPLASTY  2010    bilateral    KNEE ARTHROSCOPY      right    NISSEN FUNDOPLICATION         Social History     Social History    Marital status:      Spouse name: N/A    Number of children: N/A    Years of education: N/A     Social History Main Topics    Smoking status: Passive Smoke Exposure - Never Smoker    Smokeless tobacco: Never Used    Alcohol use Yes      Comment: 1-2 glasses of wine monthly    Drug use: No    Sexual activity: Not Asked     Other Topics Concern    None     Social History Narrative    None         Medications/Allergies: See med card    Vitals:    18 0832   BP: (!) 181/83   Pulse: 78   Resp: 20   Temp: 97.2 °F (36.2 °C)   TempSrc: Oral   SpO2: 98%   Weight: 99.7 kg (219 lb 12.8 oz)   PainSc:   4   PainLoc: Back         Physical exam:  Gen: A and O x3, pleasant, well-groomed  Skin: No rashes or obvious lesions  HEENT: PERRLA, no obvious deformities on ears or in canals. Trachea midline.  CVS: Regular rate and rhythm, normal palpable pulses.  Resp:No increased work of breathing, symmetrical chest rise.  Abdomen: Soft, NT/ND.  Musculoskeletal: No antalgic gait.     Neuro:  Lower extremities: 5/5 strength bilaterally  Reflexes: Patellar 2+, Achilles 2+ bilaterally.  Sensory:  Intact and symmetrical to light touch and pinprick in L2-S1 dermatomes bilaterally.    Lumbar spine:  Lumbar spine: Range of motion is mildly reduced with flexion and extension without pain.  Brian's test causes no increased pain on either side.    Supine straight leg raise is negative bilaterally.    Internal and external rotation of the hip causes no increased pain on either side.  Myofascial exam: No tenderness to palpation across lumbar paraspinous muscles.    Imagin17 MRI L-spine  T11-12: There is minimal bulging of the anulus.  There is mild facet joint arthropathy with ligamentum flavum thickening, right  greater than left.  There is no spinal canal or foraminal stenosis.  T12-L1: There is trace anterolisthesis of T12 on L1.  There is a diffuse disc bulge with moderate facet joint arthropathy.  There is no spinal canal or significant foraminal stenosis.  L1-2: There is marked disc space narrowing and mild retrolisthesis of L1 on L2.  There is inferior unroofing of a diffuse disc bulge with osteophytic ridging, eccentric to the left with broad left paracentral and foraminal disc protrusion.  There is mild facet joint arthropathy.  There is no significant spinal stenosis.  There is moderate left lateral recess stenosis and foraminal stenosis.  There is mild to moderate right foraminal stenosis.  L2-3: There is mild retrolisthesis, diffuse disc bulge, moderate facet joint arthropathy.  There is no spinal stenosis.  There is mild to moderate bilateral, right greater than left, foraminal stenosis.  L3-4: There is moderate to marked facet joint arthropathy with ligamentum flavum thickening.  There is a diffuse disc bulge with osteophytic ridging.  There is mild prominent dorsal epidural fat.  There is only borderline spinal stenosis.  There is moderate bilateral, right greater than left, foraminal stenosis.  L4-5:There is trace anterolisthesis, marked facet joint arthropathy with ligamentum flavum thickening, unroofing of a diffuse disc bulge with small superimposed central disc protrusion.  There is no significant spinal stenosis.  There is moderate marked right and moderate left foraminal stenosis.  L5-S1: There is trace anterolisthesis.  There is marked facet joint arthropathy with ligamentum flavum thickening and bilateral facet joint effusions.  More importantly, there is a large 12 x 10 mm left synovial cyst which results and severe flattening and deformity of the left side of the dural sac and severe left lateral recess stenosis, compressing the left S1 root.  There is unroofing of a diffuse disc bulge.  There is  moderate bilateral foraminal stenosis.  All of the discs are desiccated.  There is no acute fracture.  There is trace anterolisthesis of L4 on L5 and L5 on S1.  There is a 3 mm retrolisthesis of L1 on L2 and 2 mm retrolisthesis of L2 on L3.    Assessment:  The patient is a 72-year-old woman with obesity, arthritis, osteoporosis, GERD who presents in referral from Dr. Holley for back and left leg pain.   1. Peripheral polyneuropathy     2. Chronic pain disorder     3. Left lumbar radiculopathy     4. Lumbar spondylosis           Plan:  1.  The patient had complete relief on the left L5 and S1 TF AKUA.  This can be repeated in the future if necessary.  2.  We had a lengthy discussion about her peripheral neuropathy and also discussed spinal cord stimulation.  She would like to consider this and find out more about it.  Due to her lumbar spine problems and the possible need for future lumbar spine MRIs, I have given her information from [employer] because their montage system is whole body MRI capable.  If she decides to proceed with a trial, I will order a back brace with lateral support.    Greater than 50% of this 25 minute visit was spent on counseling the patient.

## 2018-03-22 ENCOUNTER — TELEPHONE (OUTPATIENT)
Dept: PAIN MEDICINE | Facility: CLINIC | Age: 73
End: 2018-03-22

## 2018-03-22 NOTE — TELEPHONE ENCOUNTER
Spoke with the patient and she will schedule her procedure when she comes in on 3/29 for her follow up. Back brace request has been sent as well as the surgical psych evaluation information.

## 2018-03-22 NOTE — TELEPHONE ENCOUNTER
Spoke with Isaac from [employer].  The patient is ready to schedule a spinal cord stimulator trial for peripheral neuropathy.  We will use their whole-body MRI compatible Montage system.  Please schedule the patient.  I ordered a back brace.

## 2018-03-29 ENCOUNTER — OFFICE VISIT (OUTPATIENT)
Dept: PAIN MEDICINE | Facility: CLINIC | Age: 73
End: 2018-03-29
Payer: MEDICARE

## 2018-03-29 VITALS
WEIGHT: 217.06 LBS | RESPIRATION RATE: 20 BRPM | OXYGEN SATURATION: 93 % | BODY MASS INDEX: 43.84 KG/M2 | TEMPERATURE: 98 F | DIASTOLIC BLOOD PRESSURE: 61 MMHG | HEART RATE: 72 BPM | SYSTOLIC BLOOD PRESSURE: 131 MMHG

## 2018-03-29 DIAGNOSIS — G62.9 PERIPHERAL POLYNEUROPATHY: ICD-10-CM

## 2018-03-29 DIAGNOSIS — G89.4 CHRONIC PAIN DISORDER: Primary | ICD-10-CM

## 2018-03-29 PROCEDURE — 99213 OFFICE O/P EST LOW 20 MIN: CPT | Mod: S$GLB,,, | Performed by: PHYSICIAN ASSISTANT

## 2018-03-29 PROCEDURE — 3078F DIAST BP <80 MM HG: CPT | Mod: CPTII,S$GLB,, | Performed by: PHYSICIAN ASSISTANT

## 2018-03-29 PROCEDURE — 99999 PR PBB SHADOW E&M-EST. PATIENT-LVL V: CPT | Mod: PBBFAC,,, | Performed by: PHYSICIAN ASSISTANT

## 2018-03-29 PROCEDURE — 3075F SYST BP GE 130 - 139MM HG: CPT | Mod: CPTII,S$GLB,, | Performed by: PHYSICIAN ASSISTANT

## 2018-03-29 PROCEDURE — 99499 UNLISTED E&M SERVICE: CPT | Mod: S$GLB,,, | Performed by: PHYSICIAN ASSISTANT

## 2018-03-29 NOTE — PROGRESS NOTES
This note was completed with dictation software and grammatical errors may exist.    CC: Bilateral foot pain    HPI: The patient is a 72-year-old woman with obesity, arthritis, osteoporosis, GERD who presents in referral from Dr. Holley for back and left leg pain.  She returns in follow-up today to discuss spinal cord stimulation.  She has continued relief of her left leg pain following the epidural steroid injections almost 2 months ago.  Her main complaint is severe bilateral foot pain due to neuropathy.  She reports numbness, tingling and burning, states that it is continuing to get worse.  She would like to schedule a spinal cord similar trial.  She denies weakness, bladder or bowel incontinence.    Pain intervention history:She is status post left L5 and S1 transforaminal epidural steroid injection on 2/5/18 with 100% relief of her radicular left leg pain.     ROS: She reports itching, headaches, joint stiffness, joint swelling and back pain.  Balance of review of systems is negative.    Past Medical History:   Diagnosis Date    Anxiety     Arthralgia of knee     arthralgia of bilateral knees secondary to djd    Arthritis     Back pain     Depression     Encounter for blood transfusion     AUTOLOGUS    GERD (gastroesophageal reflux disease)     Hiatal hernia     repaired in 1979    Hypertension     Obesity     JESENIA on CPAP     not using cpap currently    Osteoporosis     Pneumonia     Reactive airway disease     Restless legs syndrome     Rheumatic fever     at 6 y/o    Trouble in sleeping     Wears glasses        Past Surgical History:   Procedure Laterality Date    APPENDECTOMY      BARIATRIC SURGERY  2014    Gastric Sleeve    CHOLECYSTECTOMY      GASTRIC SLEEVE      HERNIA REPAIR      hiatal hernia    HIATAL HERNIA REPAIR  1/1/1979    HYSTERECTOMY      partial    JOINT REPLACEMENT      BILAT KNEE    KNEE ARTHROPLASTY  1/2/2010    bilateral    KNEE ARTHROSCOPY      right    NISSEN  FUNDOPLICATION         Social History     Social History    Marital status:      Spouse name: N/A    Number of children: N/A    Years of education: N/A     Social History Main Topics    Smoking status: Passive Smoke Exposure - Never Smoker    Smokeless tobacco: Never Used    Alcohol use Yes      Comment: 1-2 glasses of wine monthly    Drug use: No    Sexual activity: Not Asked     Other Topics Concern    None     Social History Narrative    None         Medications/Allergies: See med card    Vitals:    18 0827   BP: 131/61   Pulse: 72   Resp: 20   Temp: 97.6 °F (36.4 °C)   TempSrc: Oral   SpO2: (!) 93%   Weight: 98.5 kg (217 lb 0.7 oz)   PainSc:   2   PainLoc: Back         Physical exam:  Gen: A and O x3, pleasant, well-groomed  Skin: No rashes or obvious lesions  HEENT: PERRLA, no obvious deformities on ears or in canals. Trachea midline.  CVS: Regular rate and rhythm, normal palpable pulses.  Resp:No increased work of breathing, symmetrical chest rise.  Abdomen: Soft, NT/ND.  Musculoskeletal: No antalgic gait.     Neuro:  Lower extremities: 5/5 strength bilaterally  Reflexes: Patellar 2+, Achilles 2+ bilaterally.  Sensory:  Intact and symmetrical to light touch and pinprick in L2-S1 dermatomes bilaterally.    Lumbar spine:  Lumbar spine: Range of motion is mildly reduced with flexion and extension without pain.  Brian's test causes no increased pain on either side.    Supine straight leg raise is negative bilaterally.    Internal and external rotation of the hip causes no increased pain on either side.  Myofascial exam: No tenderness to palpation across lumbar paraspinous muscles.    Imagin17 MRI L-spine  T11-12: There is minimal bulging of the anulus.  There is mild facet joint arthropathy with ligamentum flavum thickening, right greater than left.  There is no spinal canal or foraminal stenosis.  T12-L1: There is trace anterolisthesis of T12 on L1.  There is a diffuse disc bulge  with moderate facet joint arthropathy.  There is no spinal canal or significant foraminal stenosis.  L1-2: There is marked disc space narrowing and mild retrolisthesis of L1 on L2.  There is inferior unroofing of a diffuse disc bulge with osteophytic ridging, eccentric to the left with broad left paracentral and foraminal disc protrusion.  There is mild facet joint arthropathy.  There is no significant spinal stenosis.  There is moderate left lateral recess stenosis and foraminal stenosis.  There is mild to moderate right foraminal stenosis.  L2-3: There is mild retrolisthesis, diffuse disc bulge, moderate facet joint arthropathy.  There is no spinal stenosis.  There is mild to moderate bilateral, right greater than left, foraminal stenosis.  L3-4: There is moderate to marked facet joint arthropathy with ligamentum flavum thickening.  There is a diffuse disc bulge with osteophytic ridging.  There is mild prominent dorsal epidural fat.  There is only borderline spinal stenosis.  There is moderate bilateral, right greater than left, foraminal stenosis.  L4-5:There is trace anterolisthesis, marked facet joint arthropathy with ligamentum flavum thickening, unroofing of a diffuse disc bulge with small superimposed central disc protrusion.  There is no significant spinal stenosis.  There is moderate marked right and moderate left foraminal stenosis.  L5-S1: There is trace anterolisthesis.  There is marked facet joint arthropathy with ligamentum flavum thickening and bilateral facet joint effusions.  More importantly, there is a large 12 x 10 mm left synovial cyst which results and severe flattening and deformity of the left side of the dural sac and severe left lateral recess stenosis, compressing the left S1 root.  There is unroofing of a diffuse disc bulge.  There is moderate bilateral foraminal stenosis.  All of the discs are desiccated.  There is no acute fracture.  There is trace anterolisthesis of L4 on L5 and L5 on  S1.  There is a 3 mm retrolisthesis of L1 on L2 and 2 mm retrolisthesis of L2 on L3.    Assessment:  The patient is a 72-year-old woman with obesity, arthritis, osteoporosis, GERD who presents in referral from Dr. Holley for back and left leg pain.   1. Chronic pain disorder     2. Peripheral polyneuropathy           Plan:  1.  I will schedule the patient for a spinal cord stimulator trial with [employer] for her bilateral foot pain.  We will use their montage system that is full body MRI compatible.  Due to her history of lumbar spine problems, we may need to get an MRI in the future if necessary.  Currently she is not having any radicular pain.  I discussed the risks involved and ordered a back brace and lateral support.  2.  Follow-up 5 days postop or sooner as needed.

## 2018-04-03 DIAGNOSIS — M79.2 PERIPHERAL NEURALGIA: ICD-10-CM

## 2018-04-03 DIAGNOSIS — G89.4 CHRONIC PAIN ASSOCIATED WITH SIGNIFICANT PSYCHOSOCIAL DYSFUNCTION: Primary | ICD-10-CM

## 2018-04-03 RX ORDER — SODIUM CHLORIDE, SODIUM LACTATE, POTASSIUM CHLORIDE, CALCIUM CHLORIDE 600; 310; 30; 20 MG/100ML; MG/100ML; MG/100ML; MG/100ML
INJECTION, SOLUTION INTRAVENOUS CONTINUOUS
Status: CANCELLED | OUTPATIENT
Start: 2018-05-25

## 2018-04-09 ENCOUNTER — OFFICE VISIT (OUTPATIENT)
Dept: FAMILY MEDICINE | Facility: CLINIC | Age: 73
End: 2018-04-09
Payer: MEDICARE

## 2018-04-09 VITALS
RESPIRATION RATE: 20 BRPM | WEIGHT: 218.06 LBS | SYSTOLIC BLOOD PRESSURE: 122 MMHG | BODY MASS INDEX: 43.96 KG/M2 | DIASTOLIC BLOOD PRESSURE: 82 MMHG | OXYGEN SATURATION: 96 % | HEART RATE: 76 BPM | HEIGHT: 59 IN | TEMPERATURE: 98 F

## 2018-04-09 DIAGNOSIS — F41.9 ANXIETY: ICD-10-CM

## 2018-04-09 DIAGNOSIS — I77.1 TORTUOUS AORTA: ICD-10-CM

## 2018-04-09 DIAGNOSIS — G62.9 NEUROPATHY: Primary | ICD-10-CM

## 2018-04-09 PROCEDURE — 99214 OFFICE O/P EST MOD 30 MIN: CPT | Mod: S$GLB,,, | Performed by: FAMILY MEDICINE

## 2018-04-09 PROCEDURE — 3079F DIAST BP 80-89 MM HG: CPT | Mod: CPTII,S$GLB,, | Performed by: FAMILY MEDICINE

## 2018-04-09 PROCEDURE — 3074F SYST BP LT 130 MM HG: CPT | Mod: CPTII,S$GLB,, | Performed by: FAMILY MEDICINE

## 2018-04-09 PROCEDURE — 99999 PR PBB SHADOW E&M-EST. PATIENT-LVL III: CPT | Mod: PBBFAC,,, | Performed by: FAMILY MEDICINE

## 2018-04-09 PROCEDURE — 99499 UNLISTED E&M SERVICE: CPT | Mod: S$GLB,,, | Performed by: FAMILY MEDICINE

## 2018-04-09 RX ORDER — SERTRALINE HYDROCHLORIDE 50 MG/1
50 TABLET, FILM COATED ORAL NIGHTLY
Qty: 90 TABLET | Refills: 1 | Status: SHIPPED | OUTPATIENT
Start: 2018-04-09 | End: 2018-10-30 | Stop reason: SDUPTHER

## 2018-04-09 RX ORDER — LORATADINE 10 MG/1
10 TABLET ORAL DAILY PRN
COMMUNITY
End: 2020-04-08

## 2018-04-09 NOTE — PROGRESS NOTES
Subjective:       Patient ID: Marta Huff is a 72 y.o. female.    Chief Complaint: Follow-up (asthma) and Follow-up (medication follow up sertraline)    HPI  Patient in the office for f/u.  Past Medical History:   Diagnosis Date    Anxiety     Arthralgia of knee     arthralgia of bilateral knees secondary to djd    Arthritis     Back pain     Depression     Encounter for blood transfusion     AUTOLOGUS    GERD (gastroesophageal reflux disease)     Hiatal hernia     repaired in 1979    Hypertension     Obesity     JESENIA on CPAP     not using cpap currently    Osteoporosis     Pneumonia     Reactive airway disease     Restless legs syndrome     Rheumatic fever     at 6 y/o    Trouble in sleeping     Wears glasses      At LOV, zoloft was started to replace celexa for irritatibility. No neg se reported. Family reports that she's less snappy. She has also noted that she is less frustrated when things don't go just so. She does not report any specific sx that are still looking for improvement.    Was able to enjoy a visit with family.   Sleep is ok, preserved.    Asthma sx are doing ok. Reports a slight sx flare recently, mild cough lingering, but has otherwise resolved. Some dyspnea still noted with activities. She has increased physical activity on the bike and elliptical.     Review of Systems   Constitutional: Negative for chills and fever.   HENT: Negative for congestion, postnasal drip, rhinorrhea, sinus pain, sinus pressure and sore throat.    Respiratory: Positive for cough and wheezing. Negative for shortness of breath.    Cardiovascular: Negative for chest pain and palpitations.   Gastrointestinal: Negative for diarrhea and nausea.   Neurological: Positive for headaches. Negative for dizziness and light-headedness.   Psychiatric/Behavioral: Positive for dysphoric mood and sleep disturbance. The patient is nervous/anxious.        Objective:      Physical Exam   Constitutional: She is  oriented to person, place, and time. She appears well-developed and well-nourished. No distress.   HENT:   Head: Normocephalic and atraumatic.   Eyes: Conjunctivae are normal. Right eye exhibits no discharge. Left eye exhibits no discharge. No scleral icterus.   Neck: Normal range of motion. Neck supple.   Cardiovascular: Normal rate and regular rhythm.    Pulmonary/Chest: Effort normal and breath sounds normal. No respiratory distress.   Abdominal: Soft. She exhibits no distension.   Musculoskeletal: Normal range of motion. She exhibits no edema.   Neurological: She is alert and oriented to person, place, and time.   Skin: Skin is warm and dry. No rash noted.   Psychiatric: She has a normal mood and affect. Her behavior is normal.   Nursing note and vitals reviewed.        Neuropathy  -     Ambulatory referral to Psychology  Per pain management.  Tortuous aorta  Noted prev on imaging.   Stress eval neg 2013.  Anxiety  Improved, cont current regimen.  -     sertraline (ZOLOFT) 50 MG tablet; Take 1 tablet (50 mg total) by mouth every evening.  Dispense: 90 tablet; Refill: 1

## 2018-04-15 DIAGNOSIS — I10 ESSENTIAL HYPERTENSION: ICD-10-CM

## 2018-04-15 DIAGNOSIS — G47.33 OSA ON CPAP: ICD-10-CM

## 2018-04-15 DIAGNOSIS — G25.81 RESTLESS LEGS SYNDROME: ICD-10-CM

## 2018-04-16 ENCOUNTER — PATIENT MESSAGE (OUTPATIENT)
Dept: FAMILY MEDICINE | Facility: CLINIC | Age: 73
End: 2018-04-16

## 2018-04-16 RX ORDER — FUROSEMIDE 40 MG/1
TABLET ORAL
Qty: 90 TABLET | Refills: 1 | Status: SHIPPED | OUTPATIENT
Start: 2018-04-16 | End: 2018-10-30 | Stop reason: SDUPTHER

## 2018-04-16 RX ORDER — BENAZEPRIL HYDROCHLORIDE 40 MG/1
TABLET ORAL
Qty: 180 TABLET | Refills: 1 | Status: SHIPPED | OUTPATIENT
Start: 2018-04-16 | End: 2019-01-08 | Stop reason: SDUPTHER

## 2018-04-16 RX ORDER — PRAMIPEXOLE DIHYDROCHLORIDE 1.5 MG/1
TABLET ORAL
Qty: 90 TABLET | Refills: 1 | Status: SHIPPED | OUTPATIENT
Start: 2018-04-16 | End: 2019-01-07 | Stop reason: SDUPTHER

## 2018-04-16 RX ORDER — ZOLPIDEM TARTRATE 5 MG/1
TABLET ORAL
Qty: 30 TABLET | Refills: 0 | Status: SHIPPED | OUTPATIENT
Start: 2018-04-16 | End: 2019-03-28

## 2018-04-18 ENCOUNTER — PATIENT MESSAGE (OUTPATIENT)
Dept: FAMILY MEDICINE | Facility: CLINIC | Age: 73
End: 2018-04-18

## 2018-04-18 RX ORDER — DIAZEPAM 2 MG/1
TABLET ORAL
Qty: 20 TABLET | Refills: 0 | Status: SHIPPED | OUTPATIENT
Start: 2018-04-18 | End: 2018-10-30 | Stop reason: CLARIF

## 2018-04-18 RX ORDER — OMEPRAZOLE 40 MG/1
CAPSULE, DELAYED RELEASE ORAL
Qty: 90 CAPSULE | Refills: 2 | Status: SHIPPED | OUTPATIENT
Start: 2018-04-18 | End: 2018-04-18 | Stop reason: SDUPTHER

## 2018-04-18 RX ORDER — OMEPRAZOLE 40 MG/1
40 CAPSULE, DELAYED RELEASE ORAL DAILY
Qty: 90 CAPSULE | Refills: 2 | Status: SHIPPED | OUTPATIENT
Start: 2018-04-18 | End: 2018-10-30 | Stop reason: CLARIF

## 2018-05-01 ENCOUNTER — PATIENT MESSAGE (OUTPATIENT)
Dept: FAMILY MEDICINE | Facility: CLINIC | Age: 73
End: 2018-05-01

## 2018-05-04 ENCOUNTER — PATIENT MESSAGE (OUTPATIENT)
Dept: SURGERY | Facility: HOSPITAL | Age: 73
End: 2018-05-04

## 2018-05-16 ENCOUNTER — CLINICAL SUPPORT (OUTPATIENT)
Dept: PAIN MEDICINE | Facility: CLINIC | Age: 73
End: 2018-05-16
Payer: MEDICARE

## 2018-05-16 DIAGNOSIS — Z11.9 SCREENING EXAMINATION FOR INFECTIOUS DISEASE: Primary | ICD-10-CM

## 2018-05-16 PROCEDURE — 87081 CULTURE SCREEN ONLY: CPT

## 2018-05-19 LAB — MRSA SPEC QL CULT: NORMAL

## 2018-05-21 ENCOUNTER — DOCUMENTATION ONLY (OUTPATIENT)
Dept: PAIN MEDICINE | Facility: CLINIC | Age: 73
End: 2018-05-21

## 2018-05-21 ENCOUNTER — TELEPHONE (OUTPATIENT)
Dept: PAIN MEDICINE | Facility: CLINIC | Age: 73
End: 2018-05-21

## 2018-05-21 DIAGNOSIS — Z22.322 MRSA (METHICILLIN RESISTANT STAPH AUREUS) CULTURE POSITIVE: ICD-10-CM

## 2018-05-21 DIAGNOSIS — G89.4 CHRONIC PAIN SYNDROME: Primary | ICD-10-CM

## 2018-05-21 DIAGNOSIS — G89.4 CHRONIC PAIN ASSOCIATED WITH SIGNIFICANT PSYCHOSOCIAL DYSFUNCTION: ICD-10-CM

## 2018-05-21 RX ORDER — MUPIROCIN 20 MG/G
OINTMENT TOPICAL 2 TIMES DAILY
Qty: 30 G | Refills: 0 | Status: ON HOLD | OUTPATIENT
Start: 2018-05-21 | End: 2018-07-11 | Stop reason: CLARIF

## 2018-05-21 NOTE — TELEPHONE ENCOUNTER
Please let the patient know that her MRSA nasal screen came back positive and we will give her appropriate antibiotics during the trial and I have sent a prescription for Bactroban to her pharmacy for her to apply to the bilateral nares twice a day.

## 2018-05-23 DIAGNOSIS — M51.36 DDD (DEGENERATIVE DISC DISEASE), LUMBAR: Primary | ICD-10-CM

## 2018-05-24 ENCOUNTER — ANESTHESIA EVENT (OUTPATIENT)
Dept: SURGERY | Facility: HOSPITAL | Age: 73
End: 2018-05-24
Payer: MEDICARE

## 2018-05-25 ENCOUNTER — HOSPITAL ENCOUNTER (OUTPATIENT)
Dept: RADIOLOGY | Facility: HOSPITAL | Age: 73
Discharge: HOME | End: 2018-05-25
Attending: ANESTHESIOLOGY | Admitting: ANESTHESIOLOGY
Payer: MEDICARE

## 2018-05-25 ENCOUNTER — SURGERY (OUTPATIENT)
Age: 73
End: 2018-05-25

## 2018-05-25 ENCOUNTER — ANESTHESIA (OUTPATIENT)
Dept: SURGERY | Facility: HOSPITAL | Age: 73
End: 2018-05-25
Payer: MEDICARE

## 2018-05-25 ENCOUNTER — HOSPITAL ENCOUNTER (OUTPATIENT)
Facility: HOSPITAL | Age: 73
Discharge: HOME | End: 2018-05-25
Attending: ANESTHESIOLOGY | Admitting: ANESTHESIOLOGY
Payer: MEDICARE

## 2018-05-25 DIAGNOSIS — M54.16 LEFT LUMBAR RADICULOPATHY: ICD-10-CM

## 2018-05-25 DIAGNOSIS — G89.4 CHRONIC PAIN SYNDROME: ICD-10-CM

## 2018-05-25 DIAGNOSIS — M79.2 PERIPHERAL NEURALGIA: ICD-10-CM

## 2018-05-25 DIAGNOSIS — Z22.322 MRSA (METHICILLIN RESISTANT STAPH AUREUS) CULTURE POSITIVE: ICD-10-CM

## 2018-05-25 DIAGNOSIS — M51.36 DDD (DEGENERATIVE DISC DISEASE), LUMBAR: ICD-10-CM

## 2018-05-25 DIAGNOSIS — G89.4 CHRONIC PAIN ASSOCIATED WITH SIGNIFICANT PSYCHOSOCIAL DYSFUNCTION: Primary | ICD-10-CM

## 2018-05-25 PROCEDURE — 63650 IMPLANT NEUROELECTRODES: CPT | Mod: ,,, | Performed by: ANESTHESIOLOGY

## 2018-05-25 PROCEDURE — 76000 FLUOROSCOPY <1 HR PHYS/QHP: CPT | Mod: TC,PO

## 2018-05-25 PROCEDURE — 95971 ALYS SMPL SP/PN NPGT W/PRGRM: CPT | Mod: ,,, | Performed by: ANESTHESIOLOGY

## 2018-05-25 PROCEDURE — C1778 LEAD, NEUROSTIMULATOR: HCPCS | Mod: PO | Performed by: ANESTHESIOLOGY

## 2018-05-25 PROCEDURE — 63600175 PHARM REV CODE 636 W HCPCS: Mod: PO | Performed by: NURSE ANESTHETIST, CERTIFIED REGISTERED

## 2018-05-25 PROCEDURE — D9220A PRA ANESTHESIA: Mod: CRNA,,, | Performed by: NURSE ANESTHETIST, CERTIFIED REGISTERED

## 2018-05-25 PROCEDURE — 71000033 HC RECOVERY, INTIAL HOUR: Mod: PO | Performed by: ANESTHESIOLOGY

## 2018-05-25 PROCEDURE — 36000706: Mod: PO | Performed by: ANESTHESIOLOGY

## 2018-05-25 PROCEDURE — C1897 LEAD, NEUROSTIM TEST KIT: HCPCS | Mod: PO | Performed by: ANESTHESIOLOGY

## 2018-05-25 PROCEDURE — 25000003 PHARM REV CODE 250: Mod: PO | Performed by: ANESTHESIOLOGY

## 2018-05-25 PROCEDURE — 36000707: Mod: PO | Performed by: ANESTHESIOLOGY

## 2018-05-25 PROCEDURE — 37000008 HC ANESTHESIA 1ST 15 MINUTES: Mod: PO | Performed by: ANESTHESIOLOGY

## 2018-05-25 PROCEDURE — D9220A PRA ANESTHESIA: Mod: ANES,,, | Performed by: ANESTHESIOLOGY

## 2018-05-25 PROCEDURE — 63600175 PHARM REV CODE 636 W HCPCS: Mod: PO | Performed by: ANESTHESIOLOGY

## 2018-05-25 PROCEDURE — 37000009 HC ANESTHESIA EA ADD 15 MINS: Mod: PO | Performed by: ANESTHESIOLOGY

## 2018-05-25 DEVICE — LEAD NEROSTIMLTR INFINION 50CM: Type: IMPLANTABLE DEVICE | Site: BACK | Status: FUNCTIONAL

## 2018-05-25 RX ORDER — FENTANYL CITRATE 50 UG/ML
INJECTION, SOLUTION INTRAMUSCULAR; INTRAVENOUS
Status: DISCONTINUED | OUTPATIENT
Start: 2018-05-25 | End: 2018-05-25

## 2018-05-25 RX ORDER — MIDAZOLAM HYDROCHLORIDE 1 MG/ML
INJECTION, SOLUTION INTRAMUSCULAR; INTRAVENOUS
Status: DISCONTINUED | OUTPATIENT
Start: 2018-05-25 | End: 2018-05-25

## 2018-05-25 RX ORDER — LIDOCAINE HYDROCHLORIDE 10 MG/ML
0.5 INJECTION, SOLUTION EPIDURAL; INFILTRATION; INTRACAUDAL; PERINEURAL ONCE AS NEEDED
Status: COMPLETED | OUTPATIENT
Start: 2018-05-25 | End: 2018-05-25

## 2018-05-25 RX ORDER — LIDOCAINE HYDROCHLORIDE 10 MG/ML
INJECTION, SOLUTION EPIDURAL; INFILTRATION; INTRACAUDAL; PERINEURAL
Status: DISCONTINUED | OUTPATIENT
Start: 2018-05-25 | End: 2018-05-25 | Stop reason: HOSPADM

## 2018-05-25 RX ORDER — CEFAZOLIN SODIUM 1 G/3ML
2 INJECTION, POWDER, FOR SOLUTION INTRAMUSCULAR; INTRAVENOUS
Status: DISCONTINUED | OUTPATIENT
Start: 2018-05-25 | End: 2018-05-25

## 2018-05-25 RX ORDER — PROPOFOL 10 MG/ML
VIAL (ML) INTRAVENOUS CONTINUOUS PRN
Status: DISCONTINUED | OUTPATIENT
Start: 2018-05-25 | End: 2018-05-25

## 2018-05-25 RX ORDER — SODIUM CHLORIDE, SODIUM LACTATE, POTASSIUM CHLORIDE, CALCIUM CHLORIDE 600; 310; 30; 20 MG/100ML; MG/100ML; MG/100ML; MG/100ML
INJECTION, SOLUTION INTRAVENOUS CONTINUOUS
Status: DISCONTINUED | OUTPATIENT
Start: 2018-05-25 | End: 2018-05-25

## 2018-05-25 RX ORDER — BACITRACIN ZINC 500 UNIT/G
OINTMENT (GRAM) TOPICAL
Status: DISCONTINUED | OUTPATIENT
Start: 2018-05-25 | End: 2018-05-25 | Stop reason: HOSPADM

## 2018-05-25 RX ORDER — SODIUM CHLORIDE, SODIUM LACTATE, POTASSIUM CHLORIDE, CALCIUM CHLORIDE 600; 310; 30; 20 MG/100ML; MG/100ML; MG/100ML; MG/100ML
INJECTION, SOLUTION INTRAVENOUS CONTINUOUS
Status: DISCONTINUED | OUTPATIENT
Start: 2018-05-25 | End: 2018-05-25 | Stop reason: HOSPADM

## 2018-05-25 RX ADMIN — FENTANYL CITRATE 25 MCG: 50 INJECTION, SOLUTION INTRAMUSCULAR; INTRAVENOUS at 08:05

## 2018-05-25 RX ADMIN — MIDAZOLAM HYDROCHLORIDE 0.5 MG: 1 INJECTION, SOLUTION INTRAMUSCULAR; INTRAVENOUS at 08:05

## 2018-05-25 RX ADMIN — SODIUM CHLORIDE, SODIUM LACTATE, POTASSIUM CHLORIDE, CALCIUM CHLORIDE: 600; 310; 30; 20 INJECTION, SOLUTION INTRAVENOUS at 07:05

## 2018-05-25 RX ADMIN — LIDOCAINE HYDROCHLORIDE 5 MG: 10 INJECTION, SOLUTION EPIDURAL; INFILTRATION; INTRACAUDAL; PERINEURAL at 07:05

## 2018-05-25 RX ADMIN — BACITRACIN ZINC 1 TUBE: 500 OINTMENT TOPICAL at 08:05

## 2018-05-25 RX ADMIN — VANCOMYCIN HYDROCHLORIDE 1000 MG: 1 INJECTION, POWDER, LYOPHILIZED, FOR SOLUTION INTRAVENOUS at 08:05

## 2018-05-25 RX ADMIN — PROPOFOL 50 MCG/KG/MIN: 10 INJECTION, EMULSION INTRAVENOUS at 08:05

## 2018-05-25 RX ADMIN — MIDAZOLAM HYDROCHLORIDE 1 MG: 1 INJECTION, SOLUTION INTRAMUSCULAR; INTRAVENOUS at 08:05

## 2018-05-25 RX ADMIN — LIDOCAINE HYDROCHLORIDE 15 ML: 10 INJECTION, SOLUTION EPIDURAL; INFILTRATION; INTRACAUDAL; PERINEURAL at 08:05

## 2018-05-25 NOTE — DISCHARGE SUMMARY
[facility]  Discharge Note  Short Stay    Admit Date: 5/25/2018    Discharge Date: 5/25/2018    Attending Physician: Raoul Belcher MD     Discharge Provider: Raoul Belcher    Diagnoses:  Active Hospital Problems    Diagnosis  POA    *Chronic pain associated with significant psychosocial dysfunction [G89.4]  Yes      Resolved Hospital Problems    Diagnosis Date Resolved POA   No resolved problems to display.       Discharged Condition: good    Final Diagnoses: Chronic pain associated with significant psychosocial dysfunction [G89.4]  Peripheral neuralgia [M79.2]    Disposition: Home or Self Care    Hospital Course: no complications, uneventful    Outcome of Hospitalization, Treatment, Procedure, or Surgery:  Patient was admitted for outpatient procedure. The patient underwent procedure without complications and are discharged home    Follow up/Patient Instructions:  Follow up as scheduled in Pain Management clinic in 3-4 weeks/Patient has received instructions and follow up date and time    Medications:  Continue previous medications      Discharge Procedure Orders  Call MD for:  temperature >100.4     Call MD for:  severe uncontrolled pain     Call MD for:  redness, tenderness, or signs of infection (pain, swelling, redness, odor or green/yellow discharge around incision site)     Call MD for:  severe persistent headache     No dressing needed           Discharge Procedure Orders (must include Diet, Follow-up, Activity):    Discharge Procedure Orders (must include Diet, Follow-up, Activity)  Call MD for:  temperature >100.4     Call MD for:  severe uncontrolled pain     Call MD for:  redness, tenderness, or signs of infection (pain, swelling, redness, odor or green/yellow discharge around incision site)     Call MD for:  severe persistent headache     No dressing needed

## 2018-05-25 NOTE — TRANSFER OF CARE
"Anesthesia Transfer of Care Note    Patient: Marta Huff    Procedure(s) Performed: Procedure(s) (LRB):  TRIAL-STIMULATOR-DORSAL COLUMN (N/A)    Patient location: PACU    Anesthesia Type: MAC    Transport from OR: Transported from OR on room air with adequate spontaneous ventilation    Post pain: adequate analgesia    Post assessment: no apparent anesthetic complications    Post vital signs: stable    Level of consciousness: awake    Nausea/Vomiting: no nausea/vomiting    Complications: none    Transfer of care protocol was followed      Last vitals:   Visit Vitals  /81 (BP Location: Right arm, Patient Position: Lying)   Pulse 64   Temp 36.5 °C (97.7 °F) (Skin)   Resp 17   Ht 4' 11" (1.499 m)   Wt 99.8 kg (220 lb)   SpO2 97%   Breastfeeding? No   BMI 44.43 kg/m²     "

## 2018-05-25 NOTE — ANESTHESIA POSTPROCEDURE EVALUATION
"Anesthesia Post Evaluation    Patient: Marta Huff    Procedure(s) Performed: Procedure(s) (LRB):  TRIAL-STIMULATOR-DORSAL COLUMN (N/A)    Final Anesthesia Type: MAC  Patient location during evaluation: PACU  Patient participation: Yes- Able to Participate  Level of consciousness: awake and alert and oriented  Post-procedure vital signs: reviewed and stable  Pain management: adequate  Airway patency: patent  PONV status at discharge: No PONV  Anesthetic complications: no      Cardiovascular status: blood pressure returned to baseline and stable  Respiratory status: unassisted and spontaneous ventilation  Hydration status: euvolemic  Follow-up not needed.        Visit Vitals  /71 (BP Location: Right arm, Patient Position: Sitting)   Pulse 66   Temp 36.4 °C (97.5 °F) (Skin)   Resp 12   Ht 4' 11" (1.499 m)   Wt 99.8 kg (220 lb)   SpO2 98%   Breastfeeding? No   BMI 44.43 kg/m²       Pain/Khari Score: Pain Assessment Performed: Yes (5/25/2018  9:36 AM)  Presence of Pain: denies (5/25/2018  9:36 AM)  Khari Score: 10 (5/25/2018  9:36 AM)      "

## 2018-05-25 NOTE — OP NOTE
PROCEDURE DATE: 5/25/2018    Procedure  1. Percutaneous insertion of spinal cord stimulator leads x 2. Total of 32 contacts.   2. Fluoroscopic needle guidance.   3. Intraoperative complex programming of Spinal Cord Stimulator, >30mins.     Pre-op Diagnosis: : Chronic pain syndrome, lumbar radiculitis, peripheral neuropathy    Post-op Diagnosis: Same    Surgeon: Raoul Belcher M.D.    Assistant: None    Anesthesia: MAC per Anesthesia Provider    Blood Loss: <10ml    Complications: none    PROCEDURE NOTE: After obtaining written informed consent patient was taken to the procedure room. Pre-procedure blood pressure and pulse were stable and recorded in patients clinic chart. Pre-op IV antibiotics were started by the Anesthesia provider.    The patient was taken to the operating room and placed in prone position. Routine monitors were applied and IV anesthesia was titrated to effect by the Anesthesia provider. The patient's back and buttocks were prepped and draped in sterile fashion using betadine and then DuraPrep solution and sterile drapes were applied. C-arm fluoroscopy was used to identify the L1/2 interspace. Through a 1% local lidocaine skin wheal, a small stab incision was made with a #11 blade scapel. Through this, a 14-gauge Tuohy needle was advanced until contact was made with the right-sided L2 lamina. It was then walked off in a cephalad medial direction using loss of resistance to technique entering into the epidural space. Once within the epidural space, an epidural spinal cord stimulator lead was advanced until the tip of the lead rested at the middle of the  T9 vertebral body. Following this, another percutaneous lead was placed with another Tuohy needle on the left side following the same procedure as described for the other side. Once all the leads were in place, stimulation testing was carried out by the device representative under my guidance. Once the leads were noted to be within proper position  with patient reporting stimulation in the painful areas, the needle was removed. The leads were secured to the patient's back using 0-0 silk. Skin was cleaned, bacitracin applied and a sterile dressing was applied.    Following the procedure the patient's vital signs were stable. The patient was taken to the recovery room in good condition with no known complication. The patient was allowed to fully awaken from anesthesia and final programming of the device was done by the device representative under my guidance. The patient was discharged home in good condition after being given discharge instructions.

## 2018-05-25 NOTE — H&P
CC: Back pain, leg pain    HPI: The patient is a 71yo woman with a history of chronic pain syndrome, lumbar radiculitis here for SCS trial. There are no major changes in history and physical from 3/29/18.    Past Medical History:   Diagnosis Date    Anxiety     Arthralgia of knee     arthralgia of bilateral knees secondary to djd    Arthritis     Back pain     Depression     Encounter for blood transfusion     AUTOLOGUS    GERD (gastroesophageal reflux disease)     Hiatal hernia     repaired in 1979    Hypertension     Obesity     JESENIA on CPAP     not using cpap currently    Osteoporosis     Pneumonia     Reactive airway disease     Restless legs syndrome     Rheumatic fever     at 6 y/o    Trouble in sleeping     Wears glasses        Past Surgical History:   Procedure Laterality Date    APPENDECTOMY      BARIATRIC SURGERY  2014    Gastric Sleeve    CHOLECYSTECTOMY      GASTRIC SLEEVE      HERNIA REPAIR      hiatal hernia    HIATAL HERNIA REPAIR  1/1/1979    HYSTERECTOMY      partial    JOINT REPLACEMENT      BILAT KNEE    KNEE ARTHROPLASTY  1/2/2010    bilateral    KNEE ARTHROSCOPY      right    NISSEN FUNDOPLICATION         Family History   Problem Relation Age of Onset    Heart disease Mother     Hypertension Mother     Diabetes Mother     Obesity Mother     Heart disease Maternal Grandfather        Social History     Social History    Marital status:      Spouse name: N/A    Number of children: N/A    Years of education: N/A     Social History Main Topics    Smoking status: Passive Smoke Exposure - Never Smoker    Smokeless tobacco: Never Used    Alcohol use Yes      Comment: 1-2 glasses of wine monthly    Drug use: No    Sexual activity: Not Asked     Other Topics Concern    None     Social History Narrative    None       Current Facility-Administered Medications   Medication Dose Route Frequency Provider Last Rate Last Dose    ceFAZolin injection 2 g  2 g  "Intravenous On Call Procedure Raoul Belcher MD        lactated ringers infusion   Intravenous Continuous Raoul Belcher MD 25 mL/hr at 05/25/18 0746      lidocaine (PF) 10 mg/ml (1%) injection 5 mg  0.5 mL Intradermal Once PRN Kassy Man MD        vancomycin (VANCOCIN) 1,000 mg in sodium chloride 0.9% 250 mL IVPB  1,000 mg Intravenous On Call Procedure Raoul Belcher MD           Review of patient's allergies indicates:   Allergen Reactions    Etodolac (bulk) Swelling    Sulfa (sulfonamide antibiotics)      Headache and rash       Vitals:    05/25/18 0733 05/25/18 0740   BP:  127/81   Pulse:  64   Resp:  17   Temp:  97.7 °F (36.5 °C)   TempSrc:  Skin   SpO2:  97%   Weight: 99.8 kg (220 lb)    Height: 4' 11" (1.499 m)        REVIEW OF SYSTEMS:     GENERAL: No weight loss, malaise or fevers.  HEENT:  No recent changes in vision or hearing  NECK: Negative for lumps, no difficulty with swallowing.  RESPIRATORY: Negative for cough, wheezing or shortness of breath, patient denies any recent URI.  CARDIOVASCULAR: Negative for chest pain, leg swelling or palpitations.  GI: Negative for abdominal discomfort, blood in stools or black stools or change in bowel habits.  MUSCULOSKELETAL: See HPI.  SKIN: Negative for lesions, rash, and itching.  PSYCH: No suicidal or homicidal ideations, no current mood disturbances.  HEMATOLOGY/LYMPHOLOGY: Negative for prolonged bleeding, bruising easily or swollen nodes. Patient is not currently taking any anti-coagulants  ENDO: No history of diabetes or thyroid dysfunction  NEURO: No history of syncope, paralysis, seizures or tremors.All other reviewed and negative other than HPI.    Physical exam:  Gen: A and O x3, pleasant, well-groomed  Skin: No rashes or obvious lesions  HEENT: PERRLA, no obvious deformities on ears or in canals. No thyroid masses, trachea midline, no palpable lymph nodes in neck, axilla.  CVS: Regular rate and rhythm, normal S1 and S2, no " murmurs.  Resp: Clear to auscultation bilaterally.  Abdomen: Soft, NT/ND, normal bowel sounds present.  Musculoskeletal/Neuro: Moving all extremities    Assessment:  Chronic pain syndrome  -     Activity as tolerated; Standing  -     Cancel: Place in Outpatient; Standing  -     Cancel: Diet NPO; Standing  -     vancomycin (VANCOCIN) 1,000 mg in sodium chloride 0.9% 250 mL IVPB; Inject 1,000 mg into the vein On call Procedure (surgery).  -     Cancel: Notify physician ; Standing  -     Cancel: Notify physician ; Standing  -     Cancel: Notify physician (specify); Standing  -     Place 18-22 gauage peripheral IV ; Standing  -     Cancel: Verify informed consent; Standing  -     Cancel: Vital signs; Standing    MRSA (methicillin resistant staph aureus) culture positive  -     Activity as tolerated; Standing  -     Cancel: Place in Outpatient; Standing  -     Cancel: Diet NPO; Standing  -     vancomycin (VANCOCIN) 1,000 mg in sodium chloride 0.9% 250 mL IVPB; Inject 1,000 mg into the vein On call Procedure (surgery).  -     Cancel: Notify physician ; Standing  -     Cancel: Notify physician ; Standing  -     Cancel: Notify physician (specify); Standing  -     Place 18-22 gauage peripheral IV ; Standing  -     Cancel: Verify informed consent; Standing  -     Cancel: Vital signs; Standing    Chronic pain associated with significant psychosocial dysfunction  -     Case Request Operating Room: TRIAL-STIMULATOR-DORSAL COLUMN  -     Activity as tolerated; Standing  -     Place in Outpatient; Standing  -     Diet NPO; Standing  -     lactated ringers infusion; Inject into the vein continuous.  -     ceFAZolin injection 2 g; Inject 2,000 mg (2 g total) into the vein On call Procedure (Surgery).  -     Notify physician ; Standing  -     Notify physician ; Standing  -     Notify physician (specify); Standing  -     Cancel: Place 18-22 gauage peripheral IV ; Standing  -     Verify informed consent; Standing  -     Vital signs;  Standing    Peripheral neuralgia  -     Case Request Operating Room: TRIAL-STIMULATOR-DORSAL COLUMN  -     Activity as tolerated; Standing  -     Place in Outpatient; Standing  -     Diet NPO; Standing  -     lactated ringers infusion; Inject into the vein continuous.  -     ceFAZolin injection 2 g; Inject 2,000 mg (2 g total) into the vein On call Procedure (Surgery).  -     Notify physician ; Standing  -     Notify physician ; Standing  -     Notify physician (specify); Standing  -     Cancel: Place 18-22 gauage peripheral IV ; Standing  -     Verify informed consent; Standing  -     Vital signs; Standing    Other orders  -     Cancel: Vital signs; Standing  -     Insert peripheral IV; Standing  -     Use 1% lidocaine at IV site; Standing  -     Nursing to confirm consent for anesthesia services; Standing  -     Discontinue: lactated ringers infusion; Inject into the vein continuous.  -     lidocaine (PF) 10 mg/ml (1%) injection 5 mg; Inject 0.5 mLs (5 mg total) into the skin once as needed (prn IV start).  -     Pulse Oximetry Once; Standing

## 2018-05-25 NOTE — DISCHARGE INSTRUCTIONS
SPINAL CORD STIMULATOR TRIAL    Please follow all of the instructions that were given to you and demonstrated by your [employer] Representative about the use and care of your remote and equipment.    A member from the [employer] team will call you each afternoon to remind you of restrictions and to offer any support you may need on the care and operation of your spinal cord stimulator device.     After your procedure:    -Sponge baths only.  -Keep your bandage and all of the external components clean and dry.  -Please limit any necessary bending, lifting or twisting to slow and careful movements.  -Avoid overhead work. Keep your elbows below your shoulders.  -Avoid motions that cause pulling on your dressing or wires.  -Turn off your stimulator when driving. (Do not drive for the first 24 hours after your procedure)  -Wear your brace when you are walking or active. You do not have to wear the brace while you are in bed.    -You may resume your home medications as prescribed.  -You may resume your normal diet as tolerated.  -Follow up as scheduled with Dr. Belcher.       For programming or any questions you may have about your spinal cord stimulator, please call your [employer] Representative:    ___ Pablo [phone]  ___ Alfred [phone]  ___ Anthony [phone]  ___ Bowen [phone]  ___ Phi [phone]  ___ Christopher [phone]  ___ Hieu [phone]

## 2018-05-25 NOTE — ANESTHESIA PREPROCEDURE EVALUATION
05/25/2018  Marta Huff is a 72 y.o., female.    Anesthesia Evaluation    I have reviewed the Patient Summary Reports.    I have reviewed the Nursing Notes.   I have reviewed the Medications.     Review of Systems  Anesthesia Hx:  No problems with previous Anesthesia    Social:  Non-Smoker    Cardiovascular:   Hypertension, well controlled    Pulmonary:   Pneumonia Asthma mild Sleep Apnea, CPAP    Renal/:  Renal/ Normal     Hepatic/GI:   Hiatal Hernia, GERD    Musculoskeletal:   Arthritis     Neurological:   Neuromuscular Disease, Restless Leg Synd   Endocrine:  Endocrine Normal    Psych:   Psychiatric History anxiety depression          Physical Exam  General:  Well nourished, Obesity    Airway/Jaw/Neck:  Airway Findings: Mouth Opening: Normal Tongue: Normal  General Airway Assessment: Adult  Oropharynx Findings:  Mallampati: II  Jaw/Neck Findings:  Neck ROM: Normal ROM     Eyes/Ears/Nose:  Eyes/Ears/Nose Findings:    Dental:  Dental Findings:   Chest/Lungs:  Chest/Lungs Findings: Normal Respiratory Rate     Heart/Vascular:  Heart Findings: Rate: Normal  Rhythm: Regular Rhythm        Mental Status:  Mental Status Findings:  Cooperative, Alert and Oriented         Anesthesia Plan  Type of Anesthesia, risks & benefits discussed:  Anesthesia Type:  general, MAC  Patient's Preference:   Intra-op Monitoring Plan:   Intra-op Monitoring Plan Comments:   Post Op Pain Control Plan: multimodal analgesia  Post Op Pain Control Plan Comments:   Induction:   IV  Beta Blocker:  Patient is not currently on a Beta-Blocker (No further documentation required).       Informed Consent: Patient understands risks and agrees with Anesthesia plan.  Questions answered. Anesthesia consent signed with patient.  ASA Score: 3     Day of Surgery Review of History & Physical:  There are no significant changes.   H&P  completed by Anesthesiologist.       Ready For Surgery From Anesthesia Perspective.

## 2018-05-28 VITALS
WEIGHT: 220 LBS | HEART RATE: 66 BPM | DIASTOLIC BLOOD PRESSURE: 71 MMHG | TEMPERATURE: 98 F | HEIGHT: 59 IN | RESPIRATION RATE: 12 BRPM | SYSTOLIC BLOOD PRESSURE: 134 MMHG | OXYGEN SATURATION: 98 % | BODY MASS INDEX: 44.35 KG/M2

## 2018-05-29 ENCOUNTER — TELEPHONE (OUTPATIENT)
Dept: BARIATRICS | Facility: CLINIC | Age: 73
End: 2018-05-29

## 2018-05-30 ENCOUNTER — OFFICE VISIT (OUTPATIENT)
Dept: PAIN MEDICINE | Facility: CLINIC | Age: 73
End: 2018-05-30
Payer: MEDICARE

## 2018-05-30 VITALS
RESPIRATION RATE: 18 BRPM | HEART RATE: 80 BPM | WEIGHT: 228.63 LBS | BODY MASS INDEX: 46.18 KG/M2 | SYSTOLIC BLOOD PRESSURE: 140 MMHG | DIASTOLIC BLOOD PRESSURE: 78 MMHG | TEMPERATURE: 98 F

## 2018-05-30 DIAGNOSIS — G89.4 CHRONIC PAIN SYNDROME: Primary | ICD-10-CM

## 2018-05-30 DIAGNOSIS — M54.16 LEFT LUMBAR RADICULOPATHY: ICD-10-CM

## 2018-05-30 DIAGNOSIS — Z22.322 MRSA (METHICILLIN RESISTANT STAPH AUREUS) CULTURE POSITIVE: ICD-10-CM

## 2018-05-30 DIAGNOSIS — M79.2 PERIPHERAL NEURALGIA: ICD-10-CM

## 2018-05-30 PROCEDURE — 99999 PR PBB SHADOW E&M-EST. PATIENT-LVL IV: CPT | Mod: PBBFAC,,, | Performed by: ANESTHESIOLOGY

## 2018-05-30 PROCEDURE — 99499 UNLISTED E&M SERVICE: CPT | Mod: S$GLB,,, | Performed by: ANESTHESIOLOGY

## 2018-05-30 PROCEDURE — 99024 POSTOP FOLLOW-UP VISIT: CPT | Mod: S$GLB,,, | Performed by: ANESTHESIOLOGY

## 2018-05-30 RX ORDER — SODIUM CHLORIDE, SODIUM LACTATE, POTASSIUM CHLORIDE, CALCIUM CHLORIDE 600; 310; 30; 20 MG/100ML; MG/100ML; MG/100ML; MG/100ML
INJECTION, SOLUTION INTRAVENOUS CONTINUOUS
Status: CANCELLED | OUTPATIENT
Start: 2018-06-11

## 2018-05-30 RX ORDER — CHLORHEXIDINE GLUCONATE 40 MG/ML
SOLUTION TOPICAL
Qty: 120 ML | Refills: 0 | Status: SHIPPED | OUTPATIENT
Start: 2018-05-30 | End: 2018-10-30

## 2018-05-30 NOTE — PROGRESS NOTES
This note was completed with dictation software and grammatical errors may exist.    CC: Bilateral foot pain    HPI: The patient is a 72-year-old woman with obesity, arthritis, osteoporosis, GERD who presents in referral from Dr. Holley for back and left leg pain. She returns in follow-up today, she is status post spinal cord stimulator trial 5 days with [employer].  She reports at least 90% relief with the stimulator, states that she has been almost pain-free in her feet and legs.  She was able to with walk much better where normally she has significant discomfort after short periods of time. She also states that she is able to wear various shoes, normally can only wear special shoes that do not hurt her feet.  She denies any fevers or chills, no pain at the lead insertion sites.        Pain intervention history:She is status post left L5 and S1 transforaminal epidural steroid injection on 2/5/18 with 100% relief of her radicular left leg pain.     ROS: She reports itching, headaches, joint stiffness, joint swelling and back pain.  Balance of review of systems is negative.    Past Medical History:   Diagnosis Date    Anxiety     Arthralgia of knee     arthralgia of bilateral knees secondary to djd    Arthritis     Back pain     Depression     Encounter for blood transfusion     AUTOLOGUS    GERD (gastroesophageal reflux disease)     Hiatal hernia     repaired in 1979    Hypertension     Obesity     JESENIA on CPAP     not using cpap currently    Osteoporosis     Pneumonia     Reactive airway disease     Restless legs syndrome     Rheumatic fever     at 4 y/o    Trouble in sleeping     Wears glasses        Past Surgical History:   Procedure Laterality Date    APPENDECTOMY      BARIATRIC SURGERY  2014    Gastric Sleeve    CHOLECYSTECTOMY      GASTRIC SLEEVE      HERNIA REPAIR      hiatal hernia    HIATAL HERNIA REPAIR  1/1/1979    HYSTERECTOMY      partial    JOINT REPLACEMENT      BILAT  KNEE    KNEE ARTHROPLASTY  2010    bilateral    KNEE ARTHROSCOPY      right    NISSEN FUNDOPLICATION         Social History     Social History    Marital status:      Spouse name: N/A    Number of children: N/A    Years of education: N/A     Social History Main Topics    Smoking status: Passive Smoke Exposure - Never Smoker    Smokeless tobacco: Never Used    Alcohol use Yes      Comment: 1-2 glasses of wine monthly    Drug use: No    Sexual activity: Not on file     Other Topics Concern    Not on file     Social History Narrative    No narrative on file         Medications/Allergies: See med card    Vitals:    18 0901   BP: (!) 140/78   Pulse: 80   Resp: 18   Temp: 98.2 °F (36.8 °C)   TempSrc: Oral   Weight: 103.7 kg (228 lb 9.9 oz)   PainSc:   2   PainLoc: Foot         Physical exam:  Gen: A and O x3, pleasant, well-groomed  Skin: No rashes or obvious lesions  HEENT: PERRLA, no obvious deformities on ears or in canals. Trachea midline.  CVS: Regular rate and rhythm, normal palpable pulses.  Resp:No increased work of breathing, symmetrical chest rise.  Abdomen: Soft, NT/ND.  Musculoskeletal: No antalgic gait.     Neuro:  Lower extremities: 5/5 strength bilaterally  Reflexes: Patellar 2+, Achilles 2+ bilaterally.  Sensory:  Intact and symmetrical to light touch and pinprick in L2-S1 dermatomes bilaterally.    Lead insertion site clean, dry, intact, no erythema or drainage.    Imagin17 MRI L-spine  T11-12: There is minimal bulging of the anulus.  There is mild facet joint arthropathy with ligamentum flavum thickening, right greater than left.  There is no spinal canal or foraminal stenosis.  T12-L1: There is trace anterolisthesis of T12 on L1.  There is a diffuse disc bulge with moderate facet joint arthropathy.  There is no spinal canal or significant foraminal stenosis.  L1-2: There is marked disc space narrowing and mild retrolisthesis of L1 on L2.  There is inferior  unroofing of a diffuse disc bulge with osteophytic ridging, eccentric to the left with broad left paracentral and foraminal disc protrusion.  There is mild facet joint arthropathy.  There is no significant spinal stenosis.  There is moderate left lateral recess stenosis and foraminal stenosis.  There is mild to moderate right foraminal stenosis.  L2-3: There is mild retrolisthesis, diffuse disc bulge, moderate facet joint arthropathy.  There is no spinal stenosis.  There is mild to moderate bilateral, right greater than left, foraminal stenosis.  L3-4: There is moderate to marked facet joint arthropathy with ligamentum flavum thickening.  There is a diffuse disc bulge with osteophytic ridging.  There is mild prominent dorsal epidural fat.  There is only borderline spinal stenosis.  There is moderate bilateral, right greater than left, foraminal stenosis.  L4-5:There is trace anterolisthesis, marked facet joint arthropathy with ligamentum flavum thickening, unroofing of a diffuse disc bulge with small superimposed central disc protrusion.  There is no significant spinal stenosis.  There is moderate marked right and moderate left foraminal stenosis.  L5-S1: There is trace anterolisthesis.  There is marked facet joint arthropathy with ligamentum flavum thickening and bilateral facet joint effusions.  More importantly, there is a large 12 x 10 mm left synovial cyst which results and severe flattening and deformity of the left side of the dural sac and severe left lateral recess stenosis, compressing the left S1 root.  There is unroofing of a diffuse disc bulge.  There is moderate bilateral foraminal stenosis.  All of the discs are desiccated.  There is no acute fracture.  There is trace anterolisthesis of L4 on L5 and L5 on S1.  There is a 3 mm retrolisthesis of L1 on L2 and 2 mm retrolisthesis of L2 on L3.    Assessment:  The patient is a 72-year-old woman with obesity, arthritis, osteoporosis, GERD who presents in  referral from Dr. Holley for back and left leg pain.   1. Chronic pain syndrome     2. Left lumbar radiculopathy     3. Peripheral neuralgia     4. MRSA (methicillin resistant staph aureus) culture positive           Plan:  1.  I removed the spinal cord stimulator leads after cleaning with sterile prep and cutting sutures.  The leads were removed intact and bandages were applied to the site.  2.  Since she did so well with the trial, she would like to proceed with implantation.  I am going to send Hibiclens wash to use the night before the morning of the procedure. We are also going to use vancomycin for the procedure. I also plan to send her home on Bactrim for the week after the procedure.

## 2018-06-15 ENCOUNTER — OFFICE VISIT (OUTPATIENT)
Dept: URGENT CARE | Facility: CLINIC | Age: 73
End: 2018-06-15
Payer: MEDICARE

## 2018-06-15 VITALS
HEIGHT: 59 IN | BODY MASS INDEX: 45.96 KG/M2 | OXYGEN SATURATION: 94 % | WEIGHT: 228 LBS | RESPIRATION RATE: 16 BRPM | TEMPERATURE: 97 F | HEART RATE: 70 BPM

## 2018-06-15 DIAGNOSIS — B02.8 HERPES ZOSTER WITH COMPLICATION: Primary | ICD-10-CM

## 2018-06-15 PROCEDURE — 99214 OFFICE O/P EST MOD 30 MIN: CPT | Mod: S$GLB,,, | Performed by: PHYSICIAN ASSISTANT

## 2018-06-15 RX ORDER — VALACYCLOVIR HYDROCHLORIDE 500 MG/1
500 TABLET, FILM COATED ORAL 3 TIMES DAILY
Qty: 21 TABLET | Refills: 0 | Status: ON HOLD | OUTPATIENT
Start: 2018-06-15 | End: 2018-07-11 | Stop reason: CLARIF

## 2018-06-15 NOTE — PROGRESS NOTES
"Subjective:       Patient ID: Marta Huff is a 72 y.o. female.    Vitals:  height is 4' 11" (1.499 m) and weight is 103.4 kg (228 lb). Her temperature is 97 °F (36.1 °C). Her pulse is 70. Her respiration is 16 and oxygen saturation is 94% (abnormal).     Chief Complaint: Herpes Zoster    Pt noticed rash 4 days ago.      Review of Systems   Constitution: Negative for chills and fever.   HENT: Negative for sore throat.    Eyes: Negative for blurred vision.   Cardiovascular: Negative for chest pain.   Respiratory: Negative for shortness of breath.    Skin: Positive for itching and rash.   Musculoskeletal: Negative for back pain and joint pain.   Gastrointestinal: Negative for abdominal pain, diarrhea, nausea and vomiting.   Neurological: Negative for headaches.   Psychiatric/Behavioral: The patient is not nervous/anxious.        Objective:      Physical Exam   Constitutional: She is oriented to person, place, and time. She appears well-developed and well-nourished.   HENT:   Head: Normocephalic and atraumatic. Head is without abrasion, without contusion and without laceration.   Right Ear: External ear normal.   Left Ear: External ear normal.   Nose: Nose normal.   Mouth/Throat: Oropharynx is clear and moist.   Eyes: Conjunctivae, EOM and lids are normal. Pupils are equal, round, and reactive to light.   Neck: Trachea normal, full passive range of motion without pain and phonation normal. Neck supple.   Cardiovascular: Normal rate, regular rhythm and normal heart sounds.    Pulmonary/Chest: Effort normal and breath sounds normal. No stridor. No respiratory distress.   Musculoskeletal: Normal range of motion.   Neurological: She is alert and oriented to person, place, and time.   Skin: Skin is warm, dry and intact. Capillary refill takes less than 2 seconds. No abrasion, no bruising, no burn, no ecchymosis, no laceration, no lesion and no rash noted. No erythema.        Vesicular rash with surrounding " erythema   Psychiatric: She has a normal mood and affect. Her speech is normal and behavior is normal. Judgment and thought content normal. Cognition and memory are normal.   Nursing note and vitals reviewed.      Assessment:       1. Herpes zoster with complication        Plan:         Herpes zoster with complication    Other orders  -     valACYclovir (VALTREX) 500 MG tablet; Take 1 tablet (500 mg total) by mouth 3 (three) times daily.  Dispense: 21 tablet; Refill: 0       I discussed with the patient the importance of follow up if their symptoms have not resolved in 1-2 week's time. Patient verbalized understanding and will RTC or go to the nearest ER if symptoms persist or worsen.       Of note, I checked records for O2 sats, this is not unusual for her to sat low and she has no complaints today of cough, chest pain or SOB.

## 2018-06-15 NOTE — PATIENT INSTRUCTIONS

## 2018-06-18 ENCOUNTER — TELEPHONE (OUTPATIENT)
Dept: URGENT CARE | Facility: CLINIC | Age: 73
End: 2018-06-18

## 2018-07-09 DIAGNOSIS — M51.36 DDD (DEGENERATIVE DISC DISEASE), LUMBAR: Primary | ICD-10-CM

## 2018-07-11 ENCOUNTER — SURGERY (OUTPATIENT)
Age: 73
End: 2018-07-11

## 2018-07-11 ENCOUNTER — ANESTHESIA EVENT (OUTPATIENT)
Dept: SURGERY | Facility: HOSPITAL | Age: 73
End: 2018-07-11
Payer: MEDICARE

## 2018-07-11 ENCOUNTER — PATIENT MESSAGE (OUTPATIENT)
Dept: SURGERY | Facility: CLINIC | Age: 73
End: 2018-07-11

## 2018-07-11 ENCOUNTER — HOSPITAL ENCOUNTER (OUTPATIENT)
Facility: HOSPITAL | Age: 73
Discharge: HOME | End: 2018-07-11
Attending: ANESTHESIOLOGY | Admitting: ANESTHESIOLOGY
Payer: MEDICARE

## 2018-07-11 ENCOUNTER — HOSPITAL ENCOUNTER (OUTPATIENT)
Dept: RADIOLOGY | Facility: HOSPITAL | Age: 73
Discharge: HOME | End: 2018-07-11
Attending: ANESTHESIOLOGY | Admitting: ANESTHESIOLOGY
Payer: MEDICARE

## 2018-07-11 ENCOUNTER — ANESTHESIA (OUTPATIENT)
Dept: SURGERY | Facility: HOSPITAL | Age: 73
End: 2018-07-11
Payer: MEDICARE

## 2018-07-11 VITALS
DIASTOLIC BLOOD PRESSURE: 60 MMHG | HEART RATE: 68 BPM | OXYGEN SATURATION: 97 % | TEMPERATURE: 98 F | SYSTOLIC BLOOD PRESSURE: 141 MMHG | RESPIRATION RATE: 16 BRPM

## 2018-07-11 DIAGNOSIS — M51.36 DDD (DEGENERATIVE DISC DISEASE), LUMBAR: ICD-10-CM

## 2018-07-11 DIAGNOSIS — G89.4 CHRONIC PAIN SYNDROME: Primary | ICD-10-CM

## 2018-07-11 PROCEDURE — 71000033 HC RECOVERY, INTIAL HOUR: Mod: PO | Performed by: ANESTHESIOLOGY

## 2018-07-11 PROCEDURE — 63600175 PHARM REV CODE 636 W HCPCS: Mod: PO | Performed by: ANESTHESIOLOGY

## 2018-07-11 PROCEDURE — C1787 PATIENT PROGR, NEUROSTIM: HCPCS | Mod: PO | Performed by: ANESTHESIOLOGY

## 2018-07-11 PROCEDURE — 63650 IMPLANT NEUROELECTRODES: CPT | Mod: 51,,, | Performed by: ANESTHESIOLOGY

## 2018-07-11 PROCEDURE — 37000009 HC ANESTHESIA EA ADD 15 MINS: Mod: PO | Performed by: ANESTHESIOLOGY

## 2018-07-11 PROCEDURE — 95972 ALYS CPLX SP/PN NPGT W/PRGRM: CPT | Mod: ,,, | Performed by: ANESTHESIOLOGY

## 2018-07-11 PROCEDURE — C1820 GENERATOR NEURO RECHG BAT SY: HCPCS | Mod: PO | Performed by: ANESTHESIOLOGY

## 2018-07-11 PROCEDURE — 63600175 PHARM REV CODE 636 W HCPCS: Mod: PO | Performed by: NURSE ANESTHETIST, CERTIFIED REGISTERED

## 2018-07-11 PROCEDURE — C1713 ANCHOR/SCREW BN/BN,TIS/BN: HCPCS | Mod: PO | Performed by: ANESTHESIOLOGY

## 2018-07-11 PROCEDURE — 27201423 OPTIME MED/SURG SUP & DEVICES STERILE SUPPLY: Mod: PO | Performed by: ANESTHESIOLOGY

## 2018-07-11 PROCEDURE — 63685 INS/RPLC SPI NPG/RCVR POCKET: CPT | Mod: ,,, | Performed by: ANESTHESIOLOGY

## 2018-07-11 PROCEDURE — D9220A PRA ANESTHESIA: Mod: ANES,,, | Performed by: ANESTHESIOLOGY

## 2018-07-11 PROCEDURE — 27800903 OPTIME MED/SURG SUP & DEVICES OTHER IMPLANTS: Mod: PO | Performed by: ANESTHESIOLOGY

## 2018-07-11 PROCEDURE — 37000008 HC ANESTHESIA 1ST 15 MINUTES: Mod: PO | Performed by: ANESTHESIOLOGY

## 2018-07-11 PROCEDURE — 76000 FLUOROSCOPY <1 HR PHYS/QHP: CPT | Mod: TC,PO

## 2018-07-11 PROCEDURE — D9220A PRA ANESTHESIA: Mod: CRNA,,, | Performed by: NURSE ANESTHETIST, CERTIFIED REGISTERED

## 2018-07-11 PROCEDURE — C1778 LEAD, NEUROSTIMULATOR: HCPCS | Mod: PO | Performed by: ANESTHESIOLOGY

## 2018-07-11 PROCEDURE — 36000707: Mod: PO | Performed by: ANESTHESIOLOGY

## 2018-07-11 PROCEDURE — 36000706: Mod: PO | Performed by: ANESTHESIOLOGY

## 2018-07-11 PROCEDURE — 25000003 PHARM REV CODE 250: Mod: PO | Performed by: ANESTHESIOLOGY

## 2018-07-11 DEVICE — LEAD KIT 8 CONTACT LINEAR 50CM: Type: IMPLANTABLE DEVICE | Site: BACK | Status: FUNCTIONAL

## 2018-07-11 DEVICE — KIT SPECTRA WAVEWRITER IPG: Type: IMPLANTABLE DEVICE | Site: BACK | Status: FUNCTIONAL

## 2018-07-11 DEVICE — ANCHOR LEAD NEROSTIMLTR CLIK X: Type: IMPLANTABLE DEVICE | Site: BACK | Status: FUNCTIONAL

## 2018-07-11 RX ORDER — OXYCODONE AND ACETAMINOPHEN 10; 325 MG/1; MG/1
1 TABLET ORAL EVERY 6 HOURS PRN
Qty: 25 TABLET | Refills: 0 | Status: SHIPPED | OUTPATIENT
Start: 2018-07-11 | End: 2018-08-02

## 2018-07-11 RX ORDER — LIDOCAINE HYDROCHLORIDE 10 MG/ML
1 INJECTION, SOLUTION EPIDURAL; INFILTRATION; INTRACAUDAL; PERINEURAL ONCE
Status: COMPLETED | OUTPATIENT
Start: 2018-07-11 | End: 2018-07-11

## 2018-07-11 RX ORDER — MIDAZOLAM HYDROCHLORIDE 1 MG/ML
INJECTION, SOLUTION INTRAMUSCULAR; INTRAVENOUS
Status: DISCONTINUED | OUTPATIENT
Start: 2018-07-11 | End: 2018-07-11

## 2018-07-11 RX ORDER — SODIUM CHLORIDE, SODIUM LACTATE, POTASSIUM CHLORIDE, CALCIUM CHLORIDE 600; 310; 30; 20 MG/100ML; MG/100ML; MG/100ML; MG/100ML
INJECTION, SOLUTION INTRAVENOUS CONTINUOUS
Status: DISCONTINUED | OUTPATIENT
Start: 2018-07-11 | End: 2018-07-11 | Stop reason: HOSPADM

## 2018-07-11 RX ORDER — LIDOCAINE HCL/PF 100 MG/5ML
SYRINGE (ML) INTRAVENOUS
Status: DISCONTINUED | OUTPATIENT
Start: 2018-07-11 | End: 2018-07-11

## 2018-07-11 RX ORDER — LIDOCAINE HYDROCHLORIDE AND EPINEPHRINE 10; 10 MG/ML; UG/ML
INJECTION, SOLUTION INFILTRATION; PERINEURAL
Status: DISCONTINUED | OUTPATIENT
Start: 2018-07-11 | End: 2018-07-11 | Stop reason: HOSPADM

## 2018-07-11 RX ORDER — BACITRACIN 50000 [IU]/1
INJECTION, POWDER, FOR SOLUTION INTRAMUSCULAR
Status: DISCONTINUED | OUTPATIENT
Start: 2018-07-11 | End: 2018-07-11 | Stop reason: HOSPADM

## 2018-07-11 RX ORDER — FENTANYL CITRATE 50 UG/ML
INJECTION, SOLUTION INTRAMUSCULAR; INTRAVENOUS
Status: DISCONTINUED | OUTPATIENT
Start: 2018-07-11 | End: 2018-07-11

## 2018-07-11 RX ORDER — MUPIROCIN 20 MG/G
OINTMENT TOPICAL DAILY
Status: DISCONTINUED | OUTPATIENT
Start: 2018-07-11 | End: 2018-07-11

## 2018-07-11 RX ORDER — CEPHALEXIN 500 MG/1
500 CAPSULE ORAL 4 TIMES DAILY
Qty: 28 CAPSULE | Refills: 0 | Status: SHIPPED | OUTPATIENT
Start: 2018-07-11 | End: 2018-08-02

## 2018-07-11 RX ORDER — MUPIROCIN 20 MG/G
OINTMENT TOPICAL
Status: DISCONTINUED | OUTPATIENT
Start: 2018-07-11 | End: 2018-07-11 | Stop reason: HOSPADM

## 2018-07-11 RX ORDER — PROPOFOL 10 MG/ML
VIAL (ML) INTRAVENOUS CONTINUOUS PRN
Status: DISCONTINUED | OUTPATIENT
Start: 2018-07-11 | End: 2018-07-11

## 2018-07-11 RX ORDER — BACITRACIN ZINC 500 UNIT/G
OINTMENT (GRAM) TOPICAL
Status: DISCONTINUED | OUTPATIENT
Start: 2018-07-11 | End: 2018-07-11 | Stop reason: HOSPADM

## 2018-07-11 RX ORDER — BUPIVACAINE HYDROCHLORIDE 2.5 MG/ML
INJECTION, SOLUTION EPIDURAL; INFILTRATION; INTRACAUDAL
Status: DISCONTINUED | OUTPATIENT
Start: 2018-07-11 | End: 2018-07-11 | Stop reason: HOSPADM

## 2018-07-11 RX ORDER — PROPOFOL 10 MG/ML
VIAL (ML) INTRAVENOUS
Status: DISCONTINUED | OUTPATIENT
Start: 2018-07-11 | End: 2018-07-11

## 2018-07-11 RX ADMIN — FENTANYL CITRATE 25 MCG: 50 INJECTION, SOLUTION INTRAMUSCULAR; INTRAVENOUS at 01:07

## 2018-07-11 RX ADMIN — BACITRACIN 50000 UNITS: 5000 INJECTION, POWDER, FOR SOLUTION INTRAMUSCULAR at 01:07

## 2018-07-11 RX ADMIN — PROPOFOL 50 MCG/KG/MIN: 10 INJECTION, EMULSION INTRAVENOUS at 12:07

## 2018-07-11 RX ADMIN — LIDOCAINE HYDROCHLORIDE: 10 INJECTION, SOLUTION EPIDURAL; INFILTRATION; INTRACAUDAL; PERINEURAL at 12:07

## 2018-07-11 RX ADMIN — MUPIROCIN: 20 OINTMENT TOPICAL at 12:07

## 2018-07-11 RX ADMIN — VANCOMYCIN HYDROCHLORIDE 1000 MG: 1 INJECTION, POWDER, LYOPHILIZED, FOR SOLUTION INTRAVENOUS at 12:07

## 2018-07-11 RX ADMIN — PROPOFOL 30 MG: 10 INJECTION, EMULSION INTRAVENOUS at 01:07

## 2018-07-11 RX ADMIN — FENTANYL CITRATE 25 MCG: 50 INJECTION, SOLUTION INTRAMUSCULAR; INTRAVENOUS at 12:07

## 2018-07-11 RX ADMIN — SODIUM CHLORIDE, SODIUM LACTATE, POTASSIUM CHLORIDE, AND CALCIUM CHLORIDE: .6; .31; .03; .02 INJECTION, SOLUTION INTRAVENOUS at 12:07

## 2018-07-11 RX ADMIN — BACITRACIN ZINC 1 TUBE: 500 OINTMENT TOPICAL at 01:07

## 2018-07-11 RX ADMIN — MIDAZOLAM HYDROCHLORIDE 1 MG: 1 INJECTION, SOLUTION INTRAMUSCULAR; INTRAVENOUS at 01:07

## 2018-07-11 RX ADMIN — LIDOCAINE HYDROCHLORIDE AND EPINEPHRINE 30 ML: 10; 10 INJECTION, SOLUTION INFILTRATION; PERINEURAL at 01:07

## 2018-07-11 RX ADMIN — LIDOCAINE HYDROCHLORIDE 50 MG: 20 INJECTION PARENTERAL at 12:07

## 2018-07-11 RX ADMIN — BUPIVACAINE HYDROCHLORIDE 30 ML: 2.5 INJECTION, SOLUTION EPIDURAL; INFILTRATION; INTRACAUDAL; PERINEURAL at 01:07

## 2018-07-11 RX ADMIN — MIDAZOLAM HYDROCHLORIDE 1 MG: 1 INJECTION, SOLUTION INTRAMUSCULAR; INTRAVENOUS at 12:07

## 2018-07-11 NOTE — DISCHARGE SUMMARY
[facility]  Discharge Note  Short Stay    Admit Date: 7/11/2018    Discharge Date: 7/11/2018    Attending Physician: Raoul Belcher MD     Discharge Provider: Raoul Belcher    Diagnoses:  Active Hospital Problems    Diagnosis  POA    *Chronic pain syndrome [G89.4]  Yes      Resolved Hospital Problems    Diagnosis Date Resolved POA   No resolved problems to display.       Discharged Condition: good    Final Diagnoses: Chronic pain syndrome [G89.4]    Disposition: Home or Self Care    Hospital Course: no complications, uneventful    Outcome of Hospitalization, Treatment, Procedure, or Surgery:  Patient was admitted for outpatient procedure. The patient underwent procedure without complications and are discharged home    Follow up/Patient Instructions:  Follow up as scheduled in Pain Management clinic in 3-4 weeks/Patient has received instructions and follow up date and time    Medications:  Continue previous medications      Discharge Procedure Orders  Call MD for:  temperature >100.4     Call MD for:  severe uncontrolled pain     Call MD for:  redness, tenderness, or signs of infection (pain, swelling, redness, odor or green/yellow discharge around incision site)     Call MD for:  severe persistent headache     No dressing needed           Discharge Procedure Orders (must include Diet, Follow-up, Activity):    Discharge Procedure Orders (must include Diet, Follow-up, Activity)  Call MD for:  temperature >100.4     Call MD for:  severe uncontrolled pain     Call MD for:  redness, tenderness, or signs of infection (pain, swelling, redness, odor or green/yellow discharge around incision site)     Call MD for:  severe persistent headache     No dressing needed

## 2018-07-11 NOTE — TRANSFER OF CARE
Anesthesia Transfer of Care Note    Patient: Marta Huff    Procedure(s) Performed: Procedure(s) (LRB):  INSERTION, SPINAL CORD STIMULATOR, (N/A)    Patient location: PACU    Anesthesia Type: MAC    Transport from OR: Transported from OR on room air with adequate spontaneous ventilation    Post pain: adequate analgesia    Post assessment: no apparent anesthetic complications    Post vital signs: stable    Level of consciousness: awake, alert and oriented    Nausea/Vomiting: no nausea/vomiting    Complications: none    Transfer of care protocol was followed      Last vitals:   Visit Vitals  BP (!) 158/89 (BP Location: Right arm, Patient Position: Lying)   Pulse 61   Temp 36.4 °C (97.6 °F) (Skin)   Resp 18   SpO2 100%   Breastfeeding? No

## 2018-07-11 NOTE — ANESTHESIA POSTPROCEDURE EVALUATION
Anesthesia Post Evaluation    Patient: Marta Huff    Procedure(s) Performed: Procedure(s) (LRB):  INSERTION, SPINAL CORD STIMULATOR, (N/A)    Final Anesthesia Type: MAC  Patient location during evaluation: PACU  Patient participation: Yes- Able to Participate  Level of consciousness: awake and alert  Post-procedure vital signs: reviewed and stable  Pain management: adequate  Airway patency: patent  PONV status at discharge: No PONV  Anesthetic complications: no      Cardiovascular status: hemodynamically stable  Respiratory status: unassisted and room air  Hydration status: euvolemic  Follow-up not needed.        Visit Vitals  /66   Pulse 63   Temp 36.4 °C (97.6 °F) (Skin)   Resp 12   SpO2 100%   Breastfeeding? No       Pain/Khari Score: Pain Assessment Performed: Yes (7/11/2018  2:40 PM)  Presence of Pain: denies (7/11/2018  2:40 PM)  Khari Score: 10 (7/11/2018  2:40 PM)

## 2018-07-11 NOTE — ANESTHESIA PREPROCEDURE EVALUATION
07/11/2018  Marta Huff is a 73 y.o., female.    Pre-op Assessment    I have reviewed the Patient Summary Reports.     I have reviewed the Nursing Notes.   I have reviewed the Medications.     Review of Systems  Anesthesia Hx:  No problems with previous Anesthesia    Social:  Non-Smoker    Cardiovascular:   Hypertension, well controlled    Pulmonary:   Pneumonia Asthma mild Sleep Apnea, CPAP    Renal/:  Renal/ Normal     Hepatic/GI:   Hiatal Hernia, GERD    Musculoskeletal:   Arthritis     Neurological:   Neuromuscular Disease, Restless Leg Synd   Endocrine:  Endocrine Normal    Psych:   Psychiatric History anxiety depression          Physical Exam  General:  Well nourished, Obesity    Airway/Jaw/Neck:  Airway Findings: Mouth Opening: Normal Tongue: Normal  General Airway Assessment: Adult  Oropharynx Findings:  Mallampati: II  Jaw/Neck Findings:  Neck ROM: Normal ROM     Eyes/Ears/Nose:  Eyes/Ears/Nose Findings:    Dental:  Dental Findings:   Chest/Lungs:  Chest/Lungs Findings: Normal Respiratory Rate     Heart/Vascular:  Heart Findings: Rate: Normal  Rhythm: Regular Rhythm        Mental Status:  Mental Status Findings:  Cooperative, Alert and Oriented         Anesthesia Plan  Type of Anesthesia, risks & benefits discussed:  Anesthesia Type:  MAC  Patient's Preference:   Intra-op Monitoring Plan:   Intra-op Monitoring Plan Comments:   Post Op Pain Control Plan: multimodal analgesia  Post Op Pain Control Plan Comments:   Induction:   IV  Beta Blocker:  Patient is not currently on a Beta-Blocker (No further documentation required).       Informed Consent: Patient understands risks and agrees with Anesthesia plan.  Questions answered. Anesthesia consent signed with patient.  ASA Score: 3     Day of Surgery Review of History & Physical: I have interviewed and examined the patient. I have reviewed  the patient's H&P dated:  There are no significant changes.  H&P update referred to the provider.         Ready For Surgery From Anesthesia Perspective.

## 2018-07-11 NOTE — DISCHARGE INSTRUCTIONS
SPINAL CORD STIMULATOR   Please follow all of the instructions that were given to you and demonstrated by your  Representative about the use and care of your remote and equipment.    Please contact your representative for any questions about restrictions or to offer any support you may need on the care and operation of your spinal cord stimulator device.     After your procedure:    -Sponge baths only .  -Keep your bandage clean and dry.  -Do not remove your dressing until your follow up visit.   -Please limit any necessary bending, lifting or twisting to slow and careful movements    for the next 6 weeks or as directed by your doctor.  -During these 6 weeks, also avoid overhead work. Keep your elbows below your  shoulders.  -During these 6 weeks, wear your brace when you are walking or active. You do not have to wear the brace  while you are in bed.  -Turn off your stimulator when driving. (Do not drive for the first 24 hours after your  procedure)      -You may resume your home medications as prescribed.  -You may resume your normal diet as tolerated.  -Follow up as scheduled with Dr. Belcher.       For programming or any questions you may have about your spinal cord stimulator, please call your  Representative:

## 2018-07-11 NOTE — H&P
CC: Back and leg pain    HPI: The patient is a 73 with a history of chronic pain syndrome, lumbar radiculitis here for SCS. There are no major changes in history and physical from 5/30/18.    Past Medical History:   Diagnosis Date    Anxiety     Arthralgia of knee     arthralgia of bilateral knees secondary to djd    Arthritis     Back pain     Depression     Encounter for blood transfusion     AUTOLOGUS    GERD (gastroesophageal reflux disease)     Hiatal hernia     repaired in 1979    History of seasonal allergies     History of shingles     Hypertension     Insomnia     Obesity     JESENIA on CPAP     not using cpap currently    Osteoporosis     Pneumonia     Reactive airway disease     Restless legs syndrome     Rheumatic fever     at 4 y/o       Past Surgical History:   Procedure Laterality Date    APPENDECTOMY      BARIATRIC SURGERY  2014    Gastric Sleeve    CHOLECYSTECTOMY      HERNIA REPAIR      hiatal hernia    HYSTERECTOMY      partial    INSERTION OF DORSAL COLUMN NERVE STIMULATOR FOR TRIAL N/A 5/25/2018    Procedure: TRIAL-STIMULATOR-DORSAL COLUMN;  Surgeon: Raoul Belcher MD;  Location: [facility] OR;  Service: Pain Management;  Laterality: N/A;    JOINT REPLACEMENT      BILAT KNEE    KNEE ARTHROPLASTY  1/2/2010    bilateral    NISSEN FUNDOPLICATION         Family History   Problem Relation Age of Onset    Heart disease Mother     Hypertension Mother     Diabetes Mother     Obesity Mother     Heart disease Maternal Grandfather        Social History     Social History    Marital status:      Spouse name: N/A    Number of children: N/A    Years of education: N/A     Social History Main Topics    Smoking status: Passive Smoke Exposure - Never Smoker    Smokeless tobacco: Never Used    Alcohol use Yes      Comment: 1-2 glasses of wine monthly    Drug use: No    Sexual activity: Not Asked     Other Topics Concern    None     Social History Narrative    None        Current Facility-Administered Medications   Medication Dose Route Frequency Provider Last Rate Last Dose    lactated ringers infusion   Intravenous Continuous Raoul Belcher MD 25 mL/hr at 07/11/18 1206      vancomycin (VANCOCIN) 1,000 mg in dextrose 5 % 250 mL IVPB  1,000 mg Intravenous On Call Procedure Raoul Belcher MD           Review of patient's allergies indicates:   Allergen Reactions    Etodolac (bulk) Swelling     Hands and feet swelling    Sulfa (sulfonamide antibiotics) Rash and Other (See Comments)     Headache        Vitals:    07/11/18 1150   BP: (!) 158/89   Pulse: 61   Resp: 18   Temp: 97.6 °F (36.4 °C)   TempSrc: Skin   SpO2: 100%       REVIEW OF SYSTEMS:     GENERAL: No weight loss, malaise or fevers.  HEENT:  No recent changes in vision or hearing  NECK: Negative for lumps, no difficulty with swallowing.  RESPIRATORY: Negative for cough, wheezing or shortness of breath, patient denies any recent URI.  CARDIOVASCULAR: Negative for chest pain, leg swelling or palpitations.  GI: Negative for abdominal discomfort, blood in stools or black stools or change in bowel habits.  MUSCULOSKELETAL: See HPI.  SKIN: Negative for lesions, rash, and itching.  PSYCH: No suicidal or homicidal ideations, no current mood disturbances.  HEMATOLOGY/LYMPHOLOGY: Negative for prolonged bleeding, bruising easily or swollen nodes. Patient is not currently taking any anti-coagulants  ENDO: No history of diabetes or thyroid dysfunction  NEURO: No history of syncope, paralysis, seizures or tremors.All other reviewed and negative other than HPI.    Physical exam:  Gen: A and O x3, pleasant, well-groomed  Skin: No rashes or obvious lesions  HEENT: PERRLA, no obvious deformities on ears or in canals. No thyroid masses, trachea midline, no palpable lymph nodes in neck, axilla.  CVS: Regular rate and rhythm, normal S1 and S2, no murmurs.  Resp: Clear to auscultation bilaterally.  Abdomen: Soft, NT/ND, normal  bowel sounds present.  Musculoskeletal/Neuro: Moving all extremities    Assessment:  Chronic pain syndrome  -     Case Request Operating Room: REPLACEMENT, SPINAL CORD STIMULATOR  -     Activity as tolerated; Standing  -     Place in Outpatient; Standing  -     Diet NPO; Standing  -     lactated ringers infusion; Inject into the vein continuous.  -     vancomycin (VANCOCIN) 1,000 mg in dextrose 5 % 250 mL IVPB; Inject 1,000 mg into the vein On call Procedure (surgery).  -     Notify physician ; Standing  -     Notify physician ; Standing  -     Notify physician (specify); Standing  -     Place 18-22 gauage peripheral IV ; Standing  -     Verify informed consent; Standing  -     Vital signs; Standing    Other orders  -     lidocaine (PF) 10 mg/ml (1%) injection 10 mg; 1 mL (10 mg total) by Other route once.

## 2018-07-11 NOTE — OP NOTE
PROCEDURE DATE: 7/11/2018    Spinal Cord Stimulator Implantation  PROCEDURE:   1. Spinal Cord Stimulator leads placement x 2 and implant- 16 contact total  2. Pulse Generator Implantation under flouroscopy  3. Programming greater than 30 mins    Pre-op diagnosis: Chronic pain syndrome, lumbar radiculitis    Post-op diagnosis: Same    Surgeon: Dr. Raoul Belcher    Assistant: none     Anesthesia: Monitored Anesthesia Care    Estimated Blood Loss: Minimal- <15    Urine Output: Not Measured    IV Fluids- See Anesthesia record    Complications: none    CONSENT: The risks, benefits, and options were discussed thoroughly with the patient. The patient's questions were answered. The patient understands the risk and benefits and wishes to proceed. Informed consent was obtained.     PROCEDURE NOTE:  Patient is s/p a successful trial of spinal cord stimulation and scheduled for a stage 2 implant. After obtaining informed consent, pre-op antibiotic was started 30 minutes prior to incision. Site of the implantable pulse generator was marked on the patients right side in the preoperative area. The patient was taken to the OR and placed in the prone position. The anesthesia provider throughout the case provided sedation and cardiopulmonary monitoring. The Patient's back was prepped with Duraprep and then draped in usual sterile fashion.     An AP fluoroscopic view was obtained to identify and eliezer the midline position of the spinous process. The skin was anesthetized with Lidocaine 1%. Skin incision with a No. 10 scalpel was made and local dissection done with electrocautery as necessary to facilitate access to the paraspinous fascia.     Using a paramedian approach, a Tuohy needle was entered into the right-side T12/L1 epidural space using a loss of resistance technique with 0.5cc of air. A similar approach was done on the left-side. Then, after negative aspiration of blood, CSF, or any paraesthesia, the SCS leads were advanced  to the bottom vertebral body in the midline. Stimulation was carried and patient reported coverage of pain areas.     Then, the Tuohy needles were removed under fluoroscopy and a lead anchor placed over the leads. Using 0-0 Orthocord sutures, leads were anchored to the fascia with 2 sutures for each lead. A relief loop was then placed. Then further local anesthetic was used at the IPG site with 1% lidocaine. Using a No. 10 scalpel, an incision was made over the right buttock and dissection carried out with Bovie and blunt dissection. After obtaining hemostasis, both the incisions were irrigated with antibiotic solutions. Using the tunneling tool, tunneling was completed between the midline and the IPG pocket. The leads were passed to the IPG and connected. Then the IPG was placed in the IPG pocket and system was checked and noted to again cover back and legs but xray revealed that the top of the right lead now moved down to the top of T11. Coverage was again checked and since patient reported good coverage, leads were left in place. Copious antibiotic bulb lavage was irrigated throughout the field, and hemostasis was maintained.     Then the wound was closed with simple interrupted sutures using 0-0 vicryl sutures in 2 layers and the skin closed with 4-0 monocryl. Bacitracin was placed over the incision sites and dressings applied. Sponge and needle counts were correct x2 at conclusion of the case.     Patient was then placed supine on the stretcher and transferred to the recovery room. Patient was programmed by the representative of the SCS. Patient was instructed not to perform any abrupt movements with the back, avoid bending, twisting, or lifting >10lbs. The patient has been scheduled to return to the clinic in approx 7 days. The patient tolerated the procedure well.

## 2018-07-18 ENCOUNTER — OFFICE VISIT (OUTPATIENT)
Dept: PAIN MEDICINE | Facility: CLINIC | Age: 73
End: 2018-07-18
Payer: MEDICARE

## 2018-07-18 VITALS
HEART RATE: 71 BPM | RESPIRATION RATE: 20 BRPM | BODY MASS INDEX: 45.95 KG/M2 | SYSTOLIC BLOOD PRESSURE: 150 MMHG | TEMPERATURE: 97 F | OXYGEN SATURATION: 99 % | DIASTOLIC BLOOD PRESSURE: 67 MMHG | WEIGHT: 227.5 LBS

## 2018-07-18 DIAGNOSIS — G89.4 CHRONIC PAIN SYNDROME: Primary | ICD-10-CM

## 2018-07-18 DIAGNOSIS — M54.16 LEFT LUMBAR RADICULOPATHY: ICD-10-CM

## 2018-07-18 PROCEDURE — 99024 POSTOP FOLLOW-UP VISIT: CPT | Mod: S$GLB,,, | Performed by: ANESTHESIOLOGY

## 2018-07-18 PROCEDURE — 99999 PR PBB SHADOW E&M-EST. PATIENT-LVL IV: CPT | Mod: PBBFAC,,, | Performed by: ANESTHESIOLOGY

## 2018-07-18 NOTE — PROGRESS NOTES
This note was completed with dictation software and grammatical errors may exist.    CC: Bilateral foot pain    HPI: The patient is a 72-year-old woman with obesity, arthritis, osteoporosis, GERD who presents in referral from Dr. Holley for back and left leg pain.  She returns in follow-up one week after spinal cord stimulator implantation, reporting almost complete relief of her back pain and leg pain, denies any new weakness or numbness.  She denies any fevers or chills, denies any pain in the incision sites.    Pain intervention history:She is status post left L5 and S1 transforaminal epidural steroid injection on 2/5/18 with 100% relief of her radicular left leg pain.     ROS: She reports itching, headaches, joint stiffness, joint swelling and back pain.  Balance of review of systems is negative.    Past Medical History:   Diagnosis Date    Anxiety     Arthralgia of knee     arthralgia of bilateral knees secondary to djd    Arthritis     Back pain     Depression     Encounter for blood transfusion     AUTOLOGUS    GERD (gastroesophageal reflux disease)     Hiatal hernia     repaired in 1979    History of seasonal allergies     History of shingles     Hypertension     Insomnia     Obesity     JESENIA on CPAP     not using cpap currently    Osteoporosis     Pneumonia     Reactive airway disease     Restless legs syndrome     Rheumatic fever     at 6 y/o       Past Surgical History:   Procedure Laterality Date    APPENDECTOMY      BARIATRIC SURGERY  2014    Gastric Sleeve    CHOLECYSTECTOMY      HERNIA REPAIR      hiatal hernia    HYSTERECTOMY      partial    INSERTION OF DORSAL COLUMN NERVE STIMULATOR FOR TRIAL N/A 5/25/2018    Procedure: TRIAL-STIMULATOR-DORSAL COLUMN;  Surgeon: Raoul Belcher MD;  Location: [facility];  Service: Pain Management;  Laterality: N/A;    INSERTION OF SPINAL CORD STIMULATOR USING MINIMALLY INVASIVE TECHNIQUE N/A 7/11/2018    Procedure: INSERTION, SPINAL CORD  STIMULATOR,;  Surgeon: Raoul Belcher MD;  Location: [facility];  Service: Pain Management;  Laterality: N/A;    JOINT REPLACEMENT      BILAT KNEE    KNEE ARTHROPLASTY  2010    bilateral    NISSEN FUNDOPLICATION         Social History     Social History    Marital status:      Spouse name: N/A    Number of children: N/A    Years of education: N/A     Social History Main Topics    Smoking status: Passive Smoke Exposure - Never Smoker    Smokeless tobacco: Never Used    Alcohol use Yes      Comment: 1-2 glasses of wine monthly    Drug use: No    Sexual activity: Not Asked     Other Topics Concern    None     Social History Narrative    None         Medications/Allergies: See med card    Vitals:    18 0947   BP: (!) 150/67   Pulse: 71   Resp: 20   Temp: 96.7 °F (35.9 °C)   TempSrc: Oral   SpO2: 99%   Weight: 103.2 kg (227 lb 8.2 oz)   PainSc: 0-No pain         Physical exam:  Gen: A and O x3, pleasant, well-groomed  Skin: No rashes or obvious lesions  HEENT: PERRLA, no obvious deformities on ears or in canals. Trachea midline.  CVS: Regular rate and rhythm, normal palpable pulses.  Resp:No increased work of breathing, symmetrical chest rise.  Abdomen: Soft, NT/ND.  Musculoskeletal: No antalgic gait.     Neuro:  Lower extremities: 5/5 strength bilaterally  Reflexes: Patellar 2+, Achilles 2+ bilaterally.  Sensory:  Intact and symmetrical to light touch and pinprick in L2-S1 dermatomes bilaterally.    Incision sites are clean, dry and intact, Steri-Strips in place.  No erythema or drainage.    Imagin17 MRI L-spine  T11-12: There is minimal bulging of the anulus.  There is mild facet joint arthropathy with ligamentum flavum thickening, right greater than left.  There is no spinal canal or foraminal stenosis.  T12-L1: There is trace anterolisthesis of T12 on L1.  There is a diffuse disc bulge with moderate facet joint arthropathy.  There is no spinal canal or significant foraminal  stenosis.  L1-2: There is marked disc space narrowing and mild retrolisthesis of L1 on L2.  There is inferior unroofing of a diffuse disc bulge with osteophytic ridging, eccentric to the left with broad left paracentral and foraminal disc protrusion.  There is mild facet joint arthropathy.  There is no significant spinal stenosis.  There is moderate left lateral recess stenosis and foraminal stenosis.  There is mild to moderate right foraminal stenosis.  L2-3: There is mild retrolisthesis, diffuse disc bulge, moderate facet joint arthropathy.  There is no spinal stenosis.  There is mild to moderate bilateral, right greater than left, foraminal stenosis.  L3-4: There is moderate to marked facet joint arthropathy with ligamentum flavum thickening.  There is a diffuse disc bulge with osteophytic ridging.  There is mild prominent dorsal epidural fat.  There is only borderline spinal stenosis.  There is moderate bilateral, right greater than left, foraminal stenosis.  L4-5:There is trace anterolisthesis, marked facet joint arthropathy with ligamentum flavum thickening, unroofing of a diffuse disc bulge with small superimposed central disc protrusion.  There is no significant spinal stenosis.  There is moderate marked right and moderate left foraminal stenosis.  L5-S1: There is trace anterolisthesis.  There is marked facet joint arthropathy with ligamentum flavum thickening and bilateral facet joint effusions.  More importantly, there is a large 12 x 10 mm left synovial cyst which results and severe flattening and deformity of the left side of the dural sac and severe left lateral recess stenosis, compressing the left S1 root.  There is unroofing of a diffuse disc bulge.  There is moderate bilateral foraminal stenosis.  All of the discs are desiccated.  There is no acute fracture.  There is trace anterolisthesis of L4 on L5 and L5 on S1.  There is a 3 mm retrolisthesis of L1 on L2 and 2 mm retrolisthesis of L2 on  L3.    Assessment:  The patient is a 72-year-old woman with obesity, arthritis, osteoporosis, GERD who presents in referral from Dr. Holley for back and left leg pain.   1. Chronic pain syndrome     2. Left lumbar radiculopathy           Plan:  1.  I removed the [place] Bandages and the wound sites look well healed, Steri-Strips still in place.  I've instructed her to begin showering but not too vigorously scrub incision sites and allow the Steri-Strips to stay in place until they fall off in the next 3-5 days.  Do not soak in water for at least 3 weeks.  2.  Since she is doing well, I will have her return for followup in one month or sooner as needed.

## 2018-08-02 ENCOUNTER — LAB VISIT (OUTPATIENT)
Dept: LAB | Facility: HOSPITAL | Age: 73
End: 2018-08-02
Attending: ANESTHESIOLOGY
Payer: MEDICARE

## 2018-08-02 ENCOUNTER — TELEPHONE (OUTPATIENT)
Dept: PAIN MEDICINE | Facility: CLINIC | Age: 73
End: 2018-08-02

## 2018-08-02 ENCOUNTER — OFFICE VISIT (OUTPATIENT)
Dept: PAIN MEDICINE | Facility: CLINIC | Age: 73
End: 2018-08-02
Payer: MEDICARE

## 2018-08-02 VITALS
TEMPERATURE: 100 F | RESPIRATION RATE: 20 BRPM | HEART RATE: 98 BPM | WEIGHT: 227.06 LBS | SYSTOLIC BLOOD PRESSURE: 138 MMHG | BODY MASS INDEX: 45.86 KG/M2 | DIASTOLIC BLOOD PRESSURE: 63 MMHG | OXYGEN SATURATION: 95 %

## 2018-08-02 DIAGNOSIS — M79.2 PERIPHERAL NEURALGIA: ICD-10-CM

## 2018-08-02 DIAGNOSIS — G89.4 CHRONIC PAIN SYNDROME: ICD-10-CM

## 2018-08-02 LAB
BASOPHILS # BLD AUTO: 0.06 K/UL
BASOPHILS NFR BLD: 0.5 %
CRP SERPL-MCNC: 134.1 MG/L
DIFFERENTIAL METHOD: ABNORMAL
EOSINOPHIL # BLD AUTO: 0.2 K/UL
EOSINOPHIL NFR BLD: 1.1 %
ERYTHROCYTE [DISTWIDTH] IN BLOOD BY AUTOMATED COUNT: 14.2 %
ERYTHROCYTE [SEDIMENTATION RATE] IN BLOOD BY WESTERGREN METHOD: 30 MM/HR
HCT VFR BLD AUTO: 38.4 %
HGB BLD-MCNC: 12.2 G/DL
IMM GRANULOCYTES # BLD AUTO: 0.05 K/UL
IMM GRANULOCYTES NFR BLD AUTO: 0.4 %
LYMPHOCYTES # BLD AUTO: 1.9 K/UL
LYMPHOCYTES NFR BLD: 14.3 %
MCH RBC QN AUTO: 29.4 PG
MCHC RBC AUTO-ENTMCNC: 31.8 G/DL
MCV RBC AUTO: 93 FL
MONOCYTES # BLD AUTO: 0.9 K/UL
MONOCYTES NFR BLD: 6.7 %
NEUTROPHILS # BLD AUTO: 10.1 K/UL
NEUTROPHILS NFR BLD: 77 %
NRBC BLD-RTO: 0 /100 WBC
PLATELET # BLD AUTO: 209 K/UL
PMV BLD AUTO: 12.4 FL
RBC # BLD AUTO: 4.15 M/UL
WBC # BLD AUTO: 13.14 K/UL

## 2018-08-02 PROCEDURE — 85651 RBC SED RATE NONAUTOMATED: CPT | Mod: PO

## 2018-08-02 PROCEDURE — 3078F DIAST BP <80 MM HG: CPT | Mod: CPTII,S$GLB,, | Performed by: PHYSICIAN ASSISTANT

## 2018-08-02 PROCEDURE — 86140 C-REACTIVE PROTEIN: CPT

## 2018-08-02 PROCEDURE — 85025 COMPLETE CBC W/AUTO DIFF WBC: CPT

## 2018-08-02 PROCEDURE — 99214 OFFICE O/P EST MOD 30 MIN: CPT | Mod: S$GLB,,, | Performed by: PHYSICIAN ASSISTANT

## 2018-08-02 PROCEDURE — 87040 BLOOD CULTURE FOR BACTERIA: CPT

## 2018-08-02 PROCEDURE — 3075F SYST BP GE 130 - 139MM HG: CPT | Mod: CPTII,S$GLB,, | Performed by: PHYSICIAN ASSISTANT

## 2018-08-02 PROCEDURE — 99999 PR PBB SHADOW E&M-EST. PATIENT-LVL IV: CPT | Mod: PBBFAC,,, | Performed by: PHYSICIAN ASSISTANT

## 2018-08-02 RX ORDER — DOXYCYCLINE 100 MG/1
100 CAPSULE ORAL EVERY 12 HOURS
Qty: 20 CAPSULE | Refills: 0 | Status: SHIPPED | OUTPATIENT
Start: 2018-08-02 | End: 2018-08-08

## 2018-08-02 NOTE — TELEPHONE ENCOUNTER
----- Message from Annabelle Brito sent at 8/2/2018  2:37 PM CDT -----  Contact: pt  Type: Needs Medical Advice    Who Called:  Marta   Symptoms (please be specific):  Implant   How long has patient had these symptoms:  Possible infection  Pharmacy name and phone #:  n/a  Best Call Back Number:     Additional Information:  Pt is calling to speak with nurse regarding her implant. She is experiencing some discomfort, redness, warm to the touch. Please call back and advise.

## 2018-08-02 NOTE — PROGRESS NOTES
This note was completed with dictation software and grammatical errors may exist.    CC: Bilateral foot pain    HPI: The patient is a 73-year-old woman with obesity, arthritis, osteoporosis, GERD who presents in referral from Dr. Holley for back and left leg pain.  The patient returns for an early follow-up today because she called the clinic today reporting some redness at her IPG site.  She is 3 weeks status post spinal cord stimulator implant with Quettra.  She reports developing some chills yesterday at 4:30 p.m. as well as some tenderness to her IPG site. She reports having a temperature of 100.2° F at 3:00 a.m. during the visit, her temperature was 99.8°.  What alerted her to call was a friend who looked at the site telling her it was bright red.  She reports almost complete relief of her pain with her stimulator.    Pain intervention history:She is status post left L5 and S1 transforaminal epidural steroid injection on 2/5/18 with 100% relief of her radicular left leg pain.     ROS: She reports itching, headaches, joint stiffness, joint swelling and back pain.  Balance of review of systems is negative.    Past Medical History:   Diagnosis Date    Anxiety     Arthralgia of knee     arthralgia of bilateral knees secondary to djd    Arthritis     Back pain     Depression     Encounter for blood transfusion     AUTOLOGUS    GERD (gastroesophageal reflux disease)     Hiatal hernia     repaired in 1979    History of seasonal allergies     History of shingles     Hypertension     Insomnia     Obesity     JESENIA on CPAP     not using cpap currently    Osteoporosis     Pneumonia     Reactive airway disease     Restless legs syndrome     Rheumatic fever     at 6 y/o       Past Surgical History:   Procedure Laterality Date    APPENDECTOMY      BARIATRIC SURGERY  2014    Gastric Sleeve    CHOLECYSTECTOMY      HERNIA REPAIR      hiatal hernia    HYSTERECTOMY      partial    INSERTION OF  DORSAL COLUMN NERVE STIMULATOR FOR TRIAL N/A 5/25/2018    Procedure: TRIAL-STIMULATOR-DORSAL COLUMN;  Surgeon: Raoul Belcher MD;  Location: [facility] OR;  Service: Pain Management;  Laterality: N/A;    INSERTION OF SPINAL CORD STIMULATOR USING MINIMALLY INVASIVE TECHNIQUE N/A 7/11/2018    Procedure: INSERTION, SPINAL CORD STIMULATOR,;  Surgeon: Raoul Belcher MD;  Location: [facility] OR;  Service: Pain Management;  Laterality: N/A;    JOINT REPLACEMENT      BILAT KNEE    KNEE ARTHROPLASTY  1/2/2010    bilateral    NISSEN FUNDOPLICATION         Social History     Social History    Marital status:      Spouse name: N/A    Number of children: N/A    Years of education: N/A     Social History Main Topics    Smoking status: Passive Smoke Exposure - Never Smoker    Smokeless tobacco: Never Used    Alcohol use Yes      Comment: 1-2 glasses of wine monthly    Drug use: No    Sexual activity: Not Asked     Other Topics Concern    None     Social History Narrative    None         Medications/Allergies: See med card    Vitals:    08/02/18 1527   BP: 138/63   Pulse: 98   Resp: 20   Temp: 99.8 °F (37.7 °C)   TempSrc: Oral   SpO2: 95%   Weight: 103 kg (227 lb 1.2 oz)   PainSc:   4   PainLoc: Back         Physical exam:  Gen: A and O x3, pleasant, well-groomed  Skin: No rashes or obvious lesions  HEENT: PERRLA, no obvious deformities on ears or in canals. Trachea midline.  CVS: Regular rate and rhythm, normal palpable pulses.  Resp:No increased work of breathing, symmetrical chest rise.  Abdomen: Soft, NT/ND.  Musculoskeletal: No antalgic gait.     Neuro:  Lower extremities: 5/5 strength bilaterally  Reflexes: Patellar 2+, Achilles 2+ bilaterally.  Sensory:  Intact and symmetrical to light touch and pinprick in L2-S1 dermatomes bilaterally.    Midline incision intact, dry, nontender.  No erythema, induration or drainage.  IPG incision intact, dry.  Mild tenderness to palpation along the IPG.  There is slight  erythema surrounding a scab on the right lateral 1/4 of the incision.  No induration or drainage.    Imagin17 MRI L-spine  T11-12: There is minimal bulging of the anulus.  There is mild facet joint arthropathy with ligamentum flavum thickening, right greater than left.  There is no spinal canal or foraminal stenosis.  T12-L1: There is trace anterolisthesis of T12 on L1.  There is a diffuse disc bulge with moderate facet joint arthropathy.  There is no spinal canal or significant foraminal stenosis.  L1-2: There is marked disc space narrowing and mild retrolisthesis of L1 on L2.  There is inferior unroofing of a diffuse disc bulge with osteophytic ridging, eccentric to the left with broad left paracentral and foraminal disc protrusion.  There is mild facet joint arthropathy.  There is no significant spinal stenosis.  There is moderate left lateral recess stenosis and foraminal stenosis.  There is mild to moderate right foraminal stenosis.  L2-3: There is mild retrolisthesis, diffuse disc bulge, moderate facet joint arthropathy.  There is no spinal stenosis.  There is mild to moderate bilateral, right greater than left, foraminal stenosis.  L3-4: There is moderate to marked facet joint arthropathy with ligamentum flavum thickening.  There is a diffuse disc bulge with osteophytic ridging.  There is mild prominent dorsal epidural fat.  There is only borderline spinal stenosis.  There is moderate bilateral, right greater than left, foraminal stenosis.  L4-5:There is trace anterolisthesis, marked facet joint arthropathy with ligamentum flavum thickening, unroofing of a diffuse disc bulge with small superimposed central disc protrusion.  There is no significant spinal stenosis.  There is moderate marked right and moderate left foraminal stenosis.  L5-S1: There is trace anterolisthesis.  There is marked facet joint arthropathy with ligamentum flavum thickening and bilateral facet joint effusions.  More importantly,  there is a large 12 x 10 mm left synovial cyst which results and severe flattening and deformity of the left side of the dural sac and severe left lateral recess stenosis, compressing the left S1 root.  There is unroofing of a diffuse disc bulge.  There is moderate bilateral foraminal stenosis.  All of the discs are desiccated.  There is no acute fracture.  There is trace anterolisthesis of L4 on L5 and L5 on S1.  There is a 3 mm retrolisthesis of L1 on L2 and 2 mm retrolisthesis of L2 on L3.    Assessment:  The patient is a 73-year-old woman with obesity, arthritis, osteoporosis, GERD who presents in referral from Dr. Holley for back and left leg pain.   1. Postoperative wound infection, initial encounter  CBC auto differential    C-REACTIVE PROTEIN    Sedimentation rate    CULTURE, BLOOD    CULTURE, BLOOD   2. Chronic pain syndrome     3. Peripheral neuralgia           Plan:  1.  I discussed with the patient that she may have a superficial surgical site infection at her IPG site.  She does have a history of MRSA and I have provided a prescription for doxycycline 100 mg twice a day for 10 days.  I have also ordered a CBC, CRP, ESR and blood cultures x2.  I advised the patient to contact us immediately if she develops high fever or worsening pain at the IPG site. I will have her follow-up in 1 week or sooner as needed and we will contact her with the lab results.

## 2018-08-02 NOTE — TELEPHONE ENCOUNTER
Spoke with patient. She has redness and warmth around the implant site. Her temperature this morning was 100. She is scheduled to see Phi at 3:30.

## 2018-08-03 ENCOUNTER — TELEPHONE (OUTPATIENT)
Dept: PAIN MEDICINE | Facility: CLINIC | Age: 73
End: 2018-08-03

## 2018-08-03 NOTE — TELEPHONE ENCOUNTER
Spoke with patient and reviewed lab work.  She has elevated ESR, CRP and WBCs.  Blood cultures are negative x2 at 24 hrs. She will continue doxycycline twice a day, started last night.  She does report some mild improvement and the IPG site pain.  I instructed her to go to the emergency department immediately if she develops any high fever over the weekend, weakness, numbness or severe pain. She verbalizes her understanding and she has an appointment with me next week.  At that time, we will repeat labs.

## 2018-08-06 ENCOUNTER — TELEPHONE (OUTPATIENT)
Dept: PAIN MEDICINE | Facility: CLINIC | Age: 73
End: 2018-08-06

## 2018-08-06 NOTE — TELEPHONE ENCOUNTER
Spoke with the patient and she has not had fever for 24 hours. Just slight tenderness and area is pink, not red. Please advise.

## 2018-08-06 NOTE — TELEPHONE ENCOUNTER
Please contact the patient and find out how her battery site is doing, she had reported some increased pain at that site and we had started her on antibiotics.  The cultures have not shown anything yet.

## 2018-08-07 LAB
BACTERIA BLD CULT: NORMAL
BACTERIA BLD CULT: NORMAL

## 2018-08-08 ENCOUNTER — LAB VISIT (OUTPATIENT)
Dept: LAB | Facility: HOSPITAL | Age: 73
End: 2018-08-08
Attending: PHYSICIAN ASSISTANT
Payer: MEDICARE

## 2018-08-08 ENCOUNTER — OFFICE VISIT (OUTPATIENT)
Dept: PAIN MEDICINE | Facility: CLINIC | Age: 73
End: 2018-08-08
Payer: MEDICARE

## 2018-08-08 VITALS
WEIGHT: 228.63 LBS | SYSTOLIC BLOOD PRESSURE: 145 MMHG | RESPIRATION RATE: 18 BRPM | TEMPERATURE: 99 F | BODY MASS INDEX: 46.18 KG/M2 | DIASTOLIC BLOOD PRESSURE: 68 MMHG | HEART RATE: 69 BPM | OXYGEN SATURATION: 95 %

## 2018-08-08 DIAGNOSIS — G89.4 CHRONIC PAIN SYNDROME: ICD-10-CM

## 2018-08-08 DIAGNOSIS — M79.2 PERIPHERAL NEURALGIA: ICD-10-CM

## 2018-08-08 LAB
BASOPHILS # BLD AUTO: 0.05 K/UL
BASOPHILS NFR BLD: 0.7 %
CRP SERPL-MCNC: 48.3 MG/L
DIFFERENTIAL METHOD: ABNORMAL
EOSINOPHIL # BLD AUTO: 0.3 K/UL
EOSINOPHIL NFR BLD: 4.2 %
ERYTHROCYTE [DISTWIDTH] IN BLOOD BY AUTOMATED COUNT: 14.2 %
ERYTHROCYTE [SEDIMENTATION RATE] IN BLOOD BY WESTERGREN METHOD: 73 MM/HR
HCT VFR BLD AUTO: 36.4 %
HGB BLD-MCNC: 11.2 G/DL
IMM GRANULOCYTES # BLD AUTO: 0.03 K/UL
IMM GRANULOCYTES NFR BLD AUTO: 0.4 %
LYMPHOCYTES # BLD AUTO: 1.8 K/UL
LYMPHOCYTES NFR BLD: 26.6 %
MCH RBC QN AUTO: 29.4 PG
MCHC RBC AUTO-ENTMCNC: 30.8 G/DL
MCV RBC AUTO: 96 FL
MONOCYTES # BLD AUTO: 0.5 K/UL
MONOCYTES NFR BLD: 7.9 %
NEUTROPHILS # BLD AUTO: 4 K/UL
NEUTROPHILS NFR BLD: 60.2 %
NRBC BLD-RTO: 0 /100 WBC
PLATELET # BLD AUTO: 228 K/UL
PMV BLD AUTO: 12.4 FL
RBC # BLD AUTO: 3.81 M/UL
WBC # BLD AUTO: 6.69 K/UL

## 2018-08-08 PROCEDURE — 99213 OFFICE O/P EST LOW 20 MIN: CPT | Mod: S$GLB,,, | Performed by: PHYSICIAN ASSISTANT

## 2018-08-08 PROCEDURE — 99999 PR PBB SHADOW E&M-EST. PATIENT-LVL IV: CPT | Mod: PBBFAC,,, | Performed by: PHYSICIAN ASSISTANT

## 2018-08-08 PROCEDURE — 36415 COLL VENOUS BLD VENIPUNCTURE: CPT | Mod: PO

## 2018-08-08 PROCEDURE — 3078F DIAST BP <80 MM HG: CPT | Mod: CPTII,S$GLB,, | Performed by: PHYSICIAN ASSISTANT

## 2018-08-08 PROCEDURE — 85651 RBC SED RATE NONAUTOMATED: CPT | Mod: PO

## 2018-08-08 PROCEDURE — 85025 COMPLETE CBC W/AUTO DIFF WBC: CPT

## 2018-08-08 PROCEDURE — 3077F SYST BP >= 140 MM HG: CPT | Mod: CPTII,S$GLB,, | Performed by: PHYSICIAN ASSISTANT

## 2018-08-08 PROCEDURE — 86140 C-REACTIVE PROTEIN: CPT

## 2018-08-08 RX ORDER — DOXYCYCLINE 100 MG/1
100 CAPSULE ORAL EVERY 12 HOURS
Qty: 22 CAPSULE | Refills: 0 | Status: SHIPPED | OUTPATIENT
Start: 2018-08-12 | End: 2018-08-23

## 2018-08-08 NOTE — PROGRESS NOTES
This note was completed with dictation software and grammatical errors may exist.    CC: Bilateral foot pain    HPI: The patient is a 73-year-old woman with obesity, arthritis, osteoporosis, GERD who presents in referral from Dr. Holley for back and left leg pain.  She returns in follow-up today to check her IPG site. She reports less tenderness at the area.  She is 4 weeks status post spinal cord stimulator implant with [employer].  She denies having any further low grade temperatures since Saturday night and has been taking her antibiotics as directed.  She denies chills, weakness or numbness.  She reports almost complete relief with her spinal cord stimulator.    Pain intervention history:She is status post left L5 and S1 transforaminal epidural steroid injection on 2/5/18 with 100% relief of her radicular left leg pain.     ROS: She reports itching, headaches, joint stiffness, joint swelling and back pain.  Balance of review of systems is negative.    Past Medical History:   Diagnosis Date    Anxiety     Arthralgia of knee     arthralgia of bilateral knees secondary to djd    Arthritis     Back pain     Depression     Encounter for blood transfusion     AUTOLOGUS    GERD (gastroesophageal reflux disease)     Hiatal hernia     repaired in 1979    History of seasonal allergies     History of shingles     Hypertension     Insomnia     Obesity     JESENIA on CPAP     not using cpap currently    Osteoporosis     Pneumonia     Reactive airway disease     Restless legs syndrome     Rheumatic fever     at 6 y/o       Past Surgical History:   Procedure Laterality Date    APPENDECTOMY      BARIATRIC SURGERY  2014    Gastric Sleeve    CHOLECYSTECTOMY      HERNIA REPAIR      hiatal hernia    HYSTERECTOMY      partial    INSERTION OF DORSAL COLUMN NERVE STIMULATOR FOR TRIAL N/A 5/25/2018    Procedure: TRIAL-STIMULATOR-DORSAL COLUMN;  Surgeon: Raoul Belcher MD;  Location: [facility] OR;  Service:  Pain Management;  Laterality: N/A;    INSERTION OF SPINAL CORD STIMULATOR USING MINIMALLY INVASIVE TECHNIQUE N/A 2018    Procedure: INSERTION, SPINAL CORD STIMULATOR,;  Surgeon: Raoul Belcher MD;  Location: [facility] OR;  Service: Pain Management;  Laterality: N/A;    JOINT REPLACEMENT      BILAT KNEE    KNEE ARTHROPLASTY  2010    bilateral    NISSEN FUNDOPLICATION         Social History     Social History    Marital status:      Spouse name: N/A    Number of children: N/A    Years of education: N/A     Social History Main Topics    Smoking status: Passive Smoke Exposure - Never Smoker    Smokeless tobacco: Never Used    Alcohol use Yes      Comment: 1-2 glasses of wine monthly    Drug use: No    Sexual activity: Not Asked     Other Topics Concern    None     Social History Narrative    None         Medications/Allergies: See med card    Vitals:    18 1008   BP: (!) 145/68   Pulse: 69   Resp: 18   Temp: 98.5 °F (36.9 °C)   TempSrc: Oral   SpO2: 95%   Weight: 103.7 kg (228 lb 9.9 oz)   PainSc:   4   PainLoc: Leg         Physical exam:  Gen: A and O x3, pleasant, well-groomed  Skin: No rashes or obvious lesions  HEENT: PERRLA, no obvious deformities on ears or in canals. Trachea midline.  CVS: Regular rate and rhythm, normal palpable pulses.  Resp:No increased work of breathing, symmetrical chest rise.  Abdomen: Soft, NT/ND.  Musculoskeletal: No antalgic gait.     Neuro:  Lower extremities: 5/5 strength bilaterally  Reflexes: Patellar 2+, Achilles 2+ bilaterally.  Sensory:  Intact and symmetrical to light touch and pinprick in L2-S1 dermatomes bilaterally.    Midline incision intact, dry, nontender.    IPG incision intact, dry.  Erythema along the lateral aspect of the incision almost completely resolved.  No induration.  Mild tenderness to palpation but improved.    Imagin17 MRI L-spine  T11-12: There is minimal bulging of the anulus.  There is mild facet joint  arthropathy with ligamentum flavum thickening, right greater than left.  There is no spinal canal or foraminal stenosis.  T12-L1: There is trace anterolisthesis of T12 on L1.  There is a diffuse disc bulge with moderate facet joint arthropathy.  There is no spinal canal or significant foraminal stenosis.  L1-2: There is marked disc space narrowing and mild retrolisthesis of L1 on L2.  There is inferior unroofing of a diffuse disc bulge with osteophytic ridging, eccentric to the left with broad left paracentral and foraminal disc protrusion.  There is mild facet joint arthropathy.  There is no significant spinal stenosis.  There is moderate left lateral recess stenosis and foraminal stenosis.  There is mild to moderate right foraminal stenosis.  L2-3: There is mild retrolisthesis, diffuse disc bulge, moderate facet joint arthropathy.  There is no spinal stenosis.  There is mild to moderate bilateral, right greater than left, foraminal stenosis.  L3-4: There is moderate to marked facet joint arthropathy with ligamentum flavum thickening.  There is a diffuse disc bulge with osteophytic ridging.  There is mild prominent dorsal epidural fat.  There is only borderline spinal stenosis.  There is moderate bilateral, right greater than left, foraminal stenosis.  L4-5:There is trace anterolisthesis, marked facet joint arthropathy with ligamentum flavum thickening, unroofing of a diffuse disc bulge with small superimposed central disc protrusion.  There is no significant spinal stenosis.  There is moderate marked right and moderate left foraminal stenosis.  L5-S1: There is trace anterolisthesis.  There is marked facet joint arthropathy with ligamentum flavum thickening and bilateral facet joint effusions.  More importantly, there is a large 12 x 10 mm left synovial cyst which results and severe flattening and deformity of the left side of the dural sac and severe left lateral recess stenosis, compressing the left S1 root.   There is unroofing of a diffuse disc bulge.  There is moderate bilateral foraminal stenosis.  All of the discs are desiccated.  There is no acute fracture.  There is trace anterolisthesis of L4 on L5 and L5 on S1.  There is a 3 mm retrolisthesis of L1 on L2 and 2 mm retrolisthesis of L2 on L3.    Assessment:  The patient is a 73-year-old woman with obesity, arthritis, osteoporosis, GERD who presents in referral from Dr. Holley for back and left leg pain.   1. Postoperative wound infection, initial encounter  CBC auto differential    Sedimentation rate    C-REACTIVE PROTEIN   2. Chronic pain syndrome     3. Peripheral neuralgia           Plan:  1.  The patient's symptoms are improving and I have provided another prescription for doxycycline, extending the treatment course to 21 days.  I have ordered another CBC, ESR and CRP to see if she has had improvement in her white cells, CRP and ESR.  Her blood cultures were negative x2.  I advised the patient to contact us immediately or go to the Emergency Department if she develops high fever, new pain, weakness or numbness.  She will follow up in 2 weeks or sooner as needed.

## 2018-08-09 ENCOUNTER — OFFICE VISIT (OUTPATIENT)
Dept: FAMILY MEDICINE | Facility: CLINIC | Age: 73
End: 2018-08-09
Payer: MEDICARE

## 2018-08-09 ENCOUNTER — TELEPHONE (OUTPATIENT)
Dept: PAIN MEDICINE | Facility: CLINIC | Age: 73
End: 2018-08-09

## 2018-08-09 VITALS
TEMPERATURE: 99 F | DIASTOLIC BLOOD PRESSURE: 86 MMHG | SYSTOLIC BLOOD PRESSURE: 138 MMHG | HEIGHT: 59 IN | WEIGHT: 228.81 LBS | HEART RATE: 64 BPM | BODY MASS INDEX: 46.13 KG/M2

## 2018-08-09 DIAGNOSIS — I10 ESSENTIAL HYPERTENSION: Primary | Chronic | ICD-10-CM

## 2018-08-09 DIAGNOSIS — F32.0 MILD MAJOR DEPRESSION: ICD-10-CM

## 2018-08-09 DIAGNOSIS — J45.20 MILD INTERMITTENT REACTIVE AIRWAY DISEASE WITHOUT COMPLICATION: ICD-10-CM

## 2018-08-09 PROCEDURE — 3075F SYST BP GE 130 - 139MM HG: CPT | Mod: CPTII,S$GLB,, | Performed by: FAMILY MEDICINE

## 2018-08-09 PROCEDURE — 3079F DIAST BP 80-89 MM HG: CPT | Mod: CPTII,S$GLB,, | Performed by: FAMILY MEDICINE

## 2018-08-09 PROCEDURE — 99999 PR PBB SHADOW E&M-EST. PATIENT-LVL III: CPT | Mod: PBBFAC,,, | Performed by: FAMILY MEDICINE

## 2018-08-09 PROCEDURE — 99213 OFFICE O/P EST LOW 20 MIN: CPT | Mod: S$GLB,,, | Performed by: FAMILY MEDICINE

## 2018-08-09 NOTE — PROGRESS NOTES
Subjective:       Patient ID: Marta Huff is a 73 y.o. female.    Chief Complaint: Follow-up (4 month follow up)    HPI  Patient in the office for f/u and review.  She is currently on doxy for an infection related to her spine stimulator. Has not been able to exercise as a result.   On zoloft for her nerves. Sleep is relatively preserved. No neg side effects reported. She does recall that family noted she was less jumpy, but has been more stressed of late so they did see some extra snappiness.   She does note that her asthma sx have been doing well this season.occ wheezing noted, but it does not last. She does have albuterol inhaler available.     Review of Systems   Constitutional: Negative for activity change, fatigue and unexpected weight change.   HENT: Negative for hearing loss, rhinorrhea and trouble swallowing.    Eyes: Negative for discharge and visual disturbance.   Respiratory: Negative for cough, chest tightness, shortness of breath and wheezing.    Cardiovascular: Negative for chest pain and palpitations.   Gastrointestinal: Negative for blood in stool, constipation, diarrhea and vomiting.   Endocrine: Negative for polydipsia and polyuria.   Genitourinary: Negative for difficulty urinating, dysuria, hematuria and menstrual problem.   Musculoskeletal: Negative for arthralgias, joint swelling and neck pain.   Neurological: Negative for weakness and headaches.   Psychiatric/Behavioral: Negative for confusion, dysphoric mood and sleep disturbance. The patient is nervous/anxious (doing ok).        Objective:      Physical Exam   Constitutional: She is oriented to person, place, and time. She appears well-developed and well-nourished. No distress.   HENT:   Head: Normocephalic and atraumatic.   Eyes: Conjunctivae are normal. Right eye exhibits no discharge. Left eye exhibits no discharge. No scleral icterus.   Neck: Normal range of motion. Neck supple.   Cardiovascular: Normal rate and regular  rhythm.    Pulmonary/Chest: Effort normal and breath sounds normal. No respiratory distress.   Abdominal: Soft. She exhibits no distension.   Musculoskeletal: Normal range of motion. She exhibits no edema.   Neurological: She is alert and oriented to person, place, and time.   Skin: Skin is warm and dry. No rash noted.   Psychiatric: She has a normal mood and affect. Her behavior is normal.   Nursing note and vitals reviewed.        Essential hypertension  Controlled, cont regimen.  Mild major depression  Doing well at current dose of zoloft. Can continue at 50mg/day.  Mild intermittent reactive airway disease without complication  Stable, cont prn albuterol.

## 2018-08-20 ENCOUNTER — OFFICE VISIT (OUTPATIENT)
Dept: PAIN MEDICINE | Facility: CLINIC | Age: 73
End: 2018-08-20
Payer: MEDICARE

## 2018-08-20 ENCOUNTER — LAB VISIT (OUTPATIENT)
Dept: LAB | Facility: HOSPITAL | Age: 73
End: 2018-08-20
Attending: ANESTHESIOLOGY
Payer: MEDICARE

## 2018-08-20 VITALS
SYSTOLIC BLOOD PRESSURE: 140 MMHG | OXYGEN SATURATION: 95 % | BODY MASS INDEX: 46.02 KG/M2 | RESPIRATION RATE: 20 BRPM | DIASTOLIC BLOOD PRESSURE: 82 MMHG | WEIGHT: 227.88 LBS | TEMPERATURE: 97 F | HEART RATE: 91 BPM

## 2018-08-20 DIAGNOSIS — L03.90 CELLULITIS, UNSPECIFIED CELLULITIS SITE: ICD-10-CM

## 2018-08-20 DIAGNOSIS — M54.16 LEFT LUMBAR RADICULOPATHY: ICD-10-CM

## 2018-08-20 DIAGNOSIS — G89.4 CHRONIC PAIN SYNDROME: Primary | ICD-10-CM

## 2018-08-20 LAB
BASOPHILS # BLD AUTO: 0.08 K/UL
BASOPHILS NFR BLD: 1 %
CRP SERPL-MCNC: 3 MG/L
DIFFERENTIAL METHOD: ABNORMAL
EOSINOPHIL # BLD AUTO: 0.3 K/UL
EOSINOPHIL NFR BLD: 3.8 %
ERYTHROCYTE [DISTWIDTH] IN BLOOD BY AUTOMATED COUNT: 14.4 %
ERYTHROCYTE [SEDIMENTATION RATE] IN BLOOD BY WESTERGREN METHOD: 33 MM/HR
HCT VFR BLD AUTO: 39 %
HGB BLD-MCNC: 12.1 G/DL
IMM GRANULOCYTES # BLD AUTO: 0.02 K/UL
IMM GRANULOCYTES NFR BLD AUTO: 0.3 %
LYMPHOCYTES # BLD AUTO: 2.1 K/UL
LYMPHOCYTES NFR BLD: 27.2 %
MCH RBC QN AUTO: 29.3 PG
MCHC RBC AUTO-ENTMCNC: 31 G/DL
MCV RBC AUTO: 94 FL
MONOCYTES # BLD AUTO: 0.4 K/UL
MONOCYTES NFR BLD: 5.4 %
NEUTROPHILS # BLD AUTO: 4.8 K/UL
NEUTROPHILS NFR BLD: 62.3 %
NRBC BLD-RTO: 0 /100 WBC
PLATELET # BLD AUTO: 273 K/UL
PMV BLD AUTO: 12.3 FL
RBC # BLD AUTO: 4.13 M/UL
WBC # BLD AUTO: 7.66 K/UL

## 2018-08-20 PROCEDURE — 85025 COMPLETE CBC W/AUTO DIFF WBC: CPT

## 2018-08-20 PROCEDURE — 3077F SYST BP >= 140 MM HG: CPT | Mod: CPTII,S$GLB,, | Performed by: ANESTHESIOLOGY

## 2018-08-20 PROCEDURE — 99999 PR PBB SHADOW E&M-EST. PATIENT-LVL IV: CPT | Mod: PBBFAC,,, | Performed by: ANESTHESIOLOGY

## 2018-08-20 PROCEDURE — 36415 COLL VENOUS BLD VENIPUNCTURE: CPT | Mod: PO

## 2018-08-20 PROCEDURE — 3079F DIAST BP 80-89 MM HG: CPT | Mod: CPTII,S$GLB,, | Performed by: ANESTHESIOLOGY

## 2018-08-20 PROCEDURE — 85651 RBC SED RATE NONAUTOMATED: CPT | Mod: PO

## 2018-08-20 PROCEDURE — 99213 OFFICE O/P EST LOW 20 MIN: CPT | Mod: S$GLB,,, | Performed by: ANESTHESIOLOGY

## 2018-08-20 PROCEDURE — 86140 C-REACTIVE PROTEIN: CPT

## 2018-08-20 NOTE — PROGRESS NOTES
This note was completed with dictation software and grammatical errors may exist.    CC: Bilateral foot pain    HPI: The patient is a 73-year-old woman with obesity, arthritis, osteoporosis, GERD who presents in referral from Dr. Holley for back and left leg pain.  She is now 1.5 months out from spinal cord stimulator implantation, there was some concern for possible infection with elevated CRP and ESR.  She was most recently treated with 21 days of doxycycline for which she has 3 more days of taking these.  She states that she no longer has any fevers or chills, denies any pain at the incision sites.  Stimulator seems to be working very well, reports having excellent coverage of her bilateral feet and feels ready to start exercising again.    Pain intervention history:She is status post left L5 and S1 transforaminal epidural steroid injection on 2/5/18 with 100% relief of her radicular left leg pain.     ROS: She reports itching, headaches, joint stiffness, joint swelling and back pain.  Balance of review of systems is negative.    Past Medical History:   Diagnosis Date    Anxiety     Arthralgia of knee     arthralgia of bilateral knees secondary to djd    Arthritis     Back pain     Depression     Encounter for blood transfusion     AUTOLOGUS    GERD (gastroesophageal reflux disease)     Hiatal hernia     repaired in 1979    History of seasonal allergies     History of shingles     Hypertension     Insomnia     Obesity     JESENIA on CPAP     not using cpap currently    Osteoporosis     Pneumonia     Reactive airway disease     Restless legs syndrome     Rheumatic fever     at 6 y/o       Past Surgical History:   Procedure Laterality Date    APPENDECTOMY      BARIATRIC SURGERY  2014    Gastric Sleeve    CHOLECYSTECTOMY      HERNIA REPAIR      hiatal hernia    HYSTERECTOMY      partial    JOINT REPLACEMENT      BILAT KNEE    KNEE ARTHROPLASTY  1/2/2010    bilateral    NISSEN FUNDOPLICATION          Social History     Socioeconomic History    Marital status:      Spouse name: Not on file    Number of children: Not on file    Years of education: Not on file    Highest education level: Not on file   Social Needs    Financial resource strain: Not on file    Food insecurity - worry: Not on file    Food insecurity - inability: Not on file    Transportation needs - medical: Not on file    Transportation needs - non-medical: Not on file   Occupational History    Not on file   Tobacco Use    Smoking status: Passive Smoke Exposure - Never Smoker    Smokeless tobacco: Never Used   Substance and Sexual Activity    Alcohol use: Yes     Comment: 1-2 glasses of wine monthly    Drug use: No    Sexual activity: Not on file   Other Topics Concern    Not on file   Social History Narrative    Not on file         Medications/Allergies: See med card    Vitals:    18 0856   BP: (!) 140/82   Pulse: 91   Resp: 20   Temp: 97.1 °F (36.2 °C)   TempSrc: Oral   SpO2: 95%   Weight: 103.4 kg (227 lb 13.5 oz)   PainSc:   4   PainLoc: Back         Physical exam:  Gen: A and O x3, pleasant, well-groomed  Skin: No rashes or obvious lesions  HEENT: PERRLA, no obvious deformities on ears or in canals. Trachea midline.  CVS: Regular rate and rhythm, normal palpable pulses.  Resp:No increased work of breathing, symmetrical chest rise.  Abdomen: Soft, NT/ND.  Musculoskeletal: No antalgic gait.     Neuro:  Lower extremities: 5/5 strength bilaterally  Reflexes: Patellar 2+, Achilles 2+ bilaterally.  Sensory:  Intact and symmetrical to light touch and pinprick in L2-S1 dermatomes bilaterally.    Midline incision intact, dry, nontender.  Does appear somewhat erythematous with multiple red spots laterally on both sides.  IPG incision intact, dry.  No erythema whatsoever, nontender.    Imagin17 MRI L-spine  T11-12: There is minimal bulging of the anulus.  There is mild facet joint arthropathy with ligamentum  flavum thickening, right greater than left.  There is no spinal canal or foraminal stenosis.  T12-L1: There is trace anterolisthesis of T12 on L1.  There is a diffuse disc bulge with moderate facet joint arthropathy.  There is no spinal canal or significant foraminal stenosis.  L1-2: There is marked disc space narrowing and mild retrolisthesis of L1 on L2.  There is inferior unroofing of a diffuse disc bulge with osteophytic ridging, eccentric to the left with broad left paracentral and foraminal disc protrusion.  There is mild facet joint arthropathy.  There is no significant spinal stenosis.  There is moderate left lateral recess stenosis and foraminal stenosis.  There is mild to moderate right foraminal stenosis.  L2-3: There is mild retrolisthesis, diffuse disc bulge, moderate facet joint arthropathy.  There is no spinal stenosis.  There is mild to moderate bilateral, right greater than left, foraminal stenosis.  L3-4: There is moderate to marked facet joint arthropathy with ligamentum flavum thickening.  There is a diffuse disc bulge with osteophytic ridging.  There is mild prominent dorsal epidural fat.  There is only borderline spinal stenosis.  There is moderate bilateral, right greater than left, foraminal stenosis.  L4-5:There is trace anterolisthesis, marked facet joint arthropathy with ligamentum flavum thickening, unroofing of a diffuse disc bulge with small superimposed central disc protrusion.  There is no significant spinal stenosis.  There is moderate marked right and moderate left foraminal stenosis.  L5-S1: There is trace anterolisthesis.  There is marked facet joint arthropathy with ligamentum flavum thickening and bilateral facet joint effusions.  More importantly, there is a large 12 x 10 mm left synovial cyst which results and severe flattening and deformity of the left side of the dural sac and severe left lateral recess stenosis, compressing the left S1 root.  There is unroofing of a diffuse  disc bulge.  There is moderate bilateral foraminal stenosis.  All of the discs are desiccated.  There is no acute fracture.  There is trace anterolisthesis of L4 on L5 and L5 on S1.  There is a 3 mm retrolisthesis of L1 on L2 and 2 mm retrolisthesis of L2 on L3.    Assessment:  The patient is a 73-year-old woman with obesity, arthritis, osteoporosis, GERD who presents in referral from Dr. Holley for back and left leg pain.   1. Chronic pain syndrome     2. Left lumbar radiculopathy     3. Cellulitis, unspecified cellulitis site  CBC W/ AUTO DIFFERENTIAL    C-reactive protein    Sedimentation rate         Plan:  1.  We discussed that she likely had a cellulitis at the IPG site but that seems to be completely resolved.  The midline incision site does appear somewhat red but completely nontender and I think this may be some normal healing, also her back brace may be rubbing against the incision site.  I have advised her to discontinue the brace and I am going to order CBC, ESR, CRP just to make sure things are moving in the right direction.  I will call her with the results.  Otherwise I will have her follow-up in 1 month or sooner as needed.  2.  I would like her to return to exercising, we discussed using treadmill and elliptical without the arms for the next month and then I think she will be free to do arm exercises as well.  We discussed trying to avoid migration of the leads.

## 2018-08-22 ENCOUNTER — PES CALL (OUTPATIENT)
Dept: ADMINISTRATIVE | Facility: CLINIC | Age: 73
End: 2018-08-22

## 2018-09-04 ENCOUNTER — PATIENT MESSAGE (OUTPATIENT)
Dept: ADMINISTRATIVE | Facility: OTHER | Age: 73
End: 2018-09-04

## 2018-09-04 ENCOUNTER — PATIENT MESSAGE (OUTPATIENT)
Dept: PAIN MEDICINE | Facility: CLINIC | Age: 73
End: 2018-09-04

## 2018-09-06 ENCOUNTER — PATIENT MESSAGE (OUTPATIENT)
Dept: BARIATRICS | Facility: CLINIC | Age: 73
End: 2018-09-06

## 2018-09-11 ENCOUNTER — TELEPHONE (OUTPATIENT)
Dept: PAIN MEDICINE | Facility: CLINIC | Age: 73
End: 2018-09-11

## 2018-09-11 ENCOUNTER — HOSPITAL ENCOUNTER (OUTPATIENT)
Dept: RADIOLOGY | Facility: HOSPITAL | Age: 73
Discharge: HOME | End: 2018-09-11
Attending: PHYSICIAN ASSISTANT
Payer: MEDICARE

## 2018-09-11 ENCOUNTER — OFFICE VISIT (OUTPATIENT)
Dept: PAIN MEDICINE | Facility: CLINIC | Age: 73
End: 2018-09-11
Payer: MEDICARE

## 2018-09-11 VITALS
TEMPERATURE: 98 F | DIASTOLIC BLOOD PRESSURE: 75 MMHG | WEIGHT: 226 LBS | SYSTOLIC BLOOD PRESSURE: 144 MMHG | HEIGHT: 59 IN | HEART RATE: 70 BPM | BODY MASS INDEX: 45.56 KG/M2

## 2018-09-11 DIAGNOSIS — T85.733A INFECTION OF SPINAL CORD STIMULATOR, INITIAL ENCOUNTER: Primary | ICD-10-CM

## 2018-09-11 DIAGNOSIS — T85.733A INFECTION OF SPINAL CORD STIMULATOR, INITIAL ENCOUNTER: ICD-10-CM

## 2018-09-11 PROCEDURE — 72133 CT LUMBAR SPINE W/O & W/DYE: CPT | Mod: TC,PO

## 2018-09-11 PROCEDURE — 3077F SYST BP >= 140 MM HG: CPT | Mod: CPTII,,, | Performed by: PHYSICIAN ASSISTANT

## 2018-09-11 PROCEDURE — 72130 CT CHEST SPINE W/O & W/DYE: CPT | Mod: 26,,, | Performed by: RADIOLOGY

## 2018-09-11 PROCEDURE — 25500020 PHARM REV CODE 255: Mod: PO | Performed by: PHYSICIAN ASSISTANT

## 2018-09-11 PROCEDURE — 99499 UNLISTED E&M SERVICE: CPT | Mod: HCNC,S$GLB,, | Performed by: PHYSICIAN ASSISTANT

## 2018-09-11 PROCEDURE — 3078F DIAST BP <80 MM HG: CPT | Mod: CPTII,,, | Performed by: PHYSICIAN ASSISTANT

## 2018-09-11 PROCEDURE — 1101F PT FALLS ASSESS-DOCD LE1/YR: CPT | Mod: CPTII,,, | Performed by: PHYSICIAN ASSISTANT

## 2018-09-11 PROCEDURE — 72133 CT LUMBAR SPINE W/O & W/DYE: CPT | Mod: 26,,, | Performed by: RADIOLOGY

## 2018-09-11 PROCEDURE — 99213 OFFICE O/P EST LOW 20 MIN: CPT | Mod: S$PBB,,, | Performed by: PHYSICIAN ASSISTANT

## 2018-09-11 PROCEDURE — 99999 PR PBB SHADOW E&M-EST. PATIENT-LVL V: CPT | Mod: PBBFAC,,, | Performed by: PHYSICIAN ASSISTANT

## 2018-09-11 PROCEDURE — 72130 CT CHEST SPINE W/O & W/DYE: CPT | Mod: TC,PO

## 2018-09-11 PROCEDURE — 99215 OFFICE O/P EST HI 40 MIN: CPT | Mod: PBBFAC,25,PN | Performed by: PHYSICIAN ASSISTANT

## 2018-09-11 RX ORDER — SODIUM CHLORIDE, SODIUM LACTATE, POTASSIUM CHLORIDE, CALCIUM CHLORIDE 600; 310; 30; 20 MG/100ML; MG/100ML; MG/100ML; MG/100ML
INJECTION, SOLUTION INTRAVENOUS CONTINUOUS
Status: CANCELLED | OUTPATIENT
Start: 2018-09-12

## 2018-09-11 RX ORDER — CLINDAMYCIN HYDROCHLORIDE 150 MG/1
300 CAPSULE ORAL 4 TIMES DAILY
Qty: 184 CAPSULE | Refills: 0 | Status: SHIPPED | OUTPATIENT
Start: 2018-09-11 | End: 2018-10-04

## 2018-09-11 RX ADMIN — IOHEXOL 75 ML: 350 INJECTION, SOLUTION INTRAVENOUS at 11:09

## 2018-09-11 NOTE — PROGRESS NOTES
This note was completed with dictation software and grammatical errors may exist.    CC: Bilateral foot pain    HPI: The patient is a 73-year-old woman with obesity, arthritis, osteoporosis, GERD who presents in referral from Dr. Holley for back and left leg pain.  She returns in follow-up today, reports going to the emergency department on 08/31/2018 for wound infection.  She had been treated for 21 days with doxycycline for a suspected MRSA superficial surgical IPG site infection with complete resolution of her symptoms.  She then presented to the emergency department with drainage from her midline incision.  She was given 7 days of clindamycin 300 mg 4 times a day which she completed 3 days ago.  She states that she did have some pain at the area during that time but no longer has any pain.  The stimulator is covering her feet completely.  She denies fever or chills.    Pain intervention history:She is status post left L5 and S1 transforaminal epidural steroid injection on 2/5/18 with 100% relief of her radicular left leg pain.     ROS: She reports itching, headaches, joint stiffness, joint swelling and back pain.  Balance of review of systems is negative.    Past Medical History:   Diagnosis Date    Anxiety     Arthralgia of knee     arthralgia of bilateral knees secondary to djd    Arthritis     Back pain     Depression     Encounter for blood transfusion     AUTOLOGUS    GERD (gastroesophageal reflux disease)     Hiatal hernia     repaired in 1979    History of seasonal allergies     History of shingles     Hypertension     Insomnia     Obesity     JESENIA on CPAP     not using cpap currently    Osteoporosis     Pneumonia     Reactive airway disease     Restless legs syndrome     Rheumatic fever     at 6 y/o       Past Surgical History:   Procedure Laterality Date    APPENDECTOMY      BARIATRIC SURGERY  2014    Gastric Sleeve    CHOLECYSTECTOMY      EGD (ESOPHAGOGASTRODUODENOSCOPY) N/A  5/20/2014    Performed by Tino [facility]eiros Jr., [facility] at [facility] OR 2ND FLR    EGD (ESOPHAGOGASTRODUODENOSCOPY) N/A 11/25/2013    Performed by Antonio Mendez MD at [facility] ENDO (4TH FLR)    AKUA-TRANSFORAMINAL L5 and S1 Left 2/5/2018    Performed by [facility]oul Belcher MD at [facility] OR    ESOPHAGOGASTRODUODENOSCOPY (EGD) N/A 6/23/2017    Performed by Dorene Tripathi MD at [facility] ENDO    GASTRECTOMY, SLEEVE, LAPAROSCOPIC N/A 5/20/2014    Performed by Tino [facility] Jr., [facility] at [facility] OR 2ND FLR    HERNIA REPAIR      hiatal hernia    HYSTERECTOMY      partial    INSERTION OF DORSAL COLUMN NERVE STIMULATOR FOR TRIAL N/A 5/25/2018    Procedure: TRIAL-STIMULATOR-DORSAL COLUMN;  Surgeon: Raoul Belcher MD;  Location: [facility] OR;  Service: Pain Management;  Laterality: N/A;    INSERTION OF SPINAL CORD STIMULATOR USING MINIMALLY INVASIVE TECHNIQUE N/A 7/11/2018    Procedure: INSERTION, SPINAL CORD STIMULATOR,;  Surgeon: Raoul Belcher MD;  Location: [facility] OR;  Service: Pain Management;  Laterality: N/A;    INSERTION, SPINAL CORD STIMULATOR, N/A 7/11/2018    Performed by [facility]cher MD at [facility] OR    JOINT REPLACEMENT      BILAT KNEE    KNEE ARTHROPLASTY  1/2/2010    bilateral    [id number] N/A 5/20/2014    Performed by Tino [facility] Jr., MD at [facility] OR 2ND FLR    NISSEN FUNDOPLICATION      YOWL-H-Z-LAPAROSCOPIC N/A 9/18/2017    Performed by [facility]i [facility] at [facility] OR    TRIAL-STIMULATOR-DORSAL COLUMN N/A 5/25/2018    Performed by Raoul Belcher MD at [facility] OR       Social History     Socioeconomic History    Marital status:      Spouse name: None    Number of children: None    Years of education: None    Highest education level: None   Social Needs    Financial resource strain: None    Food insecurity - worry: None    Food insecurity - inability: None    Transportation needs - medical: None    Transportation needs - non-medical: None   Occupational  "History    None   Tobacco Use    Smoking status: Passive Smoke Exposure - Never Smoker    Smokeless tobacco: Never Used   Substance and Sexual Activity    Alcohol use: Yes     Comment: 1-2 glasses of wine monthly    Drug use: No    Sexual activity: None   Other Topics Concern    None   Social History Narrative    None         Medications/Allergies: See med card    Vitals:    18 0927   BP: (!) 144/75   Pulse: 70   Temp: 97.6 °F (36.4 °C)   Weight: 102.5 kg (225 lb 15.5 oz)   Height: 4' 11" (1.499 m)   PainSc: 0-No pain         Physical exam:  Gen: A and O x3, pleasant, well-groomed  Skin: No rashes or obvious lesions  HEENT: PERRLA, no obvious deformities on ears or in canals. Trachea midline.  CVS: Regular rate and rhythm, normal palpable pulses.  Resp:No increased work of breathing, symmetrical chest rise.  Abdomen: Soft, NT/ND.  Musculoskeletal: No antalgic gait.     Neuro:  Lower extremities: 5/5 strength bilaterally  Reflexes: Patellar 2+, Achilles 2+ bilaterally.  Sensory:  Intact and symmetrical to light touch and pinprick in L2-S1 dermatomes bilaterally.    Midline incision intact, dry, nontender.  Stimulator lead exposed.  IPG incision intact, dry, nontender.  No erythema.    Imagin17 MRI L-spine  T11-12: There is minimal bulging of the anulus.  There is mild facet joint arthropathy with ligamentum flavum thickening, right greater than left.  There is no spinal canal or foraminal stenosis.  T12-L1: There is trace anterolisthesis of T12 on L1.  There is a diffuse disc bulge with moderate facet joint arthropathy.  There is no spinal canal or significant foraminal stenosis.  L1-2: There is marked disc space narrowing and mild retrolisthesis of L1 on L2.  There is inferior unroofing of a diffuse disc bulge with osteophytic ridging, eccentric to the left with broad left paracentral and foraminal disc protrusion.  There is mild facet joint arthropathy.  There is no significant spinal " stenosis.  There is moderate left lateral recess stenosis and foraminal stenosis.  There is mild to moderate right foraminal stenosis.  L2-3: There is mild retrolisthesis, diffuse disc bulge, moderate facet joint arthropathy.  There is no spinal stenosis.  There is mild to moderate bilateral, right greater than left, foraminal stenosis.  L3-4: There is moderate to marked facet joint arthropathy with ligamentum flavum thickening.  There is a diffuse disc bulge with osteophytic ridging.  There is mild prominent dorsal epidural fat.  There is only borderline spinal stenosis.  There is moderate bilateral, right greater than left, foraminal stenosis.  L4-5:There is trace anterolisthesis, marked facet joint arthropathy with ligamentum flavum thickening, unroofing of a diffuse disc bulge with small superimposed central disc protrusion.  There is no significant spinal stenosis.  There is moderate marked right and moderate left foraminal stenosis.  L5-S1: There is trace anterolisthesis.  There is marked facet joint arthropathy with ligamentum flavum thickening and bilateral facet joint effusions.  More importantly, there is a large 12 x 10 mm left synovial cyst which results and severe flattening and deformity of the left side of the dural sac and severe left lateral recess stenosis, compressing the left S1 root.  There is unroofing of a diffuse disc bulge.  There is moderate bilateral foraminal stenosis.  All of the discs are desiccated.  There is no acute fracture.  There is trace anterolisthesis of L4 on L5 and L5 on S1.  There is a 3 mm retrolisthesis of L1 on L2 and 2 mm retrolisthesis of L2 on L3.    Assessment:  The patient is a 73-year-old woman with obesity, arthritis, osteoporosis, GERD who presents in referral from Dr. Holley for back and left leg pain.   1. Infection of spinal cord stimulator, initial encounter  CT Lumbar Spine W Wo Contrast    CT Thoracic Spine W Wo Contrast    Vital signs    [place] 18[place] [facility] [place]     Verify informed consent    Notify physician     Notify physician     Notify physician (specify)    Diet NPO    Case Request Operating Room: REMOVAL, IMPLANT spinal cord stimulator    Place in Outpatient    lactated ringers infusion    vancomycin (VANCOCIN) 1,000 mg in dextrose 5 % 500 mL IVPB         Plan:  1.  The patient had a good response to clindamycin but her stimulator lead is now exposed.  I have discussed her case with Dr. Belcher and we will obtain CTs of the thoracic and lumbar spine and schedule her to have her stimulator leads removed tomorrow.  I have provided a prescription for clindamycin 300 mg 4 times a day.  She will have 21 days of antibiotics status post stimulator removal.  I advised her to take probiotics to help prevent C diff colitis.  2.  Follow-up in 1 week postop or sooner as needed.

## 2018-09-11 NOTE — H&P (VIEW-ONLY)
This note was completed with dictation software and grammatical errors may exist.    CC: Bilateral foot pain    HPI: The patient is a 73-year-old woman with obesity, arthritis, osteoporosis, GERD who presents in referral from Dr. Holley for back and left leg pain.  She returns in follow-up today, reports going to the emergency department on 08/31/2018 for wound infection.  She had been treated for 21 days with doxycycline for a suspected MRSA superficial surgical IPG site infection with complete resolution of her symptoms.  She then presented to the emergency department with drainage from her midline incision.  She was given 7 days of clindamycin 300 mg 4 times a day which she completed 3 days ago.  She states that she did have some pain at the area during that time but no longer has any pain.  The stimulator is covering her feet completely.  She denies fever or chills.    Pain intervention history:She is status post left L5 and S1 transforaminal epidural steroid injection on 2/5/18 with 100% relief of her radicular left leg pain.     ROS: She reports itching, headaches, joint stiffness, joint swelling and back pain.  Balance of review of systems is negative.    Past Medical History:   Diagnosis Date    Anxiety     Arthralgia of knee     arthralgia of bilateral knees secondary to djd    Arthritis     Back pain     Depression     Encounter for blood transfusion     AUTOLOGUS    GERD (gastroesophageal reflux disease)     Hiatal hernia     repaired in 1979    History of seasonal allergies     History of shingles     Hypertension     Insomnia     Obesity     JESENIA on CPAP     not using cpap currently    Osteoporosis     Pneumonia     Reactive airway disease     Restless legs syndrome     Rheumatic fever     at 6 y/o       Past Surgical History:   Procedure Laterality Date    APPENDECTOMY      BARIATRIC SURGERY  2014    Gastric Sleeve    CHOLECYSTECTOMY      EGD (ESOPHAGOGASTRODUODENOSCOPY) N/A  5/20/2014    Performed by [facility] Jr., MD at [facility] OR 2ND FLR    EGD (ESOPHAGOGASTRODUODENOSCOPY) N/A 11/25/2013    Performed by Antonio Mendez MD at [facility] ENDO (4TH FLR)    AKUA-TRANSFORAMINAL L5 and S1 Left 2/5/2018    [facility] at [facility] OR    ESOPHAGOGASTRODUODENOSCOPY (EGD) N/A 6/23/2017    Performed by [facility]i MD at [facility] ENDO    GASTRECTOMY, SLEEVE, LAPAROSCOPIC N/A 5/20/2014    Performed by Tino [facility]ros Jr., MD at [facility] OR 2ND FLR    HERNIA REPAIR      hiatal hernia    HYSTERECTOMY      partial    INSERTION OF DORSAL COLUMN NERVE STIMULATOR FOR TRIAL N/A 5/25/2018    Procedure: TRIAL-STIMULATOR-DORSAL COLUMN;  Surgeon: Raoul Belcher MD;  Location: [facility] OR;  Service: Pain Management;  Laterality: N/A;    INSERTION OF SPINAL CORD STIMULATOR USING MINIMALLY INVASIVE TECHNIQUE N/A 7/11/2018    Procedure: INSERTION, SPINAL CORD STIMULATOR,;  Surgeon: Raoul Belcher MD;  Location: [facility] OR;  Service: Pain Management;  Laterality: N/A;    INSERTION, SPINAL CORD STIMULATOR, N/A 7/11/2018    Performed by [facility]cher MD at [facility] OR    JOINT REPLACEMENT      BILAT KNEE    KNEE ARTHROPLASTY  1/2/2010    bilateral    [id number] N/A 5/20/2014    Performed by Tino [facility]ros Jr., MD at [facility] OR 2ND FLR    NISSEN FUNDOPLICATION      XTLZ-C-A-LAPAROSCOPIC N/A 9/18/2017    Performed by [facility] MD at [facility] OR    TRIAL-STIMULATOR-DORSAL COLUMN N/A 5/25/2018    Performed by [facility]cher MD at [facility] OR       Social History     Socioeconomic History    Marital status:      Spouse name: None    Number of children: None    Years of education: None    Highest education level: None   Social Needs    Financial resource strain: None    Food insecurity - worry: None    Food insecurity - inability: None    Transportation needs - medical: None    Transportation needs - non-medical: None   Occupational  "History    None   Tobacco Use    Smoking status: Passive Smoke Exposure - Never Smoker    Smokeless tobacco: Never Used   Substance and Sexual Activity    Alcohol use: Yes     Comment: 1-2 glasses of wine monthly    Drug use: No    Sexual activity: None   Other Topics Concern    None   Social History Narrative    None         Medications/Allergies: See med card    Vitals:    18 0927   BP: (!) 144/75   Pulse: 70   Temp: 97.6 °F (36.4 °C)   Weight: 102.5 kg (225 lb 15.5 oz)   Height: 4' 11" (1.499 m)   PainSc: 0-No pain         Physical exam:  Gen: A and O x3, pleasant, well-groomed  Skin: No rashes or obvious lesions  HEENT: PERRLA, no obvious deformities on ears or in canals. Trachea midline.  CVS: Regular rate and rhythm, normal palpable pulses.  Resp:No increased work of breathing, symmetrical chest rise.  Abdomen: Soft, NT/ND.  Musculoskeletal: No antalgic gait.     Neuro:  Lower extremities: 5/5 strength bilaterally  Reflexes: Patellar 2+, Achilles 2+ bilaterally.  Sensory:  Intact and symmetrical to light touch and pinprick in L2-S1 dermatomes bilaterally.    Midline incision intact, dry, nontender.  Stimulator lead exposed.  IPG incision intact, dry, nontender.  No erythema.    Imagin17 MRI L-spine  T11-12: There is minimal bulging of the anulus.  There is mild facet joint arthropathy with ligamentum flavum thickening, right greater than left.  There is no spinal canal or foraminal stenosis.  T12-L1: There is trace anterolisthesis of T12 on L1.  There is a diffuse disc bulge with moderate facet joint arthropathy.  There is no spinal canal or significant foraminal stenosis.  L1-2: There is marked disc space narrowing and mild retrolisthesis of L1 on L2.  There is inferior unroofing of a diffuse disc bulge with osteophytic ridging, eccentric to the left with broad left paracentral and foraminal disc protrusion.  There is mild facet joint arthropathy.  There is no significant spinal " stenosis.  There is moderate left lateral recess stenosis and foraminal stenosis.  There is mild to moderate right foraminal stenosis.  L2-3: There is mild retrolisthesis, diffuse disc bulge, moderate facet joint arthropathy.  There is no spinal stenosis.  There is mild to moderate bilateral, right greater than left, foraminal stenosis.  L3-4: There is moderate to marked facet joint arthropathy with ligamentum flavum thickening.  There is a diffuse disc bulge with osteophytic ridging.  There is mild prominent dorsal epidural fat.  There is only borderline spinal stenosis.  There is moderate bilateral, right greater than left, foraminal stenosis.  L4-5:There is trace anterolisthesis, marked facet joint arthropathy with ligamentum flavum thickening, unroofing of a diffuse disc bulge with small superimposed central disc protrusion.  There is no significant spinal stenosis.  There is moderate marked right and moderate left foraminal stenosis.  L5-S1: There is trace anterolisthesis.  There is marked facet joint arthropathy with ligamentum flavum thickening and bilateral facet joint effusions.  More importantly, there is a large 12 x 10 mm left synovial cyst which results and severe flattening and deformity of the left side of the dural sac and severe left lateral recess stenosis, compressing the left S1 root.  There is unroofing of a diffuse disc bulge.  There is moderate bilateral foraminal stenosis.  All of the discs are desiccated.  There is no acute fracture.  There is trace anterolisthesis of L4 on L5 and L5 on S1.  There is a 3 mm retrolisthesis of L1 on L2 and 2 mm retrolisthesis of L2 on L3.    Assessment:  The patient is a 73-year-old woman with obesity, arthritis, osteoporosis, GERD who presents in referral from Dr. Holley for back and left leg pain.   1. Infection of spinal cord stimulator, initial encounter  CT Lumbar Spine W Wo Contrast    CT Thoracic Spine W Wo Contrast    Vital signs    Place 18-22 [facility] [place]     Verify informed consent    Notify physician     Notify physician     Notify physician (specify)    Diet NPO    Case Request Operating Room: REMOVAL, IMPLANT spinal cord stimulator    Place in Outpatient    lactated ringers infusion    vancomycin (VANCOCIN) 1,000 mg in dextrose 5 % 500 mL IVPB         Plan:  1.  The patient had a good response to clindamycin but her stimulator lead is now exposed.  I have discussed her case with Dr. Belcher and we will obtain CTs of the thoracic and lumbar spine and schedule her to have her stimulator leads removed tomorrow.  I have provided a prescription for clindamycin 300 mg 4 times a day.  She will have 21 days of antibiotics status post stimulator removal.  I advised her to take probiotics to help prevent C diff colitis.  2.  Follow-up in 1 week postop or sooner as needed.

## 2018-09-11 NOTE — TELEPHONE ENCOUNTER
----- Message from Nir Nelson sent at 9/11/2018  8:45 AM CDT -----  Type: Needs Medical Advice    Who Called:  Patient    Best Call Back Number: (cell) [phone]  Additional Information: Patient calling.  States that she's stuck in traffic and late for her 8:30 appointment but she's on her way.

## 2018-09-12 ENCOUNTER — HOSPITAL ENCOUNTER (OUTPATIENT)
Facility: HOSPITAL | Age: 73
Discharge: HOME | End: 2018-09-12
Attending: ANESTHESIOLOGY | Admitting: ANESTHESIOLOGY
Payer: MEDICARE

## 2018-09-12 ENCOUNTER — ANESTHESIA (OUTPATIENT)
Dept: SURGERY | Facility: HOSPITAL | Age: 73
End: 2018-09-12
Payer: MEDICARE

## 2018-09-12 ENCOUNTER — ANESTHESIA EVENT (OUTPATIENT)
Dept: SURGERY | Facility: HOSPITAL | Age: 73
End: 2018-09-12
Payer: MEDICARE

## 2018-09-12 ENCOUNTER — HOSPITAL ENCOUNTER (OUTPATIENT)
Dept: RADIOLOGY | Facility: HOSPITAL | Age: 73
Discharge: HOME | End: 2018-09-12
Attending: ANESTHESIOLOGY | Admitting: ANESTHESIOLOGY
Payer: MEDICARE

## 2018-09-12 VITALS
SYSTOLIC BLOOD PRESSURE: 145 MMHG | HEART RATE: 68 BPM | TEMPERATURE: 98 F | HEIGHT: 59 IN | DIASTOLIC BLOOD PRESSURE: 79 MMHG | WEIGHT: 220 LBS | BODY MASS INDEX: 44.35 KG/M2 | RESPIRATION RATE: 14 BRPM | OXYGEN SATURATION: 96 %

## 2018-09-12 DIAGNOSIS — T85.733D INFECTION OF SPINAL CORD STIMULATOR, SUBSEQUENT ENCOUNTER: Primary | ICD-10-CM

## 2018-09-12 DIAGNOSIS — T85.733A INFECTION OF SPINAL CORD STIMULATOR, INITIAL ENCOUNTER: ICD-10-CM

## 2018-09-12 DIAGNOSIS — G89.4 CHRONIC PAIN SYNDROME: ICD-10-CM

## 2018-09-12 DIAGNOSIS — M51.36 DDD (DEGENERATIVE DISC DISEASE), LUMBAR: ICD-10-CM

## 2018-09-12 LAB
ACID FAST MOD KINY STN SPEC: NORMAL
GRAM STN SPEC: NORMAL
GRAM STN SPEC: NORMAL

## 2018-09-12 PROCEDURE — 37000008 HC ANESTHESIA 1ST 15 MINUTES: Mod: PO | Performed by: ANESTHESIOLOGY

## 2018-09-12 PROCEDURE — D9220A PRA ANESTHESIA: Mod: CRNA,,, | Performed by: NURSE ANESTHETIST, CERTIFIED REGISTERED

## 2018-09-12 PROCEDURE — 37000009 HC ANESTHESIA EA ADD 15 MINS: Mod: PO | Performed by: ANESTHESIOLOGY

## 2018-09-12 PROCEDURE — D9220A PRA ANESTHESIA: Mod: ANES,,, | Performed by: ANESTHESIOLOGY

## 2018-09-12 PROCEDURE — 63688 REV/RMV IMP SP NPG/R DTCH CN: CPT | Mod: ,,, | Performed by: ANESTHESIOLOGY

## 2018-09-12 PROCEDURE — 76000 FLUOROSCOPY <1 HR PHYS/QHP: CPT | Mod: TC,PO

## 2018-09-12 PROCEDURE — 25000003 PHARM REV CODE 250: Mod: PO | Performed by: ANESTHESIOLOGY

## 2018-09-12 PROCEDURE — 87075 CULTR BACTERIA EXCEPT BLOOD: CPT

## 2018-09-12 PROCEDURE — 63600175 PHARM REV CODE 636 W HCPCS: Mod: PO | Performed by: ANESTHESIOLOGY

## 2018-09-12 PROCEDURE — S0020 INJECTION, BUPIVICAINE HYDRO: HCPCS | Mod: PO | Performed by: ANESTHESIOLOGY

## 2018-09-12 PROCEDURE — 36000707: Mod: PO | Performed by: ANESTHESIOLOGY

## 2018-09-12 PROCEDURE — 63600175 PHARM REV CODE 636 W HCPCS: Mod: PO | Performed by: NURSE ANESTHETIST, CERTIFIED REGISTERED

## 2018-09-12 PROCEDURE — 63661 REMOVE SPINE ELTRD PERQ ARAY: CPT | Mod: 51,,, | Performed by: ANESTHESIOLOGY

## 2018-09-12 PROCEDURE — 36000706: Mod: PO | Performed by: ANESTHESIOLOGY

## 2018-09-12 PROCEDURE — 87102 FUNGUS ISOLATION CULTURE: CPT

## 2018-09-12 PROCEDURE — 87205 SMEAR GRAM STAIN: CPT

## 2018-09-12 PROCEDURE — 87206 SMEAR FLUORESCENT/ACID STAI: CPT

## 2018-09-12 PROCEDURE — 71000033 HC RECOVERY, INTIAL HOUR: Mod: PO | Performed by: ANESTHESIOLOGY

## 2018-09-12 PROCEDURE — 87070 CULTURE OTHR SPECIMN AEROBIC: CPT

## 2018-09-12 RX ORDER — MIDAZOLAM HYDROCHLORIDE 1 MG/ML
INJECTION, SOLUTION INTRAMUSCULAR; INTRAVENOUS
Status: DISCONTINUED | OUTPATIENT
Start: 2018-09-12 | End: 2018-09-12

## 2018-09-12 RX ORDER — LIDOCAINE HYDROCHLORIDE 10 MG/ML
1 INJECTION INFILTRATION; PERINEURAL ONCE
Status: COMPLETED | OUTPATIENT
Start: 2018-09-12 | End: 2018-09-12

## 2018-09-12 RX ORDER — OXYCODONE HYDROCHLORIDE 5 MG/1
5 TABLET ORAL ONCE AS NEEDED
Status: COMPLETED | OUTPATIENT
Start: 2018-09-12 | End: 2018-09-12

## 2018-09-12 RX ORDER — SODIUM CHLORIDE 0.9 % (FLUSH) 0.9 %
3 SYRINGE (ML) INJECTION
Status: DISCONTINUED | OUTPATIENT
Start: 2018-09-12 | End: 2018-09-12 | Stop reason: HOSPADM

## 2018-09-12 RX ORDER — PROPOFOL 10 MG/ML
VIAL (ML) INTRAVENOUS
Status: DISCONTINUED | OUTPATIENT
Start: 2018-09-12 | End: 2018-09-12

## 2018-09-12 RX ORDER — BACITRACIN 50000 [IU]/1
INJECTION, POWDER, FOR SOLUTION INTRAMUSCULAR
Status: DISCONTINUED | OUTPATIENT
Start: 2018-09-12 | End: 2018-09-12 | Stop reason: HOSPADM

## 2018-09-12 RX ORDER — BACITRACIN ZINC 500 UNIT/G
OINTMENT (GRAM) TOPICAL
Status: DISCONTINUED | OUTPATIENT
Start: 2018-09-12 | End: 2018-09-12 | Stop reason: HOSPADM

## 2018-09-12 RX ORDER — VANCOMYCIN HCL IN 5 % DEXTROSE 1G/250ML
1000 PLASTIC BAG, INJECTION (ML) INTRAVENOUS
Status: DISCONTINUED | OUTPATIENT
Start: 2018-09-12 | End: 2018-09-12 | Stop reason: HOSPADM

## 2018-09-12 RX ORDER — HYDROCODONE BITARTRATE AND ACETAMINOPHEN 7.5; 325 MG/1; MG/1
1 TABLET ORAL EVERY 4 HOURS PRN
Qty: 30 TABLET | Refills: 0 | Status: SHIPPED | OUTPATIENT
Start: 2018-09-12 | End: 2018-10-12

## 2018-09-12 RX ORDER — SODIUM CHLORIDE, SODIUM LACTATE, POTASSIUM CHLORIDE, CALCIUM CHLORIDE 600; 310; 30; 20 MG/100ML; MG/100ML; MG/100ML; MG/100ML
INJECTION, SOLUTION INTRAVENOUS CONTINUOUS
Status: DISCONTINUED | OUTPATIENT
Start: 2018-09-12 | End: 2018-09-12 | Stop reason: HOSPADM

## 2018-09-12 RX ORDER — BUPIVACAINE HYDROCHLORIDE 2.5 MG/ML
INJECTION, SOLUTION INFILTRATION; PERINEURAL
Status: DISCONTINUED | OUTPATIENT
Start: 2018-09-12 | End: 2018-09-12 | Stop reason: HOSPADM

## 2018-09-12 RX ORDER — FENTANYL CITRATE 50 UG/ML
INJECTION, SOLUTION INTRAMUSCULAR; INTRAVENOUS
Status: DISCONTINUED | OUTPATIENT
Start: 2018-09-12 | End: 2018-09-12

## 2018-09-12 RX ORDER — ONDANSETRON 2 MG/ML
INJECTION INTRAMUSCULAR; INTRAVENOUS
Status: DISCONTINUED | OUTPATIENT
Start: 2018-09-12 | End: 2018-09-12

## 2018-09-12 RX ORDER — PROPOFOL 10 MG/ML
VIAL (ML) INTRAVENOUS CONTINUOUS PRN
Status: DISCONTINUED | OUTPATIENT
Start: 2018-09-12 | End: 2018-09-12

## 2018-09-12 RX ORDER — VANCOMYCIN HYDROCHLORIDE 500 MG/10ML
INJECTION, POWDER, LYOPHILIZED, FOR SOLUTION INTRAVENOUS
Status: DISCONTINUED | OUTPATIENT
Start: 2018-09-12 | End: 2018-09-12 | Stop reason: HOSPADM

## 2018-09-12 RX ORDER — LIDOCAINE HYDROCHLORIDE AND EPINEPHRINE 10; 10 MG/ML; UG/ML
INJECTION, SOLUTION INFILTRATION; PERINEURAL
Status: DISCONTINUED | OUTPATIENT
Start: 2018-09-12 | End: 2018-09-12 | Stop reason: HOSPADM

## 2018-09-12 RX ORDER — FENTANYL CITRATE 50 UG/ML
25 INJECTION, SOLUTION INTRAMUSCULAR; INTRAVENOUS EVERY 5 MIN PRN
Status: DISCONTINUED | OUTPATIENT
Start: 2018-09-12 | End: 2018-09-12 | Stop reason: HOSPADM

## 2018-09-12 RX ORDER — LIDOCAINE HCL/PF 100 MG/5ML
SYRINGE (ML) INTRAVENOUS
Status: DISCONTINUED | OUTPATIENT
Start: 2018-09-12 | End: 2018-09-12

## 2018-09-12 RX ADMIN — FENTANYL CITRATE 25 MCG: 50 INJECTION, SOLUTION INTRAMUSCULAR; INTRAVENOUS at 10:09

## 2018-09-12 RX ADMIN — PROPOFOL 20 MG: 10 INJECTION, EMULSION INTRAVENOUS at 11:09

## 2018-09-12 RX ADMIN — SODIUM CHLORIDE, SODIUM LACTATE, POTASSIUM CHLORIDE, AND CALCIUM CHLORIDE: .6; .31; .03; .02 INJECTION, SOLUTION INTRAVENOUS at 09:09

## 2018-09-12 RX ADMIN — PROPOFOL 50 MCG/KG/MIN: 10 INJECTION, EMULSION INTRAVENOUS at 10:09

## 2018-09-12 RX ADMIN — FENTANYL CITRATE 25 MCG: 50 INJECTION, SOLUTION INTRAMUSCULAR; INTRAVENOUS at 11:09

## 2018-09-12 RX ADMIN — LIDOCAINE HYDROCHLORIDE 1 ML: 10 INJECTION, SOLUTION EPIDURAL; INFILTRATION; INTRACAUDAL at 09:09

## 2018-09-12 RX ADMIN — VANCOMYCIN HYDROCHLORIDE 1000 MG: 1 INJECTION, POWDER, LYOPHILIZED, FOR SOLUTION INTRAVENOUS at 09:09

## 2018-09-12 RX ADMIN — LIDOCAINE HYDROCHLORIDE 50 MG: 20 INJECTION PARENTERAL at 10:09

## 2018-09-12 RX ADMIN — PROPOFOL 20 MG: 10 INJECTION, EMULSION INTRAVENOUS at 10:09

## 2018-09-12 RX ADMIN — OXYCODONE HYDROCHLORIDE 5 MG: 5 TABLET ORAL at 12:09

## 2018-09-12 RX ADMIN — MIDAZOLAM HYDROCHLORIDE 1 MG: 1 INJECTION, SOLUTION INTRAMUSCULAR; INTRAVENOUS at 10:09

## 2018-09-12 RX ADMIN — PROPOFOL 30 MG: 10 INJECTION, EMULSION INTRAVENOUS at 10:09

## 2018-09-12 RX ADMIN — ONDANSETRON 4 MG: 2 INJECTION, SOLUTION INTRAMUSCULAR; INTRAVENOUS at 11:09

## 2018-09-12 NOTE — INTERVAL H&P NOTE
The patient has been examined and the H&P has been reviewed:    I concur with the findings and no changes have occurred since H&P was written.    Anesthesia/Surgery risks, benefits and alternative options discussed and understood by patient/family.          Active Hospital Problems    Diagnosis  POA    Infection of spinal cord stimulator [T85.733A]  Yes      Resolved Hospital Problems   No resolved problems to display.

## 2018-09-12 NOTE — DISCHARGE SUMMARY
[facility]  Discharge Note  Short Stay    Admit Date: 9/12/2018    Discharge Date: 9/12/2018    Attending Physician: Raoul Belcher MD     Discharge Provider: Raoul Belcher    Diagnoses:  Active Hospital Problems    Diagnosis  POA    *Infection of spinal cord stimulator [T85.063A]  Yes      Resolved Hospital Problems   No resolved problems to display.       Discharged Condition: good    Final Diagnoses: Infection of spinal cord stimulator, initial encounter [T85.848R]    Disposition: Home or Self Care    Hospital Course: no complications, uneventful    Outcome of Hospitalization, Treatment, Procedure, or Surgery:  Patient was admitted for outpatient procedure. The patient underwent procedure without complications and are discharged home    Follow up/Patient Instructions:  Follow up as scheduled in Pain Management clinic in 3-4 weeks/Patient has received instructions and follow up date and time    Medications:  Continue previous medications    Discharge Procedure Orders   Call MD for:  temperature >100.4     Call MD for:  severe uncontrolled pain     Call MD for:  redness, tenderness, or signs of infection (pain, swelling, redness, odor or green/yellow discharge around incision site)     Call MD for:  severe persistent headache     No dressing needed         Discharge Procedure Orders (must include Diet, Follow-up, Activity):   Discharge Procedure Orders (must include Diet, Follow-up, Activity)   Call MD for:  temperature >100.4     Call MD for:  severe uncontrolled pain     Call MD for:  redness, tenderness, or signs of infection (pain, swelling, redness, odor or green/yellow discharge around incision site)     Call MD for:  severe persistent headache     No dressing needed

## 2018-09-12 NOTE — ANESTHESIA PREPROCEDURE EVALUATION
09/12/2018  Marta Huff is a 73 y.o., female.    Anesthesia Evaluation      I have reviewed the Medications.     Review of Systems  Anesthesia Hx:  No problems with previous Anesthesia   Social:  Non-Smoker    Cardiovascular:   Hypertension, well controlled    Pulmonary:   Pneumonia Asthma mild Sleep Apnea, CPAP    Renal/:  Renal/ Normal     Hepatic/GI:   Hiatal Hernia, GERD    Musculoskeletal:   Arthritis     Neurological:   Neuromuscular Disease, Restless Leg Synd  Chronic Pain Syndrome   Endocrine:  Endocrine Normal    Psych:   Psychiatric History anxiety depression          Physical Exam  General:  Morbid Obesity    Airway/Jaw/Neck:  Airway Findings: Mouth Opening: Normal General Airway Assessment: Adult  Mallampati: II  Jaw/Neck Findings:  Neck ROM: Extension Decreased, Mild       Chest/Lungs:  Chest/Lungs Findings: Clear to auscultation, Normal Respiratory Rate     Heart/Vascular:  Heart Findings: Rate: Normal  Rhythm: Regular Rhythm  Sounds: Normal  Heart murmur: negative Vascular Findings: Normal (No carotid bruits.)       Mental Status:  Mental Status Findings:  Cooperative, Alert and Oriented         Anesthesia Plan  Type of Anesthesia, risks & benefits discussed:  Anesthesia Type:  MAC  Patient's Preference:   Intra-op Monitoring Plan:   Intra-op Monitoring Plan Comments:   Post Op Pain Control Plan:   Post Op Pain Control Plan Comments:   Induction:    Beta Blocker:  Patient is not currently on a Beta-Blocker (No further documentation required).       Informed Consent: Patient understands risks and agrees with Anesthesia plan.  Questions answered. Anesthesia consent signed with patient.  ASA Score: 3     Day of Surgery Review of History & Physical:            Ready For Surgery From Anesthesia Perspective.

## 2018-09-12 NOTE — PLAN OF CARE
Vss, aaron po fluids, denies pain, ambulates easily. IV removed, catheter intact. Discharge instructions provided and states understanding. States ready to go home.  Discharged from facility with family.

## 2018-09-12 NOTE — DISCHARGE INSTRUCTIONS
Discharge Instructions: After Your Surgery  Youve just had surgery. During surgery, you were given medicine called anesthesia to keep you relaxed and free of pain. After surgery, you may have some pain or nausea. This is common. Here are some tips for feeling better and getting well after surgery.     Stay on schedule with your medicine.   Going home  Your healthcare provider will show you how to take care of yourself when you go home. He or she will also answer your questions. Have an adult family member or friend drive you home. For the first 24 hours after your surgery:  · Do not drive or use heavy equipment.  · Do not make important decisions or sign legal papers.  · Do not drink alcohol.  · Have someone stay with you, if needed. He or she can watch for problems and help keep you safe.  Be sure to go to all follow-up visits with your healthcare provider. And rest after your surgery for as long as your healthcare provider tells you to.  Coping with pain  If you have pain after surgery, pain medicine will help you feel better. Take it as told, before pain becomes severe. Also, ask your healthcare provider or pharmacist about other ways to control pain. This might be with heat, ice, or relaxation. And follow any other instructions your surgeon or nurse gives you.  Tips for taking pain medicine  To get the best relief possible, remember these points:  · Pain medicines can upset your stomach. Taking them with a little food may help.  · Most pain relievers taken by mouth need at least 20 to 30 minutes to start to work.  · Taking medicine on a schedule can help you remember to take it. Try to time your medicine so that you can take it before starting an activity. This might be before you get dressed, go for a walk, or sit down for dinner.  · Constipation is a common side effect of pain medicines. Call your healthcare provider before taking any medicines such as laxatives or stool softeners to help ease constipation.  Also ask if you should skip any foods. Drinking lots of fluids and eating foods such as fruits and vegetables that are high in fiber can also help. Remember, do not take laxatives unless your surgeon has prescribed them.  · Drinking alcohol and taking pain medicine can cause dizziness and slow your breathing. It can even be deadly. Do not drink alcohol while taking pain medicine.  · Pain medicine can make you react more slowly to things. Do not drive or run machinery while taking pain medicine.  Your healthcare provider may tell you to take acetaminophen to help ease your pain. Ask him or her how much you are supposed to take each day. Acetaminophen or other pain relievers may interact with your prescription medicines or other over-the-counter (OTC) medicines. Some prescription medicines have acetaminophen and other ingredients. Using both prescription and OTC acetaminophen for pain can cause you to overdose. Read the labels on your OTC medicines with care. This will help you to clearly know the list of ingredients, how much to take, and any warnings. It may also help you not take too much acetaminophen. If you have questions or do not understand the information, ask your pharmacist or healthcare provider to explain it to you before you take the OTC medicine.  Managing nausea  Some people have an upset stomach after surgery. This is often because of anesthesia, pain, or pain medicine, or the stress of surgery. These tips will help you handle nausea and eat healthy foods as you get better. If you were on a special food plan before surgery, ask your healthcare provider if you should follow it while you get better. These tips may help:  · Do not push yourself to eat. Your body will tell you when to eat and how much.  · Start off with clear liquids and soup. They are easier to digest.  · Next try semi-solid foods, such as mashed potatoes, applesauce, and gelatin, as you feel ready.  · Slowly move to solid foods. Dont  eat fatty, rich, or spicy foods at first.  · Do not force yourself to have 3 large meals a day. Instead eat smaller amounts more often.  · Take pain medicines with a small amount of solid food, such as crackers or toast, to avoid nausea.     Call your surgeon if  · You still have pain an hour after taking medicine. The medicine may not be strong enough.  · You feel too sleepy, dizzy, or groggy. The medicine may be too strong.  · You have side effects like nausea, vomiting, or skin changes, such as rash, itching, or hives.       If you have obstructive sleep apnea  You were given anesthesia medicine during surgery to keep you comfortable and free of pain. After surgery, you may have more apnea spells because of this medicine and other medicines you were given. The spells may last longer than usual.   At home:  · Keep using the continuous positive airway pressure (CPAP) device when you sleep. Unless your healthcare provider tells you not to, use it when you sleep, day or night. CPAP is a common device used to treat obstructive sleep apnea.  · Talk with your provider before taking any pain medicine, muscle relaxants, or sedatives. Your provider will tell you about the possible dangers of taking these medicines.  Date Last Reviewed: 12/1/2016 © 2000-2017 [employer]. [address] [place] [address]. All rights reserved. This information is not intended as a substitute for professional medical care. Always follow your healthcare professional's instructions.                After your procedure:    -Sponge baths only .  -Keep your bandage clean and dry.  -Do not remove your dressing until your follow up visit.   -Please limit any necessary bending, lifting or twisting to slow and careful movements    for the next 6 weeks or as directed by your doctor.  -During these 6 weeks, also avoid overhead work. Keep your elbows below your  shoulders.  -During these 6 weeks, wear your brace when you are  walking or active. You do not have to wear the brace  while you are in bed      -You may resume your home medications as prescribed.  -You may resume your normal diet as tolerated.  -Follow up as scheduled with Dr. Belcher.

## 2018-09-12 NOTE — ANESTHESIA POSTPROCEDURE EVALUATION
"Anesthesia Post Evaluation    Patient: Marta Huff    Procedure(s) Performed: Procedure(s) (LRB):  REMOVAL, IMPLANT spinal cord stimulator (N/A)    Final Anesthesia Type: MAC  Patient location during evaluation: PACU  Patient participation: Yes- Able to Participate  Level of consciousness: awake and alert  Post-procedure vital signs: reviewed and stable  Pain management: adequate  Airway patency: patent  PONV status at discharge: No PONV  Anesthetic complications: no      Cardiovascular status: blood pressure returned to baseline and hemodynamically stable  Respiratory status: unassisted, spontaneous ventilation and room air  Hydration status: euvolemic  Follow-up not needed.        Visit Vitals  BP (!) 145/79 (BP Location: Right arm, Patient Position: Lying)   Pulse 68   Temp 36.6 °C (97.8 °F) (Skin)   Resp 14   Ht 4' 11" (1.499 m)   Wt 99.8 kg (220 lb)   SpO2 96%   BMI 44.43 kg/m²       Pain/Khari Score: Pain Assessment Performed: Yes (9/12/2018 12:20 PM)  Presence of Pain: complains of pain/discomfort (9/12/2018 12:20 PM)  Pain Rating Prior to Med Admin: 2 (9/12/2018 12:11 PM)  Khari Score: 10 (9/12/2018 12:20 PM)        "

## 2018-09-12 NOTE — TRANSFER OF CARE
"Anesthesia Transfer of Care Note    Patient: Marta Huff    Procedure(s) Performed: Procedure(s) (LRB):  REMOVAL, IMPLANT spinal cord stimulator (N/A)    Patient location: PACU    Anesthesia Type: general    Transport from OR: Transported from OR on room air with adequate spontaneous ventilation    Post pain: adequate analgesia    Post assessment: no apparent anesthetic complications    Post vital signs: stable    Level of consciousness: awake    Nausea/Vomiting: no nausea/vomiting    Complications: none    Transfer of care protocol was followed      Last vitals:   Visit Vitals  BP (!) 162/86 (BP Location: Right arm, Patient Position: Lying)   Pulse 77   Temp 36.6 °C (97.8 °F) (Skin)   Resp 15   Ht 4' 11" (1.499 m)   Wt 99.8 kg (220 lb)   SpO2 95%   BMI 44.43 kg/m²     "

## 2018-09-12 NOTE — OP NOTE
Spinal Cord Stimulator System  Removal  PROCEDURE:   1. Spinal Cord Stimulator leads removal x2, IPG removal    Pre-op diagnosis: Chronic pain syndrome, infection of SCS     Post-op diagnosis: Same    Surgeon: Dr. Raoul Belcher     Assistant: None     Anesthesia: Monitored Anesthesia Care    Estimated Blood Loss: <20ml    Urine Output: Not Measured    IV Fluids- See Anesthesia record    Complications: None    CONSENT: The risks, benefits, and options were discussed thoroughly with the patient. The patient's questions were answered. The patient understands the risk and benefits and wishes to proceed. Informed consent was obtained.     PROCEDURE NOTE:  Patient is s/p a successful implantation of spinal cord stimulator several months ago but has had pain develop at lead anchoring site. She initially had some signs of possible infection at the IPG site and was treated with Clindamycin for 3 week and no longer had any signs of infection. Yesterday, she was noted to have pain at the anchor site and lead noted to be eroded out of skin. Decision was made to remove the system and treat with antibiotics. After obtaining informed consent, pre-op antibiotic, Vancomycin was started 30 minutes prior to incision. The patient was taken to the OR and placed in the prone position. The anesthesia provider throughout the case provided sedation and cardiopulmonary monitoring. The Patient's back was prepped with Duraprep and covered with Ioban and then draped in usual sterile fashion.     Site of IPG incision noted in right buttock. The skin was anesthetized with Lidocaine 1% with epinephrine. Skin incision with a No. 15 scalpel was made and local dissection done as necessary to facilitate access the IPG. The IPG was easily removed from pocket, no signs of erythema, fluid pockets or pus was noted.Copious antibiotic bulb lavage was irrigated throughout the field, and hemostasis was maintained. Vancomycin powder 500mg was spread  throughout pocket and then closed loosely with 0-0 vicryl, 2-0 vicryl and skin closed with staples and covered with tegaderm for the rest of the procedure.    An AP fluoroscopic view was obtained to identify and eliezer the anchor site in midline thoracic region. A midline lead loop was noted to be extruding from the skin. The skin was anesthetized with Lidocaine 1% with epinephrine. Skin incision with a No. 15 scalpel was made and local dissection done as necessary to facilitate access to the paraspinous fascia and anchoring site. There was a superficial pocket of pus at the level of the skin and approximately 0.5-1mm deep and about 1mm in length caudad to cephalad. Culture was taken from this site. Once anchor sites were localized, sutures cut that were holding the leads in place, leads pulled from thoracic epidural space without resistance.  Copious antibiotic bulb lavage was irrigated throughout the field, and hemostasis was maintained.     Vancomycin powder 500mg was spread throughout the wound and was then closed loosely with simple interrupted sutures using 0-0 Vicryl sutures and then skin closed with staples.. Bacitracin was placed over the incision sites and Mepore dressings applied. Sponge and needle counts were correct x2 at conclusion of the case.     Patient was then placed supine on the stretcher and transferred to the recovery room.The patient has been scheduled to return to the clinic in approx 7 days. The patient has been prescribed Clindamycin 900mg to take twice daily for the next 22 days with first dose being yesterday. The patient tolerated the procedure well.

## 2018-09-15 LAB — BACTERIA SPEC AEROBE CULT: NO GROWTH

## 2018-09-18 ENCOUNTER — OFFICE VISIT (OUTPATIENT)
Dept: PAIN MEDICINE | Facility: CLINIC | Age: 73
End: 2018-09-18
Payer: MEDICARE

## 2018-09-18 VITALS
BODY MASS INDEX: 46.75 KG/M2 | RESPIRATION RATE: 20 BRPM | HEART RATE: 78 BPM | WEIGHT: 231.5 LBS | TEMPERATURE: 98 F | SYSTOLIC BLOOD PRESSURE: 128 MMHG | DIASTOLIC BLOOD PRESSURE: 64 MMHG | OXYGEN SATURATION: 97 %

## 2018-09-18 DIAGNOSIS — T85.733S: ICD-10-CM

## 2018-09-18 DIAGNOSIS — M54.16 LUMBAR RADICULOPATHY: ICD-10-CM

## 2018-09-18 DIAGNOSIS — G89.4 CHRONIC PAIN SYNDROME: ICD-10-CM

## 2018-09-18 DIAGNOSIS — M51.36 DDD (DEGENERATIVE DISC DISEASE), LUMBAR: Primary | ICD-10-CM

## 2018-09-18 PROCEDURE — 99215 OFFICE O/P EST HI 40 MIN: CPT | Mod: PBBFAC,PN | Performed by: ANESTHESIOLOGY

## 2018-09-18 PROCEDURE — 99999 PR PBB SHADOW E&M-EST. PATIENT-LVL V: CPT | Mod: PBBFAC,,, | Performed by: ANESTHESIOLOGY

## 2018-09-18 PROCEDURE — 99024 POSTOP FOLLOW-UP VISIT: CPT | Mod: ,,, | Performed by: ANESTHESIOLOGY

## 2018-09-18 NOTE — PROGRESS NOTES
This note was completed with dictation software and grammatical errors may exist.    CC: Bilateral foot pain    HPI: The patient is a 73-year-old woman with obesity, arthritis, osteoporosis, GERD who presents in referral from Dr. Holley for back and left leg pain.  She returns in follow-up today status post spinal cord stimulator removal because the lead anchoring site developed skin breakdown and lead was exposed at the level of the skin. Unfortunately, she had been doing very well with the stimulator and it was providing 100% relief of her back and bilateral leg pain and she was sleeping very well which she had not been doing in years.  Over the last several months, she initially had some symptoms of infection or at least redness over her the IPG site, we had given her doxycycline 100 mg for 3 weeks and things seem to have resolved.  She is doing well with the stimulator, denied any problems at the IPG site but had some irritation at the midline incision and was found to have a lead exposed.  She is doing well postoperatively now, denies any major pain at either site. She denies any fevers or chills or lethargy.      Pain intervention history:She is status post left L5 and S1 transforaminal epidural steroid injection on 2/5/18 with 100% relief of her radicular left leg pain.     ROS: She reports itching, headaches, joint stiffness, joint swelling and back pain.  Balance of review of systems is negative.    Past Medical History:   Diagnosis Date    Anxiety     Arthralgia of knee     arthralgia of bilateral knees secondary to djd    Arthritis     Back pain     Depression     Encounter for blood transfusion     AUTOLOGUS    GERD (gastroesophageal reflux disease)     Hiatal hernia     repaired in 1979    History of seasonal allergies     History of shingles     Hypertension     Insomnia     Obesity     JESENIA on CPAP     not using cpap currently    Osteoporosis     Pneumonia     Reactive airway disease      Restless legs syndrome     Rheumatic fever     at 4 y/o       Past Surgical History:   Procedure Laterality Date    APPENDECTOMY      BARIATRIC SURGERY  2014    Gastric Sleeve    CHOLECYSTECTOMY      EGD (ESOPHAGOGASTRODUODENOSCOPY) N/A 5/20/2014    Performed by Tino [facility]eiros Jr., [facility] at [facility] OR 2ND FLR    EGD (ESOPHAGOGASTRODUODENOSCOPY) N/A 11/25/2013    Performed by Antonio Mendez MD at [facility] ENDO (4TH FLR)    AKUA-TRANSFORAMINAL L5 and S1 Left 2/5/2018    Per[facility] at [facility] OR    ESOPHAGOGASTRODUODENOSCOPY (EGD) N/A 6/23/2017    Performed by Dorene [facility]pathi [facility] at [facility] ENDO    GASTRECTOMY, SLEEVE, LAPAROSCOPIC N/A 5/20/2014    Performed by Tino [facility]ros Jr., [facility] at [facility] OR 2ND FLR    HERNIA REPAIR      hiatal hernia    HYSTERECTOMY      partial    INSERTION OF DORSAL COLUMN NERVE STIMULATOR FOR TRIAL N/A 5/25/2018    Procedure: TRIAL-STIMULATOR-DORSAL COLUMN;  Surgeon: Raoul Belcher MD;  Location: [facility] OR;  Service: Pain Management;  Laterality: N/A;    INSERTION OF SPINAL CORD STIMULATOR USING MINIMALLY INVASIVE TECHNIQUE N/A 7/11/2018    Procedure: INSERTION, SPINAL CORD STIMULATOR,;  Surgeon: Raoul Belcher MD;  Location: [facility] OR;  Service: Pain Management;  Laterality: N/A;    INSERTION, SPINAL CORD STIMULATOR, N/A 7/11/2018    Performed by [facility]cher [facility] at [facility] OR    JOINT REPLACEMENT      BILAT KNEE    KNEE ARTHROPLASTY  1/2/2010    bilateral    TNEXV-[id number]-[id number] N/A 5/20/2014    Performed by Tino Medeiros Jr., [facility] at [facility] OR 2ND FLR    NISSEN FUNDOPLICATION      REMOVAL OF NEUROSTIMULATOR N/A 9/12/2018    Procedure: REMOVAL, IMPLANT spinal cord stimulator;  Surgeon: Raoul Belcher MD;  Location: [facility] OR;  Service: Pain Management;  Laterality: N/A;    REMOVAL, IMPLANT spinal cord stimulator N/A 9/12/2018    Performed by [facility] Belcher MD at [facility] OR    LKVR-T-F-LAPAROSCOPIC N/A 9/18/2017    Performed  by [facility] at [facility] OR    TRIAL-STIMULATOR-DORSAL COLUMN N/A 2018    [facility] at [facility] OR       Social History     Socioeconomic History    Marital status:      Spouse name: None    Number of children: None    Years of education: None    Highest education level: None   Social Needs    Financial resource strain: None    Food insecurity - worry: None    Food insecurity - inability: None    Transportation needs - medical: None    Transportation needs - non-medical: None   Occupational History    None   Tobacco Use    Smoking status: Passive Smoke Exposure - Never Smoker    Smokeless tobacco: Never Used   Substance and Sexual Activity    Alcohol use: Yes     Comment: 1-2 glasses of wine monthly    Drug use: No    Sexual activity: None   Other Topics Concern    None   Social History Narrative    None         Medications/Allergies: See med card    Vitals:    18 0907   BP: 128/64   Pulse: 78   Resp: 20   Temp: 97.9 °F (36.6 °C)   TempSrc: Oral   SpO2: 97%   Weight: 105 kg (231 lb 7.7 oz)   PainSc:   8   PainLoc: Foot         Physical exam:  Gen: A and O x3, pleasant, well-groomed  Skin: No rashes or obvious lesions  HEENT: PERRLA, no obvious deformities on ears or in canals. Trachea midline.  CVS: Regular rate and rhythm, normal palpable pulses.  Resp:No increased work of breathing, symmetrical chest rise.  Abdomen: Soft, NT/ND.  Musculoskeletal: No antalgic gait.     Neuro:  Lower extremities: 5/5 strength bilaterally  Reflexes: Patellar 2+, Achilles 2+ bilaterally.  Sensory:  Intact and symmetrical to light touch and pinprick in L2-S1 dermatomes bilaterally.     Incision sites look clean dry and intact.  Staples in place.  No erythema or drainage from the wounds, wounds are nontender.     Imagin17 MRI L-spine  T11-12: There is minimal bulging of the anulus.  There is mild facet joint arthropathy with ligamentum flavum thickening, right  greater than left.  There is no spinal canal or foraminal stenosis.  T12-L1: There is trace anterolisthesis of T12 on L1.  There is a diffuse disc bulge with moderate facet joint arthropathy.  There is no spinal canal or significant foraminal stenosis.  L1-2: There is marked disc space narrowing and mild retrolisthesis of L1 on L2.  There is inferior unroofing of a diffuse disc bulge with osteophytic ridging, eccentric to the left with broad left paracentral and foraminal disc protrusion.  There is mild facet joint arthropathy.  There is no significant spinal stenosis.  There is moderate left lateral recess stenosis and foraminal stenosis.  There is mild to moderate right foraminal stenosis.  L2-3: There is mild retrolisthesis, diffuse disc bulge, moderate facet joint arthropathy.  There is no spinal stenosis.  There is mild to moderate bilateral, right greater than left, foraminal stenosis.  L3-4: There is moderate to marked facet joint arthropathy with ligamentum flavum thickening.  There is a diffuse disc bulge with osteophytic ridging.  There is mild prominent dorsal epidural fat.  There is only borderline spinal stenosis.  There is moderate bilateral, right greater than left, foraminal stenosis.  L4-5:There is trace anterolisthesis, marked facet joint arthropathy with ligamentum flavum thickening, unroofing of a diffuse disc bulge with small superimposed central disc protrusion.  There is no significant spinal stenosis.  There is moderate marked right and moderate left foraminal stenosis.  L5-S1: There is trace anterolisthesis.  There is marked facet joint arthropathy with ligamentum flavum thickening and bilateral facet joint effusions.  More importantly, there is a large 12 x 10 mm left synovial cyst which results and severe flattening and deformity of the left side of the dural sac and severe left lateral recess stenosis, compressing the left S1 root.  There is unroofing of a diffuse disc bulge.  There is  moderate bilateral foraminal stenosis.  All of the discs are desiccated.  There is no acute fracture.  There is trace anterolisthesis of L4 on L5 and L5 on S1.  There is a 3 mm retrolisthesis of L1 on L2 and 2 mm retrolisthesis of L2 on L3.    Assessment:  The patient is a 73-year-old woman with obesity, arthritis, osteoporosis, GERD who presents in referral from Dr. Holley for back and left leg pain.   1. DDD (degenerative disc disease), lumbar  MRI Lumbar Spine W WO Cont   2. Lumbar radiculopathy  MRI Lumbar Spine W WO Cont   3. Infection associated with electrode lead of implanted electronic neurostimulator of spinal cord, sequela  Ambulatory Referral to Infectious Disease   4. Chronic pain syndrome         Plan:    1.  I had decided to remove both the battery and the leads because there was concern for continued infection even though the wounds did not look particularly concerning.  I think that the skin broke down due to the relief loop of the lead resting against a folded area of skin and caused chronic inflammation and eventually erosion of the skin.  At this point, I am going to have her take antibiotics for the next 3 weeks, clindamycin 150 mg 4 times daily.  To make sure that she has not had any more infection, I am going to get an MRI with and without contrast of her lumbar spine.  I am going to have her see infectious disease with a question of whether or not she could have the stimulator implanted again at a later date since she did so well, I would also have Neurosurgery place a paddle lead to keep the wires deeper.  2.  After cleaning with chlor prep, I removed the staples and applied Steri-Strips over the incision sites.  I also placed Me-Pore dressings over the incision sites and asked her to keep this on clean and dry for the next 5 days.  After that point she can remove the bandages and begin showering but not scrub the area.  If she has any worsening pain, drainage or any other issues I've asked  her to contact me.  Otherwise I will see her in one month or sooner as needed.

## 2018-09-19 ENCOUNTER — PATIENT MESSAGE (OUTPATIENT)
Dept: INFECTIOUS DISEASES | Facility: CLINIC | Age: 73
End: 2018-09-19

## 2018-09-19 LAB — BACTERIA SPEC ANAEROBE CULT: NORMAL

## 2018-09-24 ENCOUNTER — PATIENT MESSAGE (OUTPATIENT)
Dept: BARIATRICS | Facility: CLINIC | Age: 73
End: 2018-09-24

## 2018-10-01 ENCOUNTER — OFFICE VISIT (OUTPATIENT)
Dept: PAIN MEDICINE | Facility: CLINIC | Age: 73
End: 2018-10-01
Payer: MEDICARE

## 2018-10-01 VITALS
TEMPERATURE: 98 F | OXYGEN SATURATION: 98 % | BODY MASS INDEX: 46.75 KG/M2 | WEIGHT: 231.5 LBS | HEART RATE: 78 BPM | SYSTOLIC BLOOD PRESSURE: 152 MMHG | DIASTOLIC BLOOD PRESSURE: 74 MMHG | RESPIRATION RATE: 20 BRPM

## 2018-10-01 DIAGNOSIS — M54.16 LUMBAR RADICULOPATHY: ICD-10-CM

## 2018-10-01 DIAGNOSIS — G89.4 CHRONIC PAIN SYNDROME: Primary | ICD-10-CM

## 2018-10-01 PROCEDURE — 3078F DIAST BP <80 MM HG: CPT | Mod: CPTII,,, | Performed by: ANESTHESIOLOGY

## 2018-10-01 PROCEDURE — 1101F PT FALLS ASSESS-DOCD LE1/YR: CPT | Mod: CPTII,,, | Performed by: ANESTHESIOLOGY

## 2018-10-01 PROCEDURE — 99213 OFFICE O/P EST LOW 20 MIN: CPT | Mod: S$PBB,,, | Performed by: ANESTHESIOLOGY

## 2018-10-01 PROCEDURE — 3077F SYST BP >= 140 MM HG: CPT | Mod: CPTII,,, | Performed by: ANESTHESIOLOGY

## 2018-10-01 PROCEDURE — 99999 PR PBB SHADOW E&M-EST. PATIENT-LVL IV: CPT | Mod: PBBFAC,,, | Performed by: ANESTHESIOLOGY

## 2018-10-01 PROCEDURE — 99214 OFFICE O/P EST MOD 30 MIN: CPT | Mod: PBBFAC,PN | Performed by: ANESTHESIOLOGY

## 2018-10-01 RX ORDER — TRAMADOL HYDROCHLORIDE 50 MG/1
50 TABLET ORAL 2 TIMES DAILY PRN
Qty: 40 TABLET | Refills: 0 | Status: SHIPPED | OUTPATIENT
Start: 2018-10-01 | End: 2018-11-01

## 2018-10-01 NOTE — PROGRESS NOTES
This note was completed with dictation software and grammatical errors may exist.    CC: Bilateral foot pain    HPI: The patient is a 73-year-old woman with obesity, arthritis, osteoporosis, GERD who presents in referral from Dr. Holley for back and left leg pain.    She returns in follow-up today status post removal of spinal cord stimulator on 09/12/18 after she had 1 of the leads the road through the skin at her midline lumbar incision site. I had obtained cultures from the site to see if there was any infection and they have all been negative. However, she had been started on clindamycin prior to obtaining cultures and she received vancomycin prior to incision on the day of the procedure. She reports feeling good in terms of her incision sites and, no back pain, no tenderness.  She does complain however that her bilateral foot pain has returned and is fairly severe, she is hoping that we will be able to implant the device again because it had worked so well.    Pain intervention history:She is status post left L5 and S1 transforaminal epidural steroid injection on 2/5/18 with 100% relief of her radicular left leg pain.     ROS: She reports itching, headaches, joint stiffness, joint swelling and back pain.  Balance of review of systems is negative.    Past Medical History:   Diagnosis Date    Anxiety     Arthralgia of knee     arthralgia of bilateral knees secondary to djd    Arthritis     Back pain     Depression     Encounter for blood transfusion     AUTOLOGUS    GERD (gastroesophageal reflux disease)     Hiatal hernia     repaired in 1979    History of seasonal allergies     History of shingles     Hypertension     Insomnia     Obesity     JESENIA on CPAP     not using cpap currently    Osteoporosis     Pneumonia     Reactive airway disease     Restless legs syndrome     Rheumatic fever     at 6 y/o       Past Surgical History:   Procedure Laterality Date    APPENDECTOMY      BARIATRIC SURGERY   2014    Gastric Sleeve    CHOLECYSTECTOMY      EGD (ESOPHAGOGASTRODUODENOSCOPY) N/A 5/20/2014    Performed by Tino Medeiros Jr., MD at [facility] OR 2ND FLR    EGD (ESOPHAGOGASTRODUODENOSCOPY) N/A 11/25/2013    Performed by Antonio Mendez MD at [facility] ENDO (4TH FLR)    AKUA-TRANSFORAMINAL L5 and S1 Left 2/5/2018    Performed by [facility]oul [facility]cher MD at [facility] OR    ESOPHAGOGASTRODUODENOSCOPY (EGD) N/A 6/23/2017    Performed by Dorene Tripathi MD at [facility] ENDO    GASTRECTOMY, SLEEVE, LAPAROSCOPIC N/A 5/20/2014    Performed by Tino [facility] Jr., MD at [facility] OR 2ND FLR    HERNIA REPAIR      hiatal hernia    HYSTERECTOMY      partial    INSERTION OF DORSAL COLUMN NERVE STIMULATOR FOR TRIAL N/A 5/25/2018    Procedure: TRIAL-STIMULATOR-DORSAL COLUMN;  Surgeon: Raoul Belcher MD;  Location: [facility] OR;  Service: Pain Management;  Laterality: N/A;    INSERTION OF SPINAL CORD STIMULATOR USING MINIMALLY INVASIVE TECHNIQUE N/A 7/11/2018    Procedure: INSERTION, SPINAL CORD STIMULATOR,;  Surgeon: Raoul Belcher MD;  Location: [facility] OR;  Service: Pain Management;  Laterality: N/A;    INSERTION, SPINAL CORD STIMULATOR, N/A 7/11/2018    Performed by Raoul Belcher MD at [facility] OR    JOINT REPLACEMENT      BILAT KNEE    KNEE ARTHROPLASTY  1/2/2010    bilateral    [id number] N/A 5/20/2014    Performed by Tino [facility]ros Jr., MD at [facility] OR 2ND FLR    NISSEN FUNDOPLICATION      REMOVAL OF NEUROSTIMULATOR N/A 9/12/2018    Procedure: REMOVAL, IMPLANT spinal cord stimulator;  Surgeon: Raoul Belcher MD;  Location: [facility] OR;  Service: Pain Management;  Laterality: N/A;    REMOVAL, IMPLANT spinal cord stimulator N/A 9/12/2018    Performed by Raoul Belcher MD at [facility] OR    [id number]-LAPAROSCOPIC N/A 9/18/2017    Performed by [facility]pathi [facility] at [facility] OR    TRIAL-STIMULATOR-DORSAL COLUMN N/A 5/25/2018    Performed by Raoul Belcher MD at [facility] OR       Social History      Socioeconomic History    Marital status:      Spouse name: None    Number of children: None    Years of education: None    Highest education level: None   Social Needs    Financial resource strain: None    Food insecurity - worry: None    Food insecurity - inability: None    Transportation needs - medical: None    Transportation needs - non-medical: None   Occupational History    None   Tobacco Use    Smoking status: Passive Smoke Exposure - Never Smoker    Smokeless tobacco: Never Used   Substance and Sexual Activity    Alcohol use: Yes     Comment: 1-2 glasses of wine monthly    Drug use: No    Sexual activity: None   Other Topics Concern    None   Social History Narrative    None         Medications/Allergies: See med card    Vitals:    10/01/18 0918   BP: (!) 152/74   Pulse: 78   Resp: 20   Temp: 98 °F (36.7 °C)   TempSrc: Oral   SpO2: 98%   Weight: 105 kg (231 lb 7.7 oz)   PainSc:   6   PainLoc: Foot         Physical exam:  Gen: A and O x3, pleasant, well-groomed  Skin: No rashes or obvious lesions  HEENT: PERRLA, no obvious deformities on ears or in canals. Trachea midline.  CVS: Regular rate and rhythm, normal palpable pulses.  Resp:No increased work of breathing, symmetrical chest rise.  Abdomen: Soft, NT/ND.  Musculoskeletal: No antalgic gait.     Neuro:  Lower extremities: 5/5 strength bilaterally  Reflexes: Patellar 2+, Achilles 2+ bilaterally.  Sensory:  Intact and symmetrical to light touch and pinprick in L2-S1 dermatomes bilaterally.      Incision sites appear to be healing well, no erythema or drainage, completely nontender.     Imagin17 MRI L-spine  T11-12: There is minimal bulging of the anulus.  There is mild facet joint arthropathy with ligamentum flavum thickening, right greater than left.  There is no spinal canal or foraminal stenosis.  T12-L1: There is trace anterolisthesis of T12 on L1.  There is a diffuse disc bulge with moderate facet joint arthropathy.   There is no spinal canal or significant foraminal stenosis.  L1-2: There is marked disc space narrowing and mild retrolisthesis of L1 on L2.  There is inferior unroofing of a diffuse disc bulge with osteophytic ridging, eccentric to the left with broad left paracentral and foraminal disc protrusion.  There is mild facet joint arthropathy.  There is no significant spinal stenosis.  There is moderate left lateral recess stenosis and foraminal stenosis.  There is mild to moderate right foraminal stenosis.  L2-3: There is mild retrolisthesis, diffuse disc bulge, moderate facet joint arthropathy.  There is no spinal stenosis.  There is mild to moderate bilateral, right greater than left, foraminal stenosis.  L3-4: There is moderate to marked facet joint arthropathy with ligamentum flavum thickening.  There is a diffuse disc bulge with osteophytic ridging.  There is mild prominent dorsal epidural fat.  There is only borderline spinal stenosis.  There is moderate bilateral, right greater than left, foraminal stenosis.  L4-5:There is trace anterolisthesis, marked facet joint arthropathy with ligamentum flavum thickening, unroofing of a diffuse disc bulge with small superimposed central disc protrusion.  There is no significant spinal stenosis.  There is moderate marked right and moderate left foraminal stenosis.  L5-S1: There is trace anterolisthesis.  There is marked facet joint arthropathy with ligamentum flavum thickening and bilateral facet joint effusions.  More importantly, there is a large 12 x 10 mm left synovial cyst which results and severe flattening and deformity of the left side of the dural sac and severe left lateral recess stenosis, compressing the left S1 root.  There is unroofing of a diffuse disc bulge.  There is moderate bilateral foraminal stenosis.  All of the discs are desiccated.  There is no acute fracture.  There is trace anterolisthesis of L4 on L5 and L5 on S1.  There is a 3 mm retrolisthesis of  L1 on L2 and 2 mm retrolisthesis of L2 on L3.    Assessment:  The patient is a 73-year-old woman with obesity, arthritis, osteoporosis, GERD who presents in referral from Dr. Holley for back and left leg pain.   1. Chronic pain syndrome     2. Lumbar radiculopathy         Plan:    1.  Her incision sites are healing well, no signs of infection, she is finishing antibiotics and will be seeing Infectious Disease on 10/11/2018.  I would like their opinion on consideration of implanting the neurostimulator again.  I will see her again in a few weeks and we may end up referring her to Neurosurgery to implant the device.

## 2018-10-07 ENCOUNTER — PATIENT MESSAGE (OUTPATIENT)
Dept: PAIN MEDICINE | Facility: CLINIC | Age: 73
End: 2018-10-07

## 2018-10-08 ENCOUNTER — PATIENT MESSAGE (OUTPATIENT)
Dept: FAMILY MEDICINE | Facility: CLINIC | Age: 73
End: 2018-10-08

## 2018-10-08 DIAGNOSIS — G89.4 CHRONIC PAIN ASSOCIATED WITH SIGNIFICANT PSYCHOSOCIAL DYSFUNCTION: Primary | ICD-10-CM

## 2018-10-09 LAB — FUNGUS SPEC CULT: NORMAL

## 2018-10-11 ENCOUNTER — LAB VISIT (OUTPATIENT)
Dept: LAB | Facility: HOSPITAL | Age: 73
End: 2018-10-11
Attending: INTERNAL MEDICINE
Payer: MEDICARE

## 2018-10-11 ENCOUNTER — OFFICE VISIT (OUTPATIENT)
Dept: INFECTIOUS DISEASES | Facility: CLINIC | Age: 73
End: 2018-10-11
Payer: MEDICARE

## 2018-10-11 VITALS
HEIGHT: 59 IN | WEIGHT: 231.5 LBS | SYSTOLIC BLOOD PRESSURE: 138 MMHG | HEART RATE: 72 BPM | DIASTOLIC BLOOD PRESSURE: 88 MMHG | BODY MASS INDEX: 46.67 KG/M2

## 2018-10-11 DIAGNOSIS — T85.733A INFECTION OF SPINAL CORD STIMULATOR, INITIAL ENCOUNTER: Primary | ICD-10-CM

## 2018-10-11 DIAGNOSIS — T85.733A INFECTION OF SPINAL CORD STIMULATOR, INITIAL ENCOUNTER: ICD-10-CM

## 2018-10-11 LAB
CRP SERPL-MCNC: 4.4 MG/L
ERYTHROCYTE [SEDIMENTATION RATE] IN BLOOD BY WESTERGREN METHOD: 30 MM/HR

## 2018-10-11 PROCEDURE — 86140 C-REACTIVE PROTEIN: CPT

## 2018-10-11 PROCEDURE — 99213 OFFICE O/P EST LOW 20 MIN: CPT | Mod: PBBFAC,PO | Performed by: INTERNAL MEDICINE

## 2018-10-11 PROCEDURE — 99999 PR PBB SHADOW E&M-EST. PATIENT-LVL III: CPT | Mod: PBBFAC,,, | Performed by: INTERNAL MEDICINE

## 2018-10-11 PROCEDURE — 36415 COLL VENOUS BLD VENIPUNCTURE: CPT | Mod: PO

## 2018-10-11 PROCEDURE — 3079F DIAST BP 80-89 MM HG: CPT | Mod: CPTII,,, | Performed by: INTERNAL MEDICINE

## 2018-10-11 PROCEDURE — 85651 RBC SED RATE NONAUTOMATED: CPT | Mod: PO

## 2018-10-11 PROCEDURE — 1101F PT FALLS ASSESS-DOCD LE1/YR: CPT | Mod: CPTII,,, | Performed by: INTERNAL MEDICINE

## 2018-10-11 PROCEDURE — 99203 OFFICE O/P NEW LOW 30 MIN: CPT | Mod: S$PBB,,, | Performed by: INTERNAL MEDICINE

## 2018-10-11 PROCEDURE — 3075F SYST BP GE 130 - 139MM HG: CPT | Mod: CPTII,,, | Performed by: INTERNAL MEDICINE

## 2018-10-11 NOTE — PROGRESS NOTES
Subjective:      Patient ID: Marta Huff is a 73 y.o. female.    Chief Complaint:Spinal cord stimulator infection      History of Present Illness    A 73-year-old morbidly obese woman with HTN, MDD, anxiety, reactive airway disease, JESENIA, chronic pain syndrome, neuropahty, s/p spinal stimulatory May 2018, and s/p spinal cord stimulator extraction due to migrating lead who is seen today for management recommendations prior to spinal stimulator replacement. Her operative cultures remain no growth however she received antibiotic therapy prior to extraction due to evidence of cellulitis which was documented via photography in her EMR media tab. Her only complaint today is back pain limiting her ADL's.      Mrs. Huff is suffers severe headaches with sulfa antibiotics. She has a pet dog. She gardens but does not hunt or fish. She has no tattoo's and has her earlobes pierced. She does not smoke or use illicit drugs. She consumes a daiquiri every afternoon.     Review of Systems   Constitution: Negative for chills, decreased appetite, fever, weakness, malaise/fatigue, night sweats, weight gain and weight loss.   HENT: Negative for congestion, ear pain, hearing loss, hoarse voice, sore throat and tinnitus.    Eyes: Negative for blurred vision, redness and visual disturbance.   Cardiovascular: Negative for chest pain, leg swelling and palpitations.   Respiratory: Negative for cough, hemoptysis, shortness of breath and sputum production.    Hematologic/Lymphatic: Negative for adenopathy. Does not bruise/bleed easily.   Skin: Negative for dry skin, itching, rash and suspicious lesions.   Musculoskeletal: Positive for back pain. Negative for joint pain, myalgias and neck pain.   Gastrointestinal: Negative for abdominal pain, constipation, diarrhea, heartburn, nausea and vomiting.   Genitourinary: Negative for dysuria, flank pain, frequency, hematuria, hesitancy and urgency.   Neurological: Negative for dizziness,  headaches, numbness and paresthesias.   Psychiatric/Behavioral: Negative for depression and memory loss. The patient does not have insomnia and is not nervous/anxious.      Objective:   Physical Exam   Constitutional: She is oriented to person, place, and time. She appears well-developed and well-nourished. She is cooperative. She is easily aroused.  Non-toxic appearance. No distress.   HENT:   Head: Normocephalic and atraumatic. Head is without right periorbital erythema and without left periorbital erythema.   Right Ear: Hearing, tympanic membrane, external ear and ear canal normal. No swelling.   Left Ear: Hearing, tympanic membrane, external ear and ear canal normal. No swelling.   Nose: Nose normal. No nasal deformity.   Mouth/Throat: Uvula is midline, oropharynx is clear and moist and mucous membranes are normal. No oropharyngeal exudate.   Eyes: Conjunctivae, EOM and lids are normal. Pupils are equal, round, and reactive to light. Right eye exhibits no discharge and no exudate. Left eye exhibits no discharge and no exudate. Right conjunctiva is not injected. Left conjunctiva is not injected. No scleral icterus.   Neck: Normal range of motion. Neck supple. No tracheal deviation present. No thyromegaly present.   Cardiovascular: Normal rate, regular rhythm, S1 normal, S2 normal, normal heart sounds and intact distal pulses. Exam reveals no gallop and no friction rub.   No murmur heard.  Pulmonary/Chest: Effort normal and breath sounds normal. No accessory muscle usage or stridor. No tachypnea. No respiratory distress. She has no wheezes. She has no rales. She exhibits no tenderness.   Abdominal: Soft. Normal appearance and bowel sounds are normal. She exhibits no distension. There is no tenderness. There is no rebound and no guarding.   Musculoskeletal: Normal range of motion. She exhibits no edema or tenderness.   Low back well healed incision with dark discoloration of the surrounding skin.   Neurological:  She is alert, oriented to person, place, and time and easily aroused. No cranial nerve deficit. Coordination normal.   Skin: Skin is warm and dry. No lesion and no rash noted. She is not diaphoretic. No cyanosis or erythema. No pallor. Nails show no clubbing.   Psychiatric: She has a normal mood and affect. Her speech is normal and behavior is normal. Judgment and thought content normal.   Nursing note and vitals reviewed.        Assessment:       1. Infection of spinal cord stimulator, initial encounter          Plan:   No evidence of ongoing active infection at this time. Suspect cultures were rendered no growth due to antibiotic exposure prior to extraction. She is at increased risk for repeated skin and soft tissue infections.   · Recommend de-colonization of the skin prior to surgical intervention with Hibiclens wash.   · Keep area clean and dry post surgical intervention.   · Perioperative antibiotics as per protocol.   · Monitor wound post operatively and if evidence of cellulitis develops initiate antibiotic therapy.

## 2018-10-11 NOTE — LETTER
October 14, 2018      Raoul Belcher MD  [address] [facility] [address]  [facility] [address]           [place] - Infectious Disease  [address] [facility] [address]  [facility] [address]  Phone: [phone]  Fax: [phone]          Patient: Marta Huff   MR Number: [id number]   YOB: 1945   Date of Visit: 10/11/2018       Dear Dr. Raoul Belcher:    Thank you for referring Marta Huff to me for evaluation. Attached you will find relevant portions of my assessment and plan of care.    If you have questions, please do not hesitate to call me. I look forward to following Marta Huff along with you.    Sincerely,    Marce Plascencia MD    Enclosure  CC:  No Recipients    If you would like to receive this communication electronically, please contact [email or web address] or [phone] to request more information on [employer] Link access.    For providers and/or their staff who would like to refer a patient to [facility], please contact us through our one-stop-shop provider referral line, [facility], at [phone].    If you feel you have received this communication in error or would no longer like to receive these types of communications, please e-mail [email or web address]

## 2018-10-17 ENCOUNTER — PATIENT MESSAGE (OUTPATIENT)
Dept: PAIN MEDICINE | Facility: CLINIC | Age: 73
End: 2018-10-17

## 2018-10-17 ENCOUNTER — OFFICE VISIT (OUTPATIENT)
Dept: PAIN MEDICINE | Facility: CLINIC | Age: 73
End: 2018-10-17
Payer: MEDICARE

## 2018-10-17 VITALS
HEART RATE: 70 BPM | RESPIRATION RATE: 20 BRPM | DIASTOLIC BLOOD PRESSURE: 71 MMHG | SYSTOLIC BLOOD PRESSURE: 146 MMHG | BODY MASS INDEX: 47.49 KG/M2 | OXYGEN SATURATION: 96 % | WEIGHT: 235.13 LBS | TEMPERATURE: 98 F

## 2018-10-17 DIAGNOSIS — G62.9 PERIPHERAL POLYNEUROPATHY: ICD-10-CM

## 2018-10-17 DIAGNOSIS — G89.4 CHRONIC PAIN SYNDROME: Primary | ICD-10-CM

## 2018-10-17 PROCEDURE — 99215 OFFICE O/P EST HI 40 MIN: CPT | Mod: PBBFAC,PN | Performed by: PHYSICIAN ASSISTANT

## 2018-10-17 PROCEDURE — 1101F PT FALLS ASSESS-DOCD LE1/YR: CPT | Mod: CPTII,,, | Performed by: PHYSICIAN ASSISTANT

## 2018-10-17 PROCEDURE — 99213 OFFICE O/P EST LOW 20 MIN: CPT | Mod: S$PBB,,, | Performed by: PHYSICIAN ASSISTANT

## 2018-10-17 PROCEDURE — 99999 PR PBB SHADOW E&M-EST. PATIENT-LVL V: CPT | Mod: PBBFAC,,, | Performed by: PHYSICIAN ASSISTANT

## 2018-10-17 PROCEDURE — 3078F DIAST BP <80 MM HG: CPT | Mod: CPTII,,, | Performed by: PHYSICIAN ASSISTANT

## 2018-10-17 PROCEDURE — 3077F SYST BP >= 140 MM HG: CPT | Mod: CPTII,,, | Performed by: PHYSICIAN ASSISTANT

## 2018-10-18 ENCOUNTER — TELEPHONE (OUTPATIENT)
Dept: NEUROSURGERY | Facility: CLINIC | Age: 73
End: 2018-10-18

## 2018-10-18 NOTE — TELEPHONE ENCOUNTER
Patient called, states that dr barragan would like her to be seen by Dr Parnell. Patient has a MRI. Who should she be scheduled with. Thanks.

## 2018-10-18 NOTE — PROGRESS NOTES
This note was completed with dictation software and grammatical errors may exist.    CC: Bilateral foot pain    HPI: The patient is a 73-year-old woman with obesity, arthritis, osteoporosis, GERD who presents in referral from Dr. Holley for back and left leg pain.  She returns in follow-up today with bilateral foot pain. She has recovered well following the removal of her spinal cord stimulator due to infection.  She has seen Infectious Disease since her last visit.  She states that her bilateral foot pain has returned in full force and she would like to have a new device implanted.  She describes her foot pain as numb, tingling and burning.  She denies any major back pain at this time. She weakness, bladder or bowel incontinence.    Pain intervention history:She is status post left L5 and S1 transforaminal epidural steroid injection on 2/5/18 with 100% relief of her radicular left leg pain.     ROS: She reports itching, headaches, joint stiffness, joint swelling and back pain.  Balance of review of systems is negative.    Past Medical History:   Diagnosis Date    Anxiety     Arthralgia of knee     arthralgia of bilateral knees secondary to djd    Arthritis     Back pain     Depression     Encounter for blood transfusion     AUTOLOGUS    GERD (gastroesophageal reflux disease)     Hiatal hernia     repaired in 1979    History of seasonal allergies     History of shingles     Hypertension     Insomnia     Obesity     JESENIA on CPAP     not using cpap currently    Osteoporosis     Pneumonia     Reactive airway disease     Restless legs syndrome     Rheumatic fever     at 6 y/o       Past Surgical History:   Procedure Laterality Date    APPENDECTOMY      BARIATRIC SURGERY  2014    Gastric Sleeve    CHOLECYSTECTOMY      EGD (ESOPHAGOGASTRODUODENOSCOPY) N/A 5/20/2014    Performed by Tino Medeiros Jr., MD at [facility] OR [address] [place] [address]    EGD (ESOPHAGOGASTRODUODENOSCOPY) N/A 11/25/2013    Performed by Antonio  JODI Mendez MD at [facility] ENDO (4TH FLR)    AKUA-TRANSFORAMINAL L5 and S1 Left 2/5/2018    [facility]formed by [facility]cher MD at [facility] OR    ESOPHAGOGASTRODUODENOSCOPY (EGD) N/A 6/23/2017    Performed by D[facility]ne [facility]pathi MD at [facility] ENDO    GASTRECTOMY, SLEEVE, LAPAROSCOPIC N/A 5/20/2014    Performed by Tino [facility]ros Jr., MD at [facility] OR 2ND FLR    HERNIA REPAIR      hiatal hernia    HYSTERECTOMY      partial    INSERTION OF DORSAL COLUMN NERVE STIMULATOR FOR TRIAL N/A 5/25/2018    Procedure: TRIAL-STIMULATOR-DORSAL COLUMN;  Surgeon: Raoul Belcher MD;  Location: [facility] OR;  Service: Pain Management;  Laterality: N/A;    INSERTION OF SPINAL CORD STIMULATOR USING MINIMALLY INVASIVE TECHNIQUE N/A 7/11/2018    Procedure: INSERTION, SPINAL CORD STIMULATOR,;  Surgeon: Raoul Belcher MD;  Location: [facility] OR;  Service: Pain Management;  Laterality: N/A;    INSERTION, SPINAL CORD STIMULATOR, N/A 7/11/2018    Performed by [facility]cher MD at [facility] OR    JOINT REPLACEMENT      BILAT KNEE    KNEE ARTHROPLASTY  1/2/2010    bilateral    [id number] N/A 5/20/2014    Performed by Tino Medeiros Jr., MD at [facility] OR 2ND FLR    NISSEN FUNDOPLICATION      REMOVAL OF NEUROSTIMULATOR N/A 9/12/2018    Procedure: REMOVAL, IMPLANT spinal cord stimulator;  Surgeon: Raoul Belcher MD;  Location: [facility] OR;  Service: Pain Management;  Laterality: N/A;    REMOVAL, IMPLANT spinal cord stimulator N/A 9/12/2018    Performed by [facility]cher MD at [facility] OR    [id number]-W-M-LAPAROSCOPIC N/A 9/18/2017    Performed by [facility] at [facility] OR    TRIAL-STIMULATOR-DORSAL COLUMN N/A 5/25/2018    Performed by Raoul Belcher MD at [facility] OR       Social History     Socioeconomic History    Marital status:      Spouse name: None    Number of children: None    Years of education: None    Highest education level: None   Social Needs    Financial resource strain: None    Food  insecurity - worry: None    Food insecurity - inability: None    Transportation needs - medical: None    Transportation needs - non-medical: None   Occupational History    None   Tobacco Use    Smoking status: Passive Smoke Exposure - Never Smoker    Smokeless tobacco: Never Used   Substance and Sexual Activity    Alcohol use: Yes     Comment: 1-2 glasses of wine monthly    Drug use: No    Sexual activity: None   Other Topics Concern    None   Social History Narrative    None         Medications/Allergies: See med card    Vitals:    10/17/18 0839   BP: (!) 146/71   Pulse: 70   Resp: 20   Temp: 97.8 °F (36.6 °C)   TempSrc: Oral   SpO2: 96%   Weight: 106.7 kg (235 lb 1.9 oz)   PainSc:   2   PainLoc: Foot         Physical exam:  Gen: A and O x3, pleasant, well-groomed  Skin: No rashes or obvious lesions  HEENT: PERRLA, no obvious deformities on ears or in canals. Trachea midline.  CVS: Regular rate and rhythm, normal palpable pulses.  Resp:No increased work of breathing, symmetrical chest rise.  Abdomen: Soft, NT/ND.  Musculoskeletal: No antalgic gait.     Neuro:  Lower extremities: 5/5 strength bilaterally  Reflexes: Patellar 2+, Achilles 2+ bilaterally.  Sensory:  Intact and symmetrical to light touch and pinprick in L2-S1 dermatomes bilaterally.       Imagin17 MRI L-spine  T11-12: There is minimal bulging of the anulus.  There is mild facet joint arthropathy with ligamentum flavum thickening, right greater than left.  There is no spinal canal or foraminal stenosis.  T12-L1: There is trace anterolisthesis of T12 on L1.  There is a diffuse disc bulge with moderate facet joint arthropathy.  There is no spinal canal or significant foraminal stenosis.  L1-2: There is marked disc space narrowing and mild retrolisthesis of L1 on L2.  There is inferior unroofing of a diffuse disc bulge with osteophytic ridging, eccentric to the left with broad left paracentral and foraminal disc protrusion.  There is  mild facet joint arthropathy.  There is no significant spinal stenosis.  There is moderate left lateral recess stenosis and foraminal stenosis.  There is mild to moderate right foraminal stenosis.  L2-3: There is mild retrolisthesis, diffuse disc bulge, moderate facet joint arthropathy.  There is no spinal stenosis.  There is mild to moderate bilateral, right greater than left, foraminal stenosis.  L3-4: There is moderate to marked facet joint arthropathy with ligamentum flavum thickening.  There is a diffuse disc bulge with osteophytic ridging.  There is mild prominent dorsal epidural fat.  There is only borderline spinal stenosis.  There is moderate bilateral, right greater than left, foraminal stenosis.  L4-5:There is trace anterolisthesis, marked facet joint arthropathy with ligamentum flavum thickening, unroofing of a diffuse disc bulge with small superimposed central disc protrusion.  There is no significant spinal stenosis.  There is moderate marked right and moderate left foraminal stenosis.  L5-S1: There is trace anterolisthesis.  There is marked facet joint arthropathy with ligamentum flavum thickening and bilateral facet joint effusions.  More importantly, there is a large 12 x 10 mm left synovial cyst which results and severe flattening and deformity of the left side of the dural sac and severe left lateral recess stenosis, compressing the left S1 root.  There is unroofing of a diffuse disc bulge.  There is moderate bilateral foraminal stenosis.  All of the discs are desiccated.  There is no acute fracture.  There is trace anterolisthesis of L4 on L5 and L5 on S1.  There is a 3 mm retrolisthesis of L1 on L2 and 2 mm retrolisthesis of L2 on L3.    Assessment:  The patient is a 73-year-old woman with obesity, arthritis, osteoporosis, GERD who presents in referral from Dr. Holley for back and left leg pain.   1. Chronic pain syndrome  Ambulatory referral to Neurosurgery   2. Peripheral polyneuropathy   Ambulatory referral to Neurosurgery       Plan:  1.  The patient has recovered from her spinal cord stimulator infection and removal of the device.  She has been seen by infectious disease and it was recommended to decolonize the skin prior to any surgical intervention with Hibiclens as well as to use perioperative antibiotics.  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  The patient would like to have a new device implanted because she had excellent relief of her bilateral foot pain and now this has returned.  I have discussed her case with Dr. Belcher and we will request that this be done by Neurosurgery so I have placed this referral.

## 2018-10-24 ENCOUNTER — INITIAL CONSULT (OUTPATIENT)
Dept: NEUROSURGERY | Facility: CLINIC | Age: 73
End: 2018-10-24
Payer: MEDICARE

## 2018-10-24 VITALS
WEIGHT: 234.81 LBS | TEMPERATURE: 98 F | DIASTOLIC BLOOD PRESSURE: 77 MMHG | HEART RATE: 68 BPM | HEIGHT: 59 IN | BODY MASS INDEX: 47.34 KG/M2 | SYSTOLIC BLOOD PRESSURE: 147 MMHG

## 2018-10-24 DIAGNOSIS — M41.80 DEXTROSCOLIOSIS: ICD-10-CM

## 2018-10-24 DIAGNOSIS — M51.36 DDD (DEGENERATIVE DISC DISEASE), LUMBAR: ICD-10-CM

## 2018-10-24 DIAGNOSIS — G60.9 IDIOPATHIC PERIPHERAL NEUROPATHY: Primary | ICD-10-CM

## 2018-10-24 PROCEDURE — 99499 UNLISTED E&M SERVICE: CPT | Mod: HCNC,S$GLB,, | Performed by: PHYSICIAN ASSISTANT

## 2018-10-24 PROCEDURE — 99204 OFFICE O/P NEW MOD 45 MIN: CPT | Mod: HCWC,S$GLB,, | Performed by: PHYSICIAN ASSISTANT

## 2018-10-24 PROCEDURE — 99999 PR PBB SHADOW E&M-EST. PATIENT-LVL V: CPT | Mod: PBBFAC,HCWC,, | Performed by: PHYSICIAN ASSISTANT

## 2018-10-24 PROCEDURE — 1101F PT FALLS ASSESS-DOCD LE1/YR: CPT | Mod: CPTII,HCWC,S$GLB, | Performed by: PHYSICIAN ASSISTANT

## 2018-10-24 PROCEDURE — 3078F DIAST BP <80 MM HG: CPT | Mod: CPTII,HCWC,S$GLB, | Performed by: PHYSICIAN ASSISTANT

## 2018-10-24 PROCEDURE — 3077F SYST BP >= 140 MM HG: CPT | Mod: CPTII,HCWC,S$GLB, | Performed by: PHYSICIAN ASSISTANT

## 2018-10-24 NOTE — LETTER
November 9, 2018      [facility]  [address] [facility] [address] [place] [address]           [place] [facility]  [address] [facility] [address] [place] [address]  Phone: [phone]  Fax: [phone]          Patient: Marta Huff   MR Number: [id number]   YOB: 1945   Date of Visit: 10/24/2018       Dear Keegan Lucas:    Thank you for referring Marta Huff to me for evaluation. Attached you will find relevant portions of my assessment and plan of care.    If you have questions, please do not hesitate to call me. I look forward to following Marta Huff along with you.    Sincerely,    DAYNA Cabralosure  CC:  No Recipients    If you would like to receive this communication electronically, please contact [email or web address] or [phone] to request more information on [facility] [employer] access.    For providers and/or their staff who would like to refer a patient to [facility], please contact us through our one-stop-shop provider referral line, [facility], at [phone].    If you feel you have received this communication in error or would no longer like to receive these types of communications, please e-mail [email or web address]

## 2018-10-25 DIAGNOSIS — G62.89 OTHER POLYNEUROPATHY: Primary | ICD-10-CM

## 2018-10-25 DIAGNOSIS — R82.90 ABNORMAL FINDING IN URINE: ICD-10-CM

## 2018-10-25 DIAGNOSIS — R79.1 ABNORMAL COAGULATION PROFILE: ICD-10-CM

## 2018-10-25 DIAGNOSIS — G62.9 PERIPHERAL POLYNEUROPATHY: Primary | ICD-10-CM

## 2018-10-25 RX ORDER — SODIUM CHLORIDE, SODIUM LACTATE, POTASSIUM CHLORIDE, CALCIUM CHLORIDE 600; 310; 30; 20 MG/100ML; MG/100ML; MG/100ML; MG/100ML
INJECTION, SOLUTION INTRAVENOUS CONTINUOUS
Status: CANCELLED | OUTPATIENT
Start: 2018-10-25

## 2018-10-25 RX ORDER — LIDOCAINE HYDROCHLORIDE 10 MG/ML
1 INJECTION, SOLUTION EPIDURAL; INFILTRATION; INTRACAUDAL; PERINEURAL ONCE
Status: DISCONTINUED | OUTPATIENT
Start: 2018-10-25 | End: 2019-08-16 | Stop reason: HOSPADM

## 2018-10-26 ENCOUNTER — TELEPHONE (OUTPATIENT)
Dept: NEUROSURGERY | Facility: CLINIC | Age: 73
End: 2018-10-26

## 2018-10-26 ENCOUNTER — TELEPHONE (OUTPATIENT)
Dept: FAMILY MEDICINE | Facility: CLINIC | Age: 73
End: 2018-10-26

## 2018-10-26 DIAGNOSIS — G62.9 PERIPHERAL POLYNEUROPATHY: Primary | ICD-10-CM

## 2018-10-26 NOTE — TELEPHONE ENCOUNTER
Mrs. Wong will be having surgery on 11.09.18 with Dr. Parnell, Neurosurgeon, at [facility]. We are reaching out to obtain a surgical clearance from Dr. Holley to ensure the safety of our patient. The surgery is the placement of a spinal cord stimulator and will be under general anesthesia. This procedure typically lasts approximately 1.5 hours. We request that the patient hold all anticoagulant/antiinflammatory medications for 5-7 days prior to surgery. Please let us know if our office can be of further assistance.     Thank you,     Manoj Garcia RN  P: [phone]  F: [phone]

## 2018-10-26 NOTE — TELEPHONE ENCOUNTER
Pt needs pre-op clearance for SX on 11-9-2018.    I have attempted without success to contact this patient by phone to I left a message on answering machine.

## 2018-10-26 NOTE — TELEPHONE ENCOUNTER
Spoke with pt and scheduled all pre- and post-op lab appts; pt instructed to contact PCP and schedule surgical clearance appt; all dates/times/locations confirmed; verbalized understanding.

## 2018-10-26 NOTE — TELEPHONE ENCOUNTER
Tried to reach pt. No answer, left msg to call back.    [facility]

## 2018-10-27 ENCOUNTER — PATIENT MESSAGE (OUTPATIENT)
Dept: FAMILY MEDICINE | Facility: CLINIC | Age: 73
End: 2018-10-27

## 2018-10-29 ENCOUNTER — TELEPHONE (OUTPATIENT)
Dept: NEUROSURGERY | Facility: CLINIC | Age: 73
End: 2018-10-29

## 2018-10-29 DIAGNOSIS — M54.16 LEFT LUMBAR RADICULOPATHY: Primary | ICD-10-CM

## 2018-10-29 DIAGNOSIS — R82.90 ABNORMAL FINDING IN URINE: ICD-10-CM

## 2018-10-29 DIAGNOSIS — Z01.818 PREOP EXAMINATION: Primary | ICD-10-CM

## 2018-10-29 DIAGNOSIS — M54.16 LEFT LUMBAR RADICULOPATHY: ICD-10-CM

## 2018-10-29 NOTE — TELEPHONE ENCOUNTER
----- Message from Loren Muniz RN sent at 10/29/2018  2:46 PM CDT -----  No urine orders on pt... Do you want urine and urine culture need orders...Thanks

## 2018-10-29 NOTE — TELEPHONE ENCOUNTER
Yes UA and cx required on all of Dr. Parnell's pts; I don't understand why they aren't crossing over; new orders placed.

## 2018-10-29 NOTE — TELEPHONE ENCOUNTER
Spoke with pt and rescheduled surgery to 11.10.18; also confirmed pt preop appt t[facility] at [facility]; pt verbalized understanding.

## 2018-10-30 DIAGNOSIS — I10 ESSENTIAL HYPERTENSION: ICD-10-CM

## 2018-10-30 RX ORDER — SERTRALINE HYDROCHLORIDE 50 MG/1
50 TABLET, FILM COATED ORAL NIGHTLY
Qty: 90 TABLET | Refills: 1 | Status: SHIPPED | OUTPATIENT
Start: 2018-10-30 | End: 2019-03-28 | Stop reason: SDUPTHER

## 2018-10-30 RX ORDER — FUROSEMIDE 40 MG/1
40 TABLET ORAL DAILY
Qty: 90 TABLET | Refills: 1 | Status: SHIPPED | OUTPATIENT
Start: 2018-10-30 | End: 2019-01-07 | Stop reason: SDUPTHER

## 2018-11-01 ENCOUNTER — OFFICE VISIT (OUTPATIENT)
Dept: FAMILY MEDICINE | Facility: CLINIC | Age: 73
End: 2018-11-01
Payer: MEDICARE

## 2018-11-01 VITALS
HEART RATE: 64 BPM | WEIGHT: 236.75 LBS | DIASTOLIC BLOOD PRESSURE: 86 MMHG | HEIGHT: 59 IN | BODY MASS INDEX: 47.73 KG/M2 | SYSTOLIC BLOOD PRESSURE: 132 MMHG | RESPIRATION RATE: 17 BRPM

## 2018-11-01 DIAGNOSIS — I10 ESSENTIAL HYPERTENSION: Chronic | ICD-10-CM

## 2018-11-01 DIAGNOSIS — J45.20 MILD INTERMITTENT REACTIVE AIRWAY DISEASE WITHOUT COMPLICATION: ICD-10-CM

## 2018-11-01 DIAGNOSIS — Z01.818 PREOPERATIVE EXAMINATION: Primary | ICD-10-CM

## 2018-11-01 DIAGNOSIS — I27.20 PULMONARY HYPERTENSION: ICD-10-CM

## 2018-11-01 DIAGNOSIS — G47.33 OSA ON CPAP: ICD-10-CM

## 2018-11-01 PROCEDURE — 99214 OFFICE O/P EST MOD 30 MIN: CPT | Mod: PBBFAC,HCWC,PN | Performed by: INTERNAL MEDICINE

## 2018-11-01 PROCEDURE — 1101F PT FALLS ASSESS-DOCD LE1/YR: CPT | Mod: CPTII,HCWC,S$GLB, | Performed by: INTERNAL MEDICINE

## 2018-11-01 PROCEDURE — 99214 OFFICE O/P EST MOD 30 MIN: CPT | Mod: HCWC,S$GLB,, | Performed by: INTERNAL MEDICINE

## 2018-11-01 PROCEDURE — 3075F SYST BP GE 130 - 139MM HG: CPT | Mod: CPTII,HCWC,S$GLB, | Performed by: INTERNAL MEDICINE

## 2018-11-01 PROCEDURE — 99499 UNLISTED E&M SERVICE: CPT | Mod: HCNC,S$GLB,, | Performed by: INTERNAL MEDICINE

## 2018-11-01 PROCEDURE — 3079F DIAST BP 80-89 MM HG: CPT | Mod: CPTII,HCWC,S$GLB, | Performed by: INTERNAL MEDICINE

## 2018-11-01 PROCEDURE — 99999 PR PBB SHADOW E&M-EST. PATIENT-LVL IV: CPT | Mod: PBBFAC,HCWC,, | Performed by: INTERNAL MEDICINE

## 2018-11-01 NOTE — PROGRESS NOTES
Assessment and Plan:    1. Preoperative examination  RCRI risk factors include: no known RCRI risk factors. As such, per RCRI the risk of cardiac death, nonfatal myocardial infarction, or nonfatal cardiac arrest is 0.4% and the risk of myocardial infarction, pulmonary edema, ventricular fibrillation, primary cardiac arrest, or complete heart block is 0.5%.  Overall this patient can be considered low risk for this intermediate risk procedure from a cardiac perspective. No further cardiac testing is recommended at this time.     She has known JESENIA which is currently untreated as below. She has known RAD, and is not having any wheezing currently. Would not recommend obtaining chest X-ray, sleep study, or PFTs at this time. Patient is a non-smoker. We discussed the benefits of early mobilization and deep breathing after surgery.      Screened patient for alcohol misuse, use of illicit drugs, and personal or family history of anesthetic complications or bleeding diathesis and no substantial concerns were identified.     All current medications were reviewed and at this time no changes to medications are recommended prior to surgery. Advised to hold benazepril and lasix AM of surgery.     I recommend use of standard pre-op and post-op precautions for this patient. In my opinion, she is medically optimized for this procedure, and can proceed without further evaluation.     2. Pulmonary hypertension  Noted to be mild, had seen Cardiology in 2013, thought to be from combination of JESENIA, asthma, and obesity. She takes lasix which was started when she had been having some edema, but not having edema at this time. We discussed that treating JESENIA will likely improve the edema.     3. JESENIA on CPAP  Has CPAP but it is not working correctly. She requested referral to Dr. Pantoja to discuss this and I agree.   - Ambulatory consult to Sleep Disorders    4. Mild intermittent reactive airway disease without complication  Advised using  breathing treatment the morning of surgery.     5. Essential hypertension  Hold benazepril AM of surgery.           ______________________________________________________________________  Subjective:    Chief Complaint:  Preoperative evaluation    HPI:  Marta is a 73 y.o. year old woman here for preoperative evaluation. She is new to me, she is a patient of Dr. Holley.      She is scheduled on 11/10 for spinal cord stimulator replacement with Dr. Parnell. This will be under general anesthesia and is expected to last aprox 1.5 hours.      Medical history is notable for HTN, RAD, JESENIA.      She takes benazepril and lasix for HTN as well as edema. She still has edema occasionally.      She uses albuterol PRN for wheezing, but has not been on any maintenance inhaler. She has not needed to use this inhaler recently.      For JESENIA she has a CPAP, but notes that it has been broken. She has not seen any doctor in this area about JESENIA, notes she has been told she needs to see Dr. Pantoja, but has not scheduled an apt.      She does not take aspirin or any other anticoagulant.      She has seen ID as she had a history of infection of a spinal cord stimulator. They have made recommendations about perioperative antibiotics and decolonization.     She has no known personal history of cardiovascular disease (no CAD, PAD, or strokes). She has no history of heart failure, diabetes or chronic kidney disease. She denies symptoms of angina, syncope or palpitations. She is able to walk up 2 flights of stairs without chest pain or shortness of breath.       Medications:  Current Outpatient Medications on File Prior to Visit   Medication Sig Dispense Refill    albuterol (PROAIR HFA) 90 mcg/actuation inhaler Inhale 2 puffs into the lungs every 6 (six) hours as needed for Wheezing or Shortness of Breath. 3 Inhaler 3    albuterol (PROVENTIL) 2.5 mg /3 mL (0.083 %) nebulizer solution Take 3 mLs (2.5 mg total) by nebulization every 6 (six)  hours as needed for Wheezing or Shortness of Breath. Rescue 1 Box 11    alendronate (FOSAMAX) 70 MG tablet TAKE 1 TABLET EVERY 7 DAYS 12 tablet 3    B-complex with vitamin C (Z-BEC OR EQUIV) tablet Take 1 tablet by mouth once daily.       benazepril (LOTENSIN) 40 MG tablet TAKE 1 TABLET TWICE DAILY 180 tablet 1    BIOTIN/CALCIUM CARBONATE (BIOTIN 100+10 ORAL) Take 1 capsule by mouth once daily.       calcium citrate-vitamin D2 1,500-200 mg-unit Tab Take 1 tablet by mouth once daily.       [START ON 11/8/2018] chlorhexidine (PERIDEX) 0.12 % solution Use as directed 10 mLs in the mouth or throat 2 (two) times daily.      furosemide (LASIX) 40 MG tablet Take 1 tablet (40 mg total) by mouth once daily. 90 tablet 1    loratadine (CLARITIN) 10 mg tablet Take 10 mg by mouth daily as needed.       LORazepam (ATIVAN) 0.5 MG tablet Take 1 tablet (0.5 mg total) by mouth 2 (two) times daily as needed for Anxiety. 60 tablet 0    meclizine (ANTIVERT) 25 mg tablet Take 1 tablet (25 mg total) by mouth every 6 (six) hours. (Patient taking differently: Take 25 mg by mouth every 6 (six) hours as needed. ) 360 tablet 1    multivitamin (MULTIVITAMIN) per tablet Take 1 tablet by mouth once daily.        [START ON 11/8/2018] mupirocin (BACTROBAN) 2 % ointment 1 g by Nasal route 2 (two) times daily.      ondansetron (ZOFRAN-ODT) 4 MG TbDL Take 1 tablet (4 mg total) by mouth every 6 (six) hours as needed. 30 tablet 0    pramipexole (MIRAPEX) 1.5 MG tablet TAKE 1 TABLET EVERY NIGHT 90 tablet 1    sertraline (ZOLOFT) 50 MG tablet Take 1 tablet (50 mg total) by mouth every evening. 90 tablet 1    traMADol (ULTRAM) 50 mg tablet Take 1 tablet (50 mg total) by mouth 2 (two) times daily as needed for Pain. 40 tablet 0    VITAMIN B-12 5,000 mcg Subl Place 1 tablet under the tongue once a week.       zolpidem (AMBIEN) 5 MG Tab TAKE 1 TABLET NIGHTLY AS NEEDED. 30 tablet 0     Current Facility-Administered Medications on File Prior  "to Visit   Medication Dose Route Frequency Provider Last Rate Last Dose    lidocaine (PF) 10 mg/ml (1%) injection 10 mg  1 mL Intradermal Once Kevin Parnell MD           Review of Systems:  Review of Systems   Constitutional: Negative for chills and fever.   Respiratory: Positive for wheezing (rare). Negative for shortness of breath.    Cardiovascular: Positive for leg swelling. Negative for chest pain and palpitations.   Neurological: Negative for dizziness and light-headedness.       Past Medical History:  Past Medical History:   Diagnosis Date    Anxiety     Arthralgia of knee     arthralgia of bilateral knees secondary to djd    Arthritis     Back pain     Depression     Encounter for blood transfusion     AUTOLOGUS    GERD (gastroesophageal reflux disease)     Hiatal hernia     repaired in 1979    History of seasonal allergies     History of shingles     Hypertension     Insomnia     Obesity     Osteoporosis     Pneumonia     Reactive airway disease     Restless legs syndrome     Rheumatic fever     at 4 y/o    Sleep apnea     no cpap       Objective:    Vitals:  Vitals:    11/01/18 0906   BP: 132/86   Pulse: 64   Resp: 17   Weight: 107.4 kg (236 lb 12.4 oz)   Height: 4' 11" (1.499 m)   PainSc: 0-No pain       Physical Exam   Constitutional: She is oriented to person, place, and time. She appears well-developed and well-nourished. No distress.   HENT:   Mouth/Throat: Oropharynx is clear and moist.   Eyes: No scleral icterus.   Cardiovascular: Normal rate and regular rhythm. Exam reveals no friction rub.   No murmur heard.  Pulmonary/Chest: Effort normal and breath sounds normal. No respiratory distress. She has no wheezes. She has no rales.   Musculoskeletal: She exhibits edema (1+ bilateral).   Neurological: She is alert and oriented to person, place, and time.   Skin: Skin is warm and dry.   Psychiatric: She has a normal mood and affect. Her behavior is normal.   Vitals " reviewed.      Data:  Previous labs reviewed and pertinent for mild anemia, otherwise normal CMP, CBC, INR. A1c 5.5.      Tammy Foley MD  Internal Medicine

## 2018-11-08 ENCOUNTER — TELEPHONE (OUTPATIENT)
Dept: NEUROSURGERY | Facility: CLINIC | Age: 73
End: 2018-11-08

## 2018-11-08 NOTE — TELEPHONE ENCOUNTER
Left voicemail for pt that surgery was rescheduled from 11.10.18 to 11.14.18; pt instructed to call if she has any questions.

## 2018-11-09 ENCOUNTER — TELEPHONE (OUTPATIENT)
Dept: NEUROSURGERY | Facility: CLINIC | Age: 73
End: 2018-11-09

## 2018-11-09 NOTE — TELEPHONE ENCOUNTER
----- Message from Vivian Mack PA-C sent at 11/9/2018  4:33 AM CST -----  She was still on the calendar for Saturday. I know she did not answer, but please make sure she gets moved to Wednesday. Saturday is not an option

## 2018-11-09 NOTE — TELEPHONE ENCOUNTER
Spoke with pt  and notified him that pts surgery rescheduled to 11.14.18; verbalized understanding; instructed to call us with any questions or concerns.

## 2018-11-09 NOTE — PROGRESS NOTES
Neurosurgery History & Physical    Patient ID: Marta Huff is a 73 y.o. female.    Chief Complaint   Patient presents with    Neurologic Problem     Patient here for SCS evaluation. She reports her pain 5/10.       Review of Systems   Constitutional: Negative for activity change, chills, fever and unexpected weight change.   HENT: Negative for hearing loss, tinnitus, trouble swallowing and voice change.    Eyes: Negative.  Negative for visual disturbance.   Respiratory: Negative for apnea, chest tightness and shortness of breath.    Cardiovascular: Negative.  Negative for chest pain and palpitations.   Gastrointestinal: Negative.  Negative for abdominal pain, constipation, diarrhea, nausea and vomiting.   Genitourinary: Negative.  Negative for difficulty urinating, dysuria and frequency.   Musculoskeletal: Negative for back pain, gait problem, neck pain and neck stiffness.   Skin: Negative for wound.   Allergic/Immunologic: Negative for immunocompromised state.   Neurological: Positive for numbness. Negative for dizziness, tremors, seizures, facial asymmetry, speech difficulty, weakness, light-headedness and headaches.   Psychiatric/Behavioral: Negative for confusion and decreased concentration.       Past Medical History:   Diagnosis Date    Anxiety     Arthralgia of knee     arthralgia of bilateral knees secondary to djd    Arthritis     Back pain     Depression     Encounter for blood transfusion     AUTOLOGUS    GERD (gastroesophageal reflux disease)     Hiatal hernia     repaired in 1979    History of seasonal allergies     History of shingles     Hypertension     Insomnia     Obesity     Osteoporosis     Pneumonia     Reactive airway disease     Restless legs syndrome     Rheumatic fever     at 6 y/o    Sleep apnea     no cpap     Past Surgical History:   Procedure Laterality Date    APPENDECTOMY      BARIATRIC SURGERY  2014    Gastric Sleeve    CHOLECYSTECTOMY      EGD  (ESOPHAGOGASTRODUODENOSCOPY) N/A 5/20/2014    Performed by [facility]ino [facility]ros Jr., [facility] at [facility] OR 2ND FLR    EGD (ESOPHAGOGASTRODUODENOSCOPY) N/A 11/25/2013    Performed by Antonio Mendez MD at [facility] ENDO (4TH FLR)    AKUA-TRANSFORAMINAL L5 and S1 Left 2/5/2018    Performed by [facility]cher [facility] at [facility] OR    ESOPHAGOGASTRODUODENOSCOPY (EGD) N/A 6/23/2017    Per[facility]i [facility] at [facility] ENDO    GASTRECTOMY, SLEEVE, LAPAROSCOPIC N/A 5/20/2014    Performed by [facility] Jr[facility] at [facility] OR 2ND FLR    HERNIA REPAIR      hiatal hernia    HYSTERECTOMY      partial    INSERTION OF DORSAL COLUMN NERVE STIMULATOR FOR TRIAL N/A 5/25/2018    Procedure: TRIAL-STIMULATOR-DORSAL COLUMN;  Surgeon: Raoul Belcher MD;  Location: [facility] OR;  Service: Pain Management;  Laterality: N/A;    INSERTION OF SPINAL CORD STIMULATOR USING MINIMALLY INVASIVE TECHNIQUE N/A 7/11/2018    Procedure: INSERTION, SPINAL CORD STIMULATOR,;  Surgeon: Raoul Belcher MD;  Location: [facility] OR;  Service: Pain Management;  Laterality: N/A;    INSERTION, SPINAL CORD STIMULATOR, N/A 7/11/2018    Performed by [facility]cher [facility] at [facility] OR    JOINT REPLACEMENT      BILAT KNEE    KNEE ARTHROPLASTY  1/2/2010    bilateral    HENKA-EGMOGHSP-[id number] N/A 5/20/2014    Performed by T[facility] Jr[facility] at [facility] OR 2ND FLR    NISSEN FUNDOPLICATION      REMOVAL OF NEUROSTIMULATOR N/A 9/12/2018    Procedure: REMOVAL, IMPLANT spinal cord stimulator;  Surgeon: Raoul Belcher MD;  Location: [facility] OR;  Service: Pain Management;  Laterality: N/A;    REMOVAL, IMPLANT spinal cord stimulator N/A 9/12/2018    Performed by [facility]cher [facility] at [facility] OR    BUOJ-R-I-LAPAROSCOPIC N/A 9/18/2017    [facility] at [facility] OR    TRIAL-STIMULATOR-DORSAL COLUMN N/A 5/25/2018    Performed by [facility]cher MD at [facility] OR     Social History     Socioeconomic History    Marital status:       Spouse name: Not on file    Number of children: Not on file    Years of education: Not on file    Highest education level: Not on file   Social Needs    Financial resource strain: Not on file    Food insecurity - worry: Not on file    Food insecurity - inability: Not on file    Transportation needs - medical: Not on file    Transportation needs - non-medical: Not on file   Occupational History    Not on file   Tobacco Use    Smoking status: Passive Smoke Exposure - Never Smoker    Smokeless tobacco: Never Used   Substance and Sexual Activity    Alcohol use: Yes     Comment: 1-2 glasses of wine monthly    Drug use: No    Sexual activity: Not on file   Other Topics Concern    Not on file   Social History Narrative    Not on file     Family History   Problem Relation Age of Onset    Heart disease Mother     Hypertension Mother     Diabetes Mother     Obesity Mother     Heart disease Maternal Grandfather      Review of patient's allergies indicates:   Allergen Reactions    Etodolac (bulk) Swelling     Hands and feet swelling    Sulfa (sulfonamide antibiotics) Rash and Other (See Comments)     Headache        Current Outpatient Medications:     albuterol (PROAIR HFA) 90 mcg/actuation inhaler, Inhale 2 puffs into the lungs every 6 (six) hours as needed for Wheezing or Shortness of Breath., Disp: 3 Inhaler, Rfl: 3    albuterol (PROVENTIL) 2.5 mg /3 mL (0.083 %) nebulizer solution, Take 3 mLs (2.5 mg total) by nebulization every 6 (six) hours as needed for Wheezing or Shortness of Breath. Rescue, Disp: 1 Box, Rfl: 11    alendronate (FOSAMAX) 70 MG tablet, TAKE 1 TABLET EVERY 7 DAYS, Disp: 12 tablet, Rfl: 3    B-complex with vitamin C (Z-BEC OR EQUIV) tablet, Take 1 tablet by mouth once daily. , Disp: , Rfl:     benazepril (LOTENSIN) 40 MG tablet, TAKE 1 TABLET TWICE DAILY, Disp: 180 tablet, Rfl: 1    BIOTIN/CALCIUM CARBONATE (BIOTIN 100+10 ORAL), Take 1 capsule by mouth once daily. , Disp: ,  "Rfl:     calcium citrate-vitamin D2 1,500-200 mg-unit Tab, Take 1 tablet by mouth once daily. , Disp: , Rfl:     loratadine (CLARITIN) 10 mg tablet, Take 10 mg by mouth daily as needed. , Disp: , Rfl:     meclizine (ANTIVERT) 25 mg tablet, Take 1 tablet (25 mg total) by mouth every 6 (six) hours. (Patient taking differently: Take 25 mg by mouth every 6 (six) hours as needed. ), Disp: 360 tablet, Rfl: 1    multivitamin (MULTIVITAMIN) per tablet, Take 1 tablet by mouth once daily.  , Disp: , Rfl:     ondansetron (ZOFRAN-ODT) 4 MG TbDL, Take 1 tablet (4 mg total) by mouth every 6 (six) hours as needed., Disp: 30 tablet, Rfl: 0    pramipexole (MIRAPEX) 1.5 MG tablet, TAKE 1 TABLET EVERY NIGHT, Disp: 90 tablet, Rfl: 1    VITAMIN B-12 5,000 mcg Subl, Place 1 tablet under the tongue once a week. , Disp: , Rfl:     zolpidem (AMBIEN) 5 MG Tab, TAKE 1 TABLET NIGHTLY AS NEEDED., Disp: 30 tablet, Rfl: 0    chlorhexidine (PERIDEX) 0.12 % solution, Use as directed 10 mLs in the mouth or throat 2 (two) times daily., Disp: , Rfl:     furosemide (LASIX) 40 MG tablet, Take 1 tablet (40 mg total) by mouth once daily., Disp: 90 tablet, Rfl: 1    LORazepam (ATIVAN) 0.5 MG tablet, Take 1 tablet (0.5 mg total) by mouth 2 (two) times daily as needed for Anxiety., Disp: 60 tablet, Rfl: 0    mupirocin (BACTROBAN) 2 % ointment, 1 g by Nasal route 2 (two) times daily., Disp: , Rfl:     sertraline (ZOLOFT) 50 MG tablet, Take 1 tablet (50 mg total) by mouth every evening., Disp: 90 tablet, Rfl: 1    Current Facility-Administered Medications:     lidocaine (PF) 10 mg/ml (1%) injection 10 mg, 1 mL, Intradermal, Once, Kevin Parnell MD    Vitals:    10/24/18 1438   BP: (!) 147/77   Pulse: 68   Temp: 98.1 °F (36.7 °C)   Weight: 106.5 kg (234 lb 12.6 oz)   Height: 4' 11" (1.499 m)   PainSc:   5   PainLoc: Foot       Physical Exam   Constitutional: She is oriented to person, place, and time. Vital signs are normal. She appears " well-developed and well-nourished.   HENT:   Head: Normocephalic and atraumatic.   Right Ear: Hearing normal.   Left Ear: Hearing normal.   Nose: Nose normal.   Mouth/Throat: Uvula is midline.   Eyes: Conjunctivae, EOM and lids are normal. Pupils are equal, round, and reactive to light.   Neck: Trachea normal, normal range of motion, full passive range of motion without pain and phonation normal. Neck supple.   Cardiovascular: Normal rate, regular rhythm, intact distal pulses and normal pulses.   Pulmonary/Chest: Effort normal and breath sounds normal.   Abdominal: Soft. Normal appearance.   Musculoskeletal: Normal range of motion.   Feet:   Right Foot:   Protective Sensation: 4 sites tested. 4 sites sensed.   Skin Integrity: Negative for ulcer.   Left Foot:   Protective Sensation: 4 sites tested. 4 sites sensed.   Skin Integrity: Negative for ulcer.   Neurological: She is alert and oriented to person, place, and time. She has normal strength. No cranial nerve deficit or sensory deficit. She has a normal Finger-Nose-Finger Test, a normal Heel to Shin Test, a normal Romberg Test and a normal Tandem Gait Test. She displays a negative Romberg sign. Gait normal.   Reflex Scores:       Tricep reflexes are 2+ on the right side and 2+ on the left side.       Bicep reflexes are 2+ on the right side and 2+ on the left side.       Brachioradialis reflexes are 2+ on the right side and 2+ on the left side.       Patellar reflexes are 1+ on the right side and 1+ on the left side.       Achilles reflexes are 1+ on the right side and 1+ on the left side.  Skin: Skin is warm, dry and intact. Capillary refill takes less than 2 seconds.   Psychiatric: She has a normal mood and affect. Her speech is normal and behavior is normal. Judgment and thought content normal. Cognition and memory are normal.   Nursing note and vitals reviewed.      Neurologic Exam     Mental Status   Oriented to person, place, and time.   Follows 3 step  commands.   Attention: normal. Concentration: normal.   Speech: speech is normal   Level of consciousness: alert  Knowledge: good.   Able to name object.     Cranial Nerves     CN II   Visual fields full to confrontation.   Visual acuity: normal  Right visual field deficit: none  Left visual field deficit: none     CN III, IV, VI   Pupils are equal, round, and reactive to light.  Extraocular motions are normal.   CN III: no CN III palsy  CN VI: no CN VI palsy    CN V   Facial sensation intact.   Right facial sensation deficit: none  Left facial sensation deficit: none    CN VII   Facial expression full, symmetric.   Right facial weakness: none  Left facial weakness: none    CN VIII   CN VIII normal.   Hearing: intact    CN IX, X   CN IX normal.   CN X normal.     CN XI   CN XI normal.     CN XII   CN XII normal.     Motor Exam   Muscle bulk: normal  Overall muscle tone: normal  Right arm tone: normal  Left arm tone: normal  Right arm pronator drift: absent  Left arm pronator drift: absent  Right leg tone: normal  Left leg tone: normal    Strength   Strength 5/5 throughout.   Right neck flexion: 5/5  Left neck flexion: 5/5  Right neck extension: 5/5  Left neck extension: 5/5  Right deltoid: 5/5  Left deltoid: 5/5  Right biceps: 5/5  Left biceps: 5/5  Right triceps: 5/5  Left triceps: 5/5  Right wrist flexion: 5/5  Left wrist flexion: 5/5  Right wrist extension: 5/5  Left wrist extension: 5/5  Right interossei: 5/5  Left interossei: 5/5  Right abdominals: 5/5  Left abdominals: 5/5  Right iliopsoas: 5/5  Left iliopsoas: 5/5  Right quadriceps: 5/5  Left quadriceps: 5/5  Right hamstrin/5  Left hamstrin/5  Right glutei: 5/5  Left glutei: 5/5  Right anterior tibial: 5/5  Left anterior tibial: 5/5  Right posterior tibial: 5/5  Left posterior tibial: 5/5  Right peroneal: 5/5  Left peroneal: 5/5  Right gastroc: 5/5  Left gastroc: 5/5    Sensory Exam   Right arm light touch: normal  Left arm light touch: normal  Right  leg light touch: decreased from knee  Left leg light touch: decreased from knee  Vibration normal.     Gait, Coordination, and Reflexes     Gait  Gait: normal    Coordination   Romberg: negative  Finger to nose coordination: normal  Heel to shin coordination: normal  Tandem walking coordination: normal    Tremor   Resting tremor: absent  Intention tremor: absent  Action tremor: absent    Reflexes   Right brachioradialis: 2+  Left brachioradialis: 2+  Right biceps: 2+  Left biceps: 2+  Right triceps: 2+  Left triceps: 2+  Right patellar: 1+  Left patellar: 1+  Right achilles: 1+  Left achilles: 1+  Right : 2+  Left : 2+  Right plantar: normal  Left plantar: normal  Right Becker: absent  Left Becker: absent  Right ankle clonus: absent  Left ankle clonus: absent        Visit Diagnosis:  There are no diagnoses linked to this encounter.    Provider dictation:  PHQ:  3      The patient is a very pleasant 73-year-old female presenting today for consultation for possible spinal cord stimulator placement evaluation.  She was referred by Dr. Belcher in his physician assistant Keegan Lucas.  The patient had a spinal cord stimulator placed but due to concern for infection it was removed.  She denies back pain or leg pain but reports 5/10 pain in her feet.  She was very happy with the results of the spinal cord stimulator trial and initial placement with the pain relief she received from her neuropathy.  She denies nausea vomiting fever or chills.  She denies weakness in her lower extremities.  Again she denies back pain.    On physical examination, she is an elderly obese female in no acute distress.  Medical status.  She is afebrile and normotensive.  Incision in her spine is well healed with no evidence of infection.  She has no weakness in the lower extremities she does have diminished lower extremity patellar reflexes.  She does have diminished sensation below the knee bilaterally.  She walks with a  normal gait but has an abnormal tandem walk.    MRI of the lumbar spine shows significant dextroscoliosis with severe multilevel spondylosis.  MRI done with contrast post infection demonstrates no evidence of enhancement in the epidural space. There is Modic endplate changes at L1 to as well as T12-L1 wit anterior osteophytes.  There is retrolisthesis of L1 on L2.  There is multilevel facet arthropathy and foraminal stenosis however no significant central canal stenosis    Despite the significant degenerative changes in the lumbar spine the patient actually has no back pain.  She has no radicular leg pain or weakness.  At this time she has done well from the spinal cord stimulator with only a superficial wound infection.  Given her extensive degenerative changes she is a candidate for spinal cord stimulator with paddle lead placement.  We will plan to proceed with this in the near future.  The patient will contact us with any questions concerns or changes in her medical status.  She will require a partial laminectomy for paddle lead placement and will likely stay overnight for observation.            This note was done using voice recognition software. Please excuse any errors missed in proof reading.

## 2018-11-15 ENCOUNTER — TELEPHONE (OUTPATIENT)
Dept: NEUROSURGERY | Facility: CLINIC | Age: 73
End: 2018-11-15

## 2018-11-28 ENCOUNTER — CLINICAL SUPPORT (OUTPATIENT)
Dept: NEUROSURGERY | Facility: CLINIC | Age: 73
End: 2018-11-28
Payer: MEDICARE

## 2018-11-28 RX ORDER — OXYCODONE AND ACETAMINOPHEN 7.5; 325 MG/1; MG/1
1 TABLET ORAL EVERY 6 HOURS PRN
Qty: 45 TABLET | Refills: 0 | Status: SHIPPED | OUTPATIENT
Start: 2018-11-28 | End: 2019-02-18

## 2018-11-28 NOTE — PROGRESS NOTES
Pt is 12 days s/p SCS placement with Dr. Parnell. No s/s of infection. Incision cleaned with chloraprep. Incision is warm, dry, intact. Pt reports post-operative pain level < pre-operative state. Pt is requesting medication refill today. Pt re-educated on narcotics policy. Educated patient on weight lifting status, bending/lifting/twisting, and to call with any changes or questions. Pt aware of imaging and f/u appt with provider. No further questions.

## 2018-12-11 ENCOUNTER — TELEPHONE (OUTPATIENT)
Dept: NEUROSURGERY | Facility: CLINIC | Age: 73
End: 2018-12-11

## 2018-12-12 ENCOUNTER — HOSPITAL ENCOUNTER (OUTPATIENT)
Dept: RADIOLOGY | Facility: HOSPITAL | Age: 73
Discharge: HOME | End: 2018-12-12
Attending: NEUROLOGICAL SURGERY
Payer: MEDICARE

## 2018-12-12 ENCOUNTER — OFFICE VISIT (OUTPATIENT)
Dept: NEUROSURGERY | Facility: CLINIC | Age: 73
End: 2018-12-12
Payer: MEDICARE

## 2018-12-12 VITALS
HEART RATE: 84 BPM | BODY MASS INDEX: 46.88 KG/M2 | SYSTOLIC BLOOD PRESSURE: 152 MMHG | WEIGHT: 232.56 LBS | HEIGHT: 59 IN | TEMPERATURE: 98 F | DIASTOLIC BLOOD PRESSURE: 83 MMHG | RESPIRATION RATE: 20 BRPM

## 2018-12-12 DIAGNOSIS — Z96.89 S/P INSERTION OF SPINAL CORD STIMULATOR: Primary | ICD-10-CM

## 2018-12-12 DIAGNOSIS — G62.9 PERIPHERAL POLYNEUROPATHY: ICD-10-CM

## 2018-12-12 PROCEDURE — 72100 X-RAY EXAM L-S SPINE 2/3 VWS: CPT | Mod: TC,FY,HCWC,PO

## 2018-12-12 PROCEDURE — 72100 X-RAY EXAM L-S SPINE 2/3 VWS: CPT | Mod: 26,HCWC,, | Performed by: RADIOLOGY

## 2018-12-12 PROCEDURE — 99024 POSTOP FOLLOW-UP VISIT: CPT | Mod: S$GLB,,, | Performed by: PHYSICIAN ASSISTANT

## 2018-12-12 PROCEDURE — 99999 PR PBB SHADOW E&M-EST. PATIENT-LVL V: CPT | Mod: PBBFAC,,, | Performed by: PHYSICIAN ASSISTANT

## 2018-12-27 NOTE — PROGRESS NOTES
Neurosurgery History & Physical    Patient ID: Marta Huff is a 73 y.o. female.    Chief Complaint   Patient presents with    Post-op Evaluation     4 wk post spinal cord stim [facility]       Review of Systems   Constitutional: Negative for activity change, chills, fever and unexpected weight change.   HENT: Negative for hearing loss, tinnitus, trouble swallowing and voice change.    Eyes: Negative.  Negative for visual disturbance.   Respiratory: Negative for apnea, chest tightness and shortness of breath.    Cardiovascular: Negative.  Negative for chest pain and palpitations.   Gastrointestinal: Negative.  Negative for abdominal pain, constipation, diarrhea, nausea and vomiting.   Genitourinary: Negative.  Negative for difficulty urinating, dysuria and frequency.   Musculoskeletal: Negative for back pain, gait problem, neck pain and neck stiffness.   Skin: Negative for wound.   Allergic/Immunologic: Negative for immunocompromised state.   Neurological: Positive for numbness. Negative for dizziness, tremors, seizures, facial asymmetry, speech difficulty, weakness, light-headedness and headaches.   Psychiatric/Behavioral: Negative for confusion and decreased concentration.       Past Medical History:   Diagnosis Date    Anxiety     Arthralgia of knee     arthralgia of bilateral knees secondary to djd    Arthritis     Back pain     Depression     Encounter for blood transfusion     AUTOLOGUS    GERD (gastroesophageal reflux disease)     Hiatal hernia     repaired in 1979    History of seasonal allergies     History of shingles     Hypertension     Insomnia     Obesity     Osteoporosis     Pneumonia     Reactive airway disease     Restless legs syndrome     Rheumatic fever     at 6 y/o    Sleep apnea     no cpap     Past Surgical History:   Procedure Laterality Date    APPENDECTOMY      BARIATRIC SURGERY  2014    Gastric Sleeve    CHOLECYSTECTOMY      EGD (ESOPHAGOGASTRODUODENOSCOPY) N/A  5/20/2014    Performed by [facility]ino [facility]ros Jr., [facility] at [facility] OR 2ND FLR    EGD (ESOPHAGOGASTRODUODENOSCOPY) N/A 11/25/2013    Performed by Antonio Mendez MD at [facility] ENDO (4TH FLR)    AKUA-TRANSFORAMINAL L5 and S1 Left 2/5/2018    [facility]formed by [facility]cher [facility] at [facility] OR    ESOPHAGOGASTRODUODENOSCOPY (EGD) N/A 6/23/2017    Performed by Dorene Tripathi MD at [facility] ENDO    GASTRECTOMY, SLEEVE, LAPAROSCOPIC N/A 5/20/2014    Performed by [facility] Jr., [facility] at [facility] OR 2ND FLR    HERNIA REPAIR      hiatal hernia    HYSTERECTOMY      partial    INSERTION, SPINAL CORD STIMULATOR, N/A 7/11/2018    [facility] by [facility] at [facility] OR    JOINT REPLACEMENT      BILAT KNEE    KNEE ARTHROPLASTY  1/2/2010    bilateral    [id number] N/A 5/20/2014    Performed by [facility] Jr., [facility] at [facility] OR 2ND FLR    NISSEN FUNDOPLICATION      REMOVAL, IMPLANT spinal cord stimulator N/A 9/12/2018    [facility]formed by [facility] at [facility] OR    REPLACEMENT, SPINAL CORD STIMULATOR  PLACEMENT OF SPINAL CORD STIMULATOR T10 N/A 11/14/2018    Performed by [facility]ell [facility] at [facility] OR    [id number]-LAPAROSCOPIC N/A 9/18/2017    Performed by [facility]pathi [facility] at [facility] OR    TRIAL-STIMULATOR-DORSAL COLUMN N/A 5/25/2018    Performed by [facility]cher [facility] at [facility] OR     Social History     Socioeconomic History    Marital status:      Spouse name: Not on file    Number of children: Not on file    Years of education: Not on file    Highest education level: Not on file   Social Needs    Financial resource strain: Not on file    Food insecurity - worry: Not on file    Food insecurity - inability: Not on file    Transportation needs - medical: Not on file    Transportation needs - non-medical: Not on file   Occupational History    Not on file   Tobacco Use    Smoking status: Passive Smoke Exposure - Never Smoker    Smokeless tobacco: Never Used   Substance and  Sexual Activity    Alcohol use: Yes     Comment: 1-2 glasses of wine monthly    Drug use: No    Sexual activity: Not on file   Other Topics Concern    Not on file   Social History Narrative    Not on file     Family History   Problem Relation Age of Onset    Heart disease Mother     Hypertension Mother     Diabetes Mother     Obesity Mother     Heart disease Maternal Grandfather      Review of patient's allergies indicates:   Allergen Reactions    Etodolac (bulk) Swelling     Hands and feet swelling    Sulfa (sulfonamide antibiotics) Rash and Other (See Comments)     Headache        Current Outpatient Medications:     albuterol (PROAIR HFA) 90 mcg/actuation inhaler, Inhale 2 puffs into the lungs every 6 (six) hours as needed for Wheezing or Shortness of Breath., Disp: 3 Inhaler, Rfl: 3    albuterol (PROVENTIL) 2.5 mg /3 mL (0.083 %) nebulizer solution, Take 3 mLs (2.5 mg total) by nebulization every 6 (six) hours as needed for Wheezing or Shortness of Breath. Rescue, Disp: 1 Box, Rfl: 11    alendronate (FOSAMAX) 70 MG tablet, TAKE 1 TABLET EVERY 7 DAYS, Disp: 12 tablet, Rfl: 3    B-complex with vitamin C (Z-BEC OR EQUIV) tablet, Take 1 tablet by mouth once daily. , Disp: , Rfl:     benazepril (LOTENSIN) 40 MG tablet, TAKE 1 TABLET TWICE DAILY, Disp: 180 tablet, Rfl: 1    BIOTIN/CALCIUM CARBONATE (BIOTIN 100+10 ORAL), Take 1 capsule by mouth once daily. , Disp: , Rfl:     calcium citrate-vitamin D2 1,500-200 mg-unit Tab, Take 1 tablet by mouth once daily. , Disp: , Rfl:     cholecalciferol, vitamin D3, 5,000 unit capsule, Take 1 capsule (5,000 Units total) by mouth once daily., Disp: 90 capsule, Rfl: 3    furosemide (LASIX) 40 MG tablet, Take 1 tablet (40 mg total) by mouth once daily., Disp: 90 tablet, Rfl: 1    loratadine (CLARITIN) 10 mg tablet, Take 10 mg by mouth daily as needed. , Disp: , Rfl:     meclizine (ANTIVERT) 25 mg tablet, Take 1 tablet (25 mg total) by mouth every 6 (six) hours.  "(Patient taking differently: Take 25 mg by mouth every 6 (six) hours as needed. ), Disp: 360 tablet, Rfl: 1    multivitamin (MULTIVITAMIN) per tablet, Take 1 tablet by mouth once daily.  , Disp: , Rfl:     omega 3-dha-epa-fish oil (FISH OIL) 1,000 mg (120 mg-180 mg) Cap, Take 2 capsules by mouth 2 (two) times daily., Disp: 360 capsule, Rfl: 3    pramipexole (MIRAPEX) 1.5 MG tablet, TAKE 1 TABLET EVERY NIGHT, Disp: 90 tablet, Rfl: 1    VITAMIN B-12 5,000 mcg Subl, Place 1 tablet under the tongue once a week. , Disp: , Rfl:     zolpidem (AMBIEN) 5 MG Tab, TAKE 1 TABLET NIGHTLY AS NEEDED., Disp: 30 tablet, Rfl: 0    LORazepam (ATIVAN) 0.5 MG tablet, Take 1 tablet (0.5 mg total) by mouth 2 (two) times daily as needed for Anxiety., Disp: 60 tablet, Rfl: 0    ondansetron (ZOFRAN-ODT) 4 MG TbDL, Take 1 tablet (4 mg total) by mouth every 6 (six) hours as needed., Disp: 30 tablet, Rfl: 0    oxyCODONE-acetaminophen (PERCOCET) 7.5-325 mg per tablet, Take 1 tablet by mouth every 6 (six) hours as needed for Pain., Disp: 45 tablet, Rfl: 0    sertraline (ZOLOFT) 50 MG tablet, Take 1 tablet (50 mg total) by mouth every evening., Disp: 90 tablet, Rfl: 1    Current Facility-Administered Medications:     lidocaine (PF) 10 mg/ml (1%) injection 10 mg, 1 mL, Intradermal, Once, Kevin Parnell MD    Vitals:    12/12/18 1424   BP: (!) 152/83   Pulse: 84   Resp: 20   Temp: 97.7 °F (36.5 °C)   Weight: 105.5 kg (232 lb 9.4 oz)   Height: 4' 11" (1.499 m)   PainSc: 0-No pain       Physical Exam   Constitutional: She is oriented to person, place, and time. Vital signs are normal. She appears well-developed and well-nourished.   HENT:   Head: Normocephalic and atraumatic.   Right Ear: Hearing normal.   Left Ear: Hearing normal.   Nose: Nose normal.   Mouth/Throat: Uvula is midline.   Eyes: Conjunctivae, EOM and lids are normal. Pupils are equal, round, and reactive to light.   Neck: Trachea normal, normal range of motion, full " passive range of motion without pain and phonation normal. Neck supple.   Cardiovascular: Normal rate, regular rhythm, intact distal pulses and normal pulses.   Pulmonary/Chest: Effort normal and breath sounds normal.   Abdominal: Soft. Normal appearance.   Musculoskeletal: Normal range of motion.   Feet:   Right Foot:   Protective Sensation: 4 sites tested. 4 sites sensed.   Skin Integrity: Negative for ulcer.   Left Foot:   Protective Sensation: 4 sites tested. 4 sites sensed.   Skin Integrity: Negative for ulcer.   Neurological: She is alert and oriented to person, place, and time. She has normal strength. No cranial nerve deficit or sensory deficit. She has a normal Finger-Nose-Finger Test, a normal Heel to Shin Test, a normal Romberg Test and a normal Tandem Gait Test. She displays a negative Romberg sign. Gait normal.   Reflex Scores:       Tricep reflexes are 2+ on the right side and 2+ on the left side.       Bicep reflexes are 2+ on the right side and 2+ on the left side.       Brachioradialis reflexes are 2+ on the right side and 2+ on the left side.       Patellar reflexes are 1+ on the right side and 1+ on the left side.       Achilles reflexes are 1+ on the right side and 1+ on the left side.  Skin: Skin is warm, dry and intact. Capillary refill takes less than 2 seconds.   Psychiatric: She has a normal mood and affect. Her speech is normal and behavior is normal. Judgment and thought content normal. Cognition and memory are normal.   Nursing note and vitals reviewed.      Neurologic Exam     Mental Status   Oriented to person, place, and time.   Follows 3 step commands.   Attention: normal. Concentration: normal.   Speech: speech is normal   Level of consciousness: alert  Knowledge: good.   Able to name object.     Cranial Nerves     CN II   Visual fields full to confrontation.   Visual acuity: normal  Right visual field deficit: none  Left visual field deficit: none     CN III, IV, VI   Pupils are  equal, round, and reactive to light.  Extraocular motions are normal.   CN III: no CN III palsy  CN VI: no CN VI palsy    CN V   Facial sensation intact.   Right facial sensation deficit: none  Left facial sensation deficit: none    CN VII   Facial expression full, symmetric.   Right facial weakness: none  Left facial weakness: none    CN VIII   CN VIII normal.   Hearing: intact    CN IX, X   CN IX normal.   CN X normal.     CN XI   CN XI normal.     CN XII   CN XII normal.     Motor Exam   Muscle bulk: normal  Overall muscle tone: normal  Right arm tone: normal  Left arm tone: normal  Right arm pronator drift: absent  Left arm pronator drift: absent  Right leg tone: normal  Left leg tone: normal    Strength   Strength 5/5 throughout.   Right neck flexion: 5/5  Left neck flexion: 5/5  Right neck extension: 5/5  Left neck extension: 5/5  Right deltoid: 5/5  Left deltoid: 5/5  Right biceps: 5/5  Left biceps: 5/5  Right triceps: 5/5  Left triceps: 5/5  Right wrist flexion: 5/5  Left wrist flexion: 5/5  Right wrist extension: 5/5  Left wrist extension: 5/5  Right interossei: 5/5  Left interossei: 5/5  Right abdominals: 5/5  Left abdominals: 5/5  Right iliopsoas: 5/5  Left iliopsoas: 5/5  Right quadriceps: 5/5  Left quadriceps: 5/5  Right hamstrin/5  Left hamstrin/5  Right glutei: 5/5  Left glutei: 5/5  Right anterior tibial: 5/5  Left anterior tibial: 5/5  Right posterior tibial: 5/5  Left posterior tibial: 5/5  Right peroneal: 5/5  Left peroneal: 5/5  Right gastroc: 5/5  Left gastroc: 5/5    Sensory Exam   Right arm light touch: normal  Left arm light touch: normal  Right leg light touch: decreased from knee  Left leg light touch: decreased from knee  Vibration normal.     Gait, Coordination, and Reflexes     Gait  Gait: normal    Coordination   Romberg: negative  Finger to nose coordination: normal  Heel to shin coordination: normal  Tandem walking coordination: normal    Tremor   Resting tremor:  absent  Intention tremor: absent  Action tremor: absent    Reflexes   Right brachioradialis: 2+  Left brachioradialis: 2+  Right biceps: 2+  Left biceps: 2+  Right triceps: 2+  Left triceps: 2+  Right patellar: 1+  Left patellar: 1+  Right achilles: 1+  Left achilles: 1+  Right : 2+  Left : 2+  Right plantar: normal  Left plantar: normal  Right Becker: absent  Left Becker: absent  Right ankle clonus: absent  Left ankle clonus: absent        Visit Diagnosis:  S/P insertion of spinal cord stimulator        Provider dictation:  PHQ:  3      The patient is a very pleasant 73-year-old female presenting today for postoperative evaluation.  She is 4 weeks status post spinal cord stimulator placement without complications.  She reports significant improvement in her back pain to his 0/10 at this time.  She is still working with the [facility] neurostimulator representative to get the correct program.  She denies nausea vomiting fever or chills.  She denies any new numbness weakness bowel or bladder incontinence.    On physical examination, she is an elderly obese female in no acute distress.  Incision is clean dry and intact without evidence of infection.  She is afebrile and normotensive.  She has no weakness in the lower extremities she does have diminished lower extremity patellar reflexes.  She does have diminished sensation below the knee bilaterally.  She walks with a normal gait but has an abnormal tandem walk.      X-ray dated 12/12/2018 demonstrates neurostimulator lead at the level of T10 T11.  There is continuity of the neurostimulator leads without evidence of complication.    At this time the patient has done well postoperatively despite her severe thoracolumbar degenerative scoliosis her pain is well controlled.  She will continue to follow up with her pain management physician and with us as needed.          This note was done using voice recognition software. Please excuse any errors missed in proof  reading.

## 2019-01-07 DIAGNOSIS — G25.81 RESTLESS LEGS SYNDROME: ICD-10-CM

## 2019-01-07 DIAGNOSIS — I10 ESSENTIAL HYPERTENSION: ICD-10-CM

## 2019-01-08 DIAGNOSIS — M15.9 GENERALIZED OSTEOARTHRITIS: Primary | ICD-10-CM

## 2019-01-08 DIAGNOSIS — I10 ESSENTIAL HYPERTENSION: ICD-10-CM

## 2019-01-08 RX ORDER — BENAZEPRIL HYDROCHLORIDE 40 MG/1
40 TABLET ORAL 2 TIMES DAILY
Qty: 180 TABLET | Refills: 1 | Status: SHIPPED | OUTPATIENT
Start: 2019-01-08 | End: 2019-03-28

## 2019-01-08 RX ORDER — PRAMIPEXOLE DIHYDROCHLORIDE 1.5 MG/1
TABLET ORAL
Qty: 90 TABLET | Refills: 1 | Status: SHIPPED | OUTPATIENT
Start: 2019-01-08 | End: 2019-09-11 | Stop reason: SDUPTHER

## 2019-01-08 RX ORDER — ALBUTEROL SULFATE 0.83 MG/ML
SOLUTION RESPIRATORY (INHALATION)
Qty: 90 ML | Refills: 11 | Status: SHIPPED | OUTPATIENT
Start: 2019-01-08 | End: 2019-05-20 | Stop reason: SDUPTHER

## 2019-01-08 RX ORDER — ALENDRONATE SODIUM 70 MG/1
TABLET ORAL
Qty: 12 TABLET | Refills: 3 | Status: SHIPPED | OUTPATIENT
Start: 2019-01-08 | End: 2020-01-02 | Stop reason: SDUPTHER

## 2019-01-08 RX ORDER — FUROSEMIDE 40 MG/1
TABLET ORAL
Qty: 90 TABLET | Refills: 1 | Status: SHIPPED | OUTPATIENT
Start: 2019-01-08 | End: 2019-04-11 | Stop reason: SDUPTHER

## 2019-01-08 NOTE — TELEPHONE ENCOUNTER
Pt Of Elda Holley MD    Last seen on: 11/1/2018, Dr. Foley    Next appt: N/A    Last refill: N/A    Allergies:   Review of patient's allergies indicates:   Allergen Reactions    Etodolac (bulk) Swelling     Hands and feet swelling    Sulfa (sulfonamide antibiotics) Rash and Other (See Comments)     Headache        Pharmacy:     [facility] - [place], [place] - [address]  [address] [address]  Phone: [phone] Fax: [phone]      Please review! Thank you!

## 2019-01-10 ENCOUNTER — PATIENT MESSAGE (OUTPATIENT)
Dept: FAMILY MEDICINE | Facility: CLINIC | Age: 74
End: 2019-01-10

## 2019-01-10 RX ORDER — ONDANSETRON 4 MG/1
4 TABLET, ORALLY DISINTEGRATING ORAL EVERY 6 HOURS PRN
Qty: 30 TABLET | Refills: 0 | Status: SHIPPED | OUTPATIENT
Start: 2019-01-10 | End: 2019-03-28 | Stop reason: ALTCHOICE

## 2019-01-10 RX ORDER — OMEPRAZOLE 40 MG/1
40 CAPSULE, DELAYED RELEASE ORAL DAILY
Qty: 90 CAPSULE | Refills: 3 | Status: SHIPPED | OUTPATIENT
Start: 2019-01-10 | End: 2020-01-02 | Stop reason: SDUPTHER

## 2019-01-10 NOTE — TELEPHONE ENCOUNTER
Pt Of Elda Holley MD    Last seen on: 11/1/2018 by Dr. Foley    Next appt: N/A    Last refill: 4/18/2018    Allergies:   Review of patient's allergies indicates:   Allergen Reactions    Etodolac (bulk) Swelling     Hands and feet swelling    Sulfa (sulfonamide antibiotics) Rash and Other (See Comments)     Headache        Pharmacy:     [facility] - [place], [place] - [address]  [address] [address]  Phone: [phone] Fax: [phone]      Please review! Thank you!

## 2019-01-30 DIAGNOSIS — M51.36 DDD (DEGENERATIVE DISC DISEASE), LUMBAR: Primary | ICD-10-CM

## 2019-02-01 ENCOUNTER — HOSPITAL ENCOUNTER (OUTPATIENT)
Dept: RADIOLOGY | Facility: HOSPITAL | Age: 74
Discharge: HOME | End: 2019-02-01
Attending: PHYSICIAN ASSISTANT
Payer: MEDICARE

## 2019-02-01 DIAGNOSIS — M51.36 DDD (DEGENERATIVE DISC DISEASE), LUMBAR: ICD-10-CM

## 2019-02-01 PROCEDURE — 72080 XR THORACOLUMBAR SPINE AP LATERAL: ICD-10-PCS | Mod: 26,HCNC,, | Performed by: RADIOLOGY

## 2019-02-01 PROCEDURE — 72080 X-RAY EXAM THORACOLMB 2/> VW: CPT | Mod: TC,HCNC,PO

## 2019-02-01 PROCEDURE — 72080 X-RAY EXAM THORACOLMB 2/> VW: CPT | Mod: 26,HCNC,, | Performed by: RADIOLOGY

## 2019-02-04 ENCOUNTER — PATIENT MESSAGE (OUTPATIENT)
Dept: FAMILY MEDICINE | Facility: CLINIC | Age: 74
End: 2019-02-04

## 2019-02-06 ENCOUNTER — OFFICE VISIT (OUTPATIENT)
Dept: PAIN MEDICINE | Facility: CLINIC | Age: 74
End: 2019-02-06
Payer: MEDICARE

## 2019-02-06 VITALS
OXYGEN SATURATION: 97 % | WEIGHT: 238.13 LBS | TEMPERATURE: 97 F | SYSTOLIC BLOOD PRESSURE: 180 MMHG | RESPIRATION RATE: 18 BRPM | HEART RATE: 70 BPM | BODY MASS INDEX: 48.09 KG/M2 | DIASTOLIC BLOOD PRESSURE: 92 MMHG

## 2019-02-06 DIAGNOSIS — G89.4 CHRONIC PAIN SYNDROME: Primary | ICD-10-CM

## 2019-02-06 DIAGNOSIS — G62.9 PERIPHERAL POLYNEUROPATHY: ICD-10-CM

## 2019-02-06 PROCEDURE — 99999 PR PBB SHADOW E&M-EST. PATIENT-LVL V: CPT | Mod: PBBFAC,HCNC,, | Performed by: PHYSICIAN ASSISTANT

## 2019-02-06 PROCEDURE — 3080F PR MOST RECENT DIASTOLIC BLOOD PRESSURE >= 90 MM HG: ICD-10-PCS | Mod: HCNC,CPTII,S$GLB, | Performed by: PHYSICIAN ASSISTANT

## 2019-02-06 PROCEDURE — 1101F PR PT FALLS ASSESS DOC 0-1 FALLS W/OUT INJ PAST YR: ICD-10-PCS | Mod: HCNC,CPTII,S$GLB, | Performed by: PHYSICIAN ASSISTANT

## 2019-02-06 PROCEDURE — 99499 UNLISTED E&M SERVICE: CPT | Mod: S$GLB,,, | Performed by: PHYSICIAN ASSISTANT

## 2019-02-06 PROCEDURE — 1101F PT FALLS ASSESS-DOCD LE1/YR: CPT | Mod: HCNC,CPTII,S$GLB, | Performed by: PHYSICIAN ASSISTANT

## 2019-02-06 PROCEDURE — 99499 RISK ADDL DX/OHS AUDIT: ICD-10-PCS | Mod: S$GLB,,, | Performed by: PHYSICIAN ASSISTANT

## 2019-02-06 PROCEDURE — 3080F DIAST BP >= 90 MM HG: CPT | Mod: HCNC,CPTII,S$GLB, | Performed by: PHYSICIAN ASSISTANT

## 2019-02-06 PROCEDURE — 3077F PR MOST RECENT SYSTOLIC BLOOD PRESSURE >= 140 MM HG: ICD-10-PCS | Mod: HCNC,CPTII,S$GLB, | Performed by: PHYSICIAN ASSISTANT

## 2019-02-06 PROCEDURE — 99214 OFFICE O/P EST MOD 30 MIN: CPT | Mod: HCNC,S$GLB,, | Performed by: PHYSICIAN ASSISTANT

## 2019-02-06 PROCEDURE — 99999 PR PBB SHADOW E&M-EST. PATIENT-LVL V: ICD-10-PCS | Mod: PBBFAC,HCNC,, | Performed by: PHYSICIAN ASSISTANT

## 2019-02-06 PROCEDURE — 99214 PR OFFICE/OUTPT VISIT, EST, LEVL IV, 30-39 MIN: ICD-10-PCS | Mod: HCNC,S$GLB,, | Performed by: PHYSICIAN ASSISTANT

## 2019-02-06 PROCEDURE — 3077F SYST BP >= 140 MM HG: CPT | Mod: HCNC,CPTII,S$GLB, | Performed by: PHYSICIAN ASSISTANT

## 2019-02-06 NOTE — PROGRESS NOTES
This note was completed with dictation software and grammatical errors may exist.    CC: Bilateral foot pain    HPI: The patient is a 73-year-old woman with obesity, arthritis, osteoporosis, GERD who presents in referral from Dr. Holley for back and left leg pain.  She returns in follow-up today following her spinal cord stimulator implant with Dr. Parnell.  She met the [employer] representative on Friday for reprogramming because she was not having sufficient relief.  She now reports 85% relief of her bilateral foot pain and is very pleased with the results.  She denies fever or chills, no weakness, bladder or bowel incontinence.  She does report some mild numbness and tingling in her feet but not the amount of pain that she had prior to the implant.    Pain intervention history:She is status post left L5 and S1 transforaminal epidural steroid injection on 2/5/18 with 100% relief of her radicular left leg pain.     ROS: She reports itching, headaches, joint stiffness, joint swelling and back pain.  Balance of review of systems is negative.    Past Medical History:   Diagnosis Date    Anxiety     Arthralgia of knee     arthralgia of bilateral knees secondary to djd    Arthritis     Back pain     Depression     Encounter for blood transfusion     AUTOLOGUS    GERD (gastroesophageal reflux disease)     Hiatal hernia     repaired in 1979    History of seasonal allergies     History of shingles     Hypertension     Insomnia     Obesity     Osteoporosis     Pneumonia     Reactive airway disease     Restless legs syndrome     Rheumatic fever     at 6 y/o    Sleep apnea     no cpap       Past Surgical History:   Procedure Laterality Date    APPENDECTOMY      BARIATRIC SURGERY  2014    Gastric Sleeve    CHOLECYSTECTOMY      EGD (ESOPHAGOGASTRODUODENOSCOPY) N/A 5/20/2014    Performed by Tino Medeiros Jr[facility] at [facility] OR [address] [place] [address]    EGD (ESOPHAGOGASTRODUODENOSCOPY) N/A 11/25/2013    Performed by  Antonio Mendez MD at [facility] ENDO (4TH FLR)    AKUA-TRANSFORAMINAL L5 and S1 Left 2/5/2018    [facility]formed by [facility] at [facility] OR    ESOPHAGOGASTRODUODENOSCOPY (EGD) N/A 6/23/2017    Performed by Dorene Tripathi MD at [facility] ENDO    GASTRECTOMY, SLEEVE, LAPAROSCOPIC N/A 5/20/2014    Performed by [facility] Jr., [facility] at [facility] OR 2ND FLR    HERNIA REPAIR      hiatal hernia    HYSTERECTOMY      partial    INSERTION, SPINAL CORD STIMULATOR, N/A 7/11/2018    [facility] by [facility] at [facility] OR    JOINT REPLACEMENT      BILAT KNEE    KNEE ARTHROPLASTY  1/2/2010    bilateral    [id number] N/A 5/20/2014    Performed by T[facility] Jr., MD at [facility] OR 2ND FLR    NISSEN FUNDOPLICATION      REMOVAL, IMPLANT spinal cord stimulator N/A 9/12/2018    Performed by [facility] at [facility] OR    REPLACEMENT, SPINAL CORD STIMULATOR  PLACEMENT OF SPINAL CORD STIMULATOR T10 N/A 11/14/2018    Per[facility] at [facility] OR    IKCO-L-J-LAPAROSCOPIC N/A 9/18/2017    Performed by [facility]ne [facility]pathi MD at [facility] OR    TRIAL-STIMULATOR-DORSAL COLUMN N/A 5/25/2018    Performed by [facility]oul [facility]cher [facility] at [facility] OR       Social History     Socioeconomic History    Marital status:      Spouse name: None    Number of children: None    Years of education: None    Highest education level: None   Social Needs    Financial resource strain: None    Food insecurity - worry: None    Food insecurity - inability: None    Transportation needs - medical: None    Transportation needs - non-medical: None   Occupational History    None   Tobacco Use    Smoking status: Passive Smoke Exposure - Never Smoker    Smokeless tobacco: Never Used   Substance and Sexual Activity    Alcohol use: Yes     Comment: 1-2 glasses of wine monthly    Drug use: No    Sexual activity: None   Other Topics Concern    None   Social History Narrative    None          Medications/Allergies: See med card    Vitals:    19 0822   BP: (!) 180/92   Pulse: 70   Resp: 18   Temp: 97.4 °F (36.3 °C)   TempSrc: Oral   SpO2: 97%   Weight: 108 kg (238 lb 1.6 oz)   PainSc:   2   PainLoc: Back         Physical exam:  Gen: A and O x3, pleasant, well-groomed  Skin: No rashes or obvious lesions  HEENT: PERRLA, no obvious deformities on ears or in canals. Trachea midline.  CVS: Regular rate and rhythm, normal palpable pulses.  Resp:No increased work of breathing, symmetrical chest rise.  Abdomen: Soft, NT/ND.  Musculoskeletal: No antalgic gait.     Neuro:  Lower extremities: 5/5 strength bilaterally  Reflexes: Patellar 2+, Achilles 2+ bilaterally.  Sensory:  Intact and symmetrical to light touch and pinprick in L2-S1 dermatomes bilaterally.       Imagin17 MRI L-spine  T11-12: There is minimal bulging of the anulus.  There is mild facet joint arthropathy with ligamentum flavum thickening, right greater than left.  There is no spinal canal or foraminal stenosis.  T12-L1: There is trace anterolisthesis of T12 on L1.  There is a diffuse disc bulge with moderate facet joint arthropathy.  There is no spinal canal or significant foraminal stenosis.  L1-2: There is marked disc space narrowing and mild retrolisthesis of L1 on L2.  There is inferior unroofing of a diffuse disc bulge with osteophytic ridging, eccentric to the left with broad left paracentral and foraminal disc protrusion.  There is mild facet joint arthropathy.  There is no significant spinal stenosis.  There is moderate left lateral recess stenosis and foraminal stenosis.  There is mild to moderate right foraminal stenosis.  L2-3: There is mild retrolisthesis, diffuse disc bulge, moderate facet joint arthropathy.  There is no spinal stenosis.  There is mild to moderate bilateral, right greater than left, foraminal stenosis.  L3-4: There is moderate to marked facet joint arthropathy with ligamentum flavum thickening.   There is a diffuse disc bulge with osteophytic ridging.  There is mild prominent dorsal epidural fat.  There is only borderline spinal stenosis.  There is moderate bilateral, right greater than left, foraminal stenosis.  L4-5:There is trace anterolisthesis, marked facet joint arthropathy with ligamentum flavum thickening, unroofing of a diffuse disc bulge with small superimposed central disc protrusion.  There is no significant spinal stenosis.  There is moderate marked right and moderate left foraminal stenosis.  L5-S1: There is trace anterolisthesis.  There is marked facet joint arthropathy with ligamentum flavum thickening and bilateral facet joint effusions.  More importantly, there is a large 12 x 10 mm left synovial cyst which results and severe flattening and deformity of the left side of the dural sac and severe left lateral recess stenosis, compressing the left S1 root.  There is unroofing of a diffuse disc bulge.  There is moderate bilateral foraminal stenosis.  All of the discs are desiccated.  There is no acute fracture.  There is trace anterolisthesis of L4 on L5 and L5 on S1.  There is a 3 mm retrolisthesis of L1 on L2 and 2 mm retrolisthesis of L2 on L3.    Assessment:  The patient is a 73-year-old woman with obesity, arthritis, osteoporosis, GERD who presents in referral from Dr. Holley for back and left leg pain.   1. Chronic pain syndrome     2. Peripheral polyneuropathy         Plan:  1.  We reviewed her x-rays and discussed that her paddle is in position to where she should have ongoing good coverage of her bilateral foot pain. She is reporting 85% relief with her spinal cord stimulator.  There are no signs of infection and she is pleased with these results.  I will have her follow up on an as-needed basis.

## 2019-02-18 ENCOUNTER — OFFICE VISIT (OUTPATIENT)
Dept: RHEUMATOLOGY | Facility: CLINIC | Age: 74
End: 2019-02-18
Payer: MEDICARE

## 2019-02-18 VITALS
DIASTOLIC BLOOD PRESSURE: 96 MMHG | BODY MASS INDEX: 48.76 KG/M2 | HEIGHT: 59 IN | SYSTOLIC BLOOD PRESSURE: 188 MMHG | HEART RATE: 65 BPM | WEIGHT: 241.88 LBS

## 2019-02-18 DIAGNOSIS — M19.91 PRIMARY OSTEOARTHRITIS, UNSPECIFIED SITE: Primary | ICD-10-CM

## 2019-02-18 DIAGNOSIS — M70.62 GREATER TROCHANTERIC BURSITIS, LEFT: ICD-10-CM

## 2019-02-18 DIAGNOSIS — M70.61 GREATER TROCHANTERIC BURSITIS OF RIGHT HIP: ICD-10-CM

## 2019-02-18 PROCEDURE — 99203 PR OFFICE/OUTPT VISIT, NEW, LEVL III, 30-44 MIN: ICD-10-PCS | Mod: HCNC,25,S$GLB, | Performed by: INTERNAL MEDICINE

## 2019-02-18 PROCEDURE — 1101F PT FALLS ASSESS-DOCD LE1/YR: CPT | Mod: HCNC,CPTII,S$GLB, | Performed by: INTERNAL MEDICINE

## 2019-02-18 PROCEDURE — 1101F PR PT FALLS ASSESS DOC 0-1 FALLS W/OUT INJ PAST YR: ICD-10-PCS | Mod: HCNC,CPTII,S$GLB, | Performed by: INTERNAL MEDICINE

## 2019-02-18 PROCEDURE — 3080F DIAST BP >= 90 MM HG: CPT | Mod: HCNC,CPTII,S$GLB, | Performed by: INTERNAL MEDICINE

## 2019-02-18 PROCEDURE — 3080F PR MOST RECENT DIASTOLIC BLOOD PRESSURE >= 90 MM HG: ICD-10-PCS | Mod: HCNC,CPTII,S$GLB, | Performed by: INTERNAL MEDICINE

## 2019-02-18 PROCEDURE — 20610 DRAIN/INJ JOINT/BURSA W/O US: CPT | Mod: 50,HCNC,S$GLB, | Performed by: INTERNAL MEDICINE

## 2019-02-18 PROCEDURE — 99999 PR PBB SHADOW E&M-EST. PATIENT-LVL IV: ICD-10-PCS | Mod: PBBFAC,HCNC,, | Performed by: INTERNAL MEDICINE

## 2019-02-18 PROCEDURE — 3077F SYST BP >= 140 MM HG: CPT | Mod: HCNC,CPTII,S$GLB, | Performed by: INTERNAL MEDICINE

## 2019-02-18 PROCEDURE — 99499 UNLISTED E&M SERVICE: CPT | Mod: S$GLB,,, | Performed by: INTERNAL MEDICINE

## 2019-02-18 PROCEDURE — 99499 RISK ADDL DX/OHS AUDIT: ICD-10-PCS | Mod: S$GLB,,, | Performed by: INTERNAL MEDICINE

## 2019-02-18 PROCEDURE — 20610 LARGE JOINT ASPIRATION/INJECTION: R GREATER TROCHANTERIC BURSA, L GREATER TROCHANTERIC BURSA: ICD-10-PCS | Mod: 50,HCNC,S$GLB, | Performed by: INTERNAL MEDICINE

## 2019-02-18 PROCEDURE — 3077F PR MOST RECENT SYSTOLIC BLOOD PRESSURE >= 140 MM HG: ICD-10-PCS | Mod: HCNC,CPTII,S$GLB, | Performed by: INTERNAL MEDICINE

## 2019-02-18 PROCEDURE — 99203 OFFICE O/P NEW LOW 30 MIN: CPT | Mod: HCNC,25,S$GLB, | Performed by: INTERNAL MEDICINE

## 2019-02-18 PROCEDURE — 99999 PR PBB SHADOW E&M-EST. PATIENT-LVL IV: CPT | Mod: PBBFAC,HCNC,, | Performed by: INTERNAL MEDICINE

## 2019-02-18 RX ADMIN — METHYLPREDNISOLONE ACETATE 40 MG: 40 INJECTION, SUSPENSION INTRA-ARTICULAR; INTRALESIONAL; INTRAMUSCULAR; SOFT TISSUE at 04:02

## 2019-02-18 NOTE — PROGRESS NOTES
Subjective:          Chief Complaint: Marta Huff is a 73 y.o. female who had no chief complaint listed for this encounter.    HPI:    Patient is a 73-year-old female previously seen by Dr. Mcintyre last visit was 2015 for osteoarthritis this has diffuse as well as peripheral neuropathy.  She was using gabapentin  2400 mg daily.  Tramadol p.r.n. Zanaflex p.r.n..   Stopped gabapentin with no difference in the pain.   More recently patient had been following with pain management as well as being seen with neurosurgery for spinal cord stimulator.  A noting 85% relief of the foot pain.  She had a initial spinal stimulator with some migration of of transmitted lead had to have a deep replacement that has done well in the postoperative period.     She is having significant pain in bilateral hips to gluteal region and interferes with walking and with using the elliptical. Previous 45min and tolerated, now only 15min and hips begin to hurt.   Last year they have progressively worsened. She still has pain in back but this is not a constant complaint for her. She is sleeping on sides and seems to tolerate, is is the lumbosacral junction that hurts at night.   Exacerbated with sitting prolonged period. At some times she uses walker due to pain.   Hands with stiffness PIP and DIP joints long standing cannot fully curl fingers. +deformity, intermittent swelling.     Interaction with etodolac, but renal function wnl.   She does tolerate NSAID: takes aleve 440mg in AM and PRN at night. No hx of GIB.         REVIEW OF SYSTEMS:    Review of Systems   Constitutional: Negative for fever, malaise/fatigue and weight loss.   HENT: Negative for sore throat.    Eyes: Negative for double vision, photophobia and redness.   Respiratory: Negative for cough, shortness of breath and wheezing.    Cardiovascular: Negative for chest pain, palpitations and orthopnea.   Gastrointestinal: Negative for abdominal pain, constipation and diarrhea.    Genitourinary: Negative for dysuria, hematuria and urgency.   Musculoskeletal: Positive for joint pain. Negative for back pain and myalgias.   Skin: Negative for rash.   Neurological: Negative for dizziness, tingling, focal weakness and headaches.   Endo/Heme/Allergies: Does not bruise/bleed easily.   Psychiatric/Behavioral: Negative for depression, hallucinations and suicidal ideas.               Objective:            Past Medical History:   Diagnosis Date    Anxiety     Arthralgia of knee     arthralgia of bilateral knees secondary to djd    Arthritis     Back pain     Depression     Encounter for blood transfusion     AUTOLOGUS    GERD (gastroesophageal reflux disease)     Hiatal hernia     repaired in 1979    History of seasonal allergies     History of shingles     Hypertension     Insomnia     Obesity     Osteoporosis     Pneumonia     Reactive airway disease     Restless legs syndrome     Rheumatic fever     at 6 y/o    Sleep apnea     no cpap     Family History   Problem Relation Age of Onset    Heart disease Mother     Hypertension Mother     Diabetes Mother     Obesity Mother     Heart disease Maternal Grandfather      Social History     Tobacco Use    Smoking status: Passive Smoke Exposure - Never Smoker    Smokeless tobacco: Never Used   Substance Use Topics    Alcohol use: Yes     Comment: 1-2 glasses of wine monthly    Drug use: No         Current Outpatient Medications on File Prior to Visit   Medication Sig Dispense Refill    albuterol (PROAIR HFA) 90 mcg/actuation inhaler Inhale 2 puffs into the lungs every 6 (six) hours as needed for Wheezing or Shortness of Breath. 3 Inhaler 3    albuterol (PROVENTIL) 2.5 mg /3 mL (0.083 %) nebulizer solution INHALE THE CONTENTS OF 1 VIAL VIA NEBULIZER EVERY 6 HOURS AS NEEDED FOR  WHEEZING  OR  SHORTNESS OF BREATH 90 mL 11    alendronate (FOSAMAX) 70 MG tablet TAKE 1 TABLET EVERY 7 DAYS 12 tablet 3    B-complex with vitamin C  (Z-BEC OR EQUIV) tablet Take 1 tablet by mouth once daily.       benazepril (LOTENSIN) 40 MG tablet Take 1 tablet (40 mg total) by mouth 2 (two) times daily. 180 tablet 1    BIOTIN/CALCIUM CARBONATE (BIOTIN 100+10 ORAL) Take 1 capsule by mouth once daily.       calcium citrate-vitamin D2 1,500-200 mg-unit Tab Take 1 tablet by mouth once daily.       cholecalciferol, vitamin D3, 5,000 unit capsule Take 1 capsule (5,000 Units total) by mouth once daily. 90 capsule 3    furosemide (LASIX) 40 MG tablet TAKE 1 TABLET EVERY DAY 90 tablet 1    loratadine (CLARITIN) 10 mg tablet Take 10 mg by mouth daily as needed.       LORazepam (ATIVAN) 0.5 MG tablet Take 1 tablet (0.5 mg total) by mouth 2 (two) times daily as needed for Anxiety. 60 tablet 0    meclizine (ANTIVERT) 25 mg tablet Take 1 tablet (25 mg total) by mouth every 6 (six) hours. (Patient taking differently: Take 25 mg by mouth every 6 (six) hours as needed. ) 360 tablet 1    multivitamin (MULTIVITAMIN) per tablet Take 1 tablet by mouth once daily.        omega 3-dha-epa-fish oil (FISH OIL) 1,000 mg (120 mg-180 mg) Cap Take 2 capsules by mouth 2 (two) times daily. 360 capsule 3    omeprazole (PRILOSEC) 40 MG capsule Take 1 capsule (40 mg total) by mouth once daily. 90 capsule 3    ondansetron (ZOFRAN-ODT) 4 MG TbDL Take 1 tablet (4 mg total) by mouth every 6 (six) hours as needed. 30 tablet 0    oxyCODONE-acetaminophen (PERCOCET) 7.5-325 mg per tablet Take 1 tablet by mouth every 6 (six) hours as needed for Pain. 45 tablet 0    pramipexole (MIRAPEX) 1.5 MG tablet TAKE 1 TABLET EVERY NIGHT 90 tablet 1    sertraline (ZOLOFT) 50 MG tablet Take 1 tablet (50 mg total) by mouth every evening. 90 tablet 1    VITAMIN B-12 5,000 mcg Subl Place 1 tablet under the tongue once a week.       zolpidem (AMBIEN) 5 MG Tab TAKE 1 TABLET NIGHTLY AS NEEDED. 30 tablet 0     Current Facility-Administered Medications on File Prior to Visit   Medication Dose Route  Frequency Provider Last Rate Last Dose    lidocaine (PF) 10 mg/ml (1%) injection 10 mg  1 mL Intradermal Once Kevin Parnell MD           There were no vitals filed for this visit.    Physical Exam:    Physical Exam   Constitutional: She appears well-developed and well-nourished.   HENT:   Nose: No septal deviation.   Mouth/Throat: Mucous membranes are normal. No oral lesions.   Eyes: Pupils are equal, round, and reactive to light. Right conjunctiva is not injected. Left conjunctiva is not injected.   Neck: No JVD present. No thyroid mass and no thyromegaly present.   Cardiovascular: Normal rate, regular rhythm and normal pulses.   No edema   Pulmonary/Chest: Effort normal and breath sounds normal.   Abdominal: Soft. Normal appearance. There is no hepatosplenomegaly.   Musculoskeletal:        Right shoulder: She exhibits normal range of motion, no tenderness and no swelling.        Left shoulder: She exhibits normal range of motion, no tenderness and no swelling.        Right elbow: She exhibits normal range of motion and no swelling. No tenderness found.        Left elbow: She exhibits normal range of motion and no swelling. No tenderness found.        Right wrist: She exhibits normal range of motion, no tenderness and no swelling.        Left wrist: She exhibits normal range of motion, no tenderness and no swelling.        Right hip: She exhibits bony tenderness. She exhibits normal range of motion, normal strength and no swelling.        Left hip: She exhibits bony tenderness. She exhibits normal range of motion, no tenderness and no swelling.        Right knee: She exhibits normal range of motion and no swelling. No tenderness found.        Left knee: She exhibits normal range of motion and no swelling. No tenderness found.        Right ankle: She exhibits normal range of motion and no swelling. No tenderness.        Left ankle: She exhibits normal range of motion and no swelling. No tenderness.   Bony  hypertrophy PIP and DIP joints. Squaring CMC joint. Tenderness along gr. Troch and ITB. Negative exacerbation SLR some LBP.    Lymphadenopathy:     She has no cervical adenopathy.     She has no axillary adenopathy.   Neurological: She has normal strength and normal reflexes.   Skin: Skin is dry and intact.   Psychiatric: She has a normal mood and affect.             Assessment:       Encounter Diagnoses   Name Primary?    Primary osteoarthritis, unspecified site Yes    Greater trochanteric bursitis of both hips     Greater trochanteric bursitis, left           Plan:        Primary osteoarthritis, unspecified site    Greater trochanteric bursitis of both hips  -     Ambulatory Referral to Physical/Occupational Therapy    Greater trochanteric bursitis, left  -     Large Joint Aspiration/Injection: R greater trochanteric bursa, L greater trochanteric bursa  -     Ambulatory Referral to Physical/Occupational Therapy          Delightful patient with greater trochanteric bursitis bilateral hips   Injected today   Referral for PT here at [facility]     Follow-up in about 4 months (around 6/18/2019).

## 2019-02-18 NOTE — PROCEDURES
Large Joint Aspiration/Injection: R greater trochanteric bursa, L greater trochanteric bursa  Date/Time: 2/18/2019 4:03 PM  Performed by: Adwoa Jaime DO  Authorized by: Adwoa Jaime, DO     Consent Done?:  Yes (Verbal)  Indications:  Pain  Procedure site marked: Yes    Timeout: Prior to procedure the correct patient, procedure, and site was verified      Location:  Hip  Site:  R greater trochanteric bursa and L greater trochanteric bursa  Ultrasonic Guidance for needle placement: No  Needle size:  25 G  Medications:  40 mg methylPREDNISolone acetate 40 mg/mL; 40 mg methylPREDNISolone acetate 40 mg/mL  Patient tolerance:  Patient tolerated the procedure well with no immediate complications    Additional Comments: Patient informed of the risks, benefits, side effects of corticosteroid injections including but not limited to skin hypopigmentation, atrophy, ineffectiveness and infection.

## 2019-02-24 RX ORDER — METHYLPREDNISOLONE ACETATE 40 MG/ML
40 INJECTION, SUSPENSION INTRA-ARTICULAR; INTRALESIONAL; INTRAMUSCULAR; SOFT TISSUE
Status: DISCONTINUED | OUTPATIENT
Start: 2019-02-18 | End: 2019-02-24 | Stop reason: HOSPADM

## 2019-02-28 NOTE — PROGRESS NOTES
Patient, Marta Huff (MRN #[id number]), presented with a recorded BMI of 48.85 kg/m^2 consistent with the definition of morbid obesity (ICD-10 E66.01). The patient's morbid obesity was monitored, evaluated, addressed and/or treated. This addendum to the medical record is made on 02/28/2019.

## 2019-03-06 ENCOUNTER — CLINICAL SUPPORT (OUTPATIENT)
Dept: REHABILITATION | Facility: HOSPITAL | Age: 74
End: 2019-03-06
Attending: INTERNAL MEDICINE
Payer: MEDICARE

## 2019-03-06 DIAGNOSIS — Z78.9 DECREASED ACTIVITIES OF DAILY LIVING (ADL): ICD-10-CM

## 2019-03-06 PROCEDURE — G8979 MOBILITY GOAL STATUS: HCPCS | Mod: CK,HCNC,PO

## 2019-03-06 PROCEDURE — 97112 NEUROMUSCULAR REEDUCATION: CPT | Mod: HCNC,PO

## 2019-03-06 PROCEDURE — 97035 APP MDLTY 1+ULTRASOUND EA 15: CPT | Mod: HCNC,PO

## 2019-03-06 PROCEDURE — G8978 MOBILITY CURRENT STATUS: HCPCS | Mod: CL,HCNC,PO

## 2019-03-06 PROCEDURE — 97161 PT EVAL LOW COMPLEX 20 MIN: CPT | Mod: HCNC,PO

## 2019-03-07 PROBLEM — Z78.9 DECREASED ACTIVITIES OF DAILY LIVING (ADL): Status: ACTIVE | Noted: 2019-03-07

## 2019-03-07 NOTE — PLAN OF CARE
[facility]  Physical Therapy Initial Evaluation    Name: Marta Huff  Clinic Number: [id number]    Therapy Diagnosis:   Encounter Diagnosis   Name Primary?    Decreased activities of daily living (ADL)      Physician: Adwoa Jaime DO    Physician Orders: PT Eval and Treat  Medical Diagnosis from Referral: M70.62 (ICD-10-CM) - Greater trochanteric bursitis, left  Evaluation Date: 3/6/2019  Authorization Period Expiration: 12/31/19  Plan of Care Expiration: 4/10/19  Visit # / Visits authorized: 1 / 20    Time In: 0900  Time Out: 1000  Total Billable Time: 50 minutes    Precautions: Bilateral TKA (2010), O/A x 9 yrs, spinal  stimulator    Subjective   Date of onset: June, 2018  History of current condition - Marta reports: Gradual onset of hip bursitis without critical incident. She has had pain at both hips , but LEFT is worse. Pt had bilateral injections of the bursa on 2/18/19.   Pt has spinal cord stimulator.     Past Medical History:   Diagnosis Date    Anxiety     Arthralgia of knee     arthralgia of bilateral knees secondary to djd    Arthritis     Back pain     Depression     Encounter for blood transfusion     AUTOLOGUS    GERD (gastroesophageal reflux disease)     Hiatal hernia     repaired in 1979    History of seasonal allergies     History of shingles     Hypertension     Insomnia     Obesity     Osteoporosis     Pneumonia     Reactive airway disease     Restless legs syndrome     Rheumatic fever     at 4 y/o    Sleep apnea     no cpap     Marta Huff  has a past surgical history that includes Cholecystectomy; Nissen fundoplication; Appendectomy; Hysterectomy; Hernia repair; Knee Arthroplasty (1/2/2010); Bariatric Surgery (2014); Joint replacement; Insertion of dorsal column nerve stimulator for trial (N/A, 5/25/2018); Insertion of spinal cord stimulator using minimally invasive technique (N/A, 7/11/2018); Removal of  neurostimulator (N/A, 9/12/2018); and Replacement of spinal cord stimulator (N/A, 11/14/2018).    Marta has a current medication list which includes the following prescription(s): albuterol, albuterol, alendronate, b-complex with vitamin c, benazepril, biotin/calcium carbonate, calcium citrate-vitamin d2, cholecalciferol (vitamin d3), furosemide, loratadine, lorazepam, meclizine, multivitamin, omega 3-dha-epa-fish oil, omeprazole, ondansetron, pramipexole, sertraline, vitamin b-12, and zolpidem, and the following Facility-Administered Medications: lidocaine (pf) 10 mg/ml (1%).    Review of patient's allergies indicates:   Allergen Reactions    Etodolac (bulk) Swelling     Hands and feet swelling    Sulfa (sulfonamide antibiotics) Rash and Other (See Comments)     Headache         Imaging, none:     Prior Therapy: None for this occurrence or year  Social History: pt lives with their spouse  Occupation: Retired   Prior Level of Function:Pt walked and did gym exercises, house cleaning  Current Level of Function: Difficulty walking, mopping, house keeping, can't exercise    Pain:  Current 7/10, worst 9/10, best 3/10   Location: left hip   Description: Dull and Sharp  Aggravating Factors: Standing, Walking and laying on side  Easing Factors: laying on back    Pts goals: Resolve pain and return to exercise / ADL    Objective     Posture: Slight EXTENSION bias with LEFT PSIS and crest much higher    ROM:    Tight Hamstrings and KAZ with discomfort  Strength:     5/5 gross strength throughout LE's, but decreased functional strength in ADL    Gait/Balance: Decreased stride and unable to single leg stand    Neuro: Decreased gross touch sensation below knees    Palpation: Tender at greater trochanteric area on LEFT      CMS Impairment/Limitation/Restriction for FOTO Hip Survey    Therapist reviewed FOTO scores for Marta Hfuf on 3/6/2019.   FOTO documents entered into EPIC - see Media  section.    Limitation Score: 67%  Category: Mobility    Current : CL = least 60% but < 80% impaired, limited or restricted  Goal: CK = at least 40% but < 60% impaired, limited or restricted         TREATMENT   Treatment Time In: 0925  Treatment Time Out: 0950  Total Treatment time separate from Evaluation: 25 minutes    Marta participated in neuromuscular re-education activities to improve: positioning and pelvic alignment for 15 minutes. The following activities were included:  Positioning for comfort with pillows from knees to feet, sitting in chair with pillows  Heel lift on Right 8 vs 12 mm    Marta received the following direct contact modalities after being cleared for contraindications: Ultrasound:  Marta received ultrasound to manage pain and inflammation at 100 % duty cycle applied to the LEFT trochanteric bursa area at an intensity of  1.2 W/cm2  for a duration of 10 minutes. Patient tolerated treatment well without adverse effects. Therapist was in attendance throughout intervention.    Home Exercises and Patient Education Provided  Education provided:   - Anatomy related to pelvis and bursa    Written Home Exercises Provided: Positioning  Exercises were reviewed and Marta was able to demonstrate them prior to the end of the session.  Marta demonstrated good  understanding of the education provided.     See EMR under Media for exercises provided 3/6/2019.    Assessment   Marta is a 73 y.o. female referred to outpatient Physical Therapy with a medical diagnosis of M70.62 (ICD-10-CM) - Greater trochanteric bursitis, left  . Pt presents with Pain at the LEFT hip bursa and decreased ADL    Pt prognosis is Excellent.   Pt will benefit from skilled outpatient Physical Therapy to address the deficits stated above and in the chart below, provide pt/family education, and to maximize pt's level of independence.     Plan of care discussed with patient: Yes  Pt's spiritual, cultural and  educational needs considered and patient is agreeable to the plan of care and goals as stated below:     Anticipated Barriers for therapy: None    Medical Necessity is demonstrated by the following  History  Co-morbidities and personal factors that may impact the plan of care Co-morbidities:   anxiety    Personal Factors:   no deficits     low   Examination  Body Structures and Functions, activity limitations and participation restrictions that may impact the plan of care Body Regions:   lower extremities    Body Systems:    ROM  strength    Participation Restrictions:   None    Activity limitations:   Learning and applying knowledge  no deficits    General Tasks and Commands  no deficits    Communication  no deficits    Mobility  lifting and carrying objects    Self care  washing oneself (bathing, drying, washing hands)  caring for body parts (brushing teeth, shaving, grooming)  dressing    Domestic Life  shopping  cooking  doing house work (cleaning house, washing dishes, laundry)    Interactions/Relationships  no deficits    Life Areas  no deficits    Community and Social Life  recreation and leisure         low   Clinical Presentation stable and uncomplicated low   Decision Making/ Complexity Score: low     Goals:  Short Term Goals: 2 weeks  Decrease pain to 1-3/10    Improve gait with heel lift as indicated  Long Term Goals: 4 weeks   Pain @ 0-1 /10 with return to ADL and exercises    Plan   Plan of care Certification: 3/6/2019 to 4/10/19.    Outpatient Physical Therapy 2 times weekly for 6 weeks to include the following interventions: Neuromuscular Re-ed, Therapeutic Exercise and Ultrasound.     Tino Logan, PT

## 2019-03-11 ENCOUNTER — CLINICAL SUPPORT (OUTPATIENT)
Dept: REHABILITATION | Facility: HOSPITAL | Age: 74
End: 2019-03-11
Attending: INTERNAL MEDICINE
Payer: MEDICARE

## 2019-03-11 DIAGNOSIS — Z78.9 DECREASED ACTIVITIES OF DAILY LIVING (ADL): ICD-10-CM

## 2019-03-11 PROCEDURE — 97035 APP MDLTY 1+ULTRASOUND EA 15: CPT | Mod: HCNC,PO

## 2019-03-12 NOTE — PROGRESS NOTES
"  Physical Therapy Daily Treatment Note     Name: Marta Huff  Clinic Number: [id number]    Therapy Diagnosis:   Encounter Diagnosis   Name Primary?    Decreased activities of daily living (ADL)      Physician: Adwoa Jaime DO    Visit Date: 3/11/2019  Physician Orders:  PT Eval and Treat  Medical Diagnosis from Referral: M70.62 (ICD-10-CM) - Greater trochanteric bursitis, left  Evaluation Date: 3/6/2019  Authorization Period Expiration: 12/31/19  Plan of Care Expiration: 4/10/19  Visit # / Visits authorized: 2 / 20    Time In: 0815  Time Out: 0850  Total Billable Time: 10 minutes    Precautions:  Bilateral TKA (2010), O/A x 9 yrs    Subjective     Pt reports: Heel lift hurt her back   .  She was compliant with home exercise program.  Response to previous treatment: Ultrasound "felt good"  Functional change: Heel lift was deferred    Pain: 2/10  Location: right lateral hip      Objective     Marta received the following direct contact modalities after being cleared for contraindications: Ultrasound:  Marta received ultrasound to manage pain and inflammation at 100 % duty cycle applied to the LEFT trochanteric bursa area at an intensity of  1.2 W/cm2  for a duration of 10 minutes. Patient tolerated treatment well without adverse effects. Therapist was in attendance throughout intervention    Marta received hot pack for 10 minutes to increase circulation and promote tissue healing.      Home Exercises Provided and Patient Education Provided  Education provided:   - Reviewed positioning    Assessment     Pt c/o back pain was at the muscular area on the RIGHT which may be from pelvic positional stretch, but heel lift deferred  Ultrasound followed by moist heat to the hip and RIGHT lumbar area    Marta is progressing well towards her goals.   Pt prognosis is Excellent.     Pt will continue to benefit from skilled outpatient physical therapy to address the deficits listed in the problem " list box on initial evaluation, provide pt/family education and to maximize pt's level of independence in the home and community environment.     Pt's spiritual, cultural and educational needs considered and pt agreeable to plan of care and goals.    Anticipated barriers to physical therapy: noen    Goals:   Short Term Goals: 2 weeks  Decrease pain to 1-3/10    Improve gait with heel lift as indicated  Long Term Goals: 4 weeks   Pain @ 0-1 /10 with return to ADL and exercises    Plan     Ultrasound and moist heat PRN    Tino Logan, PT

## 2019-03-14 DIAGNOSIS — I10 ESSENTIAL HYPERTENSION: Primary | Chronic | ICD-10-CM

## 2019-03-15 ENCOUNTER — CLINICAL SUPPORT (OUTPATIENT)
Dept: REHABILITATION | Facility: HOSPITAL | Age: 74
End: 2019-03-15
Payer: MEDICARE

## 2019-03-15 DIAGNOSIS — Z78.9 DECREASED ACTIVITIES OF DAILY LIVING (ADL): ICD-10-CM

## 2019-03-15 PROCEDURE — 97035 APP MDLTY 1+ULTRASOUND EA 15: CPT | Mod: HCNC,PO

## 2019-03-15 NOTE — PROGRESS NOTES
"  Physical Therapy Daily Treatment Note     Name: Marta Harmon [facility]  Clinic Number: [id number]    Therapy Diagnosis:   Encounter Diagnosis   Name Primary?    Decreased activities of daily living (ADL)      Physician: Adwoa Jaime DO    Visit Date: 3/15/2019  Physician Orders:  PT Eval and Treat  Medical Diagnosis from Referral: M70.62 (ICD-10-CM) - Greater trochanteric bursitis, left  Evaluation Date: 3/6/2019  Authorization Period Expiration: 12/31/19  Plan of Care Expiration: 4/10/19  Visit # / Visits authorized: 3 / 20    Time In: 0815  Time Out: 0835  Total Billable Time: 10 minutes    Precautions:  Bilateral TKA (2010), O/A x 9 yrs    Subjective     Pt reports: Feeling better  .  She was compliant with home exercise program.  Response to previous treatment: Ultrasound "felt good"  Functional change:n/a    Pain:1- 2/10  Location: right lateral hip      Objective     Marta received the following direct contact modalities after being cleared for contraindications: Ultrasound:  Marta received ultrasound to manage pain and inflammation at 100 % duty cycle applied to the LEFT trochanteric bursa area at an intensity of  1.2 W/cm2  for a duration of 10 minutes. Patient tolerated treatment well without adverse effects. Therapist was in attendance throughout intervention    Marta deferred hot pack for 10 minutes to increase circulation and promote tissue healing.      Home Exercises Provided and Patient Education Provided  Education provided:   - Reviewed positioning    Assessment     Ultrasound  hip and RIGHT lumbar area    Marta is progressing well towards her goals.   Pt prognosis is Excellent.     Pt will continue to benefit from skilled outpatient physical therapy to address the deficits listed in the problem list box on initial evaluation, provide pt/family education and to maximize pt's level of independence in the home and community environment.     Pt's spiritual, cultural and " educational needs considered and pt agreeable to plan of care and goals.    Anticipated barriers to physical therapy: noen    Goals:   Short Term Goals: 2 weeks  Decrease pain to 1-3/10    Improve gait with heel lift as indicated  Long Term Goals: 4 weeks   Pain @ 0-1 /10 with return to ADL and exercises    Plan     Ultrasound and moist heat PRN    Tino Logan, PT

## 2019-03-25 ENCOUNTER — CLINICAL SUPPORT (OUTPATIENT)
Dept: REHABILITATION | Facility: HOSPITAL | Age: 74
End: 2019-03-25
Payer: MEDICARE

## 2019-03-25 DIAGNOSIS — Z78.9 DECREASED ACTIVITIES OF DAILY LIVING (ADL): ICD-10-CM

## 2019-03-25 PROCEDURE — 97035 APP MDLTY 1+ULTRASOUND EA 15: CPT | Mod: HCNC,PO

## 2019-03-25 PROCEDURE — 97110 THERAPEUTIC EXERCISES: CPT | Mod: HCNC,PO

## 2019-03-25 NOTE — PROGRESS NOTES
Physical Therapy Daily Treatment Note     Name: Marta RENE [facility]  Clinic Number: [id number]    Therapy Diagnosis:   Encounter Diagnosis   Name Primary?    Decreased activities of daily living (ADL)      Physician: Adwoa Jaime DO    Visit Date: 3/25/2019  Physician Orders:  PT Eval and Treat  Medical Diagnosis from Referral: M70.62 (ICD-10-CM) - Greater trochanteric bursitis, left  Evaluation Date: 3/6/2019  Authorization Period Expiration: 12/31/19  Plan of Care Expiration: 4/10/19  Visit # / Visits authorized: 4 / 20    Time In: 0846  Time Out: 0928  Total Billable Time: 38 minutes    Precautions:  Bilateral TKA (2010), O/A x 9 yrs    Subjective     Pt reports: that her right hip is feeling better today. Patient states she is having pain in her legs but she feels that is from her neuropathy and not the hip. She was compliant with home exercise program.  Response to previous treatment: no adverse effect   Functional change: able to do more at home without pain     Pain: 0/10  Location: right lateral hip      Objective     Marta received therapeutic exercise to improve flexibility, strength, endurance, and core stabilization x 28 minutes:  PROM to RLE x 10 minutes (hip flexion, hip adductor, piriformis stretching)   Supine hip adduction ball squeeze x 2 minutes  Supine glut set x 2 minutes  Posterior pelvic tilt x 5 minutes (heavy cueing)   SL hip abduction 2x10 ea   Standing hip abduction 2x10 ea     Marta received the following direct contact modalities after being cleared for contraindications: Ultrasound:  Marta received ultrasound to manage pain and inflammation at 100 % duty cycle applied to the RIGHT trochanteric bursa area at an intensity of  1.2 W/cm2  for a duration of 10 minutes. Patient tolerated treatment well without adverse effects. Therapist was in attendance throughout intervention    Marta deferred hot pack for 00 minutes to increase circulation and promote tissue  healing. (not performed today)    Home Exercises Provided and Patient Education Provided  Education provided:   - Reviewed positioning    Assessment     Marta tolerated treatment well today. Initiated gluteal strengthening exercises today without provocation of pain but heavy cueing needed to isolate gluts. Patient unable to achieve posterior pelvic tilt despite max tactile and verbal cueing. Patient unable to lie on R side due to R hip pain; sidelying hip abduction modified to standing with no difficulty noted.    Marta is progressing well towards her goals.   Pt prognosis is Excellent.     Pt will continue to benefit from skilled outpatient physical therapy to address the deficits listed in the problem list box on initial evaluation, provide pt/family education and to maximize pt's level of independence in the home and community environment.     Pt's spiritual, cultural and educational needs considered and pt agreeable to plan of care and goals.    Anticipated barriers to physical therapy: noen    Goals:   Short Term Goals: 2 weeks  Decrease pain to 1-3/10  (progressing, not met)  Improve gait with heel lift as indicated (progressing, not met)  Long Term Goals: 4 weeks   Pain @ 0-1 /10 with return to ADL and exercises (progressing, not met)    Plan     Ultrasound and moist heat PRN    Devorah Law, PTA

## 2019-03-27 ENCOUNTER — CLINICAL SUPPORT (OUTPATIENT)
Dept: REHABILITATION | Facility: HOSPITAL | Age: 74
End: 2019-03-27
Payer: MEDICARE

## 2019-03-27 DIAGNOSIS — Z78.9 DECREASED ACTIVITIES OF DAILY LIVING (ADL): ICD-10-CM

## 2019-03-27 PROCEDURE — 97035 APP MDLTY 1+ULTRASOUND EA 15: CPT | Mod: HCNC,PO

## 2019-03-27 PROCEDURE — 97110 THERAPEUTIC EXERCISES: CPT | Mod: HCNC,PO

## 2019-03-27 NOTE — PROGRESS NOTES
Physical Therapy Daily Treatment Note     Name: Marta RENE [facility]  Clinic Number: [id number]    Therapy Diagnosis:   Encounter Diagnosis   Name Primary?    Decreased activities of daily living (ADL)      Physician: Adwoa Jaime DO    Visit Date: 3/27/2019  Physician Orders:  PT Eval and Treat  Medical Diagnosis from Referral: M70.62 (ICD-10-CM) - Greater trochanteric bursitis, left  Evaluation Date: 3/6/2019  Authorization Period Expiration: 12/31/19  Plan of Care Expiration: 4/10/19  Visit # / Visits authorized: 5 / 20    Time In: 0808  Time Out: 0850  Total Billable Time: 30 minutes    Precautions:  Bilateral TKA (2010), O/A x 9 yrs    Subjective     Pt reports: that she does have some pain in her right lower back pain today but her hip is pain free. Patient states she has been practicing her pelvic tilts at home but she continues to have difficulty.  She was compliant with home exercise program.  Response to previous treatment: no adverse effect   Functional change: able to do more at home without pain     Pain: 0/10  Location: right lateral hip      Objective     Marta received therapeutic exercise to improve flexibility, strength, endurance, and core stabilization x 28 minutes:  PROM to RLE x 10 minutes (hip flexor, hip adductor, piriformis stretching)   Supine hip adduction ball squeeze x 2 minutes  Supine glut set x 2 minutes  Posterior pelvic tilt x 5 minutes (heavy cueing)   SL hip abduction 2x10 ea   Standing hip abduction 2x10 ea (not performed today)    Marta received the following direct contact modalities after being cleared for contraindications: Ultrasound:  Marta received ultrasound to manage pain and inflammation at 100 % duty cycle applied to the RIGHT trochanteric bursa area at an intensity of  1.2 W/cm2  for a duration of 10 minutes. Patient tolerated treatment well without adverse effects. Therapist was in attendance throughout intervention    Marta deferred hot  pack for 00 minutes to increase circulation and promote tissue healing. (not performed today)    Home Exercises Provided and Patient Education Provided  Education provided:   - Reviewed positioning    Assessment     Marta tolerated treatment well today. Patient continues to struggle with posterior pelvic tilts but this does improve some when performed in conjunction with glut set. Patient continues with trendelenburg gait and this is also noted when performing standing hip abduction.   Marta is progressing well towards her goals.   Pt prognosis is Excellent.     Pt will continue to benefit from skilled outpatient physical therapy to address the deficits listed in the problem list box on initial evaluation, provide pt/family education and to maximize pt's level of independence in the home and community environment.     Pt's spiritual, cultural and educational needs considered and pt agreeable to plan of care and goals.    Anticipated barriers to physical therapy: noen    Goals:   Short Term Goals: 2 weeks  Decrease pain to 1-3/10  (progressing, not met)  Improve gait with heel lift as indicated (progressing, not met)  Long Term Goals: 4 weeks   Pain @ 0-1 /10 with return to ADL and exercises (progressing, not met)    Plan     Ultrasound and moist heat PRN    Devorah Law, PTA

## 2019-03-28 ENCOUNTER — OFFICE VISIT (OUTPATIENT)
Dept: FAMILY MEDICINE | Facility: CLINIC | Age: 74
End: 2019-03-28
Payer: MEDICARE

## 2019-03-28 VITALS
HEART RATE: 74 BPM | DIASTOLIC BLOOD PRESSURE: 80 MMHG | TEMPERATURE: 98 F | WEIGHT: 242.81 LBS | OXYGEN SATURATION: 98 % | RESPIRATION RATE: 18 BRPM | SYSTOLIC BLOOD PRESSURE: 124 MMHG | HEIGHT: 59 IN | BODY MASS INDEX: 48.95 KG/M2

## 2019-03-28 DIAGNOSIS — I77.1 TORTUOUS AORTA: ICD-10-CM

## 2019-03-28 DIAGNOSIS — E78.5 HYPERLIPIDEMIA, UNSPECIFIED HYPERLIPIDEMIA TYPE: Primary | ICD-10-CM

## 2019-03-28 DIAGNOSIS — I10 ESSENTIAL HYPERTENSION: Chronic | ICD-10-CM

## 2019-03-28 DIAGNOSIS — I27.20 PULMONARY HYPERTENSION: ICD-10-CM

## 2019-03-28 DIAGNOSIS — E66.01 MORBID OBESITY DUE TO EXCESS CALORIES: ICD-10-CM

## 2019-03-28 DIAGNOSIS — K46.9 ABDOMINAL HERNIA WITHOUT OBSTRUCTION AND WITHOUT GANGRENE, RECURRENCE NOT SPECIFIED, UNSPECIFIED HERNIA TYPE: ICD-10-CM

## 2019-03-28 DIAGNOSIS — F32.0 MILD MAJOR DEPRESSION: ICD-10-CM

## 2019-03-28 PROCEDURE — 3074F SYST BP LT 130 MM HG: CPT | Mod: HCNC,CPTII,S$GLB, | Performed by: FAMILY MEDICINE

## 2019-03-28 PROCEDURE — 99999 PR PBB SHADOW E&M-EST. PATIENT-LVL IV: ICD-10-PCS | Mod: PBBFAC,HCNC,, | Performed by: FAMILY MEDICINE

## 2019-03-28 PROCEDURE — 3079F PR MOST RECENT DIASTOLIC BLOOD PRESSURE 80-89 MM HG: ICD-10-PCS | Mod: HCNC,CPTII,S$GLB, | Performed by: FAMILY MEDICINE

## 2019-03-28 PROCEDURE — 1101F PR PT FALLS ASSESS DOC 0-1 FALLS W/OUT INJ PAST YR: ICD-10-PCS | Mod: HCNC,CPTII,S$GLB, | Performed by: FAMILY MEDICINE

## 2019-03-28 PROCEDURE — 99999 PR PBB SHADOW E&M-EST. PATIENT-LVL IV: CPT | Mod: PBBFAC,HCNC,, | Performed by: FAMILY MEDICINE

## 2019-03-28 PROCEDURE — 99499 UNLISTED E&M SERVICE: CPT | Mod: HCNC,S$GLB,, | Performed by: FAMILY MEDICINE

## 2019-03-28 PROCEDURE — 3079F DIAST BP 80-89 MM HG: CPT | Mod: HCNC,CPTII,S$GLB, | Performed by: FAMILY MEDICINE

## 2019-03-28 PROCEDURE — 99214 OFFICE O/P EST MOD 30 MIN: CPT | Mod: HCNC,S$GLB,, | Performed by: FAMILY MEDICINE

## 2019-03-28 PROCEDURE — 99499 RISK ADDL DX/OHS AUDIT: ICD-10-PCS | Mod: HCNC,S$GLB,, | Performed by: FAMILY MEDICINE

## 2019-03-28 PROCEDURE — 1101F PT FALLS ASSESS-DOCD LE1/YR: CPT | Mod: HCNC,CPTII,S$GLB, | Performed by: FAMILY MEDICINE

## 2019-03-28 PROCEDURE — 99214 PR OFFICE/OUTPT VISIT, EST, LEVL IV, 30-39 MIN: ICD-10-PCS | Mod: HCNC,S$GLB,, | Performed by: FAMILY MEDICINE

## 2019-03-28 PROCEDURE — 3074F PR MOST RECENT SYSTOLIC BLOOD PRESSURE < 130 MM HG: ICD-10-PCS | Mod: HCNC,CPTII,S$GLB, | Performed by: FAMILY MEDICINE

## 2019-03-28 RX ORDER — LOSARTAN POTASSIUM 50 MG/1
50 TABLET ORAL 2 TIMES DAILY
Qty: 180 TABLET | Refills: 3 | Status: SHIPPED | OUTPATIENT
Start: 2019-03-28 | End: 2020-01-02 | Stop reason: SDUPTHER

## 2019-03-28 RX ORDER — SERTRALINE HYDROCHLORIDE 50 MG/1
100 TABLET, FILM COATED ORAL NIGHTLY
Qty: 180 TABLET | Refills: 1 | Status: SHIPPED | OUTPATIENT
Start: 2019-03-28 | End: 2019-11-25 | Stop reason: SDUPTHER

## 2019-03-28 NOTE — PROGRESS NOTES
"Subjective:       Patient ID: Marta Huff is a 73 y.o. female.    Chief Complaint: Hypertension (follow up)    HPI  Patient in the office for f/u and review.  She is working with rheum/tika re: her back, hips.  Frustrated by inability to lose weight.  Having to help take care of her  who's having more falls. Doesn't sleep well, stable regardless of meds. She has zoloft on med list, but not sure that she's taking.   Taking 1 lasix daily. She notes that since her abdominal hernia and weight gain, she finds increased GOMEZ. +fluid retention.    Review of Systems:  Review of Systems   Constitutional: Positive for fatigue. Negative for unexpected weight change.   HENT: Negative for congestion, sore throat and trouble swallowing.    Respiratory: Positive for wheezing (relates to seasonal pollen). Negative for cough and shortness of breath.    Cardiovascular: Positive for leg swelling. Negative for chest pain and palpitations.   Gastrointestinal: Negative for blood in stool, constipation and diarrhea.        Some belching with indigestion, but no gerd.   Genitourinary: Negative for difficulty urinating, dysuria, frequency, pelvic pain, vaginal bleeding and vaginal discharge.   Neurological: Negative for dizziness, light-headedness and headaches.       Objective:     Vitals:    03/28/19 0813   BP: 124/80   BP Location: Right arm   Patient Position: Sitting   BP Method: X-Large (Manual)   Pulse: 74   Resp: 18   Temp: 98 °F (36.7 °C)   TempSrc: Oral   SpO2: 98%   Weight: 110.2 kg (242 lb 13.4 oz)   Height: 4' 11" (1.499 m)          Physical Exam   Constitutional: She is oriented to person, place, and time. She appears well-developed and well-nourished. No distress.   HENT:   Head: Normocephalic and atraumatic.   Right Ear: External ear normal.   Left Ear: External ear normal.   Nose: Nose normal.   Mouth/Throat: Oropharynx is clear and moist. No oropharyngeal exudate.   Eyes: Pupils are equal, round, and " reactive to light. Conjunctivae and EOM are normal.   Neck: Normal range of motion. Neck supple. No thyromegaly present.   Cardiovascular: Normal rate and regular rhythm.   Pulmonary/Chest: No accessory muscle usage. No respiratory distress. She has wheezes (light exp wheeze, upper airway). She has no rhonchi.   Abdominal: Soft. Bowel sounds are normal. She exhibits no distension and no mass. There is no tenderness. There is no rebound and no guarding. A hernia is present. Hernia confirmed positive in the ventral area.       Exam limited by habitus.   Musculoskeletal: Normal range of motion. She exhibits edema (1+ pitting, ble).   Lymphadenopathy:     She has no cervical adenopathy.   Neurological: She is alert and oriented to person, place, and time.   Skin: Skin is warm and dry.   Psychiatric: She has a normal mood and affect. Her behavior is normal.   Nursing note and vitals reviewed.        Assessment & Plan:  Hyperlipidemia, unspecified hyperlipidemia type  -     CBC auto differential; Future; Expected date: 03/28/2019  -     TSH; Future; Expected date: 03/28/2019  Update for review.  Essential hypertension  -     Transthoracic echo (TTE) 2D with Color Flow; Future  Update echo given increased fluid retention.   Increase lasix to 40mg bid for the next 2-4 days. Phone f/u tomorrow re: diuresis.  Switch ACEi to losartan due to risk for side effect. Pt to monitor BP at home and call with elev, concerns.   Abdominal hernia without obstruction and without gangrene, recurrence not specified, unspecified hernia type  -     US Abdomen Complete; Future; Expected date: 03/28/2019  -     US Pelvis Complete Non OB; Future; Expected date: 03/28/2019  Update imaging for review.   Morbid obesity due to excess calories  Will schedule pt for f/u in bariatric clinic.  Pulmonary hypertension  Update echo as above.  Mild major depression  Notes mood is ok, but more anxiety. Agreeable to increase in zoloft dose to 100mg daily.  Recheck at f/u.  Recommend against ambien use.  Tortuous aorta  Incidental finding noted prev. Update echo and review.  Other orders  -     sertraline (ZOLOFT) 50 MG tablet; Take 2 tablets (100 mg total) by mouth every evening.  Dispense: 180 tablet; Refill: 1  -     losartan (COZAAR) 50 MG tablet; Take 1 tablet (50 mg total) by mouth 2 (two) times daily.  Dispense: 180 tablet; Refill: 3

## 2019-03-29 ENCOUNTER — LAB VISIT (OUTPATIENT)
Dept: LAB | Facility: HOSPITAL | Age: 74
End: 2019-03-29
Attending: FAMILY MEDICINE
Payer: MEDICARE

## 2019-03-29 DIAGNOSIS — E78.5 HYPERLIPIDEMIA, UNSPECIFIED HYPERLIPIDEMIA TYPE: ICD-10-CM

## 2019-03-29 DIAGNOSIS — I10 ESSENTIAL HYPERTENSION: Chronic | ICD-10-CM

## 2019-03-29 LAB
ALBUMIN SERPL BCP-MCNC: 3.6 G/DL (ref 3.5–5.2)
ALP SERPL-CCNC: 134 U/L (ref 55–135)
ALT SERPL W/O P-5'-P-CCNC: 24 U/L (ref 10–44)
ANION GAP SERPL CALC-SCNC: 9 MMOL/L (ref 8–16)
AST SERPL-CCNC: 21 U/L (ref 10–40)
BASOPHILS # BLD AUTO: 0.06 K/UL (ref 0–0.2)
BASOPHILS NFR BLD: 0.9 % (ref 0–1.9)
BILIRUB SERPL-MCNC: 0.9 MG/DL (ref 0.1–1)
BUN SERPL-MCNC: 19 MG/DL (ref 8–23)
CALCIUM SERPL-MCNC: 9.3 MG/DL (ref 8.7–10.5)
CHLORIDE SERPL-SCNC: 103 MMOL/L (ref 95–110)
CHOLEST SERPL-MCNC: 148 MG/DL (ref 120–199)
CHOLEST/HDLC SERPL: 2.3 {RATIO} (ref 2–5)
CO2 SERPL-SCNC: 30 MMOL/L (ref 23–29)
CREAT SERPL-MCNC: 0.7 MG/DL (ref 0.5–1.4)
DIFFERENTIAL METHOD: ABNORMAL
EOSINOPHIL # BLD AUTO: 0.2 K/UL (ref 0–0.5)
EOSINOPHIL NFR BLD: 3.5 % (ref 0–8)
ERYTHROCYTE [DISTWIDTH] IN BLOOD BY AUTOMATED COUNT: 16.5 % (ref 11.5–14.5)
EST. GFR  (AFRICAN AMERICAN): >60 ML/MIN/1.73 M^2
EST. GFR  (NON AFRICAN AMERICAN): >60 ML/MIN/1.73 M^2
GLUCOSE SERPL-MCNC: 95 MG/DL (ref 70–110)
HCT VFR BLD AUTO: 38.7 % (ref 37–48.5)
HDLC SERPL-MCNC: 65 MG/DL (ref 40–75)
HDLC SERPL: 43.9 % (ref 20–50)
HGB BLD-MCNC: 11.7 G/DL (ref 12–16)
IMM GRANULOCYTES # BLD AUTO: 0.02 K/UL (ref 0–0.04)
IMM GRANULOCYTES NFR BLD AUTO: 0.3 % (ref 0–0.5)
LDLC SERPL CALC-MCNC: 74.8 MG/DL (ref 63–159)
LYMPHOCYTES # BLD AUTO: 1.8 K/UL (ref 1–4.8)
LYMPHOCYTES NFR BLD: 26.1 % (ref 18–48)
MCH RBC QN AUTO: 27.9 PG (ref 27–31)
MCHC RBC AUTO-ENTMCNC: 30.2 G/DL (ref 32–36)
MCV RBC AUTO: 92 FL (ref 82–98)
MONOCYTES # BLD AUTO: 0.4 K/UL (ref 0.3–1)
MONOCYTES NFR BLD: 6.3 % (ref 4–15)
NEUTROPHILS # BLD AUTO: 4.3 K/UL (ref 1.8–7.7)
NEUTROPHILS NFR BLD: 62.9 % (ref 38–73)
NONHDLC SERPL-MCNC: 83 MG/DL
NRBC BLD-RTO: 0 /100 WBC
PLATELET # BLD AUTO: 223 K/UL (ref 150–350)
PMV BLD AUTO: 12.2 FL (ref 9.2–12.9)
POTASSIUM SERPL-SCNC: 4.3 MMOL/L (ref 3.5–5.1)
PROT SERPL-MCNC: 7.4 G/DL (ref 6–8.4)
RBC # BLD AUTO: 4.2 M/UL (ref 4–5.4)
SODIUM SERPL-SCNC: 142 MMOL/L (ref 136–145)
TRIGL SERPL-MCNC: 41 MG/DL (ref 30–150)
TSH SERPL DL<=0.005 MIU/L-ACNC: 1.74 UIU/ML (ref 0.4–4)
WBC # BLD AUTO: 6.78 K/UL (ref 3.9–12.7)

## 2019-03-29 PROCEDURE — 84443 ASSAY THYROID STIM HORMONE: CPT | Mod: HCNC

## 2019-03-29 PROCEDURE — 80061 LIPID PANEL: CPT | Mod: HCNC

## 2019-03-29 PROCEDURE — 85025 COMPLETE CBC W/AUTO DIFF WBC: CPT | Mod: HCNC

## 2019-03-29 PROCEDURE — 80053 COMPREHEN METABOLIC PANEL: CPT | Mod: HCNC

## 2019-03-29 PROCEDURE — 36415 COLL VENOUS BLD VENIPUNCTURE: CPT | Mod: HCNC,PN

## 2019-04-01 ENCOUNTER — CLINICAL SUPPORT (OUTPATIENT)
Dept: REHABILITATION | Facility: HOSPITAL | Age: 74
End: 2019-04-01
Attending: INTERNAL MEDICINE
Payer: MEDICARE

## 2019-04-01 DIAGNOSIS — Z78.9 DECREASED ACTIVITIES OF DAILY LIVING (ADL): ICD-10-CM

## 2019-04-01 PROCEDURE — 97110 THERAPEUTIC EXERCISES: CPT | Mod: HCNC,PO

## 2019-04-01 PROCEDURE — 97140 MANUAL THERAPY 1/> REGIONS: CPT | Mod: HCNC,PO

## 2019-04-01 NOTE — PROGRESS NOTES
Physical Therapy Daily Treatment Note     Name: Marta RENE [facility]  Clinic Number: [id number]    Therapy Diagnosis:   Encounter Diagnosis   Name Primary?    Decreased activities of daily living (ADL)      Physician: Adwoa Jaime DO    Visit Date: 4/1/2019  Physician Orders:  PT Eval and Treat  Medical Diagnosis from Referral: M70.62 (ICD-10-CM) - Greater trochanteric bursitis, left  Evaluation Date: 3/6/2019  Authorization Period Expiration: 12/31/19  Plan of Care Expiration: 4/10/19  Visit # / Visits authorized: 6 / 20    Time In: 0805  Time Out: 0855  Total Billable Time: 50 minutes    Precautions:  Bilateral TKA (2010), O/A x 9 yrs    Subjective     Pt reports: that her right lower back is sore this morning. Patient states her right hip does feel better but her back has been bothering her the last week.  was compliant with home exercise program.  Response to previous treatment: no adverse effect   Functional change: able to do more at home without pain     Pain: 5/10  Location: right lateral hip      Objective     Marta received therapeutic exercise to improve flexibility, strength, endurance, and core stabilization x 40 minutes:    Moist heat x 10 minutes to lumbar spine with feet on orange SB   Exercises performed on heat: LTR x 3 minutes, DKTC on orange SB x 2 minutes   PROM to R hip: hip flexion, abduction x 5 minutes    Supine hip adduction ball squeeze x 2 minutes  Supine glut set x 2 minutes  Supine hip flexor stretch x 1 minute x 2   Supine/Seated Posterior pelvic tilt x 5 minutes (heavy tactile and verbal cueing)  SL hip abduction 2x10 ea   Seated ball roll outs x 1 minutes, forward and to left, 5 sec hold ea   Standing hip abduction 2x10 ea (not performed today)    Marta received manual therapy techniques x 10 minutes consisting of:  IASTM with rolling stick: R QL, glut, hamstring, IT band    Marta received the following direct contact modalities after being cleared for  contraindications: Ultrasound:  Marta received ultrasound to manage pain and inflammation at 100 % duty cycle applied to the RIGHT trochanteric bursa area at an intensity of  1.2 W/cm2  for a duration of 00 minutes. Patient tolerated treatment well without adverse effects. Therapist was in attendance throughout intervention (NOT PERFORMED TODAY)    Home Exercises Provided and Patient Education Provided  Education provided:   - Reviewed positioning    Assessment     Marta tolerated treatment well today. Patient able to perform anterior pelvic tilt but increased lower back pain noted. Max difficulty and minimal pelvic mobility noted with posterior pelvic tilt but this does decrease pain. Patient did respond positively to manual therapy today; however, global tenderness noted through right glut and IT band. Patient demonstrates poor core and hip stability with cueing needed for positioning and awareness.   Marta is progressing well towards her goals.   Pt prognosis is Excellent.     Pt will continue to benefit from skilled outpatient physical therapy to address the deficits listed in the problem list box on initial evaluation, provide pt/family education and to maximize pt's level of independence in the home and community environment.     Pt's spiritual, cultural and educational needs considered and pt agreeable to plan of care and goals.    Anticipated barriers to physical therapy: noen    Goals:   Short Term Goals: 2 weeks  Decrease pain to 1-3/10  (progressing, not met)  Improve gait with heel lift as indicated (progressing, not met)  Long Term Goals: 4 weeks   Pain @ 0-1 /10 with return to ADL and exercises (progressing, not met)    Plan     Ultrasound and moist heat PRN    Devorah Law, AVI

## 2019-04-08 ENCOUNTER — CLINICAL SUPPORT (OUTPATIENT)
Dept: REHABILITATION | Facility: HOSPITAL | Age: 74
End: 2019-04-08
Payer: MEDICARE

## 2019-04-08 DIAGNOSIS — Z78.9 DECREASED ACTIVITIES OF DAILY LIVING (ADL): ICD-10-CM

## 2019-04-08 PROCEDURE — 97110 THERAPEUTIC EXERCISES: CPT | Mod: HCNC,PO

## 2019-04-08 NOTE — PROGRESS NOTES
"  Physical Therapy Daily Treatment Note     Name: Marta Harmon Franky  Clinic Number: [id number]    Therapy Diagnosis:   Encounter Diagnosis   Name Primary?    Decreased activities of daily living (ADL)      Physician: Adwoa Jaime DO    Visit Date: 4/8/2019  Physician Orders:  PT Eval and Treat  Medical Diagnosis from Referral: M70.62 (ICD-10-CM) - Greater trochanteric bursitis, left  Evaluation Date: 3/6/2019  Authorization Period Expiration: 12/31/19  Plan of Care Expiration: 4/10/19  Visit # / Visits authorized: 7 / 20    Time In: 0755  Time Out: 0840  Total Billable Time: 40 minutes    Precautions:  Bilateral TKA (2010), O/A x 9 yrs    Subjective     Pt reports: that her t lower back is sore,  "but not as bad" and no c/o RIGHT hip pain.    Pt  was compliant with home exercise program.  Response to previous treatment: no adverse effect   Functional change: able to do more at home without pain     Pain: 4-5 /10 on arrival, 2 /10 on leaving  Location: Lumbosacral junction bilaterally      Objective     Marta received therapeutic exercise to improve flexibility, strength, endurance, and core stabilization x 40 minutes  PROM to LE's  Lumbar stabilization exercises with FLEXION bias:   UE patterns with 2 # 3 x 10 each   UE pull down with red t-band resistance x 10   LE lift and down x 10 each leg  Walk 14' x 4 with FLEXION bias and standing against wall for rest  Leg press: Bi;lateral @ 20 and 30# 3 x 10 each with FLEXION bias  Straight back bend sit to stand x3    Marta deferred manual therapy techniques x 10 minutes consisting of:  IASTM with rolling stick: R QL, glut, hamstring, IT band    Marta deferred the following direct contact modalities after being cleared for contraindications: Ultrasound:  Marta received ultrasound to manage pain and inflammation at 100 % duty cycle applied to the RIGHT trochanteric bursa area at an intensity of  1.2 W/cm2  for a duration of 00 minutes. Patient " tolerated treatment well without adverse effects. Therapist was in attendance throughout intervention (NOT PERFORMED TODAY)    Home Exercises Provided and Patient Education Provided  Education provided:   - Reviewed positioning    Assessment     Marta tolerated treatment well today. Patient able to perform posterior pelvic tilt which decreased / nearly resolves lower back pain   She had better understanding of FLEXION bias      Marta is progressing well towards her goals.   Pt prognosis is Excellent.     Pt will continue to benefit from skilled outpatient physical therapy to address the deficits listed in the problem list box on initial evaluation, provide pt/family education and to maximize pt's level of independence in the home and community environment.     Pt's spiritual, cultural and educational needs considered and pt agreeable to plan of care and goals.    Anticipated barriers to physical therapy: none    Goals:   Short Term Goals: 2 weeks  Decrease pain to 1-3/10  (progressing, not met)  Improve gait with heel lift as indicated (progressing, not met)  Long Term Goals: 3 weeks   Pain @ 0-1 /10 with return to ADL and exercises (progressing, not met)    Plan     Ultrasound and moist heat PRN    Tino Logan, PT

## 2019-04-09 ENCOUNTER — HOSPITAL ENCOUNTER (OUTPATIENT)
Dept: RADIOLOGY | Facility: HOSPITAL | Age: 74
Discharge: HOME | End: 2019-04-09
Attending: FAMILY MEDICINE
Payer: MEDICARE

## 2019-04-09 DIAGNOSIS — K46.9 ABDOMINAL HERNIA WITHOUT OBSTRUCTION AND WITHOUT GANGRENE, RECURRENCE NOT SPECIFIED, UNSPECIFIED HERNIA TYPE: ICD-10-CM

## 2019-04-09 PROCEDURE — 76705 ECHO EXAM OF ABDOMEN: CPT | Mod: 26,HCNC,, | Performed by: RADIOLOGY

## 2019-04-09 PROCEDURE — 76705 US ABDOMEN LIMITED_HERNIA: ICD-10-PCS | Mod: 26,HCNC,, | Performed by: RADIOLOGY

## 2019-04-09 PROCEDURE — 76705 ECHO EXAM OF ABDOMEN: CPT | Mod: TC,HCNC,PO,59

## 2019-04-09 PROCEDURE — 76700 US ABDOMEN COMPLETE: ICD-10-PCS | Mod: 26,HCNC,, | Performed by: RADIOLOGY

## 2019-04-09 PROCEDURE — 76700 US EXAM ABDOM COMPLETE: CPT | Mod: 26,HCNC,, | Performed by: RADIOLOGY

## 2019-04-09 PROCEDURE — 76700 US EXAM ABDOM COMPLETE: CPT | Mod: TC,HCNC,PO

## 2019-04-11 ENCOUNTER — OFFICE VISIT (OUTPATIENT)
Dept: FAMILY MEDICINE | Facility: CLINIC | Age: 74
End: 2019-04-11
Payer: MEDICARE

## 2019-04-11 VITALS
TEMPERATURE: 98 F | RESPIRATION RATE: 19 BRPM | SYSTOLIC BLOOD PRESSURE: 122 MMHG | BODY MASS INDEX: 48.44 KG/M2 | HEART RATE: 71 BPM | DIASTOLIC BLOOD PRESSURE: 78 MMHG | OXYGEN SATURATION: 97 % | HEIGHT: 59 IN | WEIGHT: 240.31 LBS

## 2019-04-11 DIAGNOSIS — I10 ESSENTIAL HYPERTENSION: ICD-10-CM

## 2019-04-11 DIAGNOSIS — E66.01 MORBID OBESITY WITH BMI OF 45.0-49.9, ADULT: ICD-10-CM

## 2019-04-11 DIAGNOSIS — K43.2 INCISIONAL HERNIA, WITHOUT OBSTRUCTION OR GANGRENE: Primary | ICD-10-CM

## 2019-04-11 DIAGNOSIS — J45.40 MODERATE PERSISTENT ASTHMA, UNSPECIFIED WHETHER COMPLICATED: ICD-10-CM

## 2019-04-11 PROBLEM — K76.0 FATTY LIVER: Status: ACTIVE | Noted: 2019-04-11

## 2019-04-11 PROBLEM — N28.1 RENAL CYST: Status: ACTIVE | Noted: 2019-04-11

## 2019-04-11 PROBLEM — T85.733A INFECTION OF SPINAL CORD STIMULATOR: Status: RESOLVED | Noted: 2018-09-12 | Resolved: 2019-04-11

## 2019-04-11 PROCEDURE — 3078F PR MOST RECENT DIASTOLIC BLOOD PRESSURE < 80 MM HG: ICD-10-PCS | Mod: HCNC,CPTII,S$GLB, | Performed by: FAMILY MEDICINE

## 2019-04-11 PROCEDURE — 1101F PT FALLS ASSESS-DOCD LE1/YR: CPT | Mod: HCNC,CPTII,S$GLB, | Performed by: FAMILY MEDICINE

## 2019-04-11 PROCEDURE — 3074F PR MOST RECENT SYSTOLIC BLOOD PRESSURE < 130 MM HG: ICD-10-PCS | Mod: HCNC,CPTII,S$GLB, | Performed by: FAMILY MEDICINE

## 2019-04-11 PROCEDURE — 3074F SYST BP LT 130 MM HG: CPT | Mod: HCNC,CPTII,S$GLB, | Performed by: FAMILY MEDICINE

## 2019-04-11 PROCEDURE — 99999 PR PBB SHADOW E&M-EST. PATIENT-LVL IV: ICD-10-PCS | Mod: PBBFAC,HCNC,, | Performed by: FAMILY MEDICINE

## 2019-04-11 PROCEDURE — 99999 PR PBB SHADOW E&M-EST. PATIENT-LVL IV: CPT | Mod: PBBFAC,HCNC,, | Performed by: FAMILY MEDICINE

## 2019-04-11 PROCEDURE — 3078F DIAST BP <80 MM HG: CPT | Mod: HCNC,CPTII,S$GLB, | Performed by: FAMILY MEDICINE

## 2019-04-11 PROCEDURE — 99499 UNLISTED E&M SERVICE: CPT | Mod: HCNC,S$GLB,, | Performed by: FAMILY MEDICINE

## 2019-04-11 PROCEDURE — 1101F PR PT FALLS ASSESS DOC 0-1 FALLS W/OUT INJ PAST YR: ICD-10-PCS | Mod: HCNC,CPTII,S$GLB, | Performed by: FAMILY MEDICINE

## 2019-04-11 PROCEDURE — 99214 PR OFFICE/OUTPT VISIT, EST, LEVL IV, 30-39 MIN: ICD-10-PCS | Mod: HCNC,S$GLB,, | Performed by: FAMILY MEDICINE

## 2019-04-11 PROCEDURE — 99499 RISK ADDL DX/OHS AUDIT: ICD-10-PCS | Mod: HCNC,S$GLB,, | Performed by: FAMILY MEDICINE

## 2019-04-11 PROCEDURE — 99214 OFFICE O/P EST MOD 30 MIN: CPT | Mod: HCNC,S$GLB,, | Performed by: FAMILY MEDICINE

## 2019-04-11 RX ORDER — FUROSEMIDE 80 MG/1
80 TABLET ORAL DAILY
Qty: 90 TABLET | Refills: 1 | Status: SHIPPED | OUTPATIENT
Start: 2019-04-11 | End: 2019-05-24 | Stop reason: SDUPTHER

## 2019-04-11 NOTE — PROGRESS NOTES
Subjective:       Patient ID: Marta Huff is a 73 y.o. female.    Chief Complaint: No chief complaint on file.    HPI  Patient in the office for f/u and review.  Labs 3/2019 rev.   Us abd rev with fatty liver change, small renal cyst.  Hernia reviewed on us. She saw gen surg/major in 2017 who recommended weight loss before proceeding. To note, weight today is the same. She'd like to see another surgeon re: options. We discussed abdominal binders prev for comfort.   Has not yet seen marsha/rhea for f/u.   Prev wheezing comes/goes depending on pollen counts. If needed, she uses the nebulizer or hfa. claritin daily.  Mood better on zoloft consistently.  Recalls she's seeing more persistent fluid retention. Currently, taking lasix 40 alternating with 80. Due for repeat echo, last EF preserved. Last stress on file 2013, neg.   Ongoing wheezing. Was dx with bronchial asthma after a protracted bout of bronchitis with subsequent pna. Recalls that she was never on additional inhalers or similar for asthma.     Review of Systems:  Review of Systems   Constitutional: Positive for fatigue. Negative for unexpected weight change.   HENT: Negative for congestion, sore throat and trouble swallowing.    Respiratory: Positive for wheezing (relates to seasonal pollen). Negative for cough and shortness of breath (occ, with extended activity).    Cardiovascular: Positive for leg swelling (stable). Negative for chest pain and palpitations.   Gastrointestinal: Negative for blood in stool, constipation and diarrhea.        Some belching with indigestion, but no gerd.   Genitourinary: Negative for difficulty urinating, dysuria and frequency.   Neurological: Negative for dizziness, light-headedness and headaches.       Objective:     Vitals:    04/11/19 0848   BP: 122/78   BP Location: Right arm   Patient Position: Sitting   BP Method: X-Large (Manual)   Pulse: 71   Resp: 19   Temp: 98 °F (36.7 °C)   TempSrc: Oral   SpO2: 97%  "  Weight: 109 kg (240 lb 4.8 oz)   Height: 4' 11" (1.499 m)          Physical Exam   Constitutional: She is oriented to person, place, and time. She appears well-developed and well-nourished. No distress.   HENT:   Head: Normocephalic and atraumatic.   Right Ear: External ear normal.   Left Ear: External ear normal.   Nose: Nose normal.   Mouth/Throat: Oropharynx is clear and moist. No oropharyngeal exudate.   Eyes: Pupils are equal, round, and reactive to light. Conjunctivae and EOM are normal. Right eye exhibits no discharge. Left eye exhibits no discharge. No scleral icterus.   Neck: Normal range of motion. Neck supple. No thyromegaly present.   Cardiovascular: Normal rate and regular rhythm.   Pulmonary/Chest: Effort normal. No accessory muscle usage. No respiratory distress. She has wheezes. She has no rhonchi.   Abdominal: Soft. Bowel sounds are normal. She exhibits no distension and no mass. There is no tenderness. There is no rebound and no guarding. A hernia is present. Hernia confirmed positive in the ventral area.       Exam limited by habitus.   Musculoskeletal: Normal range of motion. She exhibits no edema.   Lymphadenopathy:     She has no cervical adenopathy.   Neurological: She is alert and oriented to person, place, and time.   Skin: Skin is warm and dry. No rash noted.   Psychiatric: She has a normal mood and affect. Her behavior is normal.   Nursing note and vitals reviewed.        Assessment & Plan:  Incisional hernia, without obstruction or gangrene  -     Ambulatory referral to General Surgery  Pt would like second opinion re: surgical correction. She's aware of risks associated with recurrence, particularly in regards to weight. Discussed abdominal binder for the time being, and in lieu of surgery if that option is deferred.  Essential hypertension  -     furosemide (LASIX) 80 MG tablet; Take 1 tablet (80 mg total) by mouth once daily.  Dispense: 90 tablet; Refill: 1  Controlled.   Some fluid " retention persists. Increase lasix to 80mg daily. Lab in 6-8 weeks re: renal function.   Moderate persistent asthma, unspecified whether complicated  -     Complete PFT w/ bronchodilator; Future  -     X-Ray Chest PA And Lateral; Future; Expected date: 04/11/2019  Continued wheezing. Pt not on ICS or laba, and does not recall having prev used. Nebs/hfa are intermittent by sx, not necessarily daily. Recommend daily use and update pfts to confirm asthma and/or copd.   Morbid obesity with BMI of 45.0-49.9, adult s/p laparoscopic Danielle-N-Y  Encouraged her to schedule f/u with established marsha/rhea.

## 2019-04-12 ENCOUNTER — TELEPHONE (OUTPATIENT)
Dept: FAMILY MEDICINE | Facility: CLINIC | Age: 74
End: 2019-04-12

## 2019-04-12 ENCOUNTER — PATIENT MESSAGE (OUTPATIENT)
Dept: FAMILY MEDICINE | Facility: CLINIC | Age: 74
End: 2019-04-12

## 2019-04-12 NOTE — TELEPHONE ENCOUNTER
----- Message from Elda Holley MD sent at 4/11/2019 11:42 AM CDT -----  [facility] message for lab in 6-8 weeks to check potassium and renal function with lasix dose adj.

## 2019-04-16 ENCOUNTER — HOSPITAL ENCOUNTER (OUTPATIENT)
Dept: RADIOLOGY | Facility: HOSPITAL | Age: 74
Discharge: HOME | End: 2019-04-16
Attending: FAMILY MEDICINE
Payer: MEDICARE

## 2019-04-16 DIAGNOSIS — F41.9 ANXIETY: ICD-10-CM

## 2019-04-16 DIAGNOSIS — J45.40 MODERATE PERSISTENT ASTHMA, UNSPECIFIED WHETHER COMPLICATED: ICD-10-CM

## 2019-04-16 PROCEDURE — 71046 X-RAY EXAM CHEST 2 VIEWS: CPT | Mod: TC,HCNC,PN

## 2019-04-16 PROCEDURE — 71046 X-RAY EXAM CHEST 2 VIEWS: CPT | Mod: 26,HCNC,, | Performed by: RADIOLOGY

## 2019-04-16 PROCEDURE — 71046 XR CHEST PA AND LATERAL: ICD-10-PCS | Mod: 26,HCNC,, | Performed by: RADIOLOGY

## 2019-04-16 RX ORDER — LORAZEPAM 0.5 MG/1
0.5 TABLET ORAL 2 TIMES DAILY PRN
Qty: 60 TABLET | Refills: 0 | Status: SHIPPED | OUTPATIENT
Start: 2019-04-16 | End: 2019-09-11 | Stop reason: SDUPTHER

## 2019-04-16 NOTE — TELEPHONE ENCOUNTER
----- Message from Kiana Gagnon sent at 4/16/2019  7:50 AM CDT -----  Contact: Marta Huff  Pt. Stopped by and ask for a refill to be sent in for her LORazepam 0.5 MG tablet. She stated she only have 10 pills left (5 days). Please call her when this is done.    Thanks

## 2019-04-25 ENCOUNTER — TELEPHONE (OUTPATIENT)
Dept: FAMILY MEDICINE | Facility: CLINIC | Age: 74
End: 2019-04-25

## 2019-04-29 ENCOUNTER — OFFICE VISIT (OUTPATIENT)
Dept: SURGERY | Facility: CLINIC | Age: 74
End: 2019-04-29
Payer: MEDICARE

## 2019-04-29 VITALS
WEIGHT: 243.63 LBS | TEMPERATURE: 98 F | RESPIRATION RATE: 16 BRPM | BODY MASS INDEX: 49.12 KG/M2 | SYSTOLIC BLOOD PRESSURE: 135 MMHG | HEIGHT: 59 IN | DIASTOLIC BLOOD PRESSURE: 98 MMHG | HEART RATE: 66 BPM

## 2019-04-29 DIAGNOSIS — K43.9 VENTRAL HERNIA WITHOUT OBSTRUCTION OR GANGRENE: Primary | ICD-10-CM

## 2019-04-29 PROCEDURE — 3075F SYST BP GE 130 - 139MM HG: CPT | Mod: CPTII,S$GLB,, | Performed by: SURGERY

## 2019-04-29 PROCEDURE — 99999 PR PBB SHADOW E&M-EST. PATIENT-LVL III: ICD-10-PCS | Mod: PBBFAC,,, | Performed by: SURGERY

## 2019-04-29 PROCEDURE — 3080F PR MOST RECENT DIASTOLIC BLOOD PRESSURE >= 90 MM HG: ICD-10-PCS | Mod: CPTII,S$GLB,, | Performed by: SURGERY

## 2019-04-29 PROCEDURE — 1101F PT FALLS ASSESS-DOCD LE1/YR: CPT | Mod: CPTII,S$GLB,, | Performed by: SURGERY

## 2019-04-29 PROCEDURE — 1101F PR PT FALLS ASSESS DOC 0-1 FALLS W/OUT INJ PAST YR: ICD-10-PCS | Mod: CPTII,S$GLB,, | Performed by: SURGERY

## 2019-04-29 PROCEDURE — 99213 OFFICE O/P EST LOW 20 MIN: CPT | Mod: S$GLB,,, | Performed by: SURGERY

## 2019-04-29 PROCEDURE — 99213 PR OFFICE/OUTPT VISIT, EST, LEVL III, 20-29 MIN: ICD-10-PCS | Mod: S$GLB,,, | Performed by: SURGERY

## 2019-04-29 PROCEDURE — 3075F PR MOST RECENT SYSTOLIC BLOOD PRESS GE 130-139MM HG: ICD-10-PCS | Mod: CPTII,S$GLB,, | Performed by: SURGERY

## 2019-04-29 PROCEDURE — 3080F DIAST BP >= 90 MM HG: CPT | Mod: CPTII,S$GLB,, | Performed by: SURGERY

## 2019-04-29 PROCEDURE — 99999 PR PBB SHADOW E&M-EST. PATIENT-LVL III: CPT | Mod: PBBFAC,,, | Performed by: SURGERY

## 2019-04-29 NOTE — PROGRESS NOTES
Subjective:       Patient ID: Marta Huff is a 73 y.o. female.    Chief Complaint: No chief complaint on file.      HPI 73-year-old female presents with complaint of incisional hernia for approximately 1 year.  She has a history of cholecystectomy, hysterectomy, and gastric sleeve.  The gastric sleeve was done in 2014.  She has some occasional nausea but no vomiting.  It has not become incarcerated.  She describes it as a discomfort.  She does have COPD and has some wheezing here in the office today.  She is going on a cruise next month so wants to delay this for some time.  She also appears to have an umbilical hernia as well.    Past Medical History:   Diagnosis Date    Anxiety     Arthralgia of knee     arthralgia of bilateral knees secondary to djd    Arthritis     Back pain     Depression     Encounter for blood transfusion     AUTOLOGUS    GERD (gastroesophageal reflux disease)     Hiatal hernia     repaired in 1979    History of seasonal allergies     History of shingles     Hypertension     Insomnia     Obesity     Osteoporosis     Pneumonia     Reactive airway disease     Restless legs syndrome     Rheumatic fever     at 6 y/o    Sleep apnea     no cpap     Past Surgical History:   Procedure Laterality Date    APPENDECTOMY      BARIATRIC SURGERY  2014    Gastric Sleeve    CHOLECYSTECTOMY      EGD (ESOPHAGOGASTRODUODENOSCOPY) N/A 5/20/2014    Performed by [facility] Jr[facility] at [facility] OR 2ND FLR    EGD (ESOPHAGOGASTRODUODENOSCOPY) N/A 11/25/2013    Performed by Antonio Mendez MD at [facility] ENDO (4TH FLR)    AKUA-TRANSFORAMINAL L5 and S1 Left 2/5/2018    [facility]    ESOPHAGOGASTRODUODENOSCOPY (EGD) N/A 6/23/2017    [facility] at [facility] ENDO    GASTRECTOMY, SLEEVE, LAPAROSCOPIC N/A 5/20/2014    Performed by [facility] Jr., [facility] at [facility] OR 2ND FLR    HERNIA REPAIR      hiatal hernia    HYSTERECTOMY       partial    INSERTION, SPINAL CORD STIMULATOR, N/A 7/11/2018    [facility]    JOINT REPLACEMENT      BILAT KNEE    KNEE ARTHROPLASTY  1/2/2010    bilateral    BSRBX-DSWOFKEG-XELKCBEOKQUA N/A 5/20/2014    Performed by T[facility] Jr[facility] at [facility] OR 2ND FLR    NISSEN FUNDOPLICATION      REMOVAL, IMPLANT spinal cord stimulator N/A 9/12/2018    [facility] OR    REPLACEMENT, SPINAL CORD STIMULATOR  PLACEMENT OF SPINAL CORD STIMULATOR T10 N/A 11/14/2018    Performed by [facility]ell [facility] at [facility] OR    SOOJ-X-Q-LAPAROSCOPIC N/A 9/18/2017    Per[facility] at [facility] OR    TRIAL-STIMULATOR-DORSAL COLUMN N/A 5/25/2018    [facility]formed by [facility]cher [facility] at [facility] OR         Current Outpatient Medications:     albuterol (PROAIR HFA) 90 mcg/actuation inhaler, Inhale 2 puffs into the lungs every 6 (six) hours as needed for Wheezing or Shortness of Breath., Disp: 3 Inhaler, Rfl: 3    albuterol (PROVENTIL) 2.5 mg /3 mL (0.083 %) nebulizer solution, INHALE THE CONTENTS OF 1 VIAL VIA NEBULIZER EVERY 6 HOURS AS NEEDED FOR  WHEEZING  OR  SHORTNESS OF BREATH, Disp: 90 mL, Rfl: 11    alendronate (FOSAMAX) 70 MG tablet, TAKE 1 TABLET EVERY 7 DAYS, Disp: 12 tablet, Rfl: 3    B-complex with vitamin C (Z-BEC OR EQUIV) tablet, Take 1 tablet by mouth once daily. , Disp: , Rfl:     BIOTIN/CALCIUM CARBONATE (BIOTIN 100+10 ORAL), Take 1 capsule by mouth once daily. , Disp: , Rfl:     calcium citrate-vitamin D2 1,500-200 mg-unit Tab, Take 1 tablet by mouth once daily. , Disp: , Rfl:     cholecalciferol, vitamin D3, 5,000 unit capsule, Take 1 capsule (5,000 Units total) by mouth once daily., Disp: 90 capsule, Rfl: 3    furosemide (LASIX) 80 MG tablet, Take 1 tablet (80 mg total) by mouth once daily., Disp: 90 tablet, Rfl: 1    loratadine (CLARITIN) 10 mg tablet, Take 10 mg by mouth daily as needed. , Disp: , Rfl:     LORazepam  (ATIVAN) 0.5 MG tablet, Take 1 tablet (0.5 mg total) by mouth 2 (two) times daily as needed for Anxiety., Disp: 60 tablet, Rfl: 0    losartan (COZAAR) 50 MG tablet, Take 1 tablet (50 mg total) by mouth 2 (two) times daily., Disp: 180 tablet, Rfl: 3    meclizine (ANTIVERT) 25 mg tablet, Take 1 tablet (25 mg total) by mouth every 6 (six) hours. (Patient taking differently: Take 25 mg by mouth every 6 (six) hours as needed. ), Disp: 360 tablet, Rfl: 1    multivitamin (MULTIVITAMIN) per tablet, Take 1 tablet by mouth once daily.  , Disp: , Rfl:     omega 3-dha-epa-fish oil (FISH OIL) 1,000 mg (120 mg-180 mg) Cap, Take 2 capsules by mouth 2 (two) times daily., Disp: 360 capsule, Rfl: 3    omeprazole (PRILOSEC) 40 MG capsule, Take 1 capsule (40 mg total) by mouth once daily., Disp: 90 capsule, Rfl: 3    pramipexole (MIRAPEX) 1.5 MG tablet, TAKE 1 TABLET EVERY NIGHT, Disp: 90 tablet, Rfl: 1    sertraline (ZOLOFT) 50 MG tablet, Take 2 tablets (100 mg total) by mouth every evening., Disp: 180 tablet, Rfl: 1    umeclidinium-vilanterol (ANORO ELLIPTA) 62.5-25 mcg/actuation DsDv, Inhale 1 puff into the lungs once daily. Controller, Disp: 90 each, Rfl: 3    VITAMIN B-12 5,000 mcg Subl, Place 1 tablet under the tongue once a week. , Disp: , Rfl:     Current Facility-Administered Medications:     lidocaine (PF) 10 mg/ml (1%) injection 10 mg, 1 mL, Intradermal, Once, Kevin Parnell MD    Review of patient's allergies indicates:   Allergen Reactions    Etodolac (bulk) Swelling     Hands and feet swelling    Sulfa (sulfonamide antibiotics) Rash and Other (See Comments)     Headache        Family History   Problem Relation Age of Onset    Heart disease Mother     Hypertension Mother     Diabetes Mother     Obesity Mother     Heart disease Maternal Grandfather      Social History     Socioeconomic History    Marital status:      Spouse name: Not on file    Number of children: Not on file    Years of  education: Not on file    Highest education level: Not on file   Occupational History    Not on file   Social Needs    Financial resource strain: Not on file    Food insecurity:     Worry: Not on file     Inability: Not on file    Transportation needs:     Medical: Not on file     Non-medical: Not on file   Tobacco Use    Smoking status: Passive Smoke Exposure - Never Smoker    Smokeless tobacco: Never Used   Substance and Sexual Activity    Alcohol use: Yes     Comment: 1-2 glasses of wine monthly    Drug use: No    Sexual activity: Not on file   Lifestyle    Physical activity:     Days per week: Not on file     Minutes per session: Not on file    Stress: Not on file   Relationships    Social connections:     Talks on phone: Not on file     Gets together: Not on file     Attends Anabaptism service: Not on file     Active member of club or organization: Not on file     Attends meetings of clubs or organizations: Not on file     Relationship status: Not on file   Other Topics Concern    Not on file   Social History Narrative    Not on file       Review of Systems   Constitutional: Negative for activity change, chills, fever and unexpected weight change.   HENT: Negative for congestion, sore throat, trouble swallowing and voice change.    Eyes: Negative for redness and visual disturbance.   Respiratory: Negative for cough, shortness of breath and wheezing.    Cardiovascular: Negative for chest pain and palpitations.   Gastrointestinal: Positive for abdominal pain. Negative for blood in stool, nausea and vomiting.   Endocrine: Negative.    Genitourinary: Negative for dysuria, frequency and hematuria.   Musculoskeletal: Negative for arthralgias, back pain and neck pain.   Skin: Negative for rash and wound.   Allergic/Immunologic: Negative.    Neurological: Negative for dizziness, weakness and headaches.   Hematological: Negative for adenopathy.   Psychiatric/Behavioral: Negative for agitation and dysphoric  mood. The patient is not nervous/anxious.      Objective:     Physical Exam   Constitutional: She is oriented to person, place, and time. She appears well-developed and well-nourished. No distress.   HENT:   Head: Normocephalic and atraumatic.   Mouth/Throat: Oropharynx is clear and moist. No oropharyngeal exudate.   Eyes: Pupils are equal, round, and reactive to light. Conjunctivae and EOM are normal. No scleral icterus.   Neck: Normal range of motion. No thyromegaly present.   Cardiovascular: Normal rate and regular rhythm.   No murmur heard.  Pulmonary/Chest: Effort normal. She has wheezes. She has no rales.   Abdominal: Soft. Bowel sounds are normal. She exhibits no distension and no mass. There is no tenderness. A hernia is present.   There is an incisional hernia. The patient is obese of the fascial edges it difficult to palpate.  This is above the umbilicus. She also appears to have an umbilical hernia. There is minimal tenderness.  This reduces easily.   Musculoskeletal: Normal range of motion. She exhibits no edema.   Lymphadenopathy:     She has no cervical adenopathy.   Neurological: She is alert and oriented to person, place, and time. No cranial nerve deficit.   Skin: Skin is warm and dry. No rash noted. No erythema.   Psychiatric: She has a normal mood and affect. Her behavior is normal.     Assessment:     Encounter Diagnosis   Name Primary?    Ventral hernia without obstruction or gangrene Yes       Plan:      1.  Would recommend incisional hernia repair.  2.  CT scan prior to evaluate the extent of the defects.  3.  Would need medical clearance for her pulmonary status.  4.  Patient will follow-up with me after her cruise discussed the CT and the surgical plan and schedule.

## 2019-04-29 NOTE — LETTER
April 29, 2019      Elda Holley MD  [address]  [place] [address]           [facility]  [address] [facility] [address] [place] [address]  Phone: [phone]          Patient: Marta Huff   MR Number: [id number]   YOB: 1945   Date of Visit: 4/29/2019       Dear Dr. Elda Holley:    Thank you for referring Marta Huff to me for evaluation. Attached you will find relevant portions of my assessment and plan of care.    If you have questions, please do not hesitate to call me. I look forward to following Marta Huff along with you.    Sincerely,    Clifton Nguyen MD    Enclosure  CC:  No Recipients    If you would like to receive this communication electronically, please contact [email or web address] or [phone] to request more information on [employer] access.    For providers and/or their staff who would like to refer a patient to [facility], please contact us through our one-stop-shop provider referral line, [facility] [place], at [phone].    If you feel you have received this communication in error or would no longer like to receive these types of communications, please e-mail [email or web address]

## 2019-04-30 ENCOUNTER — HOSPITAL ENCOUNTER (OUTPATIENT)
Dept: RADIOLOGY | Facility: HOSPITAL | Age: 74
Discharge: HOME | End: 2019-04-30
Attending: SURGERY
Payer: MEDICARE

## 2019-04-30 DIAGNOSIS — K43.9 VENTRAL HERNIA WITHOUT OBSTRUCTION OR GANGRENE: ICD-10-CM

## 2019-05-01 ENCOUNTER — HOSPITAL ENCOUNTER (OUTPATIENT)
Dept: RADIOLOGY | Facility: HOSPITAL | Age: 74
Discharge: HOME | End: 2019-05-01
Attending: SURGERY
Payer: MEDICARE

## 2019-05-01 DIAGNOSIS — K43.9 VENTRAL HERNIA WITHOUT OBSTRUCTION OR GANGRENE: ICD-10-CM

## 2019-05-01 PROCEDURE — 74177 CT ABD & PELVIS W/CONTRAST: CPT | Mod: TC,PO

## 2019-05-01 PROCEDURE — 74177 CT ABD & PELVIS W/CONTRAST: CPT | Mod: 26,,, | Performed by: RADIOLOGY

## 2019-05-01 PROCEDURE — 25500020 PHARM REV CODE 255: Mod: PO | Performed by: SURGERY

## 2019-05-01 PROCEDURE — 74177 CT ABDOMEN PELVIS WITH CONTRAST: ICD-10-PCS | Mod: 26,,, | Performed by: RADIOLOGY

## 2019-05-01 RX ADMIN — IOHEXOL 30 ML: 350 INJECTION, SOLUTION INTRAVENOUS at 08:05

## 2019-05-01 RX ADMIN — IOHEXOL 100 ML: 350 INJECTION, SOLUTION INTRAVENOUS at 08:05

## 2019-05-23 ENCOUNTER — PATIENT MESSAGE (OUTPATIENT)
Dept: FAMILY MEDICINE | Facility: CLINIC | Age: 74
End: 2019-05-23

## 2019-05-23 ENCOUNTER — PATIENT MESSAGE (OUTPATIENT)
Dept: SURGERY | Facility: CLINIC | Age: 74
End: 2019-05-23

## 2019-05-24 ENCOUNTER — HOSPITAL ENCOUNTER (OUTPATIENT)
Dept: RADIOLOGY | Facility: HOSPITAL | Age: 74
Discharge: HOME | End: 2019-05-24
Attending: FAMILY MEDICINE
Payer: MEDICARE

## 2019-05-24 ENCOUNTER — OFFICE VISIT (OUTPATIENT)
Dept: FAMILY MEDICINE | Facility: CLINIC | Age: 74
End: 2019-05-24
Payer: MEDICARE

## 2019-05-24 VITALS
HEIGHT: 59 IN | HEART RATE: 65 BPM | WEIGHT: 232.38 LBS | DIASTOLIC BLOOD PRESSURE: 104 MMHG | TEMPERATURE: 98 F | BODY MASS INDEX: 46.85 KG/M2 | OXYGEN SATURATION: 93 % | SYSTOLIC BLOOD PRESSURE: 164 MMHG

## 2019-05-24 DIAGNOSIS — J44.1 COPD WITH EXACERBATION: ICD-10-CM

## 2019-05-24 DIAGNOSIS — J44.1 COPD WITH EXACERBATION: Primary | ICD-10-CM

## 2019-05-24 DIAGNOSIS — I10 ESSENTIAL HYPERTENSION: ICD-10-CM

## 2019-05-24 DIAGNOSIS — R60.9 EDEMA, UNSPECIFIED TYPE: ICD-10-CM

## 2019-05-24 PROCEDURE — 99214 PR OFFICE/OUTPT VISIT, EST, LEVL IV, 30-39 MIN: ICD-10-PCS | Mod: 25,HCNC,S$GLB, | Performed by: FAMILY MEDICINE

## 2019-05-24 PROCEDURE — 3080F DIAST BP >= 90 MM HG: CPT | Mod: HCNC,CPTII,S$GLB, | Performed by: FAMILY MEDICINE

## 2019-05-24 PROCEDURE — 71046 X-RAY EXAM CHEST 2 VIEWS: CPT | Mod: 26,HCNC,, | Performed by: RADIOLOGY

## 2019-05-24 PROCEDURE — 94640 PR INHAL RX, AIRWAY OBST/DX SPUTUM INDUCT: ICD-10-PCS | Mod: HCNC,S$GLB,, | Performed by: FAMILY MEDICINE

## 2019-05-24 PROCEDURE — 99499 RISK ADDL DX/OHS AUDIT: ICD-10-PCS | Mod: HCNC,S$GLB,, | Performed by: FAMILY MEDICINE

## 2019-05-24 PROCEDURE — 99999 PR PBB SHADOW E&M-EST. PATIENT-LVL III: ICD-10-PCS | Mod: PBBFAC,HCNC,, | Performed by: FAMILY MEDICINE

## 2019-05-24 PROCEDURE — 71046 X-RAY EXAM CHEST 2 VIEWS: CPT | Mod: TC,HCNC,PN

## 2019-05-24 PROCEDURE — 3080F PR MOST RECENT DIASTOLIC BLOOD PRESSURE >= 90 MM HG: ICD-10-PCS | Mod: HCNC,CPTII,S$GLB, | Performed by: FAMILY MEDICINE

## 2019-05-24 PROCEDURE — 99214 OFFICE O/P EST MOD 30 MIN: CPT | Mod: 25,HCNC,S$GLB, | Performed by: FAMILY MEDICINE

## 2019-05-24 PROCEDURE — 94640 AIRWAY INHALATION TREATMENT: CPT | Mod: HCNC,S$GLB,, | Performed by: FAMILY MEDICINE

## 2019-05-24 PROCEDURE — 1101F PT FALLS ASSESS-DOCD LE1/YR: CPT | Mod: HCNC,CPTII,S$GLB, | Performed by: FAMILY MEDICINE

## 2019-05-24 PROCEDURE — 1101F PR PT FALLS ASSESS DOC 0-1 FALLS W/OUT INJ PAST YR: ICD-10-PCS | Mod: HCNC,CPTII,S$GLB, | Performed by: FAMILY MEDICINE

## 2019-05-24 PROCEDURE — 3077F SYST BP >= 140 MM HG: CPT | Mod: HCNC,CPTII,S$GLB, | Performed by: FAMILY MEDICINE

## 2019-05-24 PROCEDURE — 99999 PR PBB SHADOW E&M-EST. PATIENT-LVL III: CPT | Mod: PBBFAC,HCNC,, | Performed by: FAMILY MEDICINE

## 2019-05-24 PROCEDURE — 99499 UNLISTED E&M SERVICE: CPT | Mod: HCNC,S$GLB,, | Performed by: FAMILY MEDICINE

## 2019-05-24 PROCEDURE — 71046 XR CHEST PA AND LATERAL: ICD-10-PCS | Mod: 26,HCNC,, | Performed by: RADIOLOGY

## 2019-05-24 PROCEDURE — 3077F PR MOST RECENT SYSTOLIC BLOOD PRESSURE >= 140 MM HG: ICD-10-PCS | Mod: HCNC,CPTII,S$GLB, | Performed by: FAMILY MEDICINE

## 2019-05-24 RX ORDER — IPRATROPIUM BROMIDE AND ALBUTEROL SULFATE 2.5; .5 MG/3ML; MG/3ML
3 SOLUTION RESPIRATORY (INHALATION) ONCE
Status: COMPLETED | OUTPATIENT
Start: 2019-05-24 | End: 2019-05-24

## 2019-05-24 RX ORDER — PREDNISONE 20 MG/1
20 TABLET ORAL DAILY
Qty: 10 TABLET | Refills: 0 | Status: SHIPPED | OUTPATIENT
Start: 2019-05-24 | End: 2019-06-03

## 2019-05-24 RX ORDER — CLONIDINE HYDROCHLORIDE 0.1 MG/1
0.1 TABLET ORAL ONCE
Status: COMPLETED | OUTPATIENT
Start: 2019-05-24 | End: 2019-05-24

## 2019-05-24 RX ORDER — FUROSEMIDE 80 MG/1
80 TABLET ORAL DAILY
Qty: 90 TABLET | Refills: 1 | Status: SHIPPED | OUTPATIENT
Start: 2019-05-24 | End: 2020-01-02 | Stop reason: SDUPTHER

## 2019-05-24 RX ADMIN — CLONIDINE HYDROCHLORIDE 0.1 MG: 0.1 TABLET ORAL at 09:05

## 2019-05-24 RX ADMIN — IPRATROPIUM BROMIDE AND ALBUTEROL SULFATE 3 ML: 2.5; .5 SOLUTION RESPIRATORY (INHALATION) at 09:05

## 2019-05-24 NOTE — PROGRESS NOTES
"Subjective:       Patient ID: Marta Huff is a 73 y.o. female.    Chief Complaint: Follow-up (ER)    HPI  Patient in the office for f/u and review.  Was seen in the ER last week for COPD exacerbation with sats 90%. Given abx, steroids and sent home to continue meds/nebs. She reports nebs q6hrs. Feels significantly improved from when she went to the ER. Now afebrile. Able to get comfortable for rest at night.   +diarrhea. Notes fluid retention with steroid use and elev BP despite BP meds.     Review of Systems:  Review of Systems   Constitutional: Positive for fatigue. Negative for activity change, chills and fever.   HENT: Negative for congestion, ear discharge, ear pain, facial swelling, hearing loss, postnasal drip, rhinorrhea and tinnitus.    Eyes: Negative for redness and itching.   Respiratory: Positive for cough, shortness of breath and wheezing. Negative for choking, chest tightness and stridor.    Cardiovascular: Positive for leg swelling. Negative for chest pain.   Gastrointestinal: Positive for diarrhea. Negative for nausea.   Musculoskeletal: Negative for arthralgias and myalgias.   Skin: Negative for color change and rash.   Neurological: Negative for dizziness and light-headedness.       Objective:     Vitals:    05/24/19 0816   BP: (!) 164/104   Pulse: 65   Temp: 98.4 °F (36.9 °C)   SpO2: (!) 93%   Weight: 105.4 kg (232 lb 5.8 oz)   Height: 4' 11" (1.499 m)          Physical Exam   Constitutional: She is oriented to person, place, and time. She appears well-developed and well-nourished. No distress.   HENT:   Head: Normocephalic and atraumatic.   Right Ear: External ear normal. Tympanic membrane is not erythematous. A middle ear effusion is present.   Left Ear: External ear normal. Tympanic membrane is not erythematous. A middle ear effusion is present.   Nose: Mucosal edema and rhinorrhea present.   Mouth/Throat: Posterior oropharyngeal erythema present. No oropharyngeal exudate.   Eyes: " Pupils are equal, round, and reactive to light. Conjunctivae and EOM are normal. Right eye exhibits no discharge. Left eye exhibits no discharge.   Neck: Normal range of motion. Neck supple.   Cardiovascular: Normal rate and regular rhythm.   Pulmonary/Chest: Effort normal. No respiratory distress. She has decreased breath sounds. She has wheezes. She has rhonchi. She has no rales. She exhibits no tenderness.   Abdominal: Soft. Bowel sounds are normal. She exhibits no distension. There is no tenderness.   Musculoskeletal: Normal range of motion. She exhibits edema (1+ pitting, ble).   Lymphadenopathy:     She has no cervical adenopathy.   Neurological: She is alert and oriented to person, place, and time.   Skin: Skin is warm and dry.   Nursing note and vitals reviewed.         Improvement in ease of respiration, decrease in cough, and decrease in auscultated wheeze/rhonchi noted post-neb.     Assessment & Plan:  COPD with exacerbation  -     albuterol-ipratropium 2.5 mg-0.5 mg/3 mL nebulizer solution 3 mL  -     X-Ray Chest PA And Lateral; Future; Expected date: 05/24/2019  -     predniSONE (DELTASONE) 20 MG tablet; Take 1 tablet (20 mg total) by mouth once daily. for 10 days  Dispense: 10 tablet; Refill: 0  Improved.   Continue abx as prescribed by ER (additional days of azithromycin and amoxil). Cont prednisone from ER, then decrease to 20mg tablet for additional 5-10 days.   Continue nebs every 6hrs.   cxr today.  Essential hypertension  -     cloNIDine tablet 0.1 mg  Cont losartan bid. Call office if persistent elevation noted for short-term BP regimen.  Edema, unspecified type  -     furosemide (LASIX) 80 MG tablet; Take 1 tablet (80 mg total) by mouth once daily.  Dispense: 90 tablet; Refill: 1  Increase lasix to twice daily while on prednisone. Careful monitoring of fluid retention. Recommend compression stockings, leg elevation, avoid extra salt intake.     Discuss updating PFTs at f/u.

## 2019-05-30 ENCOUNTER — TELEPHONE (OUTPATIENT)
Dept: FAMILY MEDICINE | Facility: CLINIC | Age: 74
End: 2019-05-30

## 2019-05-30 NOTE — TELEPHONE ENCOUNTER
----- Message from Elda Holley MD sent at 5/24/2019  1:56 PM CDT -----  Phone f/u Monday please for breathing, fluid retention, BP.   Recheck in office with me, 10 days.

## 2019-06-27 RX ORDER — GABAPENTIN 600 MG/1
TABLET ORAL
Qty: 360 TABLET | Refills: 3 | Status: SHIPPED | OUTPATIENT
Start: 2019-06-27 | End: 2020-01-02 | Stop reason: SDUPTHER

## 2019-07-06 ENCOUNTER — PATIENT MESSAGE (OUTPATIENT)
Dept: FAMILY MEDICINE | Facility: CLINIC | Age: 74
End: 2019-07-06

## 2019-07-15 ENCOUNTER — LAB VISIT (OUTPATIENT)
Dept: LAB | Facility: HOSPITAL | Age: 74
End: 2019-07-15
Attending: SURGERY
Payer: MEDICARE

## 2019-07-15 ENCOUNTER — OFFICE VISIT (OUTPATIENT)
Dept: SURGERY | Facility: CLINIC | Age: 74
End: 2019-07-15
Payer: MEDICARE

## 2019-07-15 VITALS
BODY MASS INDEX: 50.89 KG/M2 | DIASTOLIC BLOOD PRESSURE: 82 MMHG | WEIGHT: 252 LBS | SYSTOLIC BLOOD PRESSURE: 154 MMHG | HEART RATE: 85 BPM

## 2019-07-15 DIAGNOSIS — Z01.818 PREOP TESTING: ICD-10-CM

## 2019-07-15 DIAGNOSIS — K43.9 VENTRAL HERNIA WITHOUT OBSTRUCTION OR GANGRENE: Primary | ICD-10-CM

## 2019-07-15 DIAGNOSIS — K43.2 INCISIONAL HERNIA: ICD-10-CM

## 2019-07-15 LAB
ALBUMIN SERPL BCP-MCNC: 3.4 G/DL (ref 3.5–5.2)
ALP SERPL-CCNC: 127 U/L (ref 55–135)
ALT SERPL W/O P-5'-P-CCNC: 16 U/L (ref 10–44)
ANION GAP SERPL CALC-SCNC: 13 MMOL/L (ref 8–16)
AST SERPL-CCNC: 19 U/L (ref 10–40)
BASOPHILS # BLD AUTO: 0.06 K/UL (ref 0–0.2)
BASOPHILS NFR BLD: 0.9 % (ref 0–1.9)
BILIRUB SERPL-MCNC: 0.6 MG/DL (ref 0.1–1)
BUN SERPL-MCNC: 19 MG/DL (ref 8–23)
CALCIUM SERPL-MCNC: 9.3 MG/DL (ref 8.7–10.5)
CHLORIDE SERPL-SCNC: 103 MMOL/L (ref 95–110)
CO2 SERPL-SCNC: 29 MMOL/L (ref 23–29)
CREAT SERPL-MCNC: 0.7 MG/DL (ref 0.5–1.4)
DIFFERENTIAL METHOD: ABNORMAL
EOSINOPHIL # BLD AUTO: 0.5 K/UL (ref 0–0.5)
EOSINOPHIL NFR BLD: 8 % (ref 0–8)
ERYTHROCYTE [DISTWIDTH] IN BLOOD BY AUTOMATED COUNT: 15.9 % (ref 11.5–14.5)
EST. GFR  (AFRICAN AMERICAN): >60 ML/MIN/1.73 M^2
EST. GFR  (NON AFRICAN AMERICAN): >60 ML/MIN/1.73 M^2
GLUCOSE SERPL-MCNC: 111 MG/DL (ref 70–110)
HCT VFR BLD AUTO: 37.6 % (ref 37–48.5)
HGB BLD-MCNC: 11.4 G/DL (ref 12–16)
IMM GRANULOCYTES # BLD AUTO: 0.02 K/UL (ref 0–0.04)
IMM GRANULOCYTES NFR BLD AUTO: 0.3 % (ref 0–0.5)
LYMPHOCYTES # BLD AUTO: 1.7 K/UL (ref 1–4.8)
LYMPHOCYTES NFR BLD: 25.2 % (ref 18–48)
MCH RBC QN AUTO: 27.9 PG (ref 27–31)
MCHC RBC AUTO-ENTMCNC: 30.3 G/DL (ref 32–36)
MCV RBC AUTO: 92 FL (ref 82–98)
MONOCYTES # BLD AUTO: 0.5 K/UL (ref 0.3–1)
MONOCYTES NFR BLD: 7.6 % (ref 4–15)
NEUTROPHILS # BLD AUTO: 3.8 K/UL (ref 1.8–7.7)
NEUTROPHILS NFR BLD: 58 % (ref 38–73)
NRBC BLD-RTO: 0 /100 WBC
PLATELET # BLD AUTO: 196 K/UL (ref 150–350)
PMV BLD AUTO: 12.9 FL (ref 9.2–12.9)
POTASSIUM SERPL-SCNC: 4.4 MMOL/L (ref 3.5–5.1)
PROT SERPL-MCNC: 7.1 G/DL (ref 6–8.4)
RBC # BLD AUTO: 4.09 M/UL (ref 4–5.4)
SODIUM SERPL-SCNC: 145 MMOL/L (ref 136–145)
WBC # BLD AUTO: 6.62 K/UL (ref 3.9–12.7)

## 2019-07-15 PROCEDURE — 36415 COLL VENOUS BLD VENIPUNCTURE: CPT | Mod: HCNC,PO

## 2019-07-15 PROCEDURE — 1101F PR PT FALLS ASSESS DOC 0-1 FALLS W/OUT INJ PAST YR: ICD-10-PCS | Mod: HCNC,CPTII,S$GLB, | Performed by: SURGERY

## 2019-07-15 PROCEDURE — 1101F PT FALLS ASSESS-DOCD LE1/YR: CPT | Mod: HCNC,CPTII,S$GLB, | Performed by: SURGERY

## 2019-07-15 PROCEDURE — 3079F PR MOST RECENT DIASTOLIC BLOOD PRESSURE 80-89 MM HG: ICD-10-PCS | Mod: HCNC,CPTII,S$GLB, | Performed by: SURGERY

## 2019-07-15 PROCEDURE — 99999 PR PBB SHADOW E&M-EST. PATIENT-LVL III: ICD-10-PCS | Mod: PBBFAC,HCNC,, | Performed by: SURGERY

## 2019-07-15 PROCEDURE — 3077F PR MOST RECENT SYSTOLIC BLOOD PRESSURE >= 140 MM HG: ICD-10-PCS | Mod: HCNC,CPTII,S$GLB, | Performed by: SURGERY

## 2019-07-15 PROCEDURE — 80053 COMPREHEN METABOLIC PANEL: CPT | Mod: HCNC

## 2019-07-15 PROCEDURE — 99214 OFFICE O/P EST MOD 30 MIN: CPT | Mod: HCNC,S$GLB,, | Performed by: SURGERY

## 2019-07-15 PROCEDURE — 99214 PR OFFICE/OUTPT VISIT, EST, LEVL IV, 30-39 MIN: ICD-10-PCS | Mod: HCNC,S$GLB,, | Performed by: SURGERY

## 2019-07-15 PROCEDURE — 99999 PR PBB SHADOW E&M-EST. PATIENT-LVL III: CPT | Mod: PBBFAC,HCNC,, | Performed by: SURGERY

## 2019-07-15 PROCEDURE — 85025 COMPLETE CBC W/AUTO DIFF WBC: CPT | Mod: HCNC

## 2019-07-15 PROCEDURE — 3077F SYST BP >= 140 MM HG: CPT | Mod: HCNC,CPTII,S$GLB, | Performed by: SURGERY

## 2019-07-15 PROCEDURE — 3079F DIAST BP 80-89 MM HG: CPT | Mod: HCNC,CPTII,S$GLB, | Performed by: SURGERY

## 2019-07-15 RX ORDER — ONDANSETRON 4 MG/1
TABLET, ORALLY DISINTEGRATING ORAL
COMMUNITY
Start: 2019-07-01 | End: 2019-09-11

## 2019-07-15 RX ORDER — SODIUM CHLORIDE 9 MG/ML
INJECTION, SOLUTION INTRAVENOUS CONTINUOUS
Status: CANCELLED | OUTPATIENT
Start: 2019-08-15

## 2019-07-15 RX ORDER — LIDOCAINE HYDROCHLORIDE 10 MG/ML
1 INJECTION, SOLUTION EPIDURAL; INFILTRATION; INTRACAUDAL; PERINEURAL ONCE
Status: CANCELLED | OUTPATIENT
Start: 2019-08-15

## 2019-07-15 NOTE — PROGRESS NOTES
Patient ID: Marta Huff is a 73 y.o. female.     Chief Complaint: No chief complaint on file.        HPI 73-year-old female presents with complaint of incisional hernia for approximately 1 year.  She has a history of cholecystectomy, hysterectomy, and gastric sleeve.  The gastric sleeve was done in 2014.  She has some occasional nausea but no vomiting.  It has not become incarcerated.  She describes it as a discomfort.  She does have COPD and has some wheezing here in the office today.  She is going on a cruise next month so wants to delay this for some time.  She also appears to have an umbilical hernia as well. Patient returns to schedule her hernia repair.  She went on her cruise and other than gaining some weight had a good time.  She has not had any new abdominal complaints.          Past Medical History:   Diagnosis Date    Anxiety      Arthralgia of knee       arthralgia of bilateral knees secondary to djd    Arthritis      Back pain      Depression      Encounter for blood transfusion       AUTOLOGUS    GERD (gastroesophageal reflux disease)      Hiatal hernia       repaired in 1979    History of seasonal allergies      History of shingles      Hypertension      Insomnia      Obesity      Osteoporosis      Pneumonia      Reactive airway disease      Restless legs syndrome      Rheumatic fever       at 6 y/o    Sleep apnea       no cpap            Past Surgical History:   Procedure Laterality Date    APPENDECTOMY        BARIATRIC SURGERY   2014     Gastric Sleeve    CHOLECYSTECTOMY        EGD (ESOPHAGOGASTRODUODENOSCOPY) N/A 5/20/2014     Performed by [facility]ros Jr., [facility] at [facility] OR 2ND FLR    EGD (ESOPHAGOGASTRODUODENOSCOPY) N/A 11/25/2013     Performed by Antonio Mendez MD at [facility] ENDO (4TH FLR)    AKUA-TRANSFORAMINAL L5 and S1 Left 2/5/2018     Performed by [facility]cher [facility] at [facility] OR    ESOPHAGOGASTRODUODENOSCOPY (EGD) N/A 6/23/2017     Performed by  Dorene Tripathi MD at [facility] ENDO    GASTRECTOMY, SLEEVE, LAPAROSCOPIC N/A 5/20/2014     Performed by Tino [facility]ros Jr[facility] MD at [facility] OR 2ND FLR    HERNIA REPAIR         hiatal hernia    HYSTERECTOMY         partial    INSERTION, SPINAL CORD STIMULATOR, N/A 7/11/2018     Performed by [facility]cher [facility] at [facility] OR    JOINT REPLACEMENT         BILAT KNEE    KNEE ARTHROPLASTY   1/2/2010     bilateral    VVVXX-ZNILIETU-WNPIWVMBWORM N/A 5/20/2014     Performed by T[facility] Jr[facility] at [facility] OR 2ND FLR    NISSEN FUNDOPLICATION        REMOVAL, IMPLANT spinal cord stimulator N/A 9/12/2018     Performed by [facility]cher [facility] at [facility] OR    REPLACEMENT, SPINAL CORD STIMULATOR  PLACEMENT OF SPINAL CORD STIMULATOR T10 N/A 11/14/2018     [email or web address] OR    WEBD-B-J-LAPAROSCOPIC N/A 9/18/2017     Performed by [facility]i [facility] at [facility] OR    TRIAL-STIMULATOR-DORSAL COLUMN N/A 5/25/2018     Performed by Raoul Belcher MD at [facility] OR            Current Outpatient Medications:     albuterol (PROAIR HFA) 90 mcg/actuation inhaler, Inhale 2 puffs into the lungs every 6 (six) hours as needed for Wheezing or Shortness of Breath., Disp: 3 Inhaler, Rfl: 3    albuterol (PROVENTIL) 2.5 mg /3 mL (0.083 %) nebulizer solution, INHALE THE CONTENTS OF 1 VIAL VIA NEBULIZER EVERY 6 HOURS AS NEEDED FOR  WHEEZING  OR  SHORTNESS OF BREATH, Disp: 90 mL, Rfl: 11    alendronate (FOSAMAX) 70 MG tablet, TAKE 1 TABLET EVERY 7 DAYS, Disp: 12 tablet, Rfl: 3    B-complex with vitamin C (Z-BEC OR EQUIV) tablet, Take 1 tablet by mouth once daily. , Disp: , Rfl:     BIOTIN/CALCIUM CARBONATE (BIOTIN 100+10 ORAL), Take 1 capsule by mouth once daily. , Disp: , Rfl:     calcium citrate-vitamin D2 1,500-200 mg-unit Tab, Take 1 tablet by mouth once daily. , Disp: , Rfl:     cholecalciferol, vitamin D3, 5,000 unit capsule, Take 1 capsule (5,000 Units total) by mouth once daily., Disp: 90  capsule, Rfl: 3    furosemide (LASIX) 80 MG tablet, Take 1 tablet (80 mg total) by mouth once daily., Disp: 90 tablet, Rfl: 1    loratadine (CLARITIN) 10 mg tablet, Take 10 mg by mouth daily as needed. , Disp: , Rfl:     LORazepam (ATIVAN) 0.5 MG tablet, Take 1 tablet (0.5 mg total) by mouth 2 (two) times daily as needed for Anxiety., Disp: 60 tablet, Rfl: 0    losartan (COZAAR) 50 MG tablet, Take 1 tablet (50 mg total) by mouth 2 (two) times daily., Disp: 180 tablet, Rfl: 3    meclizine (ANTIVERT) 25 mg tablet, Take 1 tablet (25 mg total) by mouth every 6 (six) hours. (Patient taking differently: Take 25 mg by mouth every 6 (six) hours as needed. ), Disp: 360 tablet, Rfl: 1    multivitamin (MULTIVITAMIN) per tablet, Take 1 tablet by mouth once daily.  , Disp: , Rfl:     omega 3-dha-epa-fish oil (FISH OIL) 1,000 mg (120 mg-180 mg) Cap, Take 2 capsules by mouth 2 (two) times daily., Disp: 360 capsule, Rfl: 3    omeprazole (PRILOSEC) 40 MG capsule, Take 1 capsule (40 mg total) by mouth once daily., Disp: 90 capsule, Rfl: 3    pramipexole (MIRAPEX) 1.5 MG tablet, TAKE 1 TABLET EVERY NIGHT, Disp: 90 tablet, Rfl: 1    sertraline (ZOLOFT) 50 MG tablet, Take 2 tablets (100 mg total) by mouth every evening., Disp: 180 tablet, Rfl: 1    umeclidinium-vilanterol (ANORO ELLIPTA) 62.5-25 mcg/actuation DsDv, Inhale 1 puff into the lungs once daily. Controller, Disp: 90 each, Rfl: 3    VITAMIN B-12 5,000 mcg Subl, Place 1 tablet under the tongue once a week. , Disp: , Rfl:      Current Facility-Administered Medications:     lidocaine (PF) 10 mg/ml (1%) injection 10 mg, 1 mL, Intradermal, Once, Kevin Parnell MD           Review of patient's allergies indicates:   Allergen Reactions    Etodolac (bulk) Swelling       Hands and feet swelling    Sulfa (sulfonamide antibiotics) Rash and Other (See Comments)       Headache                Family History   Problem Relation Age of Onset    Heart disease Mother       Hypertension Mother      Diabetes Mother      Obesity Mother      Heart disease Maternal Grandfather        Social History               Socioeconomic History    Marital status:        Spouse name: Not on file    Number of children: Not on file    Years of education: Not on file    Highest education level: Not on file   Occupational History    Not on file   Social Needs    Financial resource strain: Not on file    Food insecurity:       Worry: Not on file       Inability: Not on file    Transportation needs:       Medical: Not on file       Non-medical: Not on file   Tobacco Use    Smoking status: Passive Smoke Exposure - Never Smoker    Smokeless tobacco: Never Used   Substance and Sexual Activity    Alcohol use: Yes       Comment: 1-2 glasses of wine monthly    Drug use: No    Sexual activity: Not on file   Lifestyle    Physical activity:       Days per week: Not on file       Minutes per session: Not on file    Stress: Not on file   Relationships    Social connections:       Talks on phone: Not on file       Gets together: Not on file       Attends Taoism service: Not on file       Active member of club or organization: Not on file       Attends meetings of clubs or organizations: Not on file       Relationship status: Not on file   Other Topics Concern    Not on file   Social History Narrative    Not on file            Review of Systems   Constitutional: Negative for activity change, chills, fever and unexpected weight change.   HENT: Negative for congestion, sore throat, trouble swallowing and voice change.    Eyes: Negative for redness and visual disturbance.   Respiratory: Negative for cough, shortness of breath and wheezing.    Cardiovascular: Negative for chest pain and palpitations.   Gastrointestinal: Positive for abdominal pain. Negative for blood in stool, nausea and vomiting.   Endocrine: Negative.    Genitourinary: Negative for dysuria, frequency and hematuria.    Musculoskeletal: Negative for arthralgias, back pain and neck pain.   Skin: Negative for rash and wound.   Allergic/Immunologic: Negative.    Neurological: Negative for dizziness, weakness and headaches.   Hematological: Negative for adenopathy.   Psychiatric/Behavioral: Negative for agitation and dysphoric mood. The patient is not nervous/anxious.       Objective:      Physical Exam   Constitutional: She is oriented to person, place, and time. She appears well-developed and well-nourished. No distress.   HENT:   Head: Normocephalic and atraumatic.   Mouth/Throat: Oropharynx is clear and moist. No oropharyngeal exudate.   Eyes: Pupils are equal, round, and reactive to light. Conjunctivae and EOM are normal. No scleral icterus.   Neck: Normal range of motion. No thyromegaly present.   Cardiovascular: Normal rate and regular rhythm.   No murmur heard.  Pulmonary/Chest: Effort normal. She has wheezes. She has no rales.   Abdominal: Soft. Bowel sounds are normal. She exhibits no distension and no mass. There is no tenderness. A hernia is present.   There is an incisional hernia. The patient is obese of the fascial edges it difficult to palpate.  This is above the umbilicus. She also appears to have an umbilical hernia. There is minimal tenderness.  This reduces easily.   Musculoskeletal: Normal range of motion. She exhibits no edema.   Lymphadenopathy:     She has no cervical adenopathy.   Neurological: She is alert and oriented to person, place, and time. No cranial nerve deficit.   Skin: Skin is warm and dry. No rash noted. No erythema.   Psychiatric: She has a normal mood and affect. Her behavior is normal.      Assessment:           Encounter Diagnosis   Name Primary?    Ventral hernia without obstruction or gangrene Yes         Plan:      1.  Would recommend incisional hernia repair.  2.  CT scan prior revealed a fascial defect as well as an umbilical hernia.  She is asymptomatic from the umbilical hernia.  3.   Will proceed with open repair of incisional hernia.  4. Risks and benefits of the planned procedure were discussed at length with the patient.  Risks and benefits of not proceeding with the procedure were discussed as well. All questions were answered. The patient expressed clear understanding and would like to proceed with the procedure as discussed.

## 2019-07-15 NOTE — PATIENT INSTRUCTIONS
..PRE-OP INSTRUCTIONS    Your procedure has been scheduled at:    [facility]pre-admit nurse  [phone]      DAY thursday    DATE 08/15/2019       Please call the pre-op nurse to schedule your pre-op testing and registration  Someone from the hospital will call you the evening before surgery to let you know what time you need to be at the hospital for surgery.                                               1:  DO NOT EAT OR DRINK ANYTHING AFTER MIDNIGHT THE NIGHT BEFORE SURGERY.     2:  You will need to stop any blood thinners 1 week prior to surgery.  This includes Aspirin, fish oil, Pradaxa, Coumadin, Plavix, Pletal.  Please contact the prescribing doctor to be sure it is ok to stop these medicines.    3:  Pre-admit nurse will go over your medicines and let you know which ones not to take the morning of surgery    4:  Plan to have someone drive you home after you are released from the hospital.  You WILL NOT be able to drive yourself.    5:  If you have any questions or need to change your surgery date, please call [facility] at [phone]    AFTER SURGERY:    You can shower 48 hours after surgery, REMOVE WET BANDAGES AND BANDAIDS, leave the steri- strips on.  If you have not had a bowel movement within 3 days after surgery you may take a laxative of your choice.  Do not lift anything over 5-10 pounds.    You need to have a follow up appointment 7-14 days after surgery, call the office to schedule or if you have questions or concerns.    For [facility] patients, you will receive a [facility] message with a link to schedule your post op appointment.

## 2019-07-17 ENCOUNTER — OFFICE VISIT (OUTPATIENT)
Dept: RHEUMATOLOGY | Facility: CLINIC | Age: 74
End: 2019-07-17
Payer: MEDICARE

## 2019-07-17 VITALS
HEIGHT: 59 IN | HEART RATE: 73 BPM | BODY MASS INDEX: 50.58 KG/M2 | SYSTOLIC BLOOD PRESSURE: 120 MMHG | DIASTOLIC BLOOD PRESSURE: 78 MMHG | WEIGHT: 250.88 LBS

## 2019-07-17 DIAGNOSIS — G62.9 PERIPHERAL POLYNEUROPATHY: ICD-10-CM

## 2019-07-17 DIAGNOSIS — M15.9 GENERALIZED OSTEOARTHRITIS: Primary | ICD-10-CM

## 2019-07-17 DIAGNOSIS — G89.4 CHRONIC PAIN SYNDROME: ICD-10-CM

## 2019-07-17 DIAGNOSIS — M70.62 GREATER TROCHANTERIC BURSITIS OF BOTH HIPS: ICD-10-CM

## 2019-07-17 DIAGNOSIS — M70.61 GREATER TROCHANTERIC BURSITIS OF BOTH HIPS: ICD-10-CM

## 2019-07-17 DIAGNOSIS — M70.62 GREATER TROCHANTERIC BURSITIS OF LEFT HIP: ICD-10-CM

## 2019-07-17 DIAGNOSIS — M70.61 GREATER TROCHANTERIC BURSITIS OF RIGHT HIP: ICD-10-CM

## 2019-07-17 PROCEDURE — 99214 OFFICE O/P EST MOD 30 MIN: CPT | Mod: 25,HCNC,S$GLB, | Performed by: INTERNAL MEDICINE

## 2019-07-17 PROCEDURE — 3074F PR MOST RECENT SYSTOLIC BLOOD PRESSURE < 130 MM HG: ICD-10-PCS | Mod: HCNC,CPTII,S$GLB, | Performed by: INTERNAL MEDICINE

## 2019-07-17 PROCEDURE — 20610 DRAIN/INJ JOINT/BURSA W/O US: CPT | Mod: 50,HCNC,S$GLB, | Performed by: INTERNAL MEDICINE

## 2019-07-17 PROCEDURE — 99999 PR PBB SHADOW E&M-EST. PATIENT-LVL III: ICD-10-PCS | Mod: PBBFAC,HCNC,, | Performed by: INTERNAL MEDICINE

## 2019-07-17 PROCEDURE — 1101F PR PT FALLS ASSESS DOC 0-1 FALLS W/OUT INJ PAST YR: ICD-10-PCS | Mod: HCNC,CPTII,S$GLB, | Performed by: INTERNAL MEDICINE

## 2019-07-17 PROCEDURE — 3078F PR MOST RECENT DIASTOLIC BLOOD PRESSURE < 80 MM HG: ICD-10-PCS | Mod: HCNC,CPTII,S$GLB, | Performed by: INTERNAL MEDICINE

## 2019-07-17 PROCEDURE — 1101F PT FALLS ASSESS-DOCD LE1/YR: CPT | Mod: HCNC,CPTII,S$GLB, | Performed by: INTERNAL MEDICINE

## 2019-07-17 PROCEDURE — 99999 PR PBB SHADOW E&M-EST. PATIENT-LVL III: CPT | Mod: PBBFAC,HCNC,, | Performed by: INTERNAL MEDICINE

## 2019-07-17 PROCEDURE — 20610 LARGE JOINT ASPIRATION/INJECTION: R GREATER TROCHANTERIC BURSA, L GREATER TROCHANTERIC BURSA: ICD-10-PCS | Mod: 50,HCNC,S$GLB, | Performed by: INTERNAL MEDICINE

## 2019-07-17 PROCEDURE — 99214 PR OFFICE/OUTPT VISIT, EST, LEVL IV, 30-39 MIN: ICD-10-PCS | Mod: 25,HCNC,S$GLB, | Performed by: INTERNAL MEDICINE

## 2019-07-17 PROCEDURE — 3078F DIAST BP <80 MM HG: CPT | Mod: HCNC,CPTII,S$GLB, | Performed by: INTERNAL MEDICINE

## 2019-07-17 PROCEDURE — 3074F SYST BP LT 130 MM HG: CPT | Mod: HCNC,CPTII,S$GLB, | Performed by: INTERNAL MEDICINE

## 2019-07-17 RX ADMIN — METHYLPREDNISOLONE ACETATE 40 MG: 40 INJECTION, SUSPENSION INTRA-ARTICULAR; INTRALESIONAL; INTRAMUSCULAR; SOFT TISSUE at 08:07

## 2019-07-17 NOTE — PROGRESS NOTES
Subjective:          Chief Complaint: Marta Huff is a 74 y.o. female who had concerns including Osteoarthritis.    HPI:    Patient is a 73-year-old female previously seen by Dr. Mcintyre   Last visit with me 2/2019 with bilateral GTB injections 2/2019. Did very well with injections  Did try PT seems to exacerbate her joints.   Planning for hernia repair.   C/o weakness in the left hand, no numbness. Impaired  deformities at PIP.       Dx:  osteoarthritis this has diffuse as well as peripheral neuropathy.    She was using gabapentin  2400 mg daily.  Tramadol p.r.n. Zanaflex p.r.n..   Stopped gabapentin with no difference in the pain.   More recently patient had been following with pain management as well as being seen with neurosurgery for spinal cord stimulator.  A noting 85% relief of the foot pain.     She is having significant pain in bilateral hips to gluteal region and interferes with walking and with using the elliptical.  Previous 45min and tolerated, now only 15min and hips begin to hurt.   Last year they have progressively worsened. She still has pain in back but this is not a constant complaint for her. She is sleeping on sides and seems to tolerate, is is the lumbosacral junction that hurts at night.   Exacerbated with sitting prolonged period. At some times she uses walker due to pain.   Hands with stiffness PIP and DIP joints long standing cannot fully curl fingers. +deformity, intermittent swelling.     Interaction with etodolac, but renal function wnl.   She does tolerate NSAID: takes aleve 440mg in AM and PRN at night. No hx of GIB.         REVIEW OF SYSTEMS:    Review of Systems   Constitutional: Negative for fever, malaise/fatigue and weight loss.   HENT: Negative for sore throat.    Eyes: Negative for double vision, photophobia and redness.   Respiratory: Negative for cough, shortness of breath and wheezing.    Cardiovascular: Negative for chest pain, palpitations and orthopnea.    Gastrointestinal: Negative for abdominal pain, constipation and diarrhea.   Genitourinary: Negative for dysuria, hematuria and urgency.   Musculoskeletal: Positive for joint pain. Negative for back pain and myalgias.   Skin: Negative for rash.   Neurological: Negative for dizziness, tingling, focal weakness and headaches.   Endo/Heme/Allergies: Does not bruise/bleed easily.   Psychiatric/Behavioral: Negative for depression, hallucinations and suicidal ideas.               Objective:            Past Medical History:   Diagnosis Date    Anxiety     Arthralgia of knee     arthralgia of bilateral knees secondary to djd    Arthritis     Back pain     Depression     Encounter for blood transfusion     AUTOLOGUS    GERD (gastroesophageal reflux disease)     Hiatal hernia     repaired in 1979    History of seasonal allergies     History of shingles     Hypertension     Insomnia     Obesity     Osteoporosis     Pneumonia     Reactive airway disease     Restless legs syndrome     Rheumatic fever     at 4 y/o    Sleep apnea     no cpap     Family History   Problem Relation Age of Onset    Heart disease Mother     Hypertension Mother     Diabetes Mother     Obesity Mother     Heart disease Maternal Grandfather      Social History     Tobacco Use    Smoking status: Passive Smoke Exposure - Never Smoker    Smokeless tobacco: Never Used   Substance Use Topics    Alcohol use: Yes     Comment: 1-2 glasses of wine monthly    Drug use: No         Current Outpatient Medications on File Prior to Visit   Medication Sig Dispense Refill    albuterol (PROAIR HFA) 90 mcg/actuation inhaler Inhale 2 puffs into the lungs every 6 (six) hours as needed for Wheezing or Shortness of Breath. 3 Inhaler 3    albuterol (PROVENTIL) 2.5 mg /3 mL (0.083 %) nebulizer solution INHALE THE CONTENTS OF 1 VIAL VIA NEBULIZER EVERY 6 HOURS AS NEEDED FOR  WHEEZING  OR  SHORTNESS OF BREATH 90 mL 11    albuterol sulfate 2.5 mg/0.5 mL  Nebu Take 2.5 mg by nebulization every 4 (four) hours as needed. Rescue 1 each 1    alendronate (FOSAMAX) 70 MG tablet TAKE 1 TABLET EVERY 7 DAYS 12 tablet 3    B-complex with vitamin C (Z-BEC OR EQUIV) tablet Take 1 tablet by mouth once daily.       BIOTIN/CALCIUM CARBONATE (BIOTIN 100+10 ORAL) Take 1 capsule by mouth once daily.       calcium citrate-vitamin D2 1,500-200 mg-unit Tab Take 1 tablet by mouth once daily.       cholecalciferol, vitamin D3, 5,000 unit capsule Take 1 capsule (5,000 Units total) by mouth once daily. 90 capsule 3    furosemide (LASIX) 80 MG tablet Take 1 tablet (80 mg total) by mouth once daily. 90 tablet 1    gabapentin (NEURONTIN) 600 MG tablet TAKE 2 TABLETS TWICE DAILY 360 tablet 3    loratadine (CLARITIN) 10 mg tablet Take 10 mg by mouth daily as needed.       losartan (COZAAR) 50 MG tablet Take 1 tablet (50 mg total) by mouth 2 (two) times daily. 180 tablet 3    meclizine (ANTIVERT) 25 mg tablet Take 1 tablet (25 mg total) by mouth every 6 (six) hours. (Patient taking differently: Take 25 mg by mouth every 6 (six) hours as needed. ) 360 tablet 1    multivitamin (MULTIVITAMIN) per tablet Take 1 tablet by mouth once daily.        omega 3-dha-epa-fish oil (FISH OIL) 1,000 mg (120 mg-180 mg) Cap Take 2 capsules by mouth 2 (two) times daily. 360 capsule 3    omeprazole (PRILOSEC) 40 MG capsule Take 1 capsule (40 mg total) by mouth once daily. 90 capsule 3    ondansetron (ZOFRAN-ODT) 4 MG TbDL       pramipexole (MIRAPEX) 1.5 MG tablet TAKE 1 TABLET EVERY NIGHT 90 tablet 1    sertraline (ZOLOFT) 50 MG tablet Take 2 tablets (100 mg total) by mouth every evening. 180 tablet 1    umeclidinium-vilanterol (ANORO ELLIPTA) 62.5-25 mcg/actuation DsDv Inhale 1 puff into the lungs once daily. Controller 90 each 3    VITAMIN B-12 5,000 mcg Subl Place 1 tablet under the tongue once a week.       amoxicillin-clavulanate 875-125mg (AUGMENTIN) 875-125 mg per tablet Take 1 tablet by  mouth 2 (two) times daily. 14 tablet 0    azithromycin (Z-MARCELO) 250 MG tablet Take 1 tablet (250 mg total) by mouth once daily. Take first 2 tablets together, then 1 every day until finished. 6 tablet 0    LORazepam (ATIVAN) 0.5 MG tablet Take 1 tablet (0.5 mg total) by mouth 2 (two) times daily as needed for Anxiety. 60 tablet 0     Current Facility-Administered Medications on File Prior to Visit   Medication Dose Route Frequency Provider Last Rate Last Dose    lidocaine (PF) 10 mg/ml (1%) injection 10 mg  1 mL Intradermal Once Kevin Parnell MD           Vitals:    07/17/19 1519   BP: 120/78   Pulse: 73       Physical Exam:    Physical Exam   Constitutional: She appears well-developed and well-nourished.   HENT:   Nose: No septal deviation.   Mouth/Throat: Mucous membranes are normal. No oral lesions.   Eyes: Pupils are equal, round, and reactive to light. Right conjunctiva is not injected. Left conjunctiva is not injected.   Neck: No JVD present. No thyroid mass and no thyromegaly present.   Cardiovascular: Normal rate, regular rhythm and normal pulses.   No edema   Pulmonary/Chest: Effort normal and breath sounds normal.   Abdominal: Soft. Normal appearance. There is no hepatosplenomegaly.   Musculoskeletal:        Right shoulder: She exhibits normal range of motion, no tenderness and no swelling.        Left shoulder: She exhibits normal range of motion, no tenderness and no swelling.        Right elbow: She exhibits normal range of motion and no swelling. No tenderness found.        Left elbow: She exhibits normal range of motion and no swelling. No tenderness found.        Right wrist: She exhibits normal range of motion, no tenderness and no swelling.        Left wrist: She exhibits normal range of motion, no tenderness and no swelling.        Right hip: She exhibits bony tenderness. She exhibits normal range of motion, normal strength and no swelling.        Left hip: She exhibits bony tenderness. She  exhibits normal range of motion, no tenderness and no swelling.        Right knee: She exhibits normal range of motion and no swelling. No tenderness found.        Left knee: She exhibits normal range of motion and no swelling. No tenderness found.        Right ankle: She exhibits normal range of motion and no swelling. No tenderness.        Left ankle: She exhibits normal range of motion and no swelling. No tenderness.   Bony hypertrophy PIP and DIP joints. Squaring CMC joint. Tenderness along gr. Troch and ITB. Negative exacerbation SLR some LBP.    Lymphadenopathy:     She has no cervical adenopathy.     She has no axillary adenopathy.   Neurological: She has normal strength and normal reflexes.   Skin: Skin is dry and intact.   Psychiatric: She has a normal mood and affect.             Assessment:       Encounter Diagnoses   Name Primary?    Generalized osteoarthritis Yes    Chronic pain syndrome     Peripheral polyneuropathy     Greater trochanteric bursitis of both hips           Plan:        Generalized osteoarthritis    Chronic pain syndrome    Peripheral polyneuropathy    Greater trochanteric bursitis of both hips          Delightful patient with greater trochanteric bursitis bilateral hips      -inject bilateral GTB today    Continue with aleve PRN   -biofreeze prn.     No follow-ups on file.

## 2019-07-18 RX ORDER — METHYLPREDNISOLONE ACETATE 40 MG/ML
40 INJECTION, SUSPENSION INTRA-ARTICULAR; INTRALESIONAL; INTRAMUSCULAR; SOFT TISSUE
Status: DISCONTINUED | OUTPATIENT
Start: 2019-07-17 | End: 2019-07-18 | Stop reason: HOSPADM

## 2019-07-18 NOTE — PROCEDURES
Large Joint Aspiration/Injection: R greater trochanteric bursa, L greater trochanteric bursa  Date/Time: 7/17/2019 8:22 AM  Performed by: Adwoa Jaime DO  Authorized by: Adwoa Jaime DO     Indications:  Pain    Location:  Hip  Site:  R greater trochanteric bursa and L greater trochanteric bursa  Prep: Patient was prepped and draped in usual sterile fashion    Ultrasonic Guidance for needle placement: No  Needle size:  25 G  Medications:  40 mg methylPREDNISolone acetate 40 mg/mL; 40 mg methylPREDNISolone acetate 40 mg/mL    Additional Comments: Patient informed of the risks, benefits, side effects of corticosteroid injections including but not limited to skin hypopigmentation, atrophy, ineffectiveness and infection.

## 2019-07-29 ENCOUNTER — PATIENT OUTREACH (OUTPATIENT)
Dept: ADMINISTRATIVE | Facility: HOSPITAL | Age: 74
End: 2019-07-29

## 2019-08-05 ENCOUNTER — PATIENT MESSAGE (OUTPATIENT)
Dept: SURGERY | Facility: HOSPITAL | Age: 74
End: 2019-08-05

## 2019-08-12 ENCOUNTER — OFFICE VISIT (OUTPATIENT)
Dept: FAMILY MEDICINE | Facility: CLINIC | Age: 74
End: 2019-08-12
Payer: MEDICARE

## 2019-08-12 VITALS
RESPIRATION RATE: 19 BRPM | DIASTOLIC BLOOD PRESSURE: 74 MMHG | OXYGEN SATURATION: 97 % | HEART RATE: 79 BPM | WEIGHT: 246.25 LBS | TEMPERATURE: 99 F | SYSTOLIC BLOOD PRESSURE: 124 MMHG | BODY MASS INDEX: 48.35 KG/M2 | HEIGHT: 60 IN

## 2019-08-12 DIAGNOSIS — F41.9 ANXIETY: ICD-10-CM

## 2019-08-12 DIAGNOSIS — J45.40 MODERATE PERSISTENT ASTHMA WITHOUT COMPLICATION: Primary | ICD-10-CM

## 2019-08-12 DIAGNOSIS — E66.01 MORBID OBESITY DUE TO EXCESS CALORIES: ICD-10-CM

## 2019-08-12 PROBLEM — G89.4 CHRONIC PAIN SYNDROME: Status: RESOLVED | Noted: 2018-07-11 | Resolved: 2019-08-12

## 2019-08-12 PROCEDURE — 99999 PR PBB SHADOW E&M-EST. PATIENT-LVL III: CPT | Mod: PBBFAC,HCNC,, | Performed by: FAMILY MEDICINE

## 2019-08-12 PROCEDURE — 3074F SYST BP LT 130 MM HG: CPT | Mod: HCNC,CPTII,S$GLB, | Performed by: FAMILY MEDICINE

## 2019-08-12 PROCEDURE — 3074F PR MOST RECENT SYSTOLIC BLOOD PRESSURE < 130 MM HG: ICD-10-PCS | Mod: HCNC,CPTII,S$GLB, | Performed by: FAMILY MEDICINE

## 2019-08-12 PROCEDURE — 99214 PR OFFICE/OUTPT VISIT, EST, LEVL IV, 30-39 MIN: ICD-10-PCS | Mod: HCNC,S$GLB,, | Performed by: FAMILY MEDICINE

## 2019-08-12 PROCEDURE — 99214 OFFICE O/P EST MOD 30 MIN: CPT | Mod: HCNC,S$GLB,, | Performed by: FAMILY MEDICINE

## 2019-08-12 PROCEDURE — 99999 PR PBB SHADOW E&M-EST. PATIENT-LVL III: ICD-10-PCS | Mod: PBBFAC,HCNC,, | Performed by: FAMILY MEDICINE

## 2019-08-12 PROCEDURE — 3078F PR MOST RECENT DIASTOLIC BLOOD PRESSURE < 80 MM HG: ICD-10-PCS | Mod: HCNC,CPTII,S$GLB, | Performed by: FAMILY MEDICINE

## 2019-08-12 PROCEDURE — 3078F DIAST BP <80 MM HG: CPT | Mod: HCNC,CPTII,S$GLB, | Performed by: FAMILY MEDICINE

## 2019-08-12 PROCEDURE — 1101F PT FALLS ASSESS-DOCD LE1/YR: CPT | Mod: HCNC,CPTII,S$GLB, | Performed by: FAMILY MEDICINE

## 2019-08-12 PROCEDURE — 1101F PR PT FALLS ASSESS DOC 0-1 FALLS W/OUT INJ PAST YR: ICD-10-PCS | Mod: HCNC,CPTII,S$GLB, | Performed by: FAMILY MEDICINE

## 2019-08-12 NOTE — PROGRESS NOTES
"Subjective:       Patient ID: Marta Huff is a 74 y.o. female.    Chief Complaint: COPD (follow up)    HPI  Patient in the office for f/u and review.   Hx of COPD. Breathing "doing pretty good." she finds an increase in dypsnea related to outdoor activities. Some extra inhaler use this week attributed to humidity. No increase in cough.   Has surgery later this week for ventral hernia x 2 with surg/coleman. Anticipates at least 1 overnight.     Review of Systems:  Review of Systems   Constitutional: Negative for activity change, chills, fatigue and fever.   HENT: Negative for congestion, ear discharge, postnasal drip, rhinorrhea and tinnitus.    Eyes: Negative for redness and itching.   Respiratory: Positive for shortness of breath and wheezing. Negative for cough, choking, chest tightness and stridor.    Cardiovascular: Positive for leg swelling. Negative for chest pain.   Gastrointestinal: Negative for diarrhea and nausea.   Genitourinary: Negative for difficulty urinating and dysuria.   Musculoskeletal: Negative for arthralgias and myalgias.   Skin: Negative for color change and pallor.   Neurological: Negative for dizziness, light-headedness and headaches.       Objective:     Vitals:    08/12/19 0846   BP: 124/74   BP Location: Left arm   Patient Position: Sitting   BP Method: X-Large (Manual)   Pulse: 79   Resp: 19   Temp: 98.7 °F (37.1 °C)   TempSrc: Oral   SpO2: 97%   Weight: 111.7 kg (246 lb 4.1 oz)   Height: 5' (1.524 m)          Physical Exam   Constitutional: She is oriented to person, place, and time. She appears well-developed and well-nourished. No distress.   HENT:   Head: Normocephalic and atraumatic.   Right Ear: External ear normal.   Left Ear: External ear normal.   Nose: Nose normal.   Mouth/Throat: Oropharynx is clear and moist. No oropharyngeal exudate.   Eyes: Pupils are equal, round, and reactive to light. Conjunctivae and EOM are normal. Right eye exhibits no discharge. Left eye " exhibits no discharge. No scleral icterus.   Neck: Normal range of motion. Neck supple. No thyromegaly present.   Cardiovascular: Normal rate and regular rhythm.   Pulmonary/Chest: Effort normal. No accessory muscle usage. No respiratory distress. She has wheezes. She has no rhonchi.   Abdominal: Soft. Bowel sounds are normal. She exhibits no distension and no mass. There is no tenderness. There is no rebound and no guarding. A hernia is present. Hernia confirmed positive in the ventral area.       Exam limited by habitus.   Musculoskeletal: Normal range of motion. She exhibits no edema.   Lymphadenopathy:     She has no cervical adenopathy.   Neurological: She is alert and oriented to person, place, and time.   Skin: Skin is warm and dry. No rash noted.   Psychiatric: She has a normal mood and affect. Her behavior is normal.   Nursing note and vitals reviewed.        Assessment & Plan:  Anxiety  Lorazepam refill. Side effects and precautions of medication use reviewed with patient, expressed understanding. No questions or concerns.   Moderate persistent asthma without complication  Increase nebs to 3x/day prior to surg, and ensure albuterol x 1 morning of surgery.   Obesity  Patient continues to work on healthy diet, maintaining regular exercise.

## 2019-08-14 ENCOUNTER — ANESTHESIA EVENT (OUTPATIENT)
Dept: SURGERY | Facility: HOSPITAL | Age: 74
End: 2019-08-14
Payer: MEDICARE

## 2019-08-15 ENCOUNTER — HOSPITAL ENCOUNTER (OUTPATIENT)
Facility: HOSPITAL | Age: 74
Discharge: HOME | End: 2019-08-16
Attending: SURGERY | Admitting: SURGERY
Payer: MEDICARE

## 2019-08-15 ENCOUNTER — ANESTHESIA (OUTPATIENT)
Dept: SURGERY | Facility: HOSPITAL | Age: 74
End: 2019-08-15
Payer: MEDICARE

## 2019-08-15 DIAGNOSIS — K43.2 INCISIONAL HERNIA: Primary | ICD-10-CM

## 2019-08-15 DIAGNOSIS — K43.9 VENTRAL HERNIA WITHOUT OBSTRUCTION OR GANGRENE: ICD-10-CM

## 2019-08-15 PROCEDURE — 71000033 HC RECOVERY, INTIAL HOUR: Mod: HCNC | Performed by: SURGERY

## 2019-08-15 PROCEDURE — 27000221 HC OXYGEN, UP TO 24 HOURS: Mod: HCNC

## 2019-08-15 PROCEDURE — 71000039 HC RECOVERY, EACH ADD'L HOUR: Mod: HCNC | Performed by: SURGERY

## 2019-08-15 PROCEDURE — D9220A PRA ANESTHESIA: Mod: HCNC,ANES,, | Performed by: ANESTHESIOLOGY

## 2019-08-15 PROCEDURE — 37000008 HC ANESTHESIA 1ST 15 MINUTES: Mod: HCNC | Performed by: SURGERY

## 2019-08-15 PROCEDURE — 88307 TISSUE EXAM BY PATHOLOGIST: CPT | Mod: 26,HCNC,, | Performed by: PATHOLOGY

## 2019-08-15 PROCEDURE — 49568 PR IMPLANT MESH HERNIA REPAIR/DEBRIDEMENT CLOSURE: CPT | Mod: 59,,, | Performed by: SURGERY

## 2019-08-15 PROCEDURE — 49560 PR REPAIR INCISIONAL HERNIA,REDUCIBLE: CPT | Mod: HCNC,,, | Performed by: SURGERY

## 2019-08-15 PROCEDURE — C1781 MESH (IMPLANTABLE): HCPCS | Mod: HCNC | Performed by: SURGERY

## 2019-08-15 PROCEDURE — 63600175 PHARM REV CODE 636 W HCPCS: Mod: HCNC | Performed by: NURSE ANESTHETIST, CERTIFIED REGISTERED

## 2019-08-15 PROCEDURE — 63600175 PHARM REV CODE 636 W HCPCS: Mod: HCNC | Performed by: ANESTHESIOLOGY

## 2019-08-15 PROCEDURE — 88342 IMHCHEM/IMCYTCHM 1ST ANTB: CPT | Mod: 26,HCNC,, | Performed by: PATHOLOGY

## 2019-08-15 PROCEDURE — 94640 AIRWAY INHALATION TREATMENT: CPT | Mod: HCNC

## 2019-08-15 PROCEDURE — 88307 TISSUE SPECIMEN TO PATHOLOGY - SURGERY: ICD-10-PCS | Mod: 26,HCNC,, | Performed by: PATHOLOGY

## 2019-08-15 PROCEDURE — 63600175 PHARM REV CODE 636 W HCPCS: Mod: HCNC | Performed by: SURGERY

## 2019-08-15 PROCEDURE — 94761 N-INVAS EAR/PLS OXIMETRY MLT: CPT | Mod: HCNC

## 2019-08-15 PROCEDURE — 88341 PR IHC OR ICC EACH ADD'L SINGLE ANTIBODY  STAINPR: ICD-10-PCS | Mod: 26,HCNC,, | Performed by: PATHOLOGY

## 2019-08-15 PROCEDURE — 25000003 PHARM REV CODE 250: Mod: HCNC | Performed by: SURGERY

## 2019-08-15 PROCEDURE — 37000009 HC ANESTHESIA EA ADD 15 MINS: Mod: HCNC | Performed by: SURGERY

## 2019-08-15 PROCEDURE — C9290 INJ, BUPIVACAINE LIPOSOME: HCPCS | Mod: HCNC | Performed by: SURGERY

## 2019-08-15 PROCEDURE — 94799 UNLISTED PULMONARY SVC/PX: CPT | Mod: HCNC

## 2019-08-15 PROCEDURE — 99900104 DSU ONLY-NO CHARGE-EA ADD'L HR (STAT): Mod: HCNC | Performed by: SURGERY

## 2019-08-15 PROCEDURE — 88342 IMHCHEM/IMCYTCHM 1ST ANTB: CPT | Mod: HCNC | Performed by: PATHOLOGY

## 2019-08-15 PROCEDURE — 88341 IMHCHEM/IMCYTCHM EA ADD ANTB: CPT | Mod: 26,HCNC,, | Performed by: PATHOLOGY

## 2019-08-15 PROCEDURE — 27201423 OPTIME MED/SURG SUP & DEVICES STERILE SUPPLY: Mod: HCNC | Performed by: SURGERY

## 2019-08-15 PROCEDURE — 25000003 PHARM REV CODE 250: Mod: HCNC | Performed by: ANESTHESIOLOGY

## 2019-08-15 PROCEDURE — 36000706: Mod: HCNC | Performed by: SURGERY

## 2019-08-15 PROCEDURE — 99900103 DSU ONLY-NO CHARGE-INITIAL HR (STAT): Mod: HCNC | Performed by: SURGERY

## 2019-08-15 PROCEDURE — 88307 TISSUE EXAM BY PATHOLOGIST: CPT | Mod: HCNC | Performed by: PATHOLOGY

## 2019-08-15 PROCEDURE — 49587 PR REPAIR UMBILICAL HERN,5+Y/O,STRANG: CPT | Mod: 51,,, | Performed by: SURGERY

## 2019-08-15 PROCEDURE — 25000242 PHARM REV CODE 250 ALT 637 W/ HCPCS: Mod: HCNC | Performed by: SURGERY

## 2019-08-15 PROCEDURE — 49587 PR REPAIR UMBILICAL HERN,5+Y/O,STRANG: ICD-10-PCS | Mod: 51,,, | Performed by: SURGERY

## 2019-08-15 PROCEDURE — 49568 PR IMPLANT MESH HERNIA REPAIR/DEBRIDEMENT CLOSURE: ICD-10-PCS | Mod: 59,,, | Performed by: SURGERY

## 2019-08-15 PROCEDURE — 25000003 PHARM REV CODE 250: Mod: HCNC | Performed by: NURSE ANESTHETIST, CERTIFIED REGISTERED

## 2019-08-15 PROCEDURE — 88342 TISSUE SPECIMEN TO PATHOLOGY - SURGERY: ICD-10-PCS | Mod: 26,HCNC,, | Performed by: PATHOLOGY

## 2019-08-15 PROCEDURE — 36000707: Mod: HCNC | Performed by: SURGERY

## 2019-08-15 PROCEDURE — 49560 PR REPAIR INCISIONAL HERNIA,REDUCIBLE: ICD-10-PCS | Mod: HCNC,,, | Performed by: SURGERY

## 2019-08-15 PROCEDURE — D9220A PRA ANESTHESIA: ICD-10-PCS | Mod: HCNC,ANES,, | Performed by: ANESTHESIOLOGY

## 2019-08-15 DEVICE — PATCH HERNIA VENTRIO ST MED: Type: IMPLANTABLE DEVICE | Site: ABDOMEN | Status: FUNCTIONAL

## 2019-08-15 RX ORDER — DEXAMETHASONE SODIUM PHOSPHATE 4 MG/ML
INJECTION, SOLUTION INTRA-ARTICULAR; INTRALESIONAL; INTRAMUSCULAR; INTRAVENOUS; SOFT TISSUE
Status: DISCONTINUED | OUTPATIENT
Start: 2019-08-15 | End: 2019-08-15

## 2019-08-15 RX ORDER — HYDROMORPHONE HYDROCHLORIDE 2 MG/ML
0.2 INJECTION, SOLUTION INTRAMUSCULAR; INTRAVENOUS; SUBCUTANEOUS EVERY 5 MIN PRN
Status: DISCONTINUED | OUTPATIENT
Start: 2019-08-15 | End: 2019-08-15 | Stop reason: HOSPADM

## 2019-08-15 RX ORDER — HYDROCODONE BITARTRATE AND ACETAMINOPHEN 5; 325 MG/1; MG/1
1 TABLET ORAL EVERY 4 HOURS PRN
Status: DISCONTINUED | OUTPATIENT
Start: 2019-08-15 | End: 2019-08-16 | Stop reason: HOSPADM

## 2019-08-15 RX ORDER — DIPHENHYDRAMINE HYDROCHLORIDE 50 MG/ML
25 INJECTION INTRAMUSCULAR; INTRAVENOUS EVERY 6 HOURS PRN
Status: DISCONTINUED | OUTPATIENT
Start: 2019-08-15 | End: 2019-08-15 | Stop reason: HOSPADM

## 2019-08-15 RX ORDER — MEPERIDINE HYDROCHLORIDE 50 MG/ML
12.5 INJECTION INTRAMUSCULAR; INTRAVENOUS; SUBCUTANEOUS ONCE AS NEEDED
Status: DISCONTINUED | OUTPATIENT
Start: 2019-08-15 | End: 2019-08-15 | Stop reason: HOSPADM

## 2019-08-15 RX ORDER — PROPOFOL 10 MG/ML
VIAL (ML) INTRAVENOUS
Status: DISCONTINUED | OUTPATIENT
Start: 2019-08-15 | End: 2019-08-15

## 2019-08-15 RX ORDER — ALBUTEROL SULFATE 2.5 MG/.5ML
2.5 SOLUTION RESPIRATORY (INHALATION) EVERY 4 HOURS
Status: DISCONTINUED | OUTPATIENT
Start: 2019-08-15 | End: 2019-08-16 | Stop reason: HOSPADM

## 2019-08-15 RX ORDER — LIDOCAINE HYDROCHLORIDE 10 MG/ML
1 INJECTION, SOLUTION EPIDURAL; INFILTRATION; INTRACAUDAL; PERINEURAL ONCE
Status: DISCONTINUED | OUTPATIENT
Start: 2019-08-15 | End: 2019-08-15 | Stop reason: HOSPADM

## 2019-08-15 RX ORDER — ALBUTEROL SULFATE 2.5 MG/.5ML
2.5 SOLUTION RESPIRATORY (INHALATION) EVERY 6 HOURS PRN
Status: DISCONTINUED | OUTPATIENT
Start: 2019-08-15 | End: 2019-08-16 | Stop reason: HOSPADM

## 2019-08-15 RX ORDER — LORAZEPAM 0.5 MG/1
0.5 TABLET ORAL 2 TIMES DAILY PRN
Status: DISCONTINUED | OUTPATIENT
Start: 2019-08-15 | End: 2019-08-16 | Stop reason: HOSPADM

## 2019-08-15 RX ORDER — NEOSTIGMINE METHYLSULFATE 1 MG/ML
INJECTION, SOLUTION INTRAVENOUS
Status: DISCONTINUED | OUTPATIENT
Start: 2019-08-15 | End: 2019-08-15

## 2019-08-15 RX ORDER — FENTANYL CITRATE 50 UG/ML
INJECTION, SOLUTION INTRAMUSCULAR; INTRAVENOUS
Status: DISCONTINUED | OUTPATIENT
Start: 2019-08-15 | End: 2019-08-15

## 2019-08-15 RX ORDER — GLYCOPYRROLATE 0.2 MG/ML
INJECTION INTRAMUSCULAR; INTRAVENOUS
Status: DISCONTINUED | OUTPATIENT
Start: 2019-08-15 | End: 2019-08-15

## 2019-08-15 RX ORDER — SODIUM CHLORIDE, SODIUM LACTATE, POTASSIUM CHLORIDE, CALCIUM CHLORIDE 600; 310; 30; 20 MG/100ML; MG/100ML; MG/100ML; MG/100ML
75 INJECTION, SOLUTION INTRAVENOUS CONTINUOUS
Status: DISCONTINUED | OUTPATIENT
Start: 2019-08-15 | End: 2019-08-15

## 2019-08-15 RX ORDER — ONDANSETRON 2 MG/ML
4 INJECTION INTRAMUSCULAR; INTRAVENOUS ONCE
Status: DISCONTINUED | OUTPATIENT
Start: 2019-08-15 | End: 2019-08-15 | Stop reason: HOSPADM

## 2019-08-15 RX ORDER — ONDANSETRON 2 MG/ML
4 INJECTION INTRAMUSCULAR; INTRAVENOUS EVERY 12 HOURS PRN
Status: DISCONTINUED | OUTPATIENT
Start: 2019-08-15 | End: 2019-08-16 | Stop reason: HOSPADM

## 2019-08-15 RX ORDER — SUCCINYLCHOLINE CHLORIDE 20 MG/ML
INJECTION INTRAMUSCULAR; INTRAVENOUS
Status: DISCONTINUED | OUTPATIENT
Start: 2019-08-15 | End: 2019-08-15

## 2019-08-15 RX ORDER — LIDOCAINE HCL/PF 100 MG/5ML
SYRINGE (ML) INTRAVENOUS
Status: DISCONTINUED | OUTPATIENT
Start: 2019-08-15 | End: 2019-08-15

## 2019-08-15 RX ORDER — OXYCODONE HYDROCHLORIDE 5 MG/1
5 TABLET ORAL
Status: DISCONTINUED | OUTPATIENT
Start: 2019-08-15 | End: 2019-08-15 | Stop reason: HOSPADM

## 2019-08-15 RX ORDER — ROCURONIUM BROMIDE 10 MG/ML
INJECTION, SOLUTION INTRAVENOUS
Status: DISCONTINUED | OUTPATIENT
Start: 2019-08-15 | End: 2019-08-15

## 2019-08-15 RX ORDER — SODIUM CHLORIDE 0.9 % (FLUSH) 0.9 %
3 SYRINGE (ML) INJECTION
Status: DISCONTINUED | OUTPATIENT
Start: 2019-08-15 | End: 2019-08-15 | Stop reason: HOSPADM

## 2019-08-15 RX ORDER — GABAPENTIN 300 MG/1
600 CAPSULE ORAL NIGHTLY
Status: DISCONTINUED | OUTPATIENT
Start: 2019-08-15 | End: 2019-08-16 | Stop reason: HOSPADM

## 2019-08-15 RX ORDER — MIDAZOLAM HYDROCHLORIDE 1 MG/ML
INJECTION, SOLUTION INTRAMUSCULAR; INTRAVENOUS
Status: DISCONTINUED | OUTPATIENT
Start: 2019-08-15 | End: 2019-08-15

## 2019-08-15 RX ORDER — EPHEDRINE SULFATE 50 MG/ML
INJECTION, SOLUTION INTRAVENOUS
Status: DISCONTINUED | OUTPATIENT
Start: 2019-08-15 | End: 2019-08-15

## 2019-08-15 RX ORDER — ALBUTEROL SULFATE 2.5 MG/.5ML
2.5 SOLUTION RESPIRATORY (INHALATION) EVERY 4 HOURS
Status: DISCONTINUED | OUTPATIENT
Start: 2019-08-15 | End: 2019-08-15 | Stop reason: SDUPTHER

## 2019-08-15 RX ORDER — FENTANYL CITRATE 50 UG/ML
25 INJECTION, SOLUTION INTRAMUSCULAR; INTRAVENOUS EVERY 5 MIN PRN
Status: DISCONTINUED | OUTPATIENT
Start: 2019-08-15 | End: 2019-08-15 | Stop reason: HOSPADM

## 2019-08-15 RX ORDER — FUROSEMIDE 40 MG/1
80 TABLET ORAL DAILY
Status: DISCONTINUED | OUTPATIENT
Start: 2019-08-15 | End: 2019-08-16 | Stop reason: HOSPADM

## 2019-08-15 RX ORDER — ALBUTEROL SULFATE 90 UG/1
2 AEROSOL, METERED RESPIRATORY (INHALATION) EVERY 6 HOURS PRN
Status: DISCONTINUED | OUTPATIENT
Start: 2019-08-15 | End: 2019-08-15

## 2019-08-15 RX ORDER — SODIUM CHLORIDE 9 MG/ML
INJECTION, SOLUTION INTRAVENOUS CONTINUOUS
Status: DISCONTINUED | OUTPATIENT
Start: 2019-08-15 | End: 2019-08-16 | Stop reason: HOSPADM

## 2019-08-15 RX ORDER — LOSARTAN POTASSIUM 25 MG/1
50 TABLET ORAL 2 TIMES DAILY
Status: DISCONTINUED | OUTPATIENT
Start: 2019-08-15 | End: 2019-08-16 | Stop reason: HOSPADM

## 2019-08-15 RX ORDER — BUPIVACAINE HYDROCHLORIDE AND EPINEPHRINE 2.5; 5 MG/ML; UG/ML
INJECTION, SOLUTION EPIDURAL; INFILTRATION; INTRACAUDAL; PERINEURAL
Status: DISCONTINUED | OUTPATIENT
Start: 2019-08-15 | End: 2019-08-15 | Stop reason: HOSPADM

## 2019-08-15 RX ORDER — HYDROMORPHONE HYDROCHLORIDE 2 MG/ML
1 INJECTION, SOLUTION INTRAMUSCULAR; INTRAVENOUS; SUBCUTANEOUS EVERY 4 HOURS PRN
Status: DISCONTINUED | OUTPATIENT
Start: 2019-08-15 | End: 2019-08-16 | Stop reason: HOSPADM

## 2019-08-15 RX ORDER — ACETAMINOPHEN 10 MG/ML
INJECTION, SOLUTION INTRAVENOUS
Status: DISCONTINUED | OUTPATIENT
Start: 2019-08-15 | End: 2019-08-15

## 2019-08-15 RX ORDER — SODIUM CHLORIDE, SODIUM LACTATE, POTASSIUM CHLORIDE, CALCIUM CHLORIDE 600; 310; 30; 20 MG/100ML; MG/100ML; MG/100ML; MG/100ML
INJECTION, SOLUTION INTRAVENOUS CONTINUOUS
Status: DISCONTINUED | OUTPATIENT
Start: 2019-08-15 | End: 2019-08-15

## 2019-08-15 RX ORDER — CEFAZOLIN SODIUM 2 G/50ML
2 SOLUTION INTRAVENOUS
Status: COMPLETED | OUTPATIENT
Start: 2019-08-15 | End: 2019-08-15

## 2019-08-15 RX ORDER — ONDANSETRON 2 MG/ML
INJECTION INTRAMUSCULAR; INTRAVENOUS
Status: DISCONTINUED | OUTPATIENT
Start: 2019-08-15 | End: 2019-08-15

## 2019-08-15 RX ADMIN — LOSARTAN POTASSIUM 50 MG: 25 TABLET ORAL at 12:08

## 2019-08-15 RX ADMIN — HYDROMORPHONE HYDROCHLORIDE 1 MG: 2 INJECTION INTRAMUSCULAR; INTRAVENOUS; SUBCUTANEOUS at 08:08

## 2019-08-15 RX ADMIN — ROCURONIUM BROMIDE 5 MG: 10 INJECTION, SOLUTION INTRAVENOUS at 07:08

## 2019-08-15 RX ADMIN — LIDOCAINE HYDROCHLORIDE 100 MG: 20 INJECTION, SOLUTION INTRAVENOUS at 07:08

## 2019-08-15 RX ADMIN — SODIUM CHLORIDE: 0.9 INJECTION, SOLUTION INTRAVENOUS at 11:08

## 2019-08-15 RX ADMIN — MIDAZOLAM 1 MG: 1 INJECTION INTRAMUSCULAR; INTRAVENOUS at 07:08

## 2019-08-15 RX ADMIN — PROPOFOL 120 MG: 10 INJECTION, EMULSION INTRAVENOUS at 07:08

## 2019-08-15 RX ADMIN — HYDROMORPHONE HYDROCHLORIDE 0.2 MG: 2 INJECTION, SOLUTION INTRAMUSCULAR; INTRAVENOUS; SUBCUTANEOUS at 11:08

## 2019-08-15 RX ADMIN — ALBUTEROL SULFATE 2.5 MG: 2.5 SOLUTION RESPIRATORY (INHALATION) at 06:08

## 2019-08-15 RX ADMIN — CEFAZOLIN SODIUM 2 G: 2 SOLUTION INTRAVENOUS at 07:08

## 2019-08-15 RX ADMIN — NEOSTIGMINE METHYLSULFATE 3 MG: 1 INJECTION INTRAVENOUS at 09:08

## 2019-08-15 RX ADMIN — ROCURONIUM BROMIDE 10 MG: 10 INJECTION, SOLUTION INTRAVENOUS at 08:08

## 2019-08-15 RX ADMIN — GABAPENTIN 600 MG: 300 CAPSULE ORAL at 09:08

## 2019-08-15 RX ADMIN — SODIUM CHLORIDE, SODIUM LACTATE, POTASSIUM CHLORIDE, AND CALCIUM CHLORIDE: .6; .31; .03; .02 INJECTION, SOLUTION INTRAVENOUS at 06:08

## 2019-08-15 RX ADMIN — ONDANSETRON 4 MG: 2 INJECTION, SOLUTION INTRAMUSCULAR; INTRAVENOUS at 08:08

## 2019-08-15 RX ADMIN — LOSARTAN POTASSIUM 50 MG: 25 TABLET ORAL at 09:08

## 2019-08-15 RX ADMIN — SUCCINYLCHOLINE CHLORIDE 160 MG: 20 INJECTION, SOLUTION INTRAMUSCULAR; INTRAVENOUS at 07:08

## 2019-08-15 RX ADMIN — FENTANYL CITRATE 50 MCG: 50 INJECTION, SOLUTION INTRAMUSCULAR; INTRAVENOUS at 07:08

## 2019-08-15 RX ADMIN — FENTANYL CITRATE 50 MCG: 50 INJECTION, SOLUTION INTRAMUSCULAR; INTRAVENOUS at 08:08

## 2019-08-15 RX ADMIN — ALBUTEROL SULFATE 2.5 MG: 2.5 SOLUTION RESPIRATORY (INHALATION) at 01:08

## 2019-08-15 RX ADMIN — DEXAMETHASONE SODIUM PHOSPHATE 4 MG: 4 INJECTION, SOLUTION INTRAMUSCULAR; INTRAVENOUS at 08:08

## 2019-08-15 RX ADMIN — EPHEDRINE SULFATE 10 MG: 50 INJECTION, SOLUTION INTRAMUSCULAR; INTRAVENOUS; SUBCUTANEOUS at 07:08

## 2019-08-15 RX ADMIN — OXYCODONE HYDROCHLORIDE 5 MG: 5 TABLET ORAL at 10:08

## 2019-08-15 RX ADMIN — FUROSEMIDE 80 MG: 40 TABLET ORAL at 12:08

## 2019-08-15 RX ADMIN — HYDROCODONE BITARTRATE AND ACETAMINOPHEN 1 TABLET: 5; 325 TABLET ORAL at 06:08

## 2019-08-15 RX ADMIN — SODIUM CHLORIDE, SODIUM LACTATE, POTASSIUM CHLORIDE, AND CALCIUM CHLORIDE: .6; .31; .03; .02 INJECTION, SOLUTION INTRAVENOUS at 09:08

## 2019-08-15 RX ADMIN — ROCURONIUM BROMIDE 25 MG: 10 INJECTION, SOLUTION INTRAVENOUS at 07:08

## 2019-08-15 RX ADMIN — GLYCOPYRROLATE 0.4 MG: 0.2 INJECTION, SOLUTION INTRAMUSCULAR; INTRAVENOUS at 09:08

## 2019-08-15 RX ADMIN — ACETAMINOPHEN 1000 MG: 10 INJECTION, SOLUTION INTRAVENOUS at 08:08

## 2019-08-15 NOTE — TRANSFER OF CARE
Anesthesia Transfer of Care Note    Patient: Marta Huff    Procedure(s) Performed: Procedure(s) (LRB):  REPAIR, HERNIA, INCISIONAL, RECURRENT (N/A)    Patient location: PACU    Anesthesia Type: general    Transport from OR: Transported from OR on 2-3 L/min O2 by NC with adequate spontaneous ventilation    Post pain: adequate analgesia    Post assessment: no apparent anesthetic complications and tolerated procedure well    Post vital signs: stable    Level of consciousness: awake    Nausea/Vomiting: no nausea/vomiting    Complications: none    Transfer of care protocol was followed      Last vitals:   Visit Vitals  BP (!) 154/84 (BP Location: Left arm, Patient Position: Sitting)   Pulse 69   Temp 36.7 °C (98.1 °F) (Temporal)   Resp 20   Ht 5' (1.524 m)   Wt 113.9 kg (251 lb)   SpO2 96%   Breastfeeding? No   BMI 49.02 kg/m²

## 2019-08-15 NOTE — PLAN OF CARE
Patient ready for surgery.  Awaiting anesthesia for consent.  IV in place IVF infusing.  NAD noted

## 2019-08-15 NOTE — INTERVAL H&P NOTE
The patient has been examined and the H&P has been reviewed:    I concur with the findings and no changes have occurred since H&P was written.    Anesthesia/Surgery risks, benefits and alternative options discussed and understood by patient/family.          Active Hospital Problems    Diagnosis  POA    Incisional hernia [K43.2]  Yes      Resolved Hospital Problems   No resolved problems to display.

## 2019-08-15 NOTE — PLAN OF CARE
After communicating room number to patients family, rm [id number]. The room was rejected by 4th floor. awaiting new room assignment. Called 4th floor to notify them that the patients family should be arriving to 419 and to inform them of new room number.

## 2019-08-15 NOTE — CARE UPDATE
08/15/19 1347   Patient Assessment/Suction   Level of Consciousness (AVPU) alert   ALEJANDRA Breath Sounds coarse   PRE-TX-O2   O2 Device (Oxygen Therapy) room air  (placed on 2 lpm NC. Spo2 increased to normal range)   $ Is the patient on Low Flow Oxygen? Yes   Flow (L/min) 2   Oxygen Concentration (%) 28   SpO2 (!) 90 %  (94 with 2 lpm NC)   Pulse Oximetry Type Intermittent   $ Pulse Oximetry - Multiple Charge Pulse Oximetry - Multiple   Pulse 81   Resp 18   Aerosol Therapy   $ Aerosol Therapy Charges Aerosol Treatment   Respiratory Treatment Status (SVN) given   Treatment Route (SVN) mask   Patient Position (SVN) HOB elevated   Post Treatment Assessment (SVN) breath sounds improved   Signs of Intolerance (SVN) none   Breath Sounds Post-Respiratory Treatment   Throughout All Fields Post-Treatment All Fields   Throughout All Fields Post-Treatment aeration increased   Post-treatment Heart Rate (beats/min) 86   Post-treatment Resp Rate (breaths/min) 18   Ready to Wean/Extubation Screen   FIO2<=50 (chart decimal) 0.28

## 2019-08-15 NOTE — BRIEF OP NOTE
Patient: Marta Huff     Date of Procedure: 8/15/2019    Procedure: Repair of incisional hernia with mesh implantation  Repair of umbilical hernia    Surgeon: Clifton Baker MD    Assistant: Sobeida Silveira    Pre-op Diagnosis: Ventral hernia without obstruction or gangrene [K43.9]   Umbilical hernia  Post-op Diagnosis: Ventral hernia without obstruction or gangrene [K43.9]  Umbilical hernia    Findings: incisional and umbilical hernia    Specimen: omentum    EBL: 50 cc    Complications: None

## 2019-08-15 NOTE — H&P
Patient ID: Marta Huff is a 73 y.o. female.     Chief Complaint: No chief complaint on file.        HPI 73-year-old female presents with complaint of incisional hernia for approximately 1 year.  She has a history of cholecystectomy, hysterectomy, and gastric sleeve.  The gastric sleeve was done in 2014.  She has some occasional nausea but no vomiting.  It has not become incarcerated.  She describes it as a discomfort.  She does have COPD and has some wheezing here in the office today.  She is going on a cruise next month so wants to delay this for some time.  She also appears to have an umbilical hernia as well. Patient returns to schedule her hernia repair.  She went on her cruise and other than gaining some weight had a good time.  She has not had any new abdominal complaints.             Past Medical History:   Diagnosis Date    Anxiety      Arthralgia of knee       arthralgia of bilateral knees secondary to djd    Arthritis      Back pain      Depression      Encounter for blood transfusion       AUTOLOGUS    GERD (gastroesophageal reflux disease)      Hiatal hernia       repaired in 1979    History of seasonal allergies      History of shingles      Hypertension      Insomnia      Obesity      Osteoporosis      Pneumonia      Reactive airway disease      Restless legs syndrome      Rheumatic fever       at 4 y/o    Sleep apnea       no cpap                Past Surgical History:   Procedure Laterality Date    APPENDECTOMY        BARIATRIC SURGERY   2014     Gastric Sleeve    CHOLECYSTECTOMY        EGD (ESOPHAGOGASTRODUODENOSCOPY) N/A 5/20/2014     Performed by [facility] Jr., [facility] at [facility] OR 2ND FLR    EGD (ESOPHAGOGASTRODUODENOSCOPY) N/A 11/25/2013     Performed by Antonio Mendez MD at [facility] ENDO (4TH FLR)    AKUA-TRANSFORAMINAL L5 and S1 Left 2/5/2018     Performed by [facility] at [facility] OR    ESOPHAGOGASTRODUODENOSCOPY (EGD) N/A 6/23/2017     Performed  by Dorene Tripathi MD at [facility] ENDO    GASTRECTOMY, SLEEVE, LAPAROSCOPIC N/A 5/20/2014     Performed by Tino [facility]ros Jr., [facility] at [facility] OR 2ND FLR    HERNIA REPAIR         hiatal hernia    HYSTERECTOMY         partial    INSERTION, SPINAL CORD STIMULATOR, N/A 7/11/2018     Performed by [facility]cher [facility] at [facility] OR    JOINT REPLACEMENT         BILAT KNEE    KNEE ARTHROPLASTY   1/2/2010     bilateral    LDORR-EQOAWGVW-ZSCWGAEAKFWF N/A 5/20/2014     Performed by Tino Med[facility]ros Jr., MD at [facility] OR 2ND FLR    NISSEN FUNDOPLICATION        REMOVAL, IMPLANT spinal cord stimulator N/A 9/12/2018     Performed by [facility] at [facility] OR    REPLACEMENT, SPINAL CORD STIMULATOR  PLACEMENT OF SPINAL CORD STIMULATOR T10 N/A 11/14/2018     Performed by [facility] at [facility] OR    KAIB-F-I-LAPAROSCOPIC N/A 9/18/2017     Performed by [facility]ne [facility]pathi [facility] at [facility] OR    TRIAL-STIMULATOR-DORSAL COLUMN N/A 5/25/2018     Performed by Raoul Belcher MD at [facility] OR            Current Outpatient Medications:     albuterol (PROAIR HFA) 90 mcg/actuation inhaler, Inhale 2 puffs into the lungs every 6 (six) hours as needed for Wheezing or Shortness of Breath., Disp: 3 Inhaler, Rfl: 3    albuterol (PROVENTIL) 2.5 mg /3 mL (0.083 %) nebulizer solution, INHALE THE CONTENTS OF 1 VIAL VIA NEBULIZER EVERY 6 HOURS AS NEEDED FOR  WHEEZING  OR  SHORTNESS OF BREATH, Disp: 90 mL, Rfl: 11    alendronate (FOSAMAX) 70 MG tablet, TAKE 1 TABLET EVERY 7 DAYS, Disp: 12 tablet, Rfl: 3    B-complex with vitamin C (Z-BEC OR EQUIV) tablet, Take 1 tablet by mouth once daily. , Disp: , Rfl:     BIOTIN/CALCIUM CARBONATE (BIOTIN 100+10 ORAL), Take 1 capsule by mouth once daily. , Disp: , Rfl:     calcium citrate-vitamin D2 1,500-200 mg-unit Tab, Take 1 tablet by mouth once daily. , Disp: , Rfl:     cholecalciferol, vitamin D3, 5,000 unit capsule, Take 1 capsule (5,000 Units total) by mouth once daily., Disp: 90  capsule, Rfl: 3    furosemide (LASIX) 80 MG tablet, Take 1 tablet (80 mg total) by mouth once daily., Disp: 90 tablet, Rfl: 1    loratadine (CLARITIN) 10 mg tablet, Take 10 mg by mouth daily as needed. , Disp: , Rfl:     LORazepam (ATIVAN) 0.5 MG tablet, Take 1 tablet (0.5 mg total) by mouth 2 (two) times daily as needed for Anxiety., Disp: 60 tablet, Rfl: 0    losartan (COZAAR) 50 MG tablet, Take 1 tablet (50 mg total) by mouth 2 (two) times daily., Disp: 180 tablet, Rfl: 3    meclizine (ANTIVERT) 25 mg tablet, Take 1 tablet (25 mg total) by mouth every 6 (six) hours. (Patient taking differently: Take 25 mg by mouth every 6 (six) hours as needed. ), Disp: 360 tablet, Rfl: 1    multivitamin (MULTIVITAMIN) per tablet, Take 1 tablet by mouth once daily.  , Disp: , Rfl:     omega 3-dha-epa-fish oil (FISH OIL) 1,000 mg (120 mg-180 mg) Cap, Take 2 capsules by mouth 2 (two) times daily., Disp: 360 capsule, Rfl: 3    omeprazole (PRILOSEC) 40 MG capsule, Take 1 capsule (40 mg total) by mouth once daily., Disp: 90 capsule, Rfl: 3    pramipexole (MIRAPEX) 1.5 MG tablet, TAKE 1 TABLET EVERY NIGHT, Disp: 90 tablet, Rfl: 1    sertraline (ZOLOFT) 50 MG tablet, Take 2 tablets (100 mg total) by mouth every evening., Disp: 180 tablet, Rfl: 1    umeclidinium-vilanterol (ANORO ELLIPTA) 62.5-25 mcg/actuation DsDv, Inhale 1 puff into the lungs once daily. Controller, Disp: 90 each, Rfl: 3    VITAMIN B-12 5,000 mcg Subl, Place 1 tablet under the tongue once a week. , Disp: , Rfl:      Current Facility-Administered Medications:     lidocaine (PF) 10 mg/ml (1%) injection 10 mg, 1 mL, Intradermal, Once, Kevin Parnell MD               Review of patient's allergies indicates:   Allergen Reactions    Etodolac (bulk) Swelling       Hands and feet swelling    Sulfa (sulfonamide antibiotics) Rash and Other (See Comments)       Headache                    Family History   Problem Relation Age of Onset    Heart disease Mother       Hypertension Mother      Diabetes Mother      Obesity Mother      Heart disease Maternal Grandfather            Social History                   Socioeconomic History    Marital status:        Spouse name: Not on file    Number of children: Not on file    Years of education: Not on file    Highest education level: Not on file   Occupational History    Not on file   Social Needs    Financial resource strain: Not on file    Food insecurity:       Worry: Not on file       Inability: Not on file    Transportation needs:       Medical: Not on file       Non-medical: Not on file   Tobacco Use    Smoking status: Passive Smoke Exposure - Never Smoker    Smokeless tobacco: Never Used   Substance and Sexual Activity    Alcohol use: Yes       Comment: 1-2 glasses of wine monthly    Drug use: No    Sexual activity: Not on file   Lifestyle    Physical activity:       Days per week: Not on file       Minutes per session: Not on file    Stress: Not on file   Relationships    Social connections:       Talks on phone: Not on file       Gets together: Not on file       Attends Yarsani service: Not on file       Active member of club or organization: Not on file       Attends meetings of clubs or organizations: Not on file       Relationship status: Not on file   Other Topics Concern    Not on file   Social History Narrative    Not on file             Review of Systems   Constitutional: Negative for activity change, chills, fever and unexpected weight change.   HENT: Negative for congestion, sore throat, trouble swallowing and voice change.    Eyes: Negative for redness and visual disturbance.   Respiratory: Negative for cough, shortness of breath and wheezing.    Cardiovascular: Negative for chest pain and palpitations.   Gastrointestinal: Positive for abdominal pain. Negative for blood in stool, nausea and vomiting.   Endocrine: Negative.    Genitourinary: Negative for dysuria, frequency and  hematuria.   Musculoskeletal: Negative for arthralgias, back pain and neck pain.   Skin: Negative for rash and wound.   Allergic/Immunologic: Negative.    Neurological: Negative for dizziness, weakness and headaches.   Hematological: Negative for adenopathy.   Psychiatric/Behavioral: Negative for agitation and dysphoric mood. The patient is not nervous/anxious.       Objective:      Physical Exam   Constitutional: She is oriented to person, place, and time. She appears well-developed and well-nourished. No distress.   HENT:   Head: Normocephalic and atraumatic.   Mouth/Throat: Oropharynx is clear and moist. No oropharyngeal exudate.   Eyes: Pupils are equal, round, and reactive to light. Conjunctivae and EOM are normal. No scleral icterus.   Neck: Normal range of motion. No thyromegaly present.   Cardiovascular: Normal rate and regular rhythm.   No murmur heard.  Pulmonary/Chest: Effort normal. She has wheezes. She has no rales.   Abdominal: Soft. Bowel sounds are normal. She exhibits no distension and no mass. There is no tenderness. A hernia is present.   There is an incisional hernia. The patient is obese of the fascial edges it difficult to palpate.  This is above the umbilicus. She also appears to have an umbilical hernia. There is minimal tenderness.  This reduces easily.   Musculoskeletal: Normal range of motion. She exhibits no edema.   Lymphadenopathy:     She has no cervical adenopathy.   Neurological: She is alert and oriented to person, place, and time. No cranial nerve deficit.   Skin: Skin is warm and dry. No rash noted. No erythema.   Psychiatric: She has a normal mood and affect. Her behavior is normal.      Assessment:              Encounter Diagnosis   Name Primary?    Ventral hernia without obstruction or gangrene Yes         Plan:      1.  Would recommend incisional hernia repair.  2.  CT scan prior revealed a fascial defect as well as an umbilical hernia.  She is asymptomatic from the umbilical  hernia.  3.  Will proceed with open repair of incisional hernia.  4. Risks and benefits of the planned procedure were discussed at length with the patient.  Risks and benefits of not proceeding with the procedure were discussed as well. All questions were answered. The patient expressed clear understanding and would like to proceed with the procedure as discussed

## 2019-08-15 NOTE — OP NOTE
Patient: Marta Huff     Date of Procedure: 8/15/2019    Procedure:  Incisional hernia repair with mesh implantation.    Surgeon: Clifton Baker MD    Assistant: Sobeida Silveira    Pre-op Diagnosis: Ventral hernia without obstruction or gangrene [K43.9]   Umbilical hernia  Post-op Diagnosis: Ventral hernia without obstruction or gangrene [K43.9]  Umbilical hernia    Findings:  Incisional hernia, umbilical hernia    Specimen:  Omentum    EBL: 50 cc    Complications: None      Procedure in detail:      After appropriate consent was obtained, consent forms signed and questions answered, the operative site was marked and the patient was taken to the operating room.  The patient was positioned and general anesthesia was induced. Pre-operative antibiotic was administered. Time out procedure was then performed with the members of the surgical team. The operative field was then prepped and draped in the usual sterile fashion.      An IO band was placed. Skin incision was made  overlying the palpable hernia. Dissection was performed down to the fascial defect and the hernia sac was excised.  Inferior to the upper hernia there was a palpable umbilical hernia with incarcerated omentum.  This inferior skin incision was made at the umbilicus and dissection was performed down to the defect.  The omentum was excised and hernia reduced.  This was a 1 cm defect which was closed with interrupted 0 Ethibond suture. The posterior fascia was cleared of adhesions. [employer] of mesh was then inserted which covered the hernia defect and down to the umbilical defect. This was then secured to the fascia with a running 0 Prolene circumferential suture. Fascial Tacker was then used to tack the superficial layer of the mesh to the abdominal wall. The fascia was then closed over the mesh with running 0 Prolene suture. Exparel was injected for local anesthetic.  Deep tissues reapproximated a running 3 O Vicryl suture skin was then closed  with a 4 Monocryl subcuticular stitch. Steri-Strips and dressings were applied.     Steri-Strips and dressings were  applied.  The patient was then awakened, extubated, and transferred to the recovery room in stable condition.  The procedure was tolerated without complication.

## 2019-08-15 NOTE — ANESTHESIA PREPROCEDURE EVALUATION
08/15/2019  Marta Huff is a 74 y.o., female.    Anesthesia Evaluation    I have reviewed the Patient Summary Reports.    I have reviewed the Nursing Notes.   I have reviewed the Medications.     Review of Systems  Social:  Non-Smoker    Cardiovascular:   Hypertension    Renal/:   Chronic Renal Disease    Musculoskeletal:   Arthritis     Neurological:   Neuromuscular Disease,    Psych:   Psychiatric History          Physical Exam  General:  Morbid Obesity    Airway/Jaw/Neck:  Airway Findings: Mouth Opening: Normal Tongue: Normal  General Airway Assessment: Adult, Good  Mallampati: II  Improves to II with phonation.  TM Distance: 4-6 cm      Dental:  Dental Findings: In tact   Chest/Lungs:  Chest/Lungs Findings: Clear to auscultation, Normal Respiratory Rate     Heart/Vascular:  Heart Findings: Rate: Normal  Rhythm: Regular Rhythm  Sounds: Normal  Heart murmur: negative       Mental Status:  Mental Status Findings:  Cooperative, Alert and Oriented         Anesthesia Plan  Type of Anesthesia, risks & benefits discussed:  Anesthesia Type:  general  Patient's Preference:   Intra-op Monitoring Plan: standard ASA monitors  Intra-op Monitoring Plan Comments:   Post Op Pain Control Plan:   Post Op Pain Control Plan Comments:   Induction:   IV  Beta Blocker:  Patient is not currently on a Beta-Blocker (No further documentation required).       Informed Consent: Patient understands risks and agrees with Anesthesia plan.  Questions answered. Anesthesia consent signed with patient.  ASA Score: 3     Day of Surgery Review of History & Physical: I have interviewed and examined the patient. I have reviewed the patient's H&P dated:  There are no significant changes.  H&P update referred to the surgeon.         Ready For Surgery From Anesthesia Perspective.

## 2019-08-15 NOTE — ANESTHESIA POSTPROCEDURE EVALUATION
Anesthesia Post Evaluation    Patient: Marta Huff    Procedure(s) Performed: Procedure(s) (LRB):  REPAIR, HERNIA, INCISIONAL, RECURRENT (N/A)    Final Anesthesia Type: general  Patient location during evaluation: PACU  Patient participation: Yes- Able to Participate  Level of consciousness: awake and alert and oriented  Post-procedure vital signs: reviewed and stable  Pain management: adequate  Airway patency: patent  PONV status at discharge: No PONV  Anesthetic complications: no      Cardiovascular status: blood pressure returned to baseline  Respiratory status: unassisted, spontaneous ventilation and room air  Hydration status: euvolemic  Follow-up not needed.          Vitals Value Taken Time   /75 8/15/2019 10:08 AM   Temp 36.5 °C (97.7 °F) 8/15/2019  9:57 AM   Pulse 86 8/15/2019 10:10 AM   Resp 25 8/15/2019 10:10 AM   SpO2 94 % 8/15/2019 10:10 AM   Vitals shown include unvalidated device data.      No case tracking events are documented in the log.      Pain/Khari Score: No data recorded

## 2019-08-16 LAB
BASOPHILS # BLD AUTO: 0.04 K/UL (ref 0–0.2)
BASOPHILS NFR BLD: 0.4 % (ref 0–1.9)
DIFFERENTIAL METHOD: ABNORMAL
EOSINOPHIL # BLD AUTO: 0.1 K/UL (ref 0–0.5)
EOSINOPHIL NFR BLD: 0.7 % (ref 0–8)
ERYTHROCYTE [DISTWIDTH] IN BLOOD BY AUTOMATED COUNT: 16.2 % (ref 11.5–14.5)
HCT VFR BLD AUTO: 38 % (ref 37–48.5)
HGB BLD-MCNC: 11 G/DL (ref 12–16)
IMM GRANULOCYTES # BLD AUTO: 0.04 K/UL (ref 0–0.04)
LYMPHOCYTES # BLD AUTO: 1.7 K/UL (ref 1–4.8)
LYMPHOCYTES NFR BLD: 15.7 % (ref 18–48)
MCH RBC QN AUTO: 27.4 PG (ref 27–31)
MCHC RBC AUTO-ENTMCNC: 28.9 G/DL (ref 32–36)
MCV RBC AUTO: 95 FL (ref 82–98)
MONOCYTES # BLD AUTO: 0.5 K/UL (ref 0.3–1)
MONOCYTES NFR BLD: 4.3 % (ref 4–15)
NEUTROPHILS # BLD AUTO: 8.2 K/UL (ref 1.8–7.7)
NEUTROPHILS NFR BLD: 78.5 % (ref 38–73)
NRBC BLD-RTO: 0 /100 WBC
PLATELET # BLD AUTO: 235 K/UL (ref 150–350)
PMV BLD AUTO: 11.6 FL (ref 9.2–12.9)
RBC # BLD AUTO: 4.01 M/UL (ref 4–5.4)
WBC # BLD AUTO: 10.49 K/UL (ref 3.9–12.7)

## 2019-08-16 PROCEDURE — 94761 N-INVAS EAR/PLS OXIMETRY MLT: CPT | Mod: HCNC

## 2019-08-16 PROCEDURE — 94640 AIRWAY INHALATION TREATMENT: CPT | Mod: HCNC

## 2019-08-16 PROCEDURE — 27000221 HC OXYGEN, UP TO 24 HOURS: Mod: HCNC

## 2019-08-16 PROCEDURE — 25000003 PHARM REV CODE 250: Mod: HCNC | Performed by: SURGERY

## 2019-08-16 PROCEDURE — 36415 COLL VENOUS BLD VENIPUNCTURE: CPT | Mod: HCNC

## 2019-08-16 PROCEDURE — 85025 COMPLETE CBC W/AUTO DIFF WBC: CPT | Mod: HCNC

## 2019-08-16 PROCEDURE — 25000242 PHARM REV CODE 250 ALT 637 W/ HCPCS: Mod: HCNC | Performed by: SURGERY

## 2019-08-16 PROCEDURE — 94799 UNLISTED PULMONARY SVC/PX: CPT | Mod: HCNC

## 2019-08-16 PROCEDURE — 63600175 PHARM REV CODE 636 W HCPCS: Mod: HCNC | Performed by: SURGERY

## 2019-08-16 RX ORDER — HYDROCODONE BITARTRATE AND ACETAMINOPHEN 5; 325 MG/1; MG/1
1 TABLET ORAL EVERY 6 HOURS PRN
Qty: 20 TABLET | Refills: 0 | Status: SHIPPED | OUTPATIENT
Start: 2019-08-16 | End: 2019-08-19 | Stop reason: SDUPTHER

## 2019-08-16 RX ADMIN — HYDROCODONE BITARTRATE AND ACETAMINOPHEN 1 TABLET: 5; 325 TABLET ORAL at 11:08

## 2019-08-16 RX ADMIN — HYDROCODONE BITARTRATE AND ACETAMINOPHEN 1 TABLET: 5; 325 TABLET ORAL at 05:08

## 2019-08-16 RX ADMIN — FUROSEMIDE 80 MG: 40 TABLET ORAL at 09:08

## 2019-08-16 RX ADMIN — ALBUTEROL SULFATE 2.5 MG: 2.5 SOLUTION RESPIRATORY (INHALATION) at 06:08

## 2019-08-16 RX ADMIN — ALBUTEROL SULFATE 2.5 MG: 2.5 SOLUTION RESPIRATORY (INHALATION) at 03:08

## 2019-08-16 RX ADMIN — LOSARTAN POTASSIUM 50 MG: 25 TABLET ORAL at 09:08

## 2019-08-16 RX ADMIN — ALBUTEROL SULFATE 2.5 MG: 2.5 SOLUTION RESPIRATORY (INHALATION) at 12:08

## 2019-08-16 RX ADMIN — ALBUTEROL SULFATE 2.5 MG: 2.5 SOLUTION RESPIRATORY (INHALATION) at 11:08

## 2019-08-16 RX ADMIN — HYDROMORPHONE HYDROCHLORIDE 1 MG: 2 INJECTION INTRAMUSCULAR; INTRAVENOUS; SUBCUTANEOUS at 04:08

## 2019-08-16 RX ADMIN — HYDROCODONE BITARTRATE AND ACETAMINOPHEN 1 TABLET: 5; 325 TABLET ORAL at 06:08

## 2019-08-16 NOTE — DISCHARGE SUMMARY
Discharge Summary  General Surgery      Admit Date: 8/15/2019    Discharge Date :8/15/2019    Attending Physician: Clifton Nguyen     Discharge Physician: Clifton Nguyen    Discharged Condition: good    Discharge Diagnosis: Ventral hernia without obstruction or gangrene [K43.9]    Treatments/Procedures: Procedure(s) (LRB):  REPAIR, HERNIA, INCISIONAL, RECURRENT (N/A)    Hospital Course: Uneventful; Discharged home from Recovery    Significant Diagnostic Studies: none    Disposition: Home or Self Care    Diet: Regular    Follow up: Office 10-14 days    Activity: No heavy lifting till seen in office.    Patient Instructions:   Current Discharge Medication List      START taking these medications    Details   HYDROcodone-acetaminophen (NORCO) 5-325 mg per tablet Take 1 tablet by mouth every 6 (six) hours as needed for Pain.  Qty: 20 tablet, Refills: 0         CONTINUE these medications which have NOT CHANGED    Details   albuterol (PROAIR HFA) 90 mcg/actuation inhaler Inhale 2 puffs into the lungs every 6 (six) hours as needed for Wheezing or Shortness of Breath.  Qty: 3 Inhaler, Refills: 3    Associated Diagnoses: Reactive airway disease, unspecified asthma severity, uncomplicated      albuterol (PROVENTIL) 2.5 mg /3 mL (0.083 %) nebulizer solution INHALE THE CONTENTS OF 1 VIAL VIA NEBULIZER EVERY 6 HOURS AS NEEDED FOR  WHEEZING  OR  SHORTNESS OF BREATH  Qty: 90 mL, Refills: 11      albuterol sulfate 2.5 mg/0.5 mL Nebu Take 2.5 mg by nebulization every 4 (four) hours as needed. Rescue  Qty: 1 each, Refills: 1      alendronate (FOSAMAX) 70 MG tablet TAKE 1 TABLET EVERY 7 DAYS  Qty: 12 tablet, Refills: 3    Associated Diagnoses: Generalized osteoarthritis      B-complex with vitamin C (Z-BEC OR EQUIV) tablet Take 1 tablet by mouth once daily.       BIOTIN/CALCIUM CARBONATE (BIOTIN 100+10 ORAL) Take 1 capsule by mouth once daily.       calcium citrate-vitamin D2 1,500-200 mg-unit Tab Take 1 tablet by mouth once daily.      Associated Diagnoses: Osteopenia      cholecalciferol, vitamin D3, 5,000 unit capsule Take 1 capsule (5,000 Units total) by mouth once daily.  Qty: 90 capsule, Refills: 3      furosemide (LASIX) 80 MG tablet Take 1 tablet (80 mg total) by mouth once daily.  Qty: 90 tablet, Refills: 1    Associated Diagnoses: Edema, unspecified type      loratadine (CLARITIN) 10 mg tablet Take 10 mg by mouth daily as needed.       LORazepam (ATIVAN) 0.5 MG tablet Take 1 tablet (0.5 mg total) by mouth 2 (two) times daily as needed for Anxiety.  Qty: 60 tablet, Refills: 0    Associated Diagnoses: Anxiety      losartan (COZAAR) 50 MG tablet Take 1 tablet (50 mg total) by mouth 2 (two) times daily.  Qty: 180 tablet, Refills: 3    Associated Diagnoses: Essential hypertension      multivitamin (MULTIVITAMIN) per tablet Take 1 tablet by mouth once daily.        omega 3-dha-epa-fish oil (FISH OIL) 1,000 mg (120 mg-180 mg) Cap Take 2 capsules by mouth 2 (two) times daily.  Qty: 360 capsule, Refills: 3      omeprazole (PRILOSEC) 40 MG capsule Take 1 capsule (40 mg total) by mouth once daily.  Qty: 90 capsule, Refills: 3      pramipexole (MIRAPEX) 1.5 MG tablet TAKE 1 TABLET EVERY NIGHT  Qty: 90 tablet, Refills: 1    Associated Diagnoses: Restless legs syndrome      sertraline (ZOLOFT) 50 MG tablet Take 2 tablets (100 mg total) by mouth every evening.  Qty: 180 tablet, Refills: 1    Associated Diagnoses: Mild major depression      umeclidinium-vilanterol (ANORO ELLIPTA) 62.5-25 mcg/actuation DsDv Inhale 1 puff into the lungs once daily. Controller  Qty: 90 each, Refills: 3      VITAMIN B-12 5,000 mcg Subl Place 1 tablet under the tongue once a week.     Associated Diagnoses: Annual physical exam      gabapentin (NEURONTIN) 600 MG tablet TAKE 2 TABLETS TWICE DAILY  Qty: 360 tablet, Refills: 3      meclizine (ANTIVERT) 25 mg tablet Take 1 tablet (25 mg total) by mouth every 6 (six) hours.  Qty: 360 tablet, Refills: 1    Associated Diagnoses:  Vertigo      ondansetron (ZOFRAN-ODT) 4 MG TbDL              Discharge Procedure Orders   Remove dressing in 48 hours

## 2019-08-16 NOTE — PLAN OF CARE
08/16/19 1525   Final Note   Assessment Type Final Discharge Note   Anticipated Discharge Disposition Home

## 2019-08-16 NOTE — PLAN OF CARE
Problem: Adult Inpatient Plan of Care  Goal: Plan of Care Review  Outcome: Ongoing (interventions implemented as appropriate)  Plan of care reviewed with pt, verbalized understanding. IV infusing . Dressing to the abdomen area clean and intact. Pt ambulated to bathroom without any difficulties. Hourly and Q2h rounds completed on this pt throughout shift.  Call light kept within reach, bed in locked and lowest position, side rails up x2.

## 2019-08-16 NOTE — PLAN OF CARE
Updated the AVS and pt's nurse.       08/16/19 1528   Discharge Reassessment   Assessment Type Discharge Planning Reassessment

## 2019-08-16 NOTE — PLAN OF CARE
Met with pt to complete her assessment.  Pt, who is independent with her self care, drives and lives with her spouse and adult granddaughter.  Pt has a home nebulizer, denies home health services .  Her PCP is Dr. Elda Holley, rheumatologist is Dr. Jaime, primary insurance is [employer] and secondary insurance is Medicaid [place] take charge.  Pt's discharge disposition is home with no anticipated needs.       08/16/19 1450   Discharge Assessment   Assessment Type Discharge Planning Assessment   Confirmed/corrected address and phone number on facesheet? Yes   Assessment information obtained from? Patient   Prior to hospitilization cognitive status: Alert/Oriented   Prior to hospitalization functional status: Independent   Current cognitive status: Alert/Oriented   Current Functional Status: Independent   Lives With spouse;grandchild(marisa)   Able to Return to Prior Arrangements yes   Is patient able to care for self after discharge? Yes   Who are your caregiver(s) and their phone number(s)?   (spouse Prosper [phone])   Patient's perception of discharge disposition home or selfcare   Readmission Within the Last 30 Days no previous admission in last 30 days   Patient currently being followed by outpatient case management? No   Name and contact number of agency or person providing outside services   ([facility] )   Is it the patient/care giver preference to resume care with the current outside agency? Yes   Equipment Currently Used at Home nebulizer   Do you have any problems affording any of your prescribed medications? No  (Pharmacy is [facility])   Is the patient taking medications as prescribed? yes   Does the patient have transportation home? Yes   Transportation Anticipated family or friend will provide   Does the patient receive services at the [facility]? No   Discharge Plan A Home with family   Patient/Family in Agreement with Plan yes

## 2019-08-17 ENCOUNTER — PATIENT MESSAGE (OUTPATIENT)
Dept: SURGERY | Facility: CLINIC | Age: 74
End: 2019-08-17

## 2019-08-19 RX ORDER — HYDROCODONE BITARTRATE AND ACETAMINOPHEN 5; 325 MG/1; MG/1
1 TABLET ORAL EVERY 6 HOURS PRN
Qty: 20 TABLET | Refills: 0 | Status: SHIPPED | OUTPATIENT
Start: 2019-08-19 | End: 2019-09-11

## 2019-08-19 NOTE — TELEPHONE ENCOUNTER
----- Message from Joanne Richey sent at 8/19/2019 10:25 AM CDT -----  Contact: self  Patient requesting recent pain medication that was sent to [facility] be sent to local pharmacy instead due to patient in pain per patient           [id number] - [place] - [address]  [address]  [place] [address]  Phone: [phone] Fax: [phone]      Patient contact [phone] patient cell     ,

## 2019-08-23 VITALS
HEIGHT: 60 IN | BODY MASS INDEX: 49.17 KG/M2 | HEART RATE: 77 BPM | DIASTOLIC BLOOD PRESSURE: 56 MMHG | OXYGEN SATURATION: 97 % | TEMPERATURE: 98 F | RESPIRATION RATE: 18 BRPM | SYSTOLIC BLOOD PRESSURE: 122 MMHG | WEIGHT: 250.44 LBS

## 2019-08-26 ENCOUNTER — OFFICE VISIT (OUTPATIENT)
Dept: SURGERY | Facility: CLINIC | Age: 74
End: 2019-08-26
Payer: MEDICARE

## 2019-08-26 VITALS — BODY MASS INDEX: 51.68 KG/M2 | WEIGHT: 256.38 LBS | TEMPERATURE: 98 F | RESPIRATION RATE: 16 BRPM | HEIGHT: 59 IN

## 2019-08-26 DIAGNOSIS — Z98.890 POST-OPERATIVE STATE: Primary | ICD-10-CM

## 2019-08-26 PROCEDURE — 99024 PR POST-OP FOLLOW-UP VISIT: ICD-10-PCS | Mod: HCNC,S$GLB,, | Performed by: SURGERY

## 2019-08-26 PROCEDURE — 99999 PR PBB SHADOW E&M-EST. PATIENT-LVL III: ICD-10-PCS | Mod: PBBFAC,HCNC,, | Performed by: SURGERY

## 2019-08-26 PROCEDURE — 99024 POSTOP FOLLOW-UP VISIT: CPT | Mod: HCNC,S$GLB,, | Performed by: SURGERY

## 2019-08-26 PROCEDURE — 99999 PR PBB SHADOW E&M-EST. PATIENT-LVL III: CPT | Mod: PBBFAC,HCNC,, | Performed by: SURGERY

## 2019-09-02 NOTE — PROGRESS NOTES
POST-OP NOTE    PROCEDURE:  Open ventral hernia repair with mesh implantation    COMPLAINTS: None.    EXAM: Incision well approximated. No drainage. No infection.      IMPRESSION: Doing well    PLAN: FU as needed.

## 2019-09-10 ENCOUNTER — TELEPHONE (OUTPATIENT)
Dept: FAMILY MEDICINE | Facility: CLINIC | Age: 74
End: 2019-09-10

## 2019-09-10 NOTE — TELEPHONE ENCOUNTER
----- Message from Clay Negrete sent at 9/10/2019  8:24 AM CDT -----  Type:  Same Day Appointment Request    Caller is requesting a same day appointment.  Caller declined first available appointment listed below.      Name of Caller:  patient  When is the first available appointment?  10/29  Symptoms:  Possible pink eye  Best Call Back Number:  [phone]  Additional Information:   Requesting a call back to confirm appointment

## 2019-09-10 NOTE — TELEPHONE ENCOUNTER
Spoke with pt, states she woke up with a right eye being red, and running clear water, pt states it was crusted over this am but unsure of he color, states she only can tell it is red,  Scheduled an appt to see Dr Carroll for further evaluation

## 2019-09-11 ENCOUNTER — OFFICE VISIT (OUTPATIENT)
Dept: FAMILY MEDICINE | Facility: CLINIC | Age: 74
End: 2019-09-11
Payer: MEDICARE

## 2019-09-11 VITALS
HEIGHT: 59 IN | DIASTOLIC BLOOD PRESSURE: 78 MMHG | BODY MASS INDEX: 50.4 KG/M2 | HEART RATE: 71 BPM | OXYGEN SATURATION: 96 % | TEMPERATURE: 98 F | WEIGHT: 250 LBS | SYSTOLIC BLOOD PRESSURE: 124 MMHG

## 2019-09-11 DIAGNOSIS — F41.9 ANXIETY: ICD-10-CM

## 2019-09-11 DIAGNOSIS — G25.81 RESTLESS LEGS SYNDROME: ICD-10-CM

## 2019-09-11 DIAGNOSIS — H10.32 ACUTE BACTERIAL CONJUNCTIVITIS OF LEFT EYE: Primary | ICD-10-CM

## 2019-09-11 PROCEDURE — 99999 PR PBB SHADOW E&M-EST. PATIENT-LVL III: ICD-10-PCS | Mod: PBBFAC,HCNC,, | Performed by: FAMILY MEDICINE

## 2019-09-11 PROCEDURE — 3078F PR MOST RECENT DIASTOLIC BLOOD PRESSURE < 80 MM HG: ICD-10-PCS | Mod: HCNC,CPTII,S$GLB, | Performed by: FAMILY MEDICINE

## 2019-09-11 PROCEDURE — 99214 PR OFFICE/OUTPT VISIT, EST, LEVL IV, 30-39 MIN: ICD-10-PCS | Mod: HCNC,S$GLB,, | Performed by: FAMILY MEDICINE

## 2019-09-11 PROCEDURE — 1101F PR PT FALLS ASSESS DOC 0-1 FALLS W/OUT INJ PAST YR: ICD-10-PCS | Mod: HCNC,CPTII,S$GLB, | Performed by: FAMILY MEDICINE

## 2019-09-11 PROCEDURE — 3078F DIAST BP <80 MM HG: CPT | Mod: HCNC,CPTII,S$GLB, | Performed by: FAMILY MEDICINE

## 2019-09-11 PROCEDURE — 99214 OFFICE O/P EST MOD 30 MIN: CPT | Mod: HCNC,S$GLB,, | Performed by: FAMILY MEDICINE

## 2019-09-11 PROCEDURE — 3074F SYST BP LT 130 MM HG: CPT | Mod: HCNC,CPTII,S$GLB, | Performed by: FAMILY MEDICINE

## 2019-09-11 PROCEDURE — 99999 PR PBB SHADOW E&M-EST. PATIENT-LVL III: CPT | Mod: PBBFAC,HCNC,, | Performed by: FAMILY MEDICINE

## 2019-09-11 PROCEDURE — 3074F PR MOST RECENT SYSTOLIC BLOOD PRESSURE < 130 MM HG: ICD-10-PCS | Mod: HCNC,CPTII,S$GLB, | Performed by: FAMILY MEDICINE

## 2019-09-11 PROCEDURE — 1101F PT FALLS ASSESS-DOCD LE1/YR: CPT | Mod: HCNC,CPTII,S$GLB, | Performed by: FAMILY MEDICINE

## 2019-09-11 RX ORDER — PRAMIPEXOLE DIHYDROCHLORIDE 1.5 MG/1
1.5 TABLET ORAL NIGHTLY
Qty: 90 TABLET | Refills: 3 | Status: SHIPPED | OUTPATIENT
Start: 2019-09-11 | End: 2019-09-20 | Stop reason: SDUPTHER

## 2019-09-11 RX ORDER — OFLOXACIN 3 MG/ML
1 SOLUTION/ DROPS OPHTHALMIC 4 TIMES DAILY
Qty: 10 ML | Refills: 0 | Status: SHIPPED | OUTPATIENT
Start: 2019-09-11 | End: 2020-01-02

## 2019-09-11 RX ORDER — LORAZEPAM 0.5 MG/1
0.5 TABLET ORAL 2 TIMES DAILY PRN
Qty: 60 TABLET | Refills: 0 | Status: SHIPPED | OUTPATIENT
Start: 2019-09-11 | End: 2020-01-02 | Stop reason: SDUPTHER

## 2019-09-11 NOTE — PROGRESS NOTES
Assessment and Plan:    1. Acute bacterial conjunctivitis of left eye  Viral versus bacterial, will treat with drops due to history of contact use.  Patient to dispose of contacts she was wearing.  Wash hands thoroughly.  ER precautions given.  - ofloxacin (OCUFLOX) 0.3 % ophthalmic solution; Place 1 drop into the left eye 4 (four) times daily.  Dispense: 10 mL; Refill: 0    2. Anxiety  Medication refilled  - LORazepam (ATIVAN) 0.5 MG tablet; Take 1 tablet (0.5 mg total) by mouth 2 (two) times daily as needed for Anxiety.  Dispense: 60 tablet; Refill: 0    3. Restless legs syndrome  Medication refilled  - pramipexole (MIRAPEX) 1.5 MG tablet; Take 1 tablet (1.5 mg total) by mouth nightly.  Dispense: 90 tablet; Refill: 3          ______________________________________________________________________  Subjective:    Chief Complaint:  Chief Complaint   Patient presents with    Conjunctivitis     eye redness and mucus        HPI:  Marta is a 74 y.o. year old     Possible pink eye  Duration 2 days.  Reports redness and crusty discharge. Reports associated blurred vision.  Very mild.  Denies any fever, felicity eye pain. No sick contacts.    Med Refill  Requesting refill of medication for restless leg syndrome and intermittent anxiety.      Medications:  Current Outpatient Medications on File Prior to Visit   Medication Sig Dispense Refill    alendronate (FOSAMAX) 70 MG tablet TAKE 1 TABLET EVERY 7 DAYS 12 tablet 3    B-complex with vitamin C (Z-BEC OR EQUIV) tablet Take 1 tablet by mouth once daily.       BIOTIN/CALCIUM CARBONATE (BIOTIN 100+10 ORAL) Take 1 capsule by mouth once daily.       calcium citrate-vitamin D2 1,500-200 mg-unit Tab Take 1 tablet by mouth once daily.       cholecalciferol, vitamin D3, 5,000 unit capsule Take 1 capsule (5,000 Units total) by mouth once daily. 90 capsule 3    furosemide (LASIX) 80 MG tablet Take 1 tablet (80 mg total) by mouth once daily. 90 tablet 1    gabapentin  (NEURONTIN) 600 MG tablet TAKE 2 TABLETS TWICE DAILY 360 tablet 3    LORazepam (ATIVAN) 0.5 MG tablet Take 1 tablet (0.5 mg total) by mouth 2 (two) times daily as needed for Anxiety. 60 tablet 0    losartan (COZAAR) 50 MG tablet Take 1 tablet (50 mg total) by mouth 2 (two) times daily. 180 tablet 3    multivitamin (MULTIVITAMIN) per tablet Take 1 tablet by mouth once daily.        omega 3-dha-epa-fish oil (FISH OIL) 1,000 mg (120 mg-180 mg) Cap Take 2 capsules by mouth 2 (two) times daily. 360 capsule 3    omeprazole (PRILOSEC) 40 MG capsule Take 1 capsule (40 mg total) by mouth once daily. 90 capsule 3    pramipexole (MIRAPEX) 1.5 MG tablet TAKE 1 TABLET EVERY NIGHT 90 tablet 1    sertraline (ZOLOFT) 50 MG tablet Take 2 tablets (100 mg total) by mouth every evening. 180 tablet 1    umeclidinium-vilanterol (ANORO ELLIPTA) 62.5-25 mcg/actuation DsDv Inhale 1 puff into the lungs once daily. Controller 90 each 3    VITAMIN B-12 5,000 mcg Subl Place 1 tablet under the tongue once a week.       albuterol (PROAIR HFA) 90 mcg/actuation inhaler Inhale 2 puffs into the lungs every 6 (six) hours as needed for Wheezing or Shortness of Breath. 3 Inhaler 3    albuterol (PROVENTIL) 2.5 mg /3 mL (0.083 %) nebulizer solution INHALE THE CONTENTS OF 1 VIAL VIA NEBULIZER EVERY 6 HOURS AS NEEDED FOR  WHEEZING  OR  SHORTNESS OF BREATH 90 mL 11    albuterol sulfate 2.5 mg/0.5 mL Nebu Take 2.5 mg by nebulization every 4 (four) hours as needed. Rescue 1 each 1    loratadine (CLARITIN) 10 mg tablet Take 10 mg by mouth daily as needed.       [DISCONTINUED] HYDROcodone-acetaminophen (NORCO) 5-325 mg per tablet Take 1 tablet by mouth every 6 (six) hours as needed for Pain. 20 tablet 0    [DISCONTINUED] meclizine (ANTIVERT) 25 mg tablet Take 1 tablet (25 mg total) by mouth every 6 (six) hours. (Patient taking differently: Take 25 mg by mouth every 6 (six) hours as needed. ) 360 tablet 1    [DISCONTINUED] ondansetron (ZOFRAN-ODT)  "4 MG TbDL        No current facility-administered medications on file prior to visit.        Review of Systems:  Review of Systems   Constitutional: Negative for fever.   Eyes: Positive for pain, discharge and redness.   Respiratory: Negative for cough and shortness of breath.    Cardiovascular: Negative for chest pain.   Gastrointestinal: Negative for abdominal pain, diarrhea, nausea and vomiting.   Skin: Negative for rash.   Psychiatric/Behavioral: Negative for dysphoric mood.       Past Medical History:  Past Medical History:   Diagnosis Date    Anxiety     Arthralgia of knee     arthralgia of bilateral knees secondary to djd    Arthritis     Back pain     Depression     Encounter for blood transfusion     AUTOLOGUS    GERD (gastroesophageal reflux disease)     Hiatal hernia     repaired in 1979    History of seasonal allergies     History of shingles     Hypertension     Insomnia     Obesity     Osteoporosis     Pneumonia     Reactive airway disease     Restless legs syndrome     Rheumatic fever     at 6 y/o    Sleep apnea     no cpap       Objective:    Vitals:  Vitals:    09/11/19 0751   Pulse: 71   Temp: 98.4 °F (36.9 °C)   TempSrc: Oral   SpO2: 96%   Weight: 113.4 kg (250 lb)   Height: 4' 11" (1.499 m)   PainSc:   4   PainLoc: Abdomen       Physical Exam   Constitutional: No distress.   HENT:   Head: Normocephalic and atraumatic.   Eyes: Pupils are equal, round, and reactive to light. EOM are normal. Left eye exhibits discharge and exudate. Left eye exhibits no hordeolum. No foreign body present in the left eye. Left conjunctiva is injected.   Neck: Neck supple.   Cardiovascular: Normal rate and regular rhythm. Exam reveals no friction rub.   No murmur heard.  Pulmonary/Chest: Effort normal and breath sounds normal.   Abdominal: Soft. Bowel sounds are normal. She exhibits no distension. There is no tenderness.   Skin: Skin is warm and dry. No rash noted.   Psychiatric: She has a normal " mood and affect. Her behavior is normal.         Lex Calderon MD  Family Medicine

## 2019-09-20 DIAGNOSIS — G25.81 RESTLESS LEGS SYNDROME: ICD-10-CM

## 2019-09-20 DIAGNOSIS — F41.9 ANXIETY: ICD-10-CM

## 2019-09-20 RX ORDER — PRAMIPEXOLE DIHYDROCHLORIDE 1.5 MG/1
TABLET ORAL
Qty: 90 TABLET | Refills: 1 | Status: SHIPPED | OUTPATIENT
Start: 2019-09-20 | End: 2020-01-02 | Stop reason: SDUPTHER

## 2019-09-20 RX ORDER — LORAZEPAM 0.5 MG/1
TABLET ORAL
Qty: 60 TABLET | Refills: 0 | OUTPATIENT
Start: 2019-09-20

## 2019-09-23 ENCOUNTER — OFFICE VISIT (OUTPATIENT)
Dept: SURGERY | Facility: CLINIC | Age: 74
End: 2019-09-23
Payer: MEDICARE

## 2019-09-23 VITALS — WEIGHT: 256.19 LBS | TEMPERATURE: 97 F | BODY MASS INDEX: 51.74 KG/M2

## 2019-09-23 DIAGNOSIS — Z98.890 POST-OPERATIVE STATE: Primary | ICD-10-CM

## 2019-09-23 PROCEDURE — 99024 PR POST-OP FOLLOW-UP VISIT: ICD-10-PCS | Mod: HCNC,S$GLB,, | Performed by: SURGERY

## 2019-09-23 PROCEDURE — 99999 PR PBB SHADOW E&M-EST. PATIENT-LVL II: ICD-10-PCS | Mod: PBBFAC,HCNC,, | Performed by: SURGERY

## 2019-09-23 PROCEDURE — 99024 POSTOP FOLLOW-UP VISIT: CPT | Mod: HCNC,S$GLB,, | Performed by: SURGERY

## 2019-09-23 PROCEDURE — 99999 PR PBB SHADOW E&M-EST. PATIENT-LVL II: CPT | Mod: PBBFAC,HCNC,, | Performed by: SURGERY

## 2019-09-23 RX ORDER — CEPHALEXIN 500 MG/1
500 CAPSULE ORAL 4 TIMES DAILY
Qty: 30 CAPSULE | Refills: 0 | Status: SHIPPED | OUTPATIENT
Start: 2019-09-23 | End: 2020-01-02

## 2019-09-26 NOTE — PROGRESS NOTES
POST-OP NOTE    PROCEDURE:  Ventral hernia repair with mesh implantation    COMPLAINTS: None.    EXAM: Incision well approximated. No drainage. No infection.      IMPRESSION:  Doing well    PLAN: FU as needed.

## 2019-10-08 ENCOUNTER — PES CALL (OUTPATIENT)
Dept: ADMINISTRATIVE | Facility: CLINIC | Age: 74
End: 2019-10-08

## 2019-11-25 DIAGNOSIS — F32.0 MILD MAJOR DEPRESSION: ICD-10-CM

## 2019-11-25 RX ORDER — SERTRALINE HYDROCHLORIDE 50 MG/1
100 TABLET, FILM COATED ORAL NIGHTLY
Qty: 180 TABLET | Refills: 1 | Status: SHIPPED | OUTPATIENT
Start: 2019-11-25 | End: 2020-01-02 | Stop reason: SDUPTHER

## 2019-11-26 ENCOUNTER — PATIENT OUTREACH (OUTPATIENT)
Dept: ADMINISTRATIVE | Facility: HOSPITAL | Age: 74
End: 2019-11-26

## 2019-12-17 ENCOUNTER — OFFICE VISIT (OUTPATIENT)
Dept: FAMILY MEDICINE | Facility: CLINIC | Age: 74
End: 2019-12-17
Payer: MEDICARE

## 2019-12-17 ENCOUNTER — OFFICE VISIT (OUTPATIENT)
Dept: RHEUMATOLOGY | Facility: CLINIC | Age: 74
End: 2019-12-17
Payer: MEDICARE

## 2019-12-17 VITALS
HEIGHT: 59 IN | SYSTOLIC BLOOD PRESSURE: 136 MMHG | DIASTOLIC BLOOD PRESSURE: 84 MMHG | WEIGHT: 256.38 LBS | HEART RATE: 68 BPM | BODY MASS INDEX: 51.68 KG/M2 | OXYGEN SATURATION: 94 %

## 2019-12-17 VITALS
HEART RATE: 56 BPM | DIASTOLIC BLOOD PRESSURE: 82 MMHG | SYSTOLIC BLOOD PRESSURE: 149 MMHG | HEIGHT: 59 IN | WEIGHT: 256.31 LBS | BODY MASS INDEX: 51.67 KG/M2

## 2019-12-17 DIAGNOSIS — I27.20 PULMONARY HYPERTENSION: ICD-10-CM

## 2019-12-17 DIAGNOSIS — J45.20 MILD INTERMITTENT REACTIVE AIRWAY DISEASE WITHOUT COMPLICATION: ICD-10-CM

## 2019-12-17 DIAGNOSIS — M85.80 OSTEOPENIA, UNSPECIFIED LOCATION: ICD-10-CM

## 2019-12-17 DIAGNOSIS — Z78.0 POST-MENOPAUSAL: ICD-10-CM

## 2019-12-17 DIAGNOSIS — J45.40 MODERATE PERSISTENT ASTHMA WITHOUT COMPLICATION: ICD-10-CM

## 2019-12-17 DIAGNOSIS — M75.52 BILATERAL SHOULDER BURSITIS: ICD-10-CM

## 2019-12-17 DIAGNOSIS — Z12.31 ENCOUNTER FOR SCREENING MAMMOGRAM FOR MALIGNANT NEOPLASM OF BREAST: ICD-10-CM

## 2019-12-17 DIAGNOSIS — F32.0 MILD MAJOR DEPRESSION: ICD-10-CM

## 2019-12-17 DIAGNOSIS — M75.51 BILATERAL SHOULDER BURSITIS: ICD-10-CM

## 2019-12-17 DIAGNOSIS — I77.1 TORTUOUS AORTA: ICD-10-CM

## 2019-12-17 DIAGNOSIS — I10 ESSENTIAL HYPERTENSION: Chronic | ICD-10-CM

## 2019-12-17 DIAGNOSIS — Z00.00 ENCOUNTER FOR PREVENTIVE HEALTH EXAMINATION: Primary | ICD-10-CM

## 2019-12-17 DIAGNOSIS — E66.01 MORBID OBESITY WITH BMI OF 45.0-49.9, ADULT: ICD-10-CM

## 2019-12-17 DIAGNOSIS — M15.9 GENERALIZED OSTEOARTHRITIS: Primary | ICD-10-CM

## 2019-12-17 DIAGNOSIS — G62.9 PERIPHERAL POLYNEUROPATHY: ICD-10-CM

## 2019-12-17 PROCEDURE — 1101F PR PT FALLS ASSESS DOC 0-1 FALLS W/OUT INJ PAST YR: ICD-10-PCS | Mod: HCNC,CPTII,S$GLB, | Performed by: INTERNAL MEDICINE

## 2019-12-17 PROCEDURE — 99999 PR PBB SHADOW E&M-EST. PATIENT-LVL III: CPT | Mod: PBBFAC,HCNC,, | Performed by: INTERNAL MEDICINE

## 2019-12-17 PROCEDURE — 99499 RISK ADDL DX/OHS AUDIT: ICD-10-PCS | Mod: S$GLB,,, | Performed by: NURSE PRACTITIONER

## 2019-12-17 PROCEDURE — 99999 PR PBB SHADOW E&M-EST. PATIENT-LVL V: ICD-10-PCS | Mod: PBBFAC,HCNC,, | Performed by: NURSE PRACTITIONER

## 2019-12-17 PROCEDURE — 3077F PR MOST RECENT SYSTOLIC BLOOD PRESSURE >= 140 MM HG: ICD-10-PCS | Mod: HCNC,CPTII,S$GLB, | Performed by: INTERNAL MEDICINE

## 2019-12-17 PROCEDURE — 99214 OFFICE O/P EST MOD 30 MIN: CPT | Mod: HCNC,S$GLB,, | Performed by: INTERNAL MEDICINE

## 2019-12-17 PROCEDURE — 99999 PR PBB SHADOW E&M-EST. PATIENT-LVL V: CPT | Mod: PBBFAC,HCNC,, | Performed by: NURSE PRACTITIONER

## 2019-12-17 PROCEDURE — 1101F PT FALLS ASSESS-DOCD LE1/YR: CPT | Mod: HCNC,CPTII,S$GLB, | Performed by: INTERNAL MEDICINE

## 2019-12-17 PROCEDURE — 3079F DIAST BP 80-89 MM HG: CPT | Mod: HCNC,CPTII,S$GLB, | Performed by: INTERNAL MEDICINE

## 2019-12-17 PROCEDURE — 3079F PR MOST RECENT DIASTOLIC BLOOD PRESSURE 80-89 MM HG: ICD-10-PCS | Mod: HCNC,CPTII,S$GLB, | Performed by: NURSE PRACTITIONER

## 2019-12-17 PROCEDURE — G0439 PR MEDICARE ANNUAL WELLNESS SUBSEQUENT VISIT: ICD-10-PCS | Mod: HCNC,S$GLB,, | Performed by: NURSE PRACTITIONER

## 2019-12-17 PROCEDURE — 99499 UNLISTED E&M SERVICE: CPT | Mod: S$GLB,,, | Performed by: NURSE PRACTITIONER

## 2019-12-17 PROCEDURE — 3079F DIAST BP 80-89 MM HG: CPT | Mod: HCNC,CPTII,S$GLB, | Performed by: NURSE PRACTITIONER

## 2019-12-17 PROCEDURE — 1159F PR MEDICATION LIST DOCUMENTED IN MEDICAL RECORD: ICD-10-PCS | Mod: HCNC,S$GLB,, | Performed by: INTERNAL MEDICINE

## 2019-12-17 PROCEDURE — G0439 PPPS, SUBSEQ VISIT: HCPCS | Mod: HCNC,S$GLB,, | Performed by: NURSE PRACTITIONER

## 2019-12-17 PROCEDURE — 1125F AMNT PAIN NOTED PAIN PRSNT: CPT | Mod: HCNC,S$GLB,, | Performed by: INTERNAL MEDICINE

## 2019-12-17 PROCEDURE — 99999 PR PBB SHADOW E&M-EST. PATIENT-LVL III: ICD-10-PCS | Mod: PBBFAC,HCNC,, | Performed by: INTERNAL MEDICINE

## 2019-12-17 PROCEDURE — 3075F SYST BP GE 130 - 139MM HG: CPT | Mod: HCNC,CPTII,S$GLB, | Performed by: NURSE PRACTITIONER

## 2019-12-17 PROCEDURE — 99214 PR OFFICE/OUTPT VISIT, EST, LEVL IV, 30-39 MIN: ICD-10-PCS | Mod: HCNC,S$GLB,, | Performed by: INTERNAL MEDICINE

## 2019-12-17 PROCEDURE — 3079F PR MOST RECENT DIASTOLIC BLOOD PRESSURE 80-89 MM HG: ICD-10-PCS | Mod: HCNC,CPTII,S$GLB, | Performed by: INTERNAL MEDICINE

## 2019-12-17 PROCEDURE — 1159F MED LIST DOCD IN RCRD: CPT | Mod: HCNC,S$GLB,, | Performed by: INTERNAL MEDICINE

## 2019-12-17 PROCEDURE — 3077F SYST BP >= 140 MM HG: CPT | Mod: HCNC,CPTII,S$GLB, | Performed by: INTERNAL MEDICINE

## 2019-12-17 PROCEDURE — 3075F PR MOST RECENT SYSTOLIC BLOOD PRESS GE 130-139MM HG: ICD-10-PCS | Mod: HCNC,CPTII,S$GLB, | Performed by: NURSE PRACTITIONER

## 2019-12-17 PROCEDURE — 1125F PR PAIN SEVERITY QUANTIFIED, PAIN PRESENT: ICD-10-PCS | Mod: HCNC,S$GLB,, | Performed by: INTERNAL MEDICINE

## 2019-12-17 ASSESSMENT — ROUTINE ASSESSMENT OF PATIENT INDEX DATA (RAPID3)
PATIENT GLOBAL ASSESSMENT SCORE: 5
TOTAL RAPID3 SCORE: 5.33
PSYCHOLOGICAL DISTRESS SCORE: 7.7
PAIN SCORE: 5
MDHAQ FUNCTION SCORE: 1.8

## 2019-12-17 NOTE — PROGRESS NOTES
"Marta Huff presented for a  Medicare AWV and comprehensive Health Risk Assessment today. The following components were reviewed and updated:    · Medical history  · Family History  · Social history  · Allergies and Current Medications  · Health Risk Assessment  · Health Maintenance  · Care Team     ** See Completed Assessments for Annual Wellness Visit within the encounter summary.**       The following assessments were completed:  · Living Situation  · CAGE  · Depression Screening  · Timed Get Up and Go  · Whisper Test  · Cognitive Function Screening          · Nutrition Screening  · ADL Screening  · PAQ Screening    Vitals:    12/17/19 1042 12/17/19 1111   BP: (!) 140/90 136/84   BP Location: Left arm    Patient Position: Sitting    BP Method: Large (Manual)    Pulse: 68    SpO2: (!) 94%    Weight: 116.3 kg (256 lb 6.3 oz)    Height: 4' 11" (1.499 m)      Body mass index is 51.79 kg/m².  Physical Exam   Constitutional: She is oriented to person, place, and time. She appears well-developed and well-nourished. No distress.   HENT:   Mouth/Throat: Oropharynx is clear and moist.   Eyes: No scleral icterus.   Cardiovascular: Normal rate and regular rhythm. Exam reveals no friction rub.   No murmur heard.  Pulmonary/Chest: Effort normal and breath sounds normal. No respiratory distress. She has no wheezes. She has no rales.   Neurological: She is alert and oriented to person, place, and time.   Skin: Skin is warm and dry.   Psychiatric: She has a normal mood and affect. Her behavior is normal.   Vitals reviewed.        Diagnoses and health risks identified today and associated recommendations/orders:    1. Encounter for preventive health examination  Reviewed health maintenance and provided recommendations    Written rx for shingrix provided  - Mammo Digital Screening Bilat; Future    2. Post-menopausal    - DXA Bone Density Spine And Hip; Future    3. Encounter for screening mammogram for malignant neoplasm " of breast     - Mammo Digital Screening Bilat; Future    4. Mild intermittent reactive airway disease without complication  Pt requests pulm referral  Has albuterol inh and nebs, anoro  - Ambulatory referral to Pulmonology    5. Moderate persistent asthma without complication    - Ambulatory referral to Pulmonology    6. Tortuous aorta  Continue to monitor  Followed by Elda Holley MD .      7. Essential hypertension  Continue to monitor  Followed by Elda Holley MD .      8. Pulmonary hypertension  Continue to monitor  Followed by Elda Holley MD .      9. Mild major depression  Continue to monitor  Followed by Elda Holley MD   No si/hi.    Has zoloft    10. Morbid obesity with BMI of 45.0-49.9, adult s/p laparoscopic Danielle-[place]-Y  Continue to monitor  Followed by Elda Holley MD   Encourage healthy food choices.      11. Osteopenia, unspecified location  Continue to monitor  Followed by Elda Holley MD .        Provided Marta with a 5-10 year written screening schedule and personal prevention plan. Recommendations were developed using the USPSTF age appropriate recommendations. Education, counseling, and referrals were provided as needed. After Visit Summary printed and given to patient which includes a list of additional screenings\tests needed.    Follow up in about 1 year (around 12/17/2020).    Marleny Solares NP  I offered to discuss end of life issues, including information on how to make advance directives that the patient could use to name someone who would make medical decisions on their behalf if they became too ill to make themselves.    ___Patient declined  _X_Patient is interested, I provided paper work and offered to discuss.

## 2019-12-17 NOTE — PATIENT INSTRUCTIONS
Counseling and Referral of Other Preventative  (Italic type indicates deductible and co-insurance are waived)    Patient Name: Marta Huff  Today's Date: 12/17/2019    Health Maintenance       Date Due Completion Date    Shingles Vaccine (2 of 3) 08/03/2016 6/8/2016    Mammogram 11/14/2019 11/14/2017    DEXA SCAN 01/09/2020 1/9/2017    Override on 6/26/2012: Done    Colonoscopy 03/26/2020 3/26/2010 (Done)    Override on 3/26/2010: Done (Reportedly normal)    Lipid Panel 03/29/2020 3/29/2019    TETANUS VACCINE 02/08/2021 2/8/2011        No orders of the defined types were placed in this encounter.    The following information is provided to all patients.  This information is to help you find resources for any of the problems found today that may be affecting your health:                Living healthy guide: www.[facility].[place].[facility]      Understanding Diabetes: www.diabetes.org      Eating healthy: www.cdc.gov/healthyweight      [facility] home safety checklist: www.cdc.gov/steadi/patient.html      Agency on Aging: www.goea.[place].[facility]      Alcoholics anonymous (AA): www.aa.org      Physical Activity: www.jing.nih.gov/ad8jxov      Tobacco use: www.quitwithusla.org

## 2019-12-17 NOTE — PROGRESS NOTES
Subjective:          Chief Complaint: Marta Huff is a 74 y.o. female who had concerns including primary osteoarthritis.    HPI:    Patient is a 73-year-old female previously seen by Dr. Mcintyre   Last visit bilateral GTB injections 7/2019. Did very well with injections. Still noting benefit and relief from this.   Fall with right 5th finger fracture now healed.   B/l shoulder reduction in ROM  Still working at using elliptical.       Dx:  osteoarthritis this has diffuse as well as peripheral neuropathy.    She was using gabapentin  2400 mg daily.  Tramadol p.r.n. Zanaflex p.r.n..   Stopped gabapentin with no difference in the pain.   More recently patient had been following with pain management as well as being seen with neurosurgery for spinal cord stimulator.  A noting 85% relief of the foot pain.     She is having significant pain in bilateral hips to gluteal region and interferes with walking and with using the elliptical.  Previous 45min and tolerated, now only 15min and hips begin to hurt.   Last year they have progressively worsened. She still has pain in back but this is not a constant complaint for her. She is sleeping on sides and seems to tolerate, is is the lumbosacral junction that hurts at night.   Exacerbated with sitting prolonged period. At some times she uses walker due to pain.   Hands with stiffness PIP and DIP joints long standing cannot fully curl fingers. +deformity, intermittent swelling.     Interaction with etodolac, but renal function wnl.   She does tolerate NSAID: takes aleve 440mg in AM and PRN at night. No hx of GIB.         REVIEW OF SYSTEMS:    Review of Systems   Constitutional: Negative for fever, malaise/fatigue and weight loss.   HENT: Negative for sore throat.    Eyes: Negative for double vision, photophobia and redness.   Respiratory: Negative for cough, shortness of breath and wheezing.    Cardiovascular: Negative for chest pain, palpitations and orthopnea.    Gastrointestinal: Negative for abdominal pain, constipation and diarrhea.   Genitourinary: Negative for dysuria, hematuria and urgency.   Musculoskeletal: Positive for joint pain. Negative for back pain and myalgias.   Skin: Negative for rash.   Neurological: Negative for dizziness, tingling, focal weakness and headaches.   Endo/Heme/Allergies: Does not bruise/bleed easily.   Psychiatric/Behavioral: Negative for depression, hallucinations and suicidal ideas.               Objective:            Past Medical History:   Diagnosis Date    Anxiety     Arthralgia of knee     arthralgia of bilateral knees secondary to djd    Arthritis     Back pain     Depression     Encounter for blood transfusion     AUTOLOGUS    GERD (gastroesophageal reflux disease)     Hiatal hernia     repaired in 1979    History of seasonal allergies     History of shingles     Hypertension     Insomnia     Obesity     Osteoporosis     Pneumonia     Reactive airway disease     Restless legs syndrome     Rheumatic fever     at 4 y/o    Sleep apnea     no cpap     Family History   Problem Relation Age of Onset    Heart disease Mother     Hypertension Mother     Diabetes Mother     Obesity Mother     Heart disease Maternal Grandfather      Social History     Tobacco Use    Smoking status: Passive Smoke Exposure - Never Smoker    Smokeless tobacco: Never Used   Substance Use Topics    Alcohol use: Yes     Comment: 1-2 glasses of wine monthly    Drug use: No         Current Outpatient Medications on File Prior to Visit   Medication Sig Dispense Refill    albuterol (PROAIR HFA) 90 mcg/actuation inhaler Inhale 2 puffs into the lungs every 6 (six) hours as needed for Wheezing or Shortness of Breath. 3 Inhaler 3    albuterol (PROVENTIL) 2.5 mg /3 mL (0.083 %) nebulizer solution INHALE THE CONTENTS OF 1 VIAL VIA NEBULIZER EVERY 6 HOURS AS NEEDED FOR  WHEEZING  OR  SHORTNESS OF BREATH 90 mL 11    albuterol sulfate 2.5 mg/0.5 mL  Nebu Take 2.5 mg by nebulization every 4 (four) hours as needed. Rescue 1 each 1    alendronate (FOSAMAX) 70 MG tablet TAKE 1 TABLET EVERY 7 DAYS 12 tablet 3    B-complex with vitamin C (Z-BEC OR EQUIV) tablet Take 1 tablet by mouth once daily.       BIOTIN/CALCIUM CARBONATE (BIOTIN 100+10 ORAL) Take 1 capsule by mouth once daily.       calcium citrate-vitamin D2 1,500-200 mg-unit Tab Take 1 tablet by mouth once daily.       cephALEXin (KEFLEX) 500 MG capsule Take 1 capsule (500 mg total) by mouth 4 (four) times daily. 30 capsule 0    cholecalciferol, vitamin D3, 5,000 unit capsule Take 1 capsule (5,000 Units total) by mouth once daily. 90 capsule 3    furosemide (LASIX) 80 MG tablet Take 1 tablet (80 mg total) by mouth once daily. 90 tablet 1    loratadine (CLARITIN) 10 mg tablet Take 10 mg by mouth daily as needed.       losartan (COZAAR) 50 MG tablet Take 1 tablet (50 mg total) by mouth 2 (two) times daily. 180 tablet 3    multivitamin (MULTIVITAMIN) per tablet Take 1 tablet by mouth once daily.        ofloxacin (OCUFLOX) 0.3 % ophthalmic solution Place 1 drop into the left eye 4 (four) times daily. 10 mL 0    omeprazole (PRILOSEC) 40 MG capsule Take 1 capsule (40 mg total) by mouth once daily. 90 capsule 3    pramipexole (MIRAPEX) 1.5 MG tablet TAKE 1 TABLET EVERY NIGHT 90 tablet 1    sertraline (ZOLOFT) 50 MG tablet Take 2 tablets (100 mg total) by mouth every evening. 180 tablet 1    umeclidinium-vilanterol (ANORO ELLIPTA) 62.5-25 mcg/actuation DsDv Inhale 1 puff into the lungs once daily. Controller 90 each 3    VITAMIN B-12 5,000 mcg Subl Place 1 tablet under the tongue once a week.       gabapentin (NEURONTIN) 600 MG tablet TAKE 2 TABLETS TWICE DAILY (Patient not taking: Reported on 12/17/2019) 360 tablet 3    LORazepam (ATIVAN) 0.5 MG tablet Take 1 tablet (0.5 mg total) by mouth 2 (two) times daily as needed for Anxiety. 60 tablet 0    omega 3-dha-epa-fish oil (FISH OIL) 1,000 mg (120  mg-180 mg) Cap Take 2 capsules by mouth 2 (two) times daily. 360 capsule 3     No current facility-administered medications on file prior to visit.        Vitals:    12/17/19 0905   BP: (!) 149/82   Pulse: (!) 56       Physical Exam:    Physical Exam   Constitutional: She appears well-developed and well-nourished.   HENT:   Nose: No septal deviation.   Mouth/Throat: Mucous membranes are normal. No oral lesions.   Eyes: Pupils are equal, round, and reactive to light. Right conjunctiva is not injected. Left conjunctiva is not injected.   Neck: No JVD present. No thyroid mass and no thyromegaly present.   Cardiovascular: Normal rate, regular rhythm and normal pulses.   No edema   Pulmonary/Chest: Effort normal and breath sounds normal.   Abdominal: Soft. Normal appearance. There is no hepatosplenomegaly.   Musculoskeletal:        Right shoulder: She exhibits decreased range of motion. She exhibits no tenderness and no swelling.        Left shoulder: She exhibits decreased range of motion. She exhibits no tenderness and no swelling.        Right elbow: She exhibits normal range of motion and no swelling. No tenderness found.        Left elbow: She exhibits normal range of motion and no swelling. No tenderness found.        Right wrist: She exhibits normal range of motion, no tenderness and no swelling.        Left wrist: She exhibits normal range of motion, no tenderness and no swelling.        Right hip: She exhibits normal range of motion, normal strength, no bony tenderness and no swelling.        Left hip: She exhibits normal range of motion, no tenderness, no bony tenderness and no swelling.        Right knee: She exhibits normal range of motion and no swelling. No tenderness found.        Left knee: She exhibits normal range of motion and no swelling. No tenderness found.        Right ankle: She exhibits normal range of motion and no swelling. No tenderness.        Left ankle: She exhibits normal range of motion  and no swelling. No tenderness.        Right hand: She exhibits decreased range of motion.        Left hand: She exhibits decreased range of motion.   Bony hypertrophy PIP and DIP joints. Squaring CMC joint. Tenderness along gr. Troch and ITB. Negative exacerbation SLR some LBP.    Lymphadenopathy:     She has no cervical adenopathy.     She has no axillary adenopathy.   Neurological: She has normal strength and normal reflexes.   Skin: Skin is dry and intact.   Psychiatric: She has a normal mood and affect.             Assessment:       Encounter Diagnoses   Name Primary?    Generalized osteoarthritis Yes    Morbid obesity with BMI of 45.0-49.9, adult s/p laparoscopic Danielle-N-Y     Osteopenia, unspecified location     Bilateral shoulder bursitis     Peripheral polyneuropathy           Plan:        Generalized osteoarthritis    Morbid obesity with BMI of 45.0-49.9, adult s/p laparoscopic Danielle-N-Y    Osteopenia, unspecified location    Bilateral shoulder bursitis    Peripheral polyneuropathy          Delightful patient with greater trochanteric bursitis shoulders.       -handout shoulder exercises.    Continue with aleve PRN   -biofreeze prn.     Follow up in about 5 months (around 5/17/2020).           30min consultation with greater than 50% spent in counseling, chart review and coordination of care. All questions answered.

## 2019-12-31 ENCOUNTER — TELEPHONE (OUTPATIENT)
Dept: FAMILY MEDICINE | Facility: CLINIC | Age: 74
End: 2019-12-31

## 2019-12-31 NOTE — TELEPHONE ENCOUNTER
Spoke with pt states she has had shortness of breath for time now and wanted to schedule with Dr. Holley. Nurse Omar scheduled pt for office visit. I did advise pt to go to urgent care today due to no appointments available today with Dr. Holley. Pt stated she would wait for her appointment with Dr. Holley

## 2020-01-02 ENCOUNTER — OFFICE VISIT (OUTPATIENT)
Dept: FAMILY MEDICINE | Facility: CLINIC | Age: 75
End: 2020-01-02
Payer: MEDICARE

## 2020-01-02 VITALS
HEART RATE: 76 BPM | WEIGHT: 256.81 LBS | SYSTOLIC BLOOD PRESSURE: 128 MMHG | HEIGHT: 59 IN | DIASTOLIC BLOOD PRESSURE: 70 MMHG | RESPIRATION RATE: 20 BRPM | TEMPERATURE: 98 F | BODY MASS INDEX: 51.77 KG/M2 | OXYGEN SATURATION: 94 %

## 2020-01-02 DIAGNOSIS — I27.20 PULMONARY HYPERTENSION: ICD-10-CM

## 2020-01-02 DIAGNOSIS — F32.0 MILD MAJOR DEPRESSION: ICD-10-CM

## 2020-01-02 DIAGNOSIS — J45.909 REACTIVE AIRWAY DISEASE, UNSPECIFIED ASTHMA SEVERITY, UNCOMPLICATED: ICD-10-CM

## 2020-01-02 DIAGNOSIS — M15.9 GENERALIZED OSTEOARTHRITIS: ICD-10-CM

## 2020-01-02 DIAGNOSIS — F41.9 ANXIETY: ICD-10-CM

## 2020-01-02 DIAGNOSIS — R60.9 EDEMA, UNSPECIFIED TYPE: ICD-10-CM

## 2020-01-02 DIAGNOSIS — J44.1 COPD WITH EXACERBATION: Primary | ICD-10-CM

## 2020-01-02 DIAGNOSIS — I10 ESSENTIAL HYPERTENSION: Chronic | ICD-10-CM

## 2020-01-02 DIAGNOSIS — G25.81 RESTLESS LEGS SYNDROME: ICD-10-CM

## 2020-01-02 PROCEDURE — 94640 AIRWAY INHALATION TREATMENT: CPT | Mod: HCNC,S$GLB,, | Performed by: FAMILY MEDICINE

## 2020-01-02 PROCEDURE — 3288F PR FALLS RISK ASSESSMENT DOCUMENTED: ICD-10-PCS | Mod: HCNC,CPTII,S$GLB, | Performed by: FAMILY MEDICINE

## 2020-01-02 PROCEDURE — 99499 RISK ADDL DX/OHS AUDIT: ICD-10-PCS | Mod: S$GLB,,, | Performed by: FAMILY MEDICINE

## 2020-01-02 PROCEDURE — 1100F PR PT FALLS ASSESS DOC 2+ FALLS/FALL W/INJURY/YR: ICD-10-PCS | Mod: HCNC,CPTII,S$GLB, | Performed by: FAMILY MEDICINE

## 2020-01-02 PROCEDURE — 99214 OFFICE O/P EST MOD 30 MIN: CPT | Mod: 25,HCNC,S$GLB, | Performed by: FAMILY MEDICINE

## 2020-01-02 PROCEDURE — 3288F FALL RISK ASSESSMENT DOCD: CPT | Mod: HCNC,CPTII,S$GLB, | Performed by: FAMILY MEDICINE

## 2020-01-02 PROCEDURE — 1100F PTFALLS ASSESS-DOCD GE2>/YR: CPT | Mod: HCNC,CPTII,S$GLB, | Performed by: FAMILY MEDICINE

## 2020-01-02 PROCEDURE — 99499 UNLISTED E&M SERVICE: CPT | Mod: S$GLB,,, | Performed by: FAMILY MEDICINE

## 2020-01-02 PROCEDURE — 99999 PR PBB SHADOW E&M-EST. PATIENT-LVL IV: ICD-10-PCS | Mod: PBBFAC,HCNC,, | Performed by: FAMILY MEDICINE

## 2020-01-02 PROCEDURE — 3078F PR MOST RECENT DIASTOLIC BLOOD PRESSURE < 80 MM HG: ICD-10-PCS | Mod: HCNC,CPTII,S$GLB, | Performed by: FAMILY MEDICINE

## 2020-01-02 PROCEDURE — 3078F DIAST BP <80 MM HG: CPT | Mod: HCNC,CPTII,S$GLB, | Performed by: FAMILY MEDICINE

## 2020-01-02 PROCEDURE — 1126F PR PAIN SEVERITY QUANTIFIED, NO PAIN PRESENT: ICD-10-PCS | Mod: HCNC,S$GLB,, | Performed by: FAMILY MEDICINE

## 2020-01-02 PROCEDURE — 1159F PR MEDICATION LIST DOCUMENTED IN MEDICAL RECORD: ICD-10-PCS | Mod: HCNC,S$GLB,, | Performed by: FAMILY MEDICINE

## 2020-01-02 PROCEDURE — 1126F AMNT PAIN NOTED NONE PRSNT: CPT | Mod: HCNC,S$GLB,, | Performed by: FAMILY MEDICINE

## 2020-01-02 PROCEDURE — 3074F PR MOST RECENT SYSTOLIC BLOOD PRESSURE < 130 MM HG: ICD-10-PCS | Mod: HCNC,CPTII,S$GLB, | Performed by: FAMILY MEDICINE

## 2020-01-02 PROCEDURE — 99214 PR OFFICE/OUTPT VISIT, EST, LEVL IV, 30-39 MIN: ICD-10-PCS | Mod: 25,HCNC,S$GLB, | Performed by: FAMILY MEDICINE

## 2020-01-02 PROCEDURE — 99999 PR PBB SHADOW E&M-EST. PATIENT-LVL IV: CPT | Mod: PBBFAC,HCNC,, | Performed by: FAMILY MEDICINE

## 2020-01-02 PROCEDURE — 94640 PR INHAL RX, AIRWAY OBST/DX SPUTUM INDUCT: ICD-10-PCS | Mod: HCNC,S$GLB,, | Performed by: FAMILY MEDICINE

## 2020-01-02 PROCEDURE — 3074F SYST BP LT 130 MM HG: CPT | Mod: HCNC,CPTII,S$GLB, | Performed by: FAMILY MEDICINE

## 2020-01-02 PROCEDURE — 96372 THER/PROPH/DIAG INJ SC/IM: CPT | Mod: 59,HCNC,S$GLB, | Performed by: FAMILY MEDICINE

## 2020-01-02 PROCEDURE — 96372 PR INJECTION,THERAP/PROPH/DIAG2ST, IM OR SUBCUT: ICD-10-PCS | Mod: 59,HCNC,S$GLB, | Performed by: FAMILY MEDICINE

## 2020-01-02 PROCEDURE — 1159F MED LIST DOCD IN RCRD: CPT | Mod: HCNC,S$GLB,, | Performed by: FAMILY MEDICINE

## 2020-01-02 RX ORDER — BETAMETHASONE SODIUM PHOSPHATE AND BETAMETHASONE ACETATE 3; 3 MG/ML; MG/ML
6 INJECTION, SUSPENSION INTRA-ARTICULAR; INTRALESIONAL; INTRAMUSCULAR; SOFT TISSUE
Status: COMPLETED | OUTPATIENT
Start: 2020-01-02 | End: 2020-01-02

## 2020-01-02 RX ORDER — LEVOFLOXACIN 500 MG/1
500 TABLET, FILM COATED ORAL DAILY
Qty: 7 TABLET | Refills: 0 | Status: SHIPPED | OUTPATIENT
Start: 2020-01-02 | End: 2020-04-08

## 2020-01-02 RX ORDER — OMEPRAZOLE 40 MG/1
40 CAPSULE, DELAYED RELEASE ORAL DAILY
Qty: 90 CAPSULE | Refills: 3 | Status: SHIPPED | OUTPATIENT
Start: 2020-01-02 | End: 2020-04-08

## 2020-01-02 RX ORDER — IPRATROPIUM BROMIDE AND ALBUTEROL SULFATE 2.5; .5 MG/3ML; MG/3ML
3 SOLUTION RESPIRATORY (INHALATION)
Status: COMPLETED | OUTPATIENT
Start: 2020-01-02 | End: 2020-01-02

## 2020-01-02 RX ORDER — PREDNISONE 20 MG/1
TABLET ORAL
Qty: 23 TABLET | Refills: 0 | Status: SHIPPED | OUTPATIENT
Start: 2020-01-02 | End: 2020-01-09 | Stop reason: ALTCHOICE

## 2020-01-02 RX ORDER — PRAMIPEXOLE DIHYDROCHLORIDE 1.5 MG/1
1.5 TABLET ORAL NIGHTLY
Qty: 90 TABLET | Refills: 1 | Status: SHIPPED | OUTPATIENT
Start: 2020-01-02 | End: 2020-06-05 | Stop reason: SDUPTHER

## 2020-01-02 RX ORDER — FUROSEMIDE 80 MG/1
80 TABLET ORAL DAILY
Qty: 90 TABLET | Refills: 1 | Status: SHIPPED | OUTPATIENT
Start: 2020-01-02 | End: 2020-06-01

## 2020-01-02 RX ORDER — SERTRALINE HYDROCHLORIDE 50 MG/1
100 TABLET, FILM COATED ORAL NIGHTLY
Qty: 180 TABLET | Refills: 1 | Status: SHIPPED | OUTPATIENT
Start: 2020-01-02 | End: 2020-06-10 | Stop reason: SDUPTHER

## 2020-01-02 RX ORDER — CEFTRIAXONE 1 G/1
1 INJECTION, POWDER, FOR SOLUTION INTRAMUSCULAR; INTRAVENOUS
Status: COMPLETED | OUTPATIENT
Start: 2020-01-02 | End: 2020-01-02

## 2020-01-02 RX ORDER — LOSARTAN POTASSIUM 50 MG/1
50 TABLET ORAL 2 TIMES DAILY
Qty: 180 TABLET | Refills: 3 | Status: SHIPPED | OUTPATIENT
Start: 2020-01-02 | End: 2020-10-12

## 2020-01-02 RX ORDER — ALENDRONATE SODIUM 70 MG/1
TABLET ORAL
Qty: 12 TABLET | Refills: 3 | Status: SHIPPED | OUTPATIENT
Start: 2020-01-02 | End: 2020-10-12

## 2020-01-02 RX ORDER — GABAPENTIN 600 MG/1
1200 TABLET ORAL 2 TIMES DAILY
Qty: 360 TABLET | Refills: 3 | Status: SHIPPED | OUTPATIENT
Start: 2020-01-02 | End: 2020-06-05 | Stop reason: SDUPTHER

## 2020-01-02 RX ORDER — ALBUTEROL SULFATE 90 UG/1
2 AEROSOL, METERED RESPIRATORY (INHALATION) EVERY 6 HOURS PRN
Qty: 3 INHALER | Refills: 3 | Status: SHIPPED | OUTPATIENT
Start: 2020-01-02 | End: 2020-01-07 | Stop reason: SDUPTHER

## 2020-01-02 RX ORDER — LORAZEPAM 0.5 MG/1
0.5 TABLET ORAL 2 TIMES DAILY PRN
Qty: 60 TABLET | Refills: 0 | Status: SHIPPED | OUTPATIENT
Start: 2020-01-02 | End: 2020-04-08

## 2020-01-02 RX ORDER — ALBUTEROL SULFATE 0.83 MG/ML
SOLUTION RESPIRATORY (INHALATION)
Qty: 90 ML | Refills: 11 | Status: SHIPPED | OUTPATIENT
Start: 2020-01-02 | End: 2020-01-07 | Stop reason: SDUPTHER

## 2020-01-02 RX ADMIN — IPRATROPIUM BROMIDE AND ALBUTEROL SULFATE 3 ML: 2.5; .5 SOLUTION RESPIRATORY (INHALATION) at 11:01

## 2020-01-02 RX ADMIN — CEFTRIAXONE 1 G: 1 INJECTION, POWDER, FOR SOLUTION INTRAMUSCULAR; INTRAVENOUS at 11:01

## 2020-01-02 RX ADMIN — BETAMETHASONE SODIUM PHOSPHATE AND BETAMETHASONE ACETATE 6 MG: 3; 3 INJECTION, SUSPENSION INTRA-ARTICULAR; INTRALESIONAL; INTRAMUSCULAR; SOFT TISSUE at 11:01

## 2020-01-02 NOTE — PROGRESS NOTES
Patient, Marta Huff (MRN #[id number]), presented with a recorded BMI of 51.87 kg/m^2 consistent with the definition of morbid obesity (ICD-10 E66.01). The patient's morbid obesity was monitored, evaluated, addressed and/or treated. This addendum to the medical record is made on 01/02/2020.

## 2020-01-02 NOTE — PROGRESS NOTES
"Subjective:       Patient ID: Marta Huff is a 74 y.o. female.    Chief Complaint: Shortness of Breath (x 2 weeks) and Wheezing    HPI  Patient with c/o sob and bloom x 2-4 weeks with wheezing, some dry cough. Afebrile throughout. On her nebulizer at home including inhalers. She was initially self-managing with her nebs, but it has slowly increased.    Review of Systems:  Review of Systems   Constitutional: Positive for fatigue. Negative for activity change and fever.   HENT: Negative for congestion, ear discharge, ear pain, facial swelling, hearing loss, postnasal drip, rhinorrhea and tinnitus.    Eyes: Negative for redness and itching.   Respiratory: Positive for cough, chest tightness, shortness of breath and wheezing. Negative for choking and stridor.    Cardiovascular: Negative for chest pain and leg swelling.   Gastrointestinal: Negative for diarrhea and nausea.   Musculoskeletal: Negative for arthralgias and myalgias.   Skin: Negative for color change and rash.   Neurological: Negative for dizziness and light-headedness.       Objective:     Vitals:    01/02/20 1054   BP: 128/70   BP Location: Left arm   Patient Position: Sitting   BP Method: Large (Manual)   Pulse: 76   Resp: 20   Temp: 98.3 °F (36.8 °C)   TempSrc: Oral   SpO2: (!) 91%   Weight: 116.5 kg (256 lb 13.4 oz)   Height: 4' 11" (1.499 m)          Physical Exam   Constitutional: She is oriented to person, place, and time. She appears well-developed and well-nourished. No distress.   HENT:   Head: Normocephalic and atraumatic.   Eyes: Conjunctivae are normal. Right eye exhibits no discharge. Left eye exhibits no discharge. No scleral icterus.   Neck: Normal range of motion. Neck supple.   Cardiovascular: Normal rate.   Pulmonary/Chest: Effort normal. No accessory muscle usage. No respiratory distress. She has decreased breath sounds. She has wheezes.   Abdominal: Soft. She exhibits no distension.   Musculoskeletal: Normal range of motion. " She exhibits no edema.   Neurological: She is alert and oriented to person, place, and time.   Skin: Skin is warm and dry. No rash noted.   Psychiatric: She has a normal mood and affect. Her behavior is normal.   Nursing note and vitals reviewed.         Improvement in ease of respiration, decrease in cough, and decrease in auscultated wheeze/rhonchi noted post-neb.     Assessment & Plan:  COPD with exacerbation  -     albuterol-ipratropium 2.5 mg-0.5 mg/3 mL nebulizer solution 3 mL  -     betamethasone acetate-betamethasone sodium phosphate injection 6 mg  -     albuterol-ipratropium 2.5 mg-0.5 mg/3 mL nebulizer solution 3 mL  -     Brain natriuretic peptide; Future; Expected date: 01/02/2020  -     cefTRIAXone injection 1 g  -     predniSONE (DELTASONE) 20 MG tablet; Take 2 tablets (40 mg total) by mouth 2 (two) times daily for 3 days, THEN 1 tablet (20 mg total) 2 (two) times daily for 3 days, THEN 1 tablet (20 mg total) once daily for 5 days.  Dispense: 23 tablet; Refill: 0  -     levoFLOXacin (LEVAQUIN) 500 MG tablet; Take 1 tablet (500 mg total) by mouth once daily.  Dispense: 7 tablet; Refill: 0  Start pred taper and abx. Continue nebs 3-4x/day.   May need extra lasix for diuresis.   Keep upcoming pulm appt.  Expected course of illness and sx tx incl otc med use reviewed. Notify MD if sx persist or worsen. Alarm sx reviewed for emergent f/u.  Reactive airway disease, unspecified asthma severity, uncomplicated  -     albuterol (PROAIR HFA) 90 mcg/actuation inhaler; Inhale 2 puffs into the lungs every 6 (six) hours as needed for Wheezing or Shortness of Breath.  Dispense: 3 Inhaler; Refill: 3  -     umeclidinium-vilanterol (ANORO ELLIPTA) 62.5-25 mcg/actuation DsDv; Inhale 1 puff into the lungs once daily. Controller  Dispense: 90 each; Refill: 3    Generalized osteoarthritis  -     alendronate (FOSAMAX) 70 MG tablet; TAKE 1 TABLET EVERY 7 DAYS  Dispense: 12 tablet; Refill: 3    Edema, unspecified type  -      Brain natriuretic peptide; Future; Expected date: 01/02/2020  -     furosemide (LASIX) 80 MG tablet; Take 1 tablet (80 mg total) by mouth once daily.  Dispense: 90 tablet; Refill: 1    Anxiety  -     LORazepam (ATIVAN) 0.5 MG tablet; Take 1 tablet (0.5 mg total) by mouth 2 (two) times daily as needed for Anxiety.  Dispense: 60 tablet; Refill: 0    Essential hypertension  -     losartan (COZAAR) 50 MG tablet; Take 1 tablet (50 mg total) by mouth 2 (two) times daily.  Dispense: 180 tablet; Refill: 3    Restless legs syndrome  -     gabapentin (NEURONTIN) 600 MG tablet; Take 2 tablets (1,200 mg total) by mouth 2 (two) times daily.  Dispense: 360 tablet; Refill: 3  -     pramipexole (MIRAPEX) 1.5 MG tablet; Take 1 tablet (1.5 mg total) by mouth nightly.  Dispense: 90 tablet; Refill: 1    Mild major depression  -     sertraline (ZOLOFT) 50 MG tablet; Take 2 tablets (100 mg total) by mouth every evening.  Dispense: 180 tablet; Refill: 1    Pulmonary hypertension  -     furosemide (LASIX) 80 MG tablet; Take 1 tablet (80 mg total) by mouth once daily.  Dispense: 90 tablet; Refill: 1    Other orders  -     albuterol (PROVENTIL) 2.5 mg /3 mL (0.083 %) nebulizer solution; INHALE THE CONTENTS OF 1 VIAL VIA NEBULIZER EVERY 6 HOURS AS NEEDED FOR  WHEEZING  OR  SHORTNESS OF BREATH  Dispense: 90 mL; Refill: 11  -     omeprazole (PRILOSEC) 40 MG capsule; Take 1 capsule (40 mg total) by mouth once daily.  Dispense: 90 capsule; Refill: 3

## 2020-01-03 ENCOUNTER — CLINICAL SUPPORT (OUTPATIENT)
Dept: FAMILY MEDICINE | Facility: CLINIC | Age: 75
End: 2020-01-03
Payer: MEDICARE

## 2020-01-03 ENCOUNTER — LAB VISIT (OUTPATIENT)
Dept: LAB | Facility: HOSPITAL | Age: 75
End: 2020-01-03
Attending: FAMILY MEDICINE
Payer: MEDICARE

## 2020-01-03 VITALS — OXYGEN SATURATION: 96 %

## 2020-01-03 DIAGNOSIS — R60.9 EDEMA, UNSPECIFIED TYPE: ICD-10-CM

## 2020-01-03 DIAGNOSIS — J44.1 COPD WITH EXACERBATION: ICD-10-CM

## 2020-01-03 DIAGNOSIS — J45.909 REACTIVE AIRWAY DISEASE, UNSPECIFIED ASTHMA SEVERITY, UNCOMPLICATED: Primary | ICD-10-CM

## 2020-01-03 LAB — BNP SERPL-MCNC: 155 PG/ML (ref 0–99)

## 2020-01-03 PROCEDURE — 96372 THER/PROPH/DIAG INJ SC/IM: CPT | Mod: HCNC,S$GLB,, | Performed by: FAMILY MEDICINE

## 2020-01-03 PROCEDURE — 96372 PR INJECTION,THERAP/PROPH/DIAG2ST, IM OR SUBCUT: ICD-10-PCS | Mod: HCNC,S$GLB,, | Performed by: FAMILY MEDICINE

## 2020-01-03 PROCEDURE — 99999 PR PBB SHADOW E&M-EST. PATIENT-LVL I: CPT | Mod: PBBFAC,HCNC,,

## 2020-01-03 PROCEDURE — 36415 COLL VENOUS BLD VENIPUNCTURE: CPT | Mod: HCNC,PN

## 2020-01-03 PROCEDURE — 83880 ASSAY OF NATRIURETIC PEPTIDE: CPT | Mod: HCNC

## 2020-01-03 PROCEDURE — 99999 PR PBB SHADOW E&M-EST. PATIENT-LVL I: ICD-10-PCS | Mod: PBBFAC,HCNC,,

## 2020-01-03 RX ORDER — BETAMETHASONE SODIUM PHOSPHATE AND BETAMETHASONE ACETATE 3; 3 MG/ML; MG/ML
6 INJECTION, SUSPENSION INTRA-ARTICULAR; INTRALESIONAL; INTRAMUSCULAR; SOFT TISSUE
Status: COMPLETED | OUTPATIENT
Start: 2020-01-03 | End: 2020-01-03

## 2020-01-03 RX ADMIN — BETAMETHASONE SODIUM PHOSPHATE AND BETAMETHASONE ACETATE 6 MG: 3; 3 INJECTION, SUSPENSION INTRA-ARTICULAR; INTRALESIONAL; INTRAMUSCULAR; SOFT TISSUE at 08:01

## 2020-01-03 NOTE — PROGRESS NOTES
Patient in clinic for f/u.   Notes breathing is ok at rest, but +bloom.   She was able to rest comfortably last night.   Afebrile. Cough is intermittently productive.   Maintaining nebs every 6hrs.   Started abx with steroids last night.   Check bnp today. Plan for extra half tablet of lasix today and over the weekend to continue diuresis with steroids.   Will give additional celestone 6mg today in clinic.   Switch to trelegy from anoro ellipta.   Repeat resting pox improved to 96%.   Alarm sx reviewed re: emergent f/u including increased frequency of nebs <4hrs. Pt expressed understanding.

## 2020-01-07 DIAGNOSIS — J45.909 REACTIVE AIRWAY DISEASE, UNSPECIFIED ASTHMA SEVERITY, UNCOMPLICATED: ICD-10-CM

## 2020-01-07 RX ORDER — ALBUTEROL SULFATE 90 UG/1
2 AEROSOL, METERED RESPIRATORY (INHALATION) EVERY 6 HOURS PRN
Qty: 3 INHALER | Refills: 3 | Status: SHIPPED | OUTPATIENT
Start: 2020-01-07 | End: 2021-04-05

## 2020-01-07 RX ORDER — ALBUTEROL SULFATE 0.83 MG/ML
SOLUTION RESPIRATORY (INHALATION)
Qty: 90 ML | Refills: 11 | Status: SHIPPED | OUTPATIENT
Start: 2020-01-07

## 2020-01-09 ENCOUNTER — HOSPITAL ENCOUNTER (OUTPATIENT)
Dept: RADIOLOGY | Facility: HOSPITAL | Age: 75
Discharge: HOME | End: 2020-01-09
Attending: INTERNAL MEDICINE
Payer: MEDICARE

## 2020-01-09 DIAGNOSIS — J45.40 MODERATE PERSISTENT ASTHMA WITHOUT COMPLICATION: ICD-10-CM

## 2020-01-09 PROCEDURE — 71046 X-RAY EXAM CHEST 2 VIEWS: CPT | Mod: 26,HCNC,, | Performed by: RADIOLOGY

## 2020-01-09 PROCEDURE — 71046 XR CHEST PA AND LATERAL: ICD-10-PCS | Mod: 26,HCNC,, | Performed by: RADIOLOGY

## 2020-01-09 PROCEDURE — 71046 X-RAY EXAM CHEST 2 VIEWS: CPT | Mod: TC,HCNC,FY,PO

## 2020-05-06 ENCOUNTER — PATIENT MESSAGE (OUTPATIENT)
Dept: ADMINISTRATIVE | Facility: HOSPITAL | Age: 75
End: 2020-05-06

## 2020-05-31 DIAGNOSIS — E78.5 HYPERLIPIDEMIA, UNSPECIFIED HYPERLIPIDEMIA TYPE: Primary | ICD-10-CM

## 2020-05-31 DIAGNOSIS — R60.9 EDEMA, UNSPECIFIED TYPE: ICD-10-CM

## 2020-05-31 DIAGNOSIS — I27.20 PULMONARY HYPERTENSION: ICD-10-CM

## 2020-06-01 RX ORDER — FUROSEMIDE 80 MG/1
TABLET ORAL
Qty: 90 TABLET | Refills: 0 | Status: SHIPPED | OUTPATIENT
Start: 2020-06-01 | End: 2020-10-30 | Stop reason: SDUPTHER

## 2020-06-01 NOTE — TELEPHONE ENCOUNTER
Attempted to contact pt to schedule follow up appt and labs.   No answer, left message for pt to call clinic back.

## 2020-06-02 ENCOUNTER — PATIENT OUTREACH (OUTPATIENT)
Dept: ADMINISTRATIVE | Facility: HOSPITAL | Age: 75
End: 2020-06-02

## 2020-06-02 ENCOUNTER — TELEPHONE (OUTPATIENT)
Dept: FAMILY MEDICINE | Facility: CLINIC | Age: 75
End: 2020-06-02

## 2020-06-02 NOTE — PROGRESS NOTES
Chart review completed 06/02/2020  Care Everywhere updates requested and reviewed.  Immunizations reconciled. Media reviewed.

## 2020-06-02 NOTE — TELEPHONE ENCOUNTER
Spoke with pt. Scheduled for labs on 6/4/20 and f/u appt on 6/10/20. Notified pt that her pre-op surgical clearance form for cataract surgery will be filled out and completed by Dr. Holley at follow up appt. Pt verbalized understanding.     Will schedule mammogram and Dexa scan at time of appt per pt. Request.

## 2020-06-02 NOTE — TELEPHONE ENCOUNTER
----- Message from Patricia Sanchez sent at 6/2/2020  7:52 AM CDT -----  Type:  Patient Returning Call    Who Called:  Patient  Who Left Message for Patient:  NA  Does the patient know what this is regarding?: clearance  Best Call Back Number:  [phone]  Additional Information:

## 2020-06-04 ENCOUNTER — LAB VISIT (OUTPATIENT)
Dept: LAB | Facility: HOSPITAL | Age: 75
End: 2020-06-04
Attending: FAMILY MEDICINE
Payer: MEDICARE

## 2020-06-04 DIAGNOSIS — E78.5 HYPERLIPIDEMIA, UNSPECIFIED HYPERLIPIDEMIA TYPE: ICD-10-CM

## 2020-06-04 LAB
ALBUMIN SERPL BCP-MCNC: 3.6 G/DL (ref 3.5–5.2)
ALP SERPL-CCNC: 110 U/L (ref 55–135)
ALT SERPL W/O P-5'-P-CCNC: 17 U/L (ref 10–44)
ANION GAP SERPL CALC-SCNC: 10 MMOL/L (ref 8–16)
AST SERPL-CCNC: 22 U/L (ref 10–40)
BASOPHILS # BLD AUTO: 0.05 K/UL (ref 0–0.2)
BASOPHILS NFR BLD: 0.7 % (ref 0–1.9)
BILIRUB SERPL-MCNC: 1 MG/DL (ref 0.1–1)
BUN SERPL-MCNC: 11 MG/DL (ref 8–23)
CALCIUM SERPL-MCNC: 8.9 MG/DL (ref 8.7–10.5)
CHLORIDE SERPL-SCNC: 103 MMOL/L (ref 95–110)
CHOLEST SERPL-MCNC: 144 MG/DL (ref 120–199)
CHOLEST/HDLC SERPL: 2.7 {RATIO} (ref 2–5)
CO2 SERPL-SCNC: 32 MMOL/L (ref 23–29)
CREAT SERPL-MCNC: 0.7 MG/DL (ref 0.5–1.4)
DIFFERENTIAL METHOD: ABNORMAL
EOSINOPHIL # BLD AUTO: 0.2 K/UL (ref 0–0.5)
EOSINOPHIL NFR BLD: 3.6 % (ref 0–8)
ERYTHROCYTE [DISTWIDTH] IN BLOOD BY AUTOMATED COUNT: 21.6 % (ref 11.5–14.5)
EST. GFR  (AFRICAN AMERICAN): >60 ML/MIN/1.73 M^2
EST. GFR  (NON AFRICAN AMERICAN): >60 ML/MIN/1.73 M^2
GLUCOSE SERPL-MCNC: 102 MG/DL (ref 70–110)
HCT VFR BLD AUTO: 38.5 % (ref 37–48.5)
HDLC SERPL-MCNC: 54 MG/DL (ref 40–75)
HDLC SERPL: 37.5 % (ref 20–50)
HGB BLD-MCNC: 10.9 G/DL (ref 12–16)
IMM GRANULOCYTES # BLD AUTO: 0.01 K/UL (ref 0–0.04)
IMM GRANULOCYTES NFR BLD AUTO: 0.1 % (ref 0–0.5)
LDLC SERPL CALC-MCNC: 77.6 MG/DL (ref 63–159)
LYMPHOCYTES # BLD AUTO: 2.1 K/UL (ref 1–4.8)
LYMPHOCYTES NFR BLD: 30.8 % (ref 18–48)
MCH RBC QN AUTO: 25.6 PG (ref 27–31)
MCHC RBC AUTO-ENTMCNC: 28.3 G/DL (ref 32–36)
MCV RBC AUTO: 90 FL (ref 82–98)
MONOCYTES # BLD AUTO: 0.5 K/UL (ref 0.3–1)
MONOCYTES NFR BLD: 7.5 % (ref 4–15)
NEUTROPHILS # BLD AUTO: 3.9 K/UL (ref 1.8–7.7)
NEUTROPHILS NFR BLD: 57.3 % (ref 38–73)
NONHDLC SERPL-MCNC: 90 MG/DL
NRBC BLD-RTO: 0 /100 WBC
PLATELET # BLD AUTO: 221 K/UL (ref 150–350)
PMV BLD AUTO: 12.3 FL (ref 9.2–12.9)
POTASSIUM SERPL-SCNC: 3.5 MMOL/L (ref 3.5–5.1)
PROT SERPL-MCNC: 7.2 G/DL (ref 6–8.4)
RBC # BLD AUTO: 4.26 M/UL (ref 4–5.4)
SODIUM SERPL-SCNC: 145 MMOL/L (ref 136–145)
TRIGL SERPL-MCNC: 62 MG/DL (ref 30–150)
TSH SERPL DL<=0.005 MIU/L-ACNC: 3.12 UIU/ML (ref 0.4–4)
WBC # BLD AUTO: 6.76 K/UL (ref 3.9–12.7)

## 2020-06-04 PROCEDURE — 85025 COMPLETE CBC W/AUTO DIFF WBC: CPT | Mod: HCNC

## 2020-06-04 PROCEDURE — 36415 COLL VENOUS BLD VENIPUNCTURE: CPT | Mod: HCNC,PN

## 2020-06-04 PROCEDURE — 80053 COMPREHEN METABOLIC PANEL: CPT | Mod: HCNC

## 2020-06-04 PROCEDURE — 80061 LIPID PANEL: CPT | Mod: HCNC

## 2020-06-04 PROCEDURE — 84443 ASSAY THYROID STIM HORMONE: CPT | Mod: HCNC

## 2020-06-10 ENCOUNTER — OFFICE VISIT (OUTPATIENT)
Dept: FAMILY MEDICINE | Facility: CLINIC | Age: 75
End: 2020-06-10
Payer: MEDICARE

## 2020-06-10 VITALS
SYSTOLIC BLOOD PRESSURE: 136 MMHG | RESPIRATION RATE: 16 BRPM | WEIGHT: 248.25 LBS | DIASTOLIC BLOOD PRESSURE: 78 MMHG | BODY MASS INDEX: 50.04 KG/M2 | HEART RATE: 72 BPM | OXYGEN SATURATION: 96 % | HEIGHT: 59 IN | TEMPERATURE: 99 F

## 2020-06-10 DIAGNOSIS — R60.9 EDEMA, UNSPECIFIED TYPE: ICD-10-CM

## 2020-06-10 DIAGNOSIS — H26.9 CATARACT, UNSPECIFIED CATARACT TYPE, UNSPECIFIED LATERALITY: ICD-10-CM

## 2020-06-10 DIAGNOSIS — I10 ESSENTIAL HYPERTENSION: ICD-10-CM

## 2020-06-10 DIAGNOSIS — F32.0 MILD MAJOR DEPRESSION: ICD-10-CM

## 2020-06-10 DIAGNOSIS — Z01.818 PREOP EXAMINATION: Primary | ICD-10-CM

## 2020-06-10 PROCEDURE — 99999 PR PBB SHADOW E&M-EST. PATIENT-LVL IV: CPT | Mod: PBBFAC,HCNC,, | Performed by: FAMILY MEDICINE

## 2020-06-10 PROCEDURE — 1126F AMNT PAIN NOTED NONE PRSNT: CPT | Mod: HCNC,S$GLB,, | Performed by: FAMILY MEDICINE

## 2020-06-10 PROCEDURE — 93005 ELECTROCARDIOGRAM TRACING: CPT | Mod: HCNC,S$GLB,, | Performed by: FAMILY MEDICINE

## 2020-06-10 PROCEDURE — 3075F SYST BP GE 130 - 139MM HG: CPT | Mod: HCNC,CPTII,S$GLB, | Performed by: FAMILY MEDICINE

## 2020-06-10 PROCEDURE — 1159F MED LIST DOCD IN RCRD: CPT | Mod: HCNC,S$GLB,, | Performed by: FAMILY MEDICINE

## 2020-06-10 PROCEDURE — 1159F PR MEDICATION LIST DOCUMENTED IN MEDICAL RECORD: ICD-10-PCS | Mod: HCNC,S$GLB,, | Performed by: FAMILY MEDICINE

## 2020-06-10 PROCEDURE — 99214 PR OFFICE/OUTPT VISIT, EST, LEVL IV, 30-39 MIN: ICD-10-PCS | Mod: HCNC,S$GLB,, | Performed by: FAMILY MEDICINE

## 2020-06-10 PROCEDURE — 99214 OFFICE O/P EST MOD 30 MIN: CPT | Mod: HCNC,S$GLB,, | Performed by: FAMILY MEDICINE

## 2020-06-10 PROCEDURE — 3078F PR MOST RECENT DIASTOLIC BLOOD PRESSURE < 80 MM HG: ICD-10-PCS | Mod: HCNC,CPTII,S$GLB, | Performed by: FAMILY MEDICINE

## 2020-06-10 PROCEDURE — 3075F PR MOST RECENT SYSTOLIC BLOOD PRESS GE 130-139MM HG: ICD-10-PCS | Mod: HCNC,CPTII,S$GLB, | Performed by: FAMILY MEDICINE

## 2020-06-10 PROCEDURE — 1126F PR PAIN SEVERITY QUANTIFIED, NO PAIN PRESENT: ICD-10-PCS | Mod: HCNC,S$GLB,, | Performed by: FAMILY MEDICINE

## 2020-06-10 PROCEDURE — 93010 ELECTROCARDIOGRAM REPORT: CPT | Mod: HCNC,S$GLB,, | Performed by: INTERNAL MEDICINE

## 2020-06-10 PROCEDURE — 1100F PTFALLS ASSESS-DOCD GE2>/YR: CPT | Mod: HCNC,CPTII,S$GLB, | Performed by: FAMILY MEDICINE

## 2020-06-10 PROCEDURE — 99999 PR PBB SHADOW E&M-EST. PATIENT-LVL IV: ICD-10-PCS | Mod: PBBFAC,HCNC,, | Performed by: FAMILY MEDICINE

## 2020-06-10 PROCEDURE — 3288F FALL RISK ASSESSMENT DOCD: CPT | Mod: HCNC,CPTII,S$GLB, | Performed by: FAMILY MEDICINE

## 2020-06-10 PROCEDURE — 93010 EKG 12-LEAD: ICD-10-PCS | Mod: HCNC,S$GLB,, | Performed by: INTERNAL MEDICINE

## 2020-06-10 PROCEDURE — 1100F PR PT FALLS ASSESS DOC 2+ FALLS/FALL W/INJURY/YR: ICD-10-PCS | Mod: HCNC,CPTII,S$GLB, | Performed by: FAMILY MEDICINE

## 2020-06-10 PROCEDURE — 93005 EKG 12-LEAD: ICD-10-PCS | Mod: HCNC,S$GLB,, | Performed by: FAMILY MEDICINE

## 2020-06-10 PROCEDURE — 3288F PR FALLS RISK ASSESSMENT DOCUMENTED: ICD-10-PCS | Mod: HCNC,CPTII,S$GLB, | Performed by: FAMILY MEDICINE

## 2020-06-10 PROCEDURE — 3078F DIAST BP <80 MM HG: CPT | Mod: HCNC,CPTII,S$GLB, | Performed by: FAMILY MEDICINE

## 2020-06-10 RX ORDER — SERTRALINE HYDROCHLORIDE 50 MG/1
100 TABLET, FILM COATED ORAL NIGHTLY
Qty: 180 TABLET | Refills: 1 | Status: SHIPPED | OUTPATIENT
Start: 2020-06-10 | End: 2020-09-11

## 2020-06-10 RX ORDER — CALCIUM CARBONATE 500(1250)
1 TABLET ORAL DAILY
COMMUNITY
End: 2021-09-21

## 2020-06-10 NOTE — PROGRESS NOTES
Patient ID: Marta Huff is a 74 y.o. female.    Chief Complaint: Pre-op Exam (cataracts surgery, not scheduled at this time. Goes for pre-op 6/11/20 at eye clinic. Form for clearance.)      Marta Huff is in the office for cataract preop.    HPI  Anticipates R then L cataract procedure with ophtho/notaroberto.  Past Medical History:   Diagnosis Date    Anxiety     Arthralgia of knee     arthralgia of bilateral knees secondary to djd    Arthritis     Back pain     Depression     Encounter for blood transfusion     AUTOLOGUS    GERD (gastroesophageal reflux disease)     Hiatal hernia     repaired in 1979    History of seasonal allergies     History of shingles     Hypertension     Insomnia     Obesity     Osteoporosis     Pneumonia     Reactive airway disease     Restless legs syndrome     Rheumatic fever     at 4 y/o    Sleep apnea     no cpap     Sees pulm/padma.   Takes 1 tablet of zoloft approx 3 days/week for mood. Aware it would be more effective if daily.    Current Outpatient Medications:     albuterol (PROAIR HFA) 90 mcg/actuation inhaler, Inhale 2 puffs into the lungs every 6 (six) hours as needed for Wheezing or Shortness of Breath., Disp: 3 Inhaler, Rfl: 3    albuterol (PROVENTIL) 2.5 mg /3 mL (0.083 %) nebulizer solution, INHALE THE CONTENTS OF 1 VIAL VIA NEBULIZER EVERY 6 HOURS AS NEEDED FOR  WHEEZING  OR  SHORTNESS OF BREATH, Disp: 90 mL, Rfl: 11    alendronate (FOSAMAX) 70 MG tablet, TAKE 1 TABLET EVERY 7 DAYS, Disp: 12 tablet, Rfl: 3    B-complex with vitamin C (Z-BEC OR EQUIV) tablet, Take 1 tablet by mouth once daily. , Disp: , Rfl:     BIOTIN/CALCIUM CARBONATE (BIOTIN 100+10 ORAL), Take 1 capsule by mouth once daily. , Disp: , Rfl:     BREO ELLIPTA 200-25 mcg/dose DsDv diskus inhaler, INHALE 1 PUFF INTO THE LUNGS ONCE DAILY. CONTROLLER, Disp: 1 each, Rfl: 5    calcium carbonate (OS-SHANNAN) 500 mg calcium (1,250 mg) tablet, Take 1 tablet by mouth  once daily., Disp: , Rfl:     cholecalciferol, vitamin D3, 5,000 unit capsule, Take 1 capsule (5,000 Units total) by mouth once daily., Disp: 90 capsule, Rfl: 3    furosemide (LASIX) 80 MG tablet, TAKE 1 TABLET ONE TIME DAILY, Disp: 90 tablet, Rfl: 0    gabapentin (NEURONTIN) 600 MG tablet, TAKE 2 TABLETS TWICE DAILY, Disp: 360 tablet, Rfl: 3    losartan (COZAAR) 50 MG tablet, Take 1 tablet (50 mg total) by mouth 2 (two) times daily., Disp: 180 tablet, Rfl: 3    montelukast (SINGULAIR) 10 mg tablet, Take 1 tablet (10 mg total) by mouth every evening., Disp: 90 tablet, Rfl: 3    multivitamin (MULTIVITAMIN) per tablet, Take 1 tablet by mouth once daily.  , Disp: , Rfl:     naproxen sodium (ALEVE ORAL), Take 1 Dose by mouth daily as needed., Disp: , Rfl:     pramipexole (MIRAPEX) 1.5 MG tablet, TAKE 1 TABLET EVERY NIGHT, Disp: 90 tablet, Rfl: 1    sertraline (ZOLOFT) 50 MG tablet, Take 2 tablets (100 mg total) by mouth every evening., Disp: 180 tablet, Rfl: 1    VITAMIN B-12 5,000 mcg Subl, Place 1 tablet under the tongue once a week. , Disp: , Rfl:     calcium citrate-vitamin D2 1,500-200 mg-unit Tab, Take 1 tablet by mouth once daily. , Disp: , Rfl:     omega 3-dha-epa-fish oil (FISH OIL) 1,000 mg (120 mg-180 mg) Cap, Take 2 capsules by mouth 2 (two) times daily., Disp: 360 capsule, Rfl: 3    The 10-year ASCVD risk score ([place] FINA Gamble., et al., 2013) is: 20.4%    Values used to calculate the score:      Age: 74 years      Sex: Female      Is Non- : No      Diabetic: No      Tobacco smoker: No      Systolic Blood Pressure: 136 mmHg      Is BP treated: Yes      HDL Cholesterol: 54 mg/dL      Total Cholesterol: 144 mg/dL     Wt Readings from Last 3 Encounters:   06/10/20 112.6 kg (248 lb 3.8 oz)   01/09/20 114.8 kg (253 lb)   01/02/20 116.5 kg (256 lb 13.4 oz)     Temp Readings from Last 3 Encounters:   06/10/20 98.8 °F (37.1 °C) (Oral)   01/02/20 98.3 °F (36.8 °C) (Oral)   10/07/19  97.8 °F (36.6 °C)     BP Readings from Last 3 Encounters:   06/10/20 136/78   01/09/20 134/82   01/02/20 128/70     Pulse Readings from Last 3 Encounters:   06/10/20 72   01/09/20 72   01/02/20 76     Resp Readings from Last 3 Encounters:   06/10/20 16   01/02/20 20   10/07/19 20     PF Readings from Last 3 Encounters:   No data found for PF     SpO2 Readings from Last 3 Encounters:   01/09/20 (!) 93%   01/03/20 96%   01/02/20 (!) 94%        Lab Results   Component Value Date    HGBA1C 5.5 10/30/2018    HGBA1C 5.9 04/11/2017    HGBA1C 6.1 11/19/2013     Lab Results   Component Value Date    LDLCALC 77.6 06/04/2020    CREATININE 0.7 06/04/2020   labs 2020 rev.  Discussed due for ekg update, echo bc of fluid retention.     Review of Systems   Constitutional: Negative for activity change, fatigue and fever.   HENT: Positive for rhinorrhea. Negative for congestion, ear pain and postnasal drip.    Eyes: Negative for redness and itching.   Respiratory: Positive for apnea (she restarted use of her cpap) and wheezing (occ). Negative for cough, choking, chest tightness, shortness of breath and stridor.    Cardiovascular: Positive for leg swelling. Negative for chest pain and palpitations.   Gastrointestinal: Negative for blood in stool, diarrhea and nausea.   Genitourinary: Positive for frequency. Negative for difficulty urinating.   Musculoskeletal: Negative for gait problem and myalgias.   Skin: Negative for color change and rash.   Neurological: Negative for dizziness and light-headedness.   Psychiatric/Behavioral: Negative for dysphoric mood and sleep disturbance.           Objective:      Physical Exam   Constitutional: She is oriented to person, place, and time. She appears well-developed and well-nourished. No distress.   HENT:   Head: Normocephalic and atraumatic.   Eyes: Conjunctivae are normal. Right eye exhibits no discharge. Left eye exhibits no discharge. No scleral icterus.   Neck: Normal range of motion. Neck  supple.   Cardiovascular: Normal rate.   Murmur heard.  Pulmonary/Chest: Effort normal. No respiratory distress.   No wheeze   Abdominal: Soft. She exhibits no distension.   Musculoskeletal: She exhibits edema (1+pitting, ble).        Right shoulder: She exhibits decreased range of motion (some pain when she tries to go overhead).   Neurological: She is alert and oriented to person, place, and time.   Skin: Skin is warm and dry. No rash noted.   Psychiatric: She has a normal mood and affect. Her behavior is normal.   Nursing note and vitals reviewed.          Screening recommendations appropriate to age and health status were reviewed.    Preop examination  Low risk by RCRI for planned procedure.  Essential hypertension  -     EKG 12-lead; Future  Update for review.   Edema, unspecified type  -     Echo Color Flow Doppler? Yes; Future  -     Ambulatory referral/consult to Cardiology; Future; Expected date: 06/17/2020  Cont lasix. Needs f/u in cards with echo re: persistent fluid retention on lasix in conjunction with pulm htn.  Mild major depression  -     sertraline (ZOLOFT) 50 MG tablet; Take 2 tablets (100 mg total) by mouth every evening.  Dispense: 180 tablet; Refill: 1  Resume at 1 tablet nightly. Med check 6 weeks.   Cataract, unspecified cataract type, unspecified laterality  Per ophtho.      RCRI risk factors include: high risk type of surgery, history of ischemic disease, history of heart failure, history of cerebrovascular disease, diabetes requiring treatment with insulin, pre-op serum creatinine >2.0 and no known RCRI risk factors. As such, per RCRI the risk of cardiac death, nonfatal myocardial infarction, or nonfatal cardiac arrest is 0.4% and the risk of myocardial infarction, pulmonary edema, ventricular fibrillation, primary cardiac arrest, or complete heart block is 0.5%.  Overall this patient can be considered low risk for this low risk procedure. No further cardiac testing is recommended at this  time.     Patient denies any symptoms (as per HPI) concerning for undiagnosed lung disease including JESENIA. Would not recommend obtaining chest X-ray, sleep study, or PFTs at this time. Patient is a non-smoker. We discussed the benefits of early mobilization and deep breathing after surgery.      Screened patient for alcohol misuse, use of illicit drugs, and personal or family history of anesthetic complications or bleeding diathesis and no substantial concerns were identified.     All current medications were reviewed and at this time no changes to medications are recommended prior to surgery.     I recommend use of standard pre-op and post-op precautions for this patient. In my opinion, she is medically optimized for this procedure, and can proceed without further evaluation.

## 2020-06-16 ENCOUNTER — PATIENT OUTREACH (OUTPATIENT)
Dept: ADMINISTRATIVE | Facility: OTHER | Age: 75
End: 2020-06-16

## 2020-06-16 DIAGNOSIS — Z12.11 ENCOUNTER FOR FECAL IMMUNOCHEMICAL TEST SCREENING: Primary | ICD-10-CM

## 2020-06-17 ENCOUNTER — OFFICE VISIT (OUTPATIENT)
Dept: RHEUMATOLOGY | Facility: CLINIC | Age: 75
End: 2020-06-17
Payer: MEDICARE

## 2020-06-17 VITALS
DIASTOLIC BLOOD PRESSURE: 80 MMHG | WEIGHT: 248 LBS | HEART RATE: 71 BPM | BODY MASS INDEX: 50 KG/M2 | SYSTOLIC BLOOD PRESSURE: 136 MMHG | HEIGHT: 59 IN

## 2020-06-17 DIAGNOSIS — M15.9 GENERALIZED OSTEOARTHRITIS: ICD-10-CM

## 2020-06-17 DIAGNOSIS — M75.51 ACUTE SHOULDER BURSITIS, RIGHT: Primary | ICD-10-CM

## 2020-06-17 PROCEDURE — 3079F PR MOST RECENT DIASTOLIC BLOOD PRESSURE 80-89 MM HG: ICD-10-PCS | Mod: HCNC,CPTII,S$GLB, | Performed by: INTERNAL MEDICINE

## 2020-06-17 PROCEDURE — 3079F DIAST BP 80-89 MM HG: CPT | Mod: HCNC,CPTII,S$GLB, | Performed by: INTERNAL MEDICINE

## 2020-06-17 PROCEDURE — 1159F PR MEDICATION LIST DOCUMENTED IN MEDICAL RECORD: ICD-10-PCS | Mod: HCNC,S$GLB,, | Performed by: INTERNAL MEDICINE

## 2020-06-17 PROCEDURE — 3075F PR MOST RECENT SYSTOLIC BLOOD PRESS GE 130-139MM HG: ICD-10-PCS | Mod: HCNC,CPTII,S$GLB, | Performed by: INTERNAL MEDICINE

## 2020-06-17 PROCEDURE — 99999 PR PBB SHADOW E&M-EST. PATIENT-LVL IV: CPT | Mod: PBBFAC,HCNC,, | Performed by: INTERNAL MEDICINE

## 2020-06-17 PROCEDURE — 99499 UNLISTED E&M SERVICE: CPT | Mod: HCNC,S$GLB,, | Performed by: INTERNAL MEDICINE

## 2020-06-17 PROCEDURE — 1101F PT FALLS ASSESS-DOCD LE1/YR: CPT | Mod: HCNC,CPTII,S$GLB, | Performed by: INTERNAL MEDICINE

## 2020-06-17 PROCEDURE — 99214 OFFICE O/P EST MOD 30 MIN: CPT | Mod: 25,HCNC,S$GLB, | Performed by: INTERNAL MEDICINE

## 2020-06-17 PROCEDURE — 3008F BODY MASS INDEX DOCD: CPT | Mod: HCNC,CPTII,S$GLB, | Performed by: INTERNAL MEDICINE

## 2020-06-17 PROCEDURE — 99499 RISK ADDL DX/OHS AUDIT: ICD-10-PCS | Mod: HCNC,S$GLB,, | Performed by: INTERNAL MEDICINE

## 2020-06-17 PROCEDURE — 1101F PR PT FALLS ASSESS DOC 0-1 FALLS W/OUT INJ PAST YR: ICD-10-PCS | Mod: HCNC,CPTII,S$GLB, | Performed by: INTERNAL MEDICINE

## 2020-06-17 PROCEDURE — 20610 LARGE JOINT ASPIRATION/INJECTION: R SUBCORACOID BURSA: ICD-10-PCS | Mod: HCNC,RT,S$GLB, | Performed by: INTERNAL MEDICINE

## 2020-06-17 PROCEDURE — 1125F AMNT PAIN NOTED PAIN PRSNT: CPT | Mod: HCNC,S$GLB,, | Performed by: INTERNAL MEDICINE

## 2020-06-17 PROCEDURE — 1159F MED LIST DOCD IN RCRD: CPT | Mod: HCNC,S$GLB,, | Performed by: INTERNAL MEDICINE

## 2020-06-17 PROCEDURE — 20610 DRAIN/INJ JOINT/BURSA W/O US: CPT | Mod: HCNC,RT,S$GLB, | Performed by: INTERNAL MEDICINE

## 2020-06-17 PROCEDURE — 3008F PR BODY MASS INDEX (BMI) DOCUMENTED: ICD-10-PCS | Mod: HCNC,CPTII,S$GLB, | Performed by: INTERNAL MEDICINE

## 2020-06-17 PROCEDURE — 99999 PR PBB SHADOW E&M-EST. PATIENT-LVL IV: ICD-10-PCS | Mod: PBBFAC,HCNC,, | Performed by: INTERNAL MEDICINE

## 2020-06-17 PROCEDURE — 99214 PR OFFICE/OUTPT VISIT, EST, LEVL IV, 30-39 MIN: ICD-10-PCS | Mod: 25,HCNC,S$GLB, | Performed by: INTERNAL MEDICINE

## 2020-06-17 PROCEDURE — 1125F PR PAIN SEVERITY QUANTIFIED, PAIN PRESENT: ICD-10-PCS | Mod: HCNC,S$GLB,, | Performed by: INTERNAL MEDICINE

## 2020-06-17 PROCEDURE — 3075F SYST BP GE 130 - 139MM HG: CPT | Mod: HCNC,CPTII,S$GLB, | Performed by: INTERNAL MEDICINE

## 2020-06-17 RX ORDER — METHYLPREDNISOLONE ACETATE 40 MG/ML
40 INJECTION, SUSPENSION INTRA-ARTICULAR; INTRALESIONAL; INTRAMUSCULAR; SOFT TISSUE
Status: DISCONTINUED | OUTPATIENT
Start: 2020-06-17 | End: 2020-06-17 | Stop reason: HOSPADM

## 2020-06-17 RX ORDER — DULOXETIN HYDROCHLORIDE 30 MG/1
30 CAPSULE, DELAYED RELEASE ORAL DAILY
Qty: 30 CAPSULE | Refills: 6 | Status: SHIPPED | OUTPATIENT
Start: 2020-06-17 | End: 2020-07-08 | Stop reason: SDUPTHER

## 2020-06-17 RX ADMIN — METHYLPREDNISOLONE ACETATE 40 MG: 40 INJECTION, SUSPENSION INTRA-ARTICULAR; INTRALESIONAL; INTRAMUSCULAR; SOFT TISSUE at 09:06

## 2020-06-17 NOTE — PATIENT INSTRUCTIONS
1. Decrease Sertraline to 100mg at HS  2. Start Cymbalta 30mg daily      If you tolerate Cymbalta and we want to increase we can stop sertraline altogether and see if Cymbalta will work better for both depression/anxiety and arthritis.

## 2020-06-17 NOTE — PROGRESS NOTES
Subjective:          Chief Complaint: Marta Huff is a 74 y.o. female who had concerns including Disease Management.    HPI:    Patient is a 73-year-old female previously seen by Dr. Mcintyre   Last visit bilateral GTB injections 7/2019. Did very well with injections. Still noting benefit and relief from this.   Fall with right 5th finger fracture now healed.   B/l shoulder reduction in ROM, we attempted HEP but still with Right with persistent pain.   Hips are doing great!.  Still working at using elliptical.       Dx:  osteoarthritis this has diffuse as well as peripheral neuropathy.    She was using gabapentin  2400 mg daily.  Tramadol p.r.n. Zanaflex p.r.n..   Stopped gabapentin with no difference in the pain.   More recently patient had been following with pain management as well as being seen with neurosurgery for spinal cord stimulator.  A noting 85% relief of the foot pain.     She is having significant pain in bilateral hips to gluteal region and interferes with walking and with using the elliptical.  Previous 45min and tolerated, now only 15min and hips begin to hurt.   Last year they have progressively worsened. She still has pain in back but this is not a constant complaint for her. She is sleeping on sides and seems to tolerate, is is the lumbosacral junction that hurts at night.   Exacerbated with sitting prolonged period. At some times she uses walker due to pain.   Hands with stiffness PIP and DIP joints long standing cannot fully curl fingers. +deformity, intermittent swelling.     Interaction with etodolac, but renal function wnl.   She does tolerate NSAID: takes aleve 440mg in AM and PRN at night. No hx of GIB.         REVIEW OF SYSTEMS:    Review of Systems   Constitutional: Negative for fever, malaise/fatigue and weight loss.   HENT: Negative for sore throat.    Eyes: Negative for double vision, photophobia and redness.   Respiratory: Negative for cough, shortness of breath and wheezing.     Cardiovascular: Negative for chest pain, palpitations and orthopnea.   Gastrointestinal: Negative for abdominal pain, constipation and diarrhea.   Genitourinary: Negative for dysuria, hematuria and urgency.   Musculoskeletal: Positive for joint pain. Negative for back pain and myalgias.   Skin: Negative for rash.   Neurological: Negative for dizziness, tingling, focal weakness and headaches.   Endo/Heme/Allergies: Does not bruise/bleed easily.   Psychiatric/Behavioral: Negative for depression, hallucinations and suicidal ideas.               Objective:            Past Medical History:   Diagnosis Date    Anxiety     Arthralgia of knee     arthralgia of bilateral knees secondary to djd    Arthritis     Back pain     Depression     Encounter for blood transfusion     AUTOLOGUS    GERD (gastroesophageal reflux disease)     Hiatal hernia     repaired in 1979    History of seasonal allergies     History of shingles     Hypertension     Insomnia     Obesity     Osteoporosis     Pneumonia     Reactive airway disease     Restless legs syndrome     Rheumatic fever     at 6 y/o    Sleep apnea     no cpap     Family History   Problem Relation Age of Onset    Heart disease Mother     Hypertension Mother     Diabetes Mother     Obesity Mother     Heart disease Maternal Grandfather      Social History     Tobacco Use    Smoking status: Passive Smoke Exposure - Never Smoker    Smokeless tobacco: Never Used   Substance Use Topics    Alcohol use: Yes     Comment: 1-2 glasses of wine monthly    Drug use: No         Current Outpatient Medications on File Prior to Visit   Medication Sig Dispense Refill    albuterol (PROAIR HFA) 90 mcg/actuation inhaler Inhale 2 puffs into the lungs every 6 (six) hours as needed for Wheezing or Shortness of Breath. 3 Inhaler 3    albuterol (PROVENTIL) 2.5 mg /3 mL (0.083 %) nebulizer solution INHALE THE CONTENTS OF 1 VIAL VIA NEBULIZER EVERY 6 HOURS AS NEEDED FOR   WHEEZING  OR  SHORTNESS OF BREATH 90 mL 11    alendronate (FOSAMAX) 70 MG tablet TAKE 1 TABLET EVERY 7 DAYS 12 tablet 3    B-complex with vitamin C (Z-BEC OR EQUIV) tablet Take 1 tablet by mouth once daily.       BIOTIN/CALCIUM CARBONATE (BIOTIN 100+10 ORAL) Take 1 capsule by mouth once daily.       BREO ELLIPTA 200-25 mcg/dose DsDv diskus inhaler INHALE 1 PUFF INTO THE LUNGS ONCE DAILY. CONTROLLER 1 each 5    calcium carbonate (OS-SHANNAN) 500 mg calcium (1,250 mg) tablet Take 1 tablet by mouth once daily.      cholecalciferol, vitamin D3, 5,000 unit capsule Take 1 capsule (5,000 Units total) by mouth once daily. 90 capsule 3    furosemide (LASIX) 80 MG tablet TAKE 1 TABLET ONE TIME DAILY 90 tablet 0    gabapentin (NEURONTIN) 600 MG tablet TAKE 2 TABLETS TWICE DAILY 360 tablet 3    losartan (COZAAR) 50 MG tablet Take 1 tablet (50 mg total) by mouth 2 (two) times daily. 180 tablet 3    montelukast (SINGULAIR) 10 mg tablet Take 1 tablet (10 mg total) by mouth every evening. 90 tablet 3    multivitamin (MULTIVITAMIN) per tablet Take 1 tablet by mouth once daily.        pramipexole (MIRAPEX) 1.5 MG tablet TAKE 1 TABLET EVERY NIGHT 90 tablet 1    sertraline (ZOLOFT) 50 MG tablet Take 2 tablets (100 mg total) by mouth every evening. 180 tablet 1    VITAMIN B-12 5,000 mcg Subl Place 1 tablet under the tongue once a week.       naproxen sodium (ALEVE ORAL) Take 1 Dose by mouth daily as needed.      omega 3-dha-epa-fish oil (FISH OIL) 1,000 mg (120 mg-180 mg) Cap Take 2 capsules by mouth 2 (two) times daily. 360 capsule 3     No current facility-administered medications on file prior to visit.        Vitals:    06/17/20 0847   BP: 136/80   Pulse: 71       Physical Exam:    Physical Exam  Constitutional:       Appearance: Normal appearance. She is well-developed.   HENT:      Nose: No septal deviation.      Mouth/Throat:      Mouth: No oral lesions.   Eyes:      Conjunctiva/sclera:      Right eye: Right  conjunctiva is not injected.      Left eye: Left conjunctiva is not injected.      Pupils: Pupils are equal, round, and reactive to light.   Neck:      Thyroid: No thyroid mass or thyromegaly.      Vascular: No JVD.   Cardiovascular:      Rate and Rhythm: Normal rate and regular rhythm.      Pulses: Normal pulses.      Comments: No edema  Pulmonary:      Effort: Pulmonary effort is normal.      Breath sounds: Normal breath sounds.   Abdominal:      Palpations: Abdomen is soft.   Musculoskeletal:      Right shoulder: She exhibits decreased range of motion. She exhibits no tenderness and no swelling.      Left shoulder: She exhibits decreased range of motion. She exhibits no tenderness and no swelling.      Right elbow: She exhibits normal range of motion and no swelling. No tenderness found.      Left elbow: She exhibits normal range of motion and no swelling. No tenderness found.      Right wrist: She exhibits normal range of motion, no tenderness and no swelling.      Left wrist: She exhibits normal range of motion, no tenderness and no swelling.      Right hip: She exhibits normal range of motion, normal strength, no bony tenderness and no swelling.      Left hip: She exhibits normal range of motion, no tenderness, no bony tenderness and no swelling.      Right knee: She exhibits normal range of motion and no swelling. No tenderness found.      Left knee: She exhibits normal range of motion and no swelling. No tenderness found.      Right ankle: She exhibits normal range of motion and no swelling. No tenderness.      Left ankle: She exhibits normal range of motion and no swelling. No tenderness.      Right hand: She exhibits decreased range of motion.      Left hand: She exhibits decreased range of motion.      Comments: Bony hypertrophy PIP and DIP joints. Squaring CMC joint. Tenderness along gr. Troch and ITB. Negative exacerbation SLR some LBP.    Lymphadenopathy:      Cervical: No cervical adenopathy.    Skin:     General: Skin is dry.   Neurological:      Deep Tendon Reflexes: Reflexes are normal and symmetric.               Assessment:       No diagnosis found.       Plan:        There are no diagnoses linked to this encounter.      Delightful patient with greater trochanteric bursitis and bursitis  shoulders.       -handout shoulder exercises.    Continue with aleve PRN   -biofreeze prn.     No follow-ups on file.           30min consultation with greater than 50% spent in counseling, chart review and coordination of care. All questions answered.

## 2020-06-17 NOTE — PROCEDURES
Large Joint Aspiration/Injection: R subcoracoid bursa    Date/Time: 6/17/2020 9:00 AM  Performed by: Adwoa Jaime DO  Authorized by: Adwoa Jaime, DO     Consent Done?:  Yes (Verbal)  Site marked: the procedure site was marked    Timeout: prior to procedure the correct patient, procedure, and site was verified      Local anesthesia used?: Yes    Local anesthetic:  Lidocaine 1% without epinephrine  Anesthetic total (ml):  3      Details:  Needle Size:  25 G  Ultrasonic Guidance for needle placement?: No    Approach:  Posterior  Location:  Shoulder  Site:  R subcoracoid bursa  Medications:  40 mg methylPREDNISolone acetate 40 mg/mL  Patient tolerance:  Patient tolerated the procedure well with no immediate complications     Patient informed of the risks, benefits, side effects of corticosteroid injections including but not limited to skin hypopigmentation, atrophy, ineffectiveness and infection.

## 2020-06-23 ENCOUNTER — TELEPHONE (OUTPATIENT)
Dept: FAMILY MEDICINE | Facility: CLINIC | Age: 75
End: 2020-06-23

## 2020-06-23 NOTE — TELEPHONE ENCOUNTER
Rec'd fax request from [facility] to clarify if pt should be taking both Sertraline and Duloxetine.  LOV 6/10/20. Please review and advise.    Sertraline 50mg daily-last refilled 6/10/20  Duloxetine DR 30mg-last refiled 6/17/20

## 2020-06-24 NOTE — TELEPHONE ENCOUNTER
Spoke with pharmacy and verified that pt is able to take both medications.  Pharmacist verbalized understanding.

## 2020-06-26 ENCOUNTER — PATIENT MESSAGE (OUTPATIENT)
Dept: FAMILY MEDICINE | Facility: CLINIC | Age: 75
End: 2020-06-26

## 2020-06-30 ENCOUNTER — TELEPHONE (OUTPATIENT)
Dept: FAMILY MEDICINE | Facility: CLINIC | Age: 75
End: 2020-06-30

## 2020-06-30 NOTE — TELEPHONE ENCOUNTER
Rec'd PA request from [employer](fax [phone]) for sertraline, as pt is on this AND duloxetine at this time. .   Initiated PA via [employer].  KEY:[id number]  Dx code: F32.0, F41.9     Awaiting determination response.

## 2020-07-01 NOTE — TELEPHONE ENCOUNTER
Rec'd approval drug coverage letter from [facility]. Approval of Sertraline 50mg until 12/31/2020

## 2020-07-06 ENCOUNTER — PATIENT MESSAGE (OUTPATIENT)
Dept: FAMILY MEDICINE | Facility: CLINIC | Age: 75
End: 2020-07-06

## 2020-07-06 DIAGNOSIS — M15.9 GENERALIZED OSTEOARTHRITIS: ICD-10-CM

## 2020-07-08 ENCOUNTER — PATIENT MESSAGE (OUTPATIENT)
Dept: RHEUMATOLOGY | Facility: CLINIC | Age: 75
End: 2020-07-08

## 2020-07-08 RX ORDER — DULOXETIN HYDROCHLORIDE 30 MG/1
30 CAPSULE, DELAYED RELEASE ORAL DAILY
Qty: 30 CAPSULE | Refills: 1 | Status: SHIPPED | OUTPATIENT
Start: 2020-07-08 | End: 2020-07-10

## 2020-07-10 RX ORDER — DULOXETIN HYDROCHLORIDE 60 MG/1
60 CAPSULE, DELAYED RELEASE ORAL DAILY
Qty: 90 CAPSULE | Refills: 3 | Status: SHIPPED | OUTPATIENT
Start: 2020-07-10 | End: 2021-02-23 | Stop reason: SDUPTHER

## 2020-07-17 ENCOUNTER — PATIENT OUTREACH (OUTPATIENT)
Dept: ADMINISTRATIVE | Facility: OTHER | Age: 75
End: 2020-07-17

## 2020-07-20 ENCOUNTER — OFFICE VISIT (OUTPATIENT)
Dept: CARDIOLOGY | Facility: CLINIC | Age: 75
End: 2020-07-20
Attending: FAMILY MEDICINE
Payer: MEDICARE

## 2020-07-20 ENCOUNTER — IMMUNIZATION (OUTPATIENT)
Dept: PHARMACY | Facility: CLINIC | Age: 75
End: 2020-07-20
Payer: MEDICARE

## 2020-07-20 VITALS
DIASTOLIC BLOOD PRESSURE: 82 MMHG | SYSTOLIC BLOOD PRESSURE: 132 MMHG | HEIGHT: 59 IN | HEART RATE: 74 BPM | WEIGHT: 249.56 LBS | BODY MASS INDEX: 50.31 KG/M2

## 2020-07-20 VITALS — WEIGHT: 248 LBS | BODY MASS INDEX: 50 KG/M2 | HEIGHT: 59 IN | HEART RATE: 75 BPM

## 2020-07-20 DIAGNOSIS — J44.9 CHRONIC OBSTRUCTIVE PULMONARY DISEASE, UNSPECIFIED COPD TYPE: ICD-10-CM

## 2020-07-20 DIAGNOSIS — R60.9 EDEMA, UNSPECIFIED TYPE: ICD-10-CM

## 2020-07-20 DIAGNOSIS — J45.40 MODERATE PERSISTENT ASTHMA WITHOUT COMPLICATION: ICD-10-CM

## 2020-07-20 DIAGNOSIS — I10 ESSENTIAL HYPERTENSION: Primary | Chronic | ICD-10-CM

## 2020-07-20 DIAGNOSIS — E66.01 MORBID OBESITY WITH BMI OF 45.0-49.9, ADULT: ICD-10-CM

## 2020-07-20 DIAGNOSIS — G47.33 OSA ON CPAP: ICD-10-CM

## 2020-07-20 LAB
ASCENDING AORTA: 2.26 CM
AV INDEX (PROSTH): 0.63
AV MEAN GRADIENT: 10 MMHG
AV PEAK GRADIENT: 17 MMHG
AV VALVE AREA: 2.01 CM2
AV VELOCITY RATIO: 0.52
BSA FOR ECHO PROCEDURE: 2.16 M2
CV ECHO LV RWT: 0.53 CM
DOP CALC AO PEAK VEL: 2.09 M/S
DOP CALC AO VTI: 51.6 CM
DOP CALC LVOT AREA: 3.2 CM2
DOP CALC LVOT DIAMETER: 2.01 CM
DOP CALC LVOT PEAK VEL: 1.09 M/S
DOP CALC LVOT STROKE VOLUME: 103.58 CM3
DOP CALCLVOT PEAK VEL VTI: 32.66 CM
E WAVE DECELERATION TIME: 270 MSEC
E/A RATIO: 1.06
E/E' RATIO: 9.73 M/S
ECHO LV POSTERIOR WALL: 1.22 CM (ref 0.6–1.1)
FRACTIONAL SHORTENING: 30 % (ref 28–44)
INTERVENTRICULAR SEPTUM: 1.16 CM (ref 0.6–1.1)
IVRT: 119.89 MSEC
LA MAJOR: 5.93 CM
LA MINOR: 5.64 CM
LA WIDTH: 3.76 CM
LEFT ATRIUM SIZE: 3.32 CM
LEFT ATRIUM VOLUME INDEX: 30.4 ML/M2
LEFT ATRIUM VOLUME: 61.34 CM3
LEFT INTERNAL DIMENSION IN SYSTOLE: 3.22 CM (ref 2.1–4)
LEFT VENTRICLE DIASTOLIC VOLUME INDEX: 47.62 ML/M2
LEFT VENTRICLE DIASTOLIC VOLUME: 96.22 ML
LEFT VENTRICLE MASS INDEX: 100 G/M2
LEFT VENTRICLE SYSTOLIC VOLUME INDEX: 20.5 ML/M2
LEFT VENTRICLE SYSTOLIC VOLUME: 41.52 ML
LEFT VENTRICULAR INTERNAL DIMENSION IN DIASTOLE: 4.58 CM (ref 3.5–6)
LEFT VENTRICULAR MASS: 201.18 G
LV LATERAL E/E' RATIO: 8.92 M/S
LV SEPTAL E/E' RATIO: 10.7 M/S
MV PEAK A VEL: 1.01 M/S
MV PEAK E VEL: 1.07 M/S
MV STENOSIS PRESSURE HALF TIME: 78.3 MS
MV VALVE AREA P 1/2 METHOD: 2.81 CM2
PISA MRMAX VEL: 0.06 M/S
PISA TR MAX VEL: 2.78 M/S
PULM VEIN S/D RATIO: 1.58
PV PEAK D VEL: 0.45 M/S
PV PEAK S VEL: 0.71 M/S
RA MAJOR: 5.63 CM
RA PRESSURE: 3 MMHG
RA WIDTH: 2.79 CM
RIGHT VENTRICULAR END-DIASTOLIC DIMENSION: 2.57 CM
SINUS: 2.48 CM
STJ: 2.13 CM
TDI LATERAL: 0.12 M/S
TDI SEPTAL: 0.1 M/S
TDI: 0.11 M/S
TR MAX PG: 31 MMHG
TRICUSPID ANNULAR PLANE SYSTOLIC EXCURSION: 3.08 CM
TV REST PULMONARY ARTERY PRESSURE: 34 MMHG

## 2020-07-20 PROCEDURE — 3075F SYST BP GE 130 - 139MM HG: CPT | Mod: HCNC,CPTII,S$GLB, | Performed by: INTERNAL MEDICINE

## 2020-07-20 PROCEDURE — 93306 ECHO (CUPID ONLY): ICD-10-PCS | Mod: HCNC,S$GLB,, | Performed by: INTERNAL MEDICINE

## 2020-07-20 PROCEDURE — 3075F PR MOST RECENT SYSTOLIC BLOOD PRESS GE 130-139MM HG: ICD-10-PCS | Mod: HCNC,CPTII,S$GLB, | Performed by: INTERNAL MEDICINE

## 2020-07-20 PROCEDURE — 99999 PR PBB SHADOW E&M-EST. PATIENT-LVL II: CPT | Mod: PBBFAC,HCNC,,

## 2020-07-20 PROCEDURE — 93306 TTE W/DOPPLER COMPLETE: CPT | Mod: HCNC,S$GLB,, | Performed by: INTERNAL MEDICINE

## 2020-07-20 PROCEDURE — 1100F PR PT FALLS ASSESS DOC 2+ FALLS/FALL W/INJURY/YR: ICD-10-PCS | Mod: HCNC,CPTII,S$GLB, | Performed by: INTERNAL MEDICINE

## 2020-07-20 PROCEDURE — 99204 OFFICE O/P NEW MOD 45 MIN: CPT | Mod: HCNC,S$GLB,, | Performed by: INTERNAL MEDICINE

## 2020-07-20 PROCEDURE — 99204 PR OFFICE/OUTPT VISIT, NEW, LEVL IV, 45-59 MIN: ICD-10-PCS | Mod: HCNC,S$GLB,, | Performed by: INTERNAL MEDICINE

## 2020-07-20 PROCEDURE — 3079F DIAST BP 80-89 MM HG: CPT | Mod: HCNC,CPTII,S$GLB, | Performed by: INTERNAL MEDICINE

## 2020-07-20 PROCEDURE — 99999 PR PBB SHADOW E&M-EST. PATIENT-LVL III: ICD-10-PCS | Mod: PBBFAC,HCNC,, | Performed by: INTERNAL MEDICINE

## 2020-07-20 PROCEDURE — 3288F FALL RISK ASSESSMENT DOCD: CPT | Mod: HCNC,CPTII,S$GLB, | Performed by: INTERNAL MEDICINE

## 2020-07-20 PROCEDURE — 3288F PR FALLS RISK ASSESSMENT DOCUMENTED: ICD-10-PCS | Mod: HCNC,CPTII,S$GLB, | Performed by: INTERNAL MEDICINE

## 2020-07-20 PROCEDURE — 99999 PR PBB SHADOW E&M-EST. PATIENT-LVL II: ICD-10-PCS | Mod: PBBFAC,HCNC,,

## 2020-07-20 PROCEDURE — 1126F PR PAIN SEVERITY QUANTIFIED, NO PAIN PRESENT: ICD-10-PCS | Mod: HCNC,S$GLB,, | Performed by: INTERNAL MEDICINE

## 2020-07-20 PROCEDURE — 1126F AMNT PAIN NOTED NONE PRSNT: CPT | Mod: HCNC,S$GLB,, | Performed by: INTERNAL MEDICINE

## 2020-07-20 PROCEDURE — 1100F PTFALLS ASSESS-DOCD GE2>/YR: CPT | Mod: HCNC,CPTII,S$GLB, | Performed by: INTERNAL MEDICINE

## 2020-07-20 PROCEDURE — 3079F PR MOST RECENT DIASTOLIC BLOOD PRESSURE 80-89 MM HG: ICD-10-PCS | Mod: HCNC,CPTII,S$GLB, | Performed by: INTERNAL MEDICINE

## 2020-07-20 PROCEDURE — 1159F PR MEDICATION LIST DOCUMENTED IN MEDICAL RECORD: ICD-10-PCS | Mod: HCNC,S$GLB,, | Performed by: INTERNAL MEDICINE

## 2020-07-20 PROCEDURE — 99999 PR PBB SHADOW E&M-EST. PATIENT-LVL III: CPT | Mod: PBBFAC,HCNC,, | Performed by: INTERNAL MEDICINE

## 2020-07-20 PROCEDURE — 1159F MED LIST DOCD IN RCRD: CPT | Mod: HCNC,S$GLB,, | Performed by: INTERNAL MEDICINE

## 2020-07-20 NOTE — LETTER
July 20, 2020      Elda Holley MD  [address]  [place] [address]           [facility]  [address] [place] [address]  Phone: [phone]          Patient: Marta Huff   MR Number: [id number]   YOB: 1945   Date of Visit: 7/20/2020       Dear Dr. Elda Holley:    Thank you for referring Marta Huff to me for evaluation. Attached you will find relevant portions of my assessment and plan of care.    If you have questions, please do not hesitate to call me. I look forward to following Marta Huff along with you.    Sincerely,    Prosper Jerez MD    Enclosure  CC:  No Recipients    If you would like to receive this communication electronically, please contact [email or web address] or [phone] to request more information on [employer] access.    For providers and/or their staff who would like to refer a patient to [facility], please contact us through our one-stop-shop provider referral line, [facility], at [phone].    If you feel you have received this communication in error or would no longer like to receive these types of communications, please e-mail [email or web address]

## 2020-07-20 NOTE — PROGRESS NOTES
Subjective:    Patient ID:  Marta Huff is a 75 y.o. female who presents for evaluation of Shortness of Breath, COPD, and Hypertension      Pt seen here 3 years ago pre-op for repeat bariatric surgery. We dis Echo which was unremarkable. Since last visit she reports no new cardiac issues. She still struggles with her weight and LE edema. She does admit to occasionally overindulging in potato chips. She has Asthma and COPD but overall says her respiratory status has not changed much.       Review of Systems   Constitution: Positive for weight gain. Negative for weight loss.   HENT: Negative.    Eyes: Negative.    Cardiovascular: Positive for leg swelling. Negative for chest pain, claudication, cyanosis, irregular heartbeat, near-syncope, orthopnea (no PND), palpitations and syncope.   Respiratory: Positive for shortness of breath and wheezing. Negative for cough, hemoptysis and snoring.    Endocrine: Negative.    Skin: Negative.    Musculoskeletal: Negative for joint pain, muscle cramps, muscle weakness and myalgias.   Gastrointestinal: Negative for diarrhea, hematemesis, nausea and vomiting.   Genitourinary: Negative.    Neurological: Negative for dizziness, focal weakness, light-headedness, loss of balance, numbness, paresthesias and seizures.   Psychiatric/Behavioral: Negative.         Objective:    Physical Exam   Constitutional: She is oriented to person, place, and time. She appears well-developed and well-nourished.   Eyes: Pupils are equal, round, and reactive to light.   Neck: Normal range of motion. No thyromegaly present.   Cardiovascular: Normal rate, regular rhythm, S1 normal, S2 normal, normal heart sounds, intact distal pulses and normal pulses.  No extrasystoles are present. PMI is not displaced. Exam reveals no friction rub.   No murmur heard.  Pulmonary/Chest: Effort normal and breath sounds normal. She has no wheezes. She has no rales. She exhibits no tenderness.   Abdominal: Soft. Bowel  sounds are normal. She exhibits no distension and no mass. There is no abdominal tenderness.   Musculoskeletal: Normal range of motion.         General: Edema (1-2+ pretibial edems) present.   Neurological: She is alert and oriented to person, place, and time.   Skin: Skin is warm and dry.   Vitals reviewed.      Test(s) Reviewed  I have reviewed the following in detail:  [] Stress test   [] Angiography   [x] Echocardiogram   [x] Labs   [] Other:         Assessment:       1. Essential hypertension    2. Edema, unspecified type    3. Moderate persistent asthma without complication    4. Chronic obstructive pulmonary disease, unspecified COPD type    5. Morbid obesity with BMI of 45.0-49.9, adult s/p laparoscopic Danielle-N-Y    6. JESENIA on CPAP         Plan:       We discussed her Echocardiogram which is unchanged from study 3 years ago  We discussed her weight and continued efforts at weight loss  We discussed her sodium intake and how it contributes to her LE edema  Cardiac status appears stable   Continue present Rx

## 2020-08-24 LAB — FECAL GLOBIN BY IMMUNOCHEM. (MEDICARE): NEGATIVE

## 2020-09-04 ENCOUNTER — PATIENT OUTREACH (OUTPATIENT)
Dept: ADMINISTRATIVE | Facility: HOSPITAL | Age: 75
End: 2020-09-04

## 2020-09-10 ENCOUNTER — PATIENT OUTREACH (OUTPATIENT)
Dept: ADMINISTRATIVE | Facility: OTHER | Age: 75
End: 2020-09-10

## 2020-09-10 NOTE — PROGRESS NOTES
Health Maintenance Due   Topic Date Due    Influenza Vaccine (1) 08/01/2020     Updates were requested from care everywhere.  Chart was reviewed for overdue Proactive [facility] Encounters (YUMIKO) topics (CRS, Breast Cancer Screening, Eye exam)  Health Maintenance has been updated.  LINKS immunization registry triggered.  Immunizations were reconciled.

## 2020-09-11 ENCOUNTER — OFFICE VISIT (OUTPATIENT)
Dept: RHEUMATOLOGY | Facility: CLINIC | Age: 75
End: 2020-09-11
Payer: MEDICARE

## 2020-09-11 VITALS
HEART RATE: 88 BPM | HEIGHT: 59 IN | BODY MASS INDEX: 50.72 KG/M2 | SYSTOLIC BLOOD PRESSURE: 162 MMHG | DIASTOLIC BLOOD PRESSURE: 86 MMHG | WEIGHT: 251.56 LBS

## 2020-09-11 DIAGNOSIS — G25.81 RESTLESS LEGS SYNDROME: ICD-10-CM

## 2020-09-11 DIAGNOSIS — M15.9 GENERALIZED OSTEOARTHRITIS: Primary | ICD-10-CM

## 2020-09-11 DIAGNOSIS — E66.01 MORBID OBESITY WITH BMI OF 45.0-49.9, ADULT: ICD-10-CM

## 2020-09-11 PROCEDURE — 1159F PR MEDICATION LIST DOCUMENTED IN MEDICAL RECORD: ICD-10-PCS | Mod: HCNC,S$GLB,, | Performed by: INTERNAL MEDICINE

## 2020-09-11 PROCEDURE — 1101F PR PT FALLS ASSESS DOC 0-1 FALLS W/OUT INJ PAST YR: ICD-10-PCS | Mod: HCNC,CPTII,S$GLB, | Performed by: INTERNAL MEDICINE

## 2020-09-11 PROCEDURE — 3079F DIAST BP 80-89 MM HG: CPT | Mod: HCNC,CPTII,S$GLB, | Performed by: INTERNAL MEDICINE

## 2020-09-11 PROCEDURE — 1159F MED LIST DOCD IN RCRD: CPT | Mod: HCNC,S$GLB,, | Performed by: INTERNAL MEDICINE

## 2020-09-11 PROCEDURE — 1101F PT FALLS ASSESS-DOCD LE1/YR: CPT | Mod: HCNC,CPTII,S$GLB, | Performed by: INTERNAL MEDICINE

## 2020-09-11 PROCEDURE — 3077F SYST BP >= 140 MM HG: CPT | Mod: HCNC,CPTII,S$GLB, | Performed by: INTERNAL MEDICINE

## 2020-09-11 PROCEDURE — 99214 OFFICE O/P EST MOD 30 MIN: CPT | Mod: HCNC,S$GLB,, | Performed by: INTERNAL MEDICINE

## 2020-09-11 PROCEDURE — 1126F AMNT PAIN NOTED NONE PRSNT: CPT | Mod: HCNC,S$GLB,, | Performed by: INTERNAL MEDICINE

## 2020-09-11 PROCEDURE — 99214 PR OFFICE/OUTPT VISIT, EST, LEVL IV, 30-39 MIN: ICD-10-PCS | Mod: HCNC,S$GLB,, | Performed by: INTERNAL MEDICINE

## 2020-09-11 PROCEDURE — 1126F PR PAIN SEVERITY QUANTIFIED, NO PAIN PRESENT: ICD-10-PCS | Mod: HCNC,S$GLB,, | Performed by: INTERNAL MEDICINE

## 2020-09-11 PROCEDURE — 99999 PR PBB SHADOW E&M-EST. PATIENT-LVL IV: ICD-10-PCS | Mod: PBBFAC,HCNC,, | Performed by: INTERNAL MEDICINE

## 2020-09-11 PROCEDURE — 3077F PR MOST RECENT SYSTOLIC BLOOD PRESSURE >= 140 MM HG: ICD-10-PCS | Mod: HCNC,CPTII,S$GLB, | Performed by: INTERNAL MEDICINE

## 2020-09-11 PROCEDURE — 99999 PR PBB SHADOW E&M-EST. PATIENT-LVL IV: CPT | Mod: PBBFAC,HCNC,, | Performed by: INTERNAL MEDICINE

## 2020-09-11 PROCEDURE — 3079F PR MOST RECENT DIASTOLIC BLOOD PRESSURE 80-89 MM HG: ICD-10-PCS | Mod: HCNC,CPTII,S$GLB, | Performed by: INTERNAL MEDICINE

## 2020-09-11 NOTE — PROGRESS NOTES
Subjective:          Chief Complaint: Marta Huff is a 75 y.o. female who had concerns including Disease Management.    HPI:    Patient is a 73-year-old female previously seen by Dr. Mcintyre   Last visit bilateral GTB injections 7/2019. Did very well with injections. Still noting benefit and relief from this.   Fall with right 5th finger fracture now healed.   B/l shoulder reduction in ROM, we attempted HEP but still with Right with persistent pain.   Patient doing better with switch to Cymbalta is tolerating 60mg daily  Feeling better off sertraline.       Dx:  osteoarthritis this has diffuse as well as peripheral neuropathy.    She was using gabapentin  2400 mg daily.  Tramadol p.r.n. Zanaflex p.r.n..   Stopped gabapentin with no difference in the pain.   More recently patient had been following with pain management as well as being seen with neurosurgery for spinal cord stimulator.  A noting 85% relief of the foot pain.     She is having significant pain in bilateral hips to gluteal region and interferes with walking and with using the elliptical.  Previous 45min and tolerated, now only 15min and hips begin to hurt.   Last year they have progressively worsened. She still has pain in back but this is not a constant complaint for her. She is sleeping on sides and seems to tolerate, is is the lumbosacral junction that hurts at night.   Exacerbated with sitting prolonged period. At some times she uses walker due to pain.   Hands with stiffness PIP and DIP joints long standing cannot fully curl fingers. +deformity, intermittent swelling.     Interaction with etodolac, but renal function wnl.   She does tolerate NSAID: takes aleve 440mg in AM and PRN at night. No hx of GIB.         REVIEW OF SYSTEMS:    Review of Systems   Constitutional: Negative for fever, malaise/fatigue and weight loss.   HENT: Negative for sore throat.    Eyes: Negative for double vision, photophobia and redness.   Respiratory: Negative  for cough, shortness of breath and wheezing.    Cardiovascular: Negative for chest pain, palpitations and orthopnea.   Gastrointestinal: Negative for abdominal pain, constipation and diarrhea.   Genitourinary: Negative for dysuria, hematuria and urgency.   Musculoskeletal: Positive for joint pain. Negative for back pain and myalgias.   Skin: Negative for rash.   Neurological: Negative for dizziness, tingling, focal weakness and headaches.   Endo/Heme/Allergies: Does not bruise/bleed easily.   Psychiatric/Behavioral: Negative for depression, hallucinations and suicidal ideas.               Objective:            Past Medical History:   Diagnosis Date    Anxiety     Arthralgia of knee     arthralgia of bilateral knees secondary to djd    Arthritis     Back pain     Depression     Encounter for blood transfusion     AUTOLOGUS    GERD (gastroesophageal reflux disease)     Hiatal hernia     repaired in 1979    History of seasonal allergies     History of shingles     Hypertension     Insomnia     Obesity     JESENIA (obstructive sleep apnea) 02/2020    Home sleep study - YESICA 57, Desat 69% - Severe JESENIA with desaturations    Osteoporosis     Pneumonia     Reactive airway disease     Restless legs syndrome     Rheumatic fever     at 6 y/o    Sleep apnea     no cpap     Family History   Problem Relation Age of Onset    Heart disease Mother     Hypertension Mother     Diabetes Mother     Obesity Mother     Heart disease Maternal Grandfather      Social History     Tobacco Use    Smoking status: Passive Smoke Exposure - Never Smoker    Smokeless tobacco: Never Used   Substance Use Topics    Alcohol use: Yes     Comment: 1-2 glasses of wine monthly    Drug use: No         Current Outpatient Medications on File Prior to Visit   Medication Sig Dispense Refill    albuterol (PROAIR HFA) 90 mcg/actuation inhaler Inhale 2 puffs into the lungs every 6 (six) hours as needed for Wheezing or Shortness of Breath.  3 Inhaler 3    albuterol (PROVENTIL) 2.5 mg /3 mL (0.083 %) nebulizer solution INHALE THE CONTENTS OF 1 VIAL VIA NEBULIZER EVERY 6 HOURS AS NEEDED FOR  WHEEZING  OR  SHORTNESS OF BREATH 90 mL 11    alendronate (FOSAMAX) 70 MG tablet TAKE 1 TABLET EVERY 7 DAYS 12 tablet 3    B-complex with vitamin C (Z-BEC OR EQUIV) tablet Take 1 tablet by mouth once daily.       BIOTIN/CALCIUM CARBONATE (BIOTIN 100+10 ORAL) Take 1 capsule by mouth once daily.       BREO ELLIPTA 200-25 mcg/dose DsDv diskus inhaler INHALE 1 PUFF INTO THE LUNGS ONCE DAILY. CONTROLLER 1 each 5    calcium carbonate (OS-SHANNAN) 500 mg calcium (1,250 mg) tablet Take 1 tablet by mouth once daily.      cholecalciferol, vitamin D3, 5,000 unit capsule Take 1 capsule (5,000 Units total) by mouth once daily. 90 capsule 3    DULoxetine (CYMBALTA) 60 MG capsule Take 1 capsule (60 mg total) by mouth once daily. 90 capsule 3    furosemide (LASIX) 80 MG tablet TAKE 1 TABLET ONE TIME DAILY 90 tablet 0    gabapentin (NEURONTIN) 600 MG tablet TAKE 2 TABLETS TWICE DAILY 360 tablet 3    losartan (COZAAR) 50 MG tablet Take 1 tablet (50 mg total) by mouth 2 (two) times daily. 180 tablet 3    montelukast (SINGULAIR) 10 mg tablet Take 1 tablet (10 mg total) by mouth every evening. 90 tablet 3    multivitamin (MULTIVITAMIN) per tablet Take 1 tablet by mouth once daily.        naproxen sodium (ALEVE ORAL) Take 1 Dose by mouth daily as needed.      pramipexole (MIRAPEX) 1.5 MG tablet TAKE 1 TABLET EVERY NIGHT 90 tablet 1    VITAMIN B-12 5,000 mcg Subl Place 1 tablet under the tongue once a week.       omega 3-dha-epa-fish oil (FISH OIL) 1,000 mg (120 mg-180 mg) Cap Take 2 capsules by mouth 2 (two) times daily. 360 capsule 3    sertraline (ZOLOFT) 50 MG tablet Take 2 tablets (100 mg total) by mouth every evening. 180 tablet 1     No current facility-administered medications on file prior to visit.        Vitals:    09/11/20 1511   BP: (!) 162/86   Pulse: 88        Physical Exam:    Physical Exam  Constitutional:       Appearance: Normal appearance. She is well-developed.   HENT:      Nose: No septal deviation.      Mouth/Throat:      Mouth: No oral lesions.   Eyes:      Conjunctiva/sclera:      Right eye: Right conjunctiva is not injected.      Left eye: Left conjunctiva is not injected.      Pupils: Pupils are equal, round, and reactive to light.   Neck:      Thyroid: No thyroid mass or thyromegaly.      Vascular: No JVD.   Cardiovascular:      Rate and Rhythm: Normal rate and regular rhythm.      Pulses: Normal pulses.      Comments: No edema  Pulmonary:      Effort: Pulmonary effort is normal.      Breath sounds: Normal breath sounds.   Abdominal:      Palpations: Abdomen is soft.   Musculoskeletal:      Right shoulder: She exhibits decreased range of motion. She exhibits no tenderness and no swelling.      Left shoulder: She exhibits decreased range of motion. She exhibits no tenderness and no swelling.      Right elbow: She exhibits normal range of motion and no swelling. No tenderness found.      Left elbow: She exhibits normal range of motion and no swelling. No tenderness found.      Right wrist: She exhibits normal range of motion, no tenderness and no swelling.      Left wrist: She exhibits normal range of motion, no tenderness and no swelling.      Right hip: She exhibits normal range of motion, normal strength, no bony tenderness and no swelling.      Left hip: She exhibits normal range of motion, no tenderness, no bony tenderness and no swelling.      Right knee: She exhibits normal range of motion and no swelling. No tenderness found.      Left knee: She exhibits normal range of motion and no swelling. No tenderness found.      Right ankle: She exhibits normal range of motion and no swelling. No tenderness.      Left ankle: She exhibits normal range of motion and no swelling. No tenderness.      Right hand: She exhibits decreased range of motion.      Left  hand: She exhibits decreased range of motion.      Comments: Bony hypertrophy PIP and DIP joints. Squaring CMC joint. Tenderness along gr. Troch and ITB. Negative exacerbation SLR some LBP.    Lymphadenopathy:      Cervical: No cervical adenopathy.   Skin:     General: Skin is dry.   Neurological:      Deep Tendon Reflexes: Reflexes are normal and symmetric.               Assessment:       Encounter Diagnoses   Name Primary?    Generalized osteoarthritis Yes    Morbid obesity with BMI of 45.0-49.9, adult s/p laparoscopic Danielle-N-Y     Restless legs syndrome           Plan:        Generalized osteoarthritis    Morbid obesity with BMI of 45.0-49.9, adult s/p laparoscopic Danielle-N-Y    Restless legs syndrome      Delightful patient with greater trochanteric bursitis and bursitis  shoulders.    Patient doing better with switch to Cymbalta is tolerating 60mg daily  Feeling better off sertraline.    Shoulder stable.       -biofreeze prn.     Follow up in about 6 months (around 3/11/2021).           30min consultation with greater than 50% spent in counseling, chart review and coordination of care. All questions answered.

## 2020-09-15 ENCOUNTER — OFFICE VISIT (OUTPATIENT)
Dept: DERMATOLOGY | Facility: CLINIC | Age: 75
End: 2020-09-15
Payer: MEDICARE

## 2020-09-15 VITALS — WEIGHT: 246.69 LBS | BODY MASS INDEX: 49.73 KG/M2 | HEIGHT: 59 IN

## 2020-09-15 DIAGNOSIS — R21 RASH AND OTHER NONSPECIFIC SKIN ERUPTION: ICD-10-CM

## 2020-09-15 DIAGNOSIS — L82.1 SEBORRHEIC KERATOSES: ICD-10-CM

## 2020-09-15 DIAGNOSIS — D18.01 CHERRY ANGIOMA: Primary | ICD-10-CM

## 2020-09-15 PROCEDURE — 11104 PUNCH BX SKIN SINGLE LESION: CPT | Mod: HCNC,S$GLB,, | Performed by: DERMATOLOGY

## 2020-09-15 PROCEDURE — 1101F PR PT FALLS ASSESS DOC 0-1 FALLS W/OUT INJ PAST YR: ICD-10-PCS | Mod: HCNC,CPTII,S$GLB, | Performed by: DERMATOLOGY

## 2020-09-15 PROCEDURE — 1126F AMNT PAIN NOTED NONE PRSNT: CPT | Mod: HCNC,S$GLB,, | Performed by: DERMATOLOGY

## 2020-09-15 PROCEDURE — 99999 PR PBB SHADOW E&M-EST. PATIENT-LVL III: ICD-10-PCS | Mod: PBBFAC,HCNC,, | Performed by: DERMATOLOGY

## 2020-09-15 PROCEDURE — 88305 TISSUE EXAM BY PATHOLOGIST: ICD-10-PCS | Mod: 26,HCNC,, | Performed by: PATHOLOGY

## 2020-09-15 PROCEDURE — 99999 PR PBB SHADOW E&M-EST. PATIENT-LVL III: CPT | Mod: PBBFAC,HCNC,, | Performed by: DERMATOLOGY

## 2020-09-15 PROCEDURE — 11104 PR PUNCH BIOPSY, SKIN, SINGLE LESION: ICD-10-PCS | Mod: HCNC,S$GLB,, | Performed by: DERMATOLOGY

## 2020-09-15 PROCEDURE — 1159F MED LIST DOCD IN RCRD: CPT | Mod: HCNC,S$GLB,, | Performed by: DERMATOLOGY

## 2020-09-15 PROCEDURE — 1159F PR MEDICATION LIST DOCUMENTED IN MEDICAL RECORD: ICD-10-PCS | Mod: HCNC,S$GLB,, | Performed by: DERMATOLOGY

## 2020-09-15 PROCEDURE — 1126F PR PAIN SEVERITY QUANTIFIED, NO PAIN PRESENT: ICD-10-PCS | Mod: HCNC,S$GLB,, | Performed by: DERMATOLOGY

## 2020-09-15 PROCEDURE — 99202 OFFICE O/P NEW SF 15 MIN: CPT | Mod: 25,HCNC,S$GLB, | Performed by: DERMATOLOGY

## 2020-09-15 PROCEDURE — 88305 TISSUE EXAM BY PATHOLOGIST: CPT | Mod: 26,HCNC,, | Performed by: PATHOLOGY

## 2020-09-15 PROCEDURE — 88305 TISSUE EXAM BY PATHOLOGIST: CPT | Mod: HCNC | Performed by: PATHOLOGY

## 2020-09-15 PROCEDURE — 99202 PR OFFICE/OUTPT VISIT, NEW, LEVL II, 15-29 MIN: ICD-10-PCS | Mod: 25,HCNC,S$GLB, | Performed by: DERMATOLOGY

## 2020-09-15 PROCEDURE — 1101F PT FALLS ASSESS-DOCD LE1/YR: CPT | Mod: HCNC,CPTII,S$GLB, | Performed by: DERMATOLOGY

## 2020-09-15 NOTE — PROGRESS NOTES
Subjective:       Patient ID:  Marta Huff is a 75 y.o. female who presents for   Chief Complaint   Patient presents with    Spots and Mole on Face    Rash on abdomen     74 y/o F presents for initial visit for rash on abdomen x approx 6 months. The rash is asymptomatic. No vaginal lesions. Treated w/ neosporin with no improvement .           Past Medical History:   Diagnosis Date    Anxiety     Arthralgia of knee     arthralgia of bilateral knees secondary to djd    Arthritis     Back pain     Depression     Encounter for blood transfusion     AUTOLOGUS    GERD (gastroesophageal reflux disease)     Hiatal hernia     repaired in 1979    History of seasonal allergies     History of shingles     Hypertension     Insomnia     Obesity     JESENIA (obstructive sleep apnea) 02/2020    Home sleep study - YESICA 57, Desat 69% - Severe JESENIA with desaturations    Osteoporosis     Pneumonia     Reactive airway disease     Restless legs syndrome     Rheumatic fever     at 6 y/o    Sleep apnea     no cpap     Review of Systems   Genitourinary: Negative for genital sores and genital itching.   Skin: Negative for sensitivity to antibiotic ointment and sensitivity to bandage adhesive.   Hematologic/Lymphatic: Bruises/bleeds easily.        Objective:    Physical Exam   Constitutional: She appears well-developed and well-nourished.   Eyes: Lids are normal.    Neurological: She is alert and oriented to person, place, and time.   Psychiatric: She has a normal mood and affect.   Skin:   Areas Examined (abnormalities noted in diagram):   Head / Face Inspection Performed  Neck Inspection Performed  Chest / Axilla Inspection Performed  Abdomen Inspection Performed  Back Inspection Performed  RUE Inspected  LUE Inspection Performed              Diagram Legend     Erythematous scaling macule/papule c/w actinic keratosis       Vascular papule c/w angioma      Pigmented verrucoid papule/plaque c/w seborrheic  keratosis      Yellow umbilicated papule c/w sebaceous hyperplasia      Irregularly shaped tan macule c/w lentigo     1-2 mm smooth white papules consistent with Milia      Movable subcutaneous cyst with punctum c/w epidermal inclusion cyst      Subcutaneous movable cyst c/w pilar cyst      Firm pink to brown papule c/w dermatofibroma      Pedunculated fleshy papule(s) c/w skin tag(s)      Evenly pigmented macule c/w junctional nevus     Mildly variegated pigmented, slightly irregular-bordered macule c/w mildly atypical nevus      Flesh colored to evenly pigmented papule c/w intradermal nevus       Pink pearly papule/plaque c/w basal cell carcinoma      Erythematous hyperkeratotic cursted plaque c/w SCC      Surgical scar with no sign of skin cancer recurrence      Open and closed comedones      Inflammatory papules and pustules      Verrucoid papule consistent consistent with wart     Erythematous eczematous patches and plaques     Dystrophic onycholytic nail with subungual debris c/w onychomycosis     Umbilicated papule    Erythematous-base heme-crusted tan verrucoid plaque consistent with inflamed seborrheic keratosis     Erythematous Silvery Scaling Plaque c/w Psoriasis     See annotation      Assessment / Plan:      Pathology Orders:     Normal Orders This Visit    Specimen to Pathology, Dermatology     Comments:    Number of Specimens:->1  ------------------------->-------------------------  Spec 1 Procedure:->Biopsy  Spec 1 Clinical Impression:->atrophic plaques on abdomen; r/o  LS et A  Spec 1 Source:->L abdomen    Questions:    Procedure Type: Dermatology and skin neoplasms    Number of Specimens: 1    ------------------------: -------------------------    Spec 1 Procedure: Biopsy    Spec 1 Clinical Impression: atrophic plaques on abdomen; r/o LS et A    Spec 1 Source: L abdomen        [place] angioma  This is a benign vascular lesion. Reassurance given. No treatment required.     Rash and other nonspecific  skin eruption  -     Specimen to Pathology, Dermatology  Punch biopsy procedure note:  Punch biopsy performed after verbal consent obtained. Area marked and prepped with alcohol. Approximately 1cc of 1% lidocaine with epinephrine injected. 4 mm disposable punch used to remove lesion. Hemostasis obtained and biopsy site closed with 2 Prolene sutures. Wound care instructions reviewed with patient and handout given.    Seborrheic keratoses  These are benign inherited growths without a malignant potential. Reassurance given to patient. No treatment is necessary.              Follow up in about 2 weeks (around 9/29/2020).

## 2020-09-15 NOTE — LETTER
September 15, 2020      Edla Holley MD  [address] [place] [address]           [facility]  [address] [place] [address]  Phone: [phone]  Fax: [phone]          Patient: Marta Huff   MR Number: [id number]   YOB: 1945   Date of Visit: 9/15/2020       Dear Dr. Elda Holley:    Thank you for referring Marta Huff to me for evaluation. Attached you will find relevant portions of my assessment and plan of care.    If you have questions, please do not hesitate to call me. I look forward to following Marta Huff along with you.    Sincerely,    Jackie Jaime MD    Enclosure  CC:  No Recipients    If you would like to receive this communication electronically, please contact [email or web address] or [phone] to request more information on [employer] access.    For providers and/or their staff who would like to refer a patient to [facility], please contact us through our one-stop-shop provider referral line, [facility], at [phone].    If you feel you have received this communication in error or would no longer like to receive these types of communications, please e-mail [email or web address]

## 2020-09-18 ENCOUNTER — PATIENT MESSAGE (OUTPATIENT)
Dept: DERMATOLOGY | Facility: CLINIC | Age: 75
End: 2020-09-18

## 2020-09-18 LAB
FINAL PATHOLOGIC DIAGNOSIS: NORMAL
GROSS: NORMAL

## 2020-09-28 ENCOUNTER — OFFICE VISIT (OUTPATIENT)
Dept: DERMATOLOGY | Facility: CLINIC | Age: 75
End: 2020-09-28
Payer: MEDICARE

## 2020-09-28 VITALS — BODY MASS INDEX: 49.59 KG/M2 | RESPIRATION RATE: 18 BRPM | WEIGHT: 246 LBS | HEIGHT: 59 IN

## 2020-09-28 DIAGNOSIS — L90.0 LICHEN SCLEROSUS ET ATROPHICUS: Primary | ICD-10-CM

## 2020-09-28 PROCEDURE — 1159F PR MEDICATION LIST DOCUMENTED IN MEDICAL RECORD: ICD-10-PCS | Mod: HCNC,S$GLB,, | Performed by: DERMATOLOGY

## 2020-09-28 PROCEDURE — 1159F MED LIST DOCD IN RCRD: CPT | Mod: HCNC,S$GLB,, | Performed by: DERMATOLOGY

## 2020-09-28 PROCEDURE — 1101F PT FALLS ASSESS-DOCD LE1/YR: CPT | Mod: HCNC,CPTII,S$GLB, | Performed by: DERMATOLOGY

## 2020-09-28 PROCEDURE — 99213 PR OFFICE/OUTPT VISIT, EST, LEVL III, 20-29 MIN: ICD-10-PCS | Mod: HCNC,S$GLB,, | Performed by: DERMATOLOGY

## 2020-09-28 PROCEDURE — 1126F PR PAIN SEVERITY QUANTIFIED, NO PAIN PRESENT: ICD-10-PCS | Mod: HCNC,S$GLB,, | Performed by: DERMATOLOGY

## 2020-09-28 PROCEDURE — 99213 OFFICE O/P EST LOW 20 MIN: CPT | Mod: HCNC,S$GLB,, | Performed by: DERMATOLOGY

## 2020-09-28 PROCEDURE — 1126F AMNT PAIN NOTED NONE PRSNT: CPT | Mod: HCNC,S$GLB,, | Performed by: DERMATOLOGY

## 2020-09-28 PROCEDURE — 99999 PR PBB SHADOW E&M-EST. PATIENT-LVL II: ICD-10-PCS | Mod: PBBFAC,HCNC,, | Performed by: DERMATOLOGY

## 2020-09-28 PROCEDURE — 99999 PR PBB SHADOW E&M-EST. PATIENT-LVL II: CPT | Mod: PBBFAC,HCNC,, | Performed by: DERMATOLOGY

## 2020-09-28 PROCEDURE — 1101F PR PT FALLS ASSESS DOC 0-1 FALLS W/OUT INJ PAST YR: ICD-10-PCS | Mod: HCNC,CPTII,S$GLB, | Performed by: DERMATOLOGY

## 2020-09-28 RX ORDER — BETAMETHASONE VALERATE 1.2 MG/G
CREAM TOPICAL 2 TIMES DAILY
Qty: 45 G | Refills: 1 | Status: SHIPPED | OUTPATIENT
Start: 2020-09-28 | End: 2021-05-17 | Stop reason: SDUPTHER

## 2020-09-28 NOTE — PROGRESS NOTES
Subjective:       Patient ID:  Marta Huff is a 75 y.o. female who presents for   Chief Complaint   Patient presents with    Follow-up     74 y/o F presents for follow up s/p biopsy. Last OV 9-15-20 for rash.       Skin, left abdomen, punch biopsy: PATHOLOGY DATED 9-15-20   - LICHEN SCLEROSUS ET ATROPHICUS.        Past Medical History:   Diagnosis Date    Anxiety     Arthralgia of knee     arthralgia of bilateral knees secondary to djd    Arthritis     Back pain     Depression     Encounter for blood transfusion     AUTOLOGUS    GERD (gastroesophageal reflux disease)     Hiatal hernia     repaired in 1979    History of seasonal allergies     History of shingles     Hypertension     Insomnia     Obesity     JESENIA (obstructive sleep apnea) 02/2020    Home sleep study - YESICA 57, Desat 69% - Severe JESENIA with desaturations    Osteoporosis     Pneumonia     Reactive airway disease     Restless legs syndrome     Rheumatic fever     at 6 y/o    Sleep apnea     no cpap       Review of Systems   Genitourinary: Negative for genital sores and genital itching.   Skin: Negative for sensitivity to antibiotic ointment and sensitivity to bandage adhesive.   Hematologic/Lymphatic: Bruises/bleeds easily.        Objective:    Physical Exam   Constitutional: She appears well-developed and well-nourished.   Eyes: Lids are normal.    Neurological: She is alert and oriented to person, place, and time.   Psychiatric: She has a normal mood and affect.   Skin:   Areas Examined (abnormalities noted in diagram):   Head / Face Inspection Performed  Neck Inspection Performed  Chest / Axilla Inspection Performed  Abdomen Inspection Performed  Back Inspection Performed  RUE Inspected  LUE Inspection Performed              Diagram Legend     Erythematous scaling macule/papule c/w actinic keratosis       Vascular papule c/w angioma      Pigmented verrucoid papule/plaque c/w seborrheic keratosis      Yellow umbilicated  papule c/w sebaceous hyperplasia      Irregularly shaped tan macule c/w lentigo     1-2 mm smooth white papules consistent with Milia      Movable subcutaneous cyst with punctum c/w epidermal inclusion cyst      Subcutaneous movable cyst c/w pilar cyst      Firm pink to brown papule c/w dermatofibroma      Pedunculated fleshy papule(s) c/w skin tag(s)      Evenly pigmented macule c/w junctional nevus     Mildly variegated pigmented, slightly irregular-bordered macule c/w mildly atypical nevus      Flesh colored to evenly pigmented papule c/w intradermal nevus       Pink pearly papule/plaque c/w basal cell carcinoma      Erythematous hyperkeratotic cursted plaque c/w SCC      Surgical scar with no sign of skin cancer recurrence      Open and closed comedones      Inflammatory papules and pustules      Verrucoid papule consistent consistent with wart     Erythematous eczematous patches and plaques     Dystrophic onycholytic nail with subungual debris c/w onychomycosis     Umbilicated papule    Erythematous-base heme-crusted tan verrucoid plaque consistent with inflamed seborrheic keratosis     Erythematous Silvery Scaling Plaque c/w Psoriasis     See annotation      Assessment / Plan:        Lichen sclerosus et atrophicus  -     betamethasone valerate 0.1% (VALISONE) 0.1 % Crea; Apply topically 2 (two) times daily.  Dispense: 45 g; Refill: 1    handout provided         Follow up in about 2 months (around 11/28/2020).

## 2020-09-29 ENCOUNTER — PATIENT MESSAGE (OUTPATIENT)
Dept: OTHER | Facility: OTHER | Age: 75
End: 2020-09-29

## 2020-10-30 ENCOUNTER — PATIENT MESSAGE (OUTPATIENT)
Dept: FAMILY MEDICINE | Facility: CLINIC | Age: 75
End: 2020-10-30

## 2020-10-30 DIAGNOSIS — I27.20 PULMONARY HYPERTENSION: ICD-10-CM

## 2020-10-30 DIAGNOSIS — R60.9 EDEMA, UNSPECIFIED TYPE: ICD-10-CM

## 2020-10-30 RX ORDER — FUROSEMIDE 80 MG/1
80 TABLET ORAL DAILY
Qty: 90 TABLET | Refills: 1 | Status: SHIPPED | OUTPATIENT
Start: 2020-10-30 | End: 2021-01-14

## 2020-11-10 ENCOUNTER — PES CALL (OUTPATIENT)
Dept: ADMINISTRATIVE | Facility: CLINIC | Age: 75
End: 2020-11-10

## 2020-11-25 ENCOUNTER — PATIENT OUTREACH (OUTPATIENT)
Dept: ADMINISTRATIVE | Facility: OTHER | Age: 75
End: 2020-11-25

## 2020-12-04 ENCOUNTER — TELEPHONE (OUTPATIENT)
Dept: RESEARCH | Facility: OTHER | Age: 75
End: 2020-12-04

## 2020-12-04 NOTE — TELEPHONE ENCOUNTER
Marta Huff was contacted in regard to a research survey questionnaire (IRB #2020.065) though she did not answer. This is the 1st contact attempt in regard to this study. I have left a voice message with contact information and study information. Will plan to follow up on Wednesday, Dec 9, 2020.

## 2020-12-09 ENCOUNTER — TELEPHONE (OUTPATIENT)
Dept: RESEARCH | Facility: OTHER | Age: 75
End: 2020-12-09

## 2020-12-09 NOTE — TELEPHONE ENCOUNTER
Marta Harmon Franky was contacted in regard to a research survey questionnaire (IRB #2020.065) by myself. This is the 2nd contact attempt in regard to this study. Prior to administration of the survey, the following consent elements were discussed and understood by the patient:    ? Purpose of the study   ? Study and questionnaire design  ? Confidentiality and HIPAA Authorization for Release of Medical Records  ? Risks, Benefits, Compensations, and Costs  ? Participation in the survey is voluntary and patient may withdraw at anytime  ? Contact information for questions regarding your rights    Patient verbalizes understanding of the above: Yes  Contact information for CRC and PI given to patient: Yes  Patient was able to adequately summarize the study and seems to have a generalizable understanding of the study: Yes  Survey questionnaire completed: Yes

## 2020-12-10 ENCOUNTER — PES CALL (OUTPATIENT)
Dept: ADMINISTRATIVE | Facility: CLINIC | Age: 75
End: 2020-12-10

## 2020-12-11 ENCOUNTER — PATIENT MESSAGE (OUTPATIENT)
Dept: OTHER | Facility: OTHER | Age: 75
End: 2020-12-11

## 2021-01-12 ENCOUNTER — IMMUNIZATION (OUTPATIENT)
Dept: FAMILY MEDICINE | Facility: CLINIC | Age: 76
End: 2021-01-12
Payer: MEDICARE

## 2021-01-12 DIAGNOSIS — Z23 NEED FOR VACCINATION: ICD-10-CM

## 2021-01-12 PROCEDURE — 91300 COVID-19, MRNA, LNP-S, PF, 30 MCG/0.3 ML DOSE VACCINE: CPT | Mod: PBBFAC | Performed by: FAMILY MEDICINE

## 2021-02-02 ENCOUNTER — IMMUNIZATION (OUTPATIENT)
Dept: FAMILY MEDICINE | Facility: CLINIC | Age: 76
End: 2021-02-02
Payer: MEDICARE

## 2021-02-02 DIAGNOSIS — Z23 NEED FOR VACCINATION: Primary | ICD-10-CM

## 2021-02-02 PROCEDURE — 0002A COVID-19, MRNA, LNP-S, PF, 30 MCG/0.3 ML DOSE VACCINE: CPT | Mod: PBBFAC | Performed by: FAMILY MEDICINE

## 2021-02-02 PROCEDURE — 91300 COVID-19, MRNA, LNP-S, PF, 30 MCG/0.3 ML DOSE VACCINE: CPT | Mod: PBBFAC | Performed by: FAMILY MEDICINE

## 2021-02-19 ENCOUNTER — PATIENT MESSAGE (OUTPATIENT)
Dept: RHEUMATOLOGY | Facility: CLINIC | Age: 76
End: 2021-02-19

## 2021-02-22 ENCOUNTER — PATIENT MESSAGE (OUTPATIENT)
Dept: RHEUMATOLOGY | Facility: CLINIC | Age: 76
End: 2021-02-22

## 2021-02-23 RX ORDER — DULOXETIN HYDROCHLORIDE 60 MG/1
60 CAPSULE, DELAYED RELEASE ORAL DAILY
Qty: 90 CAPSULE | Refills: 3 | Status: SHIPPED | OUTPATIENT
Start: 2021-02-23 | End: 2021-03-05 | Stop reason: SDUPTHER

## 2021-02-27 DIAGNOSIS — R60.9 EDEMA, UNSPECIFIED TYPE: ICD-10-CM

## 2021-02-27 DIAGNOSIS — I27.20 PULMONARY HYPERTENSION: ICD-10-CM

## 2021-03-01 RX ORDER — FUROSEMIDE 80 MG/1
80 TABLET ORAL DAILY
Qty: 90 TABLET | Refills: 1 | Status: SHIPPED | OUTPATIENT
Start: 2021-03-01 | End: 2021-09-14

## 2021-03-02 ENCOUNTER — PATIENT MESSAGE (OUTPATIENT)
Dept: RHEUMATOLOGY | Facility: CLINIC | Age: 76
End: 2021-03-02

## 2021-03-03 ENCOUNTER — TELEPHONE (OUTPATIENT)
Dept: RHEUMATOLOGY | Facility: CLINIC | Age: 76
End: 2021-03-03

## 2021-03-08 RX ORDER — DULOXETIN HYDROCHLORIDE 60 MG/1
60 CAPSULE, DELAYED RELEASE ORAL DAILY
Qty: 90 CAPSULE | Refills: 3 | Status: SHIPPED | OUTPATIENT
Start: 2021-03-08 | End: 2022-02-27

## 2021-03-10 ENCOUNTER — PATIENT OUTREACH (OUTPATIENT)
Dept: ADMINISTRATIVE | Facility: OTHER | Age: 76
End: 2021-03-10

## 2021-03-11 ENCOUNTER — OFFICE VISIT (OUTPATIENT)
Dept: RHEUMATOLOGY | Facility: CLINIC | Age: 76
End: 2021-03-11
Payer: MEDICARE

## 2021-03-11 VITALS
BODY MASS INDEX: 49.4 KG/M2 | SYSTOLIC BLOOD PRESSURE: 156 MMHG | DIASTOLIC BLOOD PRESSURE: 91 MMHG | WEIGHT: 245.06 LBS | HEIGHT: 59 IN | HEART RATE: 71 BPM

## 2021-03-11 DIAGNOSIS — M54.16 LEFT LUMBAR RADICULOPATHY: ICD-10-CM

## 2021-03-11 DIAGNOSIS — G25.81 RESTLESS LEGS SYNDROME: ICD-10-CM

## 2021-03-11 DIAGNOSIS — M75.51 ACUTE SHOULDER BURSITIS, RIGHT: ICD-10-CM

## 2021-03-11 DIAGNOSIS — G62.9 PERIPHERAL POLYNEUROPATHY: ICD-10-CM

## 2021-03-11 DIAGNOSIS — M15.9 PRIMARY OSTEOARTHRITIS INVOLVING MULTIPLE JOINTS: Primary | ICD-10-CM

## 2021-03-11 PROCEDURE — 3288F FALL RISK ASSESSMENT DOCD: CPT | Mod: CPTII,S$GLB,, | Performed by: INTERNAL MEDICINE

## 2021-03-11 PROCEDURE — 99999 PR PBB SHADOW E&M-EST. PATIENT-LVL IV: CPT | Mod: PBBFAC,,, | Performed by: INTERNAL MEDICINE

## 2021-03-11 PROCEDURE — 1159F MED LIST DOCD IN RCRD: CPT | Mod: S$GLB,,, | Performed by: INTERNAL MEDICINE

## 2021-03-11 PROCEDURE — 20610 LARGE JOINT ASPIRATION/INJECTION: R SUBACROMIAL BURSA: ICD-10-PCS | Mod: RT,S$GLB,, | Performed by: INTERNAL MEDICINE

## 2021-03-11 PROCEDURE — 3077F SYST BP >= 140 MM HG: CPT | Mod: CPTII,S$GLB,, | Performed by: INTERNAL MEDICINE

## 2021-03-11 PROCEDURE — 1101F PR PT FALLS ASSESS DOC 0-1 FALLS W/OUT INJ PAST YR: ICD-10-PCS | Mod: CPTII,S$GLB,, | Performed by: INTERNAL MEDICINE

## 2021-03-11 PROCEDURE — 1125F PR PAIN SEVERITY QUANTIFIED, PAIN PRESENT: ICD-10-PCS | Mod: S$GLB,,, | Performed by: INTERNAL MEDICINE

## 2021-03-11 PROCEDURE — 3080F PR MOST RECENT DIASTOLIC BLOOD PRESSURE >= 90 MM HG: ICD-10-PCS | Mod: CPTII,S$GLB,, | Performed by: INTERNAL MEDICINE

## 2021-03-11 PROCEDURE — 1101F PT FALLS ASSESS-DOCD LE1/YR: CPT | Mod: CPTII,S$GLB,, | Performed by: INTERNAL MEDICINE

## 2021-03-11 PROCEDURE — 3288F PR FALLS RISK ASSESSMENT DOCUMENTED: ICD-10-PCS | Mod: CPTII,S$GLB,, | Performed by: INTERNAL MEDICINE

## 2021-03-11 PROCEDURE — 20610 DRAIN/INJ JOINT/BURSA W/O US: CPT | Mod: RT,S$GLB,, | Performed by: INTERNAL MEDICINE

## 2021-03-11 PROCEDURE — 99214 OFFICE O/P EST MOD 30 MIN: CPT | Mod: 25,S$GLB,, | Performed by: INTERNAL MEDICINE

## 2021-03-11 PROCEDURE — 99999 PR PBB SHADOW E&M-EST. PATIENT-LVL IV: ICD-10-PCS | Mod: PBBFAC,,, | Performed by: INTERNAL MEDICINE

## 2021-03-11 PROCEDURE — 3077F PR MOST RECENT SYSTOLIC BLOOD PRESSURE >= 140 MM HG: ICD-10-PCS | Mod: CPTII,S$GLB,, | Performed by: INTERNAL MEDICINE

## 2021-03-11 PROCEDURE — 3080F DIAST BP >= 90 MM HG: CPT | Mod: CPTII,S$GLB,, | Performed by: INTERNAL MEDICINE

## 2021-03-11 PROCEDURE — 1125F AMNT PAIN NOTED PAIN PRSNT: CPT | Mod: S$GLB,,, | Performed by: INTERNAL MEDICINE

## 2021-03-11 PROCEDURE — 99214 PR OFFICE/OUTPT VISIT, EST, LEVL IV, 30-39 MIN: ICD-10-PCS | Mod: 25,S$GLB,, | Performed by: INTERNAL MEDICINE

## 2021-03-11 PROCEDURE — 1159F PR MEDICATION LIST DOCUMENTED IN MEDICAL RECORD: ICD-10-PCS | Mod: S$GLB,,, | Performed by: INTERNAL MEDICINE

## 2021-03-11 RX ORDER — METHYLPREDNISOLONE ACETATE 40 MG/ML
40 INJECTION, SUSPENSION INTRA-ARTICULAR; INTRALESIONAL; INTRAMUSCULAR; SOFT TISSUE
Status: SHIPPED | OUTPATIENT
Start: 2021-03-11

## 2021-03-11 RX ADMIN — METHYLPREDNISOLONE ACETATE 40 MG: 40 INJECTION, SUSPENSION INTRA-ARTICULAR; INTRALESIONAL; INTRAMUSCULAR; SOFT TISSUE at 10:03

## 2021-03-11 ASSESSMENT — ROUTINE ASSESSMENT OF PATIENT INDEX DATA (RAPID3)
MDHAQ FUNCTION SCORE: 1
TOTAL RAPID3 SCORE: 3.78
PATIENT GLOBAL ASSESSMENT SCORE: 3
PAIN SCORE: 5
PSYCHOLOGICAL DISTRESS SCORE: 6.6

## 2021-03-25 ENCOUNTER — OFFICE VISIT (OUTPATIENT)
Dept: FAMILY MEDICINE | Facility: CLINIC | Age: 76
End: 2021-03-25
Payer: MEDICARE

## 2021-03-25 ENCOUNTER — LAB VISIT (OUTPATIENT)
Dept: LAB | Facility: HOSPITAL | Age: 76
End: 2021-03-25
Attending: FAMILY MEDICINE
Payer: MEDICARE

## 2021-03-25 VITALS
BODY MASS INDEX: 49.24 KG/M2 | TEMPERATURE: 98 F | DIASTOLIC BLOOD PRESSURE: 86 MMHG | RESPIRATION RATE: 16 BRPM | WEIGHT: 244.25 LBS | HEART RATE: 72 BPM | SYSTOLIC BLOOD PRESSURE: 134 MMHG | HEIGHT: 59 IN

## 2021-03-25 DIAGNOSIS — I27.20 PULMONARY HYPERTENSION: ICD-10-CM

## 2021-03-25 DIAGNOSIS — I10 ESSENTIAL HYPERTENSION: Chronic | ICD-10-CM

## 2021-03-25 DIAGNOSIS — E11.9 CONTROLLED TYPE 2 DIABETES MELLITUS WITHOUT COMPLICATION, WITHOUT LONG-TERM CURRENT USE OF INSULIN: ICD-10-CM

## 2021-03-25 DIAGNOSIS — J44.9 CHRONIC OBSTRUCTIVE PULMONARY DISEASE, UNSPECIFIED COPD TYPE: Primary | ICD-10-CM

## 2021-03-25 DIAGNOSIS — F32.0 MILD MAJOR DEPRESSION: ICD-10-CM

## 2021-03-25 DIAGNOSIS — E66.01 MORBID OBESITY WITH BMI OF 45.0-49.9, ADULT: ICD-10-CM

## 2021-03-25 DIAGNOSIS — G47.33 OSA ON CPAP: ICD-10-CM

## 2021-03-25 DIAGNOSIS — Z78.0 POSTMENOPAUSAL STATE: ICD-10-CM

## 2021-03-25 DIAGNOSIS — J44.9 CHRONIC OBSTRUCTIVE PULMONARY DISEASE, UNSPECIFIED COPD TYPE: ICD-10-CM

## 2021-03-25 DIAGNOSIS — I77.1 TORTUOUS AORTA: ICD-10-CM

## 2021-03-25 PROBLEM — E11.40 TYPE 2 DIABETES MELLITUS WITH DIABETIC NEUROPATHY, WITHOUT LONG-TERM CURRENT USE OF INSULIN: Status: ACTIVE | Noted: 2021-03-25

## 2021-03-25 LAB
ALBUMIN SERPL BCP-MCNC: 3.3 G/DL (ref 3.5–5.2)
ALP SERPL-CCNC: 134 U/L (ref 55–135)
ALT SERPL W/O P-5'-P-CCNC: 20 U/L (ref 10–44)
ANION GAP SERPL CALC-SCNC: 9 MMOL/L (ref 8–16)
AST SERPL-CCNC: 23 U/L (ref 10–40)
BILIRUB SERPL-MCNC: 0.8 MG/DL (ref 0.1–1)
BUN SERPL-MCNC: 16 MG/DL (ref 8–23)
CALCIUM SERPL-MCNC: 8.7 MG/DL (ref 8.7–10.5)
CHLORIDE SERPL-SCNC: 102 MMOL/L (ref 95–110)
CHOLEST SERPL-MCNC: 142 MG/DL (ref 120–199)
CHOLEST/HDLC SERPL: 2.5 {RATIO} (ref 2–5)
CO2 SERPL-SCNC: 33 MMOL/L (ref 23–29)
CREAT SERPL-MCNC: 0.7 MG/DL (ref 0.5–1.4)
ERYTHROCYTE [DISTWIDTH] IN BLOOD BY AUTOMATED COUNT: 14.1 % (ref 11.5–14.5)
EST. GFR  (AFRICAN AMERICAN): >60 ML/MIN/1.73 M^2
EST. GFR  (NON AFRICAN AMERICAN): >60 ML/MIN/1.73 M^2
ESTIMATED AVG GLUCOSE: 120 MG/DL (ref 68–131)
GLUCOSE SERPL-MCNC: 90 MG/DL (ref 70–110)
HBA1C MFR BLD: 5.8 % (ref 4–5.6)
HCT VFR BLD AUTO: 42.1 % (ref 37–48.5)
HDLC SERPL-MCNC: 57 MG/DL (ref 40–75)
HDLC SERPL: 40.1 % (ref 20–50)
HGB BLD-MCNC: 13 G/DL (ref 12–16)
LDLC SERPL CALC-MCNC: 71.8 MG/DL (ref 63–159)
MCH RBC QN AUTO: 30.6 PG (ref 27–31)
MCHC RBC AUTO-ENTMCNC: 30.9 G/DL (ref 32–36)
MCV RBC AUTO: 99 FL (ref 82–98)
NONHDLC SERPL-MCNC: 85 MG/DL
PLATELET # BLD AUTO: 238 K/UL (ref 150–350)
PMV BLD AUTO: 12.4 FL (ref 9.2–12.9)
POTASSIUM SERPL-SCNC: 3.7 MMOL/L (ref 3.5–5.1)
PROT SERPL-MCNC: 7.5 G/DL (ref 6–8.4)
RBC # BLD AUTO: 4.25 M/UL (ref 4–5.4)
SODIUM SERPL-SCNC: 144 MMOL/L (ref 136–145)
T4 FREE SERPL-MCNC: 1.04 NG/DL (ref 0.71–1.51)
TRIGL SERPL-MCNC: 66 MG/DL (ref 30–150)
TSH SERPL DL<=0.005 MIU/L-ACNC: 4.99 UIU/ML (ref 0.4–4)
WBC # BLD AUTO: 7.41 K/UL (ref 3.9–12.7)

## 2021-03-25 PROCEDURE — 3079F PR MOST RECENT DIASTOLIC BLOOD PRESSURE 80-89 MM HG: ICD-10-PCS | Mod: CPTII,S$GLB,, | Performed by: FAMILY MEDICINE

## 2021-03-25 PROCEDURE — 83036 HEMOGLOBIN GLYCOSYLATED A1C: CPT | Performed by: FAMILY MEDICINE

## 2021-03-25 PROCEDURE — 1159F MED LIST DOCD IN RCRD: CPT | Mod: S$GLB,,, | Performed by: FAMILY MEDICINE

## 2021-03-25 PROCEDURE — 84439 ASSAY OF FREE THYROXINE: CPT | Performed by: FAMILY MEDICINE

## 2021-03-25 PROCEDURE — 99999 PR PBB SHADOW E&M-EST. PATIENT-LVL V: CPT | Mod: PBBFAC,,, | Performed by: FAMILY MEDICINE

## 2021-03-25 PROCEDURE — 99999 PR PBB SHADOW E&M-EST. PATIENT-LVL V: ICD-10-PCS | Mod: PBBFAC,,, | Performed by: FAMILY MEDICINE

## 2021-03-25 PROCEDURE — 1101F PT FALLS ASSESS-DOCD LE1/YR: CPT | Mod: CPTII,S$GLB,, | Performed by: FAMILY MEDICINE

## 2021-03-25 PROCEDURE — 99499 RISK ADDL DX/OHS AUDIT: ICD-10-PCS | Mod: S$GLB,,, | Performed by: FAMILY MEDICINE

## 2021-03-25 PROCEDURE — 3044F PR MOST RECENT HEMOGLOBIN A1C LEVEL <7.0%: ICD-10-PCS | Mod: CPTII,S$GLB,, | Performed by: FAMILY MEDICINE

## 2021-03-25 PROCEDURE — 3288F PR FALLS RISK ASSESSMENT DOCUMENTED: ICD-10-PCS | Mod: CPTII,S$GLB,, | Performed by: FAMILY MEDICINE

## 2021-03-25 PROCEDURE — 3075F SYST BP GE 130 - 139MM HG: CPT | Mod: CPTII,S$GLB,, | Performed by: FAMILY MEDICINE

## 2021-03-25 PROCEDURE — 3079F DIAST BP 80-89 MM HG: CPT | Mod: CPTII,S$GLB,, | Performed by: FAMILY MEDICINE

## 2021-03-25 PROCEDURE — 1159F PR MEDICATION LIST DOCUMENTED IN MEDICAL RECORD: ICD-10-PCS | Mod: S$GLB,,, | Performed by: FAMILY MEDICINE

## 2021-03-25 PROCEDURE — 80061 LIPID PANEL: CPT | Performed by: FAMILY MEDICINE

## 2021-03-25 PROCEDURE — 36415 COLL VENOUS BLD VENIPUNCTURE: CPT | Mod: PN | Performed by: FAMILY MEDICINE

## 2021-03-25 PROCEDURE — 3075F PR MOST RECENT SYSTOLIC BLOOD PRESS GE 130-139MM HG: ICD-10-PCS | Mod: CPTII,S$GLB,, | Performed by: FAMILY MEDICINE

## 2021-03-25 PROCEDURE — 80053 COMPREHEN METABOLIC PANEL: CPT | Performed by: FAMILY MEDICINE

## 2021-03-25 PROCEDURE — 99499 UNLISTED E&M SERVICE: CPT | Mod: S$GLB,,, | Performed by: FAMILY MEDICINE

## 2021-03-25 PROCEDURE — 1101F PR PT FALLS ASSESS DOC 0-1 FALLS W/OUT INJ PAST YR: ICD-10-PCS | Mod: CPTII,S$GLB,, | Performed by: FAMILY MEDICINE

## 2021-03-25 PROCEDURE — 1126F PR PAIN SEVERITY QUANTIFIED, NO PAIN PRESENT: ICD-10-PCS | Mod: S$GLB,,, | Performed by: FAMILY MEDICINE

## 2021-03-25 PROCEDURE — 99215 PR OFFICE/OUTPT VISIT, EST, LEVL V, 40-54 MIN: ICD-10-PCS | Mod: S$GLB,,, | Performed by: FAMILY MEDICINE

## 2021-03-25 PROCEDURE — 84443 ASSAY THYROID STIM HORMONE: CPT | Performed by: FAMILY MEDICINE

## 2021-03-25 PROCEDURE — 1126F AMNT PAIN NOTED NONE PRSNT: CPT | Mod: S$GLB,,, | Performed by: FAMILY MEDICINE

## 2021-03-25 PROCEDURE — 99215 OFFICE O/P EST HI 40 MIN: CPT | Mod: S$GLB,,, | Performed by: FAMILY MEDICINE

## 2021-03-25 PROCEDURE — 3288F FALL RISK ASSESSMENT DOCD: CPT | Mod: CPTII,S$GLB,, | Performed by: FAMILY MEDICINE

## 2021-03-25 PROCEDURE — 3044F HG A1C LEVEL LT 7.0%: CPT | Mod: CPTII,S$GLB,, | Performed by: FAMILY MEDICINE

## 2021-03-25 PROCEDURE — 85027 COMPLETE CBC AUTOMATED: CPT | Performed by: FAMILY MEDICINE

## 2021-03-25 RX ORDER — LOSARTAN POTASSIUM 50 MG/1
50 TABLET ORAL 2 TIMES DAILY
Qty: 180 TABLET | Refills: 3 | Status: SHIPPED | OUTPATIENT
Start: 2021-03-25 | End: 2022-02-05 | Stop reason: SDUPTHER

## 2021-03-26 DIAGNOSIS — R94.6 ABNORMAL THYROID FUNCTION TEST: Primary | ICD-10-CM

## 2021-04-01 ENCOUNTER — HOSPITAL ENCOUNTER (OUTPATIENT)
Dept: RADIOLOGY | Facility: HOSPITAL | Age: 76
Discharge: HOME | End: 2021-04-01
Attending: FAMILY MEDICINE
Payer: MEDICARE

## 2021-04-01 DIAGNOSIS — Z78.0 POSTMENOPAUSAL STATE: ICD-10-CM

## 2021-04-01 PROCEDURE — 77080 DXA BONE DENSITY AXIAL: CPT | Mod: 26,,, | Performed by: RADIOLOGY

## 2021-04-01 PROCEDURE — 77080 DEXA BONE DENSITY SPINE HIP: ICD-10-PCS | Mod: 26,,, | Performed by: RADIOLOGY

## 2021-04-01 PROCEDURE — 77080 DXA BONE DENSITY AXIAL: CPT | Mod: TC,PO

## 2021-04-02 DIAGNOSIS — J45.909 REACTIVE AIRWAY DISEASE, UNSPECIFIED ASTHMA SEVERITY, UNCOMPLICATED: ICD-10-CM

## 2021-04-05 RX ORDER — ALBUTEROL SULFATE 90 UG/1
2 AEROSOL, METERED RESPIRATORY (INHALATION) EVERY 6 HOURS PRN
Qty: 54 G | Refills: 5 | Status: SHIPPED | OUTPATIENT
Start: 2021-04-05 | End: 2022-06-18

## 2021-05-16 ENCOUNTER — PATIENT OUTREACH (OUTPATIENT)
Dept: ADMINISTRATIVE | Facility: OTHER | Age: 76
End: 2021-05-16

## 2021-05-17 ENCOUNTER — OFFICE VISIT (OUTPATIENT)
Dept: DERMATOLOGY | Facility: CLINIC | Age: 76
End: 2021-05-17
Payer: MEDICARE

## 2021-05-17 VITALS — BODY MASS INDEX: 49.24 KG/M2 | WEIGHT: 244.25 LBS | RESPIRATION RATE: 18 BRPM | HEIGHT: 59 IN

## 2021-05-17 DIAGNOSIS — L81.7 SCHAMBERG DISEASE: ICD-10-CM

## 2021-05-17 DIAGNOSIS — L90.0 LICHEN SCLEROSUS ET ATROPHICUS: Primary | ICD-10-CM

## 2021-05-17 DIAGNOSIS — R21 RASH AND NONSPECIFIC SKIN ERUPTION: ICD-10-CM

## 2021-05-17 PROCEDURE — 88312 PR  SPECIAL STAINS,GROUP I: ICD-10-PCS | Mod: 26,,, | Performed by: DERMATOLOGY

## 2021-05-17 PROCEDURE — 1101F PT FALLS ASSESS-DOCD LE1/YR: CPT | Mod: CPTII,S$GLB,, | Performed by: DERMATOLOGY

## 2021-05-17 PROCEDURE — 1159F MED LIST DOCD IN RCRD: CPT | Mod: S$GLB,,, | Performed by: DERMATOLOGY

## 2021-05-17 PROCEDURE — 1126F PR PAIN SEVERITY QUANTIFIED, NO PAIN PRESENT: ICD-10-PCS | Mod: S$GLB,,, | Performed by: DERMATOLOGY

## 2021-05-17 PROCEDURE — 99214 PR OFFICE/OUTPT VISIT, EST, LEVL IV, 30-39 MIN: ICD-10-PCS | Mod: 25,S$GLB,, | Performed by: DERMATOLOGY

## 2021-05-17 PROCEDURE — 11104 PUNCH BX SKIN SINGLE LESION: CPT | Mod: S$GLB,,, | Performed by: DERMATOLOGY

## 2021-05-17 PROCEDURE — 88313 PR  SPECIAL STAINS,GROUP II: ICD-10-PCS | Mod: 26,,, | Performed by: DERMATOLOGY

## 2021-05-17 PROCEDURE — 88305 TISSUE EXAM BY PATHOLOGIST: CPT | Performed by: DERMATOLOGY

## 2021-05-17 PROCEDURE — 88305 TISSUE EXAM BY PATHOLOGIST: ICD-10-PCS | Mod: 26,,, | Performed by: DERMATOLOGY

## 2021-05-17 PROCEDURE — 88312 SPECIAL STAINS GROUP 1: CPT | Performed by: DERMATOLOGY

## 2021-05-17 PROCEDURE — 99214 OFFICE O/P EST MOD 30 MIN: CPT | Mod: 25,S$GLB,, | Performed by: DERMATOLOGY

## 2021-05-17 PROCEDURE — 1159F PR MEDICATION LIST DOCUMENTED IN MEDICAL RECORD: ICD-10-PCS | Mod: S$GLB,,, | Performed by: DERMATOLOGY

## 2021-05-17 PROCEDURE — 3288F FALL RISK ASSESSMENT DOCD: CPT | Mod: CPTII,S$GLB,, | Performed by: DERMATOLOGY

## 2021-05-17 PROCEDURE — 88313 SPECIAL STAINS GROUP 2: CPT | Performed by: DERMATOLOGY

## 2021-05-17 PROCEDURE — 99999 PR PBB SHADOW E&M-EST. PATIENT-LVL IV: ICD-10-PCS | Mod: PBBFAC,,, | Performed by: DERMATOLOGY

## 2021-05-17 PROCEDURE — 1101F PR PT FALLS ASSESS DOC 0-1 FALLS W/OUT INJ PAST YR: ICD-10-PCS | Mod: CPTII,S$GLB,, | Performed by: DERMATOLOGY

## 2021-05-17 PROCEDURE — 11104 PR PUNCH BIOPSY, SKIN, SINGLE LESION: ICD-10-PCS | Mod: S$GLB,,, | Performed by: DERMATOLOGY

## 2021-05-17 PROCEDURE — 88313 SPECIAL STAINS GROUP 2: CPT | Mod: 26,,, | Performed by: DERMATOLOGY

## 2021-05-17 PROCEDURE — 88305 TISSUE EXAM BY PATHOLOGIST: CPT | Mod: 26,,, | Performed by: DERMATOLOGY

## 2021-05-17 PROCEDURE — 99999 PR PBB SHADOW E&M-EST. PATIENT-LVL IV: CPT | Mod: PBBFAC,,, | Performed by: DERMATOLOGY

## 2021-05-17 PROCEDURE — 88312 SPECIAL STAINS GROUP 1: CPT | Mod: 26,,, | Performed by: DERMATOLOGY

## 2021-05-17 PROCEDURE — 1126F AMNT PAIN NOTED NONE PRSNT: CPT | Mod: S$GLB,,, | Performed by: DERMATOLOGY

## 2021-05-17 PROCEDURE — 3288F PR FALLS RISK ASSESSMENT DOCUMENTED: ICD-10-PCS | Mod: CPTII,S$GLB,, | Performed by: DERMATOLOGY

## 2021-05-17 RX ORDER — BETAMETHASONE VALERATE 1.2 MG/G
CREAM TOPICAL 2 TIMES DAILY
Qty: 45 G | Refills: 1 | Status: SHIPPED | OUTPATIENT
Start: 2021-05-17 | End: 2021-10-07 | Stop reason: SDUPTHER

## 2021-06-03 LAB
COMMENT: NORMAL
FINAL PATHOLOGIC DIAGNOSIS: NORMAL
GROSS: NORMAL
Lab: NORMAL
MICROSCOPIC EXAM: NORMAL

## 2021-06-04 ENCOUNTER — CLINICAL SUPPORT (OUTPATIENT)
Dept: DERMATOLOGY | Facility: CLINIC | Age: 76
End: 2021-06-04
Payer: MEDICARE

## 2021-06-04 DIAGNOSIS — Z48.02 VISIT FOR SUTURE REMOVAL: Primary | ICD-10-CM

## 2021-06-04 PROCEDURE — 99024 SUTURE REMOVAL: ICD-10-PCS | Mod: S$GLB,,, | Performed by: DERMATOLOGY

## 2021-06-04 PROCEDURE — 99024 POSTOP FOLLOW-UP VISIT: CPT | Mod: S$GLB,,, | Performed by: DERMATOLOGY

## 2021-07-07 ENCOUNTER — PES CALL (OUTPATIENT)
Dept: ADMINISTRATIVE | Facility: CLINIC | Age: 76
End: 2021-07-07

## 2021-09-20 ENCOUNTER — TELEPHONE (OUTPATIENT)
Dept: RHEUMATOLOGY | Facility: CLINIC | Age: 76
End: 2021-09-20

## 2021-09-21 ENCOUNTER — OFFICE VISIT (OUTPATIENT)
Dept: RHEUMATOLOGY | Facility: CLINIC | Age: 76
End: 2021-09-21
Payer: MEDICARE

## 2021-09-21 VITALS
BODY MASS INDEX: 49.82 KG/M2 | DIASTOLIC BLOOD PRESSURE: 96 MMHG | HEIGHT: 59 IN | SYSTOLIC BLOOD PRESSURE: 154 MMHG | HEART RATE: 87 BPM | WEIGHT: 247.13 LBS

## 2021-09-21 DIAGNOSIS — M79.7 FIBROMYALGIA: ICD-10-CM

## 2021-09-21 DIAGNOSIS — M15.9 PRIMARY OSTEOARTHRITIS INVOLVING MULTIPLE JOINTS: Primary | ICD-10-CM

## 2021-09-21 PROCEDURE — 3077F PR MOST RECENT SYSTOLIC BLOOD PRESSURE >= 140 MM HG: ICD-10-PCS | Mod: CPTII,S$GLB,, | Performed by: INTERNAL MEDICINE

## 2021-09-21 PROCEDURE — 99214 OFFICE O/P EST MOD 30 MIN: CPT | Mod: S$GLB,,, | Performed by: INTERNAL MEDICINE

## 2021-09-21 PROCEDURE — 99999 PR PBB SHADOW E&M-EST. PATIENT-LVL III: ICD-10-PCS | Mod: PBBFAC,,, | Performed by: INTERNAL MEDICINE

## 2021-09-21 PROCEDURE — 1159F MED LIST DOCD IN RCRD: CPT | Mod: CPTII,S$GLB,, | Performed by: INTERNAL MEDICINE

## 2021-09-21 PROCEDURE — 3080F PR MOST RECENT DIASTOLIC BLOOD PRESSURE >= 90 MM HG: ICD-10-PCS | Mod: CPTII,S$GLB,, | Performed by: INTERNAL MEDICINE

## 2021-09-21 PROCEDURE — 3077F SYST BP >= 140 MM HG: CPT | Mod: CPTII,S$GLB,, | Performed by: INTERNAL MEDICINE

## 2021-09-21 PROCEDURE — 3288F FALL RISK ASSESSMENT DOCD: CPT | Mod: CPTII,S$GLB,, | Performed by: INTERNAL MEDICINE

## 2021-09-21 PROCEDURE — 99214 PR OFFICE/OUTPT VISIT, EST, LEVL IV, 30-39 MIN: ICD-10-PCS | Mod: S$GLB,,, | Performed by: INTERNAL MEDICINE

## 2021-09-21 PROCEDURE — 3080F DIAST BP >= 90 MM HG: CPT | Mod: CPTII,S$GLB,, | Performed by: INTERNAL MEDICINE

## 2021-09-21 PROCEDURE — 1160F RVW MEDS BY RX/DR IN RCRD: CPT | Mod: CPTII,S$GLB,, | Performed by: INTERNAL MEDICINE

## 2021-09-21 PROCEDURE — 1125F PR PAIN SEVERITY QUANTIFIED, PAIN PRESENT: ICD-10-PCS | Mod: CPTII,S$GLB,, | Performed by: INTERNAL MEDICINE

## 2021-09-21 PROCEDURE — 1159F PR MEDICATION LIST DOCUMENTED IN MEDICAL RECORD: ICD-10-PCS | Mod: CPTII,S$GLB,, | Performed by: INTERNAL MEDICINE

## 2021-09-21 PROCEDURE — 1101F PR PT FALLS ASSESS DOC 0-1 FALLS W/OUT INJ PAST YR: ICD-10-PCS | Mod: CPTII,S$GLB,, | Performed by: INTERNAL MEDICINE

## 2021-09-21 PROCEDURE — 99999 PR PBB SHADOW E&M-EST. PATIENT-LVL III: CPT | Mod: PBBFAC,,, | Performed by: INTERNAL MEDICINE

## 2021-09-21 PROCEDURE — 1101F PT FALLS ASSESS-DOCD LE1/YR: CPT | Mod: CPTII,S$GLB,, | Performed by: INTERNAL MEDICINE

## 2021-09-21 PROCEDURE — 1125F AMNT PAIN NOTED PAIN PRSNT: CPT | Mod: CPTII,S$GLB,, | Performed by: INTERNAL MEDICINE

## 2021-09-21 PROCEDURE — 1160F PR REVIEW ALL MEDS BY PRESCRIBER/CLIN PHARMACIST DOCUMENTED: ICD-10-PCS | Mod: CPTII,S$GLB,, | Performed by: INTERNAL MEDICINE

## 2021-09-21 PROCEDURE — 3288F PR FALLS RISK ASSESSMENT DOCUMENTED: ICD-10-PCS | Mod: CPTII,S$GLB,, | Performed by: INTERNAL MEDICINE

## 2021-09-21 RX ORDER — CARTILAGE/COLLAGEN/BOR/HYALUR 40-10-5 MG
1 TABLET ORAL DAILY
COMMUNITY

## 2021-09-21 ASSESSMENT — ROUTINE ASSESSMENT OF PATIENT INDEX DATA (RAPID3)
MDHAQ FUNCTION SCORE: 1.2
FATIGUE SCORE: 2.2
TOTAL RAPID3 SCORE: 4.67
PATIENT GLOBAL ASSESSMENT SCORE: 5
PSYCHOLOGICAL DISTRESS SCORE: 2.2
PAIN SCORE: 5

## 2021-09-26 ENCOUNTER — PATIENT MESSAGE (OUTPATIENT)
Dept: FAMILY MEDICINE | Facility: CLINIC | Age: 76
End: 2021-09-26

## 2021-09-27 ENCOUNTER — PATIENT MESSAGE (OUTPATIENT)
Dept: FAMILY MEDICINE | Facility: CLINIC | Age: 76
End: 2021-09-27

## 2021-09-28 ENCOUNTER — IMMUNIZATION (OUTPATIENT)
Dept: FAMILY MEDICINE | Facility: CLINIC | Age: 76
End: 2021-09-28
Payer: MEDICARE

## 2021-09-28 DIAGNOSIS — Z23 NEED FOR VACCINATION: Primary | ICD-10-CM

## 2021-09-28 PROCEDURE — 91300 COVID-19, MRNA, LNP-S, PF, 30 MCG/0.3 ML DOSE VACCINE: CPT | Mod: HCNC,PBBFAC | Performed by: FAMILY MEDICINE

## 2021-09-28 PROCEDURE — 0003A COVID-19, MRNA, LNP-S, PF, 30 MCG/0.3 ML DOSE VACCINE: CPT | Mod: HCNC,PBBFAC | Performed by: FAMILY MEDICINE

## 2021-10-05 ENCOUNTER — OFFICE VISIT (OUTPATIENT)
Dept: FAMILY MEDICINE | Facility: CLINIC | Age: 76
End: 2021-10-05
Payer: MEDICARE

## 2021-10-05 ENCOUNTER — LAB VISIT (OUTPATIENT)
Dept: LAB | Facility: HOSPITAL | Age: 76
End: 2021-10-05
Attending: FAMILY MEDICINE
Payer: MEDICARE

## 2021-10-05 VITALS
HEART RATE: 72 BPM | SYSTOLIC BLOOD PRESSURE: 130 MMHG | BODY MASS INDEX: 49.73 KG/M2 | RESPIRATION RATE: 14 BRPM | WEIGHT: 246.69 LBS | HEIGHT: 59 IN | DIASTOLIC BLOOD PRESSURE: 88 MMHG | TEMPERATURE: 99 F

## 2021-10-05 DIAGNOSIS — R73.03 PREDIABETES: ICD-10-CM

## 2021-10-05 DIAGNOSIS — J44.9 CHRONIC OBSTRUCTIVE PULMONARY DISEASE, UNSPECIFIED COPD TYPE: Primary | ICD-10-CM

## 2021-10-05 DIAGNOSIS — I27.20 PULMONARY HYPERTENSION: ICD-10-CM

## 2021-10-05 DIAGNOSIS — F41.9 ANXIETY: ICD-10-CM

## 2021-10-05 DIAGNOSIS — E55.9 VITAMIN D DEFICIENCY: ICD-10-CM

## 2021-10-05 DIAGNOSIS — Z12.11 SPECIAL SCREENING FOR MALIGNANT NEOPLASMS, COLON: ICD-10-CM

## 2021-10-05 DIAGNOSIS — R60.9 EDEMA, UNSPECIFIED TYPE: ICD-10-CM

## 2021-10-05 DIAGNOSIS — J44.9 CHRONIC OBSTRUCTIVE PULMONARY DISEASE, UNSPECIFIED COPD TYPE: ICD-10-CM

## 2021-10-05 DIAGNOSIS — Z12.31 ENCOUNTER FOR SCREENING MAMMOGRAM FOR MALIGNANT NEOPLASM OF BREAST: ICD-10-CM

## 2021-10-05 DIAGNOSIS — I10 PRIMARY HYPERTENSION: ICD-10-CM

## 2021-10-05 LAB
25(OH)D3+25(OH)D2 SERPL-MCNC: 16 NG/ML (ref 30–96)
ALBUMIN SERPL BCP-MCNC: 3.6 G/DL (ref 3.5–5.2)
ALP SERPL-CCNC: 123 U/L (ref 55–135)
ALT SERPL W/O P-5'-P-CCNC: 21 U/L (ref 10–44)
ANION GAP SERPL CALC-SCNC: 13 MMOL/L (ref 8–16)
AST SERPL-CCNC: 24 U/L (ref 10–40)
BILIRUB SERPL-MCNC: 0.8 MG/DL (ref 0.1–1)
BUN SERPL-MCNC: 16 MG/DL (ref 8–23)
CALCIUM SERPL-MCNC: 9.4 MG/DL (ref 8.7–10.5)
CHLORIDE SERPL-SCNC: 102 MMOL/L (ref 95–110)
CHOLEST SERPL-MCNC: 144 MG/DL (ref 120–199)
CHOLEST/HDLC SERPL: 2.4 {RATIO} (ref 2–5)
CO2 SERPL-SCNC: 27 MMOL/L (ref 23–29)
CREAT SERPL-MCNC: 0.7 MG/DL (ref 0.5–1.4)
ERYTHROCYTE [DISTWIDTH] IN BLOOD BY AUTOMATED COUNT: 14.6 % (ref 11.5–14.5)
EST. GFR  (AFRICAN AMERICAN): >60 ML/MIN/1.73 M^2
EST. GFR  (NON AFRICAN AMERICAN): >60 ML/MIN/1.73 M^2
ESTIMATED AVG GLUCOSE: 117 MG/DL (ref 68–131)
GLUCOSE SERPL-MCNC: 100 MG/DL (ref 70–110)
HBA1C MFR BLD: 5.7 % (ref 4–5.6)
HCT VFR BLD AUTO: 38.5 % (ref 37–48.5)
HDLC SERPL-MCNC: 61 MG/DL (ref 40–75)
HDLC SERPL: 42.4 % (ref 20–50)
HGB BLD-MCNC: 12.5 G/DL (ref 12–16)
LDLC SERPL CALC-MCNC: 71.6 MG/DL (ref 63–159)
MCH RBC QN AUTO: 30.6 PG (ref 27–31)
MCHC RBC AUTO-ENTMCNC: 32.5 G/DL (ref 32–36)
MCV RBC AUTO: 94 FL (ref 82–98)
NONHDLC SERPL-MCNC: 83 MG/DL
PLATELET # BLD AUTO: 221 K/UL (ref 150–450)
PMV BLD AUTO: 12 FL (ref 9.2–12.9)
POTASSIUM SERPL-SCNC: 3.5 MMOL/L (ref 3.5–5.1)
PROT SERPL-MCNC: 7.4 G/DL (ref 6–8.4)
RBC # BLD AUTO: 4.08 M/UL (ref 4–5.4)
SARS-COV-2 IGG SERPL IA-ACNC: 4367.1 AU/ML
SARS-COV-2 IGG SERPL QL IA: POSITIVE
SODIUM SERPL-SCNC: 142 MMOL/L (ref 136–145)
TRIGL SERPL-MCNC: 57 MG/DL (ref 30–150)
TSH SERPL DL<=0.005 MIU/L-ACNC: 3.6 UIU/ML (ref 0.4–4)
WBC # BLD AUTO: 6.72 K/UL (ref 3.9–12.7)

## 2021-10-05 PROCEDURE — 90694 FLU VACCINE - QUADRIVALENT - ADJUVANTED: ICD-10-PCS | Mod: HCNC,S$GLB,, | Performed by: FAMILY MEDICINE

## 2021-10-05 PROCEDURE — 1125F PR PAIN SEVERITY QUANTIFIED, PAIN PRESENT: ICD-10-PCS | Mod: HCNC,CPTII,S$GLB, | Performed by: FAMILY MEDICINE

## 2021-10-05 PROCEDURE — 80053 COMPREHEN METABOLIC PANEL: CPT | Performed by: FAMILY MEDICINE

## 2021-10-05 PROCEDURE — 82306 VITAMIN D 25 HYDROXY: CPT | Performed by: FAMILY MEDICINE

## 2021-10-05 PROCEDURE — 3079F DIAST BP 80-89 MM HG: CPT | Mod: HCNC,CPTII,S$GLB, | Performed by: FAMILY MEDICINE

## 2021-10-05 PROCEDURE — 99214 PR OFFICE/OUTPT VISIT, EST, LEVL IV, 30-39 MIN: ICD-10-PCS | Mod: 25,HCNC,S$GLB, | Performed by: FAMILY MEDICINE

## 2021-10-05 PROCEDURE — G0008 ADMIN INFLUENZA VIRUS VAC: HCPCS | Mod: HCNC,S$GLB,, | Performed by: FAMILY MEDICINE

## 2021-10-05 PROCEDURE — 99499 UNLISTED E&M SERVICE: CPT | Mod: S$GLB,,, | Performed by: FAMILY MEDICINE

## 2021-10-05 PROCEDURE — G0008 FLU VACCINE - QUADRIVALENT - ADJUVANTED: ICD-10-PCS | Mod: HCNC,S$GLB,, | Performed by: FAMILY MEDICINE

## 2021-10-05 PROCEDURE — 36415 COLL VENOUS BLD VENIPUNCTURE: CPT | Mod: PN | Performed by: FAMILY MEDICINE

## 2021-10-05 PROCEDURE — 90694 VACC AIIV4 NO PRSRV 0.5ML IM: CPT | Mod: HCNC,S$GLB,, | Performed by: FAMILY MEDICINE

## 2021-10-05 PROCEDURE — 3075F PR MOST RECENT SYSTOLIC BLOOD PRESS GE 130-139MM HG: ICD-10-PCS | Mod: HCNC,CPTII,S$GLB, | Performed by: FAMILY MEDICINE

## 2021-10-05 PROCEDURE — 80061 LIPID PANEL: CPT | Performed by: FAMILY MEDICINE

## 2021-10-05 PROCEDURE — 99499 RISK ADDL DX/OHS AUDIT: ICD-10-PCS | Mod: S$GLB,,, | Performed by: FAMILY MEDICINE

## 2021-10-05 PROCEDURE — 99214 OFFICE O/P EST MOD 30 MIN: CPT | Mod: 25,HCNC,S$GLB, | Performed by: FAMILY MEDICINE

## 2021-10-05 PROCEDURE — 3288F FALL RISK ASSESSMENT DOCD: CPT | Mod: HCNC,CPTII,S$GLB, | Performed by: FAMILY MEDICINE

## 2021-10-05 PROCEDURE — 1101F PT FALLS ASSESS-DOCD LE1/YR: CPT | Mod: HCNC,CPTII,S$GLB, | Performed by: FAMILY MEDICINE

## 2021-10-05 PROCEDURE — 1125F AMNT PAIN NOTED PAIN PRSNT: CPT | Mod: HCNC,CPTII,S$GLB, | Performed by: FAMILY MEDICINE

## 2021-10-05 PROCEDURE — 99999 PR PBB SHADOW E&M-EST. PATIENT-LVL V: CPT | Mod: PBBFAC,HCNC,, | Performed by: FAMILY MEDICINE

## 2021-10-05 PROCEDURE — 83036 HEMOGLOBIN GLYCOSYLATED A1C: CPT | Performed by: FAMILY MEDICINE

## 2021-10-05 PROCEDURE — 3075F SYST BP GE 130 - 139MM HG: CPT | Mod: HCNC,CPTII,S$GLB, | Performed by: FAMILY MEDICINE

## 2021-10-05 PROCEDURE — 84443 ASSAY THYROID STIM HORMONE: CPT | Performed by: FAMILY MEDICINE

## 2021-10-05 PROCEDURE — 85027 COMPLETE CBC AUTOMATED: CPT | Performed by: FAMILY MEDICINE

## 2021-10-05 PROCEDURE — 3288F PR FALLS RISK ASSESSMENT DOCUMENTED: ICD-10-PCS | Mod: HCNC,CPTII,S$GLB, | Performed by: FAMILY MEDICINE

## 2021-10-05 PROCEDURE — 1101F PR PT FALLS ASSESS DOC 0-1 FALLS W/OUT INJ PAST YR: ICD-10-PCS | Mod: HCNC,CPTII,S$GLB, | Performed by: FAMILY MEDICINE

## 2021-10-05 PROCEDURE — 3079F PR MOST RECENT DIASTOLIC BLOOD PRESSURE 80-89 MM HG: ICD-10-PCS | Mod: HCNC,CPTII,S$GLB, | Performed by: FAMILY MEDICINE

## 2021-10-05 PROCEDURE — 99999 PR PBB SHADOW E&M-EST. PATIENT-LVL V: ICD-10-PCS | Mod: PBBFAC,HCNC,, | Performed by: FAMILY MEDICINE

## 2021-10-05 PROCEDURE — 86769 SARS-COV-2 COVID-19 ANTIBODY: CPT | Performed by: FAMILY MEDICINE

## 2021-10-07 ENCOUNTER — OFFICE VISIT (OUTPATIENT)
Dept: DERMATOLOGY | Facility: CLINIC | Age: 76
End: 2021-10-07
Payer: MEDICARE

## 2021-10-07 ENCOUNTER — LAB VISIT (OUTPATIENT)
Dept: LAB | Facility: HOSPITAL | Age: 76
End: 2021-10-07
Attending: FAMILY MEDICINE
Payer: MEDICARE

## 2021-10-07 VITALS — HEIGHT: 59 IN | RESPIRATION RATE: 18 BRPM | WEIGHT: 246.69 LBS | BODY MASS INDEX: 49.73 KG/M2

## 2021-10-07 DIAGNOSIS — L90.0 LICHEN SCLEROSUS ET ATROPHICUS: Primary | ICD-10-CM

## 2021-10-07 DIAGNOSIS — D48.5 NEOPLASM OF UNCERTAIN BEHAVIOR OF SKIN: ICD-10-CM

## 2021-10-07 DIAGNOSIS — Z12.11 SPECIAL SCREENING FOR MALIGNANT NEOPLASMS, COLON: ICD-10-CM

## 2021-10-07 PROCEDURE — 82274 ASSAY TEST FOR BLOOD FECAL: CPT | Performed by: FAMILY MEDICINE

## 2021-10-07 PROCEDURE — 99214 OFFICE O/P EST MOD 30 MIN: CPT | Mod: 25,HCNC,S$GLB, | Performed by: DERMATOLOGY

## 2021-10-07 PROCEDURE — 11102 PR TANGENTIAL BIOPSY, SKIN, SINGLE LESION: ICD-10-PCS | Mod: HCNC,S$GLB,, | Performed by: DERMATOLOGY

## 2021-10-07 PROCEDURE — 3288F PR FALLS RISK ASSESSMENT DOCUMENTED: ICD-10-PCS | Mod: HCNC,CPTII,, | Performed by: DERMATOLOGY

## 2021-10-07 PROCEDURE — 88305 TISSUE EXAM BY PATHOLOGIST: ICD-10-PCS | Mod: 26,HCNC,, | Performed by: PATHOLOGY

## 2021-10-07 PROCEDURE — 1159F PR MEDICATION LIST DOCUMENTED IN MEDICAL RECORD: ICD-10-PCS | Mod: HCNC,CPTII,, | Performed by: DERMATOLOGY

## 2021-10-07 PROCEDURE — 99999 PR PBB SHADOW E&M-EST. PATIENT-LVL III: ICD-10-PCS | Mod: PBBFAC,HCNC,, | Performed by: DERMATOLOGY

## 2021-10-07 PROCEDURE — 1159F MED LIST DOCD IN RCRD: CPT | Mod: HCNC,CPTII,, | Performed by: DERMATOLOGY

## 2021-10-07 PROCEDURE — 88305 TISSUE EXAM BY PATHOLOGIST: CPT | Mod: HCNC | Performed by: PATHOLOGY

## 2021-10-07 PROCEDURE — 1125F PR PAIN SEVERITY QUANTIFIED, PAIN PRESENT: ICD-10-PCS | Mod: HCNC,CPTII,, | Performed by: DERMATOLOGY

## 2021-10-07 PROCEDURE — 1101F PT FALLS ASSESS-DOCD LE1/YR: CPT | Mod: HCNC,CPTII,, | Performed by: DERMATOLOGY

## 2021-10-07 PROCEDURE — 1125F AMNT PAIN NOTED PAIN PRSNT: CPT | Mod: HCNC,CPTII,, | Performed by: DERMATOLOGY

## 2021-10-07 PROCEDURE — 88305 TISSUE EXAM BY PATHOLOGIST: CPT | Mod: 26,HCNC,, | Performed by: PATHOLOGY

## 2021-10-07 PROCEDURE — 11102 TANGNTL BX SKIN SINGLE LES: CPT | Mod: HCNC,S$GLB,, | Performed by: DERMATOLOGY

## 2021-10-07 PROCEDURE — 99999 PR PBB SHADOW E&M-EST. PATIENT-LVL III: CPT | Mod: PBBFAC,HCNC,, | Performed by: DERMATOLOGY

## 2021-10-07 PROCEDURE — 3288F FALL RISK ASSESSMENT DOCD: CPT | Mod: HCNC,CPTII,, | Performed by: DERMATOLOGY

## 2021-10-07 PROCEDURE — 99214 PR OFFICE/OUTPT VISIT, EST, LEVL IV, 30-39 MIN: ICD-10-PCS | Mod: 25,HCNC,S$GLB, | Performed by: DERMATOLOGY

## 2021-10-07 PROCEDURE — 1101F PR PT FALLS ASSESS DOC 0-1 FALLS W/OUT INJ PAST YR: ICD-10-PCS | Mod: HCNC,CPTII,, | Performed by: DERMATOLOGY

## 2021-10-07 RX ORDER — BETAMETHASONE VALERATE 1.2 MG/G
CREAM TOPICAL 2 TIMES DAILY
Qty: 45 G | Refills: 6 | Status: SHIPPED | OUTPATIENT
Start: 2021-10-07

## 2021-10-08 ENCOUNTER — PATIENT MESSAGE (OUTPATIENT)
Dept: FAMILY MEDICINE | Facility: CLINIC | Age: 76
End: 2021-10-08

## 2021-10-11 LAB
FINAL PATHOLOGIC DIAGNOSIS: NORMAL
GROSS: NORMAL
Lab: NORMAL
MICROSCOPIC EXAM: NORMAL

## 2021-10-13 ENCOUNTER — HOSPITAL ENCOUNTER (OUTPATIENT)
Dept: RADIOLOGY | Facility: HOSPITAL | Age: 76
Discharge: HOME | End: 2021-10-13
Attending: FAMILY MEDICINE
Payer: MEDICARE

## 2021-10-13 DIAGNOSIS — Z12.31 ENCOUNTER FOR SCREENING MAMMOGRAM FOR MALIGNANT NEOPLASM OF BREAST: ICD-10-CM

## 2021-10-13 DIAGNOSIS — Z12.31 BREAST CANCER SCREENING BY MAMMOGRAM: ICD-10-CM

## 2021-10-13 LAB — HEMOCCULT STL QL IA: NEGATIVE

## 2021-10-13 PROCEDURE — 77063 BREAST TOMOSYNTHESIS BI: CPT | Mod: 26,,, | Performed by: RADIOLOGY

## 2021-10-13 PROCEDURE — 77063 MAMMO DIGITAL SCREENING BILAT WITH TOMO: ICD-10-PCS | Mod: 26,,, | Performed by: RADIOLOGY

## 2021-10-13 PROCEDURE — 77067 SCR MAMMO BI INCL CAD: CPT | Mod: 26,,, | Performed by: RADIOLOGY

## 2021-10-13 PROCEDURE — 77067 MAMMO DIGITAL SCREENING BILAT WITH TOMO: ICD-10-PCS | Mod: 26,,, | Performed by: RADIOLOGY

## 2021-10-13 PROCEDURE — 77067 SCR MAMMO BI INCL CAD: CPT | Mod: TC,PO

## 2021-10-14 ENCOUNTER — PATIENT MESSAGE (OUTPATIENT)
Dept: RADIOLOGY | Facility: HOSPITAL | Age: 76
End: 2021-10-14
Payer: MEDICARE

## 2021-10-18 ENCOUNTER — PATIENT MESSAGE (OUTPATIENT)
Dept: FAMILY MEDICINE | Facility: CLINIC | Age: 76
End: 2021-10-18
Payer: MEDICARE

## 2021-10-28 ENCOUNTER — CLINICAL SUPPORT (OUTPATIENT)
Dept: CARDIOLOGY | Facility: HOSPITAL | Age: 76
End: 2021-10-28
Attending: FAMILY MEDICINE
Payer: MEDICARE

## 2021-10-28 VITALS — WEIGHT: 246 LBS | HEIGHT: 59 IN | BODY MASS INDEX: 49.59 KG/M2

## 2021-10-28 DIAGNOSIS — I27.20 PULMONARY HYPERTENSION: ICD-10-CM

## 2021-10-28 LAB
ASCENDING AORTA: 3.23 CM
AV INDEX (PROSTH): 0.72
AV MEAN GRADIENT: 10 MMHG
AV PEAK GRADIENT: 26 MMHG
AV VALVE AREA: 2.35 CM2
AV VELOCITY RATIO: 0.7
BSA FOR ECHO PROCEDURE: 2.16 M2
CV ECHO LV RWT: 0.56 CM
DOP CALC AO PEAK VEL: 2.57 M/S
DOP CALC AO VTI: 47.71 CM
DOP CALC LVOT AREA: 3.3 CM2
DOP CALC LVOT DIAMETER: 2.04 CM
DOP CALC LVOT PEAK VEL: 1.8 M/S
DOP CALC LVOT STROKE VOLUME: 111.92 CM3
DOP CALCLVOT PEAK VEL VTI: 34.26 CM
E WAVE DECELERATION TIME: 161.97 MSEC
E/A RATIO: 0.78
E/E' RATIO: 13.07 M/S
ECHO LV POSTERIOR WALL: 0.97 CM (ref 0.6–1.1)
EJECTION FRACTION: 65 %
FRACTIONAL SHORTENING: 31 % (ref 28–44)
INTERVENTRICULAR SEPTUM: 1.01 CM (ref 0.6–1.1)
LA MAJOR: 5.34 CM
LA MINOR: 5.16 CM
LA WIDTH: 3.86 CM
LEFT ATRIUM SIZE: 3.38 CM
LEFT ATRIUM VOLUME INDEX: 29 ML/M2
LEFT ATRIUM VOLUME: 58.2 CM3
LEFT INTERNAL DIMENSION IN SYSTOLE: 2.4 CM (ref 2.1–4)
LEFT VENTRICLE DIASTOLIC VOLUME INDEX: 24.78 ML/M2
LEFT VENTRICLE DIASTOLIC VOLUME: 49.81 ML
LEFT VENTRICLE MASS INDEX: 50 G/M2
LEFT VENTRICLE SYSTOLIC VOLUME INDEX: 10.1 ML/M2
LEFT VENTRICLE SYSTOLIC VOLUME: 20.22 ML
LEFT VENTRICULAR INTERNAL DIMENSION IN DIASTOLE: 3.47 CM (ref 3.5–6)
LEFT VENTRICULAR MASS: 100.52 G
LV LATERAL E/E' RATIO: 10.89 M/S
LV SEPTAL E/E' RATIO: 16.33 M/S
MV A" WAVE DURATION": 11.8 MSEC
MV PEAK A VEL: 1.25 M/S
MV PEAK E VEL: 0.98 M/S
MV STENOSIS PRESSURE HALF TIME: 46.97 MS
MV VALVE AREA P 1/2 METHOD: 4.68 CM2
PISA TR MAX VEL: 2.99 M/S
PULM VEIN S/D RATIO: 1.24
PV PEAK D VEL: 0.66 M/S
PV PEAK S VEL: 0.82 M/S
PV PEAK VELOCITY: 1.94 CM/S
RA MAJOR: 4.05 CM
RA PRESSURE: 3 MMHG
RA WIDTH: 2.82 CM
RIGHT VENTRICULAR END-DIASTOLIC DIMENSION: 3.38 CM
RV TISSUE DOPPLER FREE WALL SYSTOLIC VELOCITY 1 (APICAL 4 CHAMBER VIEW): 13.71 CM/S
SINUS: 3.08 CM
STJ: 2.86 CM
TDI LATERAL: 0.09 M/S
TDI SEPTAL: 0.06 M/S
TDI: 0.08 M/S
TR MAX PG: 36 MMHG
TRICUSPID ANNULAR PLANE SYSTOLIC EXCURSION: 2.5 CM
TV REST PULMONARY ARTERY PRESSURE: 39 MMHG

## 2021-10-28 PROCEDURE — 93306 TTE W/DOPPLER COMPLETE: CPT | Mod: 26,HCNC,, | Performed by: INTERNAL MEDICINE

## 2021-10-28 PROCEDURE — 93306 ECHO (CUPID ONLY): ICD-10-PCS | Mod: 26,HCNC,, | Performed by: INTERNAL MEDICINE

## 2021-10-28 PROCEDURE — 93306 TTE W/DOPPLER COMPLETE: CPT | Mod: HCNC,PO

## 2021-11-02 ENCOUNTER — HOSPITAL ENCOUNTER (OUTPATIENT)
Dept: RADIOLOGY | Facility: HOSPITAL | Age: 76
Discharge: HOME | End: 2021-11-02
Attending: FAMILY MEDICINE
Payer: MEDICARE

## 2021-11-02 DIAGNOSIS — R92.8 ABNORMAL MAMMOGRAM: ICD-10-CM

## 2021-11-02 PROCEDURE — 77065 DX MAMMO INCL CAD UNI: CPT | Mod: 26,RT,, | Performed by: RADIOLOGY

## 2021-11-02 PROCEDURE — 77061 BREAST TOMOSYNTHESIS UNI: CPT | Mod: TC,PO,RT

## 2021-11-02 PROCEDURE — 77061 MAMMO DIGITAL DIAGNOSTIC RIGHT WITH TOMO: ICD-10-PCS | Mod: 26,RT,, | Performed by: RADIOLOGY

## 2021-11-02 PROCEDURE — 76642 US BREAST RIGHT LIMITED: ICD-10-PCS | Mod: 26,RT,, | Performed by: RADIOLOGY

## 2021-11-02 PROCEDURE — 76642 ULTRASOUND BREAST LIMITED: CPT | Mod: TC,PO,RT

## 2021-11-02 PROCEDURE — 77061 BREAST TOMOSYNTHESIS UNI: CPT | Mod: 26,RT,, | Performed by: RADIOLOGY

## 2021-11-02 PROCEDURE — 77065 MAMMO DIGITAL DIAGNOSTIC RIGHT WITH TOMO: ICD-10-PCS | Mod: 26,RT,, | Performed by: RADIOLOGY

## 2021-11-02 PROCEDURE — 76642 ULTRASOUND BREAST LIMITED: CPT | Mod: 26,RT,, | Performed by: RADIOLOGY

## 2021-12-01 DIAGNOSIS — R60.9 EDEMA, UNSPECIFIED TYPE: ICD-10-CM

## 2021-12-01 DIAGNOSIS — I27.20 PULMONARY HYPERTENSION: ICD-10-CM

## 2021-12-03 RX ORDER — FUROSEMIDE 80 MG/1
80 TABLET ORAL DAILY
Qty: 90 TABLET | Refills: 3 | Status: SHIPPED | OUTPATIENT
Start: 2021-12-03 | End: 2022-09-13 | Stop reason: SDUPTHER

## 2021-12-07 ENCOUNTER — OFFICE VISIT (OUTPATIENT)
Dept: FAMILY MEDICINE | Facility: CLINIC | Age: 76
End: 2021-12-07
Payer: MEDICARE

## 2021-12-07 VITALS
TEMPERATURE: 99 F | HEIGHT: 59 IN | BODY MASS INDEX: 50.02 KG/M2 | SYSTOLIC BLOOD PRESSURE: 136 MMHG | RESPIRATION RATE: 14 BRPM | DIASTOLIC BLOOD PRESSURE: 86 MMHG | OXYGEN SATURATION: 97 % | WEIGHT: 248.13 LBS | HEART RATE: 81 BPM

## 2021-12-07 DIAGNOSIS — G25.81 RESTLESS LEGS SYNDROME: ICD-10-CM

## 2021-12-07 DIAGNOSIS — M62.830 SPASM OF MUSCLE OF LOWER BACK: Primary | ICD-10-CM

## 2021-12-07 DIAGNOSIS — R60.9 EDEMA, UNSPECIFIED TYPE: ICD-10-CM

## 2021-12-07 DIAGNOSIS — R73.03 PREDIABETES: ICD-10-CM

## 2021-12-07 DIAGNOSIS — G47.00 INSOMNIA, UNSPECIFIED TYPE: ICD-10-CM

## 2021-12-07 PROCEDURE — 1157F ADVNC CARE PLAN IN RCRD: CPT | Mod: HCNC,CPTII,S$GLB, | Performed by: FAMILY MEDICINE

## 2021-12-07 PROCEDURE — 99214 OFFICE O/P EST MOD 30 MIN: CPT | Mod: PBBFAC,PN | Performed by: FAMILY MEDICINE

## 2021-12-07 PROCEDURE — 1157F PR ADVANCE CARE PLAN OR EQUIV PRESENT IN MEDICAL RECORD: ICD-10-PCS | Mod: HCNC,CPTII,S$GLB, | Performed by: FAMILY MEDICINE

## 2021-12-07 PROCEDURE — 99999 PR PBB SHADOW E&M-EST. PATIENT-LVL IV: CPT | Mod: PBBFAC,,, | Performed by: FAMILY MEDICINE

## 2021-12-07 PROCEDURE — 99214 PR OFFICE/OUTPT VISIT, EST, LEVL IV, 30-39 MIN: ICD-10-PCS | Mod: HCNC,S$GLB,, | Performed by: FAMILY MEDICINE

## 2021-12-07 PROCEDURE — 99999 PR PBB SHADOW E&M-EST. PATIENT-LVL IV: ICD-10-PCS | Mod: PBBFAC,,, | Performed by: FAMILY MEDICINE

## 2021-12-07 PROCEDURE — 99214 OFFICE O/P EST MOD 30 MIN: CPT | Mod: HCNC,S$GLB,, | Performed by: FAMILY MEDICINE

## 2021-12-07 RX ORDER — TIZANIDINE 4 MG/1
4 TABLET ORAL
Qty: 20 TABLET | Refills: 0 | Status: SHIPPED | OUTPATIENT
Start: 2021-12-07 | End: 2021-12-17

## 2021-12-07 RX ORDER — PRAMIPEXOLE DIHYDROCHLORIDE 1.5 MG/1
1.5 TABLET ORAL NIGHTLY
Qty: 90 TABLET | Refills: 1 | Status: SHIPPED | OUTPATIENT
Start: 2021-12-07 | End: 2022-05-03

## 2022-02-04 ENCOUNTER — PATIENT MESSAGE (OUTPATIENT)
Dept: FAMILY MEDICINE | Facility: CLINIC | Age: 77
End: 2022-02-04
Payer: MEDICARE

## 2022-02-04 ENCOUNTER — LAB VISIT (OUTPATIENT)
Dept: FAMILY MEDICINE | Facility: CLINIC | Age: 77
End: 2022-02-04
Payer: MEDICARE

## 2022-02-04 DIAGNOSIS — Z20.822 ENCOUNTER FOR LABORATORY TESTING FOR COVID-19 VIRUS: ICD-10-CM

## 2022-02-04 DIAGNOSIS — Z71.84 COUNSELING FOR TRAVEL: ICD-10-CM

## 2022-02-04 LAB — SARS-COV-2 RNA RESP QL NAA+PROBE: NOT DETECTED

## 2022-02-04 PROCEDURE — U0005 INFEC AGEN DETEC AMPLI PROBE: HCPCS | Performed by: FAMILY MEDICINE

## 2022-02-04 PROCEDURE — U0003 INFECTIOUS AGENT DETECTION BY NUCLEIC ACID (DNA OR RNA); SEVERE ACUTE RESPIRATORY SYNDROME CORONAVIRUS 2 (SARS-COV-2) (CORONAVIRUS DISEASE [COVID-19]), AMPLIFIED PROBE TECHNIQUE, MAKING USE OF HIGH THROUGHPUT TECHNOLOGIES AS DESCRIBED BY CMS-2020-01-R: HCPCS | Mod: HCNC | Performed by: FAMILY MEDICINE

## 2022-02-15 DIAGNOSIS — U07.1 COVID-19 VIRUS DETECTED: ICD-10-CM

## 2022-02-18 ENCOUNTER — NURSE TRIAGE (OUTPATIENT)
Dept: ADMINISTRATIVE | Facility: CLINIC | Age: 77
End: 2022-02-18
Payer: MEDICARE

## 2022-02-18 NOTE — TELEPHONE ENCOUNTER
[place]    PCP:  Dr. Holley    Tested + for Covid on 2/15.  Pt is fully vaxed and boosted.  She did have foreign travel prior to symptoms beginning.  C/O of lightheaded/moderate dizziness, chest tightness/heaviness, mild weakness, earache, nasal congestion, nausea, severe HA, neck pain, back pain, and fatigue.  H/O asthma, COPD, vertigo, and Type II DM.  Denies fever, SOB, vomiting, diarrhea, loss of consciousness, tilting or spinning sensation, and bleeding.  She reports the chest tightness as similar to that that is associated with her asthma.  She reports that she doesn't feel like she's going to fall but just unsteady.  Symptoms began on Tues.  No aggravating/relieving factors.  She does complain that symptoms are present at resting and with activity.  SpO2=90-94%  PA=31-85; after deep breathing and coughing SpO2 increased to SpO2=96% PA=84.  Readings verified in 2 fingers.  She reports vertigo previously and was prescribed meds that did improve her symptoms.  Per protocol, care advised is go to the ED now.  Instructed to call for questions/concerns.  VU.    Reason for Disposition   SEVERE headache or neck pain    Additional Information   Negative: SEVERE difficulty breathing (e.g., struggling for each breath, speaks in single words)   Negative: Shock suspected (e.g., cold/pale/clammy skin, too weak to stand, low BP, rapid pulse)   Negative: Difficult to awaken or acting confused (e.g., disoriented, slurred speech)   Negative: Fainted, and still feels dizzy afterwards   Negative: Overdose (accidental or intentional) of medications   Negative: New neurologic deficit that is present now: * Weakness of the face, arm, or leg on one side of the body * Numbness of the face, arm, or leg on one side of the body * Loss of speech or garbled speech   Negative: Heart beating < 50 beats per minute OR > 140 beats per minute   Negative: Sounds like a life-threatening emergency to the triager   Negative: SEVERE dizziness  (e.g., unable to stand, requires support to walk, feels like passing out now)    Protocols used: DIZZINESS-A-OH

## 2022-03-10 ENCOUNTER — PATIENT MESSAGE (OUTPATIENT)
Dept: RHEUMATOLOGY | Facility: CLINIC | Age: 77
End: 2022-03-10
Payer: MEDICARE

## 2022-03-21 ENCOUNTER — TELEPHONE (OUTPATIENT)
Dept: RHEUMATOLOGY | Facility: CLINIC | Age: 77
End: 2022-03-21
Payer: MEDICARE

## 2022-03-21 ENCOUNTER — PATIENT MESSAGE (OUTPATIENT)
Dept: RHEUMATOLOGY | Facility: CLINIC | Age: 77
End: 2022-03-21
Payer: MEDICARE

## 2022-03-21 NOTE — TELEPHONE ENCOUNTER
----- Message from Mayte Mercado sent at 3/21/2022 12:05 PM CDT -----  Type:  Patient Call Back    Who Called: charles     What is the reqeust in detail: Pt states that she is still having trouble with her computer states that she wont be able to do the virtual visit today and states that she cant come on Friday either.Please Advise     Can the clinic reply by [facility]?    Best Call Back Number:[phone]     Patient states internet not working and rescheduled for 5-10-22. CG

## 2022-03-25 ENCOUNTER — HOSPITAL ENCOUNTER (OUTPATIENT)
Dept: RADIOLOGY | Facility: HOSPITAL | Age: 77
Discharge: HOME | End: 2022-03-25
Attending: FAMILY MEDICINE
Payer: MEDICARE

## 2022-03-25 ENCOUNTER — OFFICE VISIT (OUTPATIENT)
Dept: FAMILY MEDICINE | Facility: CLINIC | Age: 77
End: 2022-03-25
Payer: MEDICARE

## 2022-03-25 VITALS
WEIGHT: 247.44 LBS | SYSTOLIC BLOOD PRESSURE: 132 MMHG | BODY MASS INDEX: 48.58 KG/M2 | RESPIRATION RATE: 20 BRPM | OXYGEN SATURATION: 96 % | HEART RATE: 100 BPM | HEIGHT: 60 IN | DIASTOLIC BLOOD PRESSURE: 86 MMHG

## 2022-03-25 DIAGNOSIS — E87.6 LOW SERUM POTASSIUM: ICD-10-CM

## 2022-03-25 DIAGNOSIS — M25.552 HIP PAIN, LEFT: Primary | ICD-10-CM

## 2022-03-25 DIAGNOSIS — M25.552 HIP PAIN, LEFT: ICD-10-CM

## 2022-03-25 DIAGNOSIS — J44.9 CHRONIC OBSTRUCTIVE PULMONARY DISEASE, UNSPECIFIED COPD TYPE: ICD-10-CM

## 2022-03-25 DIAGNOSIS — I27.20 PULMONARY HYPERTENSION: ICD-10-CM

## 2022-03-25 DIAGNOSIS — E66.01 MORBID OBESITY WITH BMI OF 45.0-49.9, ADULT: ICD-10-CM

## 2022-03-25 DIAGNOSIS — F32.0 MILD MAJOR DEPRESSION: ICD-10-CM

## 2022-03-25 DIAGNOSIS — I77.1 TORTUOUS AORTA: ICD-10-CM

## 2022-03-25 DIAGNOSIS — R74.01 TRANSAMINITIS: ICD-10-CM

## 2022-03-25 DIAGNOSIS — R92.8 ABNORMAL MAMMOGRAM: ICD-10-CM

## 2022-03-25 PROCEDURE — 1159F MED LIST DOCD IN RCRD: CPT | Mod: CPTII,S$GLB,, | Performed by: FAMILY MEDICINE

## 2022-03-25 PROCEDURE — 1157F PR ADVANCE CARE PLAN OR EQUIV PRESENT IN MEDICAL RECORD: ICD-10-PCS | Mod: CPTII,S$GLB,, | Performed by: FAMILY MEDICINE

## 2022-03-25 PROCEDURE — 72100 XR LUMBAR SPINE AP AND LATERAL: ICD-10-PCS | Mod: 26,,, | Performed by: RADIOLOGY

## 2022-03-25 PROCEDURE — 99999 PR PBB SHADOW E&M-EST. PATIENT-LVL V: ICD-10-PCS | Mod: PBBFAC,,, | Performed by: FAMILY MEDICINE

## 2022-03-25 PROCEDURE — 73502 X-RAY EXAM HIP UNI 2-3 VIEWS: CPT | Mod: TC,PN,LT

## 2022-03-25 PROCEDURE — 99214 OFFICE O/P EST MOD 30 MIN: CPT | Mod: S$GLB,,, | Performed by: FAMILY MEDICINE

## 2022-03-25 PROCEDURE — 3077F SYST BP >= 140 MM HG: CPT | Mod: CPTII,S$GLB,, | Performed by: FAMILY MEDICINE

## 2022-03-25 PROCEDURE — 3288F PR FALLS RISK ASSESSMENT DOCUMENTED: ICD-10-PCS | Mod: CPTII,S$GLB,, | Performed by: FAMILY MEDICINE

## 2022-03-25 PROCEDURE — 99214 PR OFFICE/OUTPT VISIT, EST, LEVL IV, 30-39 MIN: ICD-10-PCS | Mod: S$GLB,,, | Performed by: FAMILY MEDICINE

## 2022-03-25 PROCEDURE — 3079F PR MOST RECENT DIASTOLIC BLOOD PRESSURE 80-89 MM HG: ICD-10-PCS | Mod: CPTII,S$GLB,, | Performed by: FAMILY MEDICINE

## 2022-03-25 PROCEDURE — 3288F FALL RISK ASSESSMENT DOCD: CPT | Mod: CPTII,S$GLB,, | Performed by: FAMILY MEDICINE

## 2022-03-25 PROCEDURE — 72100 X-RAY EXAM L-S SPINE 2/3 VWS: CPT | Mod: 26,,, | Performed by: RADIOLOGY

## 2022-03-25 PROCEDURE — 3079F DIAST BP 80-89 MM HG: CPT | Mod: CPTII,S$GLB,, | Performed by: FAMILY MEDICINE

## 2022-03-25 PROCEDURE — 1160F RVW MEDS BY RX/DR IN RCRD: CPT | Mod: CPTII,S$GLB,, | Performed by: FAMILY MEDICINE

## 2022-03-25 PROCEDURE — 99499 UNLISTED E&M SERVICE: CPT | Mod: S$GLB,,, | Performed by: FAMILY MEDICINE

## 2022-03-25 PROCEDURE — 1100F PR PT FALLS ASSESS DOC 2+ FALLS/FALL W/INJURY/YR: ICD-10-PCS | Mod: CPTII,S$GLB,, | Performed by: FAMILY MEDICINE

## 2022-03-25 PROCEDURE — 1157F ADVNC CARE PLAN IN RCRD: CPT | Mod: CPTII,S$GLB,, | Performed by: FAMILY MEDICINE

## 2022-03-25 PROCEDURE — 1100F PTFALLS ASSESS-DOCD GE2>/YR: CPT | Mod: CPTII,S$GLB,, | Performed by: FAMILY MEDICINE

## 2022-03-25 PROCEDURE — 1125F PR PAIN SEVERITY QUANTIFIED, PAIN PRESENT: ICD-10-PCS | Mod: CPTII,S$GLB,, | Performed by: FAMILY MEDICINE

## 2022-03-25 PROCEDURE — 99499 RISK ADDL DX/OHS AUDIT: ICD-10-PCS | Mod: S$GLB,,, | Performed by: FAMILY MEDICINE

## 2022-03-25 PROCEDURE — 99999 PR PBB SHADOW E&M-EST. PATIENT-LVL V: CPT | Mod: PBBFAC,,, | Performed by: FAMILY MEDICINE

## 2022-03-25 PROCEDURE — 1125F AMNT PAIN NOTED PAIN PRSNT: CPT | Mod: CPTII,S$GLB,, | Performed by: FAMILY MEDICINE

## 2022-03-25 PROCEDURE — 73502 XR HIP WITH PELVIS WHEN PERFORMED, 2 OR 3 VIEWS LEFT: ICD-10-PCS | Mod: 26,LT,, | Performed by: RADIOLOGY

## 2022-03-25 PROCEDURE — 1159F PR MEDICATION LIST DOCUMENTED IN MEDICAL RECORD: ICD-10-PCS | Mod: CPTII,S$GLB,, | Performed by: FAMILY MEDICINE

## 2022-03-25 PROCEDURE — 1160F PR REVIEW ALL MEDS BY PRESCRIBER/CLIN PHARMACIST DOCUMENTED: ICD-10-PCS | Mod: CPTII,S$GLB,, | Performed by: FAMILY MEDICINE

## 2022-03-25 PROCEDURE — 3077F PR MOST RECENT SYSTOLIC BLOOD PRESSURE >= 140 MM HG: ICD-10-PCS | Mod: CPTII,S$GLB,, | Performed by: FAMILY MEDICINE

## 2022-03-25 PROCEDURE — 72100 X-RAY EXAM L-S SPINE 2/3 VWS: CPT | Mod: TC,PN

## 2022-03-25 PROCEDURE — 73502 X-RAY EXAM HIP UNI 2-3 VIEWS: CPT | Mod: 26,LT,, | Performed by: RADIOLOGY

## 2022-03-25 NOTE — PROGRESS NOTES
Subjective:       Patient ID: Marta Huff is a 76 y.o. female.    Chief Complaint: Follow-up (Lab results)    HPI  Patient with c/o L hip pain. Recalls that she fell on her patio, leaned forward, and lost her balance when she tried to stand. She recalls significant bruising, but no loc. The hip had bothered her prior, but more noticeable since. She has taken med for spasm previously which helped, but still having issues. Pain noticeable with lift, rotation. No leg weakness, but she has used a walker periodically for support.     Review of Systems:  Review of Systems   Constitutional: Negative for fever and unexpected weight change (lost and then regained some).   HENT: Negative for congestion and postnasal drip.    Respiratory: Positive for apnea (no mask leak) and wheezing (stable). Negative for cough.    Cardiovascular: Negative for chest pain and leg swelling.   Gastrointestinal: Negative for abdominal pain, constipation and diarrhea.   Genitourinary: Negative for difficulty urinating, dysuria, hematuria and pelvic pain.   Musculoskeletal: Positive for arthralgias and back pain. Negative for joint swelling.   Neurological: Positive for numbness and headaches (occ, not related to recent fall). Negative for dizziness (occ), tremors (occ, can occur after nebs or late to meal) and weakness.   Psychiatric/Behavioral: Positive for sleep disturbance (sleep issues are chronic, stable; uses cpap, but only sleeps 3-4hrs). The patient is not nervous/anxious.        Objective:     Vitals:    03/25/22 0949   BP: (!) 144/82   Pulse: 100   Resp: 20   SpO2: 96%   Weight: 112.2 kg (247 lb 7.5 oz)   Height: 5' (1.524 m)          Physical Exam  Vitals and nursing note reviewed.   Constitutional:       General: She is not in acute distress.     Appearance: She is well-developed. She is obese.   HENT:      Head: Normocephalic and atraumatic.   Eyes:      General: No scleral icterus.        Right eye: No discharge.          Left eye: No discharge.      Conjunctiva/sclera: Conjunctivae normal.   Cardiovascular:      Rate and Rhythm: Normal rate and regular rhythm.   Pulmonary:      Effort: Pulmonary effort is normal. No respiratory distress.      Breath sounds: No wheezing or rhonchi.   Abdominal:      General: There is no distension.      Palpations: Abdomen is soft.   Musculoskeletal:         General: No swelling or signs of injury.      Cervical back: Neck supple.      Left hip: Tenderness (pain on flexion, rotation) present.      Right lower leg: Edema present.      Left lower leg: Edema present.   Lymphadenopathy:      Cervical: No cervical adenopathy.   Skin:     General: Skin is warm and dry.      Findings: No rash.   Neurological:      General: No focal deficit present.      Mental Status: She is alert and oriented to person, place, and time.   Psychiatric:         Mood and Affect: Mood normal.         Behavior: Behavior normal.           Assessment & Plan:  Hip pain, left  Comments:  xray in clinic for f/u  Orders:  -     X-Ray Hip 2 or 3 views Left (with Pelvis when performed); Future; Expected date: 03/25/2022  -     X-Ray Lumbar Spine AP And Lateral; Future; Expected date: 03/25/2022    Morbid obesity with BMI of 45.0-49.9, adult  Comments:  encouraged healthy approach to diet/exercise    Mild major depression  Comments:  mood has been ok of late, cont monitoring  no med changes    Pulmonary hypertension  Comments:  chronic, stable  echo 10/2021    Tortuous aorta  Comments:  cont asa/statin    Chronic obstructive pulmonary disease, unspecified COPD type  Comments:  doing ok of late, normalized since covid earlier this year  discussed nebs bid x 1 week, then daily    Transaminitis  Comments:  mild elevation noted in Feb (was + for covid at the time), will repeat  Orders:  -     Comprehensive Metabolic Panel; Future; Expected date: 03/25/2022    Low serum potassium  Comments:  repeat today for review    Abnormal mammogram  -      Mammo Digital Diagnostic Right; Future; Expected date: 03/25/2022  -     US Breast Right Limited; Future; Expected date: 03/25/2022

## 2022-04-05 DIAGNOSIS — E87.6 LOW SERUM POTASSIUM: Primary | ICD-10-CM

## 2022-04-05 RX ORDER — POTASSIUM CHLORIDE 750 MG/1
10 TABLET, EXTENDED RELEASE ORAL DAILY
Qty: 90 TABLET | Refills: 3 | Status: SHIPPED | OUTPATIENT
Start: 2022-04-05 | End: 2023-01-18

## 2022-04-08 ENCOUNTER — PATIENT MESSAGE (OUTPATIENT)
Dept: FAMILY MEDICINE | Facility: CLINIC | Age: 77
End: 2022-04-08
Payer: MEDICARE

## 2022-04-22 ENCOUNTER — PATIENT MESSAGE (OUTPATIENT)
Dept: FAMILY MEDICINE | Facility: CLINIC | Age: 77
End: 2022-04-22
Payer: MEDICARE

## 2022-04-26 ENCOUNTER — TELEPHONE (OUTPATIENT)
Dept: RADIOLOGY | Facility: HOSPITAL | Age: 77
End: 2022-04-26
Payer: MEDICARE

## 2022-04-26 NOTE — TELEPHONE ENCOUNTER
The xrays showed chronic, degenerative changes, but nothing new. Would recommend a review with Dr Belcher (pain mgmt) as it's been several years since her last review.

## 2022-04-26 NOTE — TELEPHONE ENCOUNTER
Patient called, scheduled short term 6 month f/u on 5/9/22 at [address] [facility] radiology

## 2022-05-02 DIAGNOSIS — G25.81 RESTLESS LEGS SYNDROME: ICD-10-CM

## 2022-05-03 RX ORDER — PRAMIPEXOLE DIHYDROCHLORIDE 1.5 MG/1
1.5 TABLET ORAL NIGHTLY
Qty: 90 TABLET | Refills: 1 | Status: SHIPPED | OUTPATIENT
Start: 2022-05-03 | End: 2022-10-13

## 2022-05-08 ENCOUNTER — PATIENT MESSAGE (OUTPATIENT)
Dept: PAIN MEDICINE | Facility: CLINIC | Age: 77
End: 2022-05-08
Payer: MEDICARE

## 2022-05-09 ENCOUNTER — HOSPITAL ENCOUNTER (OUTPATIENT)
Dept: RADIOLOGY | Facility: HOSPITAL | Age: 77
Discharge: HOME | End: 2022-05-09
Attending: FAMILY MEDICINE
Payer: MEDICARE

## 2022-05-09 DIAGNOSIS — R92.8 ABNORMAL MAMMOGRAM: ICD-10-CM

## 2022-05-09 PROCEDURE — 76642 ULTRASOUND BREAST LIMITED: CPT | Mod: TC,PO,RT

## 2022-05-09 PROCEDURE — 76642 ULTRASOUND BREAST LIMITED: CPT | Mod: 26,RT,, | Performed by: RADIOLOGY

## 2022-05-09 PROCEDURE — 76642 US BREAST RIGHT LIMITED: ICD-10-PCS | Mod: 26,RT,, | Performed by: RADIOLOGY

## 2022-05-10 ENCOUNTER — OFFICE VISIT (OUTPATIENT)
Dept: RHEUMATOLOGY | Facility: CLINIC | Age: 77
End: 2022-05-10
Payer: MEDICARE

## 2022-05-10 VITALS
HEART RATE: 81 BPM | BODY MASS INDEX: 48.49 KG/M2 | WEIGHT: 247 LBS | HEIGHT: 60 IN | SYSTOLIC BLOOD PRESSURE: 155 MMHG | DIASTOLIC BLOOD PRESSURE: 82 MMHG

## 2022-05-10 DIAGNOSIS — M15.3 OTHER SECONDARY OSTEOARTHRITIS OF MULTIPLE SITES: Primary | ICD-10-CM

## 2022-05-10 DIAGNOSIS — M19.012 ARTHRITIS OF LEFT SHOULDER REGION: ICD-10-CM

## 2022-05-10 DIAGNOSIS — M79.7 FIBROMYALGIA: ICD-10-CM

## 2022-05-10 PROCEDURE — 1160F RVW MEDS BY RX/DR IN RCRD: CPT | Mod: CPTII,S$GLB,, | Performed by: INTERNAL MEDICINE

## 2022-05-10 PROCEDURE — 99999 PR PBB SHADOW E&M-EST. PATIENT-LVL IV: ICD-10-PCS | Mod: PBBFAC,,, | Performed by: INTERNAL MEDICINE

## 2022-05-10 PROCEDURE — 1159F PR MEDICATION LIST DOCUMENTED IN MEDICAL RECORD: ICD-10-PCS | Mod: CPTII,S$GLB,, | Performed by: INTERNAL MEDICINE

## 2022-05-10 PROCEDURE — 20610 LARGE JOINT ASPIRATION/INJECTION: L SUBACROMIAL BURSA: ICD-10-PCS | Mod: LT,S$GLB,, | Performed by: INTERNAL MEDICINE

## 2022-05-10 PROCEDURE — 20610 DRAIN/INJ JOINT/BURSA W/O US: CPT | Mod: LT,S$GLB,, | Performed by: INTERNAL MEDICINE

## 2022-05-10 PROCEDURE — 1125F PR PAIN SEVERITY QUANTIFIED, PAIN PRESENT: ICD-10-PCS | Mod: CPTII,S$GLB,, | Performed by: INTERNAL MEDICINE

## 2022-05-10 PROCEDURE — 1159F MED LIST DOCD IN RCRD: CPT | Mod: CPTII,S$GLB,, | Performed by: INTERNAL MEDICINE

## 2022-05-10 PROCEDURE — 99999 PR PBB SHADOW E&M-EST. PATIENT-LVL IV: CPT | Mod: PBBFAC,,, | Performed by: INTERNAL MEDICINE

## 2022-05-10 PROCEDURE — 99214 PR OFFICE/OUTPT VISIT, EST, LEVL IV, 30-39 MIN: ICD-10-PCS | Mod: 25,S$GLB,, | Performed by: INTERNAL MEDICINE

## 2022-05-10 PROCEDURE — 99499 RISK ADDL DX/OHS AUDIT: ICD-10-PCS | Mod: S$GLB,,, | Performed by: INTERNAL MEDICINE

## 2022-05-10 PROCEDURE — 1125F AMNT PAIN NOTED PAIN PRSNT: CPT | Mod: CPTII,S$GLB,, | Performed by: INTERNAL MEDICINE

## 2022-05-10 PROCEDURE — 1101F PT FALLS ASSESS-DOCD LE1/YR: CPT | Mod: CPTII,S$GLB,, | Performed by: INTERNAL MEDICINE

## 2022-05-10 PROCEDURE — 1101F PR PT FALLS ASSESS DOC 0-1 FALLS W/OUT INJ PAST YR: ICD-10-PCS | Mod: CPTII,S$GLB,, | Performed by: INTERNAL MEDICINE

## 2022-05-10 PROCEDURE — 99214 OFFICE O/P EST MOD 30 MIN: CPT | Mod: 25,S$GLB,, | Performed by: INTERNAL MEDICINE

## 2022-05-10 PROCEDURE — 1157F ADVNC CARE PLAN IN RCRD: CPT | Mod: CPTII,S$GLB,, | Performed by: INTERNAL MEDICINE

## 2022-05-10 PROCEDURE — 1160F PR REVIEW ALL MEDS BY PRESCRIBER/CLIN PHARMACIST DOCUMENTED: ICD-10-PCS | Mod: CPTII,S$GLB,, | Performed by: INTERNAL MEDICINE

## 2022-05-10 PROCEDURE — 3288F PR FALLS RISK ASSESSMENT DOCUMENTED: ICD-10-PCS | Mod: CPTII,S$GLB,, | Performed by: INTERNAL MEDICINE

## 2022-05-10 PROCEDURE — 99499 UNLISTED E&M SERVICE: CPT | Mod: S$GLB,,, | Performed by: INTERNAL MEDICINE

## 2022-05-10 PROCEDURE — 1157F PR ADVANCE CARE PLAN OR EQUIV PRESENT IN MEDICAL RECORD: ICD-10-PCS | Mod: CPTII,S$GLB,, | Performed by: INTERNAL MEDICINE

## 2022-05-10 PROCEDURE — 3288F FALL RISK ASSESSMENT DOCD: CPT | Mod: CPTII,S$GLB,, | Performed by: INTERNAL MEDICINE

## 2022-05-10 RX ADMIN — METHYLPREDNISOLONE ACETATE 40 MG: 40 INJECTION, SUSPENSION INTRA-ARTICULAR; INTRALESIONAL; INTRAMUSCULAR; SOFT TISSUE at 03:05

## 2022-05-10 NOTE — PROGRESS NOTES
Answers for HPI/ROS submitted by the patient on 5/8/2022  fever: No  eye redness: No  mouth sores: No  headaches: No  shortness of breath: Yes  chest pain: No  trouble swallowing: No  diarrhea: No  constipation: No  unexpected weight change: No  genital sore: No  dysuria: No  During the last 3 days, have you had a skin rash?: No  Bruises or bleeds easily: No  cough: No    Subjective:          Chief Complaint: Marta Huff is a 76 y.o. female who had concerns including Disease Management.    HPI:    Patient is a 76year-old female OA and FMS.     Patient doing better with switch to Cymbalta is tolerating 60mg daily  Feeling better off sertraline.   Some aches and pains, but all manageable these days.     Today patient notes 8/10 pain left shoulder: trouble lifting left arm, dropping items.     Dx:  osteoarthritis this is multijoint,  as well as peripheral neuropathy.    She was using gabapentin  2400 mg daily.       More recently patient had been following with pain management as well as being seen with neurosurgery for spinal cord stimulator.  A noting 85% relief of the foot pain.     She is having significant pain in bilateral hips to gluteal region and interferes with walking and with using the elliptical.  Previous 45min and tolerated, now only 15min and hips begin to hurt.   Last year they have progressively worsened. She still has pain in back but this is not a constant complaint for her. She is sleeping on sides and seems to tolerate, is is the lumbosacral junction that hurts at night.   Exacerbated with sitting prolonged period. At some times she uses walker due to pain.   Hands with stiffness PIP and DIP joints long standing cannot fully curl fingers. +deformity, intermittent swelling.     Interaction with etodolac, but renal function wnl.   She does tolerate NSAID: takes aleve 440mg in AM and PRN at night. No hx of GIB.         REVIEW OF SYSTEMS:    Review of Systems   Constitutional: Negative for  fever, malaise/fatigue and weight loss.   HENT: Negative for sore throat.    Eyes: Negative for double vision, photophobia and redness.   Respiratory: Negative for cough, shortness of breath and wheezing.    Cardiovascular: Negative for chest pain, palpitations and orthopnea.   Gastrointestinal: Negative for abdominal pain, constipation and diarrhea.   Genitourinary: Negative for dysuria, hematuria and urgency.   Musculoskeletal: Positive for joint pain. Negative for back pain and myalgias.   Skin: Negative for rash.   Neurological: Negative for dizziness, tingling, focal weakness and headaches.   Endo/Heme/Allergies: Does not bruise/bleed easily.   Psychiatric/Behavioral: Negative for depression, hallucinations and suicidal ideas.               Objective:            Past Medical History:   Diagnosis Date    Anxiety     Arthralgia of knee     arthralgia of bilateral knees secondary to djd    Arthritis     Back pain     Depression     Encounter for blood transfusion     AUTOLOGUS    GERD (gastroesophageal reflux disease)     Hiatal hernia     repaired in 1979    History of seasonal allergies     History of shingles     Hypertension     Insomnia     Obesity     JESENIA (obstructive sleep apnea) 02/2020    Home sleep study - YESICA 57, Desat 69% - Severe JESENIA with desaturations    Osteoporosis     Pneumonia     Reactive airway disease     Restless legs syndrome     Rheumatic fever     at 4 y/o    Sleep apnea     no cpap     Family History   Problem Relation Age of Onset    Heart disease Mother     Hypertension Mother     Diabetes Mother     Obesity Mother     Heart disease Maternal Grandfather      Social History     Tobacco Use    Smoking status: Passive Smoke Exposure - Never Smoker    Smokeless tobacco: Never Used   Substance Use Topics    Alcohol use: Yes     Comment: 1-2 glasses of wine monthly    Drug use: No         Current Outpatient Medications on File Prior to Visit   Medication Sig  Dispense Refill    albuterol (PROVENTIL) 2.5 mg /3 mL (0.083 %) nebulizer solution INHALE THE CONTENTS OF 1 VIAL VIA NEBULIZER EVERY 6 HOURS AS NEEDED FOR  WHEEZING  OR  SHORTNESS OF BREATH 90 mL 11    albuterol (PROVENTIL/VENTOLIN HFA) 90 mcg/actuation inhaler INHALE 2 PUFFS INTO THE LUNGS EVERY 6 (SIX) HOURS AS NEEDED FOR WHEEZING OR SHORTNESS OF BREATH. 54 g 5    alendronate (FOSAMAX) 70 MG tablet TAKE 1 TABLET EVERY 7 DAYS 12 tablet 3    betamethasone valerate 0.1% (VALISONE) 0.1 % Crea Apply topically 2 (two) times daily. 45 g 6    calcium phosphate-vitamin D3 250 mg-10 mcg (400 unit) Chew Take 1 tablet by mouth once daily.       DULoxetine (CYMBALTA) 60 MG capsule TAKE 1 CAPSULE EVERY DAY 90 capsule 3    fluticasone furoate-vilanteroL (BREO ELLIPTA) 200-25 mcg/dose DsDv diskus inhaler Inhale 1 puff into the lungs once daily. Controller 60 each 5    furosemide (LASIX) 80 MG tablet Take 1 tablet (80 mg total) by mouth once daily. 90 tablet 3    losartan (COZAAR) 50 MG tablet TAKE 1 TABLET (50 MG TOTAL) BY MOUTH 2 (TWO) TIMES DAILY. 180 tablet 3    montelukast (SINGULAIR) 10 mg tablet Take 1 tablet (10 mg total) by mouth every evening. 90 tablet 3    naproxen sodium (ALEVE ORAL) Take 1 Dose by mouth daily as needed.      potassium chloride (KLOR-CON) 10 MEQ TbSR Take 1 tablet (10 mEq total) by mouth once daily. 90 tablet 3    pramipexole (MIRAPEX) 1.5 MG tablet TAKE 1 TABLET (1.5 MG TOTAL) BY MOUTH NIGHTLY. 90 tablet 1     Current Facility-Administered Medications on File Prior to Visit   Medication Dose Route Frequency Provider Last Rate Last Admin    methylPREDNISolone acetate injection 40 mg  40 mg Intra-articular  Adwoa Jaime DO   40 mg at 03/11/21 1000       Vitals:    05/10/22 1535   BP: (!) 155/82   Pulse: 81       Physical Exam:    Physical Exam  Constitutional:       Appearance: Normal appearance. She is well-developed.   HENT:      Nose: No septal deviation.      Mouth/Throat:       Mouth: No oral lesions.   Eyes:      Conjunctiva/sclera:      Right eye: Right conjunctiva is not injected.      Left eye: Left conjunctiva is not injected.      Pupils: Pupils are equal, round, and reactive to light.   Neck:      Thyroid: No thyroid mass or thyromegaly.      Vascular: No JVD.   Cardiovascular:      Rate and Rhythm: Normal rate and regular rhythm.      Pulses: Normal pulses.      Comments: No edema  Pulmonary:      Effort: Pulmonary effort is normal.      Breath sounds: Normal breath sounds.   Abdominal:      Palpations: Abdomen is soft.   Musculoskeletal:      Right shoulder: No swelling or tenderness. Decreased range of motion.      Left shoulder: No swelling or tenderness. Decreased range of motion.      Right elbow: No swelling. Normal range of motion. No tenderness.      Left elbow: No swelling. Normal range of motion. No tenderness.      Right wrist: No swelling or tenderness. Normal range of motion.      Left wrist: No swelling or tenderness. Normal range of motion.      Right hand: Decreased range of motion.      Left hand: Decreased range of motion.      Right hip: No bony tenderness. Normal range of motion. Normal strength.      Left hip: No tenderness or bony tenderness. Normal range of motion.      Right knee: No swelling. Normal range of motion. No tenderness.      Left knee: No swelling. Normal range of motion. No tenderness.      Right ankle: No swelling. No tenderness. Normal range of motion.      Left ankle: No swelling. No tenderness. Normal range of motion.      Comments: Bony hypertrophy PIP and DIP joints. Squaring CMC joint. Tenderness along gr. Troch and ITB. Negative exacerbation SLR some LBP.    Lymphadenopathy:      Cervical: No cervical adenopathy.   Skin:     General: Skin is dry.   Neurological:      Deep Tendon Reflexes: Reflexes are normal and symmetric.               Assessment:       Encounter Diagnoses   Name Primary?    Arthritis of left shoulder region Yes     Other secondary osteoarthritis of multiple sites     Fibromyalgia           Plan:        Other secondary osteoarthritis of multiple sites    Arthritis of left shoulder region  -     Large Joint Aspiration/Injection: L subacromial bursa    Fibromyalgia      Delightful patient with OA and FMS    Patient doing better with switch to Cymbalta is tolerating 60mg daily      RIGHT Shoulder stable.     LEFT shoulder new pain no known injury.       -biofreeze prn.    - off all gabapentin no difference not worse.   Follow up in about 6 months (around 11/10/2022).           30min consultation with greater than 50% spent in counseling, chart review and coordination of care. All questions answered.

## 2022-05-28 ENCOUNTER — PATIENT MESSAGE (OUTPATIENT)
Dept: RHEUMATOLOGY | Facility: CLINIC | Age: 77
End: 2022-05-28
Payer: MEDICARE

## 2022-05-28 DIAGNOSIS — M19.012 ARTHRITIS OF LEFT SHOULDER REGION: Primary | ICD-10-CM

## 2022-06-05 RX ORDER — METHYLPREDNISOLONE ACETATE 40 MG/ML
40 INJECTION, SUSPENSION INTRA-ARTICULAR; INTRALESIONAL; INTRAMUSCULAR; SOFT TISSUE
Status: DISCONTINUED | OUTPATIENT
Start: 2022-05-10 | End: 2022-06-05 | Stop reason: HOSPADM

## 2022-06-05 NOTE — PROCEDURES
Large Joint Aspiration/Injection: L subacromial bursa    Date/Time: 5/10/2022 3:00 PM  Performed by: Adwoa Jaime DO  Authorized by: Adwoa Jaime, DO     Consent Done?:  Yes (Verbal)  Indications:  Arthritis  Site marked: the procedure site was marked    Timeout: prior to procedure the correct patient, procedure, and site was verified      Local anesthesia used?: Yes    Local anesthetic:  Lidocaine 1% without epinephrine  Anesthetic total (ml):  3      Details:  Needle Size:  25 G  Ultrasonic Guidance for needle placement?: No    Approach:  Posterior  Location:  Shoulder  Site:  L subacromial bursa  Medications:  40 mg methylPREDNISolone acetate 40 mg/mL  Patient tolerance:  Patient tolerated the procedure well with no immediate complications     Patient informed of the risks, benefits, side effects of corticosteroid injections including but not limited to skin hypopigmentation, atrophy, ineffectiveness and infection.

## 2022-06-07 ENCOUNTER — TELEPHONE (OUTPATIENT)
Dept: FAMILY MEDICINE | Facility: CLINIC | Age: 77
End: 2022-06-07
Payer: MEDICARE

## 2022-06-07 ENCOUNTER — PATIENT MESSAGE (OUTPATIENT)
Dept: FAMILY MEDICINE | Facility: CLINIC | Age: 77
End: 2022-06-07
Payer: MEDICARE

## 2022-06-09 ENCOUNTER — TELEPHONE (OUTPATIENT)
Dept: FAMILY MEDICINE | Facility: CLINIC | Age: 77
End: 2022-06-09
Payer: MEDICARE

## 2022-06-18 ENCOUNTER — PATIENT MESSAGE (OUTPATIENT)
Dept: ADMINISTRATIVE | Facility: OTHER | Age: 77
End: 2022-06-18
Payer: MEDICARE

## 2022-06-21 DIAGNOSIS — M25.512 LEFT SHOULDER PAIN, UNSPECIFIED CHRONICITY: Primary | ICD-10-CM

## 2022-06-23 ENCOUNTER — HOSPITAL ENCOUNTER (OUTPATIENT)
Dept: RADIOLOGY | Facility: HOSPITAL | Age: 77
Discharge: HOME | End: 2022-06-23
Attending: PHYSICAL MEDICINE & REHABILITATION
Payer: MEDICARE

## 2022-06-23 ENCOUNTER — OFFICE VISIT (OUTPATIENT)
Dept: PHYSICAL MEDICINE AND REHAB | Facility: CLINIC | Age: 77
End: 2022-06-23
Payer: MEDICARE

## 2022-06-23 ENCOUNTER — TELEPHONE (OUTPATIENT)
Dept: PHYSICAL MEDICINE AND REHAB | Facility: CLINIC | Age: 77
End: 2022-06-23

## 2022-06-23 VITALS — HEIGHT: 60 IN | RESPIRATION RATE: 18 BRPM | BODY MASS INDEX: 48.49 KG/M2 | WEIGHT: 247 LBS

## 2022-06-23 DIAGNOSIS — M25.512 LEFT SHOULDER PAIN, UNSPECIFIED CHRONICITY: ICD-10-CM

## 2022-06-23 DIAGNOSIS — M75.102 NONTRAUMATIC TEAR OF LEFT ROTATOR CUFF, UNSPECIFIED TEAR EXTENT: Primary | ICD-10-CM

## 2022-06-23 DIAGNOSIS — M19.012 ARTHRITIS OF LEFT SHOULDER REGION: ICD-10-CM

## 2022-06-23 PROCEDURE — 76942 ECHO GUIDE FOR BIOPSY: CPT | Mod: S$GLB,,, | Performed by: PHYSICAL MEDICINE & REHABILITATION

## 2022-06-23 PROCEDURE — 1159F PR MEDICATION LIST DOCUMENTED IN MEDICAL RECORD: ICD-10-PCS | Mod: CPTII,S$GLB,, | Performed by: PHYSICAL MEDICINE & REHABILITATION

## 2022-06-23 PROCEDURE — 73030 X-RAY EXAM OF SHOULDER: CPT | Mod: TC,PO,LT

## 2022-06-23 PROCEDURE — 3288F FALL RISK ASSESSMENT DOCD: CPT | Mod: CPTII,S$GLB,, | Performed by: PHYSICAL MEDICINE & REHABILITATION

## 2022-06-23 PROCEDURE — 73030 XR SHOULDER TRAUMA 3 VIEW LEFT: ICD-10-PCS | Mod: 26,LT,, | Performed by: RADIOLOGY

## 2022-06-23 PROCEDURE — 3288F PR FALLS RISK ASSESSMENT DOCUMENTED: ICD-10-PCS | Mod: CPTII,S$GLB,, | Performed by: PHYSICAL MEDICINE & REHABILITATION

## 2022-06-23 PROCEDURE — 1160F RVW MEDS BY RX/DR IN RCRD: CPT | Mod: CPTII,S$GLB,, | Performed by: PHYSICAL MEDICINE & REHABILITATION

## 2022-06-23 PROCEDURE — 1159F MED LIST DOCD IN RCRD: CPT | Mod: CPTII,S$GLB,, | Performed by: PHYSICAL MEDICINE & REHABILITATION

## 2022-06-23 PROCEDURE — 99999 PR PBB SHADOW E&M-EST. PATIENT-LVL IV: ICD-10-PCS | Mod: PBBFAC,,, | Performed by: PHYSICAL MEDICINE & REHABILITATION

## 2022-06-23 PROCEDURE — 76942 PR U/S GUIDANCE FOR NEEDLE GUIDANCE: ICD-10-PCS | Mod: S$GLB,,, | Performed by: PHYSICAL MEDICINE & REHABILITATION

## 2022-06-23 PROCEDURE — 99204 OFFICE O/P NEW MOD 45 MIN: CPT | Mod: 25,S$GLB,, | Performed by: PHYSICAL MEDICINE & REHABILITATION

## 2022-06-23 PROCEDURE — 1157F PR ADVANCE CARE PLAN OR EQUIV PRESENT IN MEDICAL RECORD: ICD-10-PCS | Mod: CPTII,S$GLB,, | Performed by: PHYSICAL MEDICINE & REHABILITATION

## 2022-06-23 PROCEDURE — 73030 X-RAY EXAM OF SHOULDER: CPT | Mod: 26,LT,, | Performed by: RADIOLOGY

## 2022-06-23 PROCEDURE — 64418 NJX AA&/STRD SPRSCAP NRV: CPT | Mod: LT,S$GLB,, | Performed by: PHYSICAL MEDICINE & REHABILITATION

## 2022-06-23 PROCEDURE — 64418 NERVE BLOCK: ICD-10-PCS | Mod: LT,S$GLB,, | Performed by: PHYSICAL MEDICINE & REHABILITATION

## 2022-06-23 PROCEDURE — 1157F ADVNC CARE PLAN IN RCRD: CPT | Mod: CPTII,S$GLB,, | Performed by: PHYSICAL MEDICINE & REHABILITATION

## 2022-06-23 PROCEDURE — 1160F PR REVIEW ALL MEDS BY PRESCRIBER/CLIN PHARMACIST DOCUMENTED: ICD-10-PCS | Mod: CPTII,S$GLB,, | Performed by: PHYSICAL MEDICINE & REHABILITATION

## 2022-06-23 PROCEDURE — 1100F PR PT FALLS ASSESS DOC 2+ FALLS/FALL W/INJURY/YR: ICD-10-PCS | Mod: CPTII,S$GLB,, | Performed by: PHYSICAL MEDICINE & REHABILITATION

## 2022-06-23 PROCEDURE — 1125F PR PAIN SEVERITY QUANTIFIED, PAIN PRESENT: ICD-10-PCS | Mod: CPTII,S$GLB,, | Performed by: PHYSICAL MEDICINE & REHABILITATION

## 2022-06-23 PROCEDURE — 1100F PTFALLS ASSESS-DOCD GE2>/YR: CPT | Mod: CPTII,S$GLB,, | Performed by: PHYSICAL MEDICINE & REHABILITATION

## 2022-06-23 PROCEDURE — 1125F AMNT PAIN NOTED PAIN PRSNT: CPT | Mod: CPTII,S$GLB,, | Performed by: PHYSICAL MEDICINE & REHABILITATION

## 2022-06-23 PROCEDURE — 99999 PR PBB SHADOW E&M-EST. PATIENT-LVL IV: CPT | Mod: PBBFAC,,, | Performed by: PHYSICAL MEDICINE & REHABILITATION

## 2022-06-23 PROCEDURE — 99204 PR OFFICE/OUTPT VISIT, NEW, LEVL IV, 45-59 MIN: ICD-10-PCS | Mod: 25,S$GLB,, | Performed by: PHYSICAL MEDICINE & REHABILITATION

## 2022-06-23 NOTE — PROCEDURES
"Nerve Block    Date/Time: 6/23/2022 3:00 PM  Performed by: Javed Espinosa MD  Authorized by: Javed Espinosa MD   Consent Done: Yes  Time out: Immediately prior to procedure a "time out" was called to verify the correct patient, procedure, equipment, support staff and site/side marked as required.  Indications: pain relief  Body area: upper extremity  Nerve: suprascapular  Laterality: left    Patient sedated: no  Preparation: Patient was prepped and draped in the usual sterile fashion.  Patient position: sitting  Needle size: 22 G  Location technique: ultrasound guidance  Local Anesthetic: bupivacaine 0.25% without epinephrine  Anesthetic total: 7 mL  Patient tolerance: Patient tolerated the procedure well with no immediate complications  Comments: Ultrasound guidance was used for correct needle placement, the images were saved will be uploaded to EMR.          "

## 2022-06-23 NOTE — PROGRESS NOTES
[facility]    Consulting Provider: Adwoa Jaime DO    CHIEF COMPLAINT:   Chief Complaint   Patient presents with    Shoulder Pain     Left shoulder pain      HISTORY OF PRESENT ILLNESS: Marta Huff is a 77 y.o. right-hand dominant female who presents to me for the 1st time for evaluation and treatment of left shoulder pain.  She has had chronic pain in the left shoulder for the past year.  There was no specific trauma or injury.  She rates her pain currently as an 8 on a scale of 1-10.  She last received injection of steroid to the subacromial bursa on the left about 6 weeks ago.  She did notice reduction in pain for about 2 weeks but then the pain returned.  The pain consistently increases with use of the right shoulder.  She also has pain at night when lying on her side.  She denies significant popping, grinding, or clicking.    Review of Systems   Constitutional: Negative for fever.   HENT: Negative for drooling.    Eyes: Negative for discharge.   Respiratory: Negative for choking.    Cardiovascular: Negative for chest pain.   Genitourinary: Negative for flank pain.   Skin: Negative for wound.   Allergic/Immunologic: Negative for immunocompromised state.   Neurological: Negative for tremors and syncope.   Psychiatric/Behavioral: Negative for behavioral problems.     Past Medical History:   Past Medical History:   Diagnosis Date    Anxiety     Arthralgia of knee     arthralgia of bilateral knees secondary to djd    Arthritis     Back pain     Depression     Encounter for blood transfusion     AUTOLOGUS    GERD (gastroesophageal reflux disease)     Hiatal hernia     repaired in 1979    History of seasonal allergies     History of shingles     Hypertension     Insomnia     Obesity     JESENIA (obstructive sleep apnea) 02/2020    Home sleep study - YESICA 57, Desat 69% - Severe JESENIA with desaturations    Osteoporosis      Pneumonia     Reactive airway disease     Restless legs syndrome     Rheumatic fever     at 4 y/o    Sleep apnea     no cpap       Past Surgical History:   Past Surgical History:   Procedure Laterality Date    APPENDECTOMY      BARIATRIC SURGERY  2014    Gastric Sleeve    CHOLECYSTECTOMY      HERNIA REPAIR      hiatal hernia    HYSTERECTOMY      partial    INSERTION OF DORSAL COLUMN NERVE STIMULATOR FOR TRIAL N/A 5/25/2018    Procedure: TRIAL-STIMULATOR-DORSAL COLUMN;  Surgeon: Raoul Belcher MD;  Location: [facility] OR;  Service: Pain Management;  Laterality: N/A;    INSERTION OF SPINAL CORD STIMULATOR USING MINIMALLY INVASIVE TECHNIQUE N/A 7/11/2018    Procedure: INSERTION, SPINAL CORD STIMULATOR,;  Surgeon: Raoul Belcher MD;  Location: [facility] OR;  Service: Pain Management;  Laterality: N/A;    JOINT REPLACEMENT      BILAT KNEE    KNEE ARTHROPLASTY  1/2/2010    bilateral    NISSEN FUNDOPLICATION      REMOVAL OF NEUROSTIMULATOR N/A 9/12/2018    Procedure: REMOVAL, IMPLANT spinal cord stimulator;  Surgeon: Raoul Belcher MD;  Location: [facility] OR;  Service: Pain Management;  Laterality: N/A;    REPAIR OF RECURRENT INCISIONAL HERNIA N/A 8/15/2019    Procedure: REPAIR, HERNIA, INCISIONAL, RECURRENT;  Surgeon: Clifton Nguyen MD;  Location: [facility] OR;  Service: General;  Laterality: N/A;  open incisional hernia repair    REPLACEMENT OF SPINAL CORD STIMULATOR N/A 11/14/2018    Procedure: REPLACEMENT, SPINAL CORD STIMULATOR  PLACEMENT OF SPINAL CORD STIMULATOR T10;  Surgeon: Kevin Parnell MD;  Location: [facility] OR;  Service: Neurosurgery;  Laterality: N/A;       Family History:   Family History   Problem Relation Age of Onset    Heart disease Mother     Hypertension Mother     Diabetes Mother     Obesity Mother     Heart disease Maternal Grandfather        Medications:   Current Outpatient Medications on File Prior to Visit   Medication Sig Dispense Refill    albuterol (PROVENTIL) 2.5  mg /3 mL (0.083 %) nebulizer solution INHALE THE CONTENTS OF 1 VIAL VIA NEBULIZER EVERY 6 HOURS AS NEEDED FOR  WHEEZING  OR  SHORTNESS OF BREATH 90 mL 11    albuterol (PROVENTIL/VENTOLIN HFA) 90 mcg/actuation inhaler INHALE 2 PUFFS INTO THE LUNGS EVERY 6 HOURS AS NEEDED FOR WHEEZING OR SHORTNESS OF BREATH. 25.5 g 3    alendronate (FOSAMAX) 70 MG tablet TAKE 1 TABLET EVERY 7 DAYS 12 tablet 3    betamethasone valerate 0.1% (VALISONE) 0.1 % Crea Apply topically 2 (two) times daily. 45 g 6    calcium phosphate-vitamin D3 250 mg-10 mcg (400 unit) Chew Take 1 tablet by mouth once daily.       DULoxetine (CYMBALTA) 60 MG capsule TAKE 1 CAPSULE EVERY DAY 90 capsule 3    fluticasone furoate-vilanteroL (BREO ELLIPTA) 200-25 mcg/dose DsDv diskus inhaler Inhale 1 puff into the lungs once daily. Controller 60 each 5    furosemide (LASIX) 80 MG tablet Take 1 tablet (80 mg total) by mouth once daily. 90 tablet 3    losartan (COZAAR) 50 MG tablet TAKE 1 TABLET (50 MG TOTAL) BY MOUTH 2 (TWO) TIMES DAILY. 180 tablet 3    montelukast (SINGULAIR) 10 mg tablet Take 1 tablet (10 mg total) by mouth every evening. 90 tablet 3    naproxen sodium (ALEVE ORAL) Take 1 Dose by mouth daily as needed.      potassium chloride (KLOR-CON) 10 MEQ TbSR Take 1 tablet (10 mEq total) by mouth once daily. 90 tablet 3    pramipexole (MIRAPEX) 1.5 MG tablet TAKE 1 TABLET (1.5 MG TOTAL) BY MOUTH NIGHTLY. 90 tablet 1     Current Facility-Administered Medications on File Prior to Visit   Medication Dose Route Frequency Provider Last Rate Last Admin    methylPREDNISolone acetate injection 40 mg  40 mg Intra-articular  Adwoa Jaime DO   40 mg at 03/11/21 1000       Allergies:   Review of patient's allergies indicates:   Allergen Reactions    Etodolac (bulk) Swelling     Hands and feet swelling    Sulfa (sulfonamide antibiotics) Rash and Other (See Comments)     Headache        Social History:   Social History     Socioeconomic History     Marital status:    Tobacco Use    Smoking status: Passive Smoke Exposure - Never Smoker    Smokeless tobacco: Never Used   Substance and Sexual Activity    Alcohol use: Yes     Comment: 1-2 glasses of wine monthly    Drug use: No    Sexual activity: Yes     Partners: Male     PHYSICAL EXAMINATION:   General    Vitals:    06/23/22 1450   Resp: 18   Weight: 112 kg (247 lb)   Height: 5' (1.524 m)     Constitutional: Oriented to person, place, and time. No apparent distress. Pleasant.  HENT:   Head: Normocephalic and atraumatic.   Eyes: Right eye exhibits no discharge. Left eye exhibits no discharge. No scleral icterus.   Pulmonary/Chest: Effort normal. No respiratory distress.   Abdominal: There is no guarding.   Neurological: Alert and oriented to person, place, and time.   Psychiatric: Behavior is normal.   Left Shoulder Exam     Tenderness   Left shoulder tenderness location: Tender over the anterior and lateral aspects of the left shoulder.    Range of Motion   Active abduction: 90   Passive abduction: 100   External rotation: 60   Internal rotation 90 degrees: 50     Muscle Strength   Abduction: 4/5   Internal rotation: 5/5   External rotation: 4/5   Biceps: 5/5     Tests   Santizo test: positive  Cross arm: negative  Impingement: positive  Sulcus: absent    Other   Erythema: absent  Scars: absent  Sensation: normal  Pulse: present         INSPECTION: There is no swelling, ecchymoses, erythema or gross deformity about the left shoulder.    Imaging  X-ray of the left shoulder: No shoulder joint space narrowing.  No radiographically evident acute fracture, dislocation, or osseous destructive process.  There is degenerative change versus CPPD of the left shoulder joint with inferomedial humeral spur formation noted.  There is minor degenerative change of the left acromioclavicular joint.  No rotator cuff calcific tendinitis.  The left chest is clear.  There is degenerative change of the thoracic  "spine.     Data Reviewed: X-ray    Supportive Actions: Independent visualization of images or test specimens    ASSESSMENT:   1. Nontraumatic tear of left rotator cuff, unspecified tear extent    2. Arthritis of left shoulder region      PLAN:     1. Time was spent reviewing the above diagnosis in depth with Marta today, including acute management and rehabilitation.     2. We discussed that her pain is secondary to some arthritic change of the left shoulder joint and likely some degree of rotator cuff tearing.  She does have significant weakness with resisted movements about the left shoulder.  Surgically, she likely would not be an ideal candidate.  We discussed non operative interventions such as injection of corticosteroid with ultrasound guidance as well as radiofrequency nerve ablation techniques.  After discussion she does wish to proceed with a left suprascapular nerve block under ultrasound guidance to assess her candidacy for the radiofrequency ablation procedure.  See separate procedure note.    3. We will plan to contact her tomorrow to assess her response to today's injection.  If she does experience relief we will schedule for regular suprascapular nerve radiofrequency ablation.  If she does not get relief we may schedule her for ultrasound-guided injection of corticosteroid to the glenohumeral joint.    This is a consult from Dr. Adwoa Jaime. Please see the "Communications" section of Epic to see how the consulting physician received the report of today's findings and recommendations. If it's an [facility] physician, it will be forwarded to his/her "in basket".  Go to the following website to look up your diagnosis and learn more about it:     [email or web address]     If you have questions or concerns, please contact us via MyChart or email.      Please call the office with any issues.      Dr. Javed Espinosa M.D., CAQ-SM, R-MSK  [facility]    The above note was completed, in part, with the aid of [employer] Fluency Direct dictation software/hardware. Translation errors may be present.

## 2022-06-23 NOTE — TELEPHONE ENCOUNTER
1. What percentage of pain relief did you receive following the block, from 0-100%?     2.What was pain score from 0-10?    3. How many hours did pain relief last following the block?      4. During this time please describe in detail the activities you were able to do?

## 2022-06-24 ENCOUNTER — PATIENT OUTREACH (OUTPATIENT)
Dept: ADMINISTRATIVE | Facility: HOSPITAL | Age: 77
End: 2022-06-24
Payer: MEDICARE

## 2022-06-24 ENCOUNTER — PATIENT MESSAGE (OUTPATIENT)
Dept: PHYSICAL MEDICINE AND REHAB | Facility: CLINIC | Age: 77
End: 2022-06-24
Payer: MEDICARE

## 2022-06-24 NOTE — PROGRESS NOTES
Stacie non-compliant report chart audits for HYPERTENSION MANAGEMENT    Outreach to patient in reference to hypertension management    RE:  Patient hypertension management    Pt compliant    Outreach:  Hypertension Management

## 2022-06-24 NOTE — TELEPHONE ENCOUNTER
Attempted  pt call to discuss block results. Pt portal message sent as we are unable to leave message at provided number.

## 2022-06-27 ENCOUNTER — PATIENT MESSAGE (OUTPATIENT)
Dept: PHYSICAL MEDICINE AND REHAB | Facility: CLINIC | Age: 77
End: 2022-06-27
Payer: MEDICARE

## 2022-06-28 DIAGNOSIS — G89.29 CHRONIC LEFT SHOULDER PAIN: ICD-10-CM

## 2022-06-28 DIAGNOSIS — M25.512 CHRONIC LEFT SHOULDER PAIN: ICD-10-CM

## 2022-06-28 DIAGNOSIS — M75.102 NONTRAUMATIC TEAR OF LEFT ROTATOR CUFF, UNSPECIFIED TEAR EXTENT: ICD-10-CM

## 2022-06-28 DIAGNOSIS — M19.012 ARTHRITIS OF LEFT SHOULDER REGION: Primary | ICD-10-CM

## 2022-06-28 RX ORDER — MUPIROCIN 20 MG/G
OINTMENT TOPICAL
Status: CANCELLED | OUTPATIENT
Start: 2022-06-28

## 2022-08-02 ENCOUNTER — TELEPHONE (OUTPATIENT)
Dept: PHYSICAL MEDICINE AND REHAB | Facility: CLINIC | Age: 77
End: 2022-08-02
Payer: MEDICARE

## 2022-08-02 NOTE — TELEPHONE ENCOUNTER
----- Message from Zeynepnavid Payanard sent at 8/2/2022  8:20 AM CDT -----  Contact: Patient  Type: Patient Call Back         Who called: Patient         What is the request in detail: has an upcoming procedure on 8/12; states she needs to know or get an idea estimate of the arrival date in advance due to relying on transportation; please advise         Can the clinic reply by [facility]? yes         Would the patient rather a call back or a response via [facility]? either         Best call back number: [phone]         Additional Information: Radiofrequency Ablation//REGULAR RFA left shoulder [1229]             Thank You

## 2022-08-09 ENCOUNTER — PATIENT MESSAGE (OUTPATIENT)
Dept: FAMILY MEDICINE | Facility: CLINIC | Age: 77
End: 2022-08-09
Payer: MEDICARE

## 2022-08-12 ENCOUNTER — HOSPITAL ENCOUNTER (OUTPATIENT)
Facility: HOSPITAL | Age: 77
Discharge: HOME | End: 2022-08-12
Attending: PHYSICAL MEDICINE & REHABILITATION | Admitting: PHYSICAL MEDICINE & REHABILITATION
Payer: MEDICARE

## 2022-08-12 VITALS
TEMPERATURE: 98 F | BODY MASS INDEX: 45.16 KG/M2 | DIASTOLIC BLOOD PRESSURE: 87 MMHG | WEIGHT: 230 LBS | RESPIRATION RATE: 16 BRPM | HEART RATE: 71 BPM | HEIGHT: 60 IN | OXYGEN SATURATION: 99 % | SYSTOLIC BLOOD PRESSURE: 158 MMHG

## 2022-08-12 DIAGNOSIS — G89.29 CHRONIC LEFT SHOULDER PAIN: ICD-10-CM

## 2022-08-12 DIAGNOSIS — M25.512 CHRONIC LEFT SHOULDER PAIN: ICD-10-CM

## 2022-08-12 DIAGNOSIS — M75.102 NONTRAUMATIC TEAR OF LEFT ROTATOR CUFF, UNSPECIFIED TEAR EXTENT: ICD-10-CM

## 2022-08-12 DIAGNOSIS — M19.012 ARTHRITIS OF LEFT SHOULDER REGION: ICD-10-CM

## 2022-08-12 PROCEDURE — 64640 PR DESTRUCT BY NEURO AGENT; OTHER PERIPH NERVE: ICD-10-PCS | Mod: LT,,, | Performed by: PHYSICAL MEDICINE & REHABILITATION

## 2022-08-12 PROCEDURE — 25000003 PHARM REV CODE 250: Mod: PO | Performed by: PHYSICAL MEDICINE & REHABILITATION

## 2022-08-12 PROCEDURE — 64640 INJECTION TREATMENT OF NERVE: CPT | Mod: PO | Performed by: PHYSICAL MEDICINE & REHABILITATION

## 2022-08-12 PROCEDURE — 76942 PR U/S GUIDANCE FOR NEEDLE GUIDANCE: ICD-10-PCS | Mod: 26,,, | Performed by: PHYSICAL MEDICINE & REHABILITATION

## 2022-08-12 PROCEDURE — 76942 ECHO GUIDE FOR BIOPSY: CPT | Mod: 26,,, | Performed by: PHYSICAL MEDICINE & REHABILITATION

## 2022-08-12 PROCEDURE — 64640 INJECTION TREATMENT OF NERVE: CPT | Mod: LT,,, | Performed by: PHYSICAL MEDICINE & REHABILITATION

## 2022-08-12 PROCEDURE — 63600175 PHARM REV CODE 636 W HCPCS: Mod: PO | Performed by: PHYSICAL MEDICINE & REHABILITATION

## 2022-08-12 RX ORDER — SODIUM CHLORIDE, SODIUM LACTATE, POTASSIUM CHLORIDE, CALCIUM CHLORIDE 600; 310; 30; 20 MG/100ML; MG/100ML; MG/100ML; MG/100ML
INJECTION, SOLUTION INTRAVENOUS CONTINUOUS
Status: DISCONTINUED | OUTPATIENT
Start: 2022-08-12 | End: 2022-08-12 | Stop reason: HOSPADM

## 2022-08-12 RX ORDER — MIDAZOLAM HYDROCHLORIDE 2 MG/2ML
INJECTION, SOLUTION INTRAMUSCULAR; INTRAVENOUS
Status: DISCONTINUED | OUTPATIENT
Start: 2022-08-12 | End: 2022-08-12 | Stop reason: HOSPADM

## 2022-08-12 RX ORDER — LIDOCAINE HYDROCHLORIDE 10 MG/ML
INJECTION INFILTRATION; PERINEURAL
Status: DISCONTINUED | OUTPATIENT
Start: 2022-08-12 | End: 2022-08-12 | Stop reason: HOSPADM

## 2022-08-12 RX ORDER — MUPIROCIN 20 MG/G
OINTMENT TOPICAL
Status: DISCONTINUED | OUTPATIENT
Start: 2022-08-12 | End: 2022-08-12 | Stop reason: HOSPADM

## 2022-08-12 RX ORDER — DEXAMETHASONE SODIUM PHOSPHATE 4 MG/ML
INJECTION, SOLUTION INTRA-ARTICULAR; INTRALESIONAL; INTRAMUSCULAR; INTRAVENOUS; SOFT TISSUE
Status: DISCONTINUED | OUTPATIENT
Start: 2022-08-12 | End: 2022-08-12 | Stop reason: HOSPADM

## 2022-08-12 RX ADMIN — SODIUM CHLORIDE, SODIUM LACTATE, POTASSIUM CHLORIDE, AND CALCIUM CHLORIDE: .6; .31; .03; .02 INJECTION, SOLUTION INTRAVENOUS at 10:08

## 2022-08-12 NOTE — H&P
CHIEF COMPLAINT:   Chief Complaint   Patient presents with    Shoulder Pain       Left shoulder pain       HISTORY OF PRESENT ILLNESS: Marta Huff is a 77 y.o. right-hand dominant female who presents to me for scheduled RFA of the left suprascapular nerve. She reported >75% relief from the diagnostic block of the suprascapular nerve.     Review of Systems   Constitutional: Negative for fever.   HENT: Negative for drooling.    Eyes: Negative for discharge.   Respiratory: Negative for choking.    Cardiovascular: Negative for chest pain.   Genitourinary: Negative for flank pain.   Skin: Negative for wound.   Allergic/Immunologic: Negative for immunocompromised state.   Neurological: Negative for tremors and syncope.   Psychiatric/Behavioral: Negative for behavioral problems.      Past Medical History:        Past Medical History:   Diagnosis Date    Anxiety      Arthralgia of knee       arthralgia of bilateral knees secondary to djd    Arthritis      Back pain      Depression      Encounter for blood transfusion       AUTOLOGUS    GERD (gastroesophageal reflux disease)      Hiatal hernia       repaired in 1979    History of seasonal allergies      History of shingles      Hypertension      Insomnia      Obesity      JESENIA (obstructive sleep apnea) 02/2020     Home sleep study - YESICA 57, Desat 69% - Severe JESENIA with desaturations    Osteoporosis      Pneumonia      Reactive airway disease      Restless legs syndrome      Rheumatic fever       at 4 y/o    Sleep apnea       no cpap         Past Surgical History:         Past Surgical History:   Procedure Laterality Date    APPENDECTOMY        BARIATRIC SURGERY   2014     Gastric Sleeve    CHOLECYSTECTOMY        HERNIA REPAIR         hiatal hernia    HYSTERECTOMY         partial    INSERTION OF DORSAL COLUMN NERVE STIMULATOR FOR TRIAL N/A 5/25/2018     Procedure: TRIAL-STIMULATOR-DORSAL COLUMN;  Surgeon: Raoul Belcher MD;  Location:  [facility] OR;  Service: Pain Management;  Laterality: N/A;    INSERTION OF SPINAL CORD STIMULATOR USING MINIMALLY INVASIVE TECHNIQUE N/A 7/11/2018     Procedure: INSERTION, SPINAL CORD STIMULATOR,;  Surgeon: Raoul Belcher MD;  Location: [facility] OR;  Service: Pain Management;  Laterality: N/A;    JOINT REPLACEMENT         BILAT KNEE    KNEE ARTHROPLASTY   1/2/2010     bilateral    NISSEN FUNDOPLICATION        REMOVAL OF NEUROSTIMULATOR N/A 9/12/2018     Procedure: REMOVAL, IMPLANT spinal cord stimulator;  Surgeon: Raoul Belcher MD;  Location: [facility] OR;  Service: Pain Management;  Laterality: N/A;    REPAIR OF RECURRENT INCISIONAL HERNIA N/A 8/15/2019     Procedure: REPAIR, HERNIA, INCISIONAL, RECURRENT;  Surgeon: Clifton Nguyen MD;  Location: [facility] OR;  Service: General;  Laterality: N/A;  open incisional hernia repair    REPLACEMENT OF SPINAL CORD STIMULATOR N/A 11/14/2018     Procedure: REPLACEMENT, SPINAL CORD STIMULATOR  PLACEMENT OF SPINAL CORD STIMULATOR T10;  Surgeon: Kevin Parnell MD;  Location: [facility] OR;  Service: Neurosurgery;  Laterality: N/A;         Family History:         Family History   Problem Relation Age of Onset    Heart disease Mother      Hypertension Mother      Diabetes Mother      Obesity Mother      Heart disease Maternal Grandfather           Medications:          Current Outpatient Medications on File Prior to Visit   Medication Sig Dispense Refill    albuterol (PROVENTIL) 2.5 mg /3 mL (0.083 %) nebulizer solution INHALE THE CONTENTS OF 1 VIAL VIA NEBULIZER EVERY 6 HOURS AS NEEDED FOR  WHEEZING  OR  SHORTNESS OF BREATH 90 mL 11    albuterol (PROVENTIL/VENTOLIN HFA) 90 mcg/actuation inhaler INHALE 2 PUFFS INTO THE LUNGS EVERY 6 HOURS AS NEEDED FOR WHEEZING OR SHORTNESS OF BREATH. 25.5 g 3    alendronate (FOSAMAX) 70 MG tablet TAKE 1 TABLET EVERY 7 DAYS 12 tablet 3    betamethasone valerate 0.1% (VALISONE) 0.1 % Crea Apply topically 2 (two) times daily. 45 g 6     calcium phosphate-vitamin D3 250 mg-10 mcg (400 unit) Chew Take 1 tablet by mouth once daily.         DULoxetine (CYMBALTA) 60 MG capsule TAKE 1 CAPSULE EVERY DAY 90 capsule 3    fluticasone furoate-vilanteroL (BREO ELLIPTA) 200-25 mcg/dose DsDv diskus inhaler Inhale 1 puff into the lungs once daily. Controller 60 each 5    furosemide (LASIX) 80 MG tablet Take 1 tablet (80 mg total) by mouth once daily. 90 tablet 3    losartan (COZAAR) 50 MG tablet TAKE 1 TABLET (50 MG TOTAL) BY MOUTH 2 (TWO) TIMES DAILY. 180 tablet 3    montelukast (SINGULAIR) 10 mg tablet Take 1 tablet (10 mg total) by mouth every evening. 90 tablet 3    naproxen sodium (ALEVE ORAL) Take 1 Dose by mouth daily as needed.        potassium chloride (KLOR-CON) 10 MEQ TbSR Take 1 tablet (10 mEq total) by mouth once daily. 90 tablet 3    pramipexole (MIRAPEX) 1.5 MG tablet TAKE 1 TABLET (1.5 MG TOTAL) BY MOUTH NIGHTLY. 90 tablet 1                Current Facility-Administered Medications on File Prior to Visit   Medication Dose Route Frequency Provider Last Rate Last Admin    methylPREDNISolone acetate injection 40 mg  40 mg Intra-articular   Adwoa Jaime, DO   40 mg at 03/11/21 1000         Allergies:         Review of patient's allergies indicates:   Allergen Reactions    Etodolac (bulk) Swelling       Hands and feet swelling    Sulfa (sulfonamide antibiotics) Rash and Other (See Comments)       Headache          Social History:   Social History            Socioeconomic History    Marital status:    Tobacco Use    Smoking status: Passive Smoke Exposure - Never Smoker    Smokeless tobacco: Never Used   Substance and Sexual Activity    Alcohol use: Yes       Comment: 1-2 glasses of wine monthly    Drug use: No    Sexual activity: Yes       Partners: Male      PHYSICAL EXAMINATION:   General           VSS   Constitutional: Oriented to person, place, and time. No apparent distress. Pleasant.  HENT:   Head: Normocephalic and  atraumatic.   Eyes: Right eye exhibits no discharge. Left eye exhibits no discharge. No scleral icterus.   Pulmonary/Chest: Effort normal. No respiratory distress.   Abdominal: There is no guarding.   Neurological: Alert and oriented to person, place, and time.   Psychiatric: Behavior is normal.   Left Shoulder Exam      Tenderness   Left shoulder tenderness location: Tender over the anterior and lateral aspects of the left shoulder.     Range of Motion   Active abduction: 90   Passive abduction: 100   External rotation: 60   Internal rotation 90 degrees: 50      Muscle Strength   Abduction: 4/5   Internal rotation: 5/5   External rotation: 4/5   Biceps: 5/5      Tests   Santizo test: positive  Cross arm: negative  Impingement: positive  Sulcus: absent     Other   Erythema: absent  Scars: absent  Sensation: normal  Pulse: present     INSPECTION: There is no swelling, ecchymoses, erythema or gross deformity about the left shoulder.     Imaging  X-ray of the left shoulder: No shoulder joint space narrowing.  No radiographically evident acute fracture, dislocation, or osseous destructive process.  There is degenerative change versus CPPD of the left shoulder joint with inferomedial humeral spur formation noted.  There is minor degenerative change of the left acromioclavicular joint.  No rotator cuff calcific tendinitis.  The left chest is clear.  There is degenerative change of the thoracic spine.     Data Reviewed: X-ray     Supportive Actions: Independent visualization of images or test specimens     ASSESSMENT:   1. Nontraumatic tear of left rotator cuff, unspecified tear extent    2. Arthritis of left shoulder region       PLAN:      1. We will proceed today with left suprascapular nerve RFA, pt consented.     2. Mallampati score II, ASA 2.     3. RTC in 2 weeks.

## 2022-08-12 NOTE — OP NOTE
Operative Note       Surgery Date: 8/12/2022     Surgeon(s) and Role:     * Javed Espinosa MD - Primary    Pre-op Diagnosis:  Arthritis of left shoulder region [M19.012]  Nontraumatic tear of left rotator cuff, unspecified tear extent [M75.102]    Post-op Diagnosis: Post-Op Diagnosis Codes:     * Arthritis of left shoulder region [M19.012]     * Nontraumatic tear of left rotator cuff, unspecified tear extent [M75.102]    Procedure:  Radiofrequency ablation of the left shoulder (left) suprascapular nerve    Anesthesia: RN IV Sedation    Description of Procedure:    Marta Huff is a 77 y.o. female with chronic left shoulder pain who presents for an elective radiofrequency ablation of the left suprascapular nerve. She did get significant pain relief (>75%) from the diagnostic block of the suprascapular nerve and has elected to undergo the RF procedure in the hopes of longer term pain relief. We discussed risks, benefits, and alternatives. Patient's verbal and written consent was obtained. She was brought to the fluoroscopic suite, placed in an upright sitting position. Once she was brought to the operating room, a time-out was performed confirming the name, the location and the procedure. The left shoulder was prepped and draped in the usual sterile fashion. 1% lidocaine was used to anesthetize the overlying subcutaneous cutaneous tissues using a 27-gauge 1.5 inch needle on a 10 cc syringe. The needle tract from the skin to the target suprascapular nerve was navigated via ultrasound guidance.  Also using ultrasound, a 20-gauge needle cannula was guided down from a medial to lateral trajectory down to the suprascapular ligament and suprascapular nerve observed on ultrasound. This was done in an in plane fashion. Once in position adjacent to the suprascapular nerve, the stylette was removed and the radiofrequency probe was then inserted through the cannula. Motor stimulation was then performed at 1.5 V  showing active contraction of the infraspinatus. Following motor stimulation, the needle cannula was then pulled back 1 mm and 1 cc of 2% of lidocaine was then injected through the cannula. Next, the radio frequency probe was placed back into the introducer needle and pulsed thermal radiofrequency lesioning was performed at 42°C for 2 minutes, 30 seconds. The needle was then repositioned more posterior and thermal radiofrequency lesioning was performed at 80°C for 1 minutes, 30 seconds. Following lesioning, a mixture of 1 cc of dexamethasone, with 1 cc 1% lidocaine was injected through the introducer needle. The cannula was then re-styletted and removed. Band-Aid was applied to the puncture site after cleaning the skin with sterile solution.    Estimated Blood Loss: Minimal    Attestation:  I performed the procedure.    The patient was given conscious sedation by a registered nurse with me in attendance. The agents used were fentanyl and Versed. There was continuous monitoring of EKG, blood pressure and pulse oximetry. Total time for conscious sedation was 28 minutes.          Discharge Note    Admit Date: 8/12/2022    Attending Physician: Javed Espinosa MD     Discharge Physician: Javed Espinosa MD    Final Diagnosis: Post-Op Diagnosis Codes:     * Arthritis of left shoulder region [M19.012]     * Nontraumatic tear of left rotator cuff, unspecified tear extent [M75.102]    Disposition: Home or Self Care, pt discharged in good condition and will f/u in clinic in 2 weeks.    Patient Instructions:   Current Discharge Medication List      CONTINUE these medications which have NOT CHANGED    Details   alendronate (FOSAMAX) 70 MG tablet TAKE 1 TABLET EVERY 7 DAYS  Qty: 12 tablet, Refills: 3    Associated Diagnoses: Generalized osteoarthritis      calcium phosphate-vitamin D3 250 mg-10 mcg (400 unit) Chew Take 1 tablet by mouth once daily.       DULoxetine (CYMBALTA) 60 MG capsule TAKE 1 CAPSULE EVERY  DAY  Qty: 90 capsule, Refills: 3      furosemide (LASIX) 80 MG tablet Take 1 tablet (80 mg total) by mouth once daily.  Qty: 90 tablet, Refills: 3    Comments: Please inactivate all prior scripts with same name and strength including any scripts on hold.  Associated Diagnoses: Edema, unspecified type; Pulmonary hypertension      losartan (COZAAR) 50 MG tablet TAKE 1 TABLET (50 MG TOTAL) BY MOUTH 2 (TWO) TIMES DAILY.  Qty: 180 tablet, Refills: 3    Associated Diagnoses: Essential hypertension      montelukast (SINGULAIR) 10 mg tablet Take 1 tablet (10 mg total) by mouth every evening.  Qty: 90 tablet, Refills: 3    Associated Diagnoses: Moderate persistent asthma without complication      potassium chloride (KLOR-CON) 10 MEQ TbSR Take 1 tablet (10 mEq total) by mouth once daily.  Qty: 90 tablet, Refills: 3      pramipexole (MIRAPEX) 1.5 MG tablet TAKE 1 TABLET (1.5 MG TOTAL) BY MOUTH NIGHTLY.  Qty: 90 tablet, Refills: 1    Associated Diagnoses: Restless legs syndrome      albuterol (PROVENTIL) 2.5 mg /3 mL (0.083 %) nebulizer solution INHALE THE CONTENTS OF 1 VIAL VIA NEBULIZER EVERY 6 HOURS AS NEEDED FOR  WHEEZING  OR  SHORTNESS OF BREATH  Qty: 90 mL, Refills: 11    Associated Diagnoses: Reactive airway disease, unspecified asthma severity, uncomplicated      albuterol (PROVENTIL/VENTOLIN HFA) 90 mcg/actuation inhaler INHALE 2 PUFFS INTO THE LUNGS EVERY 6 HOURS AS NEEDED FOR WHEEZING OR SHORTNESS OF BREATH.  Qty: 25.5 g, Refills: 3    Associated Diagnoses: Reactive airway disease, unspecified asthma severity, uncomplicated      betamethasone valerate 0.1% (VALISONE) 0.1 % Crea Apply topically 2 (two) times daily.  Qty: 45 g, Refills: 6    Associated Diagnoses: Lichen sclerosus et atrophicus      fluticasone furoate-vilanteroL (BREO ELLIPTA) 200-25 mcg/dose DsDv diskus inhaler Inhale 1 puff into the lungs once daily. Controller  Qty: 60 each, Refills: 5    Associated Diagnoses: Moderate persistent asthma without  complication      naproxen sodium (ALEVE ORAL) Take 1 Dose by mouth daily as needed.             Discharge Procedure Orders (must include Diet, Follow-up, Activity)   Discharge Procedure Orders (must include Diet, Follow-up, Activity)   Diet general     Ice to affected area     No dressing needed     Call MD for:  temperature >100.4     Call MD for:  persistent nausea and vomiting     Call MD for:  severe uncontrolled pain     Call MD for:  difficulty breathing, headache or visual disturbances     Call MD for:  redness, tenderness, or signs of infection (pain, swelling, redness, odor or green/yellow discharge around incision site)     Call MD for:  hives     Call MD for:  persistent dizziness or light-headedness     Call MD for:  extreme fatigue     Shower on day dressing removed (No bath)        Discharge Date: 08/12/2022

## 2022-09-06 ENCOUNTER — PATIENT MESSAGE (OUTPATIENT)
Dept: OTHER | Facility: OTHER | Age: 77
End: 2022-09-06
Payer: MEDICARE

## 2022-09-12 ENCOUNTER — OFFICE VISIT (OUTPATIENT)
Dept: PHYSICAL MEDICINE AND REHAB | Facility: CLINIC | Age: 77
End: 2022-09-12
Payer: MEDICARE

## 2022-09-12 VITALS — BODY MASS INDEX: 49.23 KG/M2 | WEIGHT: 250.75 LBS | HEIGHT: 60 IN

## 2022-09-12 DIAGNOSIS — M19.011 ARTHRITIS OF RIGHT SHOULDER REGION: ICD-10-CM

## 2022-09-12 DIAGNOSIS — M25.512 CHRONIC LEFT SHOULDER PAIN: ICD-10-CM

## 2022-09-12 DIAGNOSIS — G89.29 CHRONIC LEFT SHOULDER PAIN: ICD-10-CM

## 2022-09-12 DIAGNOSIS — M75.102 NONTRAUMATIC TEAR OF LEFT ROTATOR CUFF, UNSPECIFIED TEAR EXTENT: ICD-10-CM

## 2022-09-12 DIAGNOSIS — M19.012 ARTHRITIS OF LEFT SHOULDER REGION: Primary | ICD-10-CM

## 2022-09-12 PROCEDURE — 99999 PR PBB SHADOW E&M-EST. PATIENT-LVL III: CPT | Mod: PBBFAC,,, | Performed by: PHYSICAL MEDICINE & REHABILITATION

## 2022-09-12 PROCEDURE — 1159F MED LIST DOCD IN RCRD: CPT | Mod: CPTII,S$GLB,, | Performed by: PHYSICAL MEDICINE & REHABILITATION

## 2022-09-12 PROCEDURE — 20611 DRAIN/INJ JOINT/BURSA W/US: CPT | Mod: RT,S$GLB,, | Performed by: PHYSICAL MEDICINE & REHABILITATION

## 2022-09-12 PROCEDURE — 20611 LARGE JOINT ASPIRATION/INJECTION: R GLENOHUMERAL: ICD-10-PCS | Mod: RT,S$GLB,, | Performed by: PHYSICAL MEDICINE & REHABILITATION

## 2022-09-12 PROCEDURE — 3288F PR FALLS RISK ASSESSMENT DOCUMENTED: ICD-10-PCS | Mod: CPTII,S$GLB,, | Performed by: PHYSICAL MEDICINE & REHABILITATION

## 2022-09-12 PROCEDURE — 1101F PT FALLS ASSESS-DOCD LE1/YR: CPT | Mod: CPTII,S$GLB,, | Performed by: PHYSICAL MEDICINE & REHABILITATION

## 2022-09-12 PROCEDURE — 1126F PR PAIN SEVERITY QUANTIFIED, NO PAIN PRESENT: ICD-10-PCS | Mod: CPTII,S$GLB,, | Performed by: PHYSICAL MEDICINE & REHABILITATION

## 2022-09-12 PROCEDURE — 1101F PR PT FALLS ASSESS DOC 0-1 FALLS W/OUT INJ PAST YR: ICD-10-PCS | Mod: CPTII,S$GLB,, | Performed by: PHYSICAL MEDICINE & REHABILITATION

## 2022-09-12 PROCEDURE — 1160F PR REVIEW ALL MEDS BY PRESCRIBER/CLIN PHARMACIST DOCUMENTED: ICD-10-PCS | Mod: CPTII,S$GLB,, | Performed by: PHYSICAL MEDICINE & REHABILITATION

## 2022-09-12 PROCEDURE — 1126F AMNT PAIN NOTED NONE PRSNT: CPT | Mod: CPTII,S$GLB,, | Performed by: PHYSICAL MEDICINE & REHABILITATION

## 2022-09-12 PROCEDURE — 1159F PR MEDICATION LIST DOCUMENTED IN MEDICAL RECORD: ICD-10-PCS | Mod: CPTII,S$GLB,, | Performed by: PHYSICAL MEDICINE & REHABILITATION

## 2022-09-12 PROCEDURE — 1157F ADVNC CARE PLAN IN RCRD: CPT | Mod: CPTII,S$GLB,, | Performed by: PHYSICAL MEDICINE & REHABILITATION

## 2022-09-12 PROCEDURE — 99213 OFFICE O/P EST LOW 20 MIN: CPT | Mod: 25,GC,S$GLB, | Performed by: PHYSICAL MEDICINE & REHABILITATION

## 2022-09-12 PROCEDURE — 3288F FALL RISK ASSESSMENT DOCD: CPT | Mod: CPTII,S$GLB,, | Performed by: PHYSICAL MEDICINE & REHABILITATION

## 2022-09-12 PROCEDURE — 99213 PR OFFICE/OUTPT VISIT, EST, LEVL III, 20-29 MIN: ICD-10-PCS | Mod: 25,GC,S$GLB, | Performed by: PHYSICAL MEDICINE & REHABILITATION

## 2022-09-12 PROCEDURE — 1160F RVW MEDS BY RX/DR IN RCRD: CPT | Mod: CPTII,S$GLB,, | Performed by: PHYSICAL MEDICINE & REHABILITATION

## 2022-09-12 PROCEDURE — 1157F PR ADVANCE CARE PLAN OR EQUIV PRESENT IN MEDICAL RECORD: ICD-10-PCS | Mod: CPTII,S$GLB,, | Performed by: PHYSICAL MEDICINE & REHABILITATION

## 2022-09-12 PROCEDURE — 99999 PR PBB SHADOW E&M-EST. PATIENT-LVL III: ICD-10-PCS | Mod: PBBFAC,,, | Performed by: PHYSICAL MEDICINE & REHABILITATION

## 2022-09-12 RX ORDER — BENZONATATE 100 MG/1
CAPSULE ORAL
COMMUNITY

## 2022-09-12 RX ORDER — TRIAMCINOLONE ACETONIDE 40 MG/ML
40 INJECTION, SUSPENSION INTRA-ARTICULAR; INTRAMUSCULAR
Status: DISCONTINUED | OUTPATIENT
Start: 2022-09-12 | End: 2022-09-12 | Stop reason: HOSPADM

## 2022-09-12 RX ADMIN — TRIAMCINOLONE ACETONIDE 40 MG: 40 INJECTION, SUSPENSION INTRA-ARTICULAR; INTRAMUSCULAR at 09:09

## 2022-09-12 NOTE — PROCEDURES
Large Joint Aspiration/Injection: R glenohumeral    Date/Time: 9/12/2022 9:00 AM  Performed by: Javed Espinosa MD  Authorized by: Javed Espinosa MD     Consent Done?:  Yes (Verbal)  Indications:  Arthritis and pain  Site marked: the procedure site was marked    Timeout: prior to procedure the correct patient, procedure, and site was verified    Prep: patient was prepped and draped in usual sterile fashion    Local anesthesia used?: No      Details:  Needle Size:  22 G  Ultrasonic Guidance for needle placement?: Yes    Images are saved and documented.  Approach: in plane, lat to med.  Location:  Shoulder  Site:  R glenohumeral  Medications:  40 mg triamcinolone acetonide 40 mg/mL  Patient tolerance:  Patient tolerated the procedure well with no immediate complications     Ultrasound guidance was used for correct needle placement, the images were saved will be uploaded to EMR.

## 2022-09-12 NOTE — PROGRESS NOTES
[facility]    CHIEF COMPLAINT:   Chief Complaint   Patient presents with    Follow-up     F/u from procedure in left shoulder      HISTORY OF PRESENT ILLNESS: Marta Huff is a 77 y.o. female who presents to me for regular follow-up after undergoing a left suprascapular nerve pulsed RF ablation on 8/12/2022. She reports very significant relief from this procedure today. She says she is now able to comb her hair again, which she is very happy about. She reports no ill-effects from the procedure, including entry-site irritation, arm weakness, or sensory changes. She reports that her left arm is now good but her right shoulder has similar pain to the prior left side pains. Aching, stiff, and worse with abduction.     Review of Systems   Constitutional: Negative for fever.   HENT: Negative for drooling.    Eyes: Negative for discharge.   Respiratory: Negative for choking.    Cardiovascular: Negative for chest pain.   Genitourinary: Negative for flank pain.   Skin: Negative for wound.   Allergic/Immunologic: Negative for immunocompromised state.   Neurological: Negative for tremors and syncope.   Psychiatric/Behavioral: Negative for behavioral problems.     Past Medical History:   Past Medical History:   Diagnosis Date    Anxiety     Arthralgia of knee     arthralgia of bilateral knees secondary to djd    Arthritis     Back pain     Depression     Encounter for blood transfusion     AUTOLOGUS    GERD (gastroesophageal reflux disease)     Hiatal hernia     repaired in 1979    History of seasonal allergies     History of shingles     Hypertension     Insomnia     Obesity     JESENIA (obstructive sleep apnea) 02/2020    Home sleep study - YESICA 57, Desat 69% - Severe JESENIA with desaturations    Osteoporosis     Pneumonia     Reactive airway disease     Restless legs syndrome     Rheumatic fever     at 6 y/o    Sleep apnea     no cpap       Past Surgical History:   Past Surgical History:   Procedure  Laterality Date    APPENDECTOMY      BARIATRIC SURGERY  2014    Gastric Sleeve    CHOLECYSTECTOMY      HERNIA REPAIR      hiatal hernia    HYSTERECTOMY      partial    INSERTION OF DORSAL COLUMN NERVE STIMULATOR FOR TRIAL N/A 5/25/2018    Procedure: TRIAL-STIMULATOR-DORSAL COLUMN;  Surgeon: Raoul Belcher MD;  Location: [facility] OR;  Service: Pain Management;  Laterality: N/A;    INSERTION OF SPINAL CORD STIMULATOR USING MINIMALLY INVASIVE TECHNIQUE N/A 7/11/2018    Procedure: INSERTION, SPINAL CORD STIMULATOR,;  Surgeon: Raoul Belcher MD;  Location: [facility] OR;  Service: Pain Management;  Laterality: N/A;    JOINT REPLACEMENT      BILAT KNEE    KNEE ARTHROPLASTY  1/2/2010    bilateral    NISSEN FUNDOPLICATION      RADIOFREQUENCY ABLATION Left 8/12/2022    Procedure: Radiofrequency Ablation//REGULAR RFA left shoulder;  Surgeon: Javed Espinosa MD;  Location: [facility] OR;  Service: Orthopedics;  Laterality: Left;    REMOVAL OF NEUROSTIMULATOR N/A 9/12/2018    Procedure: REMOVAL, IMPLANT spinal cord stimulator;  Surgeon: Raoul Belcher MD;  Location: [facility] OR;  Service: Pain Management;  Laterality: N/A;    REPAIR OF RECURRENT INCISIONAL HERNIA N/A 8/15/2019    Procedure: REPAIR, HERNIA, INCISIONAL, RECURRENT;  Surgeon: Clifton Nguyen MD;  Location: [facility] OR;  Service: General;  Laterality: N/A;  open incisional hernia repair    REPLACEMENT OF SPINAL CORD STIMULATOR N/A 11/14/2018    Procedure: REPLACEMENT, SPINAL CORD STIMULATOR  PLACEMENT OF SPINAL CORD STIMULATOR T10;  Surgeon: Kevin Parnell MD;  Location: [facility] OR;  Service: Neurosurgery;  Laterality: N/A;       Family History:   Family History   Problem Relation Age of Onset    Heart disease Mother     Hypertension Mother     Diabetes Mother     Obesity Mother     Heart disease Maternal Grandfather        Medications:   Current Outpatient Medications on File Prior to Visit   Medication Sig Dispense Refill    albuterol (PROVENTIL) 2.5 mg /3 mL  (0.083 %) nebulizer solution INHALE THE CONTENTS OF 1 VIAL VIA NEBULIZER EVERY 6 HOURS AS NEEDED FOR  WHEEZING  OR  SHORTNESS OF BREATH 90 mL 11    albuterol (PROVENTIL/VENTOLIN HFA) 90 mcg/actuation inhaler INHALE 2 PUFFS INTO THE LUNGS EVERY 6 HOURS AS NEEDED FOR WHEEZING OR SHORTNESS OF BREATH. 25.5 g 3    alendronate (FOSAMAX) 70 MG tablet TAKE 1 TABLET EVERY 7 DAYS 12 tablet 3    benzonatate (TESSALON) 100 MG capsule benzonatate 100 mg capsule   TAKE ONE CAPSULE BY MOUTH THREE TIMES DAILY AS NEEDED FOR COUGH      betamethasone valerate 0.1% (VALISONE) 0.1 % Crea Apply topically 2 (two) times daily. 45 g 6    calcium phosphate-vitamin D3 250 mg-10 mcg (400 unit) Chew Take 1 tablet by mouth once daily.       DULoxetine (CYMBALTA) 60 MG capsule TAKE 1 CAPSULE EVERY DAY 90 capsule 3    fluticasone furoate-vilanteroL (BREO ELLIPTA) 200-25 mcg/dose DsDv diskus inhaler Inhale 1 puff into the lungs once daily. Controller 60 each 5    furosemide (LASIX) 80 MG tablet Take 1 tablet (80 mg total) by mouth once daily. 90 tablet 3    losartan (COZAAR) 50 MG tablet TAKE 1 TABLET (50 MG TOTAL) BY MOUTH 2 (TWO) TIMES DAILY. 180 tablet 3    montelukast (SINGULAIR) 10 mg tablet Take 1 tablet (10 mg total) by mouth every evening. 90 tablet 3    naproxen sodium (ALEVE ORAL) Take 1 Dose by mouth daily as needed.      potassium chloride (KLOR-CON) 10 MEQ TbSR Take 1 tablet (10 mEq total) by mouth once daily. 90 tablet 3    pramipexole (MIRAPEX) 1.5 MG tablet TAKE 1 TABLET (1.5 MG TOTAL) BY MOUTH NIGHTLY. 90 tablet 1     Current Facility-Administered Medications on File Prior to Visit   Medication Dose Route Frequency Provider Last Rate Last Admin    methylPREDNISolone acetate injection 40 mg  40 mg Intra-articular  Adwoa Jaime,    40 mg at 03/11/21 1000       Allergies:   Review of patient's allergies indicates:   Allergen Reactions    Etodolac (bulk) Swelling     Hands and feet swelling    Sulfa (sulfonamide antibiotics)  Rash and Other (See Comments)     Headache        Social History:   Social History     Socioeconomic History    Marital status:    Tobacco Use    Smoking status: Passive Smoke Exposure - Never Smoker    Smokeless tobacco: Never   Substance and Sexual Activity    Alcohol use: Yes     Comment: 1-2 glasses of wine monthly    Drug use: No    Sexual activity: Yes     Partners: Male     Marta is retired.     PHYSICAL EXAMINATION:   General    Vitals:    09/12/22 0906   Weight: 113.7 kg (250 lb 12.4 oz)   Height: 5' (1.524 m)     Constitutional: Oriented to person, place, and time. No apparent distress. Pleasant.  HENT:   Head: Normocephalic and atraumatic.   Eyes: Right eye exhibits no discharge. Left eye exhibits no discharge. No scleral icterus.   Pulmonary/Chest: Effort normal. No respiratory distress.   Abdominal: There is no guarding.   Neurological: Alert and oriented to person, place, and time.   Psychiatric: Behavior is normal.   Right Shoulder Exam     Tenderness   Right shoulder tenderness location: greater tuberocity.    Muscle Strength   Abduction: 4/5   External rotation: 4/5     Tests   Santizo test: positive  Cross arm: negative    Other   Erythema: absent  Scars: absent  Sensation: normal  Pulse: present      Left Shoulder Exam     Tenderness   The patient is experiencing no tenderness.     Range of Motion   Left shoulder active abduction: 140.   Left shoulder external rotation: 45.   Forward flexion:  160   Left shoulder internal rotation 0 degrees: 75.     Muscle Strength   Abduction: 4/5   External rotation: 4/5   Biceps: 5/5     Tests   Santizo test: negative  Cross arm: negative  Impingement: negative    Other   Erythema: absent  Sensation: normal  Pulse: present       INSPECTION: There is no swelling, ecchymoses, erythema or gross deformity of the shoulders.    Imaging  Xray shoulder 6/'22: No shoulder joint space narrowing.  No radiographically evident acute fracture, dislocation, or  osseous destructive process.  There is degenerative change versus CPPD of the left shoulder joint with inferomedial humeral spur formation noted.  There is minor degenerative change of the left acromioclavicular joint.  No rotator cuff calcific tendinitis.  The left chest is clear.  There is degenerative change of the thoracic spine.    ASSESSMENT:   1. Arthritis of left shoulder region    2. Nontraumatic tear of left rotator cuff, unspecified tear extent    3. Chronic left shoulder pain      PLAN:     1. Time was spent reviewing the above diagnosis in depth with Marta today, including acute management and rehabilitation.     2. We are very pleased with the results of the pulsed RFA on the left. We discussed prognosis and likely timeline for repeating this procedure as well as potential future corticosteroid injections. We will continue to observe her left shoulder and she will contact us in the future if her pain returns.     3. We discussed a glenohumeral corticosteroid injection vs pulsed RFA of the right shoulder. After reviewing the risks, benefits, and techniques of each option, the patient opted for a R glenohumeral injection today. Informed consent was obtained.  See separate procedure note.    4. RTC PRN.    The above note was completed, in part, with the aid of Dragon dictation software/hardware. Translation errors may be present.

## 2022-09-13 ENCOUNTER — PATIENT MESSAGE (OUTPATIENT)
Dept: FAMILY MEDICINE | Facility: CLINIC | Age: 77
End: 2022-09-13
Payer: MEDICARE

## 2022-09-13 DIAGNOSIS — R60.9 EDEMA, UNSPECIFIED TYPE: ICD-10-CM

## 2022-09-13 DIAGNOSIS — I27.20 PULMONARY HYPERTENSION: ICD-10-CM

## 2022-09-13 NOTE — TELEPHONE ENCOUNTER
No new care gaps identified.  [facility] Embedded Care Gaps. Reference number: [id number]. 9/13/2022   3:38:56 PM CDT

## 2022-09-15 RX ORDER — FUROSEMIDE 80 MG/1
80 TABLET ORAL DAILY
Qty: 90 TABLET | Refills: 3 | Status: SHIPPED | OUTPATIENT
Start: 2022-09-15 | End: 2022-10-25 | Stop reason: SDUPTHER

## 2022-09-22 ENCOUNTER — LAB VISIT (OUTPATIENT)
Dept: LAB | Facility: HOSPITAL | Age: 77
End: 2022-09-22
Attending: FAMILY MEDICINE
Payer: MEDICARE

## 2022-09-22 DIAGNOSIS — R73.03 PREDIABETES: ICD-10-CM

## 2022-09-22 LAB
ALBUMIN SERPL BCP-MCNC: 3.3 G/DL (ref 3.5–5.2)
ALP SERPL-CCNC: 129 U/L (ref 55–135)
ALT SERPL W/O P-5'-P-CCNC: 20 U/L (ref 10–44)
ANION GAP SERPL CALC-SCNC: 8 MMOL/L (ref 8–16)
AST SERPL-CCNC: 25 U/L (ref 10–40)
BILIRUB SERPL-MCNC: 0.8 MG/DL (ref 0.1–1)
BUN SERPL-MCNC: 19 MG/DL (ref 8–23)
CALCIUM SERPL-MCNC: 9 MG/DL (ref 8.7–10.5)
CHLORIDE SERPL-SCNC: 106 MMOL/L (ref 95–110)
CO2 SERPL-SCNC: 29 MMOL/L (ref 23–29)
CREAT SERPL-MCNC: 0.7 MG/DL (ref 0.5–1.4)
EST. GFR  (NO RACE VARIABLE): >60 ML/MIN/1.73 M^2
ESTIMATED AVG GLUCOSE: 117 MG/DL (ref 68–131)
GLUCOSE SERPL-MCNC: 94 MG/DL (ref 70–110)
HBA1C MFR BLD: 5.7 % (ref 4–5.6)
POTASSIUM SERPL-SCNC: 3.6 MMOL/L (ref 3.5–5.1)
PROT SERPL-MCNC: 6.7 G/DL (ref 6–8.4)
SODIUM SERPL-SCNC: 143 MMOL/L (ref 136–145)

## 2022-09-22 PROCEDURE — 36415 COLL VENOUS BLD VENIPUNCTURE: CPT | Mod: PN | Performed by: FAMILY MEDICINE

## 2022-09-22 PROCEDURE — 80053 COMPREHEN METABOLIC PANEL: CPT | Performed by: FAMILY MEDICINE

## 2022-09-22 PROCEDURE — 83036 HEMOGLOBIN GLYCOSYLATED A1C: CPT | Performed by: FAMILY MEDICINE

## 2022-09-29 ENCOUNTER — PATIENT OUTREACH (OUTPATIENT)
Dept: ADMINISTRATIVE | Facility: HOSPITAL | Age: 77
End: 2022-09-29
Payer: MEDICARE

## 2022-09-30 LAB
LEFT EYE DM RETINOPATHY: NEGATIVE
RIGHT EYE DM RETINOPATHY: NEGATIVE

## 2022-10-03 ENCOUNTER — OFFICE VISIT (OUTPATIENT)
Dept: DERMATOLOGY | Facility: CLINIC | Age: 77
End: 2022-10-03
Payer: MEDICARE

## 2022-10-03 VITALS — WEIGHT: 250.69 LBS | HEIGHT: 60 IN | BODY MASS INDEX: 49.22 KG/M2 | RESPIRATION RATE: 18 BRPM

## 2022-10-03 DIAGNOSIS — L90.0 LICHEN SCLEROSUS ET ATROPHICUS: Primary | ICD-10-CM

## 2022-10-03 DIAGNOSIS — L30.4 INTERTRIGO: ICD-10-CM

## 2022-10-03 DIAGNOSIS — L30.8 OTHER ECZEMA: ICD-10-CM

## 2022-10-03 PROCEDURE — 11900 INJECT SKIN LESIONS </W 7: CPT | Mod: S$GLB,,, | Performed by: DERMATOLOGY

## 2022-10-03 PROCEDURE — 99214 PR OFFICE/OUTPT VISIT, EST, LEVL IV, 30-39 MIN: ICD-10-PCS | Mod: 25,S$GLB,, | Performed by: DERMATOLOGY

## 2022-10-03 PROCEDURE — 1100F PR PT FALLS ASSESS DOC 2+ FALLS/FALL W/INJURY/YR: ICD-10-PCS | Mod: CPTII,S$GLB,, | Performed by: DERMATOLOGY

## 2022-10-03 PROCEDURE — 1157F ADVNC CARE PLAN IN RCRD: CPT | Mod: CPTII,S$GLB,, | Performed by: DERMATOLOGY

## 2022-10-03 PROCEDURE — 11900 PR INJECTION INTO SKIN LESIONS, UP TO 7: ICD-10-PCS | Mod: S$GLB,,, | Performed by: DERMATOLOGY

## 2022-10-03 PROCEDURE — 3288F PR FALLS RISK ASSESSMENT DOCUMENTED: ICD-10-PCS | Mod: CPTII,S$GLB,, | Performed by: DERMATOLOGY

## 2022-10-03 PROCEDURE — 1159F PR MEDICATION LIST DOCUMENTED IN MEDICAL RECORD: ICD-10-PCS | Mod: CPTII,S$GLB,, | Performed by: DERMATOLOGY

## 2022-10-03 PROCEDURE — 99999 PR PBB SHADOW E&M-EST. PATIENT-LVL IV: CPT | Mod: PBBFAC,,, | Performed by: DERMATOLOGY

## 2022-10-03 PROCEDURE — 99214 OFFICE O/P EST MOD 30 MIN: CPT | Mod: 25,S$GLB,, | Performed by: DERMATOLOGY

## 2022-10-03 PROCEDURE — 99999 PR PBB SHADOW E&M-EST. PATIENT-LVL IV: ICD-10-PCS | Mod: PBBFAC,,, | Performed by: DERMATOLOGY

## 2022-10-03 PROCEDURE — 3288F FALL RISK ASSESSMENT DOCD: CPT | Mod: CPTII,S$GLB,, | Performed by: DERMATOLOGY

## 2022-10-03 PROCEDURE — 1126F AMNT PAIN NOTED NONE PRSNT: CPT | Mod: CPTII,S$GLB,, | Performed by: DERMATOLOGY

## 2022-10-03 PROCEDURE — 1100F PTFALLS ASSESS-DOCD GE2>/YR: CPT | Mod: CPTII,S$GLB,, | Performed by: DERMATOLOGY

## 2022-10-03 PROCEDURE — 1126F PR PAIN SEVERITY QUANTIFIED, NO PAIN PRESENT: ICD-10-PCS | Mod: CPTII,S$GLB,, | Performed by: DERMATOLOGY

## 2022-10-03 PROCEDURE — 1159F MED LIST DOCD IN RCRD: CPT | Mod: CPTII,S$GLB,, | Performed by: DERMATOLOGY

## 2022-10-03 PROCEDURE — 1157F PR ADVANCE CARE PLAN OR EQUIV PRESENT IN MEDICAL RECORD: ICD-10-PCS | Mod: CPTII,S$GLB,, | Performed by: DERMATOLOGY

## 2022-10-03 RX ORDER — CLOBETASOL PROPIONATE 0.5 MG/G
OINTMENT TOPICAL
Qty: 60 G | Refills: 3 | Status: SHIPPED | OUTPATIENT
Start: 2022-10-03

## 2022-10-03 RX ORDER — TACROLIMUS 1 MG/G
OINTMENT TOPICAL 2 TIMES DAILY
Qty: 100 G | Refills: 5 | Status: SHIPPED | OUTPATIENT
Start: 2022-10-03 | End: 2023-08-25

## 2022-10-03 NOTE — PATIENT INSTRUCTIONS
No more than 1 shower or bath a day.   Out of shower or bath in less than 10 minutes.   Use only warm water, NOT hot.   Soap only where needed - areas that make body odor or are visibly dirty.  Use gentle soaps only (eg, Cetaphil Gentle Cleanser).   After shower or bath, pat dry - do not scrub or rub aggressively.   Apply thick moisturizing cream twice daily, once being after the shower or bath (eg, Cerave, Cetaphil, Vanicream).   Avoid common allergens/irritants - fragrances, botanicals, etc.     Under breasts, inguinal folds - Apply Triple Paste Diaper Ointment to fully dried skin, drying powder as needed     Apply rx tacrolimus ointment twice daily, once being after the shower or bath, to areas of asymptomatic areas of lichen sclerosus on abdomen, arm.     Apply rx clobetasol ointment twice daily, once being after the shower or bath, to painful / red areas of lichen sclerosus on abdomen as well as to areas of involvement at vulva and perianal region. OK to apply plain vaseline jelly as needed to affected areas in genitals throughout the day.

## 2022-10-03 NOTE — PROGRESS NOTES
Subjective:       Patient ID:  Marta Huff is a 77 y.o. female who presents for   Chief Complaint   Patient presents with    Follow-up     HPI    Established patient. Previously diagnosed with extragenital LS&A at lower abdomen via biopsy. Treating with betamethasone valerate with some improvement; however, more recent rash seems to be progressing. +Pain at red areas on abdomen within plaques.     PMH: hereditary / idiopathic peripheral neuropathy, HTN, newly diagnosed DM, obesity, fatty liver, COPD/asthma, depression/ anxiety    9/2020  1.  Skin, left abdomen, punch biopsy:   - LICHEN SCLEROSUS ET ATROPHICUS.   MICROSCOPIC DESCRIPTION:  Sections show an atrophic epidermis with vacuolar   alteration of the basal cell layer in association with an amorphous   appearing, homogeneously eosinophilic, altered material within the underlying   superficial dermis. There is a mild to moderate lymphocytic infiltrate   beneath the amorphous material.     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 Basovacuolar interface or prominent inflammatory   infiltrate is not seen, which would be present in connective tissue disease   or drug reaction. Correlate with clinical findings.     Past Medical History:   Diagnosis Date    Anxiety     Arthralgia of knee     arthralgia of bilateral knees secondary to djd    Arthritis     Back pain     Depression     Encounter for blood transfusion     AUTOLOGUS    GERD (gastroesophageal reflux disease)     Hiatal hernia     repaired in 1979    History of seasonal allergies     History of shingles     Hypertension     Insomnia     Obesity     JESENIA (obstructive sleep apnea) 02/2020    Home sleep study - YESICA 57, Desat 69% - Severe JESENIA with desaturations    Osteoporosis     Pneumonia     Reactive airway disease     Restless legs syndrome      Rheumatic fever     at 4 y/o    Sleep apnea     no cpap         Review of Systems   Genitourinary:  Negative for genital sores and genital itching.   Skin:  Positive for itching, rash and dry skin. Negative for sensitivity to antibiotic ointment and sensitivity to bandage adhesive.   Hematologic/Lymphatic: Bruises/bleeds easily.      Objective:    Physical Exam   Constitutional: She appears well-developed and well-nourished. No distress.   Neurological: She is alert and oriented to person, place, and time. She is not disoriented.   Psychiatric: She has a normal mood and affect.   Skin:   Areas Examined (abnormalities noted in diagram):   Abdomen Inspection Performed  Genitals / Buttocks / Groin Inspection Performed  Back Inspection Performed  RUE Inspected  LUE Inspection Performed  RLE Inspected  LLE Inspection Performed            Diagram Legend     Erythematous scaling macule/papule c/w actinic keratosis       Vascular papule c/w angioma      Pigmented verrucoid papule/plaque c/w seborrheic keratosis      Yellow umbilicated papule c/w sebaceous hyperplasia      Irregularly shaped tan macule c/w lentigo     1-2 mm smooth white papules consistent with Milia      Movable subcutaneous cyst with punctum c/w epidermal inclusion cyst      Subcutaneous movable cyst c/w pilar cyst      Firm pink to brown papule c/w dermatofibroma      Pedunculated fleshy papule(s) c/w skin tag(s)      Evenly pigmented macule c/w junctional nevus     Mildly variegated pigmented, slightly irregular-bordered macule c/w mildly atypical nevus      Flesh colored to evenly pigmented papule c/w intradermal nevus       Pink pearly papule/plaque c/w basal cell carcinoma      Erythematous hyperkeratotic cursted plaque c/w SCC      Surgical scar with no sign of skin cancer recurrence      Open and closed comedones      Inflammatory papules and pustules      Verrucoid papule consistent consistent with wart     Erythematous eczematous patches and  plaques     Dystrophic onycholytic nail with subungual debris c/w onychomycosis     Umbilicated papule    Erythematous-base heme-crusted tan verrucoid plaque consistent with inflamed seborrheic keratosis     Erythematous Silvery Scaling Plaque c/w Psoriasis     See annotation          Assessment / Plan:        Lichen sclerosus et atrophicus  -     clobetasol 0.05% (TEMOVATE) 0.05 % Oint; AAA bid  Dispense: 60 g; Refill: 3    Other eczema  -     tacrolimus (PROTOPIC) 0.1 % ointment; Apply topically 2 (two) times daily.  Dispense: 100 g; Refill: 5    Intertrigo    - Discussed diagnosis, etiology, and treatment options. Plan to optimize topicals as below. Transport limits NBUVB. Consideration for MTX in future pending response.   - Counseled on gentle skin care and avoidance of common allergens/irritants zeke at special sites (vulva, perianal).  - Topicals as below.   - ILK to painful areas of erythema at lower abdomen today.   - Intralesional Kenalog Injection Procedure Note: Discussed procedure with patient/patient's guardian including risks and benefits as well as treatment alternatives. Risks of procedure include pain, bleeding, surrounding hypopigmentation, atrophy, infection, partial response, lack of response, recurrence. Verbal consent obtained. Area to be treated cleansed with alcohol. A total of 3 cc of Kenalog 3 mg/ml used to treat 2 lesion(s). Hemostasis achieved with pressure. Patient tolerated procedure well. After-visit wound care instructions. F/u 1 month PRN. [NDC for Kenalog 10 mg/cc: 1714-1216-95]    - Counseled on potential SE of medication(s) and instructed on use.     Under breasts, inguinal folds - Apply Triple Paste Diaper Ointment to fully dried skin, drying powder as needed     Apply rx tacrolimus ointment twice daily, once being after the shower or bath, to areas of asymptomatic areas of lichen sclerosus on abdomen, arm.     Apply rx clobetasol ointment twice daily, once being after the shower  or bath, to painful / red areas of lichen sclerosus on abdomen as well as to areas of involvement at vulva and perianal region. OK to apply plain vaseline jelly as needed to affected areas in genitals throughout the day.              Follow up in about 1 month (around 11/3/2022).

## 2022-10-06 ENCOUNTER — PATIENT OUTREACH (OUTPATIENT)
Dept: ADMINISTRATIVE | Facility: HOSPITAL | Age: 77
End: 2022-10-06
Payer: MEDICARE

## 2022-10-07 ENCOUNTER — TELEPHONE (OUTPATIENT)
Dept: PAIN MEDICINE | Facility: CLINIC | Age: 77
End: 2022-10-07
Payer: MEDICARE

## 2022-10-07 ENCOUNTER — PATIENT MESSAGE (OUTPATIENT)
Dept: PAIN MEDICINE | Facility: CLINIC | Age: 77
End: 2022-10-07
Payer: MEDICARE

## 2022-10-07 NOTE — TELEPHONE ENCOUNTER
----- Message from Surya Keller sent at 10/6/2022  3:38 PM CDT -----  Contact: [facility]  Type: Needs Medical Advice  Who Called:  [facility]  Best Call Back Number: [phone]  Additional Information: States they rec'd prescription for spinal cord stimulator . Did not receive chart notes. Please send. [phone]

## 2022-10-10 ENCOUNTER — PES CALL (OUTPATIENT)
Dept: ADMINISTRATIVE | Facility: CLINIC | Age: 77
End: 2022-10-10
Payer: MEDICARE

## 2022-10-17 ENCOUNTER — TELEPHONE (OUTPATIENT)
Dept: RHEUMATOLOGY | Facility: CLINIC | Age: 77
End: 2022-10-17
Payer: MEDICARE

## 2022-10-17 NOTE — TELEPHONE ENCOUNTER
LVM that her appointment was moved up sooner to 11-1-22 at 1:30. Call back number is provided if she needs to reschedule. POONAM

## 2022-10-25 ENCOUNTER — OFFICE VISIT (OUTPATIENT)
Dept: FAMILY MEDICINE | Facility: CLINIC | Age: 77
End: 2022-10-25
Payer: MEDICARE

## 2022-10-25 ENCOUNTER — HOSPITAL ENCOUNTER (OUTPATIENT)
Dept: RADIOLOGY | Facility: HOSPITAL | Age: 77
Discharge: HOME | End: 2022-10-25
Attending: FAMILY MEDICINE
Payer: MEDICARE

## 2022-10-25 ENCOUNTER — PES CALL (OUTPATIENT)
Dept: ADMINISTRATIVE | Facility: CLINIC | Age: 77
End: 2022-10-25
Payer: MEDICARE

## 2022-10-25 VITALS
SYSTOLIC BLOOD PRESSURE: 164 MMHG | DIASTOLIC BLOOD PRESSURE: 98 MMHG | RESPIRATION RATE: 14 BRPM | BODY MASS INDEX: 48.91 KG/M2 | HEART RATE: 76 BPM | WEIGHT: 249.13 LBS | HEIGHT: 60 IN

## 2022-10-25 DIAGNOSIS — R60.9 EDEMA, UNSPECIFIED TYPE: ICD-10-CM

## 2022-10-25 DIAGNOSIS — M25.562 ACUTE PAIN OF LEFT KNEE: Primary | ICD-10-CM

## 2022-10-25 DIAGNOSIS — M25.562 ACUTE PAIN OF LEFT KNEE: ICD-10-CM

## 2022-10-25 DIAGNOSIS — I27.20 PULMONARY HYPERTENSION: ICD-10-CM

## 2022-10-25 PROCEDURE — 1125F AMNT PAIN NOTED PAIN PRSNT: CPT | Mod: CPTII,S$GLB,, | Performed by: FAMILY MEDICINE

## 2022-10-25 PROCEDURE — 99999 PR PBB SHADOW E&M-EST. PATIENT-LVL V: CPT | Mod: PBBFAC,,, | Performed by: FAMILY MEDICINE

## 2022-10-25 PROCEDURE — 3077F SYST BP >= 140 MM HG: CPT | Mod: CPTII,S$GLB,, | Performed by: FAMILY MEDICINE

## 2022-10-25 PROCEDURE — 1159F PR MEDICATION LIST DOCUMENTED IN MEDICAL RECORD: ICD-10-PCS | Mod: CPTII,S$GLB,, | Performed by: FAMILY MEDICINE

## 2022-10-25 PROCEDURE — 99214 OFFICE O/P EST MOD 30 MIN: CPT | Mod: 25,S$GLB,, | Performed by: FAMILY MEDICINE

## 2022-10-25 PROCEDURE — 3080F DIAST BP >= 90 MM HG: CPT | Mod: CPTII,S$GLB,, | Performed by: FAMILY MEDICINE

## 2022-10-25 PROCEDURE — 1125F PR PAIN SEVERITY QUANTIFIED, PAIN PRESENT: ICD-10-PCS | Mod: CPTII,S$GLB,, | Performed by: FAMILY MEDICINE

## 2022-10-25 PROCEDURE — 3288F FALL RISK ASSESSMENT DOCD: CPT | Mod: CPTII,S$GLB,, | Performed by: FAMILY MEDICINE

## 2022-10-25 PROCEDURE — 3077F PR MOST RECENT SYSTOLIC BLOOD PRESSURE >= 140 MM HG: ICD-10-PCS | Mod: CPTII,S$GLB,, | Performed by: FAMILY MEDICINE

## 2022-10-25 PROCEDURE — 73564 X-RAY EXAM KNEE 4 OR MORE: CPT | Mod: 26,LT,, | Performed by: RADIOLOGY

## 2022-10-25 PROCEDURE — 1157F ADVNC CARE PLAN IN RCRD: CPT | Mod: CPTII,S$GLB,, | Performed by: FAMILY MEDICINE

## 2022-10-25 PROCEDURE — 1159F MED LIST DOCD IN RCRD: CPT | Mod: CPTII,S$GLB,, | Performed by: FAMILY MEDICINE

## 2022-10-25 PROCEDURE — 1101F PT FALLS ASSESS-DOCD LE1/YR: CPT | Mod: CPTII,S$GLB,, | Performed by: FAMILY MEDICINE

## 2022-10-25 PROCEDURE — G0008 FLU VACCINE - QUADRIVALENT - ADJUVANTED: ICD-10-PCS | Mod: S$GLB,,, | Performed by: FAMILY MEDICINE

## 2022-10-25 PROCEDURE — 1157F PR ADVANCE CARE PLAN OR EQUIV PRESENT IN MEDICAL RECORD: ICD-10-PCS | Mod: CPTII,S$GLB,, | Performed by: FAMILY MEDICINE

## 2022-10-25 PROCEDURE — 73562 XR KNEE ORTHO LEFT WITH FLEXION: ICD-10-PCS | Mod: 26,RT,, | Performed by: RADIOLOGY

## 2022-10-25 PROCEDURE — 99214 PR OFFICE/OUTPT VISIT, EST, LEVL IV, 30-39 MIN: ICD-10-PCS | Mod: 25,S$GLB,, | Performed by: FAMILY MEDICINE

## 2022-10-25 PROCEDURE — 99999 PR PBB SHADOW E&M-EST. PATIENT-LVL V: ICD-10-PCS | Mod: PBBFAC,,, | Performed by: FAMILY MEDICINE

## 2022-10-25 PROCEDURE — 90694 FLU VACCINE - QUADRIVALENT - ADJUVANTED: ICD-10-PCS | Mod: S$GLB,,, | Performed by: FAMILY MEDICINE

## 2022-10-25 PROCEDURE — 90694 VACC AIIV4 NO PRSRV 0.5ML IM: CPT | Mod: S$GLB,,, | Performed by: FAMILY MEDICINE

## 2022-10-25 PROCEDURE — 73562 X-RAY EXAM OF KNEE 3: CPT | Mod: 26,RT,, | Performed by: RADIOLOGY

## 2022-10-25 PROCEDURE — 3080F PR MOST RECENT DIASTOLIC BLOOD PRESSURE >= 90 MM HG: ICD-10-PCS | Mod: CPTII,S$GLB,, | Performed by: FAMILY MEDICINE

## 2022-10-25 PROCEDURE — G0008 ADMIN INFLUENZA VIRUS VAC: HCPCS | Mod: S$GLB,,, | Performed by: FAMILY MEDICINE

## 2022-10-25 PROCEDURE — 73562 X-RAY EXAM OF KNEE 3: CPT | Mod: TC,PN,RT

## 2022-10-25 PROCEDURE — 1160F RVW MEDS BY RX/DR IN RCRD: CPT | Mod: CPTII,S$GLB,, | Performed by: FAMILY MEDICINE

## 2022-10-25 PROCEDURE — 73564 XR KNEE ORTHO LEFT WITH FLEXION: ICD-10-PCS | Mod: 26,LT,, | Performed by: RADIOLOGY

## 2022-10-25 PROCEDURE — 1101F PR PT FALLS ASSESS DOC 0-1 FALLS W/OUT INJ PAST YR: ICD-10-PCS | Mod: CPTII,S$GLB,, | Performed by: FAMILY MEDICINE

## 2022-10-25 PROCEDURE — 3288F PR FALLS RISK ASSESSMENT DOCUMENTED: ICD-10-PCS | Mod: CPTII,S$GLB,, | Performed by: FAMILY MEDICINE

## 2022-10-25 PROCEDURE — 1160F PR REVIEW ALL MEDS BY PRESCRIBER/CLIN PHARMACIST DOCUMENTED: ICD-10-PCS | Mod: CPTII,S$GLB,, | Performed by: FAMILY MEDICINE

## 2022-10-25 RX ORDER — FUROSEMIDE 80 MG/1
80 TABLET ORAL DAILY
Qty: 100 TABLET | Refills: 3 | Status: SHIPPED | OUTPATIENT
Start: 2022-10-25 | End: 2023-08-18

## 2022-10-25 RX ORDER — TRAMADOL HYDROCHLORIDE 50 MG/1
50 TABLET ORAL EVERY 12 HOURS PRN
Qty: 20 TABLET | Refills: 1 | Status: SHIPPED | OUTPATIENT
Start: 2022-10-25 | End: 2023-03-30

## 2022-10-25 NOTE — PATIENT INSTRUCTIONS
Lasix - double your dose for the next 3 days to help pull the fluid off of your legs. Then, hopefully we can restart the compression stockings.     Tylenol is ok. Tramadol for pain up to 1 tablet 2x/day. *caution with falls, balance, driving *

## 2022-10-25 NOTE — PROGRESS NOTES
Subjective:       Patient ID: Marta Huff is a 77 y.o. female.    Chief Complaint: Leg Pain (L knee pain. Swelling, and pain radiates down to calf. ER visit 10/13/22.)    HPI  Patient in clinic for L knee pain with swelling and pain x 3 weeks. She had an ER visit for same on 10/13 with negative u/s for dvt.   Recalls she fell a few months ago on her porch, but no other trauma that she can recall.     Review of Systems:  Review of Systems   Constitutional:  Negative for fever and unexpected weight change (lost and then regained some).   HENT:  Negative for congestion and postnasal drip.    Respiratory:  Positive for apnea (no mask leak) and wheezing (stable). Negative for cough.    Cardiovascular:  Positive for leg swelling. Negative for chest pain.   Gastrointestinal:  Negative for abdominal pain, constipation and diarrhea.   Genitourinary:  Negative for difficulty urinating, dysuria, hematuria and pelvic pain.   Musculoskeletal:  Positive for arthralgias and back pain. Negative for joint swelling.   Neurological:  Positive for numbness and headaches (occ, not related to recent fall). Negative for dizziness (occ), tremors (occ, can occur after nebs or late to meal) and weakness.   Psychiatric/Behavioral:  Positive for sleep disturbance (sleep issues are chronic, stable; uses cpap, but only sleeps 3-4hrs). The patient is not nervous/anxious.      Objective:     Vitals:    10/25/22 0921   BP: (!) 166/98   BP Location: Left arm   Patient Position: Sitting   BP Method: Large (Manual)   Pulse: 76   Resp: 14   Weight: 113 kg (249 lb 1.9 oz)   Height: 5' (1.524 m)          Physical Exam  Vitals and nursing note reviewed.   Constitutional:       General: She is not in acute distress.     Appearance: Normal appearance. She is well-developed.   HENT:      Head: Normocephalic and atraumatic.   Eyes:      General: No scleral icterus.        Right eye: No discharge.         Left eye: No discharge.       Conjunctiva/sclera: Conjunctivae normal.   Cardiovascular:      Rate and Rhythm: Normal rate.   Pulmonary:      Effort: Pulmonary effort is normal. No respiratory distress.   Abdominal:      Palpations: Abdomen is soft.      Tenderness: There is no guarding.   Musculoskeletal:         General: No deformity or signs of injury.      Cervical back: Normal range of motion and neck supple.      Left knee: Swelling present. Tenderness present.      Right lower leg: Edema present.      Left lower leg: Edema present.   Skin:     General: Skin is warm and dry.      Findings: No rash.   Neurological:      General: No focal deficit present.      Mental Status: She is alert and oriented to person, place, and time.   Psychiatric:         Mood and Affect: Mood normal.         Behavior: Behavior normal.         Assessment & Plan:  Acute pain of left knee  -     X-ray Knee Ortho Left with Flexion; Future; Expected date: 10/25/2022  -     Ambulatory referral/consult to Orthopedics; Future; Expected date: 11/01/2022    Edema, unspecified type  -     furosemide (LASIX) 80 MG tablet; Take 1 tablet (80 mg total) by mouth once daily.  Dispense: 100 tablet; Refill: 3    Pulmonary hypertension  -     furosemide (LASIX) 80 MG tablet; Take 1 tablet (80 mg total) by mouth once daily.  Dispense: 100 tablet; Refill: 3    Other orders  -     traMADoL (ULTRAM) 50 mg tablet; Take 1 tablet (50 mg total) by mouth every 12 (twelve) hours as needed for Pain.  Dispense: 20 tablet; Refill: 1    Update xrays today. Schedule appt with ortho. Tramadol prn. Precautions given.  Lasix daily - double dose for 3 days for edema. Then start compressions.

## 2022-11-01 ENCOUNTER — OFFICE VISIT (OUTPATIENT)
Dept: RHEUMATOLOGY | Facility: CLINIC | Age: 77
End: 2022-11-01
Payer: MEDICARE

## 2022-11-01 VITALS
BODY MASS INDEX: 49.13 KG/M2 | HEIGHT: 60 IN | WEIGHT: 250.25 LBS | DIASTOLIC BLOOD PRESSURE: 113 MMHG | SYSTOLIC BLOOD PRESSURE: 195 MMHG | HEART RATE: 91 BPM

## 2022-11-01 DIAGNOSIS — M19.90 OSTEOARTHRITIS, UNSPECIFIED OSTEOARTHRITIS TYPE, UNSPECIFIED SITE: Primary | ICD-10-CM

## 2022-11-01 DIAGNOSIS — Z96.653 HISTORY OF BILATERAL KNEE REPLACEMENT: ICD-10-CM

## 2022-11-01 PROCEDURE — 99999 PR PBB SHADOW E&M-EST. PATIENT-LVL III: ICD-10-PCS | Mod: PBBFAC,,, | Performed by: INTERNAL MEDICINE

## 2022-11-01 PROCEDURE — 3080F PR MOST RECENT DIASTOLIC BLOOD PRESSURE >= 90 MM HG: ICD-10-PCS | Mod: CPTII,S$GLB,, | Performed by: INTERNAL MEDICINE

## 2022-11-01 PROCEDURE — 99999 PR PBB SHADOW E&M-EST. PATIENT-LVL III: CPT | Mod: PBBFAC,,, | Performed by: INTERNAL MEDICINE

## 2022-11-01 PROCEDURE — 3288F FALL RISK ASSESSMENT DOCD: CPT | Mod: CPTII,S$GLB,, | Performed by: INTERNAL MEDICINE

## 2022-11-01 PROCEDURE — 99214 OFFICE O/P EST MOD 30 MIN: CPT | Mod: S$GLB,,, | Performed by: INTERNAL MEDICINE

## 2022-11-01 PROCEDURE — 1157F PR ADVANCE CARE PLAN OR EQUIV PRESENT IN MEDICAL RECORD: ICD-10-PCS | Mod: CPTII,S$GLB,, | Performed by: INTERNAL MEDICINE

## 2022-11-01 PROCEDURE — 3077F PR MOST RECENT SYSTOLIC BLOOD PRESSURE >= 140 MM HG: ICD-10-PCS | Mod: CPTII,S$GLB,, | Performed by: INTERNAL MEDICINE

## 2022-11-01 PROCEDURE — 1101F PR PT FALLS ASSESS DOC 0-1 FALLS W/OUT INJ PAST YR: ICD-10-PCS | Mod: CPTII,S$GLB,, | Performed by: INTERNAL MEDICINE

## 2022-11-01 PROCEDURE — 3080F DIAST BP >= 90 MM HG: CPT | Mod: CPTII,S$GLB,, | Performed by: INTERNAL MEDICINE

## 2022-11-01 PROCEDURE — 1125F PR PAIN SEVERITY QUANTIFIED, PAIN PRESENT: ICD-10-PCS | Mod: CPTII,S$GLB,, | Performed by: INTERNAL MEDICINE

## 2022-11-01 PROCEDURE — 99214 PR OFFICE/OUTPT VISIT, EST, LEVL IV, 30-39 MIN: ICD-10-PCS | Mod: S$GLB,,, | Performed by: INTERNAL MEDICINE

## 2022-11-01 PROCEDURE — 1101F PT FALLS ASSESS-DOCD LE1/YR: CPT | Mod: CPTII,S$GLB,, | Performed by: INTERNAL MEDICINE

## 2022-11-01 PROCEDURE — 1157F ADVNC CARE PLAN IN RCRD: CPT | Mod: CPTII,S$GLB,, | Performed by: INTERNAL MEDICINE

## 2022-11-01 PROCEDURE — 1125F AMNT PAIN NOTED PAIN PRSNT: CPT | Mod: CPTII,S$GLB,, | Performed by: INTERNAL MEDICINE

## 2022-11-01 PROCEDURE — 3288F PR FALLS RISK ASSESSMENT DOCUMENTED: ICD-10-PCS | Mod: CPTII,S$GLB,, | Performed by: INTERNAL MEDICINE

## 2022-11-01 PROCEDURE — 3077F SYST BP >= 140 MM HG: CPT | Mod: CPTII,S$GLB,, | Performed by: INTERNAL MEDICINE

## 2022-11-01 NOTE — PROGRESS NOTES
Subjective:          Chief Complaint: Marta Huff is a 77 y.o. female who had concerns including Pain.    HPI:    Patient had acute pain last week seen in ER for left knee stiffness w/o any injury or fall. Painful to extend her left knee. Not noting pain in pop fossa.   She notes pain mostly on anterior lower leg. No back pain. Has appt next week with ortho. Still using walker. Xrays with no hardware complication/no fracture.   Dr. Holley gave Tramadol 50mg BID with little benefit but not using much. Using tylenol as well. Worse a night. Ice exacerbates.  Continues with Cymbalta which is helping.       Patient is a 76year-old female OA and FMS.     Patient doing better with switch to Cymbalta is tolerating 60mg daily  Feeling better off sertraline.   Some aches and pains, but all manageable these days.     Today patient notes 8/10 pain left shoulder: trouble lifting left arm, dropping items.     Dx:  osteoarthritis this is multijoint,  as well as peripheral neuropathy.    She was using gabapentin  2400 mg daily.       More recently patient had been following with pain management as well as being seen with neurosurgery for spinal cord stimulator.  A noting 85% relief of the foot pain.     She is having significant pain in bilateral hips to gluteal region and interferes with walking and with using the elliptical.  Previous 45min and tolerated, now only 15min and hips begin to hurt.   Last year they have progressively worsened. She still has pain in back but this is not a constant complaint for her. She is sleeping on sides and seems to tolerate, is is the lumbosacral junction that hurts at night.   Exacerbated with sitting prolonged period. At some times she uses walker due to pain.   Hands with stiffness PIP and DIP joints long standing cannot fully curl fingers. +deformity, intermittent swelling.     Interaction with etodolac, but renal function wnl.   She does tolerate NSAID: takes aleve 440mg in AM  and PRN at night. No hx of GIB.         REVIEW OF SYSTEMS:    Review of Systems   Constitutional:  Negative for fever, malaise/fatigue and weight loss.   HENT:  Negative for sore throat.    Eyes:  Negative for double vision, photophobia and redness.   Respiratory:  Negative for cough, shortness of breath and wheezing.    Cardiovascular:  Negative for chest pain, palpitations and orthopnea.   Gastrointestinal:  Negative for abdominal pain, constipation and diarrhea.   Genitourinary:  Negative for dysuria, hematuria and urgency.   Musculoskeletal:  Positive for joint pain. Negative for back pain and myalgias.   Skin:  Negative for rash.   Neurological:  Negative for dizziness, tingling, focal weakness and headaches.   Endo/Heme/Allergies:  Does not bruise/bleed easily.   Psychiatric/Behavioral:  Negative for depression, hallucinations and suicidal ideas.              Objective:            Past Medical History:   Diagnosis Date    Anxiety     Arthralgia of knee     arthralgia of bilateral knees secondary to djd    Arthritis     Back pain     Depression     Encounter for blood transfusion     AUTOLOGUS    GERD (gastroesophageal reflux disease)     Hiatal hernia     repaired in 1979    History of seasonal allergies     History of shingles     Hypertension     Insomnia     Obesity     JESENIA (obstructive sleep apnea) 02/2020    Home sleep study - YESICA 57, Desat 69% - Severe JESENIA with desaturations    Osteoporosis     Pneumonia     Reactive airway disease     Restless legs syndrome     Rheumatic fever     at 6 y/o    Sleep apnea     no cpap     Family History   Problem Relation Age of Onset    Heart disease Mother     Hypertension Mother     Diabetes Mother     Obesity Mother     Heart disease Maternal Grandfather      Social History     Tobacco Use    Smoking status: Passive Smoke Exposure - Never Smoker    Smokeless tobacco: Never   Substance Use Topics    Alcohol use: Yes     Comment: 1-2 glasses of wine monthly    Drug use:  No         Current Outpatient Medications on File Prior to Visit   Medication Sig Dispense Refill    albuterol (PROVENTIL) 2.5 mg /3 mL (0.083 %) nebulizer solution INHALE THE CONTENTS OF 1 VIAL VIA NEBULIZER EVERY 6 HOURS AS NEEDED FOR  WHEEZING  OR  SHORTNESS OF BREATH 90 mL 11    albuterol (PROVENTIL/VENTOLIN HFA) 90 mcg/actuation inhaler INHALE 2 PUFFS INTO THE LUNGS EVERY 6 HOURS AS NEEDED FOR WHEEZING OR SHORTNESS OF BREATH. 25.5 g 3    alendronate (FOSAMAX) 70 MG tablet TAKE 1 TABLET EVERY 7 DAYS 12 tablet 3    benzonatate (TESSALON) 100 MG capsule benzonatate 100 mg capsule   TAKE ONE CAPSULE BY MOUTH THREE TIMES DAILY AS NEEDED FOR COUGH      betamethasone valerate 0.1% (VALISONE) 0.1 % Crea Apply topically 2 (two) times daily. 45 g 6    calcium phosphate-vitamin D3 250 mg-10 mcg (400 unit) Chew Take 1 tablet by mouth once daily.       clobetasol 0.05% (TEMOVATE) 0.05 % Oint AAA bid 60 g 3    DULoxetine (CYMBALTA) 60 MG capsule TAKE 1 CAPSULE EVERY DAY 90 capsule 3    fluticasone furoate-vilanteroL (BREO ELLIPTA) 200-25 mcg/dose DsDv diskus inhaler Inhale 1 puff into the lungs once daily. Controller 60 each 5    furosemide (LASIX) 80 MG tablet Take 1 tablet (80 mg total) by mouth once daily. 100 tablet 3    losartan (COZAAR) 50 MG tablet TAKE 1 TABLET (50 MG TOTAL) BY MOUTH 2 (TWO) TIMES DAILY. 180 tablet 3    montelukast (SINGULAIR) 10 mg tablet Take 1 tablet (10 mg total) by mouth every evening. 90 tablet 3    naproxen sodium (ALEVE ORAL) Take 1 Dose by mouth daily as needed.      potassium chloride (KLOR-CON) 10 MEQ TbSR Take 1 tablet (10 mEq total) by mouth once daily. 90 tablet 3    pramipexole (MIRAPEX) 1.5 MG tablet TAKE 1 TABLET EVERY NIGHT 90 tablet 1    tacrolimus (PROTOPIC) 0.1 % ointment Apply topically 2 (two) times daily. 100 g 5    traMADoL (ULTRAM) 50 mg tablet Take 1 tablet (50 mg total) by mouth every 12 (twelve) hours as needed for Pain. 20 tablet 1     Current Facility-Administered  Medications on File Prior to Visit   Medication Dose Route Frequency Provider Last Rate Last Admin    methylPREDNISolone acetate injection 40 mg  40 mg Intra-articular  Adwoa LEERaymond Jaime, DO   40 mg at 03/11/21 1000    triamcinolone acetonide injection 10 mg  10 mg Intradermal 1 time in Clinic/HOD Brittani Roland MD           Vitals:    11/01/22 1309   BP: (!) 195/113   Pulse: 91       Physical Exam:    Physical Exam  Constitutional:       Appearance: Normal appearance. She is well-developed.   HENT:      Nose: No septal deviation.      Mouth/Throat:      Mouth: No oral lesions.   Eyes:      Conjunctiva/sclera:      Right eye: Right conjunctiva is not injected.      Left eye: Left conjunctiva is not injected.      Pupils: Pupils are equal, round, and reactive to light.   Neck:      Thyroid: No thyroid mass or thyromegaly.      Vascular: No JVD.   Cardiovascular:      Rate and Rhythm: Normal rate and regular rhythm.      Pulses: Normal pulses.      Comments: No edema  Pulmonary:      Effort: Pulmonary effort is normal.      Breath sounds: Normal breath sounds.   Abdominal:      Palpations: Abdomen is soft.   Musculoskeletal:      Right shoulder: No swelling or tenderness. Decreased range of motion.      Left shoulder: No swelling or tenderness. Decreased range of motion.      Right elbow: No swelling. Normal range of motion. No tenderness.      Left elbow: No swelling. Normal range of motion. No tenderness.      Right wrist: No swelling or tenderness. Normal range of motion.      Left wrist: No swelling or tenderness. Normal range of motion.      Right hand: Decreased range of motion.      Left hand: Decreased range of motion.      Right hip: No bony tenderness. Normal range of motion. Normal strength.      Left hip: No tenderness or bony tenderness. Normal range of motion.      Right knee: No swelling. Normal range of motion. No tenderness.      Left knee: No swelling. Normal range of motion. No tenderness.       Right ankle: No swelling. No tenderness. Normal range of motion.      Left ankle: No swelling. No tenderness. Normal range of motion.      Comments: Bony hypertrophy PIP and DIP joints. Squaring CMC joint. Tenderness along gr. Troch and ITB. Negative exacerbation SLR some LBP.    Lymphadenopathy:      Cervical: No cervical adenopathy.   Skin:     General: Skin is dry.   Neurological:      Deep Tendon Reflexes: Reflexes are normal and symmetric.             Assessment:       Encounter Diagnoses   Name Primary?    Osteoarthritis, unspecified osteoarthritis type, unspecified site Yes    History of bilateral knee replacement             Plan:        Osteoarthritis, unspecified osteoarthritis type, unspecified site    History of bilateral knee replacement      Delightful patient with OA and FMS    Patient doing better with switch to Cymbalta is tolerating 60mg daily      RIGHT Shoulder stable.     Left knee pain new patietn s/p TKA with continued pain. Seen in ER. Last month for similar complaint. Will need to discuss with Ortho.     Follow up in about 6 months (around 5/1/2023).           30min consultation with greater than 50% spent in counseling, chart review and coordination of care. All questions answered.

## 2022-11-01 NOTE — PROGRESS NOTES
Answers for HPI/ROS submitted by the patient on 5/8/2022  fever: No  eye redness: No  mouth sores: No  headaches: No  shortness of breath: Yes  chest pain: No  trouble swallowing: No  diarrhea: No  constipation: No  unexpected weight change: No  genital sore: No  dysuria: No  During the last 3 days, have you had a skin rash?: No  Bruises or bleeds easily: No  cough: No    Subjective:          Chief Complaint: Marta Huff is a 77 y.o. female who had concerns including Pain.    HPI:    Patient is a 76year-old female OA and FMS.     Patient doing better with switch to Cymbalta is tolerating 60mg daily  Feeling better off sertraline.   Some aches and pains, but all manageable these days.     Today patient notes 8/10 pain left shoulder: trouble lifting left arm, dropping items.     Dx:  osteoarthritis this is multijoint,  as well as peripheral neuropathy.    She was using gabapentin  2400 mg daily.       More recently patient had been following with pain management as well as being seen with neurosurgery for spinal cord stimulator.  A noting 85% relief of the foot pain.     She is having significant pain in bilateral hips to gluteal region and interferes with walking and with using the elliptical.  Previous 45min and tolerated, now only 15min and hips begin to hurt.   Last year they have progressively worsened. She still has pain in back but this is not a constant complaint for her. She is sleeping on sides and seems to tolerate, is is the lumbosacral junction that hurts at night.   Exacerbated with sitting prolonged period. At some times she uses walker due to pain.   Hands with stiffness PIP and DIP joints long standing cannot fully curl fingers. +deformity, intermittent swelling.     Interaction with etodolac, but renal function wnl.   She does tolerate NSAID: takes aleve 440mg in AM and PRN at night. No hx of GIB.         REVIEW OF SYSTEMS:    Review of Systems   Constitutional:  Negative for fever,  malaise/fatigue and weight loss.   HENT:  Negative for sore throat.    Eyes:  Negative for double vision, photophobia and redness.   Respiratory:  Negative for cough, shortness of breath and wheezing.    Cardiovascular:  Negative for chest pain, palpitations and orthopnea.   Gastrointestinal:  Negative for abdominal pain, constipation and diarrhea.   Genitourinary:  Negative for dysuria, hematuria and urgency.   Musculoskeletal:  Positive for joint pain. Negative for back pain and myalgias.   Skin:  Negative for rash.   Neurological:  Negative for dizziness, tingling, focal weakness and headaches.   Endo/Heme/Allergies:  Does not bruise/bleed easily.   Psychiatric/Behavioral:  Negative for depression, hallucinations and suicidal ideas.                Objective:            Past Medical History:   Diagnosis Date    Anxiety     Arthralgia of knee     arthralgia of bilateral knees secondary to djd    Arthritis     Back pain     Depression     Encounter for blood transfusion     AUTOLOGUS    GERD (gastroesophageal reflux disease)     Hiatal hernia     repaired in 1979    History of seasonal allergies     History of shingles     Hypertension     Insomnia     Obesity     JESENIA (obstructive sleep apnea) 02/2020    Home sleep study - YESICA 57, Desat 69% - Severe JESENIA with desaturations    Osteoporosis     Pneumonia     Reactive airway disease     Restless legs syndrome     Rheumatic fever     at 6 y/o    Sleep apnea     no cpap     Family History   Problem Relation Age of Onset    Heart disease Mother     Hypertension Mother     Diabetes Mother     Obesity Mother     Heart disease Maternal Grandfather      Social History     Tobacco Use    Smoking status: Passive Smoke Exposure - Never Smoker    Smokeless tobacco: Never   Substance Use Topics    Alcohol use: Yes     Comment: 1-2 glasses of wine monthly    Drug use: No         Current Outpatient Medications on File Prior to Visit   Medication Sig  Dispense Refill    albuterol (PROVENTIL) 2.5 mg /3 mL (0.083 %) nebulizer solution INHALE THE CONTENTS OF 1 VIAL VIA NEBULIZER EVERY 6 HOURS AS NEEDED FOR  WHEEZING  OR  SHORTNESS OF BREATH 90 mL 11    albuterol (PROVENTIL/VENTOLIN HFA) 90 mcg/actuation inhaler INHALE 2 PUFFS INTO THE LUNGS EVERY 6 HOURS AS NEEDED FOR WHEEZING OR SHORTNESS OF BREATH. 25.5 g 3    alendronate (FOSAMAX) 70 MG tablet TAKE 1 TABLET EVERY 7 DAYS 12 tablet 3    benzonatate (TESSALON) 100 MG capsule benzonatate 100 mg capsule   TAKE ONE CAPSULE BY MOUTH THREE TIMES DAILY AS NEEDED FOR COUGH      betamethasone valerate 0.1% (VALISONE) 0.1 % Crea Apply topically 2 (two) times daily. 45 g 6    calcium phosphate-vitamin D3 250 mg-10 mcg (400 unit) Chew Take 1 tablet by mouth once daily.       clobetasol 0.05% (TEMOVATE) 0.05 % Oint AAA bid 60 g 3    DULoxetine (CYMBALTA) 60 MG capsule TAKE 1 CAPSULE EVERY DAY 90 capsule 3    fluticasone furoate-vilanteroL (BREO ELLIPTA) 200-25 mcg/dose DsDv diskus inhaler Inhale 1 puff into the lungs once daily. Controller 60 each 5    furosemide (LASIX) 80 MG tablet Take 1 tablet (80 mg total) by mouth once daily. 100 tablet 3    losartan (COZAAR) 50 MG tablet TAKE 1 TABLET (50 MG TOTAL) BY MOUTH 2 (TWO) TIMES DAILY. 180 tablet 3    montelukast (SINGULAIR) 10 mg tablet Take 1 tablet (10 mg total) by mouth every evening. 90 tablet 3    naproxen sodium (ALEVE ORAL) Take 1 Dose by mouth daily as needed.      potassium chloride (KLOR-CON) 10 MEQ TbSR Take 1 tablet (10 mEq total) by mouth once daily. 90 tablet 3    pramipexole (MIRAPEX) 1.5 MG tablet TAKE 1 TABLET EVERY NIGHT 90 tablet 1    tacrolimus (PROTOPIC) 0.1 % ointment Apply topically 2 (two) times daily. 100 g 5    traMADoL (ULTRAM) 50 mg tablet Take 1 tablet (50 mg total) by mouth every 12 (twelve) hours as needed for Pain. 20 tablet 1     Current Facility-Administered Medications on File Prior to Visit   Medication Dose Route Frequency  Provider Last Rate Last Admin    methylPREDNISolone acetate injection 40 mg  40 mg Intra-articular  Adwoa Jaime, DO   40 mg at 03/11/21 1000    triamcinolone acetonide injection 10 mg  10 mg Intradermal 1 time in Clinic/HOD Brittani Roland MD           Vitals:    11/01/22 1309   BP: (!) 195/113   Pulse: 91       Physical Exam:    Physical Exam  Constitutional:       Appearance: Normal appearance. She is well-developed.   HENT:      Nose: No septal deviation.      Mouth/Throat:      Mouth: No oral lesions.   Eyes:      Conjunctiva/sclera:      Right eye: Right conjunctiva is not injected.      Left eye: Left conjunctiva is not injected.      Pupils: Pupils are equal, round, and reactive to light.   Neck:      Thyroid: No thyroid mass or thyromegaly.      Vascular: No JVD.   Cardiovascular:      Rate and Rhythm: Normal rate and regular rhythm.      Pulses: Normal pulses.      Comments: No edema  Pulmonary:      Effort: Pulmonary effort is normal.      Breath sounds: Normal breath sounds.   Abdominal:      Palpations: Abdomen is soft.   Musculoskeletal:      Right shoulder: No swelling or tenderness. Decreased range of motion.      Left shoulder: No swelling or tenderness. Decreased range of motion.      Right elbow: No swelling. Normal range of motion. No tenderness.      Left elbow: No swelling. Normal range of motion. No tenderness.      Right wrist: No swelling or tenderness. Normal range of motion.      Left wrist: No swelling or tenderness. Normal range of motion.      Right hand: Decreased range of motion.      Left hand: Decreased range of motion.      Right hip: No bony tenderness. Normal range of motion. Normal strength.      Left hip: No tenderness or bony tenderness. Normal range of motion.      Right knee: No swelling. Normal range of motion. No tenderness.      Left knee: No swelling. Normal range of motion. No tenderness.      Right ankle: No swelling. No tenderness. Normal range of motion.       Left ankle: No swelling. No tenderness. Normal range of motion.      Comments: Bony hypertrophy PIP and DIP joints. Squaring CMC joint. Tenderness along gr. Troch and ITB. Negative exacerbation SLR some LBP.    Lymphadenopathy:      Cervical: No cervical adenopathy.   Skin:     General: Skin is dry.   Neurological:      Deep Tendon Reflexes: Reflexes are normal and symmetric.             Assessment:       No diagnosis found.         Plan:        There are no diagnoses linked to this encounter.    Delightful patient with OA and FMS    Patient doing better with switch to Cymbalta is tolerating 60mg daily      RIGHT Shoulder stable.     LEFT shoulder new pain no known injury.       -biofreeze prn.    - off all gabapentin no difference not worse.   No follow-ups on file.           30min consultation with greater than 50% spent in counseling, chart review and coordination of care. All questions answered.

## 2022-11-07 ENCOUNTER — OFFICE VISIT (OUTPATIENT)
Dept: DERMATOLOGY | Facility: CLINIC | Age: 77
End: 2022-11-07
Payer: MEDICARE

## 2022-11-07 DIAGNOSIS — L90.0 LICHEN SCLEROSUS ET ATROPHICUS: Primary | ICD-10-CM

## 2022-11-07 DIAGNOSIS — L30.8 OTHER ECZEMA: ICD-10-CM

## 2022-11-07 DIAGNOSIS — L30.4 INTERTRIGO: ICD-10-CM

## 2022-11-07 PROCEDURE — 1101F PT FALLS ASSESS-DOCD LE1/YR: CPT | Mod: CPTII,S$GLB,, | Performed by: DERMATOLOGY

## 2022-11-07 PROCEDURE — 1159F PR MEDICATION LIST DOCUMENTED IN MEDICAL RECORD: ICD-10-PCS | Mod: CPTII,S$GLB,, | Performed by: DERMATOLOGY

## 2022-11-07 PROCEDURE — 99214 PR OFFICE/OUTPT VISIT, EST, LEVL IV, 30-39 MIN: ICD-10-PCS | Mod: S$GLB,,, | Performed by: DERMATOLOGY

## 2022-11-07 PROCEDURE — 99999 PR PBB SHADOW E&M-EST. PATIENT-LVL III: CPT | Mod: PBBFAC,,, | Performed by: DERMATOLOGY

## 2022-11-07 PROCEDURE — 1157F PR ADVANCE CARE PLAN OR EQUIV PRESENT IN MEDICAL RECORD: ICD-10-PCS | Mod: CPTII,S$GLB,, | Performed by: DERMATOLOGY

## 2022-11-07 PROCEDURE — 1159F MED LIST DOCD IN RCRD: CPT | Mod: CPTII,S$GLB,, | Performed by: DERMATOLOGY

## 2022-11-07 PROCEDURE — 99214 OFFICE O/P EST MOD 30 MIN: CPT | Mod: S$GLB,,, | Performed by: DERMATOLOGY

## 2022-11-07 PROCEDURE — 1101F PR PT FALLS ASSESS DOC 0-1 FALLS W/OUT INJ PAST YR: ICD-10-PCS | Mod: CPTII,S$GLB,, | Performed by: DERMATOLOGY

## 2022-11-07 PROCEDURE — 3288F PR FALLS RISK ASSESSMENT DOCUMENTED: ICD-10-PCS | Mod: CPTII,S$GLB,, | Performed by: DERMATOLOGY

## 2022-11-07 PROCEDURE — 3288F FALL RISK ASSESSMENT DOCD: CPT | Mod: CPTII,S$GLB,, | Performed by: DERMATOLOGY

## 2022-11-07 PROCEDURE — 99999 PR PBB SHADOW E&M-EST. PATIENT-LVL III: ICD-10-PCS | Mod: PBBFAC,,, | Performed by: DERMATOLOGY

## 2022-11-07 PROCEDURE — 1157F ADVNC CARE PLAN IN RCRD: CPT | Mod: CPTII,S$GLB,, | Performed by: DERMATOLOGY

## 2022-11-07 NOTE — PROGRESS NOTES
Subjective:       Patient ID:  Marta Huff is a 77 y.o. female who presents for   No chief complaint on file.    HPI    Established patient. 1 month f/u extensive extragenital LS&A at lower abdomen (biopsy-proven), also with genital LS&A appreciated clinically. Pt using tacrolimus ointment to entire abdomen and spot treating with clobetasol to painful rash at abdomen and rash at genitals. Underwent ILK to painful areas at abdomen LV. Today reports much improvement symptomatically. Some rash still present.     PMH: hereditary / idiopathic peripheral neuropathy, HTN, newly diagnosed DM, obesity, fatty liver, COPD/asthma, depression/ anxiety    9/2020  1.  Skin, left abdomen, punch biopsy:   - LICHEN SCLEROSUS ET ATROPHICUS.   MICROSCOPIC DESCRIPTION:  Sections show an atrophic epidermis with vacuolar   alteration of the basal cell layer in association with an amorphous   appearing, homogeneously eosinophilic, altered material within the underlying   superficial dermis. There is a mild to moderate lymphocytic infiltrate   beneath the amorphous material.     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 Basovacuolar interface or prominent inflammatory   infiltrate is not seen, which would be present in connective tissue disease   or drug reaction. Correlate with clinical findings.     Past Medical History:   Diagnosis Date    Anxiety     Arthralgia of knee     arthralgia of bilateral knees secondary to djd    Arthritis     Back pain     Depression     Encounter for blood transfusion     AUTOLOGUS    GERD (gastroesophageal reflux disease)     Hiatal hernia     repaired in 1979    History of seasonal allergies     History of shingles     Hypertension     Insomnia     Obesity     JESENIA (obstructive sleep apnea) 02/2020    Home sleep study - YESICA 57, Desat 69% - Severe JESENIA  with desaturations    Osteoporosis     Pneumonia     Reactive airway disease     Restless legs syndrome     Rheumatic fever     at 4 y/o    Sleep apnea     no cpap     Review of Systems   Genitourinary:  Negative for genital sores and genital itching.   Skin:  Positive for itching, rash and dry skin. Negative for sensitivity to antibiotic ointment and sensitivity to bandage adhesive.   Hematologic/Lymphatic: Bruises/bleeds easily.      Objective:    Physical Exam   Constitutional: She appears well-developed and well-nourished. No distress.   Neurological: She is alert and oriented to person, place, and time. She is not disoriented.   Psychiatric: She has a normal mood and affect.   Skin:   Areas Examined (abnormalities noted in diagram):   Abdomen Inspection Performed  Genitals / Buttocks / Groin Inspection Performed  Back Inspection Performed  RUE Inspected  LUE Inspection Performed  RLE Inspected  LLE Inspection Performed            Diagram Legend     Erythematous scaling macule/papule c/w actinic keratosis       Vascular papule c/w angioma      Pigmented verrucoid papule/plaque c/w seborrheic keratosis      Yellow umbilicated papule c/w sebaceous hyperplasia      Irregularly shaped tan macule c/w lentigo     1-2 mm smooth white papules consistent with Milia      Movable subcutaneous cyst with punctum c/w epidermal inclusion cyst      Subcutaneous movable cyst c/w pilar cyst      Firm pink to brown papule c/w dermatofibroma      Pedunculated fleshy papule(s) c/w skin tag(s)      Evenly pigmented macule c/w junctional nevus     Mildly variegated pigmented, slightly irregular-bordered macule c/w mildly atypical nevus      Flesh colored to evenly pigmented papule c/w intradermal nevus       Pink pearly papule/plaque c/w basal cell carcinoma      Erythematous hyperkeratotic cursted plaque c/w SCC      Surgical scar with no sign of skin cancer recurrence      Open and closed comedones      Inflammatory papules and  pustules      Verrucoid papule consistent consistent with wart     Erythematous eczematous patches and plaques     Dystrophic onycholytic nail with subungual debris c/w onychomycosis     Umbilicated papule    Erythematous-base heme-crusted tan verrucoid plaque consistent with inflamed seborrheic keratosis     Erythematous Silvery Scaling Plaque c/w Psoriasis     See annotation    Today      10/3/22        Assessment / Plan:        Lichen sclerosus et atrophicus    Other eczema    Intertrigo    Pt reports significant symptomatic improvement; clinically still with extensive, active disease    - Discussed diagnosis, etiology, and treatment options. Plan to c/w topicals as below. Transport limits NBUVB. Consideration for MTX, other systemic in future pending response. Pt declines ILK today given no current symptoms.   - Counseled on gentle skin care and avoidance of common allergens/irritants zeke at special sites (vulva, perianal).  - Counseled on potential SE of medication(s) and instructed on use.     Under breasts, inguinal folds - Apply Triple Paste Diaper Ointment to fully dried skin, drying powder as needed     Apply rx tacrolimus ointment twice daily to active areas of lichen sclerosus (glazed white and red areas) at abdomen, vulva, perianal region. On weekends, use clobetasol ointment instead of tacrolimus.   OK to apply plain vaseline jelly as needed to affected areas in genitals throughout the day.              Follow up in about 3 months (around 2/7/2023).

## 2022-11-07 NOTE — PATIENT INSTRUCTIONS
Under breasts, inguinal folds - Apply Triple Paste Diaper Ointment to fully dried skin, drying powder as needed     Apply rx tacrolimus ointment twice daily to active areas of lichen sclerosus (glazed white and red areas) at abdomen, vulva, perianal region. On weekends, use clobetasol ointment instead of tacrolimus.   OK to apply plain vaseline jelly as needed to affected areas in genitals throughout the day.

## 2022-11-15 ENCOUNTER — PATIENT MESSAGE (OUTPATIENT)
Dept: OTHER | Facility: OTHER | Age: 77
End: 2022-11-15
Payer: MEDICARE

## 2022-11-26 ENCOUNTER — PES CALL (OUTPATIENT)
Dept: ADMINISTRATIVE | Facility: CLINIC | Age: 77
End: 2022-11-26
Payer: MEDICARE

## 2023-03-20 ENCOUNTER — TELEPHONE (OUTPATIENT)
Dept: DERMATOLOGY | Facility: CLINIC | Age: 78
End: 2023-03-20
Payer: MEDICARE

## 2023-03-20 NOTE — TELEPHONE ENCOUNTER
----- Message from Jeremiah Lemus sent at 3/20/2023  1:19 PM CDT -----  Contact: pt  Type:  Sooner Appointment Request    Caller is requesting a sooner appointment.  Caller declined first available appointment listed below.  Caller will not accept being placed on the waitlist and is requesting a message be sent to doctor.    Name of Caller:  pt   When is the first available appointment?  N/a  Symptoms:  4 month follow up  Best Call Back Number:  [phone]    Additional Information:  pt needs to reschedule her appt on 03/22 and she needs a sooner date. Please advise.

## 2023-03-27 ENCOUNTER — LAB VISIT (OUTPATIENT)
Dept: LAB | Facility: HOSPITAL | Age: 78
End: 2023-03-27
Attending: DERMATOLOGY
Payer: MEDICARE

## 2023-03-27 ENCOUNTER — OFFICE VISIT (OUTPATIENT)
Dept: DERMATOLOGY | Facility: CLINIC | Age: 78
End: 2023-03-27
Payer: MEDICARE

## 2023-03-27 VITALS — WEIGHT: 250 LBS | HEIGHT: 60 IN | BODY MASS INDEX: 49.08 KG/M2

## 2023-03-27 DIAGNOSIS — L90.0 LICHEN SCLEROSUS ET ATROPHICUS: Primary | ICD-10-CM

## 2023-03-27 DIAGNOSIS — Z51.81 MEDICATION MONITORING ENCOUNTER: ICD-10-CM

## 2023-03-27 LAB
ALBUMIN SERPL BCP-MCNC: 3.5 G/DL (ref 3.5–5.2)
ALP SERPL-CCNC: 154 U/L (ref 55–135)
ALT SERPL W/O P-5'-P-CCNC: 24 U/L (ref 10–44)
ANION GAP SERPL CALC-SCNC: 8 MMOL/L (ref 8–16)
AST SERPL-CCNC: 25 U/L (ref 10–40)
BASOPHILS # BLD AUTO: 0.08 K/UL (ref 0–0.2)
BASOPHILS NFR BLD: 1.2 % (ref 0–1.9)
BILIRUB SERPL-MCNC: 0.7 MG/DL (ref 0.1–1)
BUN SERPL-MCNC: 16 MG/DL (ref 8–23)
CALCIUM SERPL-MCNC: 8.8 MG/DL (ref 8.7–10.5)
CHLORIDE SERPL-SCNC: 105 MMOL/L (ref 95–110)
CO2 SERPL-SCNC: 31 MMOL/L (ref 23–29)
CREAT SERPL-MCNC: 0.7 MG/DL (ref 0.5–1.4)
DIFFERENTIAL METHOD: ABNORMAL
EOSINOPHIL # BLD AUTO: 0.3 K/UL (ref 0–0.5)
EOSINOPHIL NFR BLD: 4.2 % (ref 0–8)
ERYTHROCYTE [DISTWIDTH] IN BLOOD BY AUTOMATED COUNT: 15.9 % (ref 11.5–14.5)
EST. GFR  (NO RACE VARIABLE): >60 ML/MIN/1.73 M^2
GLUCOSE SERPL-MCNC: 90 MG/DL (ref 70–110)
HBV CORE AB SERPL QL IA: NORMAL
HBV SURFACE AB SER-ACNC: <3 MIU/ML
HBV SURFACE AB SER-ACNC: NORMAL M[IU]/ML
HBV SURFACE AG SERPL QL IA: NORMAL
HCT VFR BLD AUTO: 38.7 % (ref 37–48.5)
HCV AB SERPL QL IA: NORMAL
HGB BLD-MCNC: 11.8 G/DL (ref 12–16)
IMM GRANULOCYTES # BLD AUTO: 0.01 K/UL (ref 0–0.04)
IMM GRANULOCYTES NFR BLD AUTO: 0.1 % (ref 0–0.5)
LYMPHOCYTES # BLD AUTO: 1.9 K/UL (ref 1–4.8)
LYMPHOCYTES NFR BLD: 28.4 % (ref 18–48)
MCH RBC QN AUTO: 28.3 PG (ref 27–31)
MCHC RBC AUTO-ENTMCNC: 30.5 G/DL (ref 32–36)
MCV RBC AUTO: 93 FL (ref 82–98)
MONOCYTES # BLD AUTO: 0.5 K/UL (ref 0.3–1)
MONOCYTES NFR BLD: 7.8 % (ref 4–15)
NEUTROPHILS # BLD AUTO: 3.9 K/UL (ref 1.8–7.7)
NEUTROPHILS NFR BLD: 58.3 % (ref 38–73)
NRBC BLD-RTO: 0 /100 WBC
PLATELET # BLD AUTO: 213 K/UL (ref 150–450)
PMV BLD AUTO: 12.5 FL (ref 9.2–12.9)
POTASSIUM SERPL-SCNC: 4.4 MMOL/L (ref 3.5–5.1)
PROT SERPL-MCNC: 7 G/DL (ref 6–8.4)
RBC # BLD AUTO: 4.17 M/UL (ref 4–5.4)
SODIUM SERPL-SCNC: 144 MMOL/L (ref 136–145)
WBC # BLD AUTO: 6.7 K/UL (ref 3.9–12.7)

## 2023-03-27 PROCEDURE — 1157F PR ADVANCE CARE PLAN OR EQUIV PRESENT IN MEDICAL RECORD: ICD-10-PCS | Mod: CPTII,S$GLB,, | Performed by: DERMATOLOGY

## 2023-03-27 PROCEDURE — 87340 HEPATITIS B SURFACE AG IA: CPT | Performed by: DERMATOLOGY

## 2023-03-27 PROCEDURE — 86803 HEPATITIS C AB TEST: CPT | Performed by: DERMATOLOGY

## 2023-03-27 PROCEDURE — 1126F PR PAIN SEVERITY QUANTIFIED, NO PAIN PRESENT: ICD-10-PCS | Mod: CPTII,S$GLB,, | Performed by: DERMATOLOGY

## 2023-03-27 PROCEDURE — 86704 HEP B CORE ANTIBODY TOTAL: CPT | Performed by: DERMATOLOGY

## 2023-03-27 PROCEDURE — 3288F FALL RISK ASSESSMENT DOCD: CPT | Mod: CPTII,S$GLB,, | Performed by: DERMATOLOGY

## 2023-03-27 PROCEDURE — 85025 COMPLETE CBC W/AUTO DIFF WBC: CPT | Performed by: DERMATOLOGY

## 2023-03-27 PROCEDURE — 86480 TB TEST CELL IMMUN MEASURE: CPT | Performed by: DERMATOLOGY

## 2023-03-27 PROCEDURE — 1159F PR MEDICATION LIST DOCUMENTED IN MEDICAL RECORD: ICD-10-PCS | Mod: CPTII,S$GLB,, | Performed by: DERMATOLOGY

## 2023-03-27 PROCEDURE — 36415 COLL VENOUS BLD VENIPUNCTURE: CPT | Mod: PO | Performed by: DERMATOLOGY

## 2023-03-27 PROCEDURE — 1101F PR PT FALLS ASSESS DOC 0-1 FALLS W/OUT INJ PAST YR: ICD-10-PCS | Mod: CPTII,S$GLB,, | Performed by: DERMATOLOGY

## 2023-03-27 PROCEDURE — 99214 OFFICE O/P EST MOD 30 MIN: CPT | Mod: S$GLB,,, | Performed by: DERMATOLOGY

## 2023-03-27 PROCEDURE — 1101F PT FALLS ASSESS-DOCD LE1/YR: CPT | Mod: CPTII,S$GLB,, | Performed by: DERMATOLOGY

## 2023-03-27 PROCEDURE — 1159F MED LIST DOCD IN RCRD: CPT | Mod: CPTII,S$GLB,, | Performed by: DERMATOLOGY

## 2023-03-27 PROCEDURE — 3288F PR FALLS RISK ASSESSMENT DOCUMENTED: ICD-10-PCS | Mod: CPTII,S$GLB,, | Performed by: DERMATOLOGY

## 2023-03-27 PROCEDURE — 99214 PR OFFICE/OUTPT VISIT, EST, LEVL IV, 30-39 MIN: ICD-10-PCS | Mod: S$GLB,,, | Performed by: DERMATOLOGY

## 2023-03-27 PROCEDURE — 1157F ADVNC CARE PLAN IN RCRD: CPT | Mod: CPTII,S$GLB,, | Performed by: DERMATOLOGY

## 2023-03-27 PROCEDURE — 99999 PR PBB SHADOW E&M-EST. PATIENT-LVL IV: CPT | Mod: PBBFAC,,, | Performed by: DERMATOLOGY

## 2023-03-27 PROCEDURE — 86706 HEP B SURFACE ANTIBODY: CPT | Mod: 91 | Performed by: DERMATOLOGY

## 2023-03-27 PROCEDURE — 99999 PR PBB SHADOW E&M-EST. PATIENT-LVL IV: ICD-10-PCS | Mod: PBBFAC,,, | Performed by: DERMATOLOGY

## 2023-03-27 PROCEDURE — 1126F AMNT PAIN NOTED NONE PRSNT: CPT | Mod: CPTII,S$GLB,, | Performed by: DERMATOLOGY

## 2023-03-27 PROCEDURE — 80053 COMPREHEN METABOLIC PANEL: CPT | Performed by: DERMATOLOGY

## 2023-03-27 RX ORDER — DIAZEPAM 5 MG/1
TABLET ORAL
COMMUNITY

## 2023-03-27 NOTE — PATIENT INSTRUCTIONS
Itchy, dry rash at torso: apply triamcinolone cream (new rx in jar) twice daily for next month.   Rash at vulva, scot anal region: apply clobetasol ointment (tube) twice daily for next month.    Gentle vulvar, scot anal care:   Wash areas once daily with Cetaphil gentle cleanser.   Pat dry. Ensure dryness with blow dryer on cool setting.   Apply small amount of clobetasol ointment to affected areas twice daily.     Gentle skin care:   No more than 1 shower or bath a day.   Out of shower or bath in less than 10 minutes.   Use only warm water, NOT hot.   Soap only where needed - areas that make body odor or are visibly dirty.  Use gentle soaps only (eg, Cetaphil Body Wash; non soap cleansers are more gentle than bar soaps).   After shower or bath, pat dry - do not scrub or rub aggressively.   Apply thick moisturizing cream twice daily, once being after the shower or bath (eg, Cerave Anti Itch Cream). Apply moisturizing cream diffusely and AFTER application of above rx triamcinolone cream to affected areas.   Avoid common allergens/irritants - fragrances, botanicals, etc.

## 2023-03-27 NOTE — Clinical Note
LUBA - would love your opinion on this patient w bx proven extensive extragenital LSA and vulvar LSA, 76 yo with mult comorbidities incl morbid obesity, DM, HTN, fatty liver disease (mild on abd US in 2019).   Extensive extra genital involvement - associated pain mostly controlled after some ILK last fall. Still with significant clinical activity on exam and by history (itch 5/10) despite topicals (using tacrolimus during week and clobetasol on weekends). Changed to TAC BID for next month. Vulva looks bad again after above tacrolimus / clobetasol regimen so going back to clobetasol exclusively for next 3 months. I will see her monthly to help with compliance (definitively in question).   What would LUBA do if still refractory after this time?  She is agreeable for tulane nbuvb but this will not help vulva, I can ilk vulva Considering systemics but with comorbidities definitively cautious -- mtx looks to have most data in extragenital disease, acitretin in vulvar...  Thoughts?

## 2023-03-27 NOTE — PROGRESS NOTES
Subjective:       Patient ID:  Marta Huff is a 77 y.o. female who presents for   Chief Complaint   Patient presents with    Rash     Follow up        HPI  Established patient.   4 month f/u extensive extragenital LS&A at lower torso (biopsy-proven), also with genital LS&A appreciated clinically. Underwent ILK to painful areas at abdomen last fall. Rash / vulvar changes persistent. Severe pain experienced at abdomen essentially resolved following Ilk last fall. Focal area of pain (3 out of 10 per patient). Itch improved at abdomen and vulva (5 out of 10 per patient). Unsure of which topicals using at affected areas but using something daily (prior rx tacrolimus, clobetasol, betamethasone valerate).       PMH: hereditary / idiopathic peripheral neuropathy, HTN, newly diagnosed DM, obesity, fatty liver (mild per abd US in 2019), COPD/asthma, depression/ anxiety      9/2020  1.  Skin, left abdomen, punch biopsy:   - LICHEN SCLEROSUS ET ATROPHICUS.   MICROSCOPIC DESCRIPTION:  Sections show an atrophic epidermis with vacuolar   alteration of the basal cell layer in association with an amorphous   appearing, homogeneously eosinophilic, altered material within the underlying   superficial dermis. There is a mild to moderate lymphocytic infiltrate   beneath the amorphous material.     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 Basovacuolar interface or prominent inflammatory   infiltrate is not seen, which would be present in connective tissue disease   or drug reaction. Correlate with clinical findings.     Past Medical History:   Diagnosis Date    Anxiety     Arthralgia of knee     arthralgia of bilateral knees secondary to djd    Arthritis     Back pain     Depression     Encounter for blood transfusion     AUTOLOGUS    GERD (gastroesophageal reflux disease)      Hiatal hernia     repaired in 1979    History of seasonal allergies     History of shingles     Hypertension     Insomnia     Obesity     JESENIA (obstructive sleep apnea) 02/2020    Home sleep study - YESICA 57, Desat 69% - Severe JESENIA with desaturations    Osteoporosis     Pneumonia     Reactive airway disease     Restless legs syndrome     Rheumatic fever     at 6 y/o    Sleep apnea     no cpap     Review of Systems   Genitourinary:  Negative for genital sores and genital itching.   Skin:  Positive for itching, rash and dry skin. Negative for sensitivity to antibiotic ointment and sensitivity to bandage adhesive.   Hematologic/Lymphatic: Bruises/bleeds easily.      Objective:    Physical Exam   Constitutional: She appears well-developed and well-nourished. No distress.   Neurological: She is alert and oriented to person, place, and time. She is not disoriented.   Psychiatric: She has a normal mood and affect.   Skin:   Areas Examined (abnormalities noted in diagram):   Abdomen Inspection Performed  Genitals / Buttocks / Groin Inspection Performed  Back Inspection Performed  RUE Inspected  LUE Inspection Performed  RLE Inspected  LLE Inspection Performed            Diagram Legend     Erythematous scaling macule/papule c/w actinic keratosis       Vascular papule c/w angioma      Pigmented verrucoid papule/plaque c/w seborrheic keratosis      Yellow umbilicated papule c/w sebaceous hyperplasia      Irregularly shaped tan macule c/w lentigo     1-2 mm smooth white papules consistent with Milia      Movable subcutaneous cyst with punctum c/w epidermal inclusion cyst      Subcutaneous movable cyst c/w pilar cyst      Firm pink to brown papule c/w dermatofibroma      Pedunculated fleshy papule(s) c/w skin tag(s)      Evenly pigmented macule c/w junctional nevus     Mildly variegated pigmented, slightly irregular-bordered macule c/w mildly atypical nevus      Flesh colored to evenly pigmented papule c/w intradermal nevus        Pink pearly papule/plaque c/w basal cell carcinoma      Erythematous hyperkeratotic cursted plaque c/w SCC      Surgical scar with no sign of skin cancer recurrence      Open and closed comedones      Inflammatory papules and pustules      Verrucoid papule consistent consistent with wart     Erythematous eczematous patches and plaques     Dystrophic onycholytic nail with subungual debris c/w onychomycosis     Umbilicated papule    Erythematous-base heme-crusted tan verrucoid plaque consistent with inflamed seborrheic keratosis     Erythematous Silvery Scaling Plaque c/w Psoriasis     See annotation    11/2022 post ILK and tacrolimus BID to entire affected with clobetasol BID spot treating symptomatic areas      10/2022        Assessment / Plan:        Lichen sclerosus et atrophicus  -     triamcinolone acetonide 0.1% (KENALOG) 0.1 % cream; Apply topically 2 (two) times daily as needed (rash on body).  Dispense: 454 g; Refill: 3  -     clobetasol 0.05% (TEMOVATE) 0.05 % Oint; Apply topically 2 (two) times daily as needed (rash at groin, bottom).  Dispense: 60 g; Refill: 3    Medication monitoring encounter  -     CBC Auto Differential; Future; Expected date: 03/27/2023  -     Comprehensive Metabolic Panel; Future; Expected date: 03/27/2023  -     Quantiferon Gold TB; Future; Expected date: 03/27/2023  -     Hepatitis C Antibody; Future; Expected date: 03/27/2023  -     Hepatitis B Surface Antigen; Future; Expected date: 03/27/2023  -     Hepatitis B Surface Ab, Qualitative; Future; Expected date: 03/27/2023  -     Hepatitis B Core Antibody, Total; Future; Expected date: 03/27/2023      Overall significant symptomatic improvement of extragenital disease but clinically still active (on exam, reported persistent itch). Vulvar disease also active.    Question of overall compliance with topicals. Will perform strict topicals as below for next 3 months, re-evaluating monthly.   Goal clinical endpoint: symptomatic control  of extragenital involvement, controlled clinical disease of vulvar involvement.   Consideration for active nontreatment of diffuse extragenital involvement if symptoms are controlled.   For refractory symptomatic extragenital disease, NBUVB with [facility] on [facility] is an option (pt confirms ability to travel 2-3x weekly if in [place]).   For refractory vulvar disease, ILK a future option if patient agreeable.   For refractory symptomatic extragenital disease AND vulvar disease, systemic treatment is a consideration - would avoid acitretin (data in refractory vulvar disease), use caution MTX (most data in extragenital disease) in setting of fatty liver disease, morbid obesity, DM. Updating labs as above if systemics are pursued.     Itchy, dry rash at torso: apply triamcinolone cream (new rx in jar) twice daily for next month.   Rash at vulva, scot anal region: apply clobetasol ointment (tube) twice daily for next month.    Gentle vulvar, scot anal care:   Wash areas once daily with Cetaphil gentle cleanser.   Pat dry. Ensure dryness with blow dryer on cool setting.   Apply small amount of clobetasol ointment to affected areas twice daily.     Gentle skin care:   No more than 1 shower or bath a day.   Out of shower or bath in less than 10 minutes.   Use only warm water, NOT hot.   Soap only where needed - areas that make body odor or are visibly dirty.  Use gentle soaps only (eg, Cetaphil Body Wash; non soap cleansers are more gentle than bar soaps).   After shower or bath, pat dry - do not scrub or rub aggressively.   Apply thick moisturizing cream twice daily, once being after the shower or bath (eg, Cerave Anti Itch Cream). Apply moisturizing cream diffusely and AFTER application of above rx triamcinolone cream to affected areas.   Avoid common allergens/irritants - fragrances, botanicals, etc.                Follow up in about 1 month (around 4/27/2023).

## 2023-03-29 LAB
GAMMA INTERFERON BACKGROUND BLD IA-ACNC: 0.01 IU/ML
M TB IFN-G CD4+ BCKGRND COR BLD-ACNC: 0 IU/ML
MITOGEN IGNF BCKGRD COR BLD-ACNC: 9.99 IU/ML
TB GOLD PLUS: NEGATIVE
TB2 - NIL: -0 IU/ML

## 2023-03-29 RX ORDER — CLOBETASOL PROPIONATE 0.5 MG/G
OINTMENT TOPICAL 2 TIMES DAILY PRN
Qty: 60 G | Refills: 3 | Status: SHIPPED | OUTPATIENT
Start: 2023-03-29 | End: 2023-03-30

## 2023-03-29 RX ORDER — TRIAMCINOLONE ACETONIDE 1 MG/G
CREAM TOPICAL 2 TIMES DAILY PRN
Qty: 454 G | Refills: 3 | Status: SHIPPED | OUTPATIENT
Start: 2023-03-29 | End: 2023-03-30

## 2023-03-30 ENCOUNTER — OFFICE VISIT (OUTPATIENT)
Dept: FAMILY MEDICINE | Facility: CLINIC | Age: 78
End: 2023-03-30
Payer: MEDICARE

## 2023-03-30 VITALS
HEIGHT: 60 IN | BODY MASS INDEX: 49.19 KG/M2 | WEIGHT: 250.56 LBS | OXYGEN SATURATION: 96 % | DIASTOLIC BLOOD PRESSURE: 80 MMHG | SYSTOLIC BLOOD PRESSURE: 140 MMHG | HEART RATE: 74 BPM

## 2023-03-30 DIAGNOSIS — G47.09 OTHER INSOMNIA: Primary | ICD-10-CM

## 2023-03-30 DIAGNOSIS — J44.9 CHRONIC OBSTRUCTIVE PULMONARY DISEASE, UNSPECIFIED COPD TYPE: ICD-10-CM

## 2023-03-30 DIAGNOSIS — I10 PRIMARY HYPERTENSION: ICD-10-CM

## 2023-03-30 DIAGNOSIS — E66.8 OTHER OBESITY: ICD-10-CM

## 2023-03-30 DIAGNOSIS — E66.01 MORBID OBESITY WITH BMI OF 45.0-49.9, ADULT: ICD-10-CM

## 2023-03-30 PROCEDURE — 3079F PR MOST RECENT DIASTOLIC BLOOD PRESSURE 80-89 MM HG: ICD-10-PCS | Mod: CPTII,S$GLB,,

## 2023-03-30 PROCEDURE — 3288F FALL RISK ASSESSMENT DOCD: CPT | Mod: CPTII,S$GLB,,

## 2023-03-30 PROCEDURE — 99999 PR PBB SHADOW E&M-EST. PATIENT-LVL V: CPT | Mod: PBBFAC,,,

## 2023-03-30 PROCEDURE — 1157F PR ADVANCE CARE PLAN OR EQUIV PRESENT IN MEDICAL RECORD: ICD-10-PCS | Mod: CPTII,S$GLB,,

## 2023-03-30 PROCEDURE — 1126F PR PAIN SEVERITY QUANTIFIED, NO PAIN PRESENT: ICD-10-PCS | Mod: CPTII,S$GLB,,

## 2023-03-30 PROCEDURE — 3077F PR MOST RECENT SYSTOLIC BLOOD PRESSURE >= 140 MM HG: ICD-10-PCS | Mod: CPTII,S$GLB,,

## 2023-03-30 PROCEDURE — 99999 PR PBB SHADOW E&M-EST. PATIENT-LVL V: ICD-10-PCS | Mod: PBBFAC,,,

## 2023-03-30 PROCEDURE — 1126F AMNT PAIN NOTED NONE PRSNT: CPT | Mod: CPTII,S$GLB,,

## 2023-03-30 PROCEDURE — 1101F PR PT FALLS ASSESS DOC 0-1 FALLS W/OUT INJ PAST YR: ICD-10-PCS | Mod: CPTII,S$GLB,,

## 2023-03-30 PROCEDURE — 1159F PR MEDICATION LIST DOCUMENTED IN MEDICAL RECORD: ICD-10-PCS | Mod: CPTII,S$GLB,,

## 2023-03-30 PROCEDURE — 99213 PR OFFICE/OUTPT VISIT, EST, LEVL III, 20-29 MIN: ICD-10-PCS | Mod: S$GLB,,,

## 2023-03-30 PROCEDURE — 3077F SYST BP >= 140 MM HG: CPT | Mod: CPTII,S$GLB,,

## 2023-03-30 PROCEDURE — 3079F DIAST BP 80-89 MM HG: CPT | Mod: CPTII,S$GLB,,

## 2023-03-30 PROCEDURE — 1157F ADVNC CARE PLAN IN RCRD: CPT | Mod: CPTII,S$GLB,,

## 2023-03-30 PROCEDURE — 99213 OFFICE O/P EST LOW 20 MIN: CPT | Mod: S$GLB,,,

## 2023-03-30 PROCEDURE — 1101F PT FALLS ASSESS-DOCD LE1/YR: CPT | Mod: CPTII,S$GLB,,

## 2023-03-30 PROCEDURE — 3288F PR FALLS RISK ASSESSMENT DOCUMENTED: ICD-10-PCS | Mod: CPTII,S$GLB,,

## 2023-03-30 PROCEDURE — 1159F MED LIST DOCD IN RCRD: CPT | Mod: CPTII,S$GLB,,

## 2023-03-30 RX ORDER — TRAZODONE HYDROCHLORIDE 50 MG/1
50 TABLET ORAL NIGHTLY
Qty: 30 TABLET | Refills: 11 | Status: SHIPPED | OUTPATIENT
Start: 2023-03-30 | End: 2023-04-27

## 2023-03-30 NOTE — PROGRESS NOTES
[facility]  [address]  [place] [address]    Subjective    Chief Complaint:   Chief Complaint   Patient presents with    Follow-up     Sleeping issues       History of Present Illness:     Marta Huff is a(n) 77 y.o. female with past medical history as noted below who presents to the clinic today for difficulty staying asleep.    For the past few weeks, she has been sleeping only about 3 hours per night. Her 's dementia has been challenging and keeping her awake at night. She sleeps from about 10pm-1pm and can't seem to fall back asleep. She has tried waking up and keeping herself busy by snacking or doing other things around the house but this doesn't work. Melatonin has been ineffecive. Benadryl seems to help.  Reports her anxiety has been worse lately. She gets frustrated more easily.     BP is elevated today, she did not take her BP meds yet.      Problem List:   Patient Active Problem List   Diagnosis    Hypertension    Mild major depression    Restless legs syndrome    Generalized osteoarthritis    GERD (gastroesophageal reflux disease)    Insomnia    Anxiety    Osteopenia    Pulmonary hypertension    Hereditary and idiopathic peripheral neuropathy    Morbid obesity with BMI of 45.0-49.9, adult s/p laparoscopic Danielle-N-Y    JESENIA on CPAP    Tortuous aorta    Left lumbar radiculopathy    Chronic pain associated with significant psychosocial dysfunction    Peripheral polyneuropathy    Decreased activities of daily living (ADL)    Fatty liver    Renal cyst    Moderate persistent asthma    Incisional hernia    Post-menopausal    COPD (chronic obstructive pulmonary disease)    Controlled type 2 diabetes mellitus without complication, without long-term current use of insulin       Current Outpatient Medications:   Current Outpatient Medications   Medication Instructions    albuterol (PROVENTIL) 2.5 mg /3 mL (0.083 %) nebulizer solution INHALE  THE CONTENTS OF 1 VIAL VIA NEBULIZER EVERY 6 HOURS AS NEEDED FOR  WHEEZING  OR  SHORTNESS OF BREATH    albuterol (PROVENTIL/VENTOLIN HFA) 90 mcg/actuation inhaler INHALE 2 PUFFS INTO THE LUNGS EVERY 6 HOURS AS NEEDED FOR WHEEZING OR SHORTNESS OF BREATH.    alendronate (FOSAMAX) 70 MG tablet TAKE 1 TABLET EVERY 7 DAYS    benzonatate (TESSALON) 100 MG capsule benzonatate 100 mg capsule   TAKE ONE CAPSULE BY MOUTH THREE TIMES DAILY AS NEEDED FOR COUGH    betamethasone valerate 0.1% (VALISONE) 0.1 % Crea Topical (Top), 2 times daily    calcium phosphate-vitamin D3 250 mg-10 mcg (400 unit) Chew 1 tablet, Oral, Daily    clobetasol 0.05% (TEMOVATE) 0.05 % Oint AAA bid    diazePAM (VALIUM) 5 MG tablet diazepam 5 mg tablet   TAKE 1 TABLET BY MOUTH 1 HOUR PRIOR TO DENTAL VISIT. BRING MEDICATION TO VISIT    DULoxetine (CYMBALTA) 60 MG capsule TAKE 1 CAPSULE EVERY DAY    fluticasone furoate-vilanteroL (BREO ELLIPTA) 200-25 mcg/dose DsDv diskus inhaler 1 puff, Inhalation, Daily, Controller    furosemide (LASIX) 80 mg, Oral, Daily    losartan (COZAAR) 50 mg, Oral, 2 times daily    naproxen sodium (ALEVE ORAL) 1 Dose, Oral, Daily PRN    potassium chloride (KLOR-CON) 10 MEQ TbSR 10 mEq, Oral, Daily    pramipexole (MIRAPEX) 1.5 MG tablet TAKE 1 TABLET EVERY NIGHT    tacrolimus (PROTOPIC) 0.1 % ointment Topical (Top), 2 times daily       Surgical History:   Past Surgical History:   Procedure Laterality Date    APPENDECTOMY      BARIATRIC SURGERY  2014    Gastric Sleeve    CHOLECYSTECTOMY      HERNIA REPAIR      hiatal hernia    HYSTERECTOMY      partial    INSERTION OF DORSAL COLUMN NERVE STIMULATOR FOR TRIAL N/A 5/25/2018    Procedure: TRIAL-STIMULATOR-DORSAL COLUMN;  Surgeon: Raoul Belcher MD;  Location: [facility];  Service: Pain Management;  Laterality: N/A;    INSERTION OF SPINAL CORD STIMULATOR USING MINIMALLY INVASIVE TECHNIQUE N/A 7/11/2018    Procedure: INSERTION, SPINAL CORD STIMULATOR,;  Surgeon: Raoul Belcher MD;   Location: [facility] OR;  Service: Pain Management;  Laterality: N/A;    JOINT REPLACEMENT      BILAT KNEE    KNEE ARTHROPLASTY  1/2/2010    bilateral    NISSEN FUNDOPLICATION      RADIOFREQUENCY ABLATION Left 8/12/2022    Procedure: Radiofrequency Ablation//REGULAR RFA left shoulder;  Surgeon: Javed Espinosa MD;  Location: [facility] OR;  Service: Orthopedics;  Laterality: Left;    REMOVAL OF NEUROSTIMULATOR N/A 9/12/2018    Procedure: REMOVAL, IMPLANT spinal cord stimulator;  Surgeon: Raoul Belcher MD;  Location: [facility] OR;  Service: Pain Management;  Laterality: N/A;    REPAIR OF RECURRENT INCISIONAL HERNIA N/A 8/15/2019    Procedure: REPAIR, HERNIA, INCISIONAL, RECURRENT;  Surgeon: Clifton Nguyen MD;  Location: [facility] OR;  Service: General;  Laterality: N/A;  open incisional hernia repair    REPLACEMENT OF SPINAL CORD STIMULATOR N/A 11/14/2018    Procedure: REPLACEMENT, SPINAL CORD STIMULATOR  PLACEMENT OF SPINAL CORD STIMULATOR T10;  Surgeon: Kevin Parnell MD;  [email or web address] OR;  Service: Neurosurgery;  Laterality: N/A;       Family History:   Family History   Problem Relation Age of Onset    Heart disease Mother     Hypertension Mother     Diabetes Mother     Obesity Mother     Heart disease Maternal Grandfather        Allergies:   Review of patient's allergies indicates:   Allergen Reactions    Etodolac (bulk) Swelling     Hands and feet swelling    Sulfa (sulfonamide antibiotics) Rash and Other (See Comments)     Headache        Tobacco Status:   Tobacco Use: Medium Risk    Smoking Tobacco Use: Never    Smokeless Tobacco Use: Never    Passive Exposure: Yes       Sexual Activity:   Social History     Substance and Sexual Activity   Sexual Activity Yes    Partners: Male       Alcohol Use:   Social History     Substance and Sexual Activity   Alcohol Use Yes    Comment: 1-2 glasses of wine monthly         Objective       Vitals:    03/30/23 0943   BP: (!) 140/80   Pulse: 74   SpO2: 96%   Weight: 113.6  kg (250 lb 8.8 oz)   Height: 5' (1.524 m)       Review of Systems   Constitutional:  Negative for fever.   Psychiatric/Behavioral:  The patient is nervous/anxious and has insomnia.      Physical Exam  Constitutional:       General: She is not in acute distress.     Appearance: Normal appearance.   HENT:      Head: Normocephalic and atraumatic.   Cardiovascular:      Rate and Rhythm: Normal rate and regular rhythm.      Heart sounds: Normal heart sounds. No murmur heard.  Pulmonary:      Effort: Pulmonary effort is normal. No respiratory distress.      Breath sounds: Normal breath sounds. No wheezing.   Skin:     General: Skin is warm.   Neurological:      Mental Status: She is alert and oriented to person, place, and time.   Psychiatric:         Behavior: Behavior normal.         Assessment and Plan:    1. Other insomnia  Comments:  Likely related to anxiety, will try trazodone 50 mg nightly, discuss potential side effects and indications for discontinuation   Follow up with me 2-4 weeks   Orders:  -     traZODone (DESYREL) 50 MG tablet; Take 1 tablet (50 mg total) by mouth every evening.  Dispense: 30 tablet; Refill: 11    2. BMI 45.0-49.9, adult  Comments:  Due for lipid panel     Orders:  -     Lipid Panel; Future; Expected date: 03/30/2023    3. Other obesity  -     Lipid Panel; Future; Expected date: 03/30/2023    4. Primary hypertension  Comments:  /80, did not take medications today, advised to take these daily  Will follow up on this at next appt     5. Morbid obesity with BMI of 45.0-49.9, adult    6. Chronic obstructive pulmonary disease, unspecified COPD type        Follow-up with me in 2-4 weeks for follow up of new medication trazodone 50 mg nightly, and for BP recheck    Claudia Virgen PA-C

## 2023-04-25 ENCOUNTER — LAB VISIT (OUTPATIENT)
Dept: LAB | Facility: HOSPITAL | Age: 78
End: 2023-04-25
Payer: MEDICARE

## 2023-04-25 DIAGNOSIS — E66.8 OTHER OBESITY: ICD-10-CM

## 2023-04-25 DIAGNOSIS — E11.9 CONTROLLED TYPE 2 DIABETES MELLITUS WITHOUT COMPLICATION, WITHOUT LONG-TERM CURRENT USE OF INSULIN: ICD-10-CM

## 2023-04-25 LAB
ALBUMIN/CREAT UR: 29.6 UG/MG (ref 0–30)
CREAT UR-MCNC: 125 MG/DL (ref 15–325)
ESTIMATED AVG GLUCOSE: 114 MG/DL (ref 68–131)
HBA1C MFR BLD: 5.6 % (ref 4–5.6)
MICROALBUMIN UR DL<=1MG/L-MCNC: 37 UG/ML

## 2023-04-25 PROCEDURE — 82570 ASSAY OF URINE CREATININE: CPT | Performed by: FAMILY MEDICINE

## 2023-04-25 PROCEDURE — 36415 COLL VENOUS BLD VENIPUNCTURE: CPT | Mod: PN

## 2023-04-25 PROCEDURE — 80061 LIPID PANEL: CPT

## 2023-04-25 PROCEDURE — 83036 HEMOGLOBIN GLYCOSYLATED A1C: CPT | Performed by: FAMILY MEDICINE

## 2023-04-26 LAB
CHOLEST SERPL-MCNC: 135 MG/DL (ref 120–199)
CHOLEST/HDLC SERPL: 2.2 {RATIO} (ref 2–5)
HDLC SERPL-MCNC: 62 MG/DL (ref 40–75)
HDLC SERPL: 45.9 % (ref 20–50)
LDLC SERPL CALC-MCNC: 63 MG/DL (ref 63–159)
NONHDLC SERPL-MCNC: 73 MG/DL
TRIGL SERPL-MCNC: 50 MG/DL (ref 30–150)

## 2023-04-27 ENCOUNTER — OFFICE VISIT (OUTPATIENT)
Dept: FAMILY MEDICINE | Facility: CLINIC | Age: 78
End: 2023-04-27
Payer: MEDICARE

## 2023-04-27 VITALS
BODY MASS INDEX: 47.98 KG/M2 | OXYGEN SATURATION: 95 % | HEART RATE: 76 BPM | DIASTOLIC BLOOD PRESSURE: 80 MMHG | SYSTOLIC BLOOD PRESSURE: 150 MMHG | HEIGHT: 60 IN | WEIGHT: 244.38 LBS

## 2023-04-27 DIAGNOSIS — I10 PRIMARY HYPERTENSION: Primary | ICD-10-CM

## 2023-04-27 DIAGNOSIS — G47.09 OTHER INSOMNIA: ICD-10-CM

## 2023-04-27 PROCEDURE — 3288F PR FALLS RISK ASSESSMENT DOCUMENTED: ICD-10-PCS | Mod: CPTII,S$GLB,,

## 2023-04-27 PROCEDURE — 3077F SYST BP >= 140 MM HG: CPT | Mod: CPTII,S$GLB,,

## 2023-04-27 PROCEDURE — 3288F FALL RISK ASSESSMENT DOCD: CPT | Mod: CPTII,S$GLB,,

## 2023-04-27 PROCEDURE — 3079F DIAST BP 80-89 MM HG: CPT | Mod: CPTII,S$GLB,,

## 2023-04-27 PROCEDURE — 1101F PT FALLS ASSESS-DOCD LE1/YR: CPT | Mod: CPTII,S$GLB,,

## 2023-04-27 PROCEDURE — 3079F PR MOST RECENT DIASTOLIC BLOOD PRESSURE 80-89 MM HG: ICD-10-PCS | Mod: CPTII,S$GLB,,

## 2023-04-27 PROCEDURE — 1159F PR MEDICATION LIST DOCUMENTED IN MEDICAL RECORD: ICD-10-PCS | Mod: CPTII,S$GLB,,

## 2023-04-27 PROCEDURE — 1157F ADVNC CARE PLAN IN RCRD: CPT | Mod: CPTII,S$GLB,,

## 2023-04-27 PROCEDURE — 99999 PR PBB SHADOW E&M-EST. PATIENT-LVL IV: ICD-10-PCS | Mod: PBBFAC,,,

## 2023-04-27 PROCEDURE — 99214 OFFICE O/P EST MOD 30 MIN: CPT | Mod: S$GLB,,,

## 2023-04-27 PROCEDURE — 99999 PR PBB SHADOW E&M-EST. PATIENT-LVL IV: CPT | Mod: PBBFAC,,,

## 2023-04-27 PROCEDURE — 1101F PR PT FALLS ASSESS DOC 0-1 FALLS W/OUT INJ PAST YR: ICD-10-PCS | Mod: CPTII,S$GLB,,

## 2023-04-27 PROCEDURE — 1126F AMNT PAIN NOTED NONE PRSNT: CPT | Mod: CPTII,S$GLB,,

## 2023-04-27 PROCEDURE — 3077F PR MOST RECENT SYSTOLIC BLOOD PRESSURE >= 140 MM HG: ICD-10-PCS | Mod: CPTII,S$GLB,,

## 2023-04-27 PROCEDURE — 1126F PR PAIN SEVERITY QUANTIFIED, NO PAIN PRESENT: ICD-10-PCS | Mod: CPTII,S$GLB,,

## 2023-04-27 PROCEDURE — 99214 PR OFFICE/OUTPT VISIT, EST, LEVL IV, 30-39 MIN: ICD-10-PCS | Mod: S$GLB,,,

## 2023-04-27 PROCEDURE — 1159F MED LIST DOCD IN RCRD: CPT | Mod: CPTII,S$GLB,,

## 2023-04-27 PROCEDURE — 1157F PR ADVANCE CARE PLAN OR EQUIV PRESENT IN MEDICAL RECORD: ICD-10-PCS | Mod: CPTII,S$GLB,,

## 2023-04-27 RX ORDER — TRAZODONE HYDROCHLORIDE 50 MG/1
100 TABLET ORAL NIGHTLY
Qty: 60 TABLET | Refills: 4 | Status: SHIPPED | OUTPATIENT
Start: 2023-04-27 | End: 2023-09-07

## 2023-04-27 RX ORDER — NEBIVOLOL 5 MG/1
5 TABLET ORAL DAILY
Qty: 30 TABLET | Refills: 3 | Status: SHIPPED | OUTPATIENT
Start: 2023-04-27 | End: 2023-08-24

## 2023-04-27 NOTE — PROGRESS NOTES
[facility]  [address]  [place] [address]    Subjective    Chief Complaint:   Chief Complaint   Patient presents with    Follow-up     Medication check       History of Present Illness:     Marta Huff is a(n) 77 y.o. female with past medical history as noted below who presents to the clinic today for follow up of insomnia.     Insomnia  Last visit with me we started trazodone 50 mg nightly. Sleep has been slightly better with improvement in daytime somnolence, but she is still waking around 3 am. She does use a CPAP and is about to start using it again, she just got new parts in the mail. She is not reporting depression but she is caring for her ill  who also has dementia and this has been overwhelming. No HI/SI/AVH.     Hypertension  BP is elevated today. She has not taken her BP meds yet but reports BP at home has been about 140-150/80 after taking medications. Current regimen includes losartan 50 mg BID and furosemide 80 mg daily. She has a history of LE edema well-controlled with furosemide. No chest pain or dizziness.          Problem List:   Patient Active Problem List   Diagnosis    Hypertension    Mild major depression    Restless legs syndrome    Generalized osteoarthritis    GERD (gastroesophageal reflux disease)    Insomnia    Anxiety    Osteopenia    Pulmonary hypertension    Hereditary and idiopathic peripheral neuropathy    Morbid obesity with BMI of 45.0-49.9, adult s/p laparoscopic Danielle-N-Y    JESENIA on CPAP    Tortuous aorta    Left lumbar radiculopathy    Chronic pain associated with significant psychosocial dysfunction    Peripheral polyneuropathy    Decreased activities of daily living (ADL)    Fatty liver    Renal cyst    Moderate persistent asthma    Incisional hernia    Post-menopausal    COPD (chronic obstructive pulmonary disease)    Controlled type 2 diabetes mellitus without complication, without long-term current  use of insulin       Current Outpatient Medications:   Current Outpatient Medications   Medication Instructions    albuterol (PROVENTIL) 2.5 mg /3 mL (0.083 %) nebulizer solution INHALE THE CONTENTS OF 1 VIAL VIA NEBULIZER EVERY 6 HOURS AS NEEDED FOR  WHEEZING  OR  SHORTNESS OF BREATH    albuterol (PROVENTIL/VENTOLIN HFA) 90 mcg/actuation inhaler INHALE 2 PUFFS INTO THE LUNGS EVERY 6 HOURS AS NEEDED FOR WHEEZING OR SHORTNESS OF BREATH.    alendronate (FOSAMAX) 70 MG tablet TAKE 1 TABLET EVERY 7 DAYS    benzonatate (TESSALON) 100 MG capsule benzonatate 100 mg capsule   TAKE ONE CAPSULE BY MOUTH THREE TIMES DAILY AS NEEDED FOR COUGH    betamethasone valerate 0.1% (VALISONE) 0.1 % Crea Topical (Top), 2 times daily    calcium phosphate-vitamin D3 250 mg-10 mcg (400 unit) Chew 1 tablet, Oral, Daily    clobetasol 0.05% (TEMOVATE) 0.05 % Oint AAA bid    diazePAM (VALIUM) 5 MG tablet diazepam 5 mg tablet   TAKE 1 TABLET BY MOUTH 1 HOUR PRIOR TO DENTAL VISIT. BRING MEDICATION TO VISIT    DULoxetine (CYMBALTA) 60 MG capsule TAKE 1 CAPSULE EVERY DAY    fluticasone furoate-vilanteroL (BREO ELLIPTA) 200-25 mcg/dose DsDv diskus inhaler 1 puff, Inhalation, Daily, Controller    furosemide (LASIX) 80 mg, Oral, Daily    losartan (COZAAR) 50 mg, Oral, 2 times daily    naproxen sodium (ALEVE ORAL) 1 Dose, Oral, Daily PRN    potassium chloride (KLOR-CON) 10 MEQ TbSR 10 mEq, Oral, Daily    pramipexole (MIRAPEX) 1.5 MG tablet TAKE 1 TABLET EVERY NIGHT    tacrolimus (PROTOPIC) 0.1 % ointment Topical (Top), 2 times daily    traZODone (DESYREL) 50 mg, Oral, Nightly       Surgical History:   Past Surgical History:   Procedure Laterality Date    APPENDECTOMY      BARIATRIC SURGERY  2014    Gastric Sleeve    CHOLECYSTECTOMY      HERNIA REPAIR      hiatal hernia    HYSTERECTOMY      partial    INSERTION OF DORSAL COLUMN NERVE STIMULATOR FOR TRIAL N/A 5/25/2018    Procedure: TRIAL-STIMULATOR-DORSAL COLUMN;  Surgeon: Raoul Belcher MD;   Location: [facility] OR;  Service: Pain Management;  Laterality: N/A;    INSERTION OF SPINAL CORD STIMULATOR USING MINIMALLY INVASIVE TECHNIQUE N/A 7/11/2018    Procedure: INSERTION, SPINAL CORD STIMULATOR,;  Surgeon: Raoul Belcher MD;  Location: [facility] OR;  Service: Pain Management;  Laterality: N/A;    JOINT REPLACEMENT      BILAT KNEE    KNEE ARTHROPLASTY  1/2/2010    bilateral    NISSEN FUNDOPLICATION      RADIOFREQUENCY ABLATION Left 8/12/2022    Procedure: Radiofrequency Ablation//REGULAR RFA left shoulder;  Surgeon: Javed Espinosa MD;  Location: [facility] OR;  Service: Orthopedics;  Laterality: Left;    REMOVAL OF NEUROSTIMULATOR N/A 9/12/2018    Procedure: REMOVAL, IMPLANT spinal cord stimulator;  Surgeon: Raoul Belcher MD;  Location: [facility] OR;  Service: Pain Management;  Laterality: N/A;    REPAIR OF RECURRENT INCISIONAL HERNIA N/A 8/15/2019    Procedure: REPAIR, HERNIA, INCISIONAL, RECURRENT;  Surgeon: Clifton Nguyen MD;  Location: [facility] OR;  Service: General;  Laterality: N/A;  open incisional hernia repair    REPLACEMENT OF SPINAL CORD STIMULATOR N/A 11/14/2018    Procedure: REPLACEMENT, SPINAL CORD STIMULATOR  PLACEMENT OF SPINAL CORD STIMULATOR T10;  Surgeon: Kevin Parnell MD;  Location: [facility] OR;  Service: Neurosurgery;  Laterality: N/A;       Family History:   Family History   Problem Relation Age of Onset    Heart disease Mother     Hypertension Mother     Diabetes Mother     Obesity Mother     Heart disease Maternal Grandfather        Allergies:   Review of patient's allergies indicates:   Allergen Reactions    Etodolac (bulk) Swelling     Hands and feet swelling    Sulfa (sulfonamide antibiotics) Rash and Other (See Comments)     Headache        Tobacco Status:   Tobacco Use: Medium Risk    Smoking Tobacco Use: Never    Smokeless Tobacco Use: Never    Passive Exposure: Yes       Sexual Activity:   Social History     Substance and Sexual Activity   Sexual Activity Yes    Partners: Male        Alcohol Use:   Social History     Substance and Sexual Activity   Alcohol Use Yes    Comment: 1-2 glasses of wine monthly         Objective       Vitals:    04/27/23 0745   BP: (!) 150/80   Pulse: 76   SpO2: 95%   Weight: 110.9 kg (244 lb 6.1 oz)   Height: 5' (1.524 m)       Review of Systems   Respiratory:  Negative for shortness of breath.    Cardiovascular:  Negative for chest pain.   Psychiatric/Behavioral:  Negative for depression and suicidal ideas. The patient is nervous/anxious and has insomnia.      Physical Exam  Constitutional:       General: She is not in acute distress.     Appearance: Normal appearance.   HENT:      Head: Normocephalic and atraumatic.   Cardiovascular:      Rate and Rhythm: Normal rate and regular rhythm.      Heart sounds: Normal heart sounds. No murmur heard.  Pulmonary:      Effort: Pulmonary effort is normal. No respiratory distress.      Breath sounds: Normal breath sounds. No wheezing.   Skin:     General: Skin is warm.   Neurological:      Mental Status: She is alert and oriented to person, place, and time.   Psychiatric:         Mood and Affect: Mood normal.         Behavior: Behavior normal.         Assessment and Plan:    1. Primary hypertension  -     nebivoloL (BYSTOLIC) 5 MG Tab; Take 1 tablet (5 mg total) by mouth once daily.  Dispense: 30 tablet; Refill: 3    2. Other insomnia  -     traZODone (DESYREL) 50 MG tablet; Take 2 tablets (100 mg total) by mouth every evening.  Dispense: 60 tablet; Refill: 4        Visit summary:    Marta Huff presented today for follow up.     Adding nebivolol to hypertension regimen with hx leg swelling and current uncontrolled home readings. Discussed potential side effects and indications for discontinuation. She will monitor HR and BP at home and bring log to next visit.    Will try increasing trazodone to 100 mg nightly. Discussed potential side effects and indications for discontinuation     Follow up with Dr. Holley in  2-4 weeks for med check and follow up of hypertension.    Patient was instructed to report to ER if symptoms become severe.    Follow up: No follow-ups on file.      Claudia Virgen PA-C

## 2023-04-27 NOTE — Clinical Note
Good morning,   I saw this patient of yours today. This was a follow up visit with me, she is to follow up with you in a few weeks to check on new dose of trazodone and new med nebivolol. Please see my note and let me know if you have any changes to new regimen.  Thanks, Claudia Virgen PA-C

## 2023-05-01 ENCOUNTER — OFFICE VISIT (OUTPATIENT)
Dept: DERMATOLOGY | Facility: CLINIC | Age: 78
End: 2023-05-01
Payer: MEDICARE

## 2023-05-01 ENCOUNTER — OFFICE VISIT (OUTPATIENT)
Dept: RHEUMATOLOGY | Facility: CLINIC | Age: 78
End: 2023-05-01
Payer: MEDICARE

## 2023-05-01 VITALS
HEART RATE: 59 BPM | BODY MASS INDEX: 47.5 KG/M2 | DIASTOLIC BLOOD PRESSURE: 73 MMHG | WEIGHT: 241.94 LBS | SYSTOLIC BLOOD PRESSURE: 170 MMHG | HEIGHT: 60 IN

## 2023-05-01 DIAGNOSIS — M79.7 FIBROMYALGIA: ICD-10-CM

## 2023-05-01 DIAGNOSIS — M15.9 PRIMARY OSTEOARTHRITIS INVOLVING MULTIPLE JOINTS: Primary | ICD-10-CM

## 2023-05-01 DIAGNOSIS — L90.0 LICHEN SCLEROSUS ET ATROPHICUS: Primary | ICD-10-CM

## 2023-05-01 PROCEDURE — 1157F ADVNC CARE PLAN IN RCRD: CPT | Mod: CPTII,S$GLB,, | Performed by: INTERNAL MEDICINE

## 2023-05-01 PROCEDURE — 3288F PR FALLS RISK ASSESSMENT DOCUMENTED: ICD-10-PCS | Mod: CPTII,S$GLB,, | Performed by: INTERNAL MEDICINE

## 2023-05-01 PROCEDURE — 3288F FALL RISK ASSESSMENT DOCD: CPT | Mod: CPTII,S$GLB,, | Performed by: INTERNAL MEDICINE

## 2023-05-01 PROCEDURE — 99999 PR PBB SHADOW E&M-EST. PATIENT-LVL III: ICD-10-PCS | Mod: PBBFAC,,, | Performed by: DERMATOLOGY

## 2023-05-01 PROCEDURE — 99999 PR PBB SHADOW E&M-EST. PATIENT-LVL III: CPT | Mod: PBBFAC,,, | Performed by: DERMATOLOGY

## 2023-05-01 PROCEDURE — 3078F DIAST BP <80 MM HG: CPT | Mod: CPTII,S$GLB,, | Performed by: INTERNAL MEDICINE

## 2023-05-01 PROCEDURE — 99214 OFFICE O/P EST MOD 30 MIN: CPT | Mod: S$GLB,,, | Performed by: INTERNAL MEDICINE

## 2023-05-01 PROCEDURE — 1160F PR REVIEW ALL MEDS BY PRESCRIBER/CLIN PHARMACIST DOCUMENTED: ICD-10-PCS | Mod: CPTII,S$GLB,, | Performed by: INTERNAL MEDICINE

## 2023-05-01 PROCEDURE — 3288F PR FALLS RISK ASSESSMENT DOCUMENTED: ICD-10-PCS | Mod: CPTII,S$GLB,, | Performed by: DERMATOLOGY

## 2023-05-01 PROCEDURE — 1159F MED LIST DOCD IN RCRD: CPT | Mod: CPTII,S$GLB,, | Performed by: DERMATOLOGY

## 2023-05-01 PROCEDURE — 1101F PR PT FALLS ASSESS DOC 0-1 FALLS W/OUT INJ PAST YR: ICD-10-PCS | Mod: CPTII,S$GLB,, | Performed by: INTERNAL MEDICINE

## 2023-05-01 PROCEDURE — 3077F SYST BP >= 140 MM HG: CPT | Mod: CPTII,S$GLB,, | Performed by: INTERNAL MEDICINE

## 2023-05-01 PROCEDURE — 1101F PT FALLS ASSESS-DOCD LE1/YR: CPT | Mod: CPTII,S$GLB,, | Performed by: INTERNAL MEDICINE

## 2023-05-01 PROCEDURE — 99999 PR PBB SHADOW E&M-EST. PATIENT-LVL IV: ICD-10-PCS | Mod: PBBFAC,,, | Performed by: INTERNAL MEDICINE

## 2023-05-01 PROCEDURE — 1160F RVW MEDS BY RX/DR IN RCRD: CPT | Mod: CPTII,S$GLB,, | Performed by: INTERNAL MEDICINE

## 2023-05-01 PROCEDURE — 1159F PR MEDICATION LIST DOCUMENTED IN MEDICAL RECORD: ICD-10-PCS | Mod: CPTII,S$GLB,, | Performed by: INTERNAL MEDICINE

## 2023-05-01 PROCEDURE — 1101F PT FALLS ASSESS-DOCD LE1/YR: CPT | Mod: CPTII,S$GLB,, | Performed by: DERMATOLOGY

## 2023-05-01 PROCEDURE — 1125F PR PAIN SEVERITY QUANTIFIED, PAIN PRESENT: ICD-10-PCS | Mod: CPTII,S$GLB,, | Performed by: INTERNAL MEDICINE

## 2023-05-01 PROCEDURE — 1157F ADVNC CARE PLAN IN RCRD: CPT | Mod: CPTII,S$GLB,, | Performed by: DERMATOLOGY

## 2023-05-01 PROCEDURE — 1157F PR ADVANCE CARE PLAN OR EQUIV PRESENT IN MEDICAL RECORD: ICD-10-PCS | Mod: CPTII,S$GLB,, | Performed by: DERMATOLOGY

## 2023-05-01 PROCEDURE — 3077F PR MOST RECENT SYSTOLIC BLOOD PRESSURE >= 140 MM HG: ICD-10-PCS | Mod: CPTII,S$GLB,, | Performed by: INTERNAL MEDICINE

## 2023-05-01 PROCEDURE — 99214 PR OFFICE/OUTPT VISIT, EST, LEVL IV, 30-39 MIN: ICD-10-PCS | Mod: 25,S$GLB,, | Performed by: DERMATOLOGY

## 2023-05-01 PROCEDURE — 3078F PR MOST RECENT DIASTOLIC BLOOD PRESSURE < 80 MM HG: ICD-10-PCS | Mod: CPTII,S$GLB,, | Performed by: INTERNAL MEDICINE

## 2023-05-01 PROCEDURE — 11900 PR INJECTION INTO SKIN LESIONS, UP TO 7: ICD-10-PCS | Mod: S$GLB,,, | Performed by: DERMATOLOGY

## 2023-05-01 PROCEDURE — 11900 INJECT SKIN LESIONS </W 7: CPT | Mod: S$GLB,,, | Performed by: DERMATOLOGY

## 2023-05-01 PROCEDURE — 3288F FALL RISK ASSESSMENT DOCD: CPT | Mod: CPTII,S$GLB,, | Performed by: DERMATOLOGY

## 2023-05-01 PROCEDURE — 99214 PR OFFICE/OUTPT VISIT, EST, LEVL IV, 30-39 MIN: ICD-10-PCS | Mod: S$GLB,,, | Performed by: INTERNAL MEDICINE

## 2023-05-01 PROCEDURE — 1157F PR ADVANCE CARE PLAN OR EQUIV PRESENT IN MEDICAL RECORD: ICD-10-PCS | Mod: CPTII,S$GLB,, | Performed by: INTERNAL MEDICINE

## 2023-05-01 PROCEDURE — 1159F PR MEDICATION LIST DOCUMENTED IN MEDICAL RECORD: ICD-10-PCS | Mod: CPTII,S$GLB,, | Performed by: DERMATOLOGY

## 2023-05-01 PROCEDURE — 1101F PR PT FALLS ASSESS DOC 0-1 FALLS W/OUT INJ PAST YR: ICD-10-PCS | Mod: CPTII,S$GLB,, | Performed by: DERMATOLOGY

## 2023-05-01 PROCEDURE — 1125F AMNT PAIN NOTED PAIN PRSNT: CPT | Mod: CPTII,S$GLB,, | Performed by: INTERNAL MEDICINE

## 2023-05-01 PROCEDURE — 99999 PR PBB SHADOW E&M-EST. PATIENT-LVL IV: CPT | Mod: PBBFAC,,, | Performed by: INTERNAL MEDICINE

## 2023-05-01 PROCEDURE — 99214 OFFICE O/P EST MOD 30 MIN: CPT | Mod: 25,S$GLB,, | Performed by: DERMATOLOGY

## 2023-05-01 PROCEDURE — 1159F MED LIST DOCD IN RCRD: CPT | Mod: CPTII,S$GLB,, | Performed by: INTERNAL MEDICINE

## 2023-05-01 RX ORDER — DULOXETIN HYDROCHLORIDE 60 MG/1
60 CAPSULE, DELAYED RELEASE ORAL DAILY
Qty: 90 CAPSULE | Refills: 3 | Status: SHIPPED | OUTPATIENT
Start: 2023-05-01 | End: 2023-11-01 | Stop reason: SDUPTHER

## 2023-05-01 NOTE — PROGRESS NOTES
Subjective:       Patient ID:  Marta Huff is a 77 y.o. female who presents for   No chief complaint on file.      HPI  Established patient.   1 month f/u extensive extragenital LS&A at lower torso (biopsy-proven), also with genital LS&A appreciated clinically. Currently undergoing closely monitored topical therapy - instructed to use TAC cream to extra-genital involvement and clobetasol ointment to genital involvement. Pt states she is using topicals consistently but again not quite sure which (denies using topical in jar - TAC; prior rx incl tacrolimus, clobetasol, betamethasone valerate). Nonetheless, she confirms no symptoms of itch / pain extra-genitally and genitally except for one area at L lower abdomen.    PMH: hereditary / idiopathic peripheral neuropathy, HTN, DM, obesity, fatty liver (mild per abd US in 2019), COPD/asthma, OA, fibromyalgia, depression/ anxiety    9/2020  1.  Skin, left abdomen, punch biopsy:   - LICHEN SCLEROSUS ET ATROPHICUS.   MICROSCOPIC DESCRIPTION:  Sections show an atrophic epidermis with vacuolar   alteration of the basal cell layer in association with an amorphous   appearing, homogeneously eosinophilic, altered material within the underlying   superficial dermis. There is a mild to moderate lymphocytic infiltrate   beneath the amorphous material.     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 Basovacuolar interface or prominent inflammatory   infiltrate is not seen, which would be present in connective tissue disease   or drug reaction. Correlate with clinical findings.     Past Medical History:   Diagnosis Date    Anxiety     Arthralgia of knee     arthralgia of bilateral knees secondary to djd    Arthritis     Back pain     Depression     Encounter for blood transfusion     AUTOLOGUS    GERD (gastroesophageal reflux  disease)     Hiatal hernia     repaired in 1979    History of seasonal allergies     History of shingles     Hypertension     Insomnia     Obesity     JESENIA (obstructive sleep apnea) 02/2020    Home sleep study - YESICA 57, Desat 69% - Severe JESENIA with desaturations    Osteoporosis     Pneumonia     Reactive airway disease     Restless legs syndrome     Rheumatic fever     at 4 y/o    Sleep apnea     no cpap     Review of Systems   Genitourinary:  Negative for genital sores and genital itching.   Skin:  Positive for itching, rash and dry skin. Negative for sensitivity to antibiotic ointment and sensitivity to bandage adhesive.   Hematologic/Lymphatic: Bruises/bleeds easily.      Objective:    Physical Exam   Constitutional: She appears well-developed and well-nourished. No distress.   Neurological: She is alert and oriented to person, place, and time. She is not disoriented.   Psychiatric: She has a normal mood and affect.   Skin:   Areas Examined (abnormalities noted in diagram):   Abdomen Inspection Performed  Genitals / Buttocks / Groin Inspection Performed  Back Inspection Performed  RUE Inspected  LUE Inspection Performed  RLE Inspected  LLE Inspection Performed            Diagram Legend     Erythematous scaling macule/papule c/w actinic keratosis       Vascular papule c/w angioma      Pigmented verrucoid papule/plaque c/w seborrheic keratosis      Yellow umbilicated papule c/w sebaceous hyperplasia      Irregularly shaped tan macule c/w lentigo     1-2 mm smooth white papules consistent with Milia      Movable subcutaneous cyst with punctum c/w epidermal inclusion cyst      Subcutaneous movable cyst c/w pilar cyst      Firm pink to brown papule c/w dermatofibroma      Pedunculated fleshy papule(s) c/w skin tag(s)      Evenly pigmented macule c/w junctional nevus     Mildly variegated pigmented, slightly irregular-bordered macule c/w mildly atypical nevus      Flesh colored to evenly pigmented papule c/w intradermal  nevus       Pink pearly papule/plaque c/w basal cell carcinoma      Erythematous hyperkeratotic cursted plaque c/w SCC      Surgical scar with no sign of skin cancer recurrence      Open and closed comedones      Inflammatory papules and pustules      Verrucoid papule consistent consistent with wart     Erythematous eczematous patches and plaques     Dystrophic onycholytic nail with subungual debris c/w onychomycosis     Umbilicated papule    Erythematous-base heme-crusted tan verrucoid plaque consistent with inflamed seborrheic keratosis     Erythematous Silvery Scaling Plaque c/w Psoriasis     See annotation    Today      11/2022 post ILK and tacrolimus BID to entire affected with clobetasol BID spot treating symptomatic areas      10/2022      Assessment / Plan:        Lichen sclerosus et atrophicus    Other orders  -     triamcinolone acetonide injection 10 mg      Overall significant symptomatic improvement of extragenital and genital disease but clinically still active on exam.  Question of overall compliance with topicals. Will perform strict topicals as below for next few months, re-evaluating monthly.   Goal clinical endpoint: symptomatic control of extragenital involvement, controlled clinical disease of vulvar involvement.   Consideration for active nontreatment of diffuse extragenital involvement if symptoms are controlled.   For refractory symptomatic extragenital disease, [facility]BUVB with [facility] on [facility] is an option (pt confirms ability to travel 2-3x weekly if in [place]).   For refractory vulvar disease, ILK a future option if patient agreeable.   For refractory symptomatic extragenital disease AND vulvar disease, systemic treatment is a consideration - would avoid acitretin (data in refractory vulvar disease), use caution MTX (most data in extragenital disease) in setting of fatty liver disease, morbid obesity, DM.      - Intralesional Kenalog Injection Procedure Note: Discussed procedure  with patient/patient's guardian including risks and benefits as well as treatment alternatives. Risks of procedure include pain, bleeding, surrounding hypopigmentation, atrophy, infection, partial response, lack of response, recurrence. Verbal consent obtained. Area to be treated cleansed with alcohol. A total of 0.3 cc of Kenalog 3 mg/ml used to treat 1 lesion(s) - 1 unit. Hemostasis achieved with pressure. Patient tolerated procedure well. After-visit wound care instructions. F/u 1 month PRN. [NDC for Kenalog 10 mg/cc: 7338-7230-92]      Itchy, dry rash at torso: apply triamcinolone cream (new rx in jar) twice daily for next month.   Rash at vulva, scot anal region: apply clobetasol ointment (tube) twice daily for next month.    Gentle vulvar, scot anal care:   Wash areas once daily with Cetaphil gentle cleanser.   Pat dry. Ensure dryness with blow dryer on cool setting.   Apply small amount of clobetasol ointment to affected areas twice daily.     Gentle skin care:   No more than 1 shower or bath a day.   Out of shower or bath in less than 10 minutes.   Use only warm water, NOT hot.   Soap only where needed - areas that make body odor or are visibly dirty.  Use gentle soaps only (eg, Cetaphil Body Wash; non soap cleansers are more gentle than bar soaps).   After shower or bath, pat dry - do not scrub or rub aggressively.   Apply thick moisturizing cream twice daily, once being after the shower or bath (eg, Cerave Anti Itch Cream). Apply moisturizing cream diffusely and AFTER application of above rx triamcinolone cream to affected areas.   Avoid common allergens/irritants - fragrances, botanicals, etc.                Follow up in about 1 month (around 6/1/2023).

## 2023-05-01 NOTE — PROGRESS NOTES
Subjective:          Chief Complaint: Matra Huff is a 77 y.o. female who had concerns including Disease Management.    HPI:  Patient her for f/u of  OA and FMS.     Patient doing better with switch to Cymbalta is tolerating 60mg daily  Feeling better off sertraline.   Dx:  osteoarthritis this is multijoint,  as well as peripheral neuropathy.    She was using gabapentin  2400 mg daily.   Patient had been following with pain management, did have Dr. Soheila jones  spinal cord stimulator.  A noting 85% relief of the foot pain.   New foot and hand numbness that is positional and alleviated with adjustment in position.         Previous 45min and tolerated, now only 15min and hips begin to hurt.   She still has pain in back but this is not a constant complaint for her. She is sleeping on sides and seems to tolerate, is is the lumbosacral junction that hurts at night.   Exacerbated with sitting prolonged period. At some times she uses walker due to pain.   Hands with stiffness PIP and DIP joints long standing cannot fully curl fingers. +deformity, intermittent swelling.     Interaction with etodolac, but renal function wnl.   She does tolerate NSAID: takes aleve 440mg in AM and PRN at night. No hx of GIB.    Rapid3 Question Responses and Scores 4/26/2023   MDHAQ Score 1.3   Psychologic Score 5.5   Pain Score 7.5   When you awakened in the morning OVER THE LAST WEEK, did you feel stiff? Yes   If Yes, please indicate the number of hours until you are as limber as you will be for the day 3   Fatigue Score 4.5   Global Health Score 5   RAPID3 Score 5.61                REVIEW OF SYSTEMS:    Review of Systems   Constitutional:  Negative for fever, malaise/fatigue and weight loss.   HENT:  Negative for sore throat.    Eyes:  Negative for double vision, photophobia and redness.   Respiratory:  Negative for cough, shortness of breath and wheezing.    Cardiovascular:  Negative for chest pain, palpitations and orthopnea.    Gastrointestinal:  Negative for abdominal pain, constipation and diarrhea.   Genitourinary:  Negative for dysuria, hematuria and urgency.   Musculoskeletal:  Positive for joint pain. Negative for back pain and myalgias.   Skin:  Negative for rash.   Neurological:  Negative for dizziness, tingling, focal weakness and headaches.   Endo/Heme/Allergies:  Does not bruise/bleed easily.   Psychiatric/Behavioral:  Negative for depression, hallucinations and suicidal ideas.              Objective:            Past Medical History:   Diagnosis Date    Anxiety     Arthralgia of knee     arthralgia of bilateral knees secondary to djd    Arthritis     Back pain     Depression     Encounter for blood transfusion     AUTOLOGUS    GERD (gastroesophageal reflux disease)     Hiatal hernia     repaired in 1979    History of seasonal allergies     History of shingles     Hypertension     Insomnia     Obesity     JESENIA (obstructive sleep apnea) 02/2020    Home sleep study - YESICA 57, Desat 69% - Severe JESENIA with desaturations    Osteoporosis     Pneumonia     Reactive airway disease     Restless legs syndrome     Rheumatic fever     at 4 y/o    Sleep apnea     no cpap     Family History   Problem Relation Age of Onset    Heart disease Mother     Hypertension Mother     Diabetes Mother     Obesity Mother     Heart disease Maternal Grandfather      Social History     Tobacco Use    Smoking status: Never     Passive exposure: Yes    Smokeless tobacco: Never   Substance Use Topics    Alcohol use: Yes     Comment: 1-2 glasses of wine monthly    Drug use: No         Current Outpatient Medications on File Prior to Visit   Medication Sig Dispense Refill    albuterol (PROVENTIL) 2.5 mg /3 mL (0.083 %) nebulizer solution INHALE THE CONTENTS OF 1 VIAL VIA NEBULIZER EVERY 6 HOURS AS NEEDED FOR  WHEEZING  OR  SHORTNESS OF BREATH 90 mL 11    albuterol (PROVENTIL/VENTOLIN HFA) 90 mcg/actuation inhaler INHALE 2 PUFFS INTO THE LUNGS EVERY 6 HOURS AS NEEDED  FOR WHEEZING OR SHORTNESS OF BREATH. 25.5 g 3    alendronate (FOSAMAX) 70 MG tablet TAKE 1 TABLET EVERY 7 DAYS 12 tablet 3    benzonatate (TESSALON) 100 MG capsule benzonatate 100 mg capsule   TAKE ONE CAPSULE BY MOUTH THREE TIMES DAILY AS NEEDED FOR COUGH      betamethasone valerate 0.1% (VALISONE) 0.1 % Crea Apply topically 2 (two) times daily. 45 g 6    calcium phosphate-vitamin D3 250 mg-10 mcg (400 unit) Chew Take 1 tablet by mouth once daily.       clobetasol 0.05% (TEMOVATE) 0.05 % Oint AAA bid 60 g 3    diazePAM (VALIUM) 5 MG tablet diazepam 5 mg tablet   TAKE 1 TABLET BY MOUTH 1 HOUR PRIOR TO DENTAL VISIT. BRING MEDICATION TO VISIT      DULoxetine (CYMBALTA) 60 MG capsule TAKE 1 CAPSULE EVERY DAY 90 capsule 3    fluticasone furoate-vilanteroL (BREO ELLIPTA) 200-25 mcg/dose DsDv diskus inhaler Inhale 1 puff into the lungs once daily. Controller 60 each 5    furosemide (LASIX) 80 MG tablet Take 1 tablet (80 mg total) by mouth once daily. 100 tablet 3    losartan (COZAAR) 50 MG tablet TAKE 1 TABLET (50 MG TOTAL) BY MOUTH 2 (TWO) TIMES DAILY. 180 tablet 2    naproxen sodium (ALEVE ORAL) Take 1 Dose by mouth daily as needed.      nebivoloL (BYSTOLIC) 5 MG Tab Take 1 tablet (5 mg total) by mouth once daily. 30 tablet 3    potassium chloride (KLOR-CON) 10 MEQ TbSR Take 1 tablet (10 mEq total) by mouth once daily. 90 tablet 2    pramipexole (MIRAPEX) 1.5 MG tablet TAKE 1 TABLET EVERY NIGHT 90 tablet 1    tacrolimus (PROTOPIC) 0.1 % ointment Apply topically 2 (two) times daily. 100 g 5    traZODone (DESYREL) 50 MG tablet Take 2 tablets (100 mg total) by mouth every evening. 60 tablet 4     Current Facility-Administered Medications on File Prior to Visit   Medication Dose Route Frequency Provider Last Rate Last Admin    methylPREDNISolone acetate injection 40 mg  40 mg Intra-articular  Adwoa Jaime DO   40 mg at 03/11/21 1000    triamcinolone acetonide injection 10 mg  10 mg Intradermal 1 time in Clinic/HOD  Brittani Roland MD           Vitals:    05/01/23 1108   BP: (!) 170/73   Pulse: (!) 59       Physical Exam:    Physical Exam  Constitutional:       Appearance: Normal appearance. She is well-developed.   HENT:      Nose: No septal deviation.      Mouth/Throat:      Mouth: No oral lesions.   Eyes:      Conjunctiva/sclera:      Right eye: Right conjunctiva is not injected.      Left eye: Left conjunctiva is not injected.      Pupils: Pupils are equal, round, and reactive to light.   Neck:      Thyroid: No thyroid mass or thyromegaly.      Vascular: No JVD.   Cardiovascular:      Rate and Rhythm: Normal rate and regular rhythm.      Pulses: Normal pulses.      Comments: No edema  Pulmonary:      Effort: Pulmonary effort is normal.      Breath sounds: Normal breath sounds.   Abdominal:      Palpations: Abdomen is soft.   Musculoskeletal:      Right shoulder: No swelling or tenderness. Decreased range of motion.      Left shoulder: No swelling or tenderness. Decreased range of motion.      Right elbow: No swelling. Normal range of motion. No tenderness.      Left elbow: No swelling. Normal range of motion. No tenderness.      Right wrist: No swelling or tenderness. Normal range of motion.      Left wrist: No swelling or tenderness. Normal range of motion.      Right hand: Decreased range of motion.      Left hand: Decreased range of motion.      Right hip: No bony tenderness. Normal range of motion. Normal strength.      Left hip: No tenderness or bony tenderness. Normal range of motion.      Right knee: No swelling. Normal range of motion. No tenderness.      Left knee: No swelling. Normal range of motion. No tenderness.      Right ankle: No swelling. No tenderness. Normal range of motion.      Left ankle: No swelling. No tenderness. Normal range of motion.      Comments: Bony hypertrophy PIP and DIP joints. Squaring CMC joint. Tenderness along gr. Troch and ITB. Negative exacerbation SLR some LBP.     Lymphadenopathy:      Cervical: No cervical adenopathy.   Skin:     General: Skin is dry.   Neurological:      Deep Tendon Reflexes: Reflexes are normal and symmetric.             Assessment:       Encounter Diagnoses   Name Primary?    Primary osteoarthritis involving multiple joints Yes    Fibromyalgia               Plan:        Primary osteoarthritis involving multiple joints    Fibromyalgia    Other orders  -     DULoxetine (CYMBALTA) 60 MG capsule; Take 1 capsule (60 mg total) by mouth once daily.  Dispense: 90 capsule; Refill: 3          Delightful patient with OA and FMS    Patient doing better with switch to Cymbalta is tolerating 60mg daily      RIGHT Shoulder stable.     Left knee pain new patietn s/p TKA with continued pain. Seen in ER. Last month for similar complaint. Will need to discuss with Ortho.     Follow up in about 6 months (around 11/1/2023).           30min consultation with greater than 50% spent in counseling, chart review and coordination of care. All questions answered.

## 2023-05-22 DIAGNOSIS — J45.909 REACTIVE AIRWAY DISEASE, UNSPECIFIED ASTHMA SEVERITY, UNCOMPLICATED: ICD-10-CM

## 2023-05-22 RX ORDER — ALBUTEROL SULFATE 90 UG/1
2 AEROSOL, METERED RESPIRATORY (INHALATION) EVERY 6 HOURS PRN
Qty: 25.5 G | Refills: 1 | Status: SHIPPED | OUTPATIENT
Start: 2023-05-22 | End: 2023-11-29

## 2023-05-22 NOTE — TELEPHONE ENCOUNTER
Refill Decision Note   Marta Huff  is requesting a refill authorization.  Brief Assessment and Rationale for Refill:  Approve     Medication Therapy Plan:       Medication Reconciliation Completed: No    Alert overridden per protocol: Yes   Comments:     No Care Gaps recommended.     Note composed:11:58 AM 05/22/2023

## 2023-05-22 NOTE — TELEPHONE ENCOUNTER
No care due was identified.  [facility] Embedded Care Due Messages. Reference number: [id number].   5/22/2023 11:38:23 AM CDT   Postop Diagnosis: same

## 2023-05-22 NOTE — TELEPHONE ENCOUNTER
Refill Routing Note   Medication(s) are not appropriate for processing by [facility] for the following reason(s):      Non-participating provider  Patient is followed by [facility]    [facility] action(s):  Route None identified            Appointments  past 12m or future 3m with PCP    Date Provider   Last Visit   10/25/2022 Elda Holley MD   Next Visit   Visit date not found Elda Holley MD   ED visits in past 90 days: 0        Note composed:11:35 AM 05/22/2023

## 2023-05-31 ENCOUNTER — PATIENT MESSAGE (OUTPATIENT)
Dept: ADMINISTRATIVE | Facility: HOSPITAL | Age: 78
End: 2023-05-31
Payer: MEDICARE

## 2023-06-07 DIAGNOSIS — M15.9 GENERALIZED OSTEOARTHRITIS: ICD-10-CM

## 2023-06-07 NOTE — TELEPHONE ENCOUNTER
Please advise  LOV: 04/27/2023--Promise  Nxt Apt: NA  Last Fill: 07/01/2022---12---3    Last DEXA: 04/01/2021  Last Vit D:  10/05/2021------16

## 2023-06-09 RX ORDER — ALENDRONATE SODIUM 70 MG/1
TABLET ORAL
Qty: 12 TABLET | Refills: 3 | Status: SHIPPED | OUTPATIENT
Start: 2023-06-09

## 2023-06-22 ENCOUNTER — OFFICE VISIT (OUTPATIENT)
Dept: DERMATOLOGY | Facility: CLINIC | Age: 78
End: 2023-06-22
Payer: MEDICARE

## 2023-06-22 DIAGNOSIS — L90.0 LICHEN SCLEROSUS ET ATROPHICUS: Primary | ICD-10-CM

## 2023-06-22 PROCEDURE — 99214 OFFICE O/P EST MOD 30 MIN: CPT | Mod: S$GLB,,, | Performed by: DERMATOLOGY

## 2023-06-22 PROCEDURE — 99999 PR PBB SHADOW E&M-EST. PATIENT-LVL III: CPT | Mod: PBBFAC,,, | Performed by: DERMATOLOGY

## 2023-06-22 PROCEDURE — 3288F PR FALLS RISK ASSESSMENT DOCUMENTED: ICD-10-PCS | Mod: CPTII,S$GLB,, | Performed by: DERMATOLOGY

## 2023-06-22 PROCEDURE — 1100F PTFALLS ASSESS-DOCD GE2>/YR: CPT | Mod: CPTII,S$GLB,, | Performed by: DERMATOLOGY

## 2023-06-22 PROCEDURE — 1157F PR ADVANCE CARE PLAN OR EQUIV PRESENT IN MEDICAL RECORD: ICD-10-PCS | Mod: CPTII,S$GLB,, | Performed by: DERMATOLOGY

## 2023-06-22 PROCEDURE — 99214 PR OFFICE/OUTPT VISIT, EST, LEVL IV, 30-39 MIN: ICD-10-PCS | Mod: S$GLB,,, | Performed by: DERMATOLOGY

## 2023-06-22 PROCEDURE — 1100F PR PT FALLS ASSESS DOC 2+ FALLS/FALL W/INJURY/YR: ICD-10-PCS | Mod: CPTII,S$GLB,, | Performed by: DERMATOLOGY

## 2023-06-22 PROCEDURE — 1159F MED LIST DOCD IN RCRD: CPT | Mod: CPTII,S$GLB,, | Performed by: DERMATOLOGY

## 2023-06-22 PROCEDURE — 99999 PR PBB SHADOW E&M-EST. PATIENT-LVL III: ICD-10-PCS | Mod: PBBFAC,,, | Performed by: DERMATOLOGY

## 2023-06-22 PROCEDURE — 1157F ADVNC CARE PLAN IN RCRD: CPT | Mod: CPTII,S$GLB,, | Performed by: DERMATOLOGY

## 2023-06-22 PROCEDURE — 1159F PR MEDICATION LIST DOCUMENTED IN MEDICAL RECORD: ICD-10-PCS | Mod: CPTII,S$GLB,, | Performed by: DERMATOLOGY

## 2023-06-22 PROCEDURE — 3288F FALL RISK ASSESSMENT DOCD: CPT | Mod: CPTII,S$GLB,, | Performed by: DERMATOLOGY

## 2023-06-22 NOTE — PROGRESS NOTES
Subjective:       Patient ID:  Marta Huff is a 78 y.o. female who presents for   No chief complaint on file.      HPI  Established patient.   6 week f/u extensive extragenital LS&A at lower torso (biopsy-proven), also with genital LS&A appreciated clinically. Currently undergoing closely monitored topical therapy - instructed to use TAC cream to extra-genital involvement and clobetasol ointment to genital involvement. Pt states she is using topicals consistently and now confident in proper use (has AVS printout from  and following it compliantly). She has appreciated much improvement. No current symptoms of itch / pain extra-genitally and genitally except for one area at R lower abdomen. Intermittent mild-moderate pruritus occasionally since , usually corresponding to excessive sweating with activity in heat.     PMH: hereditary / idiopathic peripheral neuropathy, HTN, DM, obesity, fatty liver (mild per abd US in 2019), COPD/asthma, OA, fibromyalgia, depression/ anxiety    9/2020  1.  Skin, left abdomen, punch biopsy:   - LICHEN SCLEROSUS ET ATROPHICUS.   MICROSCOPIC DESCRIPTION:  Sections show an atrophic epidermis with vacuolar   alteration of the basal cell layer in association with an amorphous   appearing, homogeneously eosinophilic, altered material within the underlying   superficial dermis. There is a mild to moderate lymphocytic infiltrate   beneath the amorphous material.     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 Basovacuolar interface or prominent inflammatory   infiltrate is not seen, which would be present in connective tissue disease   or drug reaction. Correlate with clinical findings.     Past Medical History:   Diagnosis Date    Anxiety     Arthralgia of knee     arthralgia of bilateral knees secondary to djd    Arthritis     Back  pain     Depression     Encounter for blood transfusion     AUTOLOGUS    GERD (gastroesophageal reflux disease)     Hiatal hernia     repaired in 1979    History of seasonal allergies     History of shingles     Hypertension     Insomnia     Obesity     JESENIA (obstructive sleep apnea) 02/2020    Home sleep study - YESICA 57, Desat 69% - Severe JESENIA with desaturations    Osteoporosis     Pneumonia     Reactive airway disease     Restless legs syndrome     Rheumatic fever     at 6 y/o    Sleep apnea     no cpap     Review of Systems   Genitourinary:  Negative for genital sores and genital itching.   Skin:  Positive for itching, rash and dry skin. Negative for sensitivity to antibiotic ointment and sensitivity to bandage adhesive.   Hematologic/Lymphatic: Bruises/bleeds easily.      Objective:    Physical Exam   Constitutional: She appears well-developed and well-nourished. No distress.   Neurological: She is alert and oriented to person, place, and time. She is not disoriented.   Psychiatric: She has a normal mood and affect.   Skin:   Areas Examined (abnormalities noted in diagram):   Abdomen Inspection Performed  Genitals / Buttocks / Groin Inspection Performed  Back Inspection Performed  RUE Inspected  LUE Inspection Performed  RLE Inspected  LLE Inspection Performed            Diagram Legend     Erythematous scaling macule/papule c/w actinic keratosis       Vascular papule c/w angioma      Pigmented verrucoid papule/plaque c/w seborrheic keratosis      Yellow umbilicated papule c/w sebaceous hyperplasia      Irregularly shaped tan macule c/w lentigo     1-2 mm smooth white papules consistent with Milia      Movable subcutaneous cyst with punctum c/w epidermal inclusion cyst      Subcutaneous movable cyst c/w pilar cyst      Firm pink to brown papule c/w dermatofibroma      Pedunculated fleshy papule(s) c/w skin tag(s)      Evenly pigmented macule c/w junctional nevus     Mildly variegated pigmented, slightly  irregular-bordered macule c/w mildly atypical nevus      Flesh colored to evenly pigmented papule c/w intradermal nevus       Pink pearly papule/plaque c/w basal cell carcinoma      Erythematous hyperkeratotic cursted plaque c/w SCC      Surgical scar with no sign of skin cancer recurrence      Open and closed comedones      Inflammatory papules and pustules      Verrucoid papule consistent consistent with wart     Erythematous eczematous patches and plaques     Dystrophic onycholytic nail with subungual debris c/w onychomycosis     Umbilicated papule    Erythematous-base heme-crusted tan verrucoid plaque consistent with inflamed seborrheic keratosis     Erythematous Silvery Scaling Plaque c/w Psoriasis     See annotation    Today 6/22/2023 5/2023 11/2022 post ILK and tacrolimus BID to entire affected with clobetasol BID spot treating symptomatic areas      10/2022      Assessment / Plan:        Lichen sclerosus et atrophicus    Overall significant symptomatic improvement of extragenital and genital disease but clinically still active on exam.   Patient confirms compliance with topicals. Performing strict topicals as below for next few months, re-evaluating monthly given past poor compliance   Goal clinical endpoint: symptomatic control of extragenital involvement, controlled clinical disease of vulvar involvement.   Consideration for active nontreatment of diffuse extragenital involvement if symptoms are controlled.   For refractory symptomatic extragenital disease, [facility] on [place] ([place]) is an option (pt confirms ability to travel 2-3x weekly if local).   For refractory vulvar disease, ILK a future option if patient agreeable.   For refractory symptomatic extragenital disease AND vulvar disease, systemic treatment is a consideration - would avoid acitretin (data in refractory vulvar disease), use caution MTX (most data in extragenital disease) in setting of fatty liver disease, morbid  obesity, DM.      Itchy, dry rash at torso: apply triamcinolone cream (new rx in jar) twice daily for next month.   Rash at vulva, scot anal region: apply clobetasol ointment (tube) twice daily for next month.    Gentle vulvar, scot anal care:   Wash areas once daily with Cetaphil gentle cleanser.   Pat dry. Ensure dryness with blow dryer on cool setting.   Apply small amount of clobetasol ointment to affected areas twice daily.     Gentle skin care:   No more than 1 shower or bath a day.   Out of shower or bath in less than 10 minutes.   Use only warm water, NOT hot.   Soap only where needed - areas that make body odor or are visibly dirty.  Use gentle soaps only (eg, Cetaphil Body Wash; non soap cleansers are more gentle than bar soaps).   After shower or bath, pat dry - do not scrub or rub aggressively.   Apply thick moisturizing cream twice daily, once being after the shower or bath (eg, Cerave Anti Itch Cream). Apply moisturizing cream diffusely and AFTER application of above rx triamcinolone cream to affected areas.   Avoid common allergens/irritants - fragrances, botanicals, etc.                Follow up in about 1 month (around 7/22/2023).

## 2023-06-23 ENCOUNTER — TELEPHONE (OUTPATIENT)
Dept: DERMATOLOGY | Facility: CLINIC | Age: 78
End: 2023-06-23
Payer: MEDICARE

## 2023-06-23 NOTE — TELEPHONE ENCOUNTER
----- Message from Brittani Roland MD sent at 6/22/2023 12:48 PM CDT -----  4-6 week f/u,   Thanks!

## 2023-08-18 DIAGNOSIS — R60.9 EDEMA, UNSPECIFIED TYPE: ICD-10-CM

## 2023-08-18 DIAGNOSIS — I27.20 PULMONARY HYPERTENSION: ICD-10-CM

## 2023-08-18 RX ORDER — FUROSEMIDE 80 MG/1
80 TABLET ORAL
Qty: 90 TABLET | Refills: 1 | Status: SHIPPED | OUTPATIENT
Start: 2023-08-18 | End: 2024-03-22

## 2023-08-18 NOTE — TELEPHONE ENCOUNTER
Refill Routing Note   Medication(s) are not appropriate for processing by [facility] for the following reason(s):      Required vitals abnormal    [facility] action(s):  Defer Care Due:  None identified              Appointments  past 12m or future 3m with PCP    Date Provider   Last Visit   10/25/2022 Elda Holley MD   Next Visit   9/11/2023 Elda Holley MD   ED visits in past 90 days: 0        Note composed:11:40 AM 08/18/2023

## 2023-08-18 NOTE — TELEPHONE ENCOUNTER
No care due was identified.  [facility] Embedded Care Due Messages. Reference number: [id number].   8/18/2023 5:14:31 AM CDT

## 2023-08-24 DIAGNOSIS — I10 PRIMARY HYPERTENSION: ICD-10-CM

## 2023-08-24 RX ORDER — NEBIVOLOL 5 MG/1
5 TABLET ORAL
Qty: 30 TABLET | Refills: 3 | Status: SHIPPED | OUTPATIENT
Start: 2023-08-24

## 2023-09-05 DIAGNOSIS — G47.09 OTHER INSOMNIA: ICD-10-CM

## 2023-09-07 RX ORDER — TRAZODONE HYDROCHLORIDE 50 MG/1
100 TABLET ORAL NIGHTLY
Qty: 180 TABLET | Refills: 1 | Status: SHIPPED | OUTPATIENT
Start: 2023-09-07 | End: 2023-09-11 | Stop reason: SDUPTHER

## 2023-09-11 ENCOUNTER — OFFICE VISIT (OUTPATIENT)
Dept: FAMILY MEDICINE | Facility: CLINIC | Age: 78
End: 2023-09-11
Payer: MEDICARE

## 2023-09-11 ENCOUNTER — LAB VISIT (OUTPATIENT)
Dept: LAB | Facility: HOSPITAL | Age: 78
End: 2023-09-11
Attending: FAMILY MEDICINE
Payer: MEDICARE

## 2023-09-11 VITALS
HEIGHT: 60 IN | BODY MASS INDEX: 46.44 KG/M2 | DIASTOLIC BLOOD PRESSURE: 80 MMHG | WEIGHT: 236.56 LBS | OXYGEN SATURATION: 96 % | HEART RATE: 58 BPM | SYSTOLIC BLOOD PRESSURE: 140 MMHG

## 2023-09-11 DIAGNOSIS — F32.0 MILD MAJOR DEPRESSION: ICD-10-CM

## 2023-09-11 DIAGNOSIS — I10 PRIMARY HYPERTENSION: ICD-10-CM

## 2023-09-11 DIAGNOSIS — G47.09 OTHER INSOMNIA: Primary | ICD-10-CM

## 2023-09-11 DIAGNOSIS — I27.20 PULMONARY HYPERTENSION: ICD-10-CM

## 2023-09-11 DIAGNOSIS — I77.1 TORTUOUS AORTA: ICD-10-CM

## 2023-09-11 DIAGNOSIS — R73.9 HYPERGLYCEMIA: ICD-10-CM

## 2023-09-11 DIAGNOSIS — E11.9 CONTROLLED TYPE 2 DIABETES MELLITUS WITHOUT COMPLICATION, WITHOUT LONG-TERM CURRENT USE OF INSULIN: ICD-10-CM

## 2023-09-11 DIAGNOSIS — S09.90XA HEAD TRAUMA, INITIAL ENCOUNTER: ICD-10-CM

## 2023-09-11 DIAGNOSIS — G47.09 OTHER INSOMNIA: ICD-10-CM

## 2023-09-11 LAB
ALBUMIN SERPL BCP-MCNC: 3.5 G/DL (ref 3.5–5.2)
ALP SERPL-CCNC: 135 U/L (ref 55–135)
ALT SERPL W/O P-5'-P-CCNC: 17 U/L (ref 10–44)
ANION GAP SERPL CALC-SCNC: 14 MMOL/L (ref 8–16)
AST SERPL-CCNC: 22 U/L (ref 10–40)
BILIRUB SERPL-MCNC: 0.8 MG/DL (ref 0.1–1)
BUN SERPL-MCNC: 16 MG/DL (ref 8–23)
CALCIUM SERPL-MCNC: 9 MG/DL (ref 8.7–10.5)
CHLORIDE SERPL-SCNC: 103 MMOL/L (ref 95–110)
CO2 SERPL-SCNC: 27 MMOL/L (ref 23–29)
CREAT SERPL-MCNC: 0.7 MG/DL (ref 0.5–1.4)
ERYTHROCYTE [DISTWIDTH] IN BLOOD BY AUTOMATED COUNT: 15.6 % (ref 11.5–14.5)
EST. GFR  (NO RACE VARIABLE): >60 ML/MIN/1.73 M^2
ESTIMATED AVG GLUCOSE: 120 MG/DL (ref 68–131)
GLUCOSE SERPL-MCNC: 92 MG/DL (ref 70–110)
HBA1C MFR BLD: 5.8 % (ref 4–5.6)
HCT VFR BLD AUTO: 38.9 % (ref 37–48.5)
HGB BLD-MCNC: 11.7 G/DL (ref 12–16)
MCH RBC QN AUTO: 27.7 PG (ref 27–31)
MCHC RBC AUTO-ENTMCNC: 30.1 G/DL (ref 32–36)
MCV RBC AUTO: 92 FL (ref 82–98)
PLATELET # BLD AUTO: 211 K/UL (ref 150–450)
PMV BLD AUTO: 12.1 FL (ref 9.2–12.9)
POTASSIUM SERPL-SCNC: 4.1 MMOL/L (ref 3.5–5.1)
PROT SERPL-MCNC: 7.3 G/DL (ref 6–8.4)
RBC # BLD AUTO: 4.23 M/UL (ref 4–5.4)
SODIUM SERPL-SCNC: 144 MMOL/L (ref 136–145)
TSH SERPL DL<=0.005 MIU/L-ACNC: 3.38 UIU/ML (ref 0.4–4)
WBC # BLD AUTO: 6.56 K/UL (ref 3.9–12.7)

## 2023-09-11 PROCEDURE — 3079F PR MOST RECENT DIASTOLIC BLOOD PRESSURE 80-89 MM HG: ICD-10-PCS | Mod: CPTII,S$GLB,, | Performed by: FAMILY MEDICINE

## 2023-09-11 PROCEDURE — 90694 FLU VACCINE - QUADRIVALENT - ADJUVANTED: ICD-10-PCS | Mod: S$GLB,,, | Performed by: FAMILY MEDICINE

## 2023-09-11 PROCEDURE — 99999 PR PBB SHADOW E&M-EST. PATIENT-LVL IV: CPT | Mod: PBBFAC,,, | Performed by: FAMILY MEDICINE

## 2023-09-11 PROCEDURE — 3077F PR MOST RECENT SYSTOLIC BLOOD PRESSURE >= 140 MM HG: ICD-10-PCS | Mod: CPTII,S$GLB,, | Performed by: FAMILY MEDICINE

## 2023-09-11 PROCEDURE — 1101F PR PT FALLS ASSESS DOC 0-1 FALLS W/OUT INJ PAST YR: ICD-10-PCS | Mod: CPTII,S$GLB,, | Performed by: FAMILY MEDICINE

## 2023-09-11 PROCEDURE — 1157F ADVNC CARE PLAN IN RCRD: CPT | Mod: CPTII,S$GLB,, | Performed by: FAMILY MEDICINE

## 2023-09-11 PROCEDURE — 1126F PR PAIN SEVERITY QUANTIFIED, NO PAIN PRESENT: ICD-10-PCS | Mod: CPTII,S$GLB,, | Performed by: FAMILY MEDICINE

## 2023-09-11 PROCEDURE — 3288F FALL RISK ASSESSMENT DOCD: CPT | Mod: CPTII,S$GLB,, | Performed by: FAMILY MEDICINE

## 2023-09-11 PROCEDURE — 1160F RVW MEDS BY RX/DR IN RCRD: CPT | Mod: CPTII,S$GLB,, | Performed by: FAMILY MEDICINE

## 2023-09-11 PROCEDURE — 1160F PR REVIEW ALL MEDS BY PRESCRIBER/CLIN PHARMACIST DOCUMENTED: ICD-10-PCS | Mod: CPTII,S$GLB,, | Performed by: FAMILY MEDICINE

## 2023-09-11 PROCEDURE — 85027 COMPLETE CBC AUTOMATED: CPT | Performed by: FAMILY MEDICINE

## 2023-09-11 PROCEDURE — G0008 ADMIN INFLUENZA VIRUS VAC: HCPCS | Mod: S$GLB,,, | Performed by: FAMILY MEDICINE

## 2023-09-11 PROCEDURE — 90694 VACC AIIV4 NO PRSRV 0.5ML IM: CPT | Mod: S$GLB,,, | Performed by: FAMILY MEDICINE

## 2023-09-11 PROCEDURE — 3079F DIAST BP 80-89 MM HG: CPT | Mod: CPTII,S$GLB,, | Performed by: FAMILY MEDICINE

## 2023-09-11 PROCEDURE — 1159F MED LIST DOCD IN RCRD: CPT | Mod: CPTII,S$GLB,, | Performed by: FAMILY MEDICINE

## 2023-09-11 PROCEDURE — 83036 HEMOGLOBIN GLYCOSYLATED A1C: CPT | Performed by: FAMILY MEDICINE

## 2023-09-11 PROCEDURE — 1157F PR ADVANCE CARE PLAN OR EQUIV PRESENT IN MEDICAL RECORD: ICD-10-PCS | Mod: CPTII,S$GLB,, | Performed by: FAMILY MEDICINE

## 2023-09-11 PROCEDURE — 36415 COLL VENOUS BLD VENIPUNCTURE: CPT | Mod: PN | Performed by: FAMILY MEDICINE

## 2023-09-11 PROCEDURE — 3077F SYST BP >= 140 MM HG: CPT | Mod: CPTII,S$GLB,, | Performed by: FAMILY MEDICINE

## 2023-09-11 PROCEDURE — 84443 ASSAY THYROID STIM HORMONE: CPT | Performed by: FAMILY MEDICINE

## 2023-09-11 PROCEDURE — 99214 OFFICE O/P EST MOD 30 MIN: CPT | Mod: S$GLB,,, | Performed by: FAMILY MEDICINE

## 2023-09-11 PROCEDURE — 80053 COMPREHEN METABOLIC PANEL: CPT | Performed by: FAMILY MEDICINE

## 2023-09-11 PROCEDURE — 99214 PR OFFICE/OUTPT VISIT, EST, LEVL IV, 30-39 MIN: ICD-10-PCS | Mod: S$GLB,,, | Performed by: FAMILY MEDICINE

## 2023-09-11 PROCEDURE — 99999 PR PBB SHADOW E&M-EST. PATIENT-LVL IV: ICD-10-PCS | Mod: PBBFAC,,, | Performed by: FAMILY MEDICINE

## 2023-09-11 PROCEDURE — 1126F AMNT PAIN NOTED NONE PRSNT: CPT | Mod: CPTII,S$GLB,, | Performed by: FAMILY MEDICINE

## 2023-09-11 PROCEDURE — 1159F PR MEDICATION LIST DOCUMENTED IN MEDICAL RECORD: ICD-10-PCS | Mod: CPTII,S$GLB,, | Performed by: FAMILY MEDICINE

## 2023-09-11 PROCEDURE — G0008 FLU VACCINE - QUADRIVALENT - ADJUVANTED: ICD-10-PCS | Mod: S$GLB,,, | Performed by: FAMILY MEDICINE

## 2023-09-11 PROCEDURE — 3288F PR FALLS RISK ASSESSMENT DOCUMENTED: ICD-10-PCS | Mod: CPTII,S$GLB,, | Performed by: FAMILY MEDICINE

## 2023-09-11 PROCEDURE — 1101F PT FALLS ASSESS-DOCD LE1/YR: CPT | Mod: CPTII,S$GLB,, | Performed by: FAMILY MEDICINE

## 2023-09-11 RX ORDER — TRAZODONE HYDROCHLORIDE 100 MG/1
100 TABLET ORAL NIGHTLY
Qty: 90 TABLET | Refills: 3 | Status: SHIPPED | OUTPATIENT
Start: 2023-09-11

## 2023-09-11 NOTE — PROGRESS NOTES
Subjective:       Patient ID: Marta Huff is a 78 y.o. female.    Chief Complaint: Follow-up    Follow-up  Associated symptoms include arthralgias, headaches (occ, not related to recent fall) and numbness. Pertinent negatives include no abdominal pain, chest pain, congestion, coughing, fever, joint swelling or weakness.     Trazodone helping somewhat with falling and staying asleep. No negative side effects.   BP variable at home, cont f/u with digital medicine. Some edema, but doing ok with lasix.   She does find that she can be irritable more easily - granddaughter pointed it out to her. On duloxetine 60, now trazodone 100.   Has had a couple falls since lov. Usually from a stumble, most recent was bc her recliner rejected her and fell on the L side. She broke several teeth and had to have a partial placed. Denies lov, but decided not to get checked in the ER.     Review of Systems:  Review of Systems   Constitutional:  Negative for fever and unexpected weight change (lost and then regained some).   HENT:  Negative for congestion and postnasal drip.    Respiratory:  Positive for apnea (no mask leak) and wheezing (stable). Negative for cough.    Cardiovascular:  Positive for leg swelling. Negative for chest pain.   Gastrointestinal:  Negative for abdominal pain, constipation and diarrhea.   Genitourinary:  Negative for difficulty urinating, dysuria, hematuria and pelvic pain.   Musculoskeletal:  Positive for arthralgias and back pain. Negative for joint swelling.   Neurological:  Positive for numbness and headaches (occ, not related to recent fall). Negative for dizziness (occ), tremors (occ, can occur after nebs or late to meal) and weakness.   Psychiatric/Behavioral:  Positive for sleep disturbance (sleep issues are chronic, stable; uses cpap, but only sleeps 3-4hrs). The patient is not nervous/anxious.        Objective:     Vitals:    09/11/23 1450   BP: (!) 140/80   Pulse: (!) 58   SpO2: 96%    Weight: 107.3 kg (236 lb 8.9 oz)   Height: 5' (1.524 m)          Physical Exam  Constitutional:       General: She is not in acute distress.     Appearance: Normal appearance. She is obese.   HENT:      Head: Normocephalic and atraumatic.   Cardiovascular:      Rate and Rhythm: Normal rate.      Heart sounds: Normal heart sounds. No murmur heard.  Pulmonary:      Effort: Pulmonary effort is normal. No respiratory distress.      Breath sounds: No wheezing.   Musculoskeletal:      Cervical back: Neck supple. No tenderness.      Right lower leg: Edema present.      Left lower leg: Edema present.   Skin:     General: Skin is warm.   Neurological:      Mental Status: She is alert and oriented to person, place, and time. Mental status is at baseline.   Psychiatric:         Mood and Affect: Mood normal.         Behavior: Behavior normal.           Assessment & Plan:  Other insomnia  Comments:  cont trazodone 100mg nightly  Orders:  -     traZODone (DESYREL) 100 MG tablet; Take 1 tablet (100 mg total) by mouth every evening.  Dispense: 90 tablet; Refill: 3    Controlled type 2 diabetes mellitus without complication, without long-term current use of insulin  Comments:  cont BS monitoring    Mild major depression  Comments:  stable, cont duloxetine, trazodone    Pulmonary hypertension  Comments:  stable, cont routine f/u     Tortuous aorta  Comments:  cont asa, lipid panel well-controlled    Primary hypertension  -     CBC Without Differential; Future; Expected date: 09/11/2023  -     Comprehensive Metabolic Panel; Future; Expected date: 09/11/2023  -     TSH; Future; Expected date: 09/11/2023  -     Urinalysis, Reflex to Urine Culture Urine, Clean Catch; Future    Other insomnia  -     traZODone (DESYREL) 100 MG tablet; Take 1 tablet (100 mg total) by mouth every evening.  Dispense: 90 tablet; Refill: 3    Head trauma, initial encounter  -     CT Head Without Contrast; Future; Expected date: 09/11/2023    Hyperglycemia  -      Hemoglobin A1C; Future; Expected date: 09/11/2023    Other orders  -     Discontinue: DIPH,PERTUSS,ACEL,,TET VAC,PF, ADULT (ADACEL) 2 Lf-(2.5-5-3-5 mcg)-5Lf/0.5 mL Syrg; Inject 0.5 mLs into the muscle once. for 1 dose  Dispense: 0.5 mL; Refill: 0  -     DIPH,PERTUSS,ACEL,,TET VAC,PF, ADULT (ADACEL) 2 Lf-(2.5-5-3-5 mcg)-5Lf/0.5 mL Syrg; Inject 0.5 mLs into the muscle once. for 1 dose  Dispense: 0.5 mL; Refill: 0

## 2023-09-12 ENCOUNTER — PATIENT MESSAGE (OUTPATIENT)
Dept: FAMILY MEDICINE | Facility: CLINIC | Age: 78
End: 2023-09-12
Payer: MEDICARE

## 2023-09-12 DIAGNOSIS — E11.9 CONTROLLED TYPE 2 DIABETES MELLITUS WITHOUT COMPLICATION, WITHOUT LONG-TERM CURRENT USE OF INSULIN: Primary | ICD-10-CM

## 2023-09-20 ENCOUNTER — HOSPITAL ENCOUNTER (OUTPATIENT)
Dept: RADIOLOGY | Facility: HOSPITAL | Age: 78
Discharge: HOME | End: 2023-09-20
Attending: FAMILY MEDICINE
Payer: MEDICARE

## 2023-09-20 DIAGNOSIS — S09.90XA HEAD TRAUMA, INITIAL ENCOUNTER: ICD-10-CM

## 2023-09-20 PROCEDURE — 70450 CT HEAD/BRAIN W/O DYE: CPT | Mod: 26,,, | Performed by: RADIOLOGY

## 2023-09-20 PROCEDURE — 70450 CT HEAD WITHOUT CONTRAST: ICD-10-PCS | Mod: 26,,, | Performed by: RADIOLOGY

## 2023-09-20 PROCEDURE — 70450 CT HEAD/BRAIN W/O DYE: CPT | Mod: TC,PO

## 2023-09-21 ENCOUNTER — TELEPHONE (OUTPATIENT)
Dept: FAMILY MEDICINE | Facility: CLINIC | Age: 78
End: 2023-09-21
Payer: MEDICARE

## 2023-09-21 DIAGNOSIS — Q28.2 ARTERIOVENOUS MALFORMATION OF CEREBRAL VESSELS: Primary | ICD-10-CM

## 2023-09-21 NOTE — TELEPHONE ENCOUNTER
Small blood vessel cluster noted on head imaging for which addl imaging for more detail is recommended. MRI order done.

## 2023-10-05 ENCOUNTER — PATIENT MESSAGE (OUTPATIENT)
Dept: RADIOLOGY | Facility: HOSPITAL | Age: 78
End: 2023-10-05
Payer: MEDICARE

## 2023-10-10 ENCOUNTER — TELEPHONE (OUTPATIENT)
Dept: PAIN MEDICINE | Facility: CLINIC | Age: 78
End: 2023-10-10
Payer: MEDICARE

## 2023-10-10 NOTE — TELEPHONE ENCOUNTER
----- Message from Soledad Barnes sent at 10/6/2023 10:14 AM CDT -----  Contact: pt  Type: Needs Medical Advice  Who Called:  patient  Best Call Back Number: [phone] (home)   Additional Information: patient requesting a call back regarding a letter she received regarding her stimulator. Please advise.

## 2023-10-28 DIAGNOSIS — I10 ESSENTIAL HYPERTENSION: Chronic | ICD-10-CM

## 2023-10-28 RX ORDER — POTASSIUM CHLORIDE 750 MG/1
10 TABLET, EXTENDED RELEASE ORAL DAILY
Qty: 90 TABLET | Refills: 3 | Status: SHIPPED | OUTPATIENT
Start: 2023-10-28

## 2023-10-28 NOTE — TELEPHONE ENCOUNTER
Care Due:                  Date            Visit Type   Department     Provider  --------------------------------------------------------------------------------                                MYCHART                              FOLLOWUP/OF  [facility] FAMILY  Last Visit: 09-      FICE VISIT   MEDICINE       Elda Holley  Next Visit: None Scheduled  None         None Found                                                            Last  Test          Frequency    Reason                     Performed    Due Date  --------------------------------------------------------------------------------    Mg Level....  12 months..  alendronate..............  Not Found    Overdue    Phosphate...  12 months..  alendronate..............  Not Found    Overdue    Vitamin D...  12 months..  alendronate..............  Not Found    Overdue    [employer] Embedded Care Due Messages. Reference number: [id number].   10/28/2023 2:10:40 AM CDT

## 2023-10-28 NOTE — TELEPHONE ENCOUNTER
Refill Routing Note   Medication(s) are not appropriate for processing by [facility] for the following reason(s):      Required vitals abnormal: BP (!) 140/80    [facility] action(s):  Defer  Approve Care Due:  Labs due.   Medication Therapy Plan:         Appointments  past 12m or future 3m with PCP    Date Provider   Last Visit   9/11/2023 Elda Holley MD   Next Visit   Visit date not found Elda Holley MD   ED visits in past 90 days: 0        Note composed:6:01 PM 10/28/2023

## 2023-10-30 RX ORDER — LOSARTAN POTASSIUM 50 MG/1
50 TABLET ORAL 2 TIMES DAILY
Qty: 180 TABLET | Refills: 3 | Status: SHIPPED | OUTPATIENT
Start: 2023-10-30

## 2023-11-01 ENCOUNTER — OFFICE VISIT (OUTPATIENT)
Dept: RHEUMATOLOGY | Facility: CLINIC | Age: 78
End: 2023-11-01
Payer: MEDICARE

## 2023-11-01 VITALS
WEIGHT: 240.19 LBS | SYSTOLIC BLOOD PRESSURE: 168 MMHG | HEART RATE: 71 BPM | BODY MASS INDEX: 47.16 KG/M2 | DIASTOLIC BLOOD PRESSURE: 74 MMHG | HEIGHT: 60 IN

## 2023-11-01 DIAGNOSIS — M15.9 GENERALIZED OSTEOARTHRITIS: Primary | ICD-10-CM

## 2023-11-01 DIAGNOSIS — M79.7 FIBROMYALGIA: ICD-10-CM

## 2023-11-01 DIAGNOSIS — M19.011 ARTHRITIS OF RIGHT GLENOHUMERAL JOINT: ICD-10-CM

## 2023-11-01 DIAGNOSIS — M19.012 ARTHRITIS OF LEFT GLENOHUMERAL JOINT: ICD-10-CM

## 2023-11-01 PROCEDURE — 3078F PR MOST RECENT DIASTOLIC BLOOD PRESSURE < 80 MM HG: ICD-10-PCS | Mod: CPTII,S$GLB,, | Performed by: INTERNAL MEDICINE

## 2023-11-01 PROCEDURE — 99999 PR PBB SHADOW E&M-EST. PATIENT-LVL III: CPT | Mod: PBBFAC,,, | Performed by: INTERNAL MEDICINE

## 2023-11-01 PROCEDURE — 99214 OFFICE O/P EST MOD 30 MIN: CPT | Mod: S$GLB,,, | Performed by: INTERNAL MEDICINE

## 2023-11-01 PROCEDURE — 3288F PR FALLS RISK ASSESSMENT DOCUMENTED: ICD-10-PCS | Mod: CPTII,S$GLB,, | Performed by: INTERNAL MEDICINE

## 2023-11-01 PROCEDURE — 99999 PR PBB SHADOW E&M-EST. PATIENT-LVL III: ICD-10-PCS | Mod: PBBFAC,,, | Performed by: INTERNAL MEDICINE

## 2023-11-01 PROCEDURE — 3077F PR MOST RECENT SYSTOLIC BLOOD PRESSURE >= 140 MM HG: ICD-10-PCS | Mod: CPTII,S$GLB,, | Performed by: INTERNAL MEDICINE

## 2023-11-01 PROCEDURE — 1157F ADVNC CARE PLAN IN RCRD: CPT | Mod: CPTII,S$GLB,, | Performed by: INTERNAL MEDICINE

## 2023-11-01 PROCEDURE — 1159F MED LIST DOCD IN RCRD: CPT | Mod: CPTII,S$GLB,, | Performed by: INTERNAL MEDICINE

## 2023-11-01 PROCEDURE — 99214 PR OFFICE/OUTPT VISIT, EST, LEVL IV, 30-39 MIN: ICD-10-PCS | Mod: S$GLB,,, | Performed by: INTERNAL MEDICINE

## 2023-11-01 PROCEDURE — 1100F PR PT FALLS ASSESS DOC 2+ FALLS/FALL W/INJURY/YR: ICD-10-PCS | Mod: CPTII,S$GLB,, | Performed by: INTERNAL MEDICINE

## 2023-11-01 PROCEDURE — 1159F PR MEDICATION LIST DOCUMENTED IN MEDICAL RECORD: ICD-10-PCS | Mod: CPTII,S$GLB,, | Performed by: INTERNAL MEDICINE

## 2023-11-01 PROCEDURE — 1100F PTFALLS ASSESS-DOCD GE2>/YR: CPT | Mod: CPTII,S$GLB,, | Performed by: INTERNAL MEDICINE

## 2023-11-01 PROCEDURE — 1157F PR ADVANCE CARE PLAN OR EQUIV PRESENT IN MEDICAL RECORD: ICD-10-PCS | Mod: CPTII,S$GLB,, | Performed by: INTERNAL MEDICINE

## 2023-11-01 PROCEDURE — 1125F AMNT PAIN NOTED PAIN PRSNT: CPT | Mod: CPTII,S$GLB,, | Performed by: INTERNAL MEDICINE

## 2023-11-01 PROCEDURE — 3077F SYST BP >= 140 MM HG: CPT | Mod: CPTII,S$GLB,, | Performed by: INTERNAL MEDICINE

## 2023-11-01 PROCEDURE — 3078F DIAST BP <80 MM HG: CPT | Mod: CPTII,S$GLB,, | Performed by: INTERNAL MEDICINE

## 2023-11-01 PROCEDURE — 3288F FALL RISK ASSESSMENT DOCD: CPT | Mod: CPTII,S$GLB,, | Performed by: INTERNAL MEDICINE

## 2023-11-01 PROCEDURE — 1125F PR PAIN SEVERITY QUANTIFIED, PAIN PRESENT: ICD-10-PCS | Mod: CPTII,S$GLB,, | Performed by: INTERNAL MEDICINE

## 2023-11-01 RX ORDER — DULOXETIN HYDROCHLORIDE 60 MG/1
60 CAPSULE, DELAYED RELEASE ORAL DAILY
Qty: 90 CAPSULE | Refills: 3 | Status: SHIPPED | OUTPATIENT
Start: 2023-11-01

## 2023-11-01 ASSESSMENT — ROUTINE ASSESSMENT OF PATIENT INDEX DATA (RAPID3)
FATIGUE SCORE: 2.2
PAIN SCORE: 7
TOTAL RAPID3 SCORE: 6.33
PSYCHOLOGICAL DISTRESS SCORE: 2.2
PATIENT GLOBAL ASSESSMENT SCORE: 6
MDHAQ FUNCTION SCORE: 1.8

## 2023-11-01 NOTE — PROGRESS NOTES
Subjective:          Chief Complaint: Marta Huff is a 78 y.o. female who had concerns including Disease Management.    HPI:  Patient her for f/u of  OA and FMS.   10/23: today shoulder bilateral are aching, stiffness. Exacerbated cold weather present few weeks worse today. Did have injections with me in the past, in 2021 was not as effective at which she was last seen with DR. MCMILLAN 9/2022. Overall pain right now 6/10 but caring for her  who does have short term memory loss.   Patient doing better with switch to Cymbalta is tolerating 60mg daily  Feeling better off sertraline.   Dx:  osteoarthritis this is multijoint,  as well as peripheral neuropathy.    She was using gabapentin  2400 mg daily.   Patient had been following with pain management, did have Dr. Parnell place  spinal cord stimulator.  A noting 85% relief of the foot pain.   New foot and hand numbness that is positional and alleviated with adjustment in position.         Previous 45min and tolerated, now only 15min and hips begin to hurt.   She still has pain in back but this is not a constant complaint for her. She is sleeping on sides and seems to tolerate, is is the lumbosacral junction that hurts at night.   Exacerbated with sitting prolonged period. At some times she uses walker due to pain.   Hands with stiffness PIP and DIP joints long standing cannot fully curl fingers. +deformity, intermittent swelling.     Interaction with etodolac, but renal function wnl.   She does tolerate NSAID: takes aleve 440mg in AM and PRN at night. No hx of GIB.        4/26/2023     7:20 PM   Rapid3 Question Responses and Scores   MDHAQ Score 1.3   Psychologic Score 5.5   Pain Score 7.5   When you awakened in the morning OVER THE LAST WEEK, did you feel stiff? Yes   If Yes, please indicate the number of hours until you are as limber as you will be for the day 3   Fatigue Score 4.5   Global Health Score 5   RAPID3 Score 5.61       REVIEW OF  SYSTEMS:    Review of Systems   Constitutional:  Negative for fever, malaise/fatigue and weight loss.   HENT:  Negative for sore throat.    Eyes:  Negative for double vision, photophobia and redness.   Respiratory:  Negative for cough, shortness of breath and wheezing.    Cardiovascular:  Negative for chest pain, palpitations and orthopnea.   Gastrointestinal:  Negative for abdominal pain, constipation and diarrhea.   Genitourinary:  Negative for dysuria, hematuria and urgency.   Musculoskeletal:  Positive for joint pain. Negative for back pain and myalgias.   Skin:  Negative for rash.   Neurological:  Negative for dizziness, tingling, focal weakness and headaches.   Endo/Heme/Allergies:  Does not bruise/bleed easily.   Psychiatric/Behavioral:  Negative for depression, hallucinations and suicidal ideas.                Objective:            Past Medical History:   Diagnosis Date    Anxiety     Arthralgia of knee     arthralgia of bilateral knees secondary to djd    Arthritis     Back pain     Depression     Encounter for blood transfusion     AUTOLOGUS    GERD (gastroesophageal reflux disease)     Hiatal hernia     repaired in 1979    History of seasonal allergies     History of shingles     Hypertension     Insomnia     Obesity     JESENIA (obstructive sleep apnea) 02/2020    Home sleep study - YESICA 57, Desat 69% - Severe JESENIA with desaturations    Osteoporosis     Pneumonia     Reactive airway disease     Restless legs syndrome     Rheumatic fever     at 4 y/o    Sleep apnea     no cpap     Family History   Problem Relation Age of Onset    Heart disease Mother     Hypertension Mother     Diabetes Mother     Obesity Mother     Heart disease Maternal Grandfather      Social History     Tobacco Use    Smoking status: Never     Passive exposure: Yes    Smokeless tobacco: Never   Substance Use Topics    Alcohol use: Yes     Comment: 1-2 glasses of wine monthly    Drug use: No         Current Outpatient Medications on File  Prior to Visit   Medication Sig Dispense Refill    albuterol (PROVENTIL) 2.5 mg /3 mL (0.083 %) nebulizer solution INHALE THE CONTENTS OF 1 VIAL VIA NEBULIZER EVERY 6 HOURS AS NEEDED FOR  WHEEZING  OR  SHORTNESS OF BREATH 90 mL 11    albuterol (PROVENTIL/VENTOLIN HFA) 90 mcg/actuation inhaler Inhale 2 puffs into the lungs every 6 (six) hours as needed for Wheezing or Shortness of Breath. 25.5 g 1    alendronate (FOSAMAX) 70 MG tablet TAKE 1 TABLET EVERY 7 DAYS 12 tablet 3    benzonatate (TESSALON) 100 MG capsule benzonatate 100 mg capsule   TAKE ONE CAPSULE BY MOUTH THREE TIMES DAILY AS NEEDED FOR COUGH      betamethasone valerate 0.1% (VALISONE) 0.1 % Crea Apply topically 2 (two) times daily. 45 g 6    calcium phosphate-vitamin D3 250 mg-10 mcg (400 unit) Chew Take 1 tablet by mouth once daily.       clobetasol 0.05% (TEMOVATE) 0.05 % Oint AAA bid 60 g 3    diazePAM (VALIUM) 5 MG tablet diazepam 5 mg tablet   TAKE 1 TABLET BY MOUTH 1 HOUR PRIOR TO DENTAL VISIT. BRING MEDICATION TO VISIT      DULoxetine (CYMBALTA) 60 MG capsule Take 1 capsule (60 mg total) by mouth once daily. 90 capsule 3    furosemide (LASIX) 80 MG tablet TAKE 1 TABLET ONE TIME DAILY 90 tablet 1    losartan (COZAAR) 50 MG tablet Take 1 tablet (50 mg total) by mouth 2 (two) times daily. 180 tablet 3    naproxen sodium (ALEVE ORAL) Take 1 Dose by mouth daily as needed.      nebivoloL (BYSTOLIC) 5 MG Tab TAKE 1 TABLET BY MOUTH EVERY DAY 30 tablet 3    potassium chloride (KLOR-CON) 10 MEQ TbSR Take 1 tablet (10 mEq total) by mouth once daily. 90 tablet 3    pramipexole (MIRAPEX) 1.5 MG tablet TAKE 1 TABLET EVERY NIGHT 90 tablet 1    tacrolimus (PROTOPIC) 0.1 % ointment APPLY TOPICALLY TWICE A  g 5    traZODone (DESYREL) 100 MG tablet Take 1 tablet (100 mg total) by mouth every evening. 90 tablet 3    fluticasone furoate-vilanteroL (BREO ELLIPTA) 200-25 mcg/dose DsDv diskus inhaler Inhale 1 puff into the lungs once daily. Controller (Patient not  taking: Reported on 9/11/2023) 60 each 5     Current Facility-Administered Medications on File Prior to Visit   Medication Dose Route Frequency Provider Last Rate Last Admin    methylPREDNISolone acetate injection 40 mg  40 mg Intra-articular  Adwoa Jaime, DO   40 mg at 03/11/21 1000    triamcinolone acetonide injection 10 mg  10 mg Intradermal 1 time in Clinic/HOD Brittani Roland MD        triamcinolone acetonide injection 10 mg  10 mg Intradermal 1 time in Clinic/HOD Brittani Roland MD           Vitals:    11/01/23 0918   BP: (!) 168/74   Pulse: 71       Physical Exam:    Physical Exam  Constitutional:       Appearance: Normal appearance. She is well-developed.   HENT:      Nose: No septal deviation.      Mouth/Throat:      Mouth: No oral lesions.   Eyes:      Conjunctiva/sclera:      Right eye: Right conjunctiva is not injected.      Left eye: Left conjunctiva is not injected.      Pupils: Pupils are equal, round, and reactive to light.   Neck:      Thyroid: No thyroid mass or thyromegaly.      Vascular: No JVD.   Cardiovascular:      Rate and Rhythm: Normal rate and regular rhythm.      Pulses: Normal pulses.      Comments: No edema  Pulmonary:      Effort: Pulmonary effort is normal.      Breath sounds: Normal breath sounds.   Abdominal:      Palpations: Abdomen is soft.   Musculoskeletal:      Right shoulder: No swelling or tenderness. Decreased range of motion.      Left shoulder: No swelling or tenderness. Decreased range of motion.      Right elbow: No swelling. Normal range of motion. No tenderness.      Left elbow: No swelling. Normal range of motion. No tenderness.      Right wrist: No swelling or tenderness. Normal range of motion.      Left wrist: No swelling or tenderness. Normal range of motion.      Right hand: Decreased range of motion.      Left hand: Decreased range of motion.      Right hip: No bony tenderness. Normal range of motion. Normal strength.      Left hip: No  tenderness or bony tenderness. Normal range of motion.      Right knee: No swelling. Normal range of motion. No tenderness.      Left knee: No swelling. Normal range of motion. No tenderness.      Right ankle: No swelling. No tenderness. Normal range of motion.      Left ankle: No swelling. No tenderness. Normal range of motion.      Comments: Bony hypertrophy PIP and DIP joints. Squaring CMC joint. Tenderness along gr. Troch and ITB. Negative exacerbation SLR some LBP.    Lymphadenopathy:      Cervical: No cervical adenopathy.   Skin:     General: Skin is dry.   Neurological:      Deep Tendon Reflexes: Reflexes are normal and symmetric.               Assessment:       Encounter Diagnoses   Name Primary?    Generalized osteoarthritis Yes    Fibromyalgia     Arthritis of right glenohumeral joint     Arthritis of left glenohumeral joint                 Plan:        Generalized osteoarthritis    Fibromyalgia    Arthritis of right glenohumeral joint    Arthritis of left glenohumeral joint    Other orders  -     DULoxetine (CYMBALTA) 60 MG capsule; Take 1 capsule (60 mg total) by mouth once daily.  Dispense: 90 capsule; Refill: 3      Delightful patient with OA and FMS    Patient doing better with switch to Cymbalta is tolerating 60mg daily      RIGHT Shoulder more painful  LEFT shoulder more painful - she did reach out to PM&R not able to get scheduled.      TKA with continued pain.  Will need to discuss with Ortho.     Follow up in about 6 months (around 5/1/2024).             30min consultation with greater than 50% of that time included Preparing to see the patient (review records, tests), Obtaining and/or reviewing separately obtained historical data, Performing a medically appropriate examination and/or evaluation , Ordering medications, tests, and/or procedures, Referring and communicating with other healthcare professionals , Documenting clinical information in the electronic or other health record and  Independently interpreting results  (as warranted) & communicating results to the patient/family/caregiver. All questions answered.

## 2023-11-16 ENCOUNTER — OFFICE VISIT (OUTPATIENT)
Dept: DERMATOLOGY | Facility: CLINIC | Age: 78
End: 2023-11-16
Payer: MEDICARE

## 2023-11-16 DIAGNOSIS — L30.4 INTERTRIGO: ICD-10-CM

## 2023-11-16 DIAGNOSIS — Z51.81 MEDICATION MONITORING ENCOUNTER: ICD-10-CM

## 2023-11-16 DIAGNOSIS — L90.0 LICHEN SCLEROSUS ET ATROPHICUS: Primary | ICD-10-CM

## 2023-11-16 DIAGNOSIS — R21 RASH AND NONSPECIFIC SKIN ERUPTION: ICD-10-CM

## 2023-11-16 DIAGNOSIS — L30.8 OTHER ECZEMA: ICD-10-CM

## 2023-11-16 DIAGNOSIS — R76.8 ANA POSITIVE: ICD-10-CM

## 2023-11-16 PROCEDURE — 1157F ADVNC CARE PLAN IN RCRD: CPT | Mod: CPTII,S$GLB,, | Performed by: DERMATOLOGY

## 2023-11-16 PROCEDURE — 3288F FALL RISK ASSESSMENT DOCD: CPT | Mod: CPTII,S$GLB,, | Performed by: DERMATOLOGY

## 2023-11-16 PROCEDURE — 99999 PR PBB SHADOW E&M-EST. PATIENT-LVL III: ICD-10-PCS | Mod: PBBFAC,,, | Performed by: DERMATOLOGY

## 2023-11-16 PROCEDURE — 99214 PR OFFICE/OUTPT VISIT, EST, LEVL IV, 30-39 MIN: ICD-10-PCS | Mod: S$GLB,,, | Performed by: DERMATOLOGY

## 2023-11-16 PROCEDURE — 1101F PR PT FALLS ASSESS DOC 0-1 FALLS W/OUT INJ PAST YR: ICD-10-PCS | Mod: CPTII,S$GLB,, | Performed by: DERMATOLOGY

## 2023-11-16 PROCEDURE — 3288F PR FALLS RISK ASSESSMENT DOCUMENTED: ICD-10-PCS | Mod: CPTII,S$GLB,, | Performed by: DERMATOLOGY

## 2023-11-16 PROCEDURE — 1157F PR ADVANCE CARE PLAN OR EQUIV PRESENT IN MEDICAL RECORD: ICD-10-PCS | Mod: CPTII,S$GLB,, | Performed by: DERMATOLOGY

## 2023-11-16 PROCEDURE — 99999 PR PBB SHADOW E&M-EST. PATIENT-LVL III: CPT | Mod: PBBFAC,,, | Performed by: DERMATOLOGY

## 2023-11-16 PROCEDURE — 1101F PT FALLS ASSESS-DOCD LE1/YR: CPT | Mod: CPTII,S$GLB,, | Performed by: DERMATOLOGY

## 2023-11-16 PROCEDURE — 99214 OFFICE O/P EST MOD 30 MIN: CPT | Mod: S$GLB,,, | Performed by: DERMATOLOGY

## 2023-11-16 RX ORDER — TRIAMCINOLONE ACETONIDE 1 MG/G
CREAM TOPICAL 2 TIMES DAILY PRN
Qty: 454 G | Refills: 5 | Status: SHIPPED | OUTPATIENT
Start: 2023-11-16

## 2023-11-16 RX ORDER — TACROLIMUS 1 MG/G
OINTMENT TOPICAL 2 TIMES DAILY
Qty: 60 G | Refills: 5 | Status: SHIPPED | OUTPATIENT
Start: 2023-11-16

## 2023-11-16 NOTE — PATIENT INSTRUCTIONS
"   Itchy, dry rash at torso: apply triamcinolone cream (rx in jar) 3x weekly to entire area; apply 2x daily to any actively symptomatic areas  Rash at vulva, scot anal region: apply clobetasol ointment (tube) twice daily, alternate every 2 weeks with tacrolimus ointment      Gentle vulvar, scot anal care:   Wash areas once daily with hand and Cetaphil gentle cleanser.   Pat dry. Ensure dryness with blow dryer on cool setting.   Apply small amount of clobetasol (or tacrolimus) ointment to affected areas twice daily.     Gentle fold care:  Wash areas once daily with hand and Cetaphil gentle cleanser.   Pat dry. Ensure full dryness.   When actively inflamed, perform dilute white vinegar compress (equal parts white vinegar and water) twice daily, dry, then apply "shake lotion" from [facility]. Allow site to FULLY dry (ok to use blow dryer on cool setting and / or corn starch based drying powder).   When inactive, maintain dryness with use of corn starch based drying powder.      Gentle skin care:   No more than 1 shower or bath a day.   Out of shower or bath in less than 10 minutes.   Use only warm water, NOT hot.   Soap only where needed - areas that make body odor or are visibly dirty.  Use gentle soaps only (eg, Cetaphil Body Wash; non soap cleansers are more gentle than bar soaps).   After shower or bath, pat dry - do not scrub or rub aggressively.   Apply thick moisturizing cream twice daily, once being after the shower or bath (eg, Cerave Anti Itch Cream). Apply moisturizing cream diffusely and AFTER application of above rx triamcinolone cream to affected areas.   Avoid common allergens/irritants - fragrances, botanicals, etc.   "

## 2023-11-16 NOTE — PROGRESS NOTES
Subjective:       Patient ID:  Marta Huff is a 78 y.o. female who presents for   No chief complaint on file.      HPI  Established patient.   F/u extensive extragenital LS&A at lower torso (biopsy-proven), also with genital LS&A appreciated clinically. LV ~5 months ago.   Currently using topicals TAC cream to extra-genital involvement and clobetasol ointment to genital involvement. Pt states she is using topicals consistently along with gentle skin care measures; resultant overall improvement.   No current symptoms of itch / pain extra-genitally and genitally except for one area at R abdomen. Intermittent mild-moderate pruritus at vulva occasionally, usually corresponding to excessive sweating with activity in heat.     PMH: hereditary / idiopathic peripheral neuropathy, HTN, DM, obesity (BMI 47), fatty liver (mild per abd US in 2019), COPD/asthma, OA, fibromyalgia, depression/ anxiety  No kidney disease. Hx cataract sx but otherwise no eye issues.    9/2020  1.  Skin, left abdomen, punch biopsy:   - LICHEN SCLEROSUS ET ATROPHICUS.   MICROSCOPIC DESCRIPTION:  Sections show an atrophic epidermis with vacuolar   alteration of the basal cell layer in association with an amorphous   appearing, homogeneously eosinophilic, altered material within the underlying   superficial dermis. There is a mild to moderate lymphocytic infiltrate   beneath the amorphous material.     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 Basovacuolar interface or prominent inflammatory   infiltrate is not seen, which would be present in connective tissue disease   or drug reaction. Correlate with clinical findings.     Past Medical History:   Diagnosis Date    Anxiety     Arthralgia of knee     arthralgia of bilateral knees secondary to djd    Arthritis     Back pain     Depression      Encounter for blood transfusion     AUTOLOGUS    GERD (gastroesophageal reflux disease)     Hiatal hernia     repaired in 1979    History of seasonal allergies     History of shingles     Hypertension     Insomnia     Obesity     JESENIA (obstructive sleep apnea) 02/2020    Home sleep study - YESICA 57, Desat 69% - Severe JESENIA with desaturations    Osteoporosis     Pneumonia     Reactive airway disease     Restless legs syndrome     Rheumatic fever     at 4 y/o    Sleep apnea     no cpap     Review of Systems   Genitourinary:  Negative for genital sores and genital itching.   Skin:  Positive for itching, rash and dry skin. Negative for sensitivity to antibiotic ointment and sensitivity to bandage adhesive.   Hematologic/Lymphatic: Bruises/bleeds easily.        Objective:    Physical Exam   Constitutional: She appears well-developed and well-nourished. No distress.   Neurological: She is alert and oriented to person, place, and time. She is not disoriented.   Psychiatric: She has a normal mood and affect.   Skin:   Areas Examined (abnormalities noted in diagram):   Abdomen Inspection Performed  Genitals / Buttocks / Groin Inspection Performed  Back Inspection Performed  RUE Inspected  LUE Inspection Performed  RLE Inspected  LLE Inspection Performed              Diagram Legend     Erythematous scaling macule/papule c/w actinic keratosis       Vascular papule c/w angioma      Pigmented verrucoid papule/plaque c/w seborrheic keratosis      Yellow umbilicated papule c/w sebaceous hyperplasia      Irregularly shaped tan macule c/w lentigo     1-2 mm smooth white papules consistent with Milia      Movable subcutaneous cyst with punctum c/w epidermal inclusion cyst      Subcutaneous movable cyst c/w pilar cyst      Firm pink to brown papule c/w dermatofibroma      Pedunculated fleshy papule(s) c/w skin tag(s)      Evenly pigmented macule c/w junctional nevus     Mildly variegated pigmented, slightly irregular-bordered macule c/w  mildly atypical nevus      Flesh colored to evenly pigmented papule c/w intradermal nevus       Pink pearly papule/plaque c/w basal cell carcinoma      Erythematous hyperkeratotic cursted plaque c/w SCC      Surgical scar with no sign of skin cancer recurrence      Open and closed comedones      Inflammatory papules and pustules      Verrucoid papule consistent consistent with wart     Erythematous eczematous patches and plaques     Dystrophic onycholytic nail with subungual debris c/w onychomycosis     Umbilicated papule    Erythematous-base heme-crusted tan verrucoid plaque consistent with inflamed seborrheic keratosis     Erythematous Silvery Scaling Plaque c/w Psoriasis     See annotation    11/2023    10/2022 (baseline)       Latest Reference Range & Units 10/21/04 17:50 12/15/08 10:15   MIRACLE Neg <1:160  Pos, Titer to follow ! Negative   MIRACLE HEP-2 Titer Neg <1:160 titer Pos 1:160 !    ds DNA Ab Negative Titer Negative    MIRACLE Hep-2 Pattern  Speckled !        Assessment / Plan:        Lichen sclerosus et atrophicus  -     triamcinolone acetonide 0.1% (KENALOG) 0.1 % cream; Apply topically 2 (two) times daily as needed (rash on body). Avoid use on face, body folds, groin/genitalia.  Dispense: 454 g; Refill: 5    Other eczema  -     tacrolimus (PROTOPIC) 0.1 % ointment; Apply topically 2 (two) times daily.  Dispense: 60 g; Refill: 5    Intertrigo  -     triamcinolone acetonide 0.1% (KENALOG) 0.1 % cream; Apply to affected area twice a day after cool blow dry  Dispense: 30 g; Refill: 5    MIRACLE positive    Rash and nonspecific skin eruption    Medication monitoring encounter      Overall significant symptomatic improvement of extragenital and genital disease and mild-to-moderately improved clinically on exam. Flared intertrigo at subpannus.   Patient confirms compliance with topicals. Past poor compliance / confusion. Written instructions provided.   Multiple co-morbidities limiting treatment, sub-optimal lifestyle.    Goal clinical endpoint: symptomatic control of extragenital involvement, controlled clinical disease of vulvar involvement.   Consideration for active nontreatment of diffuse extragenital involvement if symptoms are controlled.   For refractory symptomatic [facility]agenital disease[facility] [place] is an option (pt confirms ability to travel 2-3x weekly if local).   For refractory vulvar disease, [facility] a future option. May attempt at next visit if patient agreeable (previously deferred).  For refractory symptomatic extragenital disease AND vulvar disease, systemic treatment is a consideration - would avoid acitretin (data in refractory vulvar disease) with extensive metabolic syndrome / CV disease, use caution MTX (most data in extragenital disease) in setting of fatty liver disease, morbid obesity, DM. Next step is trial of hydroxychloroquine, updating labs incl MIRACLE ( remote past pos, repeat neg), ordering baseline ophtho exam (hx comorbid DM). Consideration for Cellcept, Humira trial in future?    Discussed benefits and risks for treatment with Plaquenil with patient. Patient will require a baseline and yearly ophthalmologic examination. Needs baseline CBC and LFT's. Will need CBC q month x 3 then q 3 - 6 months.    Must avoid smoking when taking Plaquenil as smoking decreases effectiveness.     Itchy, dry rash at torso: apply triamcinolone cream (rx in jar) 3x weekly to entire area; apply 2x daily to any actively symptomatic areas  Rash at vulva, scot anal region: apply clobetasol ointment (tube) twice daily, alternate every 2 weeks with tacrolimus ointment      Gentle vulvar, scot anal care:   Wash areas once daily with hand and Cetaphil gentle cleanser.   Pat dry. Ensure dryness with blow dryer on cool setting.   Apply small amount of clobetasol (or tacrolimus) ointment to affected areas twice daily.     Gentle fold care:  Wash areas once daily with hand and Cetaphil gentle cleanser.   Pat dry.  "Ensure full dryness.   When actively inflamed, perform dilute white vinegar compress (equal parts white vinegar and water) twice daily, dry, then apply "shake lotion" from [facility]. Allow site to FULLY dry (ok to use blow dryer on cool setting and / or corn starch based drying powder).   When inactive, maintain dryness with use of corn starch based drying powder.      Gentle skin care:   No more than 1 shower or bath a day.   Out of shower or bath in less than 10 minutes.   Use only warm water, NOT hot.   Soap only where needed - areas that make body odor or are visibly dirty.  Use gentle soaps only (eg, Cetaphil Body Wash; non soap cleansers are more gentle than bar soaps).   After shower or bath, pat dry - do not scrub or rub aggressively.   Apply thick moisturizing cream twice daily, once being after the shower or bath (eg, Cerave Anti Itch Cream). Apply moisturizing cream diffusely and AFTER application of above rx triamcinolone cream to affected areas.   Avoid common allergens/irritants - fragrances, botanicals, etc.          Follow up in about 1 month (around 12/16/2023).          "

## 2023-11-17 ENCOUNTER — OFFICE VISIT (OUTPATIENT)
Dept: PHYSICAL MEDICINE AND REHAB | Facility: CLINIC | Age: 78
End: 2023-11-17
Payer: MEDICARE

## 2023-11-17 VITALS
HEART RATE: 67 BPM | DIASTOLIC BLOOD PRESSURE: 71 MMHG | BODY MASS INDEX: 47.67 KG/M2 | WEIGHT: 242.81 LBS | SYSTOLIC BLOOD PRESSURE: 152 MMHG | HEIGHT: 60 IN

## 2023-11-17 DIAGNOSIS — M19.011 ARTHRITIS OF RIGHT SHOULDER REGION: ICD-10-CM

## 2023-11-17 DIAGNOSIS — M75.102 NONTRAUMATIC TEAR OF LEFT ROTATOR CUFF, UNSPECIFIED TEAR EXTENT: ICD-10-CM

## 2023-11-17 DIAGNOSIS — M15.9 PRIMARY OSTEOARTHRITIS INVOLVING MULTIPLE JOINTS: ICD-10-CM

## 2023-11-17 DIAGNOSIS — M25.512 LEFT SHOULDER PAIN, UNSPECIFIED CHRONICITY: Primary | ICD-10-CM

## 2023-11-17 PROCEDURE — 1157F PR ADVANCE CARE PLAN OR EQUIV PRESENT IN MEDICAL RECORD: ICD-10-PCS | Mod: CPTII,S$GLB,, | Performed by: STUDENT IN AN ORGANIZED HEALTH CARE EDUCATION/TRAINING PROGRAM

## 2023-11-17 PROCEDURE — 1159F PR MEDICATION LIST DOCUMENTED IN MEDICAL RECORD: ICD-10-PCS | Mod: CPTII,S$GLB,, | Performed by: STUDENT IN AN ORGANIZED HEALTH CARE EDUCATION/TRAINING PROGRAM

## 2023-11-17 PROCEDURE — 3077F SYST BP >= 140 MM HG: CPT | Mod: CPTII,S$GLB,, | Performed by: STUDENT IN AN ORGANIZED HEALTH CARE EDUCATION/TRAINING PROGRAM

## 2023-11-17 PROCEDURE — 99999 PR PBB SHADOW E&M-EST. PATIENT-LVL V: ICD-10-PCS | Mod: PBBFAC,,, | Performed by: STUDENT IN AN ORGANIZED HEALTH CARE EDUCATION/TRAINING PROGRAM

## 2023-11-17 PROCEDURE — 1125F AMNT PAIN NOTED PAIN PRSNT: CPT | Mod: CPTII,S$GLB,, | Performed by: STUDENT IN AN ORGANIZED HEALTH CARE EDUCATION/TRAINING PROGRAM

## 2023-11-17 PROCEDURE — 99214 OFFICE O/P EST MOD 30 MIN: CPT | Mod: 25,S$GLB,, | Performed by: STUDENT IN AN ORGANIZED HEALTH CARE EDUCATION/TRAINING PROGRAM

## 2023-11-17 PROCEDURE — 20610 DRAIN/INJ JOINT/BURSA W/O US: CPT | Mod: LT,S$GLB,, | Performed by: STUDENT IN AN ORGANIZED HEALTH CARE EDUCATION/TRAINING PROGRAM

## 2023-11-17 PROCEDURE — 99999 PR PBB SHADOW E&M-EST. PATIENT-LVL V: CPT | Mod: PBBFAC,,, | Performed by: STUDENT IN AN ORGANIZED HEALTH CARE EDUCATION/TRAINING PROGRAM

## 2023-11-17 PROCEDURE — 3288F PR FALLS RISK ASSESSMENT DOCUMENTED: ICD-10-PCS | Mod: CPTII,S$GLB,, | Performed by: STUDENT IN AN ORGANIZED HEALTH CARE EDUCATION/TRAINING PROGRAM

## 2023-11-17 PROCEDURE — 1157F ADVNC CARE PLAN IN RCRD: CPT | Mod: CPTII,S$GLB,, | Performed by: STUDENT IN AN ORGANIZED HEALTH CARE EDUCATION/TRAINING PROGRAM

## 2023-11-17 PROCEDURE — 99214 PR OFFICE/OUTPT VISIT, EST, LEVL IV, 30-39 MIN: ICD-10-PCS | Mod: 25,S$GLB,, | Performed by: STUDENT IN AN ORGANIZED HEALTH CARE EDUCATION/TRAINING PROGRAM

## 2023-11-17 PROCEDURE — 1101F PR PT FALLS ASSESS DOC 0-1 FALLS W/OUT INJ PAST YR: ICD-10-PCS | Mod: CPTII,S$GLB,, | Performed by: STUDENT IN AN ORGANIZED HEALTH CARE EDUCATION/TRAINING PROGRAM

## 2023-11-17 PROCEDURE — 1159F MED LIST DOCD IN RCRD: CPT | Mod: CPTII,S$GLB,, | Performed by: STUDENT IN AN ORGANIZED HEALTH CARE EDUCATION/TRAINING PROGRAM

## 2023-11-17 PROCEDURE — 20610: ICD-10-PCS | Mod: LT,S$GLB,, | Performed by: STUDENT IN AN ORGANIZED HEALTH CARE EDUCATION/TRAINING PROGRAM

## 2023-11-17 PROCEDURE — 3288F FALL RISK ASSESSMENT DOCD: CPT | Mod: CPTII,S$GLB,, | Performed by: STUDENT IN AN ORGANIZED HEALTH CARE EDUCATION/TRAINING PROGRAM

## 2023-11-17 PROCEDURE — 1125F PR PAIN SEVERITY QUANTIFIED, PAIN PRESENT: ICD-10-PCS | Mod: CPTII,S$GLB,, | Performed by: STUDENT IN AN ORGANIZED HEALTH CARE EDUCATION/TRAINING PROGRAM

## 2023-11-17 PROCEDURE — 3078F DIAST BP <80 MM HG: CPT | Mod: CPTII,S$GLB,, | Performed by: STUDENT IN AN ORGANIZED HEALTH CARE EDUCATION/TRAINING PROGRAM

## 2023-11-17 PROCEDURE — 3077F PR MOST RECENT SYSTOLIC BLOOD PRESSURE >= 140 MM HG: ICD-10-PCS | Mod: CPTII,S$GLB,, | Performed by: STUDENT IN AN ORGANIZED HEALTH CARE EDUCATION/TRAINING PROGRAM

## 2023-11-17 PROCEDURE — 1101F PT FALLS ASSESS-DOCD LE1/YR: CPT | Mod: CPTII,S$GLB,, | Performed by: STUDENT IN AN ORGANIZED HEALTH CARE EDUCATION/TRAINING PROGRAM

## 2023-11-17 PROCEDURE — 3078F PR MOST RECENT DIASTOLIC BLOOD PRESSURE < 80 MM HG: ICD-10-PCS | Mod: CPTII,S$GLB,, | Performed by: STUDENT IN AN ORGANIZED HEALTH CARE EDUCATION/TRAINING PROGRAM

## 2023-11-17 RX ORDER — TRIAMCINOLONE ACETONIDE 40 MG/ML
40 INJECTION, SUSPENSION INTRA-ARTICULAR; INTRAMUSCULAR
Status: DISCONTINUED | OUTPATIENT
Start: 2023-11-17 | End: 2023-11-17 | Stop reason: HOSPADM

## 2023-11-17 RX ADMIN — TRIAMCINOLONE ACETONIDE 40 MG: 40 INJECTION, SUSPENSION INTRA-ARTICULAR; INTRAMUSCULAR at 10:11

## 2023-11-17 NOTE — PROGRESS NOTES
PHYSICAL MEDICINE AND REHABILITATION  New Patient Consult:    Subjective:   Chief Complaint:    Chief Complaint   Patient presents with    Shoulder Pain     C/o pain in both shoulders       HPI: Marta Huff is a 78 y.o. female with  has a past medical history of Anxiety, Arthralgia of knee, Arthritis, Back pain, Depression, Encounter for blood transfusion, GERD (gastroesophageal reflux disease), Hiatal hernia, History of seasonal allergies, History of shingles, Hypertension, Insomnia, Obesity, JESENIA (obstructive sleep apnea) (02/2020), Osteoporosis, Pneumonia, Reactive airway disease, Restless legs syndrome, Rheumatic fever, and Sleep apnea. She was sent to me for consultation for Shoulder Pain (C/o pain in both shoulders)   Today,  she complains of bilateral shoulder pain.  She describes the pain as sharp, tight, aching.  The pain is made worse with lifting.  She has tried therapy and injections.  Her pain is 9-10/10.  She endorses associated weakness with her pain.  She has seen Dr. Tavera that she in the past who provided her with suprascapular nerve ablations as well as shoulder injections.  She has found significant relief with these in the past.  X-ray of the left shoulder on 06/23/2022 revealed degenerative change versus CPPD of the left shoulder joint. She reports recently an inability to reach overhead related to her pain. She has a history of prediabetes. She recalls excellent relief with the burning of the nerve to her shoulder. Her left shoulder pain is worse than her right.     Review of Systems  Weakness    Imaging/Diagnostic Studies    XR SHOULDER TRAUMA 3 VIEW LEFT     CLINICAL HISTORY:  Pain in left shoulder     TECHNIQUE:  Four views of the left shoulder were acquired including internal rotation, external rotation, scapular-Y, and axillary views.     COMPARISON:  None.     FINDINGS:  No shoulder joint space narrowing.  No radiographically evident acute fracture, dislocation, or osseous  destructive process.  There is degenerative change versus CPPD of the left shoulder joint with inferomedial humeral spur formation noted.  There is minor degenerative change of the left acromioclavicular joint.  No rotator cuff calcific tendinitis.  The left chest is clear.  There is degenerative change of the thoracic spine.     Impression:     As above        Electronically signed by: Raymond Sandy MD  Date:                                            06/23/2022    Past Medical History:   Diagnosis Date    Anxiety     Arthralgia of knee     arthralgia of bilateral knees secondary to djd    Arthritis     Back pain     Depression     Encounter for blood transfusion     AUTOLOGUS    GERD (gastroesophageal reflux disease)     Hiatal hernia     repaired in 1979    History of seasonal allergies     History of shingles     Hypertension     Insomnia     Obesity     JESENIA (obstructive sleep apnea) 02/2020    Home sleep study - YESICA 57, Desat 69% - Severe JESENIA with desaturations    Osteoporosis     Pneumonia     Reactive airway disease     Restless legs syndrome     Rheumatic fever     at 6 y/o    Sleep apnea     no cpap       Past Surgical History:   Procedure Laterality Date    APPENDECTOMY      BARIATRIC SURGERY  2014    Gastric Sleeve    CHOLECYSTECTOMY      HERNIA REPAIR      hiatal hernia    HYSTERECTOMY      partial    INSERTION OF DORSAL COLUMN NERVE STIMULATOR FOR TRIAL N/A 5/25/2018    Procedure: TRIAL-STIMULATOR-DORSAL COLUMN;  Surgeon: Raoul Belcher MD;  Location: [facility] OR;  Service: Pain Management;  Laterality: N/A;    INSERTION OF SPINAL CORD STIMULATOR USING MINIMALLY INVASIVE TECHNIQUE N/A 7/11/2018    Procedure: INSERTION, SPINAL CORD STIMULATOR,;  Surgeon: Raoul Belcher MD;  Location: [facility] OR;  Service: Pain Management;  Laterality: N/A;    JOINT REPLACEMENT      BILAT KNEE    KNEE ARTHROPLASTY  1/2/2010    bilateral    NISSEN FUNDOPLICATION      RADIOFREQUENCY ABLATION Left 8/12/2022    Procedure:  Radiofrequency Ablation//REGULAR RFA left shoulder;  Surgeon: Javed Espinosa MD;  Location: [facility] OR;  Service: Orthopedics;  Laterality: Left;    REMOVAL OF NEUROSTIMULATOR N/A 9/12/2018    Procedure: REMOVAL, IMPLANT spinal cord stimulator;  Surgeon: Raoul Belcher MD;  Location: [facility] OR;  Service: Pain Management;  Laterality: N/A;    REPAIR OF RECURRENT INCISIONAL HERNIA N/A 8/15/2019    Procedure: REPAIR, HERNIA, INCISIONAL, RECURRENT;  Surgeon: Clifton Nguyen MD;  Location: [facility] OR;  Service: General;  Laterality: N/A;  open incisional hernia repair    REPLACEMENT OF SPINAL CORD STIMULATOR N/A 11/14/2018    Procedure: REPLACEMENT, SPINAL CORD STIMULATOR  PLACEMENT OF SPINAL CORD STIMULATOR T10;  Surgeon: Kevin Parnell MD;  Location: [facility] OR;  Service: Neurosurgery;  Laterality: N/A;       Review of patient's allergies indicates:   Allergen Reactions    Etodolac (bulk) Swelling     Hands and feet swelling    Sulfa (sulfonamide antibiotics) Rash and Other (See Comments)     Headache        Current Outpatient Medications   Medication Sig Dispense Refill    albuterol (PROVENTIL) 2.5 mg /3 mL (0.083 %) nebulizer solution INHALE THE CONTENTS OF 1 VIAL VIA NEBULIZER EVERY 6 HOURS AS NEEDED FOR  WHEEZING  OR  SHORTNESS OF BREATH 90 mL 11    albuterol (PROVENTIL/VENTOLIN HFA) 90 mcg/actuation inhaler Inhale 2 puffs into the lungs every 6 (six) hours as needed for Wheezing or Shortness of Breath. 25.5 g 1    alendronate (FOSAMAX) 70 MG tablet TAKE 1 TABLET EVERY 7 DAYS 12 tablet 3    benzonatate (TESSALON) 100 MG capsule benzonatate 100 mg capsule   TAKE ONE CAPSULE BY MOUTH THREE TIMES DAILY AS NEEDED FOR COUGH      betamethasone valerate 0.1% (VALISONE) 0.1 % Crea Apply topically 2 (two) times daily. 45 g 6    calcium phosphate-vitamin D3 250 mg-10 mcg (400 unit) Chew Take 1 tablet by mouth once daily.       clobetasol 0.05% (TEMOVATE) 0.05 % Oint AAA bid 60 g 3    diazePAM (VALIUM) 5 MG tablet  diazepam 5 mg tablet   TAKE 1 TABLET BY MOUTH 1 HOUR PRIOR TO DENTAL VISIT. BRING MEDICATION TO VISIT      DULoxetine (CYMBALTA) 60 MG capsule Take 1 capsule (60 mg total) by mouth once daily. 90 capsule 3    fluticasone furoate-vilanteroL (BREO ELLIPTA) 200-25 mcg/dose DsDv diskus inhaler Inhale 1 puff into the lungs once daily. Controller 60 each 5    furosemide (LASIX) 80 MG tablet TAKE 1 TABLET ONE TIME DAILY 90 tablet 1    losartan (COZAAR) 50 MG tablet Take 1 tablet (50 mg total) by mouth 2 (two) times daily. 180 tablet 3    naproxen sodium (ALEVE ORAL) Take 1 Dose by mouth daily as needed.      nebivoloL (BYSTOLIC) 5 MG Tab TAKE 1 TABLET BY MOUTH EVERY DAY 30 tablet 3    potassium chloride (KLOR-CON) 10 MEQ TbSR Take 1 tablet (10 mEq total) by mouth once daily. 90 tablet 3    pramipexole (MIRAPEX) 1.5 MG tablet TAKE 1 TABLET EVERY NIGHT 90 tablet 1    tacrolimus (PROTOPIC) 0.1 % ointment APPLY TOPICALLY TWICE A  g 5    tacrolimus (PROTOPIC) 0.1 % ointment Apply topically 2 (two) times daily. 60 g 5    traZODone (DESYREL) 100 MG tablet Take 1 tablet (100 mg total) by mouth every evening. 90 tablet 3    triamcinolone acetonide 0.1% (KENALOG) 0.1 % cream Apply to affected area twice a day after cool blow dry 30 g 5    triamcinolone acetonide 0.1% (KENALOG) 0.1 % cream Apply topically 2 (two) times daily as needed (rash on body). Avoid use on face, body folds, groin/genitalia. 454 g 5     Current Facility-Administered Medications   Medication Dose Route Frequency Provider Last Rate Last Admin    triamcinolone acetonide injection 10 mg  10 mg Intradermal 1 time in Clinic/HOD Brittani Roland MD        triamcinolone acetonide injection 10 mg  10 mg Intradermal 1 time in Clinic/HOD Brittani Roland MD         Facility-Administered Medications Ordered in Other Visits   Medication Dose Route Frequency Provider Last Rate Last Admin    methylPREDNISolone acetate injection 40 mg  40 mg Intra-articular   Adwoa Jaime, DO   40 mg at 03/11/21 1000       Family History   Problem Relation Age of Onset    Heart disease Mother     Hypertension Mother     Diabetes Mother     Obesity Mother     Heart disease Maternal Grandfather        Social History     Socioeconomic History    Marital status:    Tobacco Use    Smoking status: Never     Passive exposure: Yes    Smokeless tobacco: Never   Substance and Sexual Activity    Alcohol use: Yes     Comment: 1-2 glasses of wine monthly    Drug use: No    Sexual activity: Yes     Partners: Male         Objective:    BP (!) 152/71   Pulse 67   Ht 5' (1.524 m)   Wt 110.2 kg (242 lb 13.4 oz)   BMI 47.43 kg/m²   Physical Exam  Vitals and nursing note reviewed.   Constitutional:       Appearance: Normal appearance.   HENT:      Head: Normocephalic.   Eyes:      Extraocular Movements: Extraocular movements intact.   Cardiovascular:      Rate and Rhythm: Normal rate.      Pulses: Normal pulses.   Pulmonary:      Effort: Pulmonary effort is normal.   Abdominal:      General: Abdomen is flat.   Skin:     General: Skin is warm and dry.   Neurological:      General: No focal deficit present.      Mental Status: She is alert and oriented to person, place, and time. Mental status is at baseline.   Psychiatric:         Mood and Affect: Mood normal.         Behavior: Behavior normal.         Thought Content: Thought content normal.        Right Shoulder Exam     Tenderness   Right shoulder tenderness location: Sober the insertion of the supraspinatus muscle bilaterally.    Range of Motion   Active abduction:  abnormal   External rotation:  40 abnormal   Forward flexion:  90   Internal rotation 0 degrees:  normal   Internal rotation 90 degrees:  normal     Muscle Strength   Abduction: 5/5   Internal rotation: 5/5   External rotation: 5/5   Supraspinatus: 5/5   Subscapularis: 5/5   Biceps: 5/5     Tests   Apprehension: negative  Santizo test: positive  Cross arm:  negative  Impingement: positive  Drop arm: negative  Sulcus: absent    Other   Erythema: absent  Scars: absent  Sensation: normal  Pulse: present      Left Shoulder Exam     Range of Motion   External rotation:  30   Forward flexion:  90   Internal rotation 0 degrees:  normal   Internal rotation 90 degrees:  normal     Muscle Strength   Abduction: 5/5   Internal rotation: 5/5   External rotation: 5/5   Supraspinatus: 5/5   Subscapularis: 5/5   Biceps: 5/5     Tests   Apprehension: negative  Santizo test: positive  Cross arm: negative  Impingement: positive  Drop arm: negative  Sulcus: absent    Other   Erythema: absent  Scars: absent  Sensation: normal  Pulse: present                Assessment:       ICD-10-CM ICD-9-CM    1. Left shoulder pain, unspecified chronicity  M25.512 719.41 X-ray Shoulder 2 or More Views Right      Ambulatory referral/consult to Physical/Occupational Therapy      2. Primary osteoarthritis involving multiple joints  M15.9 715.98 Ambulatory referral/consult to Physical Medicine Rehab      X-ray Shoulder 2 or More Views Right      Ambulatory referral/consult to Physical/Occupational Therapy      3. Nontraumatic tear of left rotator cuff, unspecified tear extent  M75.102 727.61 Ambulatory referral/consult to Physical/Occupational Therapy      4. Arthritis of right shoulder region  M19.011 716.91 X-ray Shoulder 2 or More Views Right            Plan:   1. Left shoulder pain, unspecified chronicity  -     X-ray Shoulder 2 or More Views Right; Future; Expected date: 11/17/2023  -     Ambulatory referral/consult to Physical/Occupational Therapy; Future; Expected date: 11/24/2023    2. Primary osteoarthritis involving multiple joints  -     Ambulatory referral/consult to Physical Medicine Rehab  -     X-ray Shoulder 2 or More Views Right; Future; Expected date: 11/17/2023  -     Ambulatory referral/consult to Physical/Occupational Therapy; Future; Expected date: 11/24/2023    3. Nontraumatic tear of  left rotator cuff, unspecified tear extent  Assessment & Plan:  Some evidence of impingement signs as well as osteoarthritis in the left shoulder.    Left subacromial bursa injection with steroid.  Verbal consent obtained.  See procedure note for details.  She will monitor her sugar as she does have prediabetes.    Physical therapy evaluate and treat  Reviewed available imaging of the left shoulder.  Discussed the role of suprascapular nerve cryoablation which she would be a candidate for in the future.  She is also had good relief with pulsed RFA with Dr. Tavera as she who is no longer here.    Return to clinic in 4-6 weeks.  Consider left intra-articular injection with ultrasound guidance if minimal relief with subacromial bursa injection.         Orders:  -     Ambulatory referral/consult to Physical/Occupational Therapy; Future; Expected date: 11/24/2023    4. Arthritis of right shoulder region  Assessment & Plan:  X-rays ordered today as she has no imaging available of the right shoulder.    Physical therapy evaluate and treat for osteoarthritis with rotator cuff tear versus strain versus tendinitis  Return to clinic in 4-6 weeks.  Consider intra-articular right shoulder injection as indicated    Orders:  -     X-ray Shoulder 2 or More Views Right; Future; Expected date: 11/17/2023           Kwasi Grossman MD  Physical Medicine & Rehabilitation     Disclaimer:  This note may have been prepared using voice recognition software, it may have not been extensively proofed, as such there could be errors within the text such as sound alike errors.  Contact the author of this note for clarification.

## 2023-11-17 NOTE — PROCEDURES
Large Joint Aspiration/Injection: L SAB: L subacromial bursa    Date/Time: 11/17/2023 10:00 AM    Performed by: Kwasi Grossman MD  Authorized by: Kwasi Grossman MD    Consent Done?:  Yes (Verbal)  Indications:  Arthritis, diagnostic evaluation and pain  Timeout: prior to procedure the correct patient, procedure, and site was verified    Prep: patient was prepped and draped in usual sterile fashion      Local anesthesia used?: Yes    Anesthesia:  Local infiltration  Anesthetic total (ml):  3      Details:  Needle Size:  22 G and 25 G  Ultrasonic Guidance for needle placement?: No    Approach:  Posterior  Location:  Shoulder  Site:  L subacromial bursa  Medications:  40 mg triamcinolone acetonide 40 mg/mL  Patient tolerance:  Patient tolerated the procedure well with no immediate complications

## 2023-11-17 NOTE — ASSESSMENT & PLAN NOTE
Some evidence of impingement signs as well as osteoarthritis in the left shoulder.    Left subacromial bursa injection with steroid.  Verbal consent obtained.  See procedure note for details.  She will monitor her sugar as she does have prediabetes.    Physical therapy evaluate and treat  Reviewed available imaging of the left shoulder.  Discussed the role of suprascapular nerve cryoablation which she would be a candidate for in the future.  She is also had good relief with pulsed RFA with Dr. Tavera as she who is no longer here.    Return to clinic in 4-6 weeks.  Consider left intra-articular injection with ultrasound guidance if minimal relief with subacromial bursa injection.

## 2023-11-17 NOTE — ASSESSMENT & PLAN NOTE
X-rays ordered today as she has no imaging available of the right shoulder.    Physical therapy evaluate and treat for osteoarthritis with rotator cuff tear versus strain versus tendinitis  Return to clinic in 4-6 weeks.  Consider intra-articular right shoulder injection as indicated

## 2023-11-29 DIAGNOSIS — J45.909 REACTIVE AIRWAY DISEASE, UNSPECIFIED ASTHMA SEVERITY, UNCOMPLICATED: ICD-10-CM

## 2023-11-29 RX ORDER — ALBUTEROL SULFATE 90 UG/1
2 AEROSOL, METERED RESPIRATORY (INHALATION) EVERY 6 HOURS PRN
Qty: 25.5 G | Refills: 3 | Status: SHIPPED | OUTPATIENT
Start: 2023-11-29

## 2023-11-29 RX ORDER — HYDROXYCHLOROQUINE SULFATE 200 MG/1
200 TABLET, FILM COATED ORAL 2 TIMES DAILY
Qty: 60 TABLET | Refills: 3 | Status: SHIPPED | OUTPATIENT
Start: 2023-11-29 | End: 2024-04-02 | Stop reason: SDUPTHER

## 2023-11-29 NOTE — TELEPHONE ENCOUNTER
No care due was identified.  [facility] Embedded Care Due Messages. Reference number: [id number].   11/29/2023 1:43:45 AM CST

## 2023-11-29 NOTE — TELEPHONE ENCOUNTER
Refill Decision Note   Marta Huff  is requesting a refill authorization.  Brief Assessment and Rationale for Refill:  Approve     Medication Therapy Plan:         Alert overridden per protocol: Yes   Comments:     Note composed:9:22 AM 11/29/2023             Appointments     Last Visit   9/11/2023 Elda Holley MD   Next Visit   Visit date not found Elda Hollye MD

## 2023-11-30 ENCOUNTER — LAB VISIT (OUTPATIENT)
Dept: LAB | Facility: HOSPITAL | Age: 78
End: 2023-11-30
Attending: DERMATOLOGY
Payer: MEDICARE

## 2023-11-30 DIAGNOSIS — R76.8 ANA POSITIVE: ICD-10-CM

## 2023-11-30 DIAGNOSIS — Z51.81 MEDICATION MONITORING ENCOUNTER: ICD-10-CM

## 2023-11-30 DIAGNOSIS — R21 RASH AND NONSPECIFIC SKIN ERUPTION: ICD-10-CM

## 2023-11-30 PROCEDURE — 86038 ANTINUCLEAR ANTIBODIES: CPT | Performed by: DERMATOLOGY

## 2023-11-30 PROCEDURE — 85025 COMPLETE CBC W/AUTO DIFF WBC: CPT | Performed by: DERMATOLOGY

## 2023-11-30 PROCEDURE — 36415 COLL VENOUS BLD VENIPUNCTURE: CPT | Mod: PO | Performed by: DERMATOLOGY

## 2023-11-30 PROCEDURE — 80053 COMPREHEN METABOLIC PANEL: CPT | Performed by: DERMATOLOGY

## 2023-12-01 LAB
ALBUMIN SERPL BCP-MCNC: 3.2 G/DL (ref 3.5–5.2)
ALP SERPL-CCNC: 120 U/L (ref 55–135)
ALT SERPL W/O P-5'-P-CCNC: 22 U/L (ref 10–44)
ANION GAP SERPL CALC-SCNC: 9 MMOL/L (ref 8–16)
AST SERPL-CCNC: 22 U/L (ref 10–40)
BASOPHILS # BLD AUTO: 0.07 K/UL (ref 0–0.2)
BASOPHILS NFR BLD: 1 % (ref 0–1.9)
BILIRUB SERPL-MCNC: 1.1 MG/DL (ref 0.1–1)
BUN SERPL-MCNC: 17 MG/DL (ref 8–23)
CALCIUM SERPL-MCNC: 8.6 MG/DL (ref 8.7–10.5)
CHLORIDE SERPL-SCNC: 104 MMOL/L (ref 95–110)
CO2 SERPL-SCNC: 32 MMOL/L (ref 23–29)
CREAT SERPL-MCNC: 0.8 MG/DL (ref 0.5–1.4)
DIFFERENTIAL METHOD: ABNORMAL
EOSINOPHIL # BLD AUTO: 0.2 K/UL (ref 0–0.5)
EOSINOPHIL NFR BLD: 2.9 % (ref 0–8)
ERYTHROCYTE [DISTWIDTH] IN BLOOD BY AUTOMATED COUNT: 16.3 % (ref 11.5–14.5)
EST. GFR  (NO RACE VARIABLE): >60 ML/MIN/1.73 M^2
GLUCOSE SERPL-MCNC: 128 MG/DL (ref 70–110)
HCT VFR BLD AUTO: 37.1 % (ref 37–48.5)
HGB BLD-MCNC: 11.1 G/DL (ref 12–16)
IMM GRANULOCYTES # BLD AUTO: 0.01 K/UL (ref 0–0.04)
IMM GRANULOCYTES NFR BLD AUTO: 0.1 % (ref 0–0.5)
LYMPHOCYTES # BLD AUTO: 2 K/UL (ref 1–4.8)
LYMPHOCYTES NFR BLD: 26.9 % (ref 18–48)
MCH RBC QN AUTO: 26.9 PG (ref 27–31)
MCHC RBC AUTO-ENTMCNC: 29.9 G/DL (ref 32–36)
MCV RBC AUTO: 90 FL (ref 82–98)
MONOCYTES # BLD AUTO: 0.5 K/UL (ref 0.3–1)
MONOCYTES NFR BLD: 7 % (ref 4–15)
NEUTROPHILS # BLD AUTO: 4.5 K/UL (ref 1.8–7.7)
NEUTROPHILS NFR BLD: 62.1 % (ref 38–73)
NRBC BLD-RTO: 0 /100 WBC
PLATELET # BLD AUTO: 209 K/UL (ref 150–450)
PMV BLD AUTO: 12.4 FL (ref 9.2–12.9)
POTASSIUM SERPL-SCNC: 3.8 MMOL/L (ref 3.5–5.1)
PROT SERPL-MCNC: 6.9 G/DL (ref 6–8.4)
RBC # BLD AUTO: 4.12 M/UL (ref 4–5.4)
SODIUM SERPL-SCNC: 145 MMOL/L (ref 136–145)
WBC # BLD AUTO: 7.29 K/UL (ref 3.9–12.7)

## 2023-12-04 LAB — ANA SER QL IF: NORMAL

## 2023-12-13 ENCOUNTER — OFFICE VISIT (OUTPATIENT)
Dept: OPTOMETRY | Facility: CLINIC | Age: 78
End: 2023-12-13
Payer: MEDICARE

## 2023-12-13 DIAGNOSIS — Z51.81 MEDICATION MONITORING ENCOUNTER: ICD-10-CM

## 2023-12-13 DIAGNOSIS — L90.0 LICHEN SCLEROSUS ET ATROPHICUS: Primary | ICD-10-CM

## 2023-12-13 DIAGNOSIS — Z79.899 LONG-TERM USE OF PLAQUENIL: ICD-10-CM

## 2023-12-13 PROCEDURE — 1126F AMNT PAIN NOTED NONE PRSNT: CPT | Mod: CPTII,S$GLB,, | Performed by: OPTOMETRIST

## 2023-12-13 PROCEDURE — 92014 PR EYE EXAM, EST PATIENT,COMPREHESV: ICD-10-PCS | Mod: S$GLB,,, | Performed by: OPTOMETRIST

## 2023-12-13 PROCEDURE — 3288F FALL RISK ASSESSMENT DOCD: CPT | Mod: CPTII,S$GLB,, | Performed by: OPTOMETRIST

## 2023-12-13 PROCEDURE — 99999 PR PBB SHADOW E&M-EST. PATIENT-LVL III: CPT | Mod: PBBFAC,,, | Performed by: OPTOMETRIST

## 2023-12-13 PROCEDURE — 1159F PR MEDICATION LIST DOCUMENTED IN MEDICAL RECORD: ICD-10-PCS | Mod: CPTII,S$GLB,, | Performed by: OPTOMETRIST

## 2023-12-13 PROCEDURE — 99999 PR PBB SHADOW E&M-EST. PATIENT-LVL III: ICD-10-PCS | Mod: PBBFAC,,, | Performed by: OPTOMETRIST

## 2023-12-13 PROCEDURE — 92134 CPTRZ OPH DX IMG PST SGM RTA: CPT | Mod: S$GLB,,, | Performed by: OPTOMETRIST

## 2023-12-13 PROCEDURE — 1159F MED LIST DOCD IN RCRD: CPT | Mod: CPTII,S$GLB,, | Performed by: OPTOMETRIST

## 2023-12-13 PROCEDURE — 1157F ADVNC CARE PLAN IN RCRD: CPT | Mod: CPTII,S$GLB,, | Performed by: OPTOMETRIST

## 2023-12-13 PROCEDURE — 1100F PR PT FALLS ASSESS DOC 2+ FALLS/FALL W/INJURY/YR: ICD-10-PCS | Mod: CPTII,S$GLB,, | Performed by: OPTOMETRIST

## 2023-12-13 PROCEDURE — 3288F PR FALLS RISK ASSESSMENT DOCUMENTED: ICD-10-PCS | Mod: CPTII,S$GLB,, | Performed by: OPTOMETRIST

## 2023-12-13 PROCEDURE — 1157F PR ADVANCE CARE PLAN OR EQUIV PRESENT IN MEDICAL RECORD: ICD-10-PCS | Mod: CPTII,S$GLB,, | Performed by: OPTOMETRIST

## 2023-12-13 PROCEDURE — 1100F PTFALLS ASSESS-DOCD GE2>/YR: CPT | Mod: CPTII,S$GLB,, | Performed by: OPTOMETRIST

## 2023-12-13 PROCEDURE — 92014 COMPRE OPH EXAM EST PT 1/>: CPT | Mod: S$GLB,,, | Performed by: OPTOMETRIST

## 2023-12-13 PROCEDURE — 1126F PR PAIN SEVERITY QUANTIFIED, NO PAIN PRESENT: ICD-10-PCS | Mod: CPTII,S$GLB,, | Performed by: OPTOMETRIST

## 2023-12-13 PROCEDURE — 92134 POSTERIOR SEGMENT OCT RETINA (OCULAR COHERENCE TOMOGRAPHY)-BOTH EYES: ICD-10-PCS | Mod: S$GLB,,, | Performed by: OPTOMETRIST

## 2023-12-13 NOTE — PATIENT INSTRUCTIONS
Using the Amsler Grid  If you are at risk for vision loss, you may be told to check your eyesight regularly using the Amsler grid. Below is the grid and instructions for using it.         The Amsler grid helps you track changes in your vision.    How to Use the Amsler Grid  Use the grid in a well-lighted area.  Wear glasses or contact lenses if you usually wear them.  Hold the grid at your normal reading distance (about 16 inches).  Cover your left eye.  With your right eye, look at the dot in the center of the grid.  While looking at the dot, notice if any of the lines look wavy, if any lines disappear, or if the boxes change shape.  Write down on a piece of paper any vision changes from the last time you used the grid.  Now repeat the exercise, this time covering your right eye.  Call your doctor right away if you notice any vision changes.  How Often Should I Check My Vision?  Use the Amsler grid as often as your eye doctor suggests. Keep the grid where youll remember to use it. Call your eye doctor right away if you notice any changes with your eyesight. This includes if your vision improves.  © 0849-0862 [employer], [address] [place] [address], [place] [address]. All rights reserved. This information is not intended as a substitute for professional medical care. Always follow your healthcare professional's instructions.

## 2023-12-13 NOTE — PROGRESS NOTES
HPI    Plaq check-dle-1 week    Pt states she recenlty got new glasses rx-didn't fill yet. Started plaq x   1 week. No flashes or floaters. States va blurred OS at times.   Last edited by Christa Syed on 12/13/2023  1:52 PM.            Assessment /Plan     For exam results, see Encounter Report.    Lichen sclerosus et atrophicus  -     Posterior Segment OCT Retina-Both eyes    Long-term use of Plaquenil  -     Posterior Segment OCT Retina-Both eyes    Medication monitoring encounter  -     Ambulatory referral/consult to Ophthalmology      1-3.   Baseline exam // started plaquenil this month    Current dosing 3.63 mg/ kg/ day  Cumulative N/A     OCT mac 12/2023   OU w/ normal macula/ fovea contour   Good general macular health for age     Discussed and reassured  Discussed and educated patient on current findings /plan.  RTC 1 year annual w/ 10-2 fields, prn if any changes / issues

## 2023-12-18 ENCOUNTER — PATIENT MESSAGE (OUTPATIENT)
Dept: ADMINISTRATIVE | Facility: HOSPITAL | Age: 78
End: 2023-12-18
Payer: MEDICARE

## 2024-01-29 ENCOUNTER — TELEPHONE (OUTPATIENT)
Dept: DERMATOLOGY | Facility: CLINIC | Age: 79
End: 2024-01-29
Payer: MEDICARE

## 2024-01-29 NOTE — TELEPHONE ENCOUNTER
Tried to reach pt. No answer, will try again later and I left a message on answering machine.    Contacting to inform pt to see if pt needed a follow up.

## 2024-03-07 RX ORDER — ALPRAZOLAM 0.25 MG/1
0.25 TABLET ORAL 2 TIMES DAILY PRN
Qty: 20 TABLET | Refills: 1 | Status: SHIPPED | OUTPATIENT
Start: 2024-03-07 | End: 2024-05-02 | Stop reason: SDUPTHER

## 2024-03-18 DIAGNOSIS — L30.8 OTHER ECZEMA: ICD-10-CM

## 2024-03-18 RX ORDER — TACROLIMUS 1 MG/G
1 OINTMENT TOPICAL 2 TIMES DAILY
Qty: 100 G | Refills: 5 | Status: SHIPPED | OUTPATIENT
Start: 2024-03-18 | End: 2024-05-06

## 2024-03-20 DIAGNOSIS — I27.20 PULMONARY HYPERTENSION: ICD-10-CM

## 2024-03-20 DIAGNOSIS — R60.9 EDEMA, UNSPECIFIED TYPE: ICD-10-CM

## 2024-03-20 NOTE — TELEPHONE ENCOUNTER
Care Due:                  Date            Visit Type   Department     Provider  --------------------------------------------------------------------------------                                MYCHART                              FOLLOWUP/OF  [facility] FAMILY  Last Visit: 09-      FICE VISIT   MEDICINE       Elda Holley  Next Visit: None Scheduled  None         None Found                                                            Last  Test          Frequency    Reason                     Performed    Due Date  --------------------------------------------------------------------------------    Mg Level....  12 months..  alendronate..............  Not Found    Overdue    Phosphate...  12 months..  alendronate..............  Not Found    Overdue    Vitamin D...  12 months..  alendronate..............  Not Found    Overdue    [employer] Embedded Care Due Messages. Reference number: [id number].   3/20/2024 1:29:50 AM CDT

## 2024-03-20 NOTE — TELEPHONE ENCOUNTER
Refill Routing Note   Medication(s) are not appropriate for processing by [facility] for the following reason(s):        Required vitals abnormal    [facility] action(s):  Defer               Appointments  past 12m or future 3m with PCP    Date Provider   Last Visit   9/11/2023 Elda Holley MD   Next Visit   Visit date not found Elda Holley MD   ED visits in past 90 days: 0        Note composed:12:59 PM 03/20/2024

## 2024-03-22 RX ORDER — FUROSEMIDE 80 MG/1
80 TABLET ORAL
Qty: 90 TABLET | Refills: 1 | Status: SHIPPED | OUTPATIENT
Start: 2024-03-22

## 2024-04-02 RX ORDER — HYDROXYCHLOROQUINE SULFATE 200 MG/1
200 TABLET, FILM COATED ORAL 2 TIMES DAILY
Qty: 60 TABLET | Refills: 3 | Status: SHIPPED | OUTPATIENT
Start: 2024-04-02

## 2024-05-02 ENCOUNTER — TELEPHONE (OUTPATIENT)
Dept: FAMILY MEDICINE | Facility: CLINIC | Age: 79
End: 2024-05-02
Payer: MEDICARE

## 2024-05-02 ENCOUNTER — OFFICE VISIT (OUTPATIENT)
Dept: FAMILY MEDICINE | Facility: CLINIC | Age: 79
End: 2024-05-02
Payer: MEDICARE

## 2024-05-02 ENCOUNTER — PATIENT MESSAGE (OUTPATIENT)
Dept: FAMILY MEDICINE | Facility: CLINIC | Age: 79
End: 2024-05-02

## 2024-05-02 VITALS
WEIGHT: 232.81 LBS | HEART RATE: 90 BPM | HEIGHT: 60 IN | SYSTOLIC BLOOD PRESSURE: 126 MMHG | DIASTOLIC BLOOD PRESSURE: 60 MMHG | OXYGEN SATURATION: 95 % | BODY MASS INDEX: 45.71 KG/M2

## 2024-05-02 DIAGNOSIS — R00.2 PALPITATIONS: ICD-10-CM

## 2024-05-02 DIAGNOSIS — E11.9 CONTROLLED TYPE 2 DIABETES MELLITUS WITHOUT COMPLICATION, WITHOUT LONG-TERM CURRENT USE OF INSULIN: ICD-10-CM

## 2024-05-02 DIAGNOSIS — I77.1 TORTUOUS AORTA: ICD-10-CM

## 2024-05-02 DIAGNOSIS — L03.90 CELLULITIS, UNSPECIFIED CELLULITIS SITE: Primary | ICD-10-CM

## 2024-05-02 DIAGNOSIS — F32.0 MILD MAJOR DEPRESSION: ICD-10-CM

## 2024-05-02 DIAGNOSIS — Z23 IMMUNIZATION DUE: ICD-10-CM

## 2024-05-02 DIAGNOSIS — E66.01 MORBID OBESITY WITH BMI OF 45.0-49.9, ADULT: ICD-10-CM

## 2024-05-02 DIAGNOSIS — R60.0 LOCALIZED EDEMA: ICD-10-CM

## 2024-05-02 DIAGNOSIS — G62.9 PERIPHERAL POLYNEUROPATHY: ICD-10-CM

## 2024-05-02 DIAGNOSIS — J44.9 CHRONIC OBSTRUCTIVE PULMONARY DISEASE, UNSPECIFIED COPD TYPE: ICD-10-CM

## 2024-05-02 DIAGNOSIS — I27.20 PULMONARY HYPERTENSION: ICD-10-CM

## 2024-05-02 PROCEDURE — 3288F FALL RISK ASSESSMENT DOCD: CPT | Mod: CPTII,S$GLB,, | Performed by: FAMILY MEDICINE

## 2024-05-02 PROCEDURE — 1101F PT FALLS ASSESS-DOCD LE1/YR: CPT | Mod: CPTII,S$GLB,, | Performed by: FAMILY MEDICINE

## 2024-05-02 PROCEDURE — 99999 PR PBB SHADOW E&M-EST. PATIENT-LVL V: CPT | Mod: PBBFAC,,, | Performed by: FAMILY MEDICINE

## 2024-05-02 PROCEDURE — 1160F RVW MEDS BY RX/DR IN RCRD: CPT | Mod: CPTII,S$GLB,, | Performed by: FAMILY MEDICINE

## 2024-05-02 PROCEDURE — 3074F SYST BP LT 130 MM HG: CPT | Mod: CPTII,S$GLB,, | Performed by: FAMILY MEDICINE

## 2024-05-02 PROCEDURE — 1159F MED LIST DOCD IN RCRD: CPT | Mod: CPTII,S$GLB,, | Performed by: FAMILY MEDICINE

## 2024-05-02 PROCEDURE — 3078F DIAST BP <80 MM HG: CPT | Mod: CPTII,S$GLB,, | Performed by: FAMILY MEDICINE

## 2024-05-02 PROCEDURE — 1157F ADVNC CARE PLAN IN RCRD: CPT | Mod: CPTII,S$GLB,, | Performed by: FAMILY MEDICINE

## 2024-05-02 PROCEDURE — 99214 OFFICE O/P EST MOD 30 MIN: CPT | Mod: 25,S$GLB,, | Performed by: FAMILY MEDICINE

## 2024-05-02 PROCEDURE — 1125F AMNT PAIN NOTED PAIN PRSNT: CPT | Mod: CPTII,S$GLB,, | Performed by: FAMILY MEDICINE

## 2024-05-02 RX ORDER — ALPRAZOLAM 0.25 MG/1
0.25 TABLET ORAL 2 TIMES DAILY PRN
Qty: 20 TABLET | Refills: 1 | Status: SHIPPED | OUTPATIENT
Start: 2024-05-02 | End: 2024-06-01

## 2024-05-02 RX ORDER — TETANUS TOXOID, REDUCED DIPHTHERIA TOXOID AND ACELLULAR PERTUSSIS VACCINE, ADSORBED 5; 2.5; 8; 8; 2.5 [IU]/.5ML; [IU]/.5ML; UG/.5ML; UG/.5ML; UG/.5ML
0.5 SUSPENSION INTRAMUSCULAR ONCE
Qty: 0.5 ML | Refills: 0 | Status: SHIPPED | OUTPATIENT
Start: 2024-05-02 | End: 2024-05-02

## 2024-05-02 RX ORDER — DOXYCYCLINE HYCLATE 100 MG
100 TABLET ORAL 2 TIMES DAILY
Qty: 20 TABLET | Refills: 0 | Status: SHIPPED | OUTPATIENT
Start: 2024-05-02

## 2024-05-02 NOTE — PROGRESS NOTES
Subjective:       Patient ID: Marta Huff is a 78 y.o. female.    Chief Complaint: Anxiety (Shakes )    Anxiety  Symptoms include nervous/anxious behavior and palpitations.         Patient seen for f/u.   Recall, her  passed away somewhat unexpectedly earlier this year. She recalls that he was complaining of a dental issue but didn't want to get it checked out. When he did get it checked out, they found cancer.   She notes her anxiety is sometimes an issue. Increased anxiety and irritability. Feels tremulous in her chest at times, consistent with palpitations. Cannot always separate from her usual copd/resp problems. Not constant. Slightly better since moving. Can wake her from sleep.   She had a fall onto her knees - landed on her s/p knee replacement knee and noticed some fullness/swelling. Lower leg with redness, prickly feeling, swelling.     Review of Systems:  Review of Systems   Constitutional:  Positive for fatigue. Negative for fever.   Cardiovascular:  Positive for palpitations and leg swelling.   Musculoskeletal:  Positive for joint swelling.   Skin:  Positive for color change. Negative for wound.   Psychiatric/Behavioral:  The patient is nervous/anxious.        Objective:     Vitals:    05/02/24 1401   BP: 126/60   Pulse: 90   SpO2: 95%   Weight: 105.6 kg (232 lb 12.9 oz)   Height: 5' (1.524 m)          Physical Exam  Vitals and nursing note reviewed.   Constitutional:       General: She is not in acute distress.     Appearance: Normal appearance. She is well-developed.   HENT:      Head: Normocephalic and atraumatic.   Eyes:      General: No scleral icterus.        Right eye: No discharge.         Left eye: No discharge.      Conjunctiva/sclera: Conjunctivae normal.   Cardiovascular:      Rate and Rhythm: Normal rate.   Pulmonary:      Effort: Pulmonary effort is normal. No respiratory distress.   Abdominal:      Palpations: Abdomen is soft.      Tenderness: There is no guarding.    Musculoskeletal:         General: No deformity or signs of injury.      Cervical back: Neck supple.      Right lower leg: No edema.      Left lower leg: Edema (1+ edema) present.   Skin:     General: Skin is warm and dry.      Findings: Erythema (distal 1/3 shin with erythema) present. No rash.   Neurological:      General: No focal deficit present.      Mental Status: She is alert and oriented to person, place, and time.   Psychiatric:         Mood and Affect: Mood normal.         Behavior: Behavior normal.           Assessment & Plan:  Cellulitis, unspecified cellulitis site  Comments:  doxy 2x/day  Orders:  -     doxycycline (VIBRA-TABS) 100 MG tablet; Take 1 tablet (100 mg total) by mouth 2 (two) times daily.  Dispense: 20 tablet; Refill: 0  -     US Lower Extremity Veins Left; Future; Expected date: 05/02/2024    Mild major depression  Comments:  doing ok given her 's passing, cont regimen  sparing xanax prn    Peripheral polyneuropathy  Comments:  stable    Localized edema  -     US Lower Extremity Veins Left; Future; Expected date: 05/02/2024  -     Echo; Future    Controlled type 2 diabetes mellitus without complication, without long-term current use of insulin  -     Comprehensive Metabolic Panel; Future; Expected date: 05/02/2024  -     Lipid Panel; Future; Expected date: 05/02/2024  -     TSH; Future; Expected date: 05/02/2024  -     Hemoglobin A1C; Future; Expected date: 05/02/2024  -     Urinalysis, Reflex to Urine Culture Urine, Clean Catch  -     Microalbumin/Creatinine Ratio, Urine; Future    Immunization due  -     diphth,pertus,acell,,tetanus (BOOSTRIX TDAP) 2.5-8-5 Lf-mcg-Lf/0.5mL Syrg injection; Inject 0.5 mLs into the muscle once. for 1 dose  Dispense: 0.5 mL; Refill: 0    Chronic obstructive pulmonary disease, unspecified COPD type  Comments:  stable    Morbid obesity with BMI of 45.0-49.9, adult  Comments:  stable, cont activity as tolerated    Pulmonary hypertension  -     Echo;  Future    Tortuous aorta  Comments:  stable    Palpitations  Comments:  update echo/holter  Orders:  -     Holter monitor - 48 hour; Future    Other orders  -     ALPRAZolam (XANAX) 0.25 MG tablet; Take 1 tablet (0.25 mg total) by mouth 2 (two) times daily as needed for Anxiety.  Dispense: 20 tablet; Refill: 1

## 2024-05-04 DIAGNOSIS — L90.0 LICHEN SCLEROSUS ET ATROPHICUS: ICD-10-CM

## 2024-05-04 DIAGNOSIS — L30.8 OTHER ECZEMA: ICD-10-CM

## 2024-05-06 ENCOUNTER — TELEPHONE (OUTPATIENT)
Dept: FAMILY MEDICINE | Facility: CLINIC | Age: 79
End: 2024-05-06
Payer: MEDICARE

## 2024-05-06 RX ORDER — TACROLIMUS 1 MG/G
1 OINTMENT TOPICAL 2 TIMES DAILY
Qty: 100 G | Refills: 5 | Status: SHIPPED | OUTPATIENT
Start: 2024-05-06

## 2024-05-06 RX ORDER — CLOBETASOL PROPIONATE 0.5 MG/G
OINTMENT TOPICAL
Qty: 60 G | Refills: 3 | Status: SHIPPED | OUTPATIENT
Start: 2024-05-06

## 2024-05-06 NOTE — TELEPHONE ENCOUNTER
Called pt to get clarification on a medication request to see if pt needs the 50 or 100 mg of trazodone.

## 2024-05-08 DIAGNOSIS — Z78.0 MENOPAUSE: ICD-10-CM

## 2024-05-09 ENCOUNTER — OFFICE VISIT (OUTPATIENT)
Dept: DERMATOLOGY | Facility: CLINIC | Age: 79
End: 2024-05-09
Payer: MEDICARE

## 2024-05-09 VITALS — HEIGHT: 60 IN | RESPIRATION RATE: 18 BRPM | WEIGHT: 232.81 LBS | BODY MASS INDEX: 45.71 KG/M2

## 2024-05-09 DIAGNOSIS — L30.8 OTHER ECZEMA: ICD-10-CM

## 2024-05-09 DIAGNOSIS — L90.0 LICHEN SCLEROSUS ET ATROPHICUS: Primary | ICD-10-CM

## 2024-05-09 PROCEDURE — 99999 PR PBB SHADOW E&M-EST. PATIENT-LVL V: CPT | Mod: PBBFAC,,, | Performed by: DERMATOLOGY

## 2024-05-09 PROCEDURE — 3288F FALL RISK ASSESSMENT DOCD: CPT | Mod: CPTII,S$GLB,, | Performed by: DERMATOLOGY

## 2024-05-09 PROCEDURE — 1157F ADVNC CARE PLAN IN RCRD: CPT | Mod: CPTII,S$GLB,, | Performed by: DERMATOLOGY

## 2024-05-09 PROCEDURE — 1101F PT FALLS ASSESS-DOCD LE1/YR: CPT | Mod: CPTII,S$GLB,, | Performed by: DERMATOLOGY

## 2024-05-09 PROCEDURE — 1159F MED LIST DOCD IN RCRD: CPT | Mod: CPTII,S$GLB,, | Performed by: DERMATOLOGY

## 2024-05-09 PROCEDURE — 99214 OFFICE O/P EST MOD 30 MIN: CPT | Mod: S$GLB,,, | Performed by: DERMATOLOGY

## 2024-05-09 RX ORDER — TRIAMCINOLONE ACETONIDE 1 MG/G
CREAM TOPICAL 2 TIMES DAILY PRN
Qty: 454 G | Refills: 5 | Status: SHIPPED | OUTPATIENT
Start: 2024-05-09

## 2024-05-09 RX ORDER — TACROLIMUS 1 MG/G
OINTMENT TOPICAL 2 TIMES DAILY
Qty: 100 G | Refills: 5 | Status: SHIPPED | OUTPATIENT
Start: 2024-05-09

## 2024-05-09 RX ORDER — CLOBETASOL PROPIONATE 0.5 MG/G
OINTMENT TOPICAL 2 TIMES DAILY PRN
Qty: 60 G | Refills: 3 | Status: SHIPPED | OUTPATIENT
Start: 2024-05-09

## 2024-05-09 RX ORDER — HYDROXYCHLOROQUINE SULFATE 200 MG/1
200 TABLET, FILM COATED ORAL 2 TIMES DAILY
Qty: 60 TABLET | Refills: 3 | Status: SHIPPED | OUTPATIENT
Start: 2024-05-09

## 2024-05-09 NOTE — PROGRESS NOTES
Subjective:       Patient ID:  Marta Huff is a 78 y.o. female who presents for   Chief Complaint   Patient presents with    Rash       HPI  Established patient.   6 month f/u extensive extragenital LS&A at lower torso (biopsy-proven), also with genital LS&A appreciated clinically, intertrigo at subpannus.   Patient lost to close f/u 2/2 's health concerns and ultimate recent passing. Patient has run out of Plaquenil a few months ago (started at ) and many of her normal topicals. Overall increased in dry flaky skin at abdomen and some itch here but otherwise doing relatively well. Denies symptoms at genitals.   Rx'd topicals incl TAC cream to extra-genital involvement and clobetasol and tacrolimus ointments to genital involvement.       PMH: hereditary / idiopathic peripheral neuropathy, HTN, DM, obesity (BMI 47), fatty liver (mild per abd US in 2019), COPD/asthma, OA, fibromyalgia, depression/ anxiety  No kidney disease. Hx cataract sx but otherwise no eye issues.    9/2020  1.  Skin, left abdomen, punch biopsy:   - LICHEN SCLEROSUS ET ATROPHICUS.   MICROSCOPIC DESCRIPTION:  Sections show an atrophic epidermis with vacuolar   alteration of the basal cell layer in association with an amorphous   appearing, homogeneously eosinophilic, altered material within the underlying   superficial dermis. There is a mild to moderate lymphocytic infiltrate   beneath the amorphous material.     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 Basovacuolar interface or prominent inflammatory   infiltrate is not seen, which would be present in connective tissue disease   or drug reaction. Correlate with clinical findings.     Past Medical History:   Diagnosis Date    Anxiety     Arthralgia of knee     arthralgia of bilateral knees secondary to djd    Arthritis     Back  pain     Depression     Encounter for blood transfusion     AUTOLOGUS    GERD (gastroesophageal reflux disease)     Hiatal hernia     repaired in 1979    History of seasonal allergies     History of shingles     Hypertension     Insomnia     Obesity     JESENIA (obstructive sleep apnea) 02/2020    Home sleep study - YESICA 57, Desat 69% - Severe JESENIA with desaturations    Osteoporosis     Pneumonia     Reactive airway disease     Restless legs syndrome     Rheumatic fever     at 6 y/o    Sleep apnea     no cpap     Review of Systems   Genitourinary:  Negative for genital sores and genital itching.   Skin:  Positive for itching, rash and dry skin. Negative for sensitivity to antibiotic ointment and sensitivity to bandage adhesive.   Hematologic/Lymphatic: Bruises/bleeds easily.        Objective:    Physical Exam   Constitutional: She appears well-developed and well-nourished. No distress.   Neurological: She is alert and oriented to person, place, and time. She is not disoriented.   Psychiatric: She has a normal mood and affect.   Skin:   Areas Examined (abnormalities noted in diagram):   Abdomen Inspection Performed  Genitals / Buttocks / Groin Inspection Performed  Back Inspection Performed  RUE Inspected  LUE Inspection Performed  RLE Inspected  LLE Inspection Performed              Diagram Legend     Erythematous scaling macule/papule c/w actinic keratosis       Vascular papule c/w angioma      Pigmented verrucoid papule/plaque c/w seborrheic keratosis      Yellow umbilicated papule c/w sebaceous hyperplasia      Irregularly shaped tan macule c/w lentigo     1-2 mm smooth white papules consistent with Milia      Movable subcutaneous cyst with punctum c/w epidermal inclusion cyst      Subcutaneous movable cyst c/w pilar cyst      Firm pink to brown papule c/w dermatofibroma      Pedunculated fleshy papule(s) c/w skin tag(s)      Evenly pigmented macule c/w junctional nevus     Mildly variegated pigmented, slightly  irregular-bordered macule c/w mildly atypical nevus      Flesh colored to evenly pigmented papule c/w intradermal nevus       Pink pearly papule/plaque c/w basal cell carcinoma      Erythematous hyperkeratotic cursted plaque c/w SCC      Surgical scar with no sign of skin cancer recurrence      Open and closed comedones      Inflammatory papules and pustules      Verrucoid papule consistent consistent with wart     Erythematous eczematous patches and plaques     Dystrophic onycholytic nail with subungual debris c/w onychomycosis     Umbilicated papule    Erythematous-base heme-crusted tan verrucoid plaque consistent with inflamed seborrheic keratosis     Erythematous Silvery Scaling Plaque c/w Psoriasis     See annotation    11/2023    10/2022 (baseline)       Latest Reference Range & Units 10/21/04 17:50 12/15/08 10:15   MIRACLE Neg <1:160  Pos, Titer to follow ! Negative   MIRACLE HEP-2 Titer Neg <1:160 titer Pos 1:160 !    ds DNA Ab Negative Titer Negative    MIRACLE Hep-2 Pattern  Speckled !        Assessment / Plan:        Lichen sclerosus et atrophicus  -     triamcinolone acetonide 0.1% (KENALOG) 0.1 % cream; Apply topically 2 (two) times daily as needed (rash on body). Avoid use on face, body folds, groin/genitalia.  Dispense: 454 g; Refill: 5  -     clobetasol 0.05% (TEMOVATE) 0.05 % Oint; Apply topically 2 (two) times daily as needed (rash at privates).  Dispense: 60 g; Refill: 3  -     hydroxychloroquine (PLAQUENIL) 200 mg tablet; Take 1 tablet (200 mg total) by mouth 2 (two) times daily.  Dispense: 60 tablet; Refill: 3    Other eczema  -     tacrolimus (PROTOPIC) 0.1 % ointment; Apply topically 2 (two) times daily.  Dispense: 100 g; Refill: 5        Despite lapse of treatment, still overall improved from baseline especially symptomatically. Activity still present on exam both extra-genitally and genitally.     Multiple co-morbidities limiting treatment, sub-optimal lifestyle.   Goal clinical endpoint: symptomatic  control of extragenital involvement, controlled clinical disease of genital involvement.   Consideration for active nontreatment of diffuse extragenital involvement if symptoms are controlled.   For refractory symptomatic extragenital disease, [facility] [place] ([place], [place] is an option (pt confirms ability to travel 2-3x weekly if local).   For refractory vulvar disease, [facility] a future option.    For refractory symptomatic extragenital disease AND vulvar disease, systemic treatment is a consideration - would avoid acitretin (data in refractory vulvar disease) with extensive metabolic syndrome / CV disease, use caution MTX (most data in extragenital disease) in setting of fatty liver disease, morbid obesity, DM. Resuming trial of hydroxychloroquine today (baseline ophtho exam 12/2023) Consideration for Cellcept, Humira trial in future?    Discussed benefits and risks for treatment with Plaquenil with patient. Patient will require a baseline and yearly ophthalmologic examination. Will need CBC q month x 3 then q 3 - 6 months.Must avoid smoking when taking Plaquenil as smoking decreases effectiveness.     Itchy, dry rash at torso: apply triamcinolone cream (rx in jar) 3x weekly to entire area; apply 2x daily to any actively symptomatic areas  Rash at vulva, scot anal region: apply clobetasol ointment (tube) twice daily, alternate every 2 weeks with tacrolimus ointment      Gentle vulvar, scot anal care:   Wash areas once daily with hand and Cetaphil gentle cleanser.   Pat dry. Ensure dryness with blow dryer on cool setting.   Apply small amount of clobetasol (or tacrolimus) ointment to affected areas twice daily.     Gentle fold care:  Wash areas once daily with hand and Cetaphil gentle cleanser.   Pat dry. Ensure full dryness - cool blow dry or corn starch based powder (OTC Zeasorb AF powder).       Gentle skin care:   No more than 1 shower or bath a day.   Out of shower or bath in less than 10 minutes.   Use  only warm water, NOT hot.   Soap only where needed - areas that make body odor or are visibly dirty.  Use gentle soaps only (eg, Cetaphil Body Wash; non soap cleansers are more gentle than bar soaps).   After shower or bath, pat dry - do not scrub or rub aggressively.   Apply thick moisturizing cream twice daily, once being after the shower or bath (eg, Cerave Anti Itch Cream). Apply moisturizing cream diffusely and AFTER application of above rx triamcinolone cream to affected areas.   Avoid common allergens/irritants - fragrances, botanicals, etc.          Follow up in about 1 month (around 6/9/2024).

## 2024-05-09 NOTE — PATIENT INSTRUCTIONS
New pill: hydroxychloroquine 1 pill twice daily.     Itchy, dry rash at torso: apply triamcinolone cream (rx in jar) 3x weekly to entire area; apply 2x daily to any actively symptomatic areas  Rash at vulva, scot anal region: apply clobetasol ointment (tube) twice daily, alternate every 2 weeks with tacrolimus ointment (tube)     Gentle vulvar, scot anal care:   Wash areas once daily with hand and Cetaphil gentle cleanser.   Pat dry. Ensure dryness with blow dryer on cool setting.   Apply small amount of clobetasol (or tacrolimus) ointment to affected areas twice daily.     Gentle fold care:  Wash areas once daily with hand and Cetaphil gentle cleanser.   Pat dry. Ensure full dryness - cool blow dry or corn starch based powder (OTC Zeasorb AF powder).       Gentle skin care:   No more than 1 shower or bath a day.   Out of shower or bath in less than 10 minutes.   Use only warm water, NOT hot.   Soap only where needed - areas that make body odor or are visibly dirty.  Use gentle soaps only (eg, Cetaphil Body Wash; non soap cleansers are more gentle than bar soaps).   After shower or bath, pat dry - do not scrub or rub aggressively.   Apply thick moisturizing cream twice daily, once being after the shower or bath (eg, Cerave Anti Itch Cream). Apply moisturizing cream diffusely and AFTER application of above rx triamcinolone cream to affected areas.   Avoid common allergens/irritants - fragrances, botanicals, etc.

## 2024-05-30 DIAGNOSIS — M15.9 GENERALIZED OSTEOARTHRITIS: ICD-10-CM

## 2024-05-30 RX ORDER — ALENDRONATE SODIUM 70 MG/1
TABLET ORAL
Qty: 12 TABLET | Refills: 3 | Status: SHIPPED | OUTPATIENT
Start: 2024-05-30

## 2024-05-30 NOTE — TELEPHONE ENCOUNTER
Please approve for alendronate (FOSAMAX) 70 MG tablet   Last OV 05/02/24  Last refill date 06/09/23  Last labs 11/30/23  Next appt 08/02/24

## 2024-05-30 NOTE — TELEPHONE ENCOUNTER
Care Due:                  Date            Visit Type   Department     Provider  --------------------------------------------------------------------------------                                MYCHART                              FOLLOWUP/OF  [facility] FAMILY  Last Visit: 05-      FICE VISIT   MEDICINE       Elda Holley                              EP -                              PRIMARY      [facility]  Next Visit: 08-      CARE ([facility])   MEDICINE       Elda Holley                                                            Last  Test          Frequency    Reason                     Performed    Due Date  --------------------------------------------------------------------------------    Mg Level....  12 months..  alendronate..............  Not Found    Overdue    Phosphate...  12 months..  alendronate..............  Not Found    Overdue    Vitamin D...  12 months..  alendronate..............  Not Found    Overdue    [employer] Embedded Care Due Messages. Reference number: [id number].   5/30/2024 12:50:08 PM CDT

## 2024-06-04 ENCOUNTER — TELEPHONE (OUTPATIENT)
Dept: DERMATOLOGY | Facility: CLINIC | Age: 79
End: 2024-06-04
Payer: MEDICARE

## 2024-06-12 DIAGNOSIS — G25.81 RESTLESS LEGS SYNDROME: ICD-10-CM

## 2024-06-12 NOTE — TELEPHONE ENCOUNTER
No care due was identified.  [facility] Embedded Care Due Messages. Reference number: [id number].   6/12/2024 6:47:39 AM CDT

## 2024-06-12 NOTE — TELEPHONE ENCOUNTER
Refill Routing Note   Medication(s) are not appropriate for processing by [facility] for the following reason(s):        Outside of protocol    [facility] action(s):  Route             Appointments  past 12m or future 3m with PCP    Date Provider   Last Visit   5/2/2024 Elda Holley MD   Next Visit   8/2/2024 Elda Holley MD   ED visits in past 90 days: 0        Note composed:7:23 AM 06/12/2024

## 2024-06-14 RX ORDER — PRAMIPEXOLE DIHYDROCHLORIDE 1.5 MG/1
TABLET ORAL
Qty: 90 TABLET | Refills: 3 | Status: SHIPPED | OUTPATIENT
Start: 2024-06-14

## 2024-07-11 ENCOUNTER — OFFICE VISIT (OUTPATIENT)
Dept: RHEUMATOLOGY | Facility: CLINIC | Age: 79
End: 2024-07-11
Payer: MEDICARE

## 2024-07-11 VITALS
DIASTOLIC BLOOD PRESSURE: 82 MMHG | SYSTOLIC BLOOD PRESSURE: 145 MMHG | BODY MASS INDEX: 45.73 KG/M2 | WEIGHT: 234.13 LBS | HEART RATE: 99 BPM

## 2024-07-11 DIAGNOSIS — M15.9 PRIMARY OSTEOARTHRITIS INVOLVING MULTIPLE JOINTS: Primary | ICD-10-CM

## 2024-07-11 DIAGNOSIS — G62.9 PERIPHERAL POLYNEUROPATHY: ICD-10-CM

## 2024-07-11 PROCEDURE — 1157F ADVNC CARE PLAN IN RCRD: CPT | Mod: CPTII,S$GLB,, | Performed by: INTERNAL MEDICINE

## 2024-07-11 PROCEDURE — 3079F DIAST BP 80-89 MM HG: CPT | Mod: CPTII,S$GLB,, | Performed by: INTERNAL MEDICINE

## 2024-07-11 PROCEDURE — 1159F MED LIST DOCD IN RCRD: CPT | Mod: CPTII,S$GLB,, | Performed by: INTERNAL MEDICINE

## 2024-07-11 PROCEDURE — 3077F SYST BP >= 140 MM HG: CPT | Mod: CPTII,S$GLB,, | Performed by: INTERNAL MEDICINE

## 2024-07-11 PROCEDURE — 1125F AMNT PAIN NOTED PAIN PRSNT: CPT | Mod: CPTII,S$GLB,, | Performed by: INTERNAL MEDICINE

## 2024-07-11 PROCEDURE — 99999 PR PBB SHADOW E&M-EST. PATIENT-LVL IV: CPT | Mod: PBBFAC,,, | Performed by: INTERNAL MEDICINE

## 2024-07-11 PROCEDURE — 3288F FALL RISK ASSESSMENT DOCD: CPT | Mod: CPTII,S$GLB,, | Performed by: INTERNAL MEDICINE

## 2024-07-11 PROCEDURE — 1100F PTFALLS ASSESS-DOCD GE2>/YR: CPT | Mod: CPTII,S$GLB,, | Performed by: INTERNAL MEDICINE

## 2024-07-11 PROCEDURE — 99214 OFFICE O/P EST MOD 30 MIN: CPT | Mod: S$GLB,,, | Performed by: INTERNAL MEDICINE

## 2024-07-11 RX ORDER — GABAPENTIN 100 MG/1
100 CAPSULE ORAL 2 TIMES DAILY
Qty: 60 CAPSULE | Refills: 1 | Status: SHIPPED | OUTPATIENT
Start: 2024-07-11 | End: 2025-07-11

## 2024-07-11 NOTE — PATIENT INSTRUCTIONS
Restart small dosage of gabapentin 100mg twice daily.   Monitor for feeling sleepy or confused  Monitor for leg swelling that is worsening.   If either occurs STOP Gabapentin and call Dr. Jaime

## 2024-07-11 NOTE — PROGRESS NOTES
Subjective:          Chief Complaint: Marta Huff is a 79 y.o. female who had no chief complaint listed for this encounter.    HPI:  Patient her for f/u of  OA and FMS.   7/24: patient having tingling in hands and feet. She has hx of neuropathy in feet with spinal stim (Soheila/Ye). She is working with her PCP as she is having some erythema in addition to tingling of the feet. She states her spinal stim has not functioned in 2 years. What is new is the tips of her fingers. Not positional, intermittent.     10/23: today shoulder bilateral are aching, stiffness. Exacerbated cold weather present few weeks worse today. Did have injections with me in the past, in 2021 was not as effective at which she was last seen with DR. MCMILLAN 9/2022. Overall pain right now 6/10 but caring for her  who does have short term memory loss.   Patient doing better with switch to Cymbalta is tolerating 60mg daily  Feeling better off sertraline.   Dx:  osteoarthritis this is multijoint,  as well as peripheral neuropathy.    She was using gabapentin  2400 mg daily.   Patient had been following with pain management, did have Dr. Parnell place  spinal cord stimulator.  A noting 85% relief of the foot pain.     She still has pain in back but this is not a constant complaint for her. She is sleeping on sides and seems to tolerate, is is the lumbosacral junction that hurts at night.   Exacerbated with sitting prolonged period. At some times she uses walker due to pain.   Hands with stiffness PIP and DIP joints long standing cannot fully curl fingers. +deformity, intermittent swelling.     Interaction with etodolac, but renal function wnl.   She does tolerate NSAID: takes aleve 440mg in AM and PRN at night. No hx of GIB.        4/26/2023     7:20 PM   Rapid3 Question Responses and Scores   MDHAQ Score 1.3   Psychologic Score 5.5   Pain Score 7.5   When you awakened in the morning OVER THE LAST WEEK, did you feel stiff? Yes   If  Yes, please indicate the number of hours until you are as limber as you will be for the day 3   Fatigue Score 4.5   Global Health Score 5   RAPID3 Score 5.61       REVIEW OF SYSTEMS:    Review of Systems   Constitutional:  Negative for fever, malaise/fatigue and weight loss.   HENT:  Negative for sore throat.    Eyes:  Negative for double vision, photophobia and redness.   Respiratory:  Negative for cough, shortness of breath and wheezing.    Cardiovascular:  Negative for chest pain, palpitations and orthopnea.   Gastrointestinal:  Negative for abdominal pain, constipation and diarrhea.   Genitourinary:  Negative for dysuria, hematuria and urgency.   Musculoskeletal:  Positive for joint pain. Negative for back pain and myalgias.   Skin:  Negative for rash.   Neurological:  Negative for dizziness, tingling, focal weakness and headaches.   Endo/Heme/Allergies:  Does not bruise/bleed easily.   Psychiatric/Behavioral:  Negative for depression, hallucinations and suicidal ideas.                Objective:            Past Medical History:   Diagnosis Date    Anxiety     Arthralgia of knee     arthralgia of bilateral knees secondary to djd    Arthritis     Back pain     Depression     Encounter for blood transfusion     AUTOLOGUS    GERD (gastroesophageal reflux disease)     Hiatal hernia     repaired in 1979    History of seasonal allergies     History of shingles     Hypertension     Insomnia     Obesity     JESENIA (obstructive sleep apnea) 02/2020    Home sleep study - YESICA 57, Desat 69% - Severe JESENIA with desaturations    Osteoporosis     Pneumonia     Reactive airway disease     Restless legs syndrome     Rheumatic fever     at 4 y/o    Sleep apnea     no cpap     Family History   Problem Relation Name Age of Onset    Heart disease Mother      Hypertension Mother      Diabetes Mother      Obesity Mother      Heart disease Maternal Grandfather       Social History     Tobacco Use    Smoking status: Never     Passive  exposure: Yes    Smokeless tobacco: Never   Substance Use Topics    Alcohol use: Yes     Comment: 1-2 glasses of wine monthly    Drug use: No         Current Outpatient Medications on File Prior to Visit   Medication Sig Dispense Refill    albuterol (PROVENTIL) 2.5 mg /3 mL (0.083 %) nebulizer solution INHALE THE CONTENTS OF 1 VIAL VIA NEBULIZER EVERY 6 HOURS AS NEEDED FOR  WHEEZING  OR  SHORTNESS OF BREATH 90 mL 11    albuterol (PROVENTIL/VENTOLIN HFA) 90 mcg/actuation inhaler INHALE 2 PUFFS INTO THE LUNGS EVERY 6 HOURS AS NEEDED FOR WHEEZING OR SHORTNESS OF BREATH. 25.5 g 3    alendronate (FOSAMAX) 70 MG tablet TAKE 1 TABLET EVERY 7 DAYS 12 tablet 3    betamethasone valerate 0.1% (VALISONE) 0.1 % Crea Apply topically 2 (two) times daily. 45 g 6    clobetasol 0.05% (TEMOVATE) 0.05 % Oint APPLY TO AFFECTED AREA TWICE A DAY 60 g 3    clobetasol 0.05% (TEMOVATE) 0.05 % Oint Apply topically 2 (two) times daily as needed (rash at privates). 60 g 3    DULoxetine (CYMBALTA) 60 MG capsule Take 1 capsule (60 mg total) by mouth once daily. 90 capsule 3    fluticasone furoate-vilanteroL (BREO ELLIPTA) 200-25 mcg/dose DsDv diskus inhaler Inhale 1 puff into the lungs once daily. Controller 60 each 5    furosemide (LASIX) 80 MG tablet TAKE 1 TABLET EVERY DAY 90 tablet 1    hydroxychloroquine (PLAQUENIL) 200 mg tablet TAKE 1 TABLET BY MOUTH TWICE A DAY 60 tablet 3    hydroxychloroquine (PLAQUENIL) 200 mg tablet Take 1 tablet (200 mg total) by mouth 2 (two) times daily. 60 tablet 3    losartan (COZAAR) 50 MG tablet Take 1 tablet (50 mg total) by mouth 2 (two) times daily. 180 tablet 3    naproxen sodium (ALEVE ORAL) Take 1 Dose by mouth daily as needed.      nebivoloL (BYSTOLIC) 5 MG Tab TAKE 1 TABLET BY MOUTH EVERY DAY 30 tablet 3    potassium chloride (KLOR-CON) 10 MEQ TbSR Take 1 tablet (10 mEq total) by mouth once daily. 90 tablet 3    pramipexole (MIRAPEX) 1.5 MG tablet TAKE 1 TABLET EVERY NIGHT 90 tablet 3    triamcinolone  acetonide 0.1% (KENALOG) 0.1 % cream Apply topically 2 (two) times daily as needed (rash on body). Avoid use on face, body folds, groin/genitalia. 454 g 5    triamcinolone acetonide 0.1% (KENALOG) 0.1 % cream Apply topically 2 (two) times daily as needed (rash on body). Avoid use on face, body folds, groin/genitalia. 454 g 5    ALPRAZolam (XANAX) 0.25 MG tablet Take 1 tablet (0.25 mg total) by mouth 2 (two) times daily as needed for Anxiety. 20 tablet 1    benzonatate (TESSALON) 100 MG capsule benzonatate 100 mg capsule   TAKE ONE CAPSULE BY MOUTH THREE TIMES DAILY AS NEEDED FOR COUGH (Patient not taking: Reported on 5/2/2024)      calcium phosphate-vitamin D3 250 mg-10 mcg (400 unit) Chew Take 1 tablet by mouth once daily.  (Patient not taking: Reported on 7/11/2024)      diazePAM (VALIUM) 5 MG tablet  (Patient not taking: Reported on 7/11/2024)      doxycycline (VIBRA-TABS) 100 MG tablet Take 1 tablet (100 mg total) by mouth 2 (two) times daily. (Patient not taking: Reported on 7/11/2024) 20 tablet 0    tacrolimus (PROTOPIC) 0.1 % ointment Apply topically 2 (two) times daily. (Patient not taking: Reported on 7/11/2024) 60 g 5    tacrolimus (PROTOPIC) 0.1 % ointment APPLY TO AFFECTED AREA TWICE A DAY (Patient not taking: Reported on 7/11/2024) 100 g 5    tacrolimus (PROTOPIC) 0.1 % ointment Apply topically 2 (two) times daily. (Patient not taking: Reported on 7/11/2024) 100 g 5    traZODone (DESYREL) 100 MG tablet Take 1 tablet (100 mg total) by mouth every evening. (Patient not taking: Reported on 7/11/2024) 90 tablet 3    triamcinolone 0.033%, ciclopirox 0.257%, milk of magnesia topical suspension Apply to affected area twice a day after cool blow dry (Patient not taking: Reported on 7/11/2024) 180 g 5     Current Facility-Administered Medications on File Prior to Visit   Medication Dose Route Frequency Provider Last Rate Last Admin    methylPREDNISolone acetate injection 40 mg  40 mg Intra-articular  Addison  Adwoa CALDERÓN,    40 mg at 03/11/21 1000    triamcinolone acetonide injection 10 mg  10 mg Intradermal 1 time in Clinic/HOD Brittani Roland MD        triamcinolone acetonide injection 10 mg  10 mg Intradermal 1 time in Clinic/HOD Brittani Roland MD           Vitals:    07/11/24 1408   BP: (!) 145/82   Pulse: 99       Physical Exam:    Physical Exam  Constitutional:       Appearance: Normal appearance. She is well-developed.   HENT:      Nose: No septal deviation.      Mouth/Throat:      Mouth: No oral lesions.   Eyes:      Conjunctiva/sclera:      Right eye: Right conjunctiva is not injected.      Left eye: Left conjunctiva is not injected.      Pupils: Pupils are equal, round, and reactive to light.   Neck:      Thyroid: No thyroid mass or thyromegaly.      Vascular: No JVD.   Cardiovascular:      Rate and Rhythm: Normal rate and regular rhythm.      Pulses: Normal pulses.      Comments: No edema  Pulmonary:      Effort: Pulmonary effort is normal.      Breath sounds: Normal breath sounds.   Abdominal:      Palpations: Abdomen is soft.   Musculoskeletal:      Right shoulder: No swelling or tenderness. Decreased range of motion.      Left shoulder: No swelling or tenderness. Decreased range of motion.      Right elbow: No swelling. Normal range of motion. No tenderness.      Left elbow: No swelling. Normal range of motion. No tenderness.      Right wrist: No swelling or tenderness. Normal range of motion.      Left wrist: No swelling or tenderness. Normal range of motion.      Right hand: Decreased range of motion.      Left hand: Decreased range of motion.      Right hip: No bony tenderness. Normal range of motion. Normal strength.      Left hip: No tenderness or bony tenderness. Normal range of motion.      Right knee: No swelling. Normal range of motion. No tenderness.      Left knee: No swelling. Normal range of motion. No tenderness.      Right ankle: No swelling. No tenderness. Normal range of  motion.      Left ankle: No swelling. No tenderness. Normal range of motion.      Comments: Bony hypertrophy PIP and DIP joints. Squaring CMC joint. Tenderness along gr. Troch and ITB. Negative exacerbation SLR some LBP.    Lymphadenopathy:      Cervical: No cervical adenopathy.   Skin:     General: Skin is dry.   Neurological:      Deep Tendon Reflexes: Reflexes are normal and symmetric.               Assessment:       Encounter Diagnoses   Name Primary?    Primary osteoarthritis involving multiple joints Yes    Peripheral polyneuropathy             Plan:        Primary osteoarthritis involving multiple joints    Peripheral polyneuropathy  -     gabapentin (NEURONTIN) 100 MG capsule; Take 1 capsule (100 mg total) by mouth 2 (two) times daily.  Dispense: 60 capsule; Refill: 1        Delightful patient with OA and FMS    Patient doing better with switch to Cymbalta is tolerating 60mg daily      Peripheral neuropathy: feet is known issue she has a spinal stimulator not function for over 2 years.   New is her hands. Before we subject her to EMG/NCS can restart low dosage gabapentin 100mg BID and I want her to reach out to Dr Belcher about the spinal stim. She will reach out soon.    Discussed edema     TKA with continued pain.  Will need to discuss with Ortho.     No follow-ups on file.             30min consultation with greater than 50% of that time included Preparing to see the patient (review records, tests), Obtaining and/or reviewing separately obtained historical data, Performing a medically appropriate examination and/or evaluation , Ordering medications, tests, and/or procedures, Referring and communicating with other healthcare professionals , Documenting clinical information in the electronic or other health record and Independently interpreting results  (as warranted) & communicating results to the patient/family/caregiver. All questions answered.

## 2024-07-15 ENCOUNTER — HOSPITAL ENCOUNTER (OUTPATIENT)
Dept: RADIOLOGY | Facility: HOSPITAL | Age: 79
Discharge: HOME | End: 2024-07-15
Attending: FAMILY MEDICINE
Payer: MEDICARE

## 2024-07-15 DIAGNOSIS — Z78.0 MENOPAUSE: ICD-10-CM

## 2024-07-15 PROCEDURE — 77080 DXA BONE DENSITY AXIAL: CPT | Mod: TC,PO

## 2024-07-15 PROCEDURE — 77080 DXA BONE DENSITY AXIAL: CPT | Mod: 26,,, | Performed by: RADIOLOGY

## 2024-07-17 ENCOUNTER — HOSPITAL ENCOUNTER (OUTPATIENT)
Dept: RADIOLOGY | Facility: HOSPITAL | Age: 79
Discharge: HOME | End: 2024-07-17
Attending: FAMILY MEDICINE
Payer: MEDICARE

## 2024-07-17 DIAGNOSIS — L03.90 CELLULITIS, UNSPECIFIED CELLULITIS SITE: ICD-10-CM

## 2024-07-17 DIAGNOSIS — R60.0 LOCALIZED EDEMA: ICD-10-CM

## 2024-07-17 PROCEDURE — 93971 EXTREMITY STUDY: CPT | Mod: TC,PO,LT

## 2024-07-17 PROCEDURE — 93971 EXTREMITY STUDY: CPT | Mod: 26,LT,, | Performed by: RADIOLOGY

## 2024-08-02 ENCOUNTER — OFFICE VISIT (OUTPATIENT)
Dept: FAMILY MEDICINE | Facility: CLINIC | Age: 79
End: 2024-08-02
Payer: MEDICARE

## 2024-08-02 VITALS
SYSTOLIC BLOOD PRESSURE: 138 MMHG | HEART RATE: 73 BPM | WEIGHT: 244.5 LBS | HEIGHT: 60 IN | DIASTOLIC BLOOD PRESSURE: 82 MMHG | BODY MASS INDEX: 48 KG/M2 | OXYGEN SATURATION: 95 %

## 2024-08-02 DIAGNOSIS — I10 ESSENTIAL HYPERTENSION: ICD-10-CM

## 2024-08-02 DIAGNOSIS — I27.20 PULMONARY HYPERTENSION: ICD-10-CM

## 2024-08-02 DIAGNOSIS — E11.9 CONTROLLED TYPE 2 DIABETES MELLITUS WITHOUT COMPLICATION, WITHOUT LONG-TERM CURRENT USE OF INSULIN: ICD-10-CM

## 2024-08-02 DIAGNOSIS — R60.9 EDEMA, UNSPECIFIED TYPE: Primary | ICD-10-CM

## 2024-08-02 PROCEDURE — 1160F RVW MEDS BY RX/DR IN RCRD: CPT | Mod: CPTII,S$GLB,, | Performed by: FAMILY MEDICINE

## 2024-08-02 PROCEDURE — 1157F ADVNC CARE PLAN IN RCRD: CPT | Mod: CPTII,S$GLB,, | Performed by: FAMILY MEDICINE

## 2024-08-02 PROCEDURE — 1159F MED LIST DOCD IN RCRD: CPT | Mod: CPTII,S$GLB,, | Performed by: FAMILY MEDICINE

## 2024-08-02 PROCEDURE — 3288F FALL RISK ASSESSMENT DOCD: CPT | Mod: CPTII,S$GLB,, | Performed by: FAMILY MEDICINE

## 2024-08-02 PROCEDURE — 1101F PT FALLS ASSESS-DOCD LE1/YR: CPT | Mod: CPTII,S$GLB,, | Performed by: FAMILY MEDICINE

## 2024-08-02 PROCEDURE — 1125F AMNT PAIN NOTED PAIN PRSNT: CPT | Mod: CPTII,S$GLB,, | Performed by: FAMILY MEDICINE

## 2024-08-02 PROCEDURE — 3075F SYST BP GE 130 - 139MM HG: CPT | Mod: CPTII,S$GLB,, | Performed by: FAMILY MEDICINE

## 2024-08-02 PROCEDURE — 99214 OFFICE O/P EST MOD 30 MIN: CPT | Mod: S$GLB,,, | Performed by: FAMILY MEDICINE

## 2024-08-02 PROCEDURE — G2211 COMPLEX E/M VISIT ADD ON: HCPCS | Mod: S$GLB,,, | Performed by: FAMILY MEDICINE

## 2024-08-02 PROCEDURE — 3079F DIAST BP 80-89 MM HG: CPT | Mod: CPTII,S$GLB,, | Performed by: FAMILY MEDICINE

## 2024-08-02 PROCEDURE — 99999 PR PBB SHADOW E&M-EST. PATIENT-LVL V: CPT | Mod: PBBFAC,,, | Performed by: FAMILY MEDICINE

## 2024-08-02 RX ORDER — BUMETANIDE 2 MG/1
2 TABLET ORAL DAILY
Qty: 30 TABLET | Refills: 11 | Status: SHIPPED | OUTPATIENT
Start: 2024-08-02 | End: 2025-08-02

## 2024-08-02 RX ORDER — BLOOD-GLUCOSE SENSOR
1 EACH MISCELLANEOUS
Qty: 3 EACH | Refills: 11 | Status: SHIPPED | OUTPATIENT
Start: 2024-08-02 | End: 2024-08-13 | Stop reason: SDUPTHER

## 2024-08-02 NOTE — PROGRESS NOTES
Subjective:       Patient ID: Marta Huff is a 79 y.o. female.    Chief Complaint: Follow-up (Red and swellen)      Marta Huff is in the office for f/u.    Follow-up  Associated symptoms include fatigue and joint swelling. Pertinent negatives include no coughing or fever.     Legs persistent red/swollen. U/s neg for DVT. She is trying to increase her water intake, currently 3 bottles of water/day.   She checks her BS at home, but is not updated with the digital program to have them submitted. She recalls a a couple of episodes of low blood sugars that improved easily with a snack.   Past Medical History:   Diagnosis Date    Anxiety     Arthralgia of knee     arthralgia of bilateral knees secondary to djd    Arthritis     Back pain     Depression     Encounter for blood transfusion     AUTOLOGUS    GERD (gastroesophageal reflux disease)     Hiatal hernia     repaired in 1979    History of seasonal allergies     History of shingles     Hypertension     Insomnia     Obesity     JESENIA (obstructive sleep apnea) 02/2020    Home sleep study - YESICA 57, Desat 69% - Severe JESENIA with desaturations    Osteoporosis     Pneumonia     Reactive airway disease     Restless legs syndrome     Rheumatic fever     at 6 y/o    Sleep apnea     no cpap         Current Outpatient Medications:     albuterol (PROVENTIL) 2.5 mg /3 mL (0.083 %) nebulizer solution, INHALE THE CONTENTS OF 1 VIAL VIA NEBULIZER EVERY 6 HOURS AS NEEDED FOR  WHEEZING  OR  SHORTNESS OF BREATH, Disp: 90 mL, Rfl: 11    albuterol (PROVENTIL/VENTOLIN HFA) 90 mcg/actuation inhaler, INHALE 2 PUFFS INTO THE LUNGS EVERY 6 HOURS AS NEEDED FOR WHEEZING OR SHORTNESS OF BREATH, Disp: 25.5 g, Rfl: 2    alendronate (FOSAMAX) 70 MG tablet, TAKE 1 TABLET EVERY 7 DAYS, Disp: 12 tablet, Rfl: 3    betamethasone valerate 0.1% (VALISONE) 0.1 % Crea, Apply topically 2 (two) times daily., Disp: 45 g, Rfl: 6    clobetasol 0.05% (TEMOVATE) 0.05 % Oint, APPLY TO AFFECTED  AREA TWICE A DAY, Disp: 60 g, Rfl: 3    clobetasol 0.05% (TEMOVATE) 0.05 % Oint, Apply topically 2 (two) times daily as needed (rash at privates)., Disp: 60 g, Rfl: 3    fluticasone furoate-vilanteroL (BREO ELLIPTA) 200-25 mcg/dose DsDv diskus inhaler, Inhale 1 puff into the lungs once daily. Controller, Disp: 60 each, Rfl: 5    hydroxychloroquine (PLAQUENIL) 200 mg tablet, TAKE 1 TABLET BY MOUTH TWICE A DAY, Disp: 60 tablet, Rfl: 3    naproxen sodium (ALEVE ORAL), Take 1 Dose by mouth daily as needed., Disp: , Rfl:     nebivoloL (BYSTOLIC) 5 MG Tab, TAKE 1 TABLET BY MOUTH EVERY DAY, Disp: 30 tablet, Rfl: 3    ALPRAZolam (XANAX) 0.25 MG tablet, Take 1 tablet (0.25 mg total) by mouth 2 (two) times daily as needed for Anxiety., Disp: 20 tablet, Rfl: 1    benzonatate (TESSALON) 100 MG capsule, benzonatate 100 mg capsule  TAKE ONE CAPSULE BY MOUTH THREE TIMES DAILY AS NEEDED FOR COUGH (Patient not taking: Reported on 5/2/2024), Disp: , Rfl:     blood-glucose sensor (FREESTYLE MARY KAY 3 SENSOR) Madelyn, 1 Device by Misc.(Non-Drug; Combo Route) route every 14 (fourteen) days., Disp: 3 each, Rfl: 11    bumetanide (BUMEX) 2 MG tablet, Take 1 tablet (2 mg total) by mouth once daily., Disp: 30 tablet, Rfl: 11    calcium phosphate-vitamin D3 250 mg-10 mcg (400 unit) Chew, Take 1 tablet by mouth once daily.  (Patient not taking: Reported on 7/11/2024), Disp: , Rfl:     diazePAM (VALIUM) 5 MG tablet, , Disp: , Rfl:     doxycycline (VIBRA-TABS) 100 MG tablet, Take 1 tablet (100 mg total) by mouth 2 (two) times daily. (Patient not taking: Reported on 7/11/2024), Disp: 20 tablet, Rfl: 0    DULoxetine (CYMBALTA) 60 MG capsule, Take 1 capsule (60 mg total) by mouth once daily., Disp: 90 capsule, Rfl: 3    gabapentin (NEURONTIN) 100 MG capsule, Take 1 capsule (100 mg total) by mouth 2 (two) times daily. (Patient not taking: Reported on 8/2/2024), Disp: 60 capsule, Rfl: 1    hydroxychloroquine (PLAQUENIL) 200 mg tablet, Take 1 tablet (200  mg total) by mouth 2 (two) times daily., Disp: 60 tablet, Rfl: 3    losartan (COZAAR) 50 MG tablet, TAKE 1 TABLET TWICE DAILY, Disp: 180 tablet, Rfl: 1    potassium chloride (KLOR-CON) 10 MEQ TbSR, TAKE 1 TABLET EVERY DAY, Disp: 90 tablet, Rfl: 1    pramipexole (MIRAPEX) 1.5 MG tablet, Take 1 tablet (1.5 mg total) by mouth every evening., Disp: 90 tablet, Rfl: 3    tacrolimus (PROTOPIC) 0.1 % ointment, Apply topically 2 (two) times daily. (Patient not taking: Reported on 7/11/2024), Disp: 60 g, Rfl: 5    tacrolimus (PROTOPIC) 0.1 % ointment, APPLY TO AFFECTED AREA TWICE A DAY (Patient not taking: Reported on 7/11/2024), Disp: 100 g, Rfl: 5    tacrolimus (PROTOPIC) 0.1 % ointment, Apply topically 2 (two) times daily. (Patient not taking: Reported on 7/11/2024), Disp: 100 g, Rfl: 5    traZODone (DESYREL) 100 MG tablet, Take 1 tablet (100 mg total) by mouth every evening. (Patient not taking: Reported on 7/11/2024), Disp: 90 tablet, Rfl: 3    triamcinolone 0.033%, ciclopirox 0.257%, milk of magnesia topical suspension, Apply to affected area twice a day after cool blow dry (Patient not taking: Reported on 7/11/2024), Disp: 180 g, Rfl: 5    triamcinolone acetonide 0.1% (KENALOG) 0.1 % cream, Apply topically 2 (two) times daily as needed (rash on body). Avoid use on face, body folds, groin/genitalia. (Patient not taking: Reported on 8/2/2024), Disp: 454 g, Rfl: 5    triamcinolone acetonide 0.1% (KENALOG) 0.1 % cream, Apply topically 2 (two) times daily as needed (rash on body). Avoid use on face, body folds, groin/genitalia. (Patient not taking: Reported on 8/2/2024), Disp: 454 g, Rfl: 5    Current Facility-Administered Medications:     triamcinolone acetonide injection 10 mg, 10 mg, Intradermal, 1 time in Clinic/HOD, Brittani Roland MD    triamcinolone acetonide injection 10 mg, 10 mg, Intradermal, 1 time in Clinic/LESLY, Brittani Roland MD    Facility-Administered Medications Ordered in Other Visits:      methylPREDNISolone acetate injection 40 mg, 40 mg, Intra-articular, , Adwoa Jaime, DO, 40 mg at 03/11/21 1000    The ASCVD Risk score (Lucinda DK, et al., 2019) failed to calculate for the following reasons:    The valid total cholesterol range is 130 to 320 mg/dL     Lab Results   Component Value Date    HGBA1C 5.6 08/13/2024    HGBA1C 5.8 (H) 09/11/2023    HGBA1C 5.6 04/25/2023     Lab Results   Component Value Date    LDLCALC 51.0 (L) 08/13/2024    CREATININE 0.7 08/13/2024   Labs 2023 rev.     Review of Systems   Constitutional:  Positive for fatigue. Negative for fever.   Respiratory:  Positive for shortness of breath. Negative for cough.    Cardiovascular:  Positive for palpitations and leg swelling.   Musculoskeletal:  Positive for joint swelling.   Skin:  Positive for color change. Negative for wound.   Psychiatric/Behavioral:  The patient is nervous/anxious.        Objective:      Physical Exam  Vitals and nursing note reviewed.   Constitutional:       General: She is not in acute distress.     Appearance: Normal appearance. She is well-developed.   HENT:      Head: Normocephalic and atraumatic.   Eyes:      General: No scleral icterus.        Right eye: No discharge.         Left eye: No discharge.      Conjunctiva/sclera: Conjunctivae normal.   Cardiovascular:      Rate and Rhythm: Normal rate.   Pulmonary:      Effort: Pulmonary effort is normal. No respiratory distress.      Breath sounds: Rhonchi present.   Skin:     General: Skin is warm and dry.   Neurological:      General: No focal deficit present.      Mental Status: She is alert and oriented to person, place, and time.   Psychiatric:         Mood and Affect: Mood normal.         Behavior: Behavior normal.             Screening recommendations appropriate to age and health status were reviewed.    Assessment & Plan:    Edema, unspecified type  Comments:  update echo, reviewed salt avoidance, use of compression socks, leg elevation, activity  as tolerated  switch to bumex, 2mg daily - let me know if no improvement  Orders:  -     bumetanide (BUMEX) 2 MG tablet; Take 1 tablet (2 mg total) by mouth once daily.  Dispense: 30 tablet; Refill: 11  -     Echo; Future    Controlled type 2 diabetes mellitus without complication, without long-term current use of insulin  Comments:  stable, avoidance of hypglycemia stressed  mary kay application and use reviewed with pt    Pulmonary hypertension  Comments:  update echo and cont efforts at maintaining appr fluid level  Orders:  -     Echo; Future    Essential hypertension  Comments:  controlled in clinic, reviewed    Other orders  -     Discontinue: blood-glucose sensor (FREESTYLE MARY KAY 3 SENSOR) Madelyn; 1 Device by Misc.(Non-Drug; Combo Route) route every 14 (fourteen) days.  Dispense: 3 each; Refill: 11    Visit today included increased complexity associated with the care of the episodic problem edema addressed and managing the longitudinal care of the patient due to the serious and/or complex managed problem(s) DM2, pulm HTN.

## 2024-08-02 NOTE — PATIENT INSTRUCTIONS
Bumex instead of lasix this weekend. 1 pill every morning. If weight/fluid is worsening, increase to 2 pills in the morning and call me Monday or Tuesday to let me know.     Augustin 3 trial sensor - prescription went to DME - they should call you.    Compression socks, start with lower compression so you can get them on. We'll try to move up as the fluid levels improve.

## 2024-08-07 DIAGNOSIS — I10 ESSENTIAL HYPERTENSION: Chronic | ICD-10-CM

## 2024-08-07 RX ORDER — FUROSEMIDE 80 MG/1
80 TABLET ORAL
Qty: 90 TABLET | Refills: 0 | OUTPATIENT
Start: 2024-08-07

## 2024-08-07 RX ORDER — LOSARTAN POTASSIUM 50 MG/1
50 TABLET ORAL 2 TIMES DAILY
Qty: 180 TABLET | Refills: 1 | Status: SHIPPED | OUTPATIENT
Start: 2024-08-07

## 2024-08-07 RX ORDER — POTASSIUM CHLORIDE 750 MG/1
10 TABLET, EXTENDED RELEASE ORAL
Qty: 90 TABLET | Refills: 1 | Status: SHIPPED | OUTPATIENT
Start: 2024-08-07

## 2024-08-13 ENCOUNTER — TELEPHONE (OUTPATIENT)
Dept: FAMILY MEDICINE | Facility: CLINIC | Age: 79
End: 2024-08-13
Payer: MEDICARE

## 2024-08-13 ENCOUNTER — LAB VISIT (OUTPATIENT)
Dept: LAB | Facility: HOSPITAL | Age: 79
End: 2024-08-13
Attending: FAMILY MEDICINE
Payer: MEDICARE

## 2024-08-13 DIAGNOSIS — G25.81 RESTLESS LEGS SYNDROME: ICD-10-CM

## 2024-08-13 DIAGNOSIS — E11.9 CONTROLLED TYPE 2 DIABETES MELLITUS WITHOUT COMPLICATION, WITHOUT LONG-TERM CURRENT USE OF INSULIN: ICD-10-CM

## 2024-08-13 DIAGNOSIS — M19.90 OSTEOARTHRITIS, UNSPECIFIED OSTEOARTHRITIS TYPE, UNSPECIFIED SITE: ICD-10-CM

## 2024-08-13 DIAGNOSIS — L90.0 LICHEN SCLEROSUS ET ATROPHICUS: ICD-10-CM

## 2024-08-13 DIAGNOSIS — M79.7 FIBROMYALGIA: Primary | ICD-10-CM

## 2024-08-13 LAB
ALBUMIN SERPL BCP-MCNC: 3.5 G/DL (ref 3.5–5.2)
ALBUMIN/CREAT UR: 52.9 UG/MG (ref 0–30)
ALP SERPL-CCNC: 114 U/L (ref 55–135)
ALT SERPL W/O P-5'-P-CCNC: 15 U/L (ref 10–44)
ANION GAP SERPL CALC-SCNC: 8 MMOL/L (ref 8–16)
AST SERPL-CCNC: 22 U/L (ref 10–40)
BILIRUB SERPL-MCNC: 0.6 MG/DL (ref 0.1–1)
BUN SERPL-MCNC: 13 MG/DL (ref 8–23)
CALCIUM SERPL-MCNC: 8.9 MG/DL (ref 8.7–10.5)
CHLORIDE SERPL-SCNC: 104 MMOL/L (ref 95–110)
CHOLEST SERPL-MCNC: 122 MG/DL (ref 120–199)
CHOLEST/HDLC SERPL: 2 {RATIO} (ref 2–5)
CO2 SERPL-SCNC: 30 MMOL/L (ref 23–29)
CREAT SERPL-MCNC: 0.7 MG/DL (ref 0.5–1.4)
CREAT UR-MCNC: 34 MG/DL (ref 15–325)
EST. GFR  (NO RACE VARIABLE): >60 ML/MIN/1.73 M^2
ESTIMATED AVG GLUCOSE: 114 MG/DL (ref 68–131)
GLUCOSE SERPL-MCNC: 95 MG/DL (ref 70–110)
HBA1C MFR BLD: 5.6 % (ref 4–5.6)
HDLC SERPL-MCNC: 61 MG/DL (ref 40–75)
HDLC SERPL: 50 % (ref 20–50)
LDLC SERPL CALC-MCNC: 51 MG/DL (ref 63–159)
MICROALBUMIN UR DL<=1MG/L-MCNC: 18 UG/ML
NONHDLC SERPL-MCNC: 61 MG/DL
POTASSIUM SERPL-SCNC: 4 MMOL/L (ref 3.5–5.1)
PROT SERPL-MCNC: 7.2 G/DL (ref 6–8.4)
SODIUM SERPL-SCNC: 142 MMOL/L (ref 136–145)
TRIGL SERPL-MCNC: 50 MG/DL (ref 30–150)
TSH SERPL DL<=0.005 MIU/L-ACNC: 2.95 UIU/ML (ref 0.4–4)

## 2024-08-13 PROCEDURE — 82570 ASSAY OF URINE CREATININE: CPT | Performed by: FAMILY MEDICINE

## 2024-08-13 PROCEDURE — 80061 LIPID PANEL: CPT | Performed by: FAMILY MEDICINE

## 2024-08-13 PROCEDURE — 36415 COLL VENOUS BLD VENIPUNCTURE: CPT | Mod: PN | Performed by: FAMILY MEDICINE

## 2024-08-13 PROCEDURE — 84443 ASSAY THYROID STIM HORMONE: CPT | Performed by: FAMILY MEDICINE

## 2024-08-13 PROCEDURE — 82043 UR ALBUMIN QUANTITATIVE: CPT | Performed by: FAMILY MEDICINE

## 2024-08-13 PROCEDURE — 83036 HEMOGLOBIN GLYCOSYLATED A1C: CPT | Performed by: FAMILY MEDICINE

## 2024-08-13 PROCEDURE — 80053 COMPREHEN METABOLIC PANEL: CPT | Performed by: FAMILY MEDICINE

## 2024-08-13 NOTE — TELEPHONE ENCOUNTER
No care due was identified.  [facility] Embedded Care Due Messages. Reference number: [id number].   8/13/2024 5:12:57 PM CDT

## 2024-08-14 RX ORDER — HYDROXYCHLOROQUINE SULFATE 200 MG/1
200 TABLET, FILM COATED ORAL 2 TIMES DAILY
Qty: 60 TABLET | Refills: 3 | Status: SHIPPED | OUTPATIENT
Start: 2024-08-14

## 2024-08-14 RX ORDER — DULOXETIN HYDROCHLORIDE 60 MG/1
60 CAPSULE, DELAYED RELEASE ORAL DAILY
Qty: 90 CAPSULE | Refills: 3 | Status: SHIPPED | OUTPATIENT
Start: 2024-08-14

## 2024-08-15 RX ORDER — PRAMIPEXOLE DIHYDROCHLORIDE 1.5 MG/1
1.5 TABLET ORAL NIGHTLY
Qty: 90 TABLET | Refills: 3 | Status: SHIPPED | OUTPATIENT
Start: 2024-08-15

## 2024-08-15 RX ORDER — BLOOD-GLUCOSE SENSOR
1 EACH MISCELLANEOUS
Qty: 3 EACH | Refills: 11 | Status: SHIPPED | OUTPATIENT
Start: 2024-08-15 | End: 2025-08-15

## 2024-08-15 RX ORDER — ALPRAZOLAM 0.25 MG/1
0.25 TABLET ORAL 2 TIMES DAILY PRN
Qty: 20 TABLET | Refills: 1 | Status: SHIPPED | OUTPATIENT
Start: 2024-08-15 | End: 2024-09-14

## 2024-08-16 ENCOUNTER — HOSPITAL ENCOUNTER (OUTPATIENT)
Dept: CARDIOLOGY | Facility: HOSPITAL | Age: 79
Discharge: HOME | End: 2024-08-16
Attending: FAMILY MEDICINE
Payer: MEDICARE

## 2024-08-16 VITALS — BODY MASS INDEX: 47.91 KG/M2 | WEIGHT: 244 LBS | HEIGHT: 60 IN

## 2024-08-16 DIAGNOSIS — R60.9 EDEMA, UNSPECIFIED TYPE: ICD-10-CM

## 2024-08-16 DIAGNOSIS — I27.20 PULMONARY HYPERTENSION: ICD-10-CM

## 2024-08-16 LAB
ASCENDING AORTA: 3 CM
AV INDEX (PROSTH): 0.86
AV MEAN GRADIENT: 8 MMHG
AV PEAK GRADIENT: 16 MMHG
AV VALVE AREA BY VELOCITY RATIO: 3.14 CM²
AV VALVE AREA: 3.56 CM²
AV VELOCITY RATIO: 0.76
BSA FOR ECHO PROCEDURE: 2.16 M2
CV ECHO LV RWT: 0.5 CM
DOP CALC AO PEAK VEL: 1.98 M/S
DOP CALC AO VTI: 40.7 CM
DOP CALC LVOT AREA: 4.1 CM2
DOP CALC LVOT DIAMETER: 2.29 CM
DOP CALC LVOT PEAK VEL: 1.51 M/S
DOP CALC LVOT STROKE VOLUME: 144.9 CM3
DOP CALCLVOT PEAK VEL VTI: 35.2 CM
E WAVE DECELERATION TIME: 171.86 MSEC
E/A RATIO: 0.85
E/E' RATIO: 11.4 M/S
ECHO LV POSTERIOR WALL: 1.13 CM (ref 0.6–1.1)
FRACTIONAL SHORTENING: 28 % (ref 28–44)
INTERVENTRICULAR SEPTUM: 1.05 CM (ref 0.6–1.1)
IVC DIAMETER: 2.44 CM
LEFT ATRIUM AREA SYSTOLIC (APICAL 2 CHAMBER): 19.74 CM2
LEFT ATRIUM AREA SYSTOLIC (APICAL 4 CHAMBER): 28.97 CM2
LEFT ATRIUM SIZE: 3.9 CM
LEFT ATRIUM VOLUME INDEX MOD: 37.4 ML/M2
LEFT ATRIUM VOLUME MOD: 75.85 CM3
LEFT INTERNAL DIMENSION IN SYSTOLE: 3.24 CM (ref 2.1–4)
LEFT VENTRICLE DIASTOLIC VOLUME INDEX: 46.2 ML/M2
LEFT VENTRICLE DIASTOLIC VOLUME: 93.78 ML
LEFT VENTRICLE END SYSTOLIC VOLUME APICAL 2 CHAMBER: 53.64 ML
LEFT VENTRICLE END SYSTOLIC VOLUME APICAL 4 CHAMBER: 107.64 ML
LEFT VENTRICLE MASS INDEX: 86 G/M2
LEFT VENTRICLE SYSTOLIC VOLUME INDEX: 20.8 ML/M2
LEFT VENTRICLE SYSTOLIC VOLUME: 42.16 ML
LEFT VENTRICULAR INTERNAL DIMENSION IN DIASTOLE: 4.53 CM (ref 3.5–6)
LEFT VENTRICULAR MASS: 174.61 G
LV LATERAL E/E' RATIO: 10.36 M/S
LV SEPTAL E/E' RATIO: 12.67 M/S
LVED V (TEICH): 93.78 ML
LVES V (TEICH): 42.16 ML
LVOT MG: 5.16 MMHG
LVOT MV: 1.07 CM/S
MV PEAK A VEL: 1.34 M/S
MV PEAK E VEL: 1.14 M/S
MV STENOSIS PRESSURE HALF TIME: 49.84 MS
MV VALVE AREA P 1/2 METHOD: 4.41 CM2
OHS CV RV/LV RATIO: 0.86 CM
PISA TR MAX VEL: 3.21 M/S
PULM VEIN S/D RATIO: 1.57
PV PEAK D VEL: 0.47 M/S
PV PEAK S VEL: 0.74 M/S
RIGHT VENTRICLE DIASTOLIC LENGTH: 8.3 CM
RIGHT VENTRICLE DIASTOLIC MID DIMENSION: 2.4 CM
RIGHT VENTRICULAR END-DIASTOLIC DIMENSION: 3.91 CM
RIGHT VENTRICULAR LENGTH IN DIASTOLE (APICAL 4-CHAMBER VIEW): 8.25 CM
RV MID DIAMA: 2.35 CM
RV TISSUE DOPPLER FREE WALL SYSTOLIC VELOCITY 1 (APICAL 4 CHAMBER VIEW): 13.94 CM/S
SINUS: 3.35 CM
STJ: 3.01 CM
TDI LATERAL: 0.11 M/S
TDI SEPTAL: 0.09 M/S
TDI: 0.1 M/S
TR MAX PG: 41 MMHG
TRICUSPID ANNULAR PLANE SYSTOLIC EXCURSION: 2.56 CM
Z-SCORE OF LEFT VENTRICULAR DIMENSION IN END DIASTOLE: -2.84
Z-SCORE OF LEFT VENTRICULAR DIMENSION IN END SYSTOLE: -1.02

## 2024-08-16 PROCEDURE — 93306 TTE W/DOPPLER COMPLETE: CPT | Mod: 26,,, | Performed by: INTERNAL MEDICINE

## 2024-08-16 PROCEDURE — 93306 TTE W/DOPPLER COMPLETE: CPT | Mod: PO

## 2024-08-18 LAB
ASCENDING AORTA: 3 CM
AV INDEX (PROSTH): 0.86
AV MEAN GRADIENT: 8 MMHG
AV PEAK GRADIENT: 16 MMHG
AV VALVE AREA BY VELOCITY RATIO: 3.14 CM²
AV VALVE AREA: 3.56 CM²
AV VELOCITY RATIO: 0.76
BSA FOR ECHO PROCEDURE: 2.16 M2
CV ECHO LV RWT: 0.5 CM
DOP CALC AO PEAK VEL: 1.98 M/S
DOP CALC AO VTI: 40.7 CM
DOP CALC LVOT AREA: 4.1 CM2
DOP CALC LVOT DIAMETER: 2.29 CM
DOP CALC LVOT PEAK VEL: 1.51 M/S
DOP CALC LVOT STROKE VOLUME: 144.9 CM3
DOP CALCLVOT PEAK VEL VTI: 35.2 CM
E WAVE DECELERATION TIME: 171.86 MSEC
E/A RATIO: 0.85
E/E' RATIO: 11.4 M/S
ECHO LV POSTERIOR WALL: 1.13 CM (ref 0.6–1.1)
EJECTION FRACTION: 65 %
FRACTIONAL SHORTENING: 28 % (ref 28–44)
INTERVENTRICULAR SEPTUM: 1.05 CM (ref 0.6–1.1)
IVC DIAMETER: 2.44 CM
LEFT ATRIUM AREA SYSTOLIC (APICAL 2 CHAMBER): 19.74 CM2
LEFT ATRIUM AREA SYSTOLIC (APICAL 4 CHAMBER): 28.97 CM2
LEFT ATRIUM SIZE: 3.9 CM
LEFT ATRIUM VOLUME INDEX MOD: 37.4 ML/M2
LEFT ATRIUM VOLUME MOD: 75.85 CM3
LEFT INTERNAL DIMENSION IN SYSTOLE: 3.24 CM (ref 2.1–4)
LEFT VENTRICLE DIASTOLIC VOLUME INDEX: 46.2 ML/M2
LEFT VENTRICLE DIASTOLIC VOLUME: 93.78 ML
LEFT VENTRICLE END SYSTOLIC VOLUME APICAL 2 CHAMBER: 53.64 ML
LEFT VENTRICLE END SYSTOLIC VOLUME APICAL 4 CHAMBER: 107.64 ML
LEFT VENTRICLE MASS INDEX: 86 G/M2
LEFT VENTRICLE SYSTOLIC VOLUME INDEX: 20.8 ML/M2
LEFT VENTRICLE SYSTOLIC VOLUME: 42.16 ML
LEFT VENTRICULAR INTERNAL DIMENSION IN DIASTOLE: 4.53 CM (ref 3.5–6)
LEFT VENTRICULAR MASS: 174.61 G
LV LATERAL E/E' RATIO: 10.36 M/S
LV SEPTAL E/E' RATIO: 12.67 M/S
LVED V (TEICH): 93.78 ML
LVES V (TEICH): 42.16 ML
LVOT MG: 5.16 MMHG
LVOT MV: 1.07 CM/S
MV PEAK A VEL: 1.34 M/S
MV PEAK E VEL: 1.14 M/S
MV STENOSIS PRESSURE HALF TIME: 49.84 MS
MV VALVE AREA P 1/2 METHOD: 4.41 CM2
OHS CV RV/LV RATIO: 0.86 CM
PISA TR MAX VEL: 3.21 M/S
PULM VEIN S/D RATIO: 1.57
PV PEAK D VEL: 0.47 M/S
PV PEAK S VEL: 0.74 M/S
RA PRESSURE ESTIMATED: 3 MMHG
RIGHT VENTRICLE DIASTOLIC LENGTH: 8.3 CM
RIGHT VENTRICLE DIASTOLIC MID DIMENSION: 2.4 CM
RIGHT VENTRICULAR END-DIASTOLIC DIMENSION: 3.91 CM
RIGHT VENTRICULAR LENGTH IN DIASTOLE (APICAL 4-CHAMBER VIEW): 8.25 CM
RV MID DIAMA: 2.35 CM
RV TB RVSP: 6 MMHG
RV TISSUE DOPPLER FREE WALL SYSTOLIC VELOCITY 1 (APICAL 4 CHAMBER VIEW): 13.94 CM/S
SINUS: 3.35 CM
STJ: 3.01 CM
TDI LATERAL: 0.11 M/S
TDI SEPTAL: 0.09 M/S
TDI: 0.1 M/S
TR MAX PG: 41 MMHG
TRICUSPID ANNULAR PLANE SYSTOLIC EXCURSION: 2.56 CM
TV REST PULMONARY ARTERY PRESSURE: 44 MMHG
Z-SCORE OF LEFT VENTRICULAR DIMENSION IN END DIASTOLE: -2.84
Z-SCORE OF LEFT VENTRICULAR DIMENSION IN END SYSTOLE: -1.02

## 2024-08-30 ENCOUNTER — TELEPHONE (OUTPATIENT)
Dept: FAMILY MEDICINE | Facility: CLINIC | Age: 79
End: 2024-08-30
Payer: MEDICARE

## 2024-08-30 NOTE — TELEPHONE ENCOUNTER
----- Message from Shereen Jaimes sent at 8/30/2024 11:26 AM CDT -----  Regarding: Refill  Type:  RX Refill Request    Who Called: Pt    Refill or New Rx:Refill     RX Name and Strength:Freestyle Carmen 3      Preferred Pharmacy with phone number:  [facility] - [facility] [address]  [address] [address]  Phone: [phone] Fax: [phone]    Local or Mail Order:Mail      Would the patient rather a call back or a response via [facility]? Call back    Best Call Back Number:[phone]    Additional Information: Rx sts no meds was sent over . Thank you

## 2024-08-30 NOTE — TELEPHONE ENCOUNTER
No care due was identified.  [facility] Embedded Care Due Messages. Reference number: [id number].   8/30/2024 11:35:18 AM CDT

## 2024-08-31 RX ORDER — FLASH GLUCOSE SENSOR
KIT MISCELLANEOUS
Refills: 0 | OUTPATIENT
Start: 2024-08-31

## 2024-08-31 NOTE — TELEPHONE ENCOUNTER
[facility] Note  Quick DC. Inappropriate Request   Refill request requires further review by MD: NO   Medication Therapy Plan: Pharmacy is requesting new script(s) for the following medications without required information, (sig/ frequency/qty/etc)     [facility] action(s):  Quick Discontinue      Duplicate Pended Encounter(s)/ Last Prescribed Details:    Pharmacies have been requesting medications for patients without required information, (sig, frequency, qty, etc.). In addition, requests are sent for medication(s) pt. are currently not taking, and medications patients have never taken.    We have spoken to the pharmacies about these request types and advised their teams previously that we are unable to assess these New Script requests and require all details for these requests. This is a known issue and has been reported.        Medication related problems are not assessed for QDC.   Medication Reconciliation Completed? NO Were there pending details that required adjustment? NO     Automatic Epic Generated Protocol Data Below:   Requested Prescriptions   Pending Prescriptions Disp Refills    FREESTYLE MARY KAY 14 DAY SENSOR Kit [Pharmacy Med Name: [employer] MARY KAY SENSOR (14-DAY)]  0              Appointments      Date Provider   Last Visit   8/2/2024 Elda Holley MD   Next Visit   8/30/2024 Elda Holley MD        Note composed:4:11 AM 08/31/2024

## 2024-09-03 ENCOUNTER — TELEPHONE (OUTPATIENT)
Dept: FAMILY MEDICINE | Facility: CLINIC | Age: 79
End: 2024-09-03
Payer: MEDICARE

## 2024-09-03 ENCOUNTER — PATIENT MESSAGE (OUTPATIENT)
Dept: FAMILY MEDICINE | Facility: CLINIC | Age: 79
End: 2024-09-03
Payer: MEDICARE

## 2024-09-03 RX ORDER — BLOOD-GLUCOSE SENSOR
1 EACH MISCELLANEOUS
Qty: 3 EACH | Refills: 11 | Status: SHIPPED | OUTPATIENT
Start: 2024-09-03 | End: 2025-09-03

## 2024-09-03 NOTE — TELEPHONE ENCOUNTER
Pt came in requesting freestyle bartolome be sent to different pharm, I did update requested pharm in chart.

## 2024-09-18 ENCOUNTER — OFFICE VISIT (OUTPATIENT)
Dept: FAMILY MEDICINE | Facility: CLINIC | Age: 79
End: 2024-09-18
Payer: MEDICARE

## 2024-09-18 VITALS
WEIGHT: 246.56 LBS | DIASTOLIC BLOOD PRESSURE: 88 MMHG | SYSTOLIC BLOOD PRESSURE: 136 MMHG | BODY MASS INDEX: 48.41 KG/M2 | HEIGHT: 60 IN | OXYGEN SATURATION: 98 % | HEART RATE: 70 BPM

## 2024-09-18 DIAGNOSIS — I10 ESSENTIAL HYPERTENSION: ICD-10-CM

## 2024-09-18 DIAGNOSIS — R60.9 EDEMA, UNSPECIFIED TYPE: Primary | ICD-10-CM

## 2024-09-18 DIAGNOSIS — G62.9 PERIPHERAL POLYNEUROPATHY: ICD-10-CM

## 2024-09-18 PROCEDURE — 99999 PR PBB SHADOW E&M-EST. PATIENT-LVL V: CPT | Mod: PBBFAC,,, | Performed by: NURSE PRACTITIONER

## 2024-09-18 PROCEDURE — 3075F SYST BP GE 130 - 139MM HG: CPT | Mod: CPTII,S$GLB,, | Performed by: NURSE PRACTITIONER

## 2024-09-18 PROCEDURE — 3079F DIAST BP 80-89 MM HG: CPT | Mod: CPTII,S$GLB,, | Performed by: NURSE PRACTITIONER

## 2024-09-18 PROCEDURE — 99214 OFFICE O/P EST MOD 30 MIN: CPT | Mod: S$GLB,,, | Performed by: NURSE PRACTITIONER

## 2024-09-18 PROCEDURE — 3288F FALL RISK ASSESSMENT DOCD: CPT | Mod: CPTII,S$GLB,, | Performed by: NURSE PRACTITIONER

## 2024-09-18 PROCEDURE — 1157F ADVNC CARE PLAN IN RCRD: CPT | Mod: CPTII,S$GLB,, | Performed by: NURSE PRACTITIONER

## 2024-09-18 PROCEDURE — 1160F RVW MEDS BY RX/DR IN RCRD: CPT | Mod: CPTII,S$GLB,, | Performed by: NURSE PRACTITIONER

## 2024-09-18 PROCEDURE — 1159F MED LIST DOCD IN RCRD: CPT | Mod: CPTII,S$GLB,, | Performed by: NURSE PRACTITIONER

## 2024-09-18 PROCEDURE — 1125F AMNT PAIN NOTED PAIN PRSNT: CPT | Mod: CPTII,S$GLB,, | Performed by: NURSE PRACTITIONER

## 2024-09-18 PROCEDURE — 1101F PT FALLS ASSESS-DOCD LE1/YR: CPT | Mod: CPTII,S$GLB,, | Performed by: NURSE PRACTITIONER

## 2024-09-18 RX ORDER — FUROSEMIDE 80 MG/1
80 TABLET ORAL DAILY
Qty: 90 TABLET | Refills: 1 | Status: SHIPPED | OUTPATIENT
Start: 2024-09-18 | End: 2025-03-17

## 2024-09-18 NOTE — PROGRESS NOTES
THIS DOCUMENT WAS MADE IN PART WITH VOICE RECOGNITION SOFTWARE.  OCCASIONALLY THIS SOFTWARE WILL MISINTERPRET WORDS OR PHRASES.     Assessment and Plan:    Edema, unspecified type  Comments:  compressions socks   limit salt intake   elevation   resume Lasix as prescribed   activity as tolerated   recheck 1 month  Orders:  -     furosemide (LASIX) 80 MG tablet; Take 1 tablet (80 mg total) by mouth once daily.  Dispense: 90 tablet; Refill: 1    Essential hypertension  Comments:  controlled   continue regimen  Orders:  -     furosemide (LASIX) 80 MG tablet; Take 1 tablet (80 mg total) by mouth once daily.  Dispense: 90 tablet; Refill: 1    Peripheral polyneuropathy  Comments:  continue gabapentin as prescribed             Visit summary:      Discussed resuming furosemide  daily     Advised on symptomatic treatment-  compression stocking,  elevation,  salt restriction      Patient verbalized she will try to be more consistent with wearing compression stockings.    Emergent conditions/ER precautions discussed.     Recommended  patient  follow up in 1 month with PCP      Follow up in about 4 weeks (around 10/16/2024) for Follow-up with PCP- Dr. Holley .   ______________________________________________________________________  Subjective:    Chief Complaint:  Follow up  lower extremity swelling    HPI:  Marta is a 79 y.o. year old female with a past medical history noted below, here to follow up  for lower extremity swelling.   Patient was seen in clinic by PCP- Dr. Holley on 8/2/24 started on Bumex for leg swelling.    She reports that she stopped taking  because she developed blisters and increased redness to lower  extremities. Patient reports that she has a sulfa allergy and was advised by  pharmacist to stop taking medication. She reports that she  resumed taking her Lasix 80mg- which is helping with swelling-  patient requesting refill.  She reports that she is still having some redness and burning pain.  Patient  reports that she is taking Gabapentin which helps with her neuropathy.   She  admits she has not been consistent with  wearing compression socks.  Patient denies chest pain and shortness of breath.           Medications:  Current Outpatient Medications on File Prior to Visit   Medication Sig Dispense Refill    albuterol (PROVENTIL) 2.5 mg /3 mL (0.083 %) nebulizer solution INHALE THE CONTENTS OF 1 VIAL VIA NEBULIZER EVERY 6 HOURS AS NEEDED FOR  WHEEZING  OR  SHORTNESS OF BREATH 90 mL 11    albuterol (PROVENTIL/VENTOLIN HFA) 90 mcg/actuation inhaler INHALE 2 PUFFS INTO THE LUNGS EVERY 6 HOURS AS NEEDED FOR WHEEZING OR SHORTNESS OF BREATH 25.5 g 2    alendronate (FOSAMAX) 70 MG tablet TAKE 1 TABLET EVERY 7 DAYS 12 tablet 3    ALPRAZolam (XANAX) 0.25 MG tablet Take 1 tablet (0.25 mg total) by mouth 2 (two) times daily as needed for Anxiety. 20 tablet 1    betamethasone valerate 0.1% (VALISONE) 0.1 % Crea Apply topically 2 (two) times daily. 45 g 6    blood-glucose sensor (FREESTYLE MARY KAY 3 SENSOR) Madelyn 1 Device by Misc.(Non-Drug; Combo Route) route every 14 (fourteen) days. 3 each 11    calcium phosphate-vitamin D3 250 mg-10 mcg (400 unit) Chew Take 1 tablet by mouth once daily.      clobetasol 0.05% (TEMOVATE) 0.05 % Oint APPLY TO AFFECTED AREA TWICE A DAY 60 g 3    diazePAM (VALIUM) 5 MG tablet       DULoxetine (CYMBALTA) 60 MG capsule Take 1 capsule (60 mg total) by mouth once daily. 90 capsule 3    fluticasone furoate-vilanteroL (BREO ELLIPTA) 200-25 mcg/dose DsDv diskus inhaler Inhale 1 puff into the lungs once daily. Controller 60 each 5    gabapentin (NEURONTIN) 100 MG capsule Take 1 capsule (100 mg total) by mouth 2 (two) times daily. 60 capsule 1    hydroxychloroquine (PLAQUENIL) 200 mg tablet Take 1 tablet (200 mg total) by mouth 2 (two) times daily. 60 tablet 3    losartan (COZAAR) 50 MG tablet TAKE 1 TABLET TWICE DAILY 180 tablet 1    naproxen sodium (ALEVE ORAL) Take 1 Dose by mouth daily as needed.       nebivoloL (BYSTOLIC) 5 MG Tab TAKE 1 TABLET BY MOUTH EVERY DAY 30 tablet 3    potassium chloride (KLOR-CON) 10 MEQ TbSR TAKE 1 TABLET EVERY DAY 90 tablet 1    pramipexole (MIRAPEX) 1.5 MG tablet Take 1 tablet (1.5 mg total) by mouth every evening. 90 tablet 3    [DISCONTINUED] benzonatate (TESSALON) 100 MG capsule       [DISCONTINUED] bumetanide (BUMEX) 2 MG tablet Take 1 tablet (2 mg total) by mouth once daily. (Patient not taking: Reported on 9/18/2024) 30 tablet 11    [DISCONTINUED] clobetasol 0.05% (TEMOVATE) 0.05 % Oint Apply topically 2 (two) times daily as needed (rash at privates). 60 g 3    [DISCONTINUED] doxycycline (VIBRA-TABS) 100 MG tablet Take 1 tablet (100 mg total) by mouth 2 (two) times daily. 20 tablet 0    [DISCONTINUED] hydroxychloroquine (PLAQUENIL) 200 mg tablet TAKE 1 TABLET BY MOUTH TWICE A DAY 60 tablet 3    [DISCONTINUED] tacrolimus (PROTOPIC) 0.1 % ointment Apply topically 2 (two) times daily. 60 g 5    [DISCONTINUED] tacrolimus (PROTOPIC) 0.1 % ointment APPLY TO AFFECTED AREA TWICE A  g 5    [DISCONTINUED] tacrolimus (PROTOPIC) 0.1 % ointment Apply topically 2 (two) times daily. 100 g 5    [DISCONTINUED] traZODone (DESYREL) 100 MG tablet Take 1 tablet (100 mg total) by mouth every evening. 90 tablet 3    [DISCONTINUED] triamcinolone 0.033%, ciclopirox 0.257%, milk of magnesia topical suspension Apply to affected area twice a day after cool blow dry 180 g 5    [DISCONTINUED] triamcinolone acetonide 0.1% (KENALOG) 0.1 % cream Apply topically 2 (two) times daily as needed (rash on body). Avoid use on face, body folds, groin/genitalia. 454 g 5    [DISCONTINUED] triamcinolone acetonide 0.1% (KENALOG) 0.1 % cream Apply topically 2 (two) times daily as needed (rash on body). Avoid use on face, body folds, groin/genitalia. 454 g 5     Current Facility-Administered Medications on File Prior to Visit   Medication Dose Route Frequency Provider Last Rate Last Admin    methylPREDNISolone  acetate injection 40 mg  40 mg Intra-articular  Adwoa JaimeRaymond, DO   40 mg at 03/11/21 1000    triamcinolone acetonide injection 10 mg  10 mg Intradermal 1 time in Clinic/HOD Brittani Roland MD        triamcinolone acetonide injection 10 mg  10 mg Intradermal 1 time in Clinic/HOD Brittani Roland MD           Review of Systems:  Review of Systems   Constitutional:  Negative for chills, fatigue and fever.   Respiratory:  Negative for cough and shortness of breath.    Cardiovascular:  Positive for leg swelling. Negative for chest pain.   Gastrointestinal:  Negative for abdominal distention and abdominal pain.   Musculoskeletal:  Positive for arthralgias.   Skin:  Positive for color change. Negative for rash.       Past Medical History:  Past Medical History:   Diagnosis Date    Anxiety     Arthralgia of knee     arthralgia of bilateral knees secondary to djd    Arthritis     Back pain     Depression     Encounter for blood transfusion     AUTOLOGUS    GERD (gastroesophageal reflux disease)     Hiatal hernia     repaired in 1979    History of seasonal allergies     History of shingles     Hypertension     Insomnia     Obesity     JESENIA (obstructive sleep apnea) 02/2020    Home sleep study - YESICA 57, Desat 69% - Severe JESENIA with desaturations    Osteoporosis     Pneumonia     Reactive airway disease     Restless legs syndrome     Rheumatic fever     at 4 y/o    Sleep apnea     no cpap       Objective:    Vitals:  Vitals:    09/18/24 0913 09/18/24 0930   BP: (!) 142/80 136/88   Pulse: 70    SpO2: 98%    Weight: 111.8 kg (246 lb 9.4 oz)    Height: 5' (1.524 m)    PainSc:   4    PainLoc: Leg        Physical Exam  Vitals and nursing note reviewed.   Constitutional:       General: She is not in acute distress.     Appearance: Normal appearance. She is obese.   HENT:      Head: Normocephalic and atraumatic.   Cardiovascular:      Rate and Rhythm: Normal rate.      Heart sounds: Normal heart sounds. No murmur  heard.  Pulmonary:      Effort: Pulmonary effort is normal. No respiratory distress.      Breath sounds: No wheezing.   Musculoskeletal:      Cervical back: Neck supple. No tenderness.      Right lower le+ Pitting Edema present.      Left lower le+ Pitting Edema present.   Skin:     General: Skin is warm.      Findings: No rash.   Neurological:      Mental Status: She is alert and oriented to person, place, and time. Mental status is at baseline.   Psychiatric:         Mood and Affect: Mood normal.         Behavior: Behavior normal.         Data:       Medical history reviewed, Medications reconciled.              [facility]EL[facility]  Family Medicine

## 2024-10-30 RX ORDER — TRAZODONE HYDROCHLORIDE 100 MG/1
100 TABLET ORAL NIGHTLY
Qty: 90 TABLET | Refills: 3 | OUTPATIENT
Start: 2024-10-30

## 2024-11-14 LAB
LEFT EYE DM RETINOPATHY: POSITIVE
RIGHT EYE DM RETINOPATHY: POSITIVE

## 2024-12-25 DIAGNOSIS — I10 ESSENTIAL HYPERTENSION: Chronic | ICD-10-CM

## 2024-12-25 NOTE — TELEPHONE ENCOUNTER
No care due was identified.  [facility] Embedded Care Due Messages. Reference number: [id number].   12/25/2024 1:49:04 AM CST

## 2024-12-26 RX ORDER — POTASSIUM CHLORIDE 750 MG/1
10 TABLET, EXTENDED RELEASE ORAL
Qty: 90 TABLET | Refills: 2 | Status: SHIPPED | OUTPATIENT
Start: 2024-12-26

## 2024-12-26 RX ORDER — LOSARTAN POTASSIUM 50 MG/1
50 TABLET ORAL 2 TIMES DAILY
Qty: 180 TABLET | Refills: 2 | Status: SHIPPED | OUTPATIENT
Start: 2024-12-26

## 2024-12-26 NOTE — TELEPHONE ENCOUNTER
Refill Decision Note   Marta [facility]kins  is requesting a refill authorization.  Brief Assessment and Rationale for Refill:  Approve     Medication Therapy Plan:         Comments:     Note composed:8:00 AM 12/26/2024

## 2025-01-01 ENCOUNTER — RESULTS FOLLOW-UP (OUTPATIENT)
Dept: FAMILY MEDICINE | Facility: CLINIC | Age: 80
End: 2025-01-01

## 2025-01-13 ENCOUNTER — OFFICE VISIT (OUTPATIENT)
Dept: RHEUMATOLOGY | Facility: CLINIC | Age: 80
End: 2025-01-13
Payer: MEDICARE

## 2025-01-13 VITALS
SYSTOLIC BLOOD PRESSURE: 189 MMHG | WEIGHT: 238.56 LBS | BODY MASS INDEX: 46.84 KG/M2 | HEIGHT: 60 IN | HEART RATE: 83 BPM | DIASTOLIC BLOOD PRESSURE: 117 MMHG

## 2025-01-13 DIAGNOSIS — M19.90 OSTEOARTHRITIS, UNSPECIFIED OSTEOARTHRITIS TYPE, UNSPECIFIED SITE: ICD-10-CM

## 2025-01-13 DIAGNOSIS — E66.01 MORBID OBESITY WITH BMI OF 45.0-49.9, ADULT: ICD-10-CM

## 2025-01-13 DIAGNOSIS — G62.9 PERIPHERAL POLYNEUROPATHY: ICD-10-CM

## 2025-01-13 DIAGNOSIS — M15.0 PRIMARY OSTEOARTHRITIS INVOLVING MULTIPLE JOINTS: Primary | ICD-10-CM

## 2025-01-13 DIAGNOSIS — M79.7 FIBROMYALGIA: ICD-10-CM

## 2025-01-13 DIAGNOSIS — M54.16 LEFT LUMBAR RADICULOPATHY: ICD-10-CM

## 2025-01-13 PROCEDURE — 3080F DIAST BP >= 90 MM HG: CPT | Mod: HCNC,CPTII,S$GLB, | Performed by: INTERNAL MEDICINE

## 2025-01-13 PROCEDURE — 1125F AMNT PAIN NOTED PAIN PRSNT: CPT | Mod: HCNC,CPTII,S$GLB, | Performed by: INTERNAL MEDICINE

## 2025-01-13 PROCEDURE — 1157F ADVNC CARE PLAN IN RCRD: CPT | Mod: HCNC,CPTII,S$GLB, | Performed by: INTERNAL MEDICINE

## 2025-01-13 PROCEDURE — 99999 PR PBB SHADOW E&M-EST. PATIENT-LVL IV: CPT | Mod: PBBFAC,HCNC,, | Performed by: INTERNAL MEDICINE

## 2025-01-13 PROCEDURE — 1159F MED LIST DOCD IN RCRD: CPT | Mod: HCNC,CPTII,S$GLB, | Performed by: INTERNAL MEDICINE

## 2025-01-13 PROCEDURE — 3288F FALL RISK ASSESSMENT DOCD: CPT | Mod: HCNC,CPTII,S$GLB, | Performed by: INTERNAL MEDICINE

## 2025-01-13 PROCEDURE — 1101F PT FALLS ASSESS-DOCD LE1/YR: CPT | Mod: HCNC,CPTII,S$GLB, | Performed by: INTERNAL MEDICINE

## 2025-01-13 PROCEDURE — 99214 OFFICE O/P EST MOD 30 MIN: CPT | Mod: HCNC,S$GLB,, | Performed by: INTERNAL MEDICINE

## 2025-01-13 PROCEDURE — 3077F SYST BP >= 140 MM HG: CPT | Mod: HCNC,CPTII,S$GLB, | Performed by: INTERNAL MEDICINE

## 2025-01-13 RX ORDER — GABAPENTIN 100 MG/1
100 CAPSULE ORAL 2 TIMES DAILY
Qty: 180 CAPSULE | Refills: 3 | Status: SHIPPED | OUTPATIENT
Start: 2025-01-13 | End: 2026-01-13

## 2025-01-13 RX ORDER — DULOXETIN HYDROCHLORIDE 60 MG/1
60 CAPSULE, DELAYED RELEASE ORAL DAILY
Qty: 90 CAPSULE | Refills: 3 | Status: SHIPPED | OUTPATIENT
Start: 2025-01-13

## 2025-01-13 ASSESSMENT — ROUTINE ASSESSMENT OF PATIENT INDEX DATA (RAPID3)
PATIENT GLOBAL ASSESSMENT SCORE: 2
TOTAL RAPID3 SCORE: 3.33
PAIN SCORE: 4
MDHAQ FUNCTION SCORE: 1.2
PSYCHOLOGICAL DISTRESS SCORE: 2.2
FATIGUE SCORE: 2.2

## 2025-01-13 NOTE — PROGRESS NOTES
Subjective:          Chief Complaint: Marta Huff is a 79 y.o. female who had concerns including Disease Management.    HPI:  Patient her for f/u of  OA and FMS.     1/2025: continued intermittent edema, hx of neuropathy and she feels numbness and tingling still present. She is    7/24: patient having tingling in hands and feet. She has hx of neuropathy in feet with spinal stim (Soheila/Ye). She is working with her PCP as she is having some erythema in addition to tingling of the feet. She states her spinal stim has not functioned in 2 years. What is new is the tips of her fingers. Not positional, intermittent.     10/23: today shoulder bilateral are aching, stiffness. Exacerbated cold weather present few weeks worse today. Did have injections with me in the past, in 2021 was not as effective at which she was last seen with DR. MCMILLAN 9/2022. Overall pain right now 6/10 but caring for her  who does have short term memory loss.   Patient doing better with switch to Cymbalta is tolerating 60mg daily  Feeling better off sertraline.   Dx:  osteoarthritis this is multijoint,  as well as peripheral neuropathy.    She was using gabapentin  2400 mg daily.   Patient had been following with pain management, did have Dr. Parnell place  spinal cord stimulator.  A noting 85% relief of the foot pain.     She still has pain in back but this is not a constant complaint for her. She is sleeping on sides and seems to tolerate, is is the lumbosacral junction that hurts at night.   Exacerbated with sitting prolonged period. At some times she uses walker due to pain.   Hands with stiffness PIP and DIP joints long standing cannot fully curl fingers. +deformity, intermittent swelling.     Interaction with etodolac, but renal function wnl.   She does tolerate NSAID: takes aleve 440mg in AM and PRN at night. No hx of GIB.        REVIEW OF SYSTEMS:    Review of Systems   Constitutional:  Negative for fever, malaise/fatigue  and weight loss.   HENT:  Negative for sore throat.    Eyes:  Negative for double vision, photophobia and redness.   Respiratory:  Negative for cough, shortness of breath and wheezing.    Cardiovascular:  Negative for chest pain, palpitations and orthopnea.   Gastrointestinal:  Negative for abdominal pain, constipation and diarrhea.   Genitourinary:  Negative for dysuria, hematuria and urgency.   Musculoskeletal:  Positive for joint pain. Negative for back pain and myalgias.   Skin:  Negative for rash.   Neurological:  Negative for dizziness, tingling, focal weakness and headaches.   Endo/Heme/Allergies:  Does not bruise/bleed easily.   Psychiatric/Behavioral:  Negative for depression, hallucinations and suicidal ideas.                Objective:            Past Medical History:   Diagnosis Date    Anxiety     Arthralgia of knee     arthralgia of bilateral knees secondary to djd    Arthritis     Back pain     Depression     Encounter for blood transfusion     AUTOLOGUS    GERD (gastroesophageal reflux disease)     Hiatal hernia     repaired in 1979    History of seasonal allergies     History of shingles     Hypertension     Insomnia     Obesity     JESENIA (obstructive sleep apnea) 02/2020    Home sleep study - YESICA 57, Desat 69% - Severe JESENIA with desaturations    Osteoporosis     Pneumonia     Reactive airway disease     Restless legs syndrome     Rheumatic fever     at 4 y/o    Sleep apnea     no cpap     Family History   Problem Relation Name Age of Onset    Heart disease Mother      Hypertension Mother      Diabetes Mother      Obesity Mother      Heart disease Maternal Grandfather       Social History     Tobacco Use    Smoking status: Never     Passive exposure: Yes    Smokeless tobacco: Never   Substance Use Topics    Alcohol use: Yes     Comment: 1-2 glasses of wine monthly    Drug use: No         Current Outpatient Medications on File Prior to Visit   Medication Sig Dispense Refill    albuterol (PROVENTIL) 2.5  mg /3 mL (0.083 %) nebulizer solution INHALE THE CONTENTS OF 1 VIAL VIA NEBULIZER EVERY 6 HOURS AS NEEDED FOR  WHEEZING  OR  SHORTNESS OF BREATH 90 mL 11    albuterol (PROVENTIL/VENTOLIN HFA) 90 mcg/actuation inhaler INHALE 2 PUFFS INTO THE LUNGS EVERY 6 HOURS AS NEEDED FOR WHEEZING OR SHORTNESS OF BREATH 25.5 g 2    alendronate (FOSAMAX) 70 MG tablet TAKE 1 TABLET EVERY 7 DAYS 12 tablet 3    betamethasone valerate 0.1% (VALISONE) 0.1 % Crea Apply topically 2 (two) times daily. 45 g 6    blood-glucose sensor (FREESTYLE MARY KAY 3 SENSOR) Madelyn 1 Device by Misc.(Non-Drug; Combo Route) route every 14 (fourteen) days. 3 each 11    calcium phosphate-vitamin D3 250 mg-10 mcg (400 unit) Chew Take 1 tablet by mouth once daily.      clobetasol 0.05% (TEMOVATE) 0.05 % Oint APPLY TO AFFECTED AREA TWICE A DAY 60 g 3    diazePAM (VALIUM) 5 MG tablet       DULoxetine (CYMBALTA) 60 MG capsule Take 1 capsule (60 mg total) by mouth once daily. 90 capsule 3    fluticasone furoate-vilanteroL (BREO ELLIPTA) 200-25 mcg/dose DsDv diskus inhaler Inhale 1 puff into the lungs once daily. Controller 60 each 5    furosemide (LASIX) 80 MG tablet Take 1 tablet (80 mg total) by mouth once daily. 90 tablet 1    gabapentin (NEURONTIN) 100 MG capsule Take 1 capsule (100 mg total) by mouth 2 (two) times daily. 60 capsule 1    hydroxychloroquine (PLAQUENIL) 200 mg tablet Take 1 tablet (200 mg total) by mouth 2 (two) times daily. 60 tablet 3    losartan (COZAAR) 50 MG tablet TAKE 1 TABLET TWICE DAILY 180 tablet 2    naproxen sodium (ALEVE ORAL) Take 1 Dose by mouth daily as needed.      nebivoloL (BYSTOLIC) 5 MG Tab TAKE 1 TABLET BY MOUTH EVERY DAY 30 tablet 3    potassium chloride (KLOR-CON) 10 MEQ TbSR TAKE 1 TABLET EVERY DAY 90 tablet 2    pramipexole (MIRAPEX) 1.5 MG tablet Take 1 tablet (1.5 mg total) by mouth every evening. 90 tablet 3    ALPRAZolam (XANAX) 0.25 MG tablet Take 1 tablet (0.25 mg total) by mouth 2 (two) times daily as needed for  Anxiety. 20 tablet 1     Current Facility-Administered Medications on File Prior to Visit   Medication Dose Route Frequency Provider Last Rate Last Admin    methylPREDNISolone acetate injection 40 mg  40 mg Intra-articular  Addison Adwoa LEERaymond, DO   40 mg at 03/11/21 1000    triamcinolone acetonide injection 10 mg  10 mg Intradermal 1 time in Clinic/HOD Brittani Roland MD        triamcinolone acetonide injection 10 mg  10 mg Intradermal 1 time in Clinic/HOD Brittani Roland MD           Vitals:    01/13/25 1432   BP: (!) 189/117   Pulse: 83       Physical Exam:    Physical Exam  Constitutional:       Appearance: Normal appearance. She is well-developed.   HENT:      Nose: No septal deviation.      Mouth/Throat:      Mouth: No oral lesions.   Eyes:      Conjunctiva/sclera:      Right eye: Right conjunctiva is not injected.      Left eye: Left conjunctiva is not injected.      Pupils: Pupils are equal, round, and reactive to light.   Neck:      Thyroid: No thyroid mass or thyromegaly.      Vascular: No JVD.   Cardiovascular:      Rate and Rhythm: Normal rate and regular rhythm.      Pulses: Normal pulses.      Comments: No edema  Pulmonary:      Effort: Pulmonary effort is normal.      Breath sounds: Normal breath sounds.   Abdominal:      Palpations: Abdomen is soft.   Musculoskeletal:      Right shoulder: No swelling or tenderness. Decreased range of motion.      Left shoulder: No swelling or tenderness. Decreased range of motion.      Right elbow: No swelling. Normal range of motion. No tenderness.      Left elbow: No swelling. Normal range of motion. No tenderness.      Right wrist: No swelling or tenderness. Normal range of motion.      Left wrist: No swelling or tenderness. Normal range of motion.      Right hand: Decreased range of motion.      Left hand: Decreased range of motion.      Right hip: No bony tenderness. Normal range of motion. Normal strength.      Left hip: No tenderness or bony  tenderness. Normal range of motion.      Right knee: No swelling. Normal range of motion. No tenderness.      Left knee: No swelling. Normal range of motion. No tenderness.      Right ankle: No swelling. No tenderness. Normal range of motion.      Left ankle: No swelling. No tenderness. Normal range of motion.      Comments: Bony hypertrophy PIP and DIP joints. Squaring CMC joint. Tenderness along gr. Troch and ITB. Negative exacerbation SLR some LBP.    Lymphadenopathy:      Cervical: No cervical adenopathy.   Skin:     General: Skin is dry.   Neurological:      Deep Tendon Reflexes: Reflexes are normal and symmetric.             Assessment:       Encounter Diagnoses   Name Primary?    Primary osteoarthritis involving multiple joints Yes    Morbid obesity with BMI of 45.0-49.9, adult     Left lumbar radiculopathy             Plan:        Primary osteoarthritis involving multiple joints  -     gabapentin (NEURONTIN) 100 MG capsule; Take 1 capsule (100 mg total) by mouth 2 (two) times daily.  Dispense: 180 capsule; Refill: 3  -     DULoxetine (CYMBALTA) 60 MG capsule; Take 1 capsule (60 mg total) by mouth once daily.  Dispense: 90 capsule; Refill: 3    Morbid obesity with BMI of 45.0-49.9, adult    Left lumbar radiculopathy    Peripheral polyneuropathy  -     gabapentin (NEURONTIN) 100 MG capsule; Take 1 capsule (100 mg total) by mouth 2 (two) times daily.  Dispense: 180 capsule; Refill: 3    Fibromyalgia  -     DULoxetine (CYMBALTA) 60 MG capsule; Take 1 capsule (60 mg total) by mouth once daily.  Dispense: 90 capsule; Refill: 3    Osteoarthritis, unspecified osteoarthritis type, unspecified site        Delightful patient with OA and FMS    Patient doing better with switch to Cymbalta is tolerating 60mg daily      Peripheral neuropathy: feet is known issue she has a spinal stimulator not function for over 2 years.      Before we subject her to EMG/NCS can restart low dosage gabapentin 100mg BID   Discussed edema she  is trying to use compression hose but very difficult to put compression socks on.    I will have her try alpha lipoic acid/B12 nerve supplement.     TKA with continued pain.  Will need to discuss with Ortho.     Follow up in about 6 months (around 7/13/2025).             30min consultation with greater than 50% of that time included Preparing to see the patient (review records, tests), Obtaining and/or reviewing separately obtained historical data, Performing a medically appropriate examination and/or evaluation , Ordering medications, tests, and/or procedures, Referring and communicating with other healthcare professionals , Documenting clinical information in the electronic or other health record and Independently interpreting results  (as warranted) & communicating results to the patient/family/caregiver. All questions answered.

## 2025-01-13 NOTE — PATIENT INSTRUCTIONS
Equate Nerve Comfort supplement: Combination typically has alpha lipoic acid, l-carnitine, vitamin B12.

## 2025-01-14 DIAGNOSIS — Z00.00 ENCOUNTER FOR MEDICARE ANNUAL WELLNESS EXAM: ICD-10-CM

## 2025-02-02 DIAGNOSIS — J45.909 REACTIVE AIRWAY DISEASE, UNSPECIFIED ASTHMA SEVERITY, UNCOMPLICATED: ICD-10-CM

## 2025-02-02 NOTE — TELEPHONE ENCOUNTER
Care Due:                  Date            Visit Type   Department     Provider  --------------------------------------------------------------------------------                                EP -                              PRIMARY      [facility]  Last Visit: 08-      CARE ([facility])   MEDICINE       Elda Holley                              MYCHART                              FOLLOWUP/OF  [facility]  Next Visit: 02-      [facility] VISIT   MEDICINE       Elda Holley                                                            Last  Test          Frequency    Reason                     Performed    Due Date  --------------------------------------------------------------------------------    Mg Level....  12 months..  alendronate..............  Not Found    Overdue    Phosphate...  12 months..  alendronate..............  Not Found    Overdue    [employer] Embedded Care Due Messages. Reference number: [id number].   2/02/2025 12:32:44 PM CST

## 2025-02-03 RX ORDER — ALBUTEROL SULFATE 90 UG/1
2 INHALANT RESPIRATORY (INHALATION) EVERY 6 HOURS PRN
Qty: 54 G | Refills: 1 | Status: SHIPPED | OUTPATIENT
Start: 2025-02-03

## 2025-02-03 NOTE — TELEPHONE ENCOUNTER
Refill Decision Note   Marta [facility] Wilkins  is requesting a refill authorization.  Brief Assessment and Rationale for Refill:  Approve     Medication Therapy Plan:         Comments:     Note composed:10:19 AM 02/03/2025

## 2025-02-05 ENCOUNTER — OFFICE VISIT (OUTPATIENT)
Dept: FAMILY MEDICINE | Facility: CLINIC | Age: 80
End: 2025-02-05
Payer: MEDICARE

## 2025-02-05 ENCOUNTER — LAB VISIT (OUTPATIENT)
Dept: LAB | Facility: HOSPITAL | Age: 80
End: 2025-02-05
Attending: FAMILY MEDICINE
Payer: MEDICARE

## 2025-02-05 ENCOUNTER — PATIENT OUTREACH (OUTPATIENT)
Dept: ADMINISTRATIVE | Facility: HOSPITAL | Age: 80
End: 2025-02-05
Payer: MEDICARE

## 2025-02-05 VITALS
DIASTOLIC BLOOD PRESSURE: 80 MMHG | HEIGHT: 60 IN | HEART RATE: 91 BPM | SYSTOLIC BLOOD PRESSURE: 140 MMHG | OXYGEN SATURATION: 96 % | WEIGHT: 241.88 LBS | BODY MASS INDEX: 47.49 KG/M2

## 2025-02-05 DIAGNOSIS — R60.9 EDEMA, UNSPECIFIED TYPE: ICD-10-CM

## 2025-02-05 DIAGNOSIS — E66.01 SEVERE OBESITY (BMI >= 40): ICD-10-CM

## 2025-02-05 DIAGNOSIS — I10 ESSENTIAL HYPERTENSION: ICD-10-CM

## 2025-02-05 DIAGNOSIS — D64.9 ANEMIA, UNSPECIFIED TYPE: ICD-10-CM

## 2025-02-05 DIAGNOSIS — E55.9 VITAMIN D DEFICIENCY: ICD-10-CM

## 2025-02-05 DIAGNOSIS — E11.9 CONTROLLED TYPE 2 DIABETES MELLITUS WITHOUT COMPLICATION, WITHOUT LONG-TERM CURRENT USE OF INSULIN: Primary | ICD-10-CM

## 2025-02-05 DIAGNOSIS — Q28.2 ARTERIOVENOUS MALFORMATION OF CEREBRAL VESSELS: ICD-10-CM

## 2025-02-05 DIAGNOSIS — E11.9 CONTROLLED TYPE 2 DIABETES MELLITUS WITHOUT COMPLICATION, WITHOUT LONG-TERM CURRENT USE OF INSULIN: ICD-10-CM

## 2025-02-05 DIAGNOSIS — J44.9 CHRONIC OBSTRUCTIVE PULMONARY DISEASE, UNSPECIFIED COPD TYPE: ICD-10-CM

## 2025-02-05 DIAGNOSIS — M15.9 GENERALIZED OSTEOARTHRITIS: ICD-10-CM

## 2025-02-05 PROBLEM — I77.1 TORTUOUS AORTA: Status: RESOLVED | Noted: 2017-08-30 | Resolved: 2025-02-05

## 2025-02-05 PROBLEM — Z78.0 POST-MENOPAUSAL: Status: RESOLVED | Noted: 2019-12-17 | Resolved: 2025-02-05

## 2025-02-05 LAB
25(OH)D3+25(OH)D2 SERPL-MCNC: 10 NG/ML (ref 30–96)
ALBUMIN SERPL BCP-MCNC: 3.5 G/DL (ref 3.5–5.2)
ALP SERPL-CCNC: 133 U/L (ref 40–150)
ALT SERPL W/O P-5'-P-CCNC: 18 U/L (ref 10–44)
ANION GAP SERPL CALC-SCNC: 7 MMOL/L (ref 8–16)
AST SERPL-CCNC: 23 U/L (ref 10–40)
BASOPHILS # BLD AUTO: 0.06 K/UL (ref 0–0.2)
BASOPHILS NFR BLD: 1 % (ref 0–1.9)
BILIRUB SERPL-MCNC: 0.6 MG/DL (ref 0.1–1)
BUN SERPL-MCNC: 15 MG/DL (ref 8–23)
CALCIUM SERPL-MCNC: 8.9 MG/DL (ref 8.7–10.5)
CHLORIDE SERPL-SCNC: 106 MMOL/L (ref 95–110)
CO2 SERPL-SCNC: 31 MMOL/L (ref 23–29)
CREAT SERPL-MCNC: 0.7 MG/DL (ref 0.5–1.4)
DIFFERENTIAL METHOD BLD: ABNORMAL
EOSINOPHIL # BLD AUTO: 0.3 K/UL (ref 0–0.5)
EOSINOPHIL NFR BLD: 4.4 % (ref 0–8)
ERYTHROCYTE [DISTWIDTH] IN BLOOD BY AUTOMATED COUNT: 16.6 % (ref 11.5–14.5)
EST. GFR  (NO RACE VARIABLE): >60 ML/MIN/1.73 M^2
ESTIMATED AVG GLUCOSE: 120 MG/DL (ref 68–131)
GLUCOSE SERPL-MCNC: 78 MG/DL (ref 70–110)
HBA1C MFR BLD: 5.8 % (ref 4–5.6)
HCT VFR BLD AUTO: 36.3 % (ref 37–48.5)
HGB BLD-MCNC: 11.1 G/DL (ref 12–16)
IMM GRANULOCYTES # BLD AUTO: 0.01 K/UL (ref 0–0.04)
IMM GRANULOCYTES NFR BLD AUTO: 0.2 % (ref 0–0.5)
LYMPHOCYTES # BLD AUTO: 1.6 K/UL (ref 1–4.8)
LYMPHOCYTES NFR BLD: 26.7 % (ref 18–48)
MCH RBC QN AUTO: 27.1 PG (ref 27–31)
MCHC RBC AUTO-ENTMCNC: 30.6 G/DL (ref 32–36)
MCV RBC AUTO: 89 FL (ref 82–98)
MONOCYTES # BLD AUTO: 0.6 K/UL (ref 0.3–1)
MONOCYTES NFR BLD: 9.4 % (ref 4–15)
NEUTROPHILS # BLD AUTO: 3.4 K/UL (ref 1.8–7.7)
NEUTROPHILS NFR BLD: 58.3 % (ref 38–73)
NRBC BLD-RTO: 0 /100 WBC
PLATELET # BLD AUTO: 243 K/UL (ref 150–450)
PMV BLD AUTO: 12.4 FL (ref 9.2–12.9)
POTASSIUM SERPL-SCNC: 4.2 MMOL/L (ref 3.5–5.1)
PROT SERPL-MCNC: 7.4 G/DL (ref 6–8.4)
RBC # BLD AUTO: 4.1 M/UL (ref 4–5.4)
SODIUM SERPL-SCNC: 144 MMOL/L (ref 136–145)
WBC # BLD AUTO: 5.85 K/UL (ref 3.9–12.7)

## 2025-02-05 PROCEDURE — 83036 HEMOGLOBIN GLYCOSYLATED A1C: CPT | Mod: HCNC | Performed by: FAMILY MEDICINE

## 2025-02-05 PROCEDURE — 80053 COMPREHEN METABOLIC PANEL: CPT | Mod: HCNC | Performed by: FAMILY MEDICINE

## 2025-02-05 PROCEDURE — G2211 COMPLEX E/M VISIT ADD ON: HCPCS | Mod: HCNC,S$GLB,, | Performed by: FAMILY MEDICINE

## 2025-02-05 PROCEDURE — 1159F MED LIST DOCD IN RCRD: CPT | Mod: HCNC,CPTII,S$GLB, | Performed by: FAMILY MEDICINE

## 2025-02-05 PROCEDURE — 99215 OFFICE O/P EST HI 40 MIN: CPT | Mod: HCNC,S$GLB,, | Performed by: FAMILY MEDICINE

## 2025-02-05 PROCEDURE — 3288F FALL RISK ASSESSMENT DOCD: CPT | Mod: HCNC,CPTII,S$GLB, | Performed by: FAMILY MEDICINE

## 2025-02-05 PROCEDURE — 1160F RVW MEDS BY RX/DR IN RCRD: CPT | Mod: HCNC,CPTII,S$GLB, | Performed by: FAMILY MEDICINE

## 2025-02-05 PROCEDURE — 83880 ASSAY OF NATRIURETIC PEPTIDE: CPT | Mod: HCNC | Performed by: FAMILY MEDICINE

## 2025-02-05 PROCEDURE — 1157F ADVNC CARE PLAN IN RCRD: CPT | Mod: HCNC,CPTII,S$GLB, | Performed by: FAMILY MEDICINE

## 2025-02-05 PROCEDURE — 1126F AMNT PAIN NOTED NONE PRSNT: CPT | Mod: HCNC,CPTII,S$GLB, | Performed by: FAMILY MEDICINE

## 2025-02-05 PROCEDURE — 3079F DIAST BP 80-89 MM HG: CPT | Mod: HCNC,CPTII,S$GLB, | Performed by: FAMILY MEDICINE

## 2025-02-05 PROCEDURE — 85025 COMPLETE CBC W/AUTO DIFF WBC: CPT | Mod: HCNC | Performed by: FAMILY MEDICINE

## 2025-02-05 PROCEDURE — 1101F PT FALLS ASSESS-DOCD LE1/YR: CPT | Mod: HCNC,CPTII,S$GLB, | Performed by: FAMILY MEDICINE

## 2025-02-05 PROCEDURE — 36415 COLL VENOUS BLD VENIPUNCTURE: CPT | Mod: HCNC,PN | Performed by: FAMILY MEDICINE

## 2025-02-05 PROCEDURE — 99999 PR PBB SHADOW E&M-EST. PATIENT-LVL III: CPT | Mod: PBBFAC,HCNC,, | Performed by: FAMILY MEDICINE

## 2025-02-05 PROCEDURE — 82306 VITAMIN D 25 HYDROXY: CPT | Mod: HCNC | Performed by: FAMILY MEDICINE

## 2025-02-05 PROCEDURE — 3077F SYST BP >= 140 MM HG: CPT | Mod: HCNC,CPTII,S$GLB, | Performed by: FAMILY MEDICINE

## 2025-02-05 RX ORDER — ALENDRONATE SODIUM 70 MG/1
TABLET ORAL
Qty: 12 TABLET | Refills: 3 | Status: SHIPPED | OUTPATIENT
Start: 2025-02-05

## 2025-02-05 RX ORDER — ALBUTEROL SULFATE 0.83 MG/ML
SOLUTION RESPIRATORY (INHALATION)
Qty: 300 ML | Refills: 11 | Status: SHIPPED | OUTPATIENT
Start: 2025-02-05 | End: 2025-02-05 | Stop reason: SDUPTHER

## 2025-02-05 RX ORDER — SEMAGLUTIDE 0.68 MG/ML
0.25 INJECTION, SOLUTION SUBCUTANEOUS
Qty: 4 EACH | Refills: 4 | Status: SHIPPED | OUTPATIENT
Start: 2025-02-05

## 2025-02-05 RX ORDER — ALBUTEROL SULFATE 0.83 MG/ML
SOLUTION RESPIRATORY (INHALATION)
Qty: 300 ML | Refills: 11 | Status: SHIPPED | OUTPATIENT
Start: 2025-02-05

## 2025-02-05 NOTE — LETTER
FAX      AUTHORIZATION FOR RELEASE OF   CONFIDENTIAL INFORMATION        We are seeing Marta Huff, date of birth 1945, in the clinic at [facility]. Elda Holely MD is the patient's PCP. Marta Huff has an outstanding lab/procedure at the time we reviewed their chart. In order to help keep their health information updated, Marta Huff has authorized us to request the following medical record(s):     ()  MAMMOGRAM (WITHIN 2 YRS)  ()  COLON/PATH W/RECALL (WITHIN 10 YRS)     ()  PAP SMEAR (WITHIN 3 YRS)      ()  OUTSIDE LAB RESULTS    ()  DEXA SCAN (WITHIN 3 YRS)      (X) DM EYE EXAM (WITHIN 48 MONTHS)          ()  FOOT EXAM (WITHIN 1yr.)          () OUTSIDE IMMUNIZATIONS    ()  OTHER                                   Please fax records to [facility] [phone].     If you have any questions, please contact Beverley @ [phone].             Marta Huff  MRN: [id number]  : 1945  Age: 78 y.o.  Sex: female         Patient/Legal Guardian Signature  This signature was collected at 2024           _______________________________   Printed Name/Relationship to Patient      Consent for Examination and Treatment: I hereby authorize the providers and employees of [facility] ([facility]) to provide medical treatment/services which includes, but is not limited to, performing and administering tests and diagnostic procedures that are deemed necessary, including, but not limited to, imaging examinations, blood tests and other laboratory procedures as may be required by the hospital, clinic, or may be ordered by my physician(s) or persons working under the general and/or special instructions of my physician(s).      I understand and agree that this consent covers all authorized persons, including but not limited to physicians, residents, nurse practitioners, physicians' assistants, specialists, consultants, student nurses, and independently contracted  physicians, who are called upon by the physician in charge, to carry out the diagnostic procedures and medical or surgical treatment.     I hereby authorize [facility] to retain or dispose of any specimens or tissue, should there be such remaining from any test or procedure.     I hereby authorize and give consent for [facility] providers and employees to take photographs, images or videotapes of such diagnostic, surgical or treatment procedures of Patient as may be required by [facility] or as may be ordered by a physician. I further acknowledge and agree that [facility] may use cameras or other devices for patient monitoring.     I am aware that the practice of medicine is not an exact science, and I acknowledge that no guarantees have been made to me as to the outcome of any tests, procedures or treatment.     Authorization for Release of Information: I understand that my insurance company and/or their agents may need information necessary to make determinations about payment/reimbursement. I hereby provide authorization to release to all insurance companies, their successors, assignees, other parties with whom they may have contracted, or others acting on their behalf, that are involved with payment for any hospital and/or clinic charges incurred by the patient, any information that they request and deem necessary for payment/reimbursement, and/or quality review.  I further authorize the release of my health information to physicians or other health care practitioners on staff who are involved in my health care now and in the future, and to other health care providers, entities, or institutions for the purpose of my continued care and treatment, including referrals.

## 2025-02-05 NOTE — PROGRESS NOTES
Population Health Chart Review & Patient Outreach Details      Additional [facility] Notes:               Updates Requested / Reviewed:      Updated Care Coordination Note and Immunizations Reconciliation Completed or Queried: [place]         Health Maintenance Topics Overdue:      [facility] Score: 2     Eye Exam  Uncontrolled BP                       Health Maintenance Topic(s) Outreach Outcomes & Actions Taken:    Eye Exam - Outreach Outcomes & Actions Taken  : External Records Requested & Care Team Updated if Applicable

## 2025-02-05 NOTE — PROGRESS NOTES
Chief Complaint:  Chief Complaint   Patient presents with    Follow-up     Blood work         HPI:  Marta is a 79 y.o. year old     History of Present Illness    CHIEF COMPLAINT:  Marta presents today for follow up    DIABETES:  She is unable to use Augustin device due to insurance coverage issues. She reports difficulty obtaining adequate blood samples for finger stick blood sugar testing despite using recommended techniques. She experiences episodes of shakiness and weakness in stomach that resolve spontaneously, but has not checked blood sugar during these episodes. Her diet includes daily oatmeal consumption.    RESPIRATORY:  She reports recent breathing difficulties, including this morning which required nebulizer use. Her nebulizer supplies need replacement as the mask is becoming loose and detaching during use.    SLEEP:  She reports an irregular sleep schedule, falling asleep around 2:30-3:00 AM and waking up at 6:00 AM with daytime somnolence. She tried melatonin without benefit and is uncertain about appropriate dosage.    MEDICAL HISTORY:  She has an implanted biomedical device in her back placed by Dr. Belcher, with non-functional battery for approximately one year. She sustained a fall with head injury in 2023 requiring CT.    CURRENT MEDICATIONS:  She takes Lasix 80 mg daily and reports good response to generic neuropathy medication.      ROS:  Respiratory: +shortness of breath  Neurological: +weakness  Psychiatric: +sleep difficulty           Review of Systems   Constitutional:  Positive for fatigue. Negative for fever.   HENT:  Negative for congestion and postnasal drip.    Respiratory:  Positive for shortness of breath. Negative for cough.    Cardiovascular:  Positive for palpitations and leg swelling.   Gastrointestinal:  Negative for constipation, diarrhea and nausea.   Genitourinary:  Negative for difficulty urinating and frequency.   Musculoskeletal:  Positive for arthralgias and  joint swelling.   Skin:  Positive for color change. Negative for wound.   Neurological:  Negative for dizziness, light-headedness and headaches.   Psychiatric/Behavioral:  Positive for sleep disturbance.          Past Medical History:  Past Medical History:   Diagnosis Date    Anxiety     Arthralgia of knee     arthralgia of bilateral knees secondary to djd    Arthritis     Back pain     Depression     Encounter for blood transfusion     AUTOLOGUS    GERD (gastroesophageal reflux disease)     Hiatal hernia     repaired in 1979    History of seasonal allergies     History of shingles     Hypertension     Insomnia     Obesity     JESENIA (obstructive sleep apnea) 02/2020    Home sleep study - YESICA 57, Desat 69% - Severe JESENIA with desaturations    Osteoporosis     Pneumonia     Reactive airway disease     Restless legs syndrome     Rheumatic fever     at 6 y/o    Sleep apnea     no cpap         Vitals:  Vitals:    02/05/25 0914   BP: (!) 140/80   Pulse: 91   SpO2: 96%   Weight: 109.7 kg (241 lb 13.5 oz)   Height: 5' (1.524 m)   PainSc: 0-No pain       BP Readings from Last 5 Encounters:   02/05/25 (!) 140/80   01/13/25 (!) 189/117   09/18/24 136/88   08/02/24 138/82   07/11/24 (!) 145/82       The ASCVD Risk score (Lucinda MORTON, et al., 2019) failed to calculate for the following reasons:    The valid total cholesterol range is 130 to 320 mg/dL      Physical Exam:  Physical Exam  Vitals and nursing note reviewed.   Constitutional:       General: She is not in acute distress.     Appearance: Normal appearance. She is well-developed.   HENT:      Head: Normocephalic and atraumatic.   Eyes:      General: No scleral icterus.        Right eye: No discharge.         Left eye: No discharge.      Conjunctiva/sclera: Conjunctivae normal.   Cardiovascular:      Rate and Rhythm: Normal rate and regular rhythm.   Pulmonary:      Effort: Pulmonary effort is normal. No respiratory distress.      Breath sounds: Normal breath sounds.    Abdominal:      Palpations: Abdomen is soft.      Tenderness: There is no guarding.   Musculoskeletal:         General: No deformity or signs of injury.      Cervical back: Neck supple.      Right lower le+ Pitting Edema present.      Left lower le+ Pitting Edema present.   Skin:     General: Skin is warm and dry.      Findings: No rash.   Neurological:      General: No focal deficit present.      Mental Status: She is alert and oriented to person, place, and time.   Psychiatric:         Mood and Affect: Mood normal.         Behavior: Behavior normal.             Assessment & Plan:  Controlled type 2 diabetes mellitus without complication, without long-term current use of insulin  -     Hemoglobin A1C; Future; Expected date: 2025  -     Comprehensive Metabolic Panel; Future; Expected date: 2025  -     semaglutide (OZEMPIC) 0.25 mg or 0.5 mg (2 mg/3 mL) pen injector; Inject 0.25 mg into the skin every 7 days.  Dispense: 4 each; Refill: 4    Edema, unspecified type  -     Echo; Future; Expected date: 2025  -     NT-Pro Natriuretic Peptide; Future; Expected date: 2025    Severe obesity (BMI >= 40)  -     semaglutide (OZEMPIC) 0.25 mg or 0.5 mg (2 mg/3 mL) pen injector; Inject 0.25 mg into the skin every 7 days.  Dispense: 4 each; Refill: 4    Chronic obstructive pulmonary disease, unspecified COPD type  -     Discontinue: albuterol (PROVENTIL) 2.5 mg /3 mL (0.083 %) nebulizer solution; INHALE THE CONTENTS OF 1 VIAL VIA NEBULIZER EVERY 6 HOURS AS NEEDED FOR  WHEEZING  OR  SHORTNESS OF BREATH  Dispense: 300 mL; Refill: 11  -     albuterol (PROVENTIL) 2.5 mg /3 mL (0.083 %) nebulizer solution; INHALE THE CONTENTS OF 1 VIAL VIA NEBULIZER EVERY 6 HOURS AS NEEDED FOR  WHEEZING  OR  SHORTNESS OF BREATH  Dispense: 300 mL; Refill: 11  -     Echo; Future; Expected date: 2025    Essential hypertension    Vitamin D deficiency  -     Vitamin D; Future; Expected date: 2025    Anemia,  unspecified type  -     CBC Auto Differential; Future; Expected date: 02/05/2025    Generalized osteoarthritis  -     alendronate (FOSAMAX) 70 MG tablet; TAKE 1 TABLET EVERY 7 DAYS  Dispense: 12 tablet; Refill: 3    Arteriovenous malformation of cerebral vessels  -     CT Head Without Contrast; Future; Expected date: 02/05/2025         Assessment & Plan    ORDERS:   CBC, BMP, and Vitamin D level ordered   CT of head ordered (non-urgent follow-up)   Lab ordered to assess fluid retention levels    IMPRESSION:  Assessed weight loss options; recommended trial of Ozempic for weight loss, not diabetes management, given controlled blood sugars  Evaluated respiratory symptoms; attributed shortness of breath to fluid retention  Reviewed cardiac status; noted consistent with HTN based on previous summer's echocardiogram  Considered follow-up imaging for previously identified small collection of blood vessels in head CT from 2023  Assessed fluid retention; recommended increased Lasix dosage temporarily to address edema and associated symptoms    PLAN SUMMARY:   CBC, BMP, and Vitamin D level ordered   CT of head ordered (non-urgent)   Continued current blood pressure medications   Increased Lasix to 80 mg twice daily for 3 days, then return to daily   Started Ozempic at lowest starting dose for weight loss   Continued albuterol for nebulizer use   Discontinued Plaquenil (hydroxychloroquine)   Marta to obtain RSV vaccination at local pharmacy   Follow up in a few weeks   Follow up in late September for echocardiogram   Follow up in spring to reassess overall health status and medication efficacy   Marta to message Dr. Belcher's office regarding battery replacement for implanted device    PATIENT EDUCATION:   Explained Ozempic mechanism of action, potential side effects (nausea, constipation), and importance of protein intake and hydration during use   Discussed relationship between fluid intake and edema, emphasizing  importance of hydration for effective diuresis   Educated on causes of leg redness and signs of potential skin infection to monitor    ACTION ITEMS/LIFESTYLE:   Marta to increase water intake to support fluid balance and diuresis   Marta to monitor for signs of hypoglycemia when feeling shaky or weak   Marta to message Dr. Belcher's office regarding battery replacement for implanted device   Marta to obtain RSV vaccination at local pharmacy    MEDICATIONS:   Started Ozempic at lowest starting dose for weight loss   Increased Lasix to 80 mg twice daily for 3 days (Wednesday, Thursday, Friday), then return to daily dosing   Continued current blood pressure medications (Losartan twice daily, other medication to be confirmed by patient)   Discontinued Plaquenil (hydroxychloroquine)   Continued albuterol for nebulizer use    FOLLOW UP:   Follow up in a few weeks   Follow up in late September for echocardiogram   Follow up in spring to reassess overall health status and medication efficacy         Visit today included increased complexity associated with the care of the episodic problem bloom addressed and managing the longitudinal care of the patient due to the serious and/or complex managed problem(s) htn, copd, vit d, dm2.    This note was generated with the assistance of ambient listening technology. Verbal consent was obtained by the patient and accompanying visitor(s) for the recording of patient appointment to facilitate this note. I attest to having reviewed and edited the generated note for accuracy, though some syntax or spelling errors may persist. Please contact the author of this note for any clarification.

## 2025-02-07 LAB — NT-PROBNP SERPL IA-MCNC: 102 PG/ML

## 2025-02-12 DIAGNOSIS — I10 ESSENTIAL HYPERTENSION: ICD-10-CM

## 2025-02-12 DIAGNOSIS — R60.9 EDEMA, UNSPECIFIED TYPE: ICD-10-CM

## 2025-02-12 RX ORDER — FUROSEMIDE 80 MG/1
80 TABLET ORAL DAILY
Qty: 90 TABLET | Refills: 3 | Status: SHIPPED | OUTPATIENT
Start: 2025-02-12

## 2025-02-12 NOTE — TELEPHONE ENCOUNTER
Refill Routing Note   Medication(s) are not appropriate for processing by [facility] for the following reason(s):        Required vitals abnormal  No active prescription written by provider    [facility] action(s):  Defer               Appointments  past 12m or future 3m with PCP    Date Provider   Last Visit   2/5/2025 Elda Holley MD   Next Visit   4/8/2025 Elda Holley MD   ED visits in past 90 days: 0        Note composed:10:18 AM 02/12/2025

## 2025-02-12 NOTE — TELEPHONE ENCOUNTER
No care due was identified.  [facility] Embedded Care Due Messages. Reference number: [id number].   2/12/2025 10:19:07 AM CST

## 2025-02-14 ENCOUNTER — PATIENT MESSAGE (OUTPATIENT)
Facility: CLINIC | Age: 80
End: 2025-02-14
Payer: MEDICARE

## 2025-04-08 ENCOUNTER — OFFICE VISIT (OUTPATIENT)
Dept: FAMILY MEDICINE | Facility: CLINIC | Age: 80
End: 2025-04-08
Payer: MEDICARE

## 2025-04-08 VITALS
DIASTOLIC BLOOD PRESSURE: 82 MMHG | SYSTOLIC BLOOD PRESSURE: 118 MMHG | HEART RATE: 62 BPM | BODY MASS INDEX: 43.73 KG/M2 | WEIGHT: 222.75 LBS | OXYGEN SATURATION: 92 % | HEIGHT: 60 IN

## 2025-04-08 DIAGNOSIS — I27.20 PULMONARY HYPERTENSION: ICD-10-CM

## 2025-04-08 DIAGNOSIS — E11.9 CONTROLLED TYPE 2 DIABETES MELLITUS WITHOUT COMPLICATION, WITHOUT LONG-TERM CURRENT USE OF INSULIN: ICD-10-CM

## 2025-04-08 DIAGNOSIS — R60.9 EDEMA, UNSPECIFIED TYPE: ICD-10-CM

## 2025-04-08 DIAGNOSIS — E11.9 CONTROLLED TYPE 2 DIABETES MELLITUS WITHOUT COMPLICATION, WITHOUT LONG-TERM CURRENT USE OF INSULIN: Primary | ICD-10-CM

## 2025-04-08 DIAGNOSIS — G25.81 RESTLESS LEGS SYNDROME: ICD-10-CM

## 2025-04-08 DIAGNOSIS — E66.01 SEVERE OBESITY (BMI >= 40): ICD-10-CM

## 2025-04-08 DIAGNOSIS — I10 ESSENTIAL HYPERTENSION: Chronic | ICD-10-CM

## 2025-04-08 DIAGNOSIS — D64.9 ANEMIA, UNSPECIFIED TYPE: ICD-10-CM

## 2025-04-08 DIAGNOSIS — J44.9 CHRONIC OBSTRUCTIVE PULMONARY DISEASE, UNSPECIFIED COPD TYPE: ICD-10-CM

## 2025-04-08 PROCEDURE — 3079F DIAST BP 80-89 MM HG: CPT | Mod: HCNC,CPTII,S$GLB, | Performed by: FAMILY MEDICINE

## 2025-04-08 PROCEDURE — 3074F SYST BP LT 130 MM HG: CPT | Mod: HCNC,CPTII,S$GLB, | Performed by: FAMILY MEDICINE

## 2025-04-08 PROCEDURE — 1159F MED LIST DOCD IN RCRD: CPT | Mod: HCNC,CPTII,S$GLB, | Performed by: FAMILY MEDICINE

## 2025-04-08 PROCEDURE — 99214 OFFICE O/P EST MOD 30 MIN: CPT | Mod: HCNC,S$GLB,, | Performed by: FAMILY MEDICINE

## 2025-04-08 PROCEDURE — 1157F ADVNC CARE PLAN IN RCRD: CPT | Mod: HCNC,CPTII,S$GLB, | Performed by: FAMILY MEDICINE

## 2025-04-08 PROCEDURE — G2211 COMPLEX E/M VISIT ADD ON: HCPCS | Mod: HCNC,S$GLB,, | Performed by: FAMILY MEDICINE

## 2025-04-08 PROCEDURE — 99999 PR PBB SHADOW E&M-EST. PATIENT-LVL IV: CPT | Mod: PBBFAC,HCNC,, | Performed by: FAMILY MEDICINE

## 2025-04-08 PROCEDURE — 1160F RVW MEDS BY RX/DR IN RCRD: CPT | Mod: HCNC,CPTII,S$GLB, | Performed by: FAMILY MEDICINE

## 2025-04-08 PROCEDURE — 1101F PT FALLS ASSESS-DOCD LE1/YR: CPT | Mod: HCNC,CPTII,S$GLB, | Performed by: FAMILY MEDICINE

## 2025-04-08 PROCEDURE — 3288F FALL RISK ASSESSMENT DOCD: CPT | Mod: HCNC,CPTII,S$GLB, | Performed by: FAMILY MEDICINE

## 2025-04-08 PROCEDURE — 1126F AMNT PAIN NOTED NONE PRSNT: CPT | Mod: HCNC,CPTII,S$GLB, | Performed by: FAMILY MEDICINE

## 2025-04-08 RX ORDER — LOSARTAN POTASSIUM 50 MG/1
50 TABLET ORAL 2 TIMES DAILY
Qty: 180 TABLET | Refills: 3 | Status: SHIPPED | OUTPATIENT
Start: 2025-04-08

## 2025-04-08 RX ORDER — PRAMIPEXOLE DIHYDROCHLORIDE 1.5 MG/1
1.5 TABLET ORAL NIGHTLY
Qty: 90 TABLET | Refills: 3 | Status: SHIPPED | OUTPATIENT
Start: 2025-04-08

## 2025-04-08 NOTE — PROGRESS NOTES
Chief Complaint:  Chief Complaint   Patient presents with    Follow-up     6 month        HPI:  Marta is a 79 y.o. year old     History of Present Illness    CHIEF COMPLAINT:  Mrata presents today for follow-up of diabetes management and weight loss.    WEIGHT MANAGEMENT:  She reports successful weight loss of 20 lbs on lowest dose of Ozempic without any side effects. She has made dietary modifications including reduced portion sizes, elimination of junk food and bread, and incorporation of protein powder in smoothies during morning and afternoon. She notes visible changes in clothing fit, particularly looseness in shirts around shoulders.    EDEMA:  She reports improvement in fluid retention though swelling still occurs with increased activity, particularly while moving apartments and being on feet for extended periods. She has difficulty with stairs, specifically noting challenges going up stairs.    NEUROPATHY:  She reports significant improvement in neuropathy symptoms with resolution of tingling sensations since starting Neriva approximately 2 months ago. She occasionally feels unusual sensations with gabapentin.    RESPIRATORY:  She reports improvement in breathing with as-needed use of Breo inhaler for symptom management.    HEADACHES:  She reports significant improvement in headaches with no recent episodes, noting a history of frequent headaches previously.      ROS:  Cardiovascular: +lower extremity edema  Gastrointestinal: no nausea, no constipation, +loss of appetite  Musculoskeletal: +pain with movement  Neurological: no headache, no tingling           Review of Systems   Constitutional:  Negative for fatigue, fever and unexpected weight change (down 20# with addition of ozempic).   HENT:  Negative for congestion and trouble swallowing.    Respiratory:  Negative for cough and shortness of breath (improved).    Cardiovascular:  Positive for leg swelling (improved). Negative for chest pain.    Gastrointestinal:  Negative for abdominal pain, constipation and nausea.   Genitourinary:  Negative for difficulty urinating.   Musculoskeletal:  Negative for arthralgias (feels much better with weight loss and neuropathy meds from rheum/tika) and gait problem (improving - ambulatory with cane instead of previous rollator).   Skin:  Positive for color change. Negative for rash.   Neurological:  Negative for headaches.   Psychiatric/Behavioral:  Negative for sleep disturbance.          Past Medical History:  Past Medical History:   Diagnosis Date    Anxiety     Arthralgia of knee     arthralgia of bilateral knees secondary to djd    Arthritis     Back pain     Depression     Encounter for blood transfusion     AUTOLOGUS    GERD (gastroesophageal reflux disease)     Hiatal hernia     repaired in 1979    History of seasonal allergies     History of shingles     Hypertension     Insomnia     Obesity     JESENIA (obstructive sleep apnea) 02/2020    Home sleep study - YESICA 57, Desat 69% - Severe JESENIA with desaturations    Osteoporosis     Pneumonia     Reactive airway disease     Restless legs syndrome     Rheumatic fever     at 4 y/o    Sleep apnea     no cpap         Vitals:  Vitals:    04/08/25 1027   BP: 118/82   Pulse: 62   SpO2: (!) 92%   Weight: 101.1 kg (222 lb 12.4 oz)   Height: 5' (1.524 m)   PainSc: 0-No pain       BP Readings from Last 5 Encounters:   04/08/25 118/82   02/05/25 (!) 140/80   01/13/25 (!) 189/117   09/18/24 136/88   08/02/24 138/82       The ASCVD Risk score (Lucinda MORTON, et al., 2019) failed to calculate for the following reasons:    The valid total cholesterol range is 130 to 320 mg/dL      Physical Exam:  Physical Exam  Vitals and nursing note reviewed.   Constitutional:       General: She is not in acute distress.     Appearance: Normal appearance. She is well-developed. She is obese.   HENT:      Head: Normocephalic and atraumatic.   Eyes:      General: No scleral icterus.        Right eye: No  discharge.         Left eye: No discharge.      Conjunctiva/sclera: Conjunctivae normal.   Cardiovascular:      Rate and Rhythm: Normal rate and regular rhythm.   Pulmonary:      Effort: Pulmonary effort is normal. No respiratory distress.      Breath sounds: Normal breath sounds.   Abdominal:      Palpations: Abdomen is soft.      Tenderness: There is no guarding.   Musculoskeletal:         General: No deformity or signs of injury.      Cervical back: Neck supple.      Right lower leg: Edema present.      Left lower leg: Edema present.   Skin:     General: Skin is warm and dry.      Findings: No rash.   Neurological:      General: No focal deficit present.      Mental Status: She is alert and oriented to person, place, and time.   Psychiatric:         Mood and Affect: Mood normal.         Behavior: Behavior normal.             Assessment & Plan:  Controlled type 2 diabetes mellitus without complication, without long-term current use of insulin  Comments:  cont ozempic currently 0.25mg weekly and tolerating well without negative side effects  no hypoglycemia  Orders:  -     Hemoglobin A1C; Future; Expected date: 04/08/2025  -     Comprehensive Metabolic Panel; Future; Expected date: 04/08/2025  -     TSH; Future; Expected date: 04/08/2025  -     Lipid Panel; Future; Expected date: 04/08/2025  -     Microalbumin/Creatinine Ratio, Urine; Future  -     Urinalysis, Reflex to Urine Culture    Chronic obstructive pulmonary disease, unspecified COPD type  Comments:  ensure breo at least qod going into heat/humidity this summer    Pulmonary hypertension  Comments:  stable, cont monitoring    Essential hypertension  Comments:  controlled, cont regimen; BP monitoring with weight loss as dose adj may be needed going forward  Orders:  -     losartan (COZAAR) 50 MG tablet; Take 1 tablet (50 mg total) by mouth 2 (two) times daily.  Dispense: 180 tablet; Refill: 3    Restless legs syndrome  -     pramipexole (MIRAPEX) 1.5 MG  tablet; Take 1 tablet (1.5 mg total) by mouth every evening.  Dispense: 90 tablet; Refill: 3    Severe obesity (BMI >= 40)  Comments:  down 20# since lov    Anemia, unspecified type  -     CBC Auto Differential; Future; Expected date: 04/08/2025    Controlled type 2 diabetes mellitus without complication, without long-term current use of insulin  -     Hemoglobin A1C; Future; Expected date: 04/08/2025  -     Comprehensive Metabolic Panel; Future; Expected date: 04/08/2025  -     TSH; Future; Expected date: 04/08/2025  -     Lipid Panel; Future; Expected date: 04/08/2025  -     Microalbumin/Creatinine Ratio, Urine; Future  -     Urinalysis, Reflex to Urine Culture    Edema, unspecified type  -     NT-Pro Natriuretic Peptide; Future; Expected date: 04/08/2025         Assessment & Plan    ORDERS:   Ordered CBC, chemistry panel, A1C, BNP, thyroid function tests, and lipid panel.    IMPRESSION:  Continued Ozempic 0.25 mg weekly due to good response without side effects or hypoglycemia.  Maintained current BP medication regimen as BP is well-controlled.  Continued Lasix for fluid retention management.  Continued Breo inhaler with instruction to use at least every other day in preparation for humid summer weather.  Continued gabapentin with instruction to reduce to nighttime use only on good days, while continuing Neriva supplement for neuropathy.  Continued Losartan.  Continued Mirapex.    PLAN SUMMARY:   Order CBC, chemistry panel, A1C, BNP, thyroid function tests, and lipid panel   Continue Lasix for fluid retention   Continue Losartan for blood pressure management   Continue Ozempic 0.25 mg weekly   Continue gabapentin, reduce to nighttime use only on good days   Continue Neriva supplement for neuropathy   Continue Breo inhaler, use at least every other day   Continue Mirapex   Increase protein intake, particularly through protein powder in smoothies   Reduce tea intake and increase water intake   Elevate feet after  moving activities   Follow up in 4 months (late July or early August)   Contact office if condition changes before next appointment   Follow up with NP if provider unavailable    PATIENT EDUCATION:   Explained importance of protein intake while on Ozempic to prevent excessive muscle loss.   Discussed potential negative effects of excessive tea intake on kidneys and bladder.   Explained proper storage requirements for refrigerated medications.    ACTION ITEMS/LIFESTYLE:   Marta to increase protein intake, particularly through protein powder in smoothies.   Marta to reduce tea intake and increase water intake.   Marta to continue current level of physical activity, including moving around as much as possible.   Marta to elevate feet after moving activities to manage fluid retention.    MEDICATIONS:   Continued Ozempic 0.25 mg weekly due to good response without side effects or hypoglycemia.   Maintained current BP medication regimen as BP is well-controlled.   Continued Lasix for fluid retention management.   Continued Breo inhaler with instruction to use at least every other day in preparation for humid summer weather.   Continued gabapentin with instruction to reduce to nighttime use only on good days, while continuing Neriva supplement for neuropathy.   Continued Losartan.   Continued Mirapex.    FOLLOW UP:   Follow up in 4 months (late July or early August).   Complete lab work before next appointment.   Follow up with NP if unavailable for follow-up visit.   Contact the office if any changes in condition occur before next appointment.         Visit today included increased complexity associated with the care of the episodic problem dm2 addressed and managing the longitudinal care of the patient due to the serious and/or complex managed problem(s) copd, htn, edema.    This note was generated with the assistance of ambient listening technology. Verbal consent was obtained by the patient and  accompanying visitor(s) for the recording of patient appointment to facilitate this note. I attest to having reviewed and edited the generated note for accuracy, though some syntax or spelling errors may persist. Please contact the author of this note for any clarification.

## 2025-05-05 ENCOUNTER — TELEPHONE (OUTPATIENT)
Dept: FAMILY MEDICINE | Facility: CLINIC | Age: 80
End: 2025-05-05

## 2025-05-05 ENCOUNTER — OFFICE VISIT (OUTPATIENT)
Dept: FAMILY MEDICINE | Facility: CLINIC | Age: 80
End: 2025-05-05
Payer: MEDICARE

## 2025-05-05 ENCOUNTER — LAB VISIT (OUTPATIENT)
Dept: LAB | Facility: HOSPITAL | Age: 80
End: 2025-05-05
Attending: EMERGENCY MEDICINE
Payer: MEDICARE

## 2025-05-05 VITALS
HEIGHT: 60 IN | WEIGHT: 227.75 LBS | OXYGEN SATURATION: 94 % | DIASTOLIC BLOOD PRESSURE: 78 MMHG | SYSTOLIC BLOOD PRESSURE: 122 MMHG | HEART RATE: 58 BPM | BODY MASS INDEX: 44.72 KG/M2

## 2025-05-05 DIAGNOSIS — R19.7 DIARRHEA OF PRESUMED INFECTIOUS ORIGIN: ICD-10-CM

## 2025-05-05 DIAGNOSIS — R41.0 CONFUSION: ICD-10-CM

## 2025-05-05 DIAGNOSIS — R19.7 DIARRHEA OF PRESUMED INFECTIOUS ORIGIN: Primary | ICD-10-CM

## 2025-05-05 PROBLEM — R79.89 ELEVATED TROPONIN: Status: ACTIVE | Noted: 2025-05-05

## 2025-05-05 PROBLEM — E87.20 LACTIC ACIDOSIS: Status: ACTIVE | Noted: 2025-05-05

## 2025-05-05 PROBLEM — N17.9 AKI (ACUTE KIDNEY INJURY): Status: ACTIVE | Noted: 2025-05-05

## 2025-05-05 PROBLEM — R57.9 SHOCK: Status: ACTIVE | Noted: 2025-05-05

## 2025-05-05 PROBLEM — E86.1 HYPOTENSION DUE TO HYPOVOLEMIA: Status: ACTIVE | Noted: 2025-05-05

## 2025-05-05 PROBLEM — R41.82 ALTERED MENTAL STATUS: Status: ACTIVE | Noted: 2025-05-05

## 2025-05-05 PROBLEM — Z71.89 ACP (ADVANCE CARE PLANNING): Status: ACTIVE | Noted: 2025-05-05

## 2025-05-05 PROBLEM — J96.01 ACUTE HYPOXEMIC RESPIRATORY FAILURE: Status: ACTIVE | Noted: 2025-05-05

## 2025-05-05 PROBLEM — N39.0 UTI (URINARY TRACT INFECTION): Status: ACTIVE | Noted: 2025-05-05

## 2025-05-05 PROBLEM — J81.1 PULMONARY EDEMA: Status: ACTIVE | Noted: 2025-05-05

## 2025-05-05 PROBLEM — D61.818 PANCYTOPENIA: Status: ACTIVE | Noted: 2025-05-05

## 2025-05-05 LAB
ABSOLUTE EOSINOPHIL (OHS): 0.07 K/UL
ABSOLUTE MONOCYTE (OHS): 0.75 K/UL (ref 0.3–1)
ABSOLUTE NEUTROPHIL COUNT (OHS): 16.42 K/UL (ref 1.8–7.7)
ALBUMIN SERPL BCP-MCNC: 2.2 G/DL (ref 3.5–5.2)
ALP SERPL-CCNC: 196 UNIT/L (ref 40–150)
ALT SERPL W/O P-5'-P-CCNC: 30 UNIT/L (ref 10–44)
ANION GAP (OHS): 16 MMOL/L (ref 8–16)
AST SERPL-CCNC: 28 UNIT/L (ref 11–45)
BASOPHILS # BLD AUTO: 0.13 K/UL
BASOPHILS NFR BLD AUTO: 0.7 %
BILIRUB SERPL-MCNC: 1.1 MG/DL (ref 0.1–1)
BUN SERPL-MCNC: 83 MG/DL (ref 8–23)
CALCIUM SERPL-MCNC: 8.5 MG/DL (ref 8.7–10.5)
CHLORIDE SERPL-SCNC: 101 MMOL/L (ref 95–110)
CO2 SERPL-SCNC: 22 MMOL/L (ref 23–29)
CREAT SERPL-MCNC: 4.5 MG/DL (ref 0.5–1.4)
ERYTHROCYTE [DISTWIDTH] IN BLOOD BY AUTOMATED COUNT: 17.7 % (ref 11.5–14.5)
GFR SERPLBLD CREATININE-BSD FMLA CKD-EPI: 9 ML/MIN/1.73/M2
GLUCOSE SERPL-MCNC: 99 MG/DL (ref 70–110)
HCT VFR BLD AUTO: 36.1 % (ref 37–48.5)
HGB BLD-MCNC: 12.1 GM/DL (ref 12–16)
IMM GRANULOCYTES # BLD AUTO: 0.28 K/UL (ref 0–0.04)
IMM GRANULOCYTES NFR BLD AUTO: 1.5 % (ref 0–0.5)
LYMPHOCYTES # BLD AUTO: 1.47 K/UL (ref 1–4.8)
MCH RBC QN AUTO: 27.3 PG (ref 27–31)
MCHC RBC AUTO-ENTMCNC: 33.5 G/DL (ref 32–36)
MCV RBC AUTO: 81 FL (ref 82–98)
NUCLEATED RBC (/100WBC) (OHS): 0 /100 WBC
PLATELET # BLD AUTO: 92 K/UL (ref 150–450)
PMV BLD AUTO: ABNORMAL FL
POTASSIUM SERPL-SCNC: 3.5 MMOL/L (ref 3.5–5.1)
PROT SERPL-MCNC: 6.5 GM/DL (ref 6–8.4)
RBC # BLD AUTO: 4.44 M/UL (ref 4–5.4)
RELATIVE EOSINOPHIL (OHS): 0.4 %
RELATIVE LYMPHOCYTE (OHS): 7.7 % (ref 18–48)
RELATIVE MONOCYTE (OHS): 3.9 % (ref 4–15)
RELATIVE NEUTROPHIL (OHS): 85.8 % (ref 38–73)
SODIUM SERPL-SCNC: 139 MMOL/L (ref 136–145)
WBC # BLD AUTO: 19.12 K/UL (ref 3.9–12.7)

## 2025-05-05 PROCEDURE — 1157F ADVNC CARE PLAN IN RCRD: CPT | Mod: CPTII,HCNC,S$GLB, | Performed by: EMERGENCY MEDICINE

## 2025-05-05 PROCEDURE — 99213 OFFICE O/P EST LOW 20 MIN: CPT | Mod: HCNC,S$GLB,, | Performed by: EMERGENCY MEDICINE

## 2025-05-05 PROCEDURE — 1160F RVW MEDS BY RX/DR IN RCRD: CPT | Mod: CPTII,HCNC,S$GLB, | Performed by: EMERGENCY MEDICINE

## 2025-05-05 PROCEDURE — 3288F FALL RISK ASSESSMENT DOCD: CPT | Mod: CPTII,HCNC,S$GLB, | Performed by: EMERGENCY MEDICINE

## 2025-05-05 PROCEDURE — 1125F AMNT PAIN NOTED PAIN PRSNT: CPT | Mod: CPTII,HCNC,S$GLB, | Performed by: EMERGENCY MEDICINE

## 2025-05-05 PROCEDURE — 1101F PT FALLS ASSESS-DOCD LE1/YR: CPT | Mod: CPTII,HCNC,S$GLB, | Performed by: EMERGENCY MEDICINE

## 2025-05-05 PROCEDURE — 99999 PR PBB SHADOW E&M-EST. PATIENT-LVL IV: CPT | Mod: PBBFAC,HCNC,, | Performed by: EMERGENCY MEDICINE

## 2025-05-05 PROCEDURE — 3074F SYST BP LT 130 MM HG: CPT | Mod: CPTII,HCNC,S$GLB, | Performed by: EMERGENCY MEDICINE

## 2025-05-05 PROCEDURE — 3078F DIAST BP <80 MM HG: CPT | Mod: CPTII,HCNC,S$GLB, | Performed by: EMERGENCY MEDICINE

## 2025-05-05 PROCEDURE — 85025 COMPLETE CBC W/AUTO DIFF WBC: CPT | Mod: HCNC

## 2025-05-05 PROCEDURE — 36415 COLL VENOUS BLD VENIPUNCTURE: CPT | Mod: HCNC,PO

## 2025-05-05 PROCEDURE — 82040 ASSAY OF SERUM ALBUMIN: CPT | Mod: HCNC,PO

## 2025-05-05 PROCEDURE — 1159F MED LIST DOCD IN RCRD: CPT | Mod: CPTII,HCNC,S$GLB, | Performed by: EMERGENCY MEDICINE

## 2025-05-05 NOTE — PROGRESS NOTES
Name: Marta Huff  MRN: [id number]  : 1945  PCP: Elda Holley MD    Subjective   Marta Huff is here today for diarrhea and confusion.        Review of Systems   Constitutional: Negative.    HENT: Negative.     Eyes: Negative.    Respiratory: Negative.     Cardiovascular: Negative.    Gastrointestinal:  Positive for diarrhea.   Endocrine: Negative.    Genitourinary: Negative.    Musculoskeletal: Negative.    Allergic/Immunologic: Negative.    Neurological: Negative.    Hematological: Negative.    Psychiatric/Behavioral:  Positive for confusion.    All other systems reviewed and are negative.      Problem List[1]    There are no preventive care reminders to display for this patient.    Objective   Vitals:    25 0935   BP: 122/78   Pulse: (!) 58       Physical Exam  Vitals and nursing note reviewed.   Constitutional:       General: She is not in acute distress.     Appearance: Normal appearance. She is well-developed.   HENT:      Head: Normocephalic and atraumatic.      Right Ear: External ear normal.      Left Ear: External ear normal.      Mouth/Throat:      Mouth: Mucous membranes are moist.   Eyes:      General: Lids are normal. Gaze aligned appropriately.      Extraocular Movements: Extraocular movements intact.      Conjunctiva/sclera: Conjunctivae normal.      Pupils: Pupils are equal, round, and reactive to light.   Cardiovascular:      Rate and Rhythm: Normal rate and regular rhythm.      Heart sounds: No murmur heard.     No friction rub. No gallop.   Pulmonary:      Effort: Pulmonary effort is normal. No respiratory distress.      Breath sounds: Normal breath sounds and air entry. No wheezing, rhonchi or rales.   Abdominal:      General: Abdomen is flat and protuberant. Bowel sounds are increased. There is no distension.      Tenderness: There is generalized abdominal tenderness.   Musculoskeletal:         General: No swelling or deformity.      Cervical back: Full  passive range of motion without pain, normal range of motion and neck supple.      Right lower leg: No edema.      Left lower leg: No edema.   Skin:     General: Skin is warm and dry.      Coloration: Skin is not jaundiced.   Neurological:      General: No focal deficit present.      Mental Status: She is alert and oriented to person, place, and time. Mental status is at baseline.      GCS: GCS eye subscore is 4. GCS verbal subscore is 5. GCS motor subscore is 6.   Psychiatric:         Attention and Perception: Attention and perception normal.         Mood and Affect: Mood normal.         Speech: Speech normal.         Behavior: Behavior normal. Behavior is cooperative.         Thought Content: Thought content normal.         Cognition and Memory: Cognition normal.         Judgment: Judgment normal.         Assessment & Plan  Diarrhea of presumed infectious origin  Patient was seen at an outside facility and prescribed a medication for diarrhea.  She took it; Promethazine; and had slurred speech and some confusion.  Patients diarrhea has not improved.  We will send some labs including stool studies.        Orders:    CBC Auto Differential; Future    Comprehensive Metabolic Panel; Future    Gastrointestinal Pathogens Panel, PCR; Future    Confusion  Patient was prescribed Promethazine and had confusion.  This is an expected side affect / adverse effect.             Follow up  with  in a week    Patient is encouraged to contact me at anytime via [employer] [facility] or my office for any needs.    Isaac Fitch MD  05/05/2025    DISCLAIMER: This note was prepared with [employer] voice recognition transcription software. Garbled syntax, mangled pronouns, and other bizarre constructions may be attributed to that software system.  I attest to having reviewed and edited the generated note for accuracy, though some syntax or spelling errors may persist. Please contact the author of this note for any  clarification.         [1]   Patient Active Problem List  Diagnosis    Essential hypertension    Mild major depression    Restless legs syndrome    Primary osteoarthritis involving multiple joints    GERD (gastroesophageal reflux disease)    Insomnia    Osteopenia    Pulmonary hypertension    Hereditary and idiopathic peripheral neuropathy    Morbid obesity with BMI of 45.0-49.9, adult s/p laparoscopic Danielle-N-Y    JESENIA on CPAP    Left lumbar radiculopathy    Chronic pain associated with significant psychosocial dysfunction    Peripheral polyneuropathy    Decreased activities of daily living (ADL)    Fatty liver    Renal cyst    Moderate persistent asthma    Incisional hernia    COPD (chronic obstructive pulmonary disease)    Controlled type 2 diabetes mellitus without complication, without long-term current use of insulin    Nontraumatic tear of left rotator cuff    Arthritis of right shoulder region    Left shoulder pain    Edema

## 2025-05-05 NOTE — TELEPHONE ENCOUNTER
----- Message from Isaac Fitch MD sent at 5/5/2025 11:04 AM CDT -----  Please call and send to Emergency Department   ----- Message -----  From: Lab, Background User  Sent: 5/5/2025  10:59 AM CDT  To: Isaac Fitch MD

## 2025-05-05 NOTE — TELEPHONE ENCOUNTER
Left pt a voice message informing them that they need to go to the Emergency Department for fluids and further evaluation.

## 2025-05-06 PROBLEM — B96.20 E COLI BACTEREMIA: Status: ACTIVE | Noted: 2025-05-06

## 2025-05-06 PROBLEM — R78.81 E COLI BACTEREMIA: Status: ACTIVE | Noted: 2025-05-06

## 2025-05-06 PROBLEM — Z51.5 ENCOUNTER FOR PALLIATIVE CARE: Status: ACTIVE | Noted: 2025-05-06

## 2025-05-07 PROBLEM — I48.91 A-FIB: Status: ACTIVE | Noted: 2025-05-07

## 2025-05-12 PROBLEM — G93.41 ACUTE METABOLIC ENCEPHALOPATHY: Status: ACTIVE | Noted: 2025-01-01

## (undated) DEVICE — REMOVER LOTION

## (undated) DEVICE — SPONGE SUPER KERLIX 6X6.75IN

## (undated) DEVICE — APPLICATOR CHLORAPREP ORN 26ML

## (undated) DEVICE — RELOAD ECHELON FLEX BLU 60MM

## (undated) DEVICE — CHLORAPREP W TINT 26ML APPL

## (undated) DEVICE — SYR GLASS 5CC LUER LOK

## (undated) DEVICE — SET DECANTER MEDICHOICE

## (undated) DEVICE — DRAPE HALF SURGICAL 40X58IN

## (undated) DEVICE — DURAPREP SURG SCRUB 26ML

## (undated) DEVICE — MARKER SKIN STND TIP BLUE BARR

## (undated) DEVICE — SHEARS HARMONIC 36CM HD 1000I

## (undated) DEVICE — SEE MEDLINE ITEM 157117

## (undated) DEVICE — CANNULA CVD 100MM X 20G

## (undated) DEVICE — SCISSOR CURVED ENDOPATH 5MM

## (undated) DEVICE — KIT FREELINK REMOTE CONTROL

## (undated) DEVICE — SOL WATER STRL IRR 1000ML

## (undated) DEVICE — SEE MEDLINE ITEM 157128

## (undated) DEVICE — PATIENT TRIAL KIT

## (undated) DEVICE — SEE MEDLINE ITEM 152622

## (undated) DEVICE — CUP MEDICINE STERILE 2OZ

## (undated) DEVICE — WRENCH HEXAGONAL 3

## (undated) DEVICE — SUT 0 VICRYL / UR6 (J603)

## (undated) DEVICE — COVER TRNSDUC CIV-FLX 8.9X91.5

## (undated) DEVICE — GLOVE SURG ULTRA TOUCH 7.5

## (undated) DEVICE — SUT PRLENE 1CT 1 BL MONO 30

## (undated) DEVICE — GLOVE SURGICAL LATEX SZ 7

## (undated) DEVICE — Device

## (undated) DEVICE — DRAPE C ARM 42 X 120 10/BX

## (undated) DEVICE — DRAPE INCISE IOBAN 2 23X17IN

## (undated) DEVICE — TOOL TUNNELING 28CM

## (undated) DEVICE — IRRIGATOR SUCTION W/TIP

## (undated) DEVICE — SUT ETHIBOND 0 CR/CT-1 8-18

## (undated) DEVICE — TUBING PNEUMO

## (undated) DEVICE — SEE MEDLINE ITEM 157148

## (undated) DEVICE — GELPOINT MINI KIT ENDOSCOPIC

## (undated) DEVICE — SYR 10CC LUER LOCK

## (undated) DEVICE — SUT 0 VICRYL / CT-1

## (undated) DEVICE — SEE MEDLINE ITEM 146313

## (undated) DEVICE — DRAPE ABDOMINAL TIBURON 14X11

## (undated) DEVICE — PAD ELECTROSURGICAL PAT PLATE

## (undated) DEVICE — KIT SEAL HEMSTAT EVICEL 35CM

## (undated) DEVICE — TROCAR KII FIOS 12MM X 100MM

## (undated) DEVICE — SOL NACL 0.9% INJ PF/100 150

## (undated) DEVICE — SLEEVE SCD EXPRESS CALF MEDIUM

## (undated) DEVICE — SUT 2-0 VICRYL / SH (J417)

## (undated) DEVICE — LABEL FOR UTILITY MARKER

## (undated) DEVICE — GAUZE SPONGE 4X4 12PLY

## (undated) DEVICE — SYR DISP LL 5CC

## (undated) DEVICE — TRAY NERVE BLOCK

## (undated) DEVICE — STAPLER ECHELON FLEX 60MM 44CM

## (undated) DEVICE — SYR 50CC LL

## (undated) DEVICE — SPONGE DERMACEA 4X4IN 12PLY

## (undated) DEVICE — SUT 3-0 12-18IN SILK

## (undated) DEVICE — ELECTRODE REM PLYHSV RETURN 9

## (undated) DEVICE — SEE MEDLINE ITEM 157131

## (undated) DEVICE — UNDERGLOVES BIOGEL PI SIZE 8

## (undated) DEVICE — TRANSFER MATTRES LATERAL 34

## (undated) DEVICE — SUT CTD VICRYL VIL BR SH 27

## (undated) DEVICE — DRESSING ADHESIVE ISLAND 3 X 6

## (undated) DEVICE — STAPLER EEA XL 21MM SINGLE USE

## (undated) DEVICE — SUT 0 18IN NUROLON BLK BRA

## (undated) DEVICE — CLIP ENDO LIGATION LARGE CLIPS

## (undated) DEVICE — TOWEL OR DISP STRL BLUE 4/PK

## (undated) DEVICE — APPLICATOR CHLORAPREP CLR 10.5

## (undated) DEVICE — SUT SILK 0 SH 30IN BLK BR

## (undated) DEVICE — LINER SUCTION 3000CC

## (undated) DEVICE — CABLE SPECTRA 2 2X8 OR EXT

## (undated) DEVICE — RELOAD ECHELON FLEX WHT 60MM

## (undated) DEVICE — DRESSING TRANS 6X8 TEGADERM

## (undated) DEVICE — SEE L#120831

## (undated) DEVICE — DEVICE FIXATE SUT BAND DUAL PK

## (undated) DEVICE — SEE MEDLINE ITEM 146417

## (undated) DEVICE — SOL CLEARIFY VISUALIZATION LAP

## (undated) DEVICE — SOL IRR NACL .9% 3000ML

## (undated) DEVICE — PENCIL ROCKER SWITCH 10FT CORD

## (undated) DEVICE — SUT SILK 2-0 BLK BR PS-2 18

## (undated) DEVICE — SUT CTD VICRYL 2-0 VIL BR C

## (undated) DEVICE — SUT MONOCYRL 4-0 PS2 UND

## (undated) DEVICE — SUT 0 30IN PROLENE BL MONO

## (undated) DEVICE — GLOVE SURG ULTRA TOUCH 8

## (undated) DEVICE — KIT PROCEDURE STER INLET CLOSU

## (undated) DEVICE — SUT MONOCRYL 4-0 PS-2

## (undated) DEVICE — NDL SPINAL SPINOCAN 22GX3.5

## (undated) DEVICE — SUT 3-0 VICRYL / SH (J416)

## (undated) DEVICE — SUT MONOCRYL 4-0 SH UND MON

## (undated) DEVICE — NDL 18GA X1 1/2 REG BEVEL

## (undated) DEVICE — LUBRICANT SURGILUBE 2 OZ

## (undated) DEVICE — DRAPE THREE-QTR REINF 53X77IN

## (undated) DEVICE — NDL HYPO 27G X 1 1/2

## (undated) DEVICE — TROCAR KII FIOS 5MM X 100MM

## (undated) DEVICE — SCALPEL #11 BLADE STRL DISP

## (undated) DEVICE — SUT 2/0 30IN SILK BLK BRAI

## (undated) DEVICE — NDL SAFETY 21G X 1 1/2 ECLPSE

## (undated) DEVICE — KIT FIBRIN SEALANT EVICEL 5 ML

## (undated) DEVICE — CABLE EXT SPECTRA 2FT 1X16

## (undated) DEVICE — STAPLER SKIN ROTATING HEAD

## (undated) DEVICE — CLOSURE SKIN STERI STRIP 1/4X3

## (undated) DEVICE — SYR SLIP TIP 10ML SHIELD

## (undated) DEVICE — ANVIL ACCESSORY 21MM DST

## (undated) DEVICE — DRESSING TRANS 4X4 TEGADERM

## (undated) DEVICE — STRAP OR TABLE 5IN X 72IN

## (undated) DEVICE — SEE MEDLINE ITEM 153151

## (undated) DEVICE — PACK CUSTOM UNIV BASIN SLI

## (undated) DEVICE — SUT 2-0 VICRYL / CT-1

## (undated) DEVICE — TAPE MEDIPORE 4IN X 2YDS

## (undated) DEVICE — BANDAGE ADHESIVE PLASTIC 7/8IN

## (undated) DEVICE — SUT 1 36IN PDS II

## (undated) DEVICE — PAD GROUNDING DISPER ELECTRODE

## (undated) DEVICE — CLOSURE SKIN STERI STRIP 1/2X4

## (undated) DEVICE — SLEEVE SCD EXPRESS CALF LARGE

## (undated) DEVICE — BLADE SURG CARBON STEEL SZ11

## (undated) DEVICE — SEE MEDLINE ITEM 146292

## (undated) DEVICE — CARTRIDGE BABCOCK GRASPER 5X45

## (undated) DEVICE — SOL STRL WATER INJ 1000ML BG

## (undated) DEVICE — GAUZE SPONGE PEANUT STRL

## (undated) DEVICE — SEE MEDLINE ITEM 152741

## (undated) DEVICE — NDL SPINAL 18GX3.5 SPINOCAN

## (undated) DEVICE — SEE MEDLINE ITEM 152680